# Patient Record
Sex: FEMALE | Race: WHITE | Employment: OTHER | URBAN - NONMETROPOLITAN AREA
[De-identification: names, ages, dates, MRNs, and addresses within clinical notes are randomized per-mention and may not be internally consistent; named-entity substitution may affect disease eponyms.]

---

## 2017-02-08 ENCOUNTER — PROCEDURE VISIT (OUTPATIENT)
Dept: CARDIOLOGY | Age: 22
End: 2017-02-08

## 2017-02-08 DIAGNOSIS — Z45.09 ENCOUNTER FOR ELECTRONIC ANALYSIS OF REVEAL EVENT RECORDER: Primary | ICD-10-CM

## 2017-02-08 PROCEDURE — 93298 REM INTERROG DEV EVAL SCRMS: CPT | Performed by: INTERNAL MEDICINE

## 2017-02-20 ENCOUNTER — OFFICE VISIT (OUTPATIENT)
Dept: FAMILY MEDICINE CLINIC | Age: 22
End: 2017-02-20

## 2017-02-20 VITALS
TEMPERATURE: 98.8 F | WEIGHT: 241.7 LBS | HEART RATE: 116 BPM | SYSTOLIC BLOOD PRESSURE: 108 MMHG | HEIGHT: 70 IN | DIASTOLIC BLOOD PRESSURE: 60 MMHG | BODY MASS INDEX: 34.6 KG/M2 | RESPIRATION RATE: 16 BRPM

## 2017-02-20 DIAGNOSIS — K11.20 SIALADENITIS: Primary | ICD-10-CM

## 2017-02-20 PROCEDURE — G8484 FLU IMMUNIZE NO ADMIN: HCPCS | Performed by: NURSE PRACTITIONER

## 2017-02-20 PROCEDURE — G8427 DOCREV CUR MEDS BY ELIG CLIN: HCPCS | Performed by: NURSE PRACTITIONER

## 2017-02-20 PROCEDURE — 1036F TOBACCO NON-USER: CPT | Performed by: NURSE PRACTITIONER

## 2017-02-20 PROCEDURE — 99213 OFFICE O/P EST LOW 20 MIN: CPT | Performed by: NURSE PRACTITIONER

## 2017-02-20 PROCEDURE — G8417 CALC BMI ABV UP PARAM F/U: HCPCS | Performed by: NURSE PRACTITIONER

## 2017-02-20 RX ORDER — CETIRIZINE HYDROCHLORIDE 10 MG/1
10 TABLET ORAL DAILY
Qty: 90 TABLET | Refills: 3 | Status: SHIPPED | OUTPATIENT
Start: 2017-02-20

## 2017-02-20 ASSESSMENT — ENCOUNTER SYMPTOMS
SORE THROAT: 0
RESPIRATORY NEGATIVE: 1
FACIAL SWELLING: 0
TROUBLE SWALLOWING: 0

## 2017-03-15 ENCOUNTER — TELEPHONE (OUTPATIENT)
Dept: CARDIOLOGY | Age: 22
End: 2017-03-15

## 2017-04-20 ENCOUNTER — TELEPHONE (OUTPATIENT)
Dept: CARDIOLOGY | Age: 22
End: 2017-04-20

## 2017-05-04 ENCOUNTER — TELEPHONE (OUTPATIENT)
Dept: CARDIOLOGY | Age: 22
End: 2017-05-04

## 2017-05-04 ENCOUNTER — PROCEDURE VISIT (OUTPATIENT)
Dept: CARDIOLOGY | Age: 22
End: 2017-05-04

## 2017-05-04 DIAGNOSIS — Z45.09 ENCOUNTER FOR ELECTRONIC ANALYSIS OF REVEAL EVENT RECORDER: Primary | ICD-10-CM

## 2017-05-04 PROCEDURE — 93298 REM INTERROG DEV EVAL SCRMS: CPT | Performed by: INTERNAL MEDICINE

## 2017-05-05 PROBLEM — I26.99 ACUTE PULMONARY EMBOLISM (HCC): Status: ACTIVE | Noted: 2017-05-05

## 2017-05-05 PROBLEM — R07.9 CHEST PAIN: Status: ACTIVE | Noted: 2017-05-05

## 2017-05-05 PROBLEM — N64.4 BREAST PAIN, RIGHT: Status: ACTIVE | Noted: 2017-05-05

## 2017-05-06 PROBLEM — R07.1 CHEST PAIN ON BREATHING: Status: ACTIVE | Noted: 2017-05-05

## 2017-05-08 ENCOUNTER — TELEPHONE (OUTPATIENT)
Dept: FAMILY MEDICINE CLINIC | Age: 22
End: 2017-05-08

## 2017-05-09 ENCOUNTER — TELEPHONE (OUTPATIENT)
Dept: FAMILY MEDICINE CLINIC | Age: 22
End: 2017-05-09

## 2017-05-16 ENCOUNTER — OFFICE VISIT (OUTPATIENT)
Dept: FAMILY MEDICINE CLINIC | Age: 22
End: 2017-05-16

## 2017-05-16 VITALS
TEMPERATURE: 98.9 F | WEIGHT: 254.6 LBS | OXYGEN SATURATION: 98 % | RESPIRATION RATE: 13 BRPM | BODY MASS INDEX: 36.45 KG/M2 | HEART RATE: 107 BPM | SYSTOLIC BLOOD PRESSURE: 116 MMHG | HEIGHT: 70 IN | DIASTOLIC BLOOD PRESSURE: 66 MMHG

## 2017-05-16 DIAGNOSIS — F33.41 MAJOR DEPRESSIVE DISORDER, RECURRENT, IN PARTIAL REMISSION (HCC): ICD-10-CM

## 2017-05-16 DIAGNOSIS — I26.99 OTHER ACUTE PULMONARY EMBOLISM WITHOUT ACUTE COR PULMONALE (HCC): Primary | ICD-10-CM

## 2017-05-16 PROCEDURE — 1111F DSCHRG MED/CURRENT MED MERGE: CPT | Performed by: NURSE PRACTITIONER

## 2017-05-16 PROCEDURE — 99214 OFFICE O/P EST MOD 30 MIN: CPT | Performed by: NURSE PRACTITIONER

## 2017-05-16 PROCEDURE — G8427 DOCREV CUR MEDS BY ELIG CLIN: HCPCS | Performed by: NURSE PRACTITIONER

## 2017-05-16 PROCEDURE — 1036F TOBACCO NON-USER: CPT | Performed by: NURSE PRACTITIONER

## 2017-05-16 PROCEDURE — G8417 CALC BMI ABV UP PARAM F/U: HCPCS | Performed by: NURSE PRACTITIONER

## 2017-05-16 RX ORDER — INDOMETHACIN 50 MG/1
50 CAPSULE ORAL 2 TIMES DAILY WITH MEALS
COMMUNITY
End: 2017-05-16 | Stop reason: ALTCHOICE

## 2017-05-16 ASSESSMENT — PATIENT HEALTH QUESTIONNAIRE - PHQ9
SUM OF ALL RESPONSES TO PHQ9 QUESTIONS 1 & 2: 0
2. FEELING DOWN, DEPRESSED OR HOPELESS: 0
1. LITTLE INTEREST OR PLEASURE IN DOING THINGS: 0
SUM OF ALL RESPONSES TO PHQ QUESTIONS 1-9: 0

## 2017-05-17 ENCOUNTER — OFFICE VISIT (OUTPATIENT)
Dept: CARDIOLOGY | Age: 22
End: 2017-05-17

## 2017-05-17 VITALS
WEIGHT: 252 LBS | HEART RATE: 93 BPM | DIASTOLIC BLOOD PRESSURE: 80 MMHG | SYSTOLIC BLOOD PRESSURE: 128 MMHG | BODY MASS INDEX: 36.08 KG/M2 | HEIGHT: 70 IN

## 2017-05-17 DIAGNOSIS — R07.1 CHEST PAIN ON BREATHING: Primary | ICD-10-CM

## 2017-05-17 PROCEDURE — 1036F TOBACCO NON-USER: CPT | Performed by: INTERNAL MEDICINE

## 2017-05-17 PROCEDURE — G8417 CALC BMI ABV UP PARAM F/U: HCPCS | Performed by: INTERNAL MEDICINE

## 2017-05-17 PROCEDURE — 93000 ELECTROCARDIOGRAM COMPLETE: CPT | Performed by: INTERNAL MEDICINE

## 2017-05-17 PROCEDURE — 1111F DSCHRG MED/CURRENT MED MERGE: CPT | Performed by: INTERNAL MEDICINE

## 2017-05-17 PROCEDURE — 99205 OFFICE O/P NEW HI 60 MIN: CPT | Performed by: INTERNAL MEDICINE

## 2017-05-17 PROCEDURE — G8427 DOCREV CUR MEDS BY ELIG CLIN: HCPCS | Performed by: INTERNAL MEDICINE

## 2017-05-17 RX ORDER — CLONIDINE HYDROCHLORIDE 0.1 MG/1
0.1 TABLET ORAL 2 TIMES DAILY
Qty: 60 TABLET | Refills: 3 | Status: SHIPPED | OUTPATIENT
Start: 2017-05-17 | End: 2017-09-21 | Stop reason: SDUPTHER

## 2017-05-17 ASSESSMENT — ENCOUNTER SYMPTOMS
BLURRED VISION: 0
ABDOMINAL PAIN: 0
COUGH: 0
ABDOMINAL PAIN: 0
SHORTNESS OF BREATH: 0
NAUSEA: 0
CHEST TIGHTNESS: 1
SHORTNESS OF BREATH: 0
DIARRHEA: 0
DOUBLE VISION: 0
COUGH: 0
NAUSEA: 0
WHEEZING: 0
RIGHT EYE: 0
LEFT EYE: 0
CONSTIPATION: 0
SORE THROAT: 0
VOMITING: 0
BACK PAIN: 0

## 2017-06-06 ENCOUNTER — CARE COORDINATION (OUTPATIENT)
Dept: FAMILY MEDICINE CLINIC | Age: 22
End: 2017-06-06

## 2017-06-09 ENCOUNTER — TELEPHONE (OUTPATIENT)
Dept: CARDIOLOGY | Age: 22
End: 2017-06-09

## 2017-06-09 ENCOUNTER — PROCEDURE VISIT (OUTPATIENT)
Dept: CARDIOLOGY | Age: 22
End: 2017-06-09

## 2017-06-09 DIAGNOSIS — Z45.09 ENCOUNTER FOR ELECTRONIC ANALYSIS OF REVEAL EVENT RECORDER: Primary | ICD-10-CM

## 2017-06-09 PROCEDURE — 93298 REM INTERROG DEV EVAL SCRMS: CPT | Performed by: INTERNAL MEDICINE

## 2017-06-20 ENCOUNTER — OFFICE VISIT (OUTPATIENT)
Dept: FAMILY MEDICINE CLINIC | Age: 22
End: 2017-06-20

## 2017-06-20 VITALS
RESPIRATION RATE: 14 BRPM | HEIGHT: 70 IN | SYSTOLIC BLOOD PRESSURE: 124 MMHG | OXYGEN SATURATION: 98 % | HEART RATE: 134 BPM | DIASTOLIC BLOOD PRESSURE: 74 MMHG | WEIGHT: 258.2 LBS | BODY MASS INDEX: 36.97 KG/M2 | TEMPERATURE: 98.5 F

## 2017-06-20 DIAGNOSIS — G90.A POTS (POSTURAL ORTHOSTATIC TACHYCARDIA SYNDROME): ICD-10-CM

## 2017-06-20 DIAGNOSIS — R76.8 POSITIVE ANA (ANTINUCLEAR ANTIBODY): ICD-10-CM

## 2017-06-20 DIAGNOSIS — K21.9 GASTROESOPHAGEAL REFLUX DISEASE WITHOUT ESOPHAGITIS: ICD-10-CM

## 2017-06-20 DIAGNOSIS — F41.1 GAD (GENERALIZED ANXIETY DISORDER): ICD-10-CM

## 2017-06-20 DIAGNOSIS — F33.0 MAJOR DEPRESSIVE DISORDER, RECURRENT EPISODE, MILD (HCC): Chronic | ICD-10-CM

## 2017-06-20 DIAGNOSIS — E79.0 ELEVATED URIC ACID IN BLOOD: Primary | ICD-10-CM

## 2017-06-20 DIAGNOSIS — R10.13 DYSPEPSIA: ICD-10-CM

## 2017-06-20 PROCEDURE — G8427 DOCREV CUR MEDS BY ELIG CLIN: HCPCS | Performed by: NURSE PRACTITIONER

## 2017-06-20 PROCEDURE — 99213 OFFICE O/P EST LOW 20 MIN: CPT | Performed by: NURSE PRACTITIONER

## 2017-06-20 PROCEDURE — 1036F TOBACCO NON-USER: CPT | Performed by: NURSE PRACTITIONER

## 2017-06-20 PROCEDURE — G8417 CALC BMI ABV UP PARAM F/U: HCPCS | Performed by: NURSE PRACTITIONER

## 2017-06-20 RX ORDER — COLCHICINE 0.6 MG/1
0.6 TABLET ORAL DAILY
Qty: 5 TABLET | Refills: 0 | Status: SHIPPED | OUTPATIENT
Start: 2017-06-20 | End: 2018-01-05 | Stop reason: ALTCHOICE

## 2017-06-21 PROBLEM — R76.8 POSITIVE ANA (ANTINUCLEAR ANTIBODY): Status: ACTIVE | Noted: 2017-06-21

## 2017-06-21 ASSESSMENT — ENCOUNTER SYMPTOMS
COUGH: 0
SHORTNESS OF BREATH: 0
NAUSEA: 1
ABDOMINAL PAIN: 0
VOMITING: 1

## 2017-07-05 ENCOUNTER — OFFICE VISIT (OUTPATIENT)
Dept: CARDIOLOGY | Age: 22
End: 2017-07-05

## 2017-07-05 ENCOUNTER — CARE COORDINATION (OUTPATIENT)
Dept: FAMILY MEDICINE CLINIC | Age: 22
End: 2017-07-05

## 2017-07-05 VITALS
DIASTOLIC BLOOD PRESSURE: 82 MMHG | HEIGHT: 70 IN | RESPIRATION RATE: 22 BRPM | WEIGHT: 270 LBS | SYSTOLIC BLOOD PRESSURE: 133 MMHG | HEART RATE: 121 BPM | BODY MASS INDEX: 38.65 KG/M2 | OXYGEN SATURATION: 98 %

## 2017-07-05 DIAGNOSIS — R42 DIZZINESS: Primary | ICD-10-CM

## 2017-07-05 DIAGNOSIS — R00.0 TACHYCARDIA: ICD-10-CM

## 2017-07-05 PROCEDURE — 93000 ELECTROCARDIOGRAM COMPLETE: CPT | Performed by: INTERNAL MEDICINE

## 2017-07-05 PROCEDURE — 1036F TOBACCO NON-USER: CPT | Performed by: INTERNAL MEDICINE

## 2017-07-05 PROCEDURE — 99213 OFFICE O/P EST LOW 20 MIN: CPT | Performed by: INTERNAL MEDICINE

## 2017-07-05 PROCEDURE — G8427 DOCREV CUR MEDS BY ELIG CLIN: HCPCS | Performed by: INTERNAL MEDICINE

## 2017-07-05 PROCEDURE — G8417 CALC BMI ABV UP PARAM F/U: HCPCS | Performed by: INTERNAL MEDICINE

## 2017-07-05 ASSESSMENT — ENCOUNTER SYMPTOMS
RIGHT EYE: 0
LEFT EYE: 0
ABDOMINAL PAIN: 0
COUGH: 0
SHORTNESS OF BREATH: 0
SORE THROAT: 0
WHEEZING: 0
CONSTIPATION: 0
NAUSEA: 0
DOUBLE VISION: 0
VOMITING: 0
DIARRHEA: 0
BLURRED VISION: 0
BACK PAIN: 0

## 2017-07-25 ENCOUNTER — PROCEDURE VISIT (OUTPATIENT)
Dept: CARDIOLOGY CLINIC | Age: 22
End: 2017-07-25
Payer: COMMERCIAL

## 2017-07-25 DIAGNOSIS — Z45.09 ENCOUNTER FOR ELECTRONIC ANALYSIS OF REVEAL EVENT RECORDER: Primary | ICD-10-CM

## 2017-07-25 PROCEDURE — 93298 REM INTERROG DEV EVAL SCRMS: CPT | Performed by: INTERNAL MEDICINE

## 2017-08-07 ENCOUNTER — OFFICE VISIT (OUTPATIENT)
Dept: FAMILY MEDICINE CLINIC | Age: 22
End: 2017-08-07
Payer: COMMERCIAL

## 2017-08-07 ENCOUNTER — HOSPITAL ENCOUNTER (OUTPATIENT)
Age: 22
Discharge: HOME | End: 2017-08-07
Payer: COMMERCIAL

## 2017-08-07 VITALS
SYSTOLIC BLOOD PRESSURE: 118 MMHG | HEART RATE: 76 BPM | BODY MASS INDEX: 38.48 KG/M2 | DIASTOLIC BLOOD PRESSURE: 80 MMHG | RESPIRATION RATE: 16 BRPM | HEIGHT: 70 IN | OXYGEN SATURATION: 98 % | WEIGHT: 268.8 LBS

## 2017-08-07 DIAGNOSIS — R76.8 POSITIVE ANA (ANTINUCLEAR ANTIBODY): ICD-10-CM

## 2017-08-07 DIAGNOSIS — D64.9 ANEMIA, UNSPECIFIED TYPE: Primary | ICD-10-CM

## 2017-08-07 DIAGNOSIS — D64.9 ANEMIA, UNSPECIFIED TYPE: ICD-10-CM

## 2017-08-07 DIAGNOSIS — Z79.01 ANTICOAGULANT LONG-TERM USE: ICD-10-CM

## 2017-08-07 DIAGNOSIS — K21.9 GASTROESOPHAGEAL REFLUX DISEASE WITHOUT ESOPHAGITIS: ICD-10-CM

## 2017-08-07 LAB
FERRITIN: 14 NG/ML (ref 10–291)
FOLATE: > 20 NG/ML (ref 4.8–24.2)
HCT VFR BLD CALC: 37.7 % (ref 37–47)
HEMOGLOBIN: 12.4 GM/DL (ref 12–16)
MCH RBC QN AUTO: 29.4 PG (ref 27–31)
MCHC RBC AUTO-ENTMCNC: 33 GM/DL (ref 33–37)
MCV RBC AUTO: 88.9 FL (ref 81–99)
PDW BLD-RTO: 14.9 % (ref 11.5–14.5)
PLATELET # BLD: 235 THOU/MM3 (ref 130–400)
PMV BLD AUTO: 8.9 MCM (ref 7.4–10.4)
RBC # BLD: 4.24 MILL/MM3 (ref 4.2–5.4)
VITAMIN B-12: 531 PG/ML (ref 211–911)
WBC # BLD: 9.7 THOU/MM3 (ref 4.8–10.8)

## 2017-08-07 PROCEDURE — 82728 ASSAY OF FERRITIN: CPT

## 2017-08-07 PROCEDURE — 85027 COMPLETE CBC AUTOMATED: CPT

## 2017-08-07 PROCEDURE — 99213 OFFICE O/P EST LOW 20 MIN: CPT | Performed by: NURSE PRACTITIONER

## 2017-08-07 PROCEDURE — 82607 VITAMIN B-12: CPT

## 2017-08-07 PROCEDURE — G8417 CALC BMI ABV UP PARAM F/U: HCPCS | Performed by: NURSE PRACTITIONER

## 2017-08-07 PROCEDURE — G8427 DOCREV CUR MEDS BY ELIG CLIN: HCPCS | Performed by: NURSE PRACTITIONER

## 2017-08-07 PROCEDURE — 36415 COLL VENOUS BLD VENIPUNCTURE: CPT

## 2017-08-07 PROCEDURE — 1036F TOBACCO NON-USER: CPT | Performed by: NURSE PRACTITIONER

## 2017-08-07 PROCEDURE — 82746 ASSAY OF FOLIC ACID SERUM: CPT

## 2017-08-07 RX ORDER — OMEPRAZOLE 40 MG/1
40 CAPSULE, DELAYED RELEASE ORAL 2 TIMES DAILY
Qty: 60 CAPSULE | Refills: 5 | Status: SHIPPED | OUTPATIENT
Start: 2017-08-07 | End: 2018-02-03 | Stop reason: SDUPTHER

## 2017-08-08 ASSESSMENT — ENCOUNTER SYMPTOMS
SHORTNESS OF BREATH: 0
COUGH: 0
ABDOMINAL PAIN: 0
NAUSEA: 0
VOMITING: 0

## 2017-08-29 ENCOUNTER — HOSPITAL ENCOUNTER (EMERGENCY)
Age: 22
Discharge: HOME | End: 2017-08-29
Attending: FAMILY MEDICINE
Payer: COMMERCIAL

## 2017-08-29 VITALS
BODY MASS INDEX: 38.57 KG/M2 | TEMPERATURE: 97.4 F | RESPIRATION RATE: 18 BRPM | OXYGEN SATURATION: 99 % | HEART RATE: 130 BPM | SYSTOLIC BLOOD PRESSURE: 112 MMHG | WEIGHT: 265 LBS | DIASTOLIC BLOOD PRESSURE: 72 MMHG

## 2017-08-29 DIAGNOSIS — R51.9 NONINTRACTABLE EPISODIC HEADACHE, UNSPECIFIED HEADACHE TYPE: Primary | ICD-10-CM

## 2017-08-29 LAB
ALBUMIN SERPL-MCNC: 4.7 G/DL (ref 3.5–5.1)
ALP BLD-CCNC: 127 U/L (ref 38–126)
ALT SERPL-CCNC: 20 U/L (ref 11–66)
ANION GAP SERPL CALCULATED.3IONS-SCNC: 21 MEQ/L (ref 8–16)
ANISOCYTOSIS: ABNORMAL
APTT: 38.8 SECONDS (ref 22–38)
AST SERPL-CCNC: 17 U/L (ref 5–40)
BASOPHILS # BLD: 0.5 %
BASOPHILS ABSOLUTE: 0 THOU/MM3 (ref 0–0.1)
BILIRUB SERPL-MCNC: < 0.2 MG/DL (ref 0.3–1.2)
BILIRUBIN DIRECT: < 0.2 MG/DL (ref 0–0.3)
BUN BLDV-MCNC: 9 MG/DL (ref 7–22)
CALCIUM SERPL-MCNC: 9.6 MG/DL (ref 8.5–10.5)
CHLORIDE BLD-SCNC: 96 MEQ/L (ref 98–111)
CO2: 22 MEQ/L (ref 23–33)
CREAT SERPL-MCNC: 0.6 MG/DL (ref 0.4–1.2)
EOSINOPHIL # BLD: 1.9 %
EOSINOPHILS ABSOLUTE: 0.2 THOU/MM3 (ref 0–0.4)
GFR SERPL CREATININE-BSD FRML MDRD: > 90 ML/MIN/1.73M2
GLUCOSE BLD-MCNC: 129 MG/DL (ref 70–108)
HCT VFR BLD CALC: 36.6 % (ref 37–47)
HEMOGLOBIN: 12.4 GM/DL (ref 12–16)
INR BLD: 1.05 (ref 0.85–1.13)
LIPASE: 38.3 U/L (ref 5.6–51.3)
LYMPHOCYTES # BLD: 27.2 %
LYMPHOCYTES ABSOLUTE: 2.6 THOU/MM3 (ref 1–4.8)
MCH RBC QN AUTO: 29.6 PG (ref 27–31)
MCHC RBC AUTO-ENTMCNC: 33.9 GM/DL (ref 33–37)
MCV RBC AUTO: 87.4 FL (ref 81–99)
MONOCYTES # BLD: 5.7 %
MONOCYTES ABSOLUTE: 0.6 THOU/MM3 (ref 0.4–1.3)
NUCLEATED RED BLOOD CELLS: 0 /100 WBC
OSMOLALITY CALCULATION: 277.9 MOSMOL/KG (ref 275–300)
PDW BLD-RTO: 15.4 % (ref 11.5–14.5)
PLATELET # BLD: 281 THOU/MM3 (ref 130–400)
PMV BLD AUTO: 8.3 MCM (ref 7.4–10.4)
POTASSIUM SERPL-SCNC: 3.7 MEQ/L (ref 3.5–5.2)
RBC # BLD: 4.19 MILL/MM3 (ref 4.2–5.4)
RBC # BLD: NORMAL 10*6/UL
SEG NEUTROPHILS: 64.7 %
SEGMENTED NEUTROPHILS ABSOLUTE COUNT: 6.3 THOU/MM3 (ref 1.8–7.7)
SODIUM BLD-SCNC: 139 MEQ/L (ref 135–145)
TOTAL PROTEIN: 8.2 G/DL (ref 6.1–8)
WBC # BLD: 9.7 THOU/MM3 (ref 4.8–10.8)

## 2017-08-29 PROCEDURE — 85025 COMPLETE CBC W/AUTO DIFF WBC: CPT

## 2017-08-29 PROCEDURE — 80053 COMPREHEN METABOLIC PANEL: CPT

## 2017-08-29 PROCEDURE — 96375 TX/PRO/DX INJ NEW DRUG ADDON: CPT

## 2017-08-29 PROCEDURE — 82248 BILIRUBIN DIRECT: CPT

## 2017-08-29 PROCEDURE — 6360000002 HC RX W HCPCS: Performed by: FAMILY MEDICINE

## 2017-08-29 PROCEDURE — 85730 THROMBOPLASTIN TIME PARTIAL: CPT

## 2017-08-29 PROCEDURE — 36415 COLL VENOUS BLD VENIPUNCTURE: CPT

## 2017-08-29 PROCEDURE — 2500000003 HC RX 250 WO HCPCS: Performed by: FAMILY MEDICINE

## 2017-08-29 PROCEDURE — 83690 ASSAY OF LIPASE: CPT

## 2017-08-29 PROCEDURE — 96374 THER/PROPH/DIAG INJ IV PUSH: CPT

## 2017-08-29 PROCEDURE — 6370000000 HC RX 637 (ALT 250 FOR IP): Performed by: FAMILY MEDICINE

## 2017-08-29 PROCEDURE — 85610 PROTHROMBIN TIME: CPT

## 2017-08-29 PROCEDURE — 99283 EMERGENCY DEPT VISIT LOW MDM: CPT

## 2017-08-29 PROCEDURE — S0028 INJECTION, FAMOTIDINE, 20 MG: HCPCS | Performed by: FAMILY MEDICINE

## 2017-08-29 RX ORDER — TRAMADOL HYDROCHLORIDE 50 MG/1
50 TABLET ORAL ONCE
Status: COMPLETED | OUTPATIENT
Start: 2017-08-29 | End: 2017-08-29

## 2017-08-29 RX ORDER — ONDANSETRON 2 MG/ML
4 INJECTION INTRAMUSCULAR; INTRAVENOUS ONCE
Status: COMPLETED | OUTPATIENT
Start: 2017-08-29 | End: 2017-08-29

## 2017-08-29 RX ADMIN — ONDANSETRON 4 MG: 2 INJECTION INTRAMUSCULAR; INTRAVENOUS at 16:57

## 2017-08-29 RX ADMIN — TRAMADOL HYDROCHLORIDE 50 MG: 50 TABLET, FILM COATED ORAL at 17:25

## 2017-08-29 RX ADMIN — FAMOTIDINE 20 MG: 10 INJECTION, SOLUTION INTRAVENOUS at 16:57

## 2017-08-29 ASSESSMENT — PAIN DESCRIPTION - LOCATION: LOCATION: ABDOMEN;HEAD

## 2017-08-29 ASSESSMENT — ENCOUNTER SYMPTOMS
NAUSEA: 1
EYE DISCHARGE: 0

## 2017-08-29 ASSESSMENT — PAIN DESCRIPTION - DESCRIPTORS: DESCRIPTORS: BURNING;ACHING

## 2017-08-29 ASSESSMENT — PAIN DESCRIPTION - PAIN TYPE: TYPE: ACUTE PAIN

## 2017-08-29 ASSESSMENT — PAIN SCALES - GENERAL: PAINLEVEL_OUTOF10: 8

## 2017-08-31 ENCOUNTER — OFFICE VISIT (OUTPATIENT)
Dept: FAMILY MEDICINE CLINIC | Age: 22
End: 2017-08-31
Payer: COMMERCIAL

## 2017-08-31 VITALS
TEMPERATURE: 98.5 F | HEART RATE: 135 BPM | RESPIRATION RATE: 15 BRPM | OXYGEN SATURATION: 98 % | DIASTOLIC BLOOD PRESSURE: 80 MMHG | WEIGHT: 264.8 LBS | SYSTOLIC BLOOD PRESSURE: 120 MMHG | HEIGHT: 70 IN | BODY MASS INDEX: 37.91 KG/M2

## 2017-08-31 DIAGNOSIS — G43.501 PERSISTENT MIGRAINE AURA WITHOUT CEREBRAL INFARCTION AND WITH STATUS MIGRAINOSUS, NOT INTRACTABLE: Primary | ICD-10-CM

## 2017-08-31 DIAGNOSIS — F41.1 GAD (GENERALIZED ANXIETY DISORDER): ICD-10-CM

## 2017-08-31 DIAGNOSIS — R76.8 POSITIVE ANA (ANTINUCLEAR ANTIBODY): ICD-10-CM

## 2017-08-31 DIAGNOSIS — R11.0 NAUSEA: ICD-10-CM

## 2017-08-31 DIAGNOSIS — R00.0 TACHYARRHYTHMIA: ICD-10-CM

## 2017-08-31 DIAGNOSIS — F33.0 MAJOR DEPRESSIVE DISORDER, RECURRENT EPISODE, MILD (HCC): Chronic | ICD-10-CM

## 2017-08-31 PROCEDURE — G8427 DOCREV CUR MEDS BY ELIG CLIN: HCPCS | Performed by: NURSE PRACTITIONER

## 2017-08-31 PROCEDURE — 1036F TOBACCO NON-USER: CPT | Performed by: NURSE PRACTITIONER

## 2017-08-31 PROCEDURE — G8417 CALC BMI ABV UP PARAM F/U: HCPCS | Performed by: NURSE PRACTITIONER

## 2017-08-31 PROCEDURE — 99213 OFFICE O/P EST LOW 20 MIN: CPT | Performed by: NURSE PRACTITIONER

## 2017-08-31 RX ORDER — TRAMADOL HYDROCHLORIDE 50 MG/1
50 TABLET ORAL EVERY 8 HOURS PRN
Qty: 15 TABLET | Refills: 0 | Status: SHIPPED | OUTPATIENT
Start: 2017-08-31 | End: 2017-09-10

## 2017-08-31 RX ORDER — ONDANSETRON 4 MG/1
8 TABLET, ORALLY DISINTEGRATING ORAL EVERY 8 HOURS PRN
Qty: 15 TABLET | Refills: 0 | Status: SHIPPED | OUTPATIENT
Start: 2017-08-31 | End: 2017-10-31 | Stop reason: ALTCHOICE

## 2017-08-31 ASSESSMENT — ENCOUNTER SYMPTOMS
PHOTOPHOBIA: 1
SHORTNESS OF BREATH: 0
NAUSEA: 1
DIARRHEA: 0
VOMITING: 1
ABDOMINAL PAIN: 0
COUGH: 0
CONSTIPATION: 0

## 2017-09-01 ENCOUNTER — CARE COORDINATION (OUTPATIENT)
Dept: FAMILY MEDICINE CLINIC | Age: 22
End: 2017-09-01

## 2017-09-05 ENCOUNTER — PROCEDURE VISIT (OUTPATIENT)
Dept: CARDIOLOGY CLINIC | Age: 22
End: 2017-09-05
Payer: COMMERCIAL

## 2017-09-05 ENCOUNTER — HOSPITAL ENCOUNTER (OUTPATIENT)
Age: 22
Discharge: HOME | End: 2017-09-05
Payer: COMMERCIAL

## 2017-09-05 DIAGNOSIS — Z45.09 ENCOUNTER FOR ELECTRONIC ANALYSIS OF REVEAL EVENT RECORDER: Primary | ICD-10-CM

## 2017-09-05 DIAGNOSIS — D64.9 ANEMIA, UNSPECIFIED TYPE: ICD-10-CM

## 2017-09-05 LAB
HCT VFR BLD CALC: 36.8 % (ref 37–47)
HEMOGLOBIN: 12.1 GM/DL (ref 12–16)
MCH RBC QN AUTO: 28.9 PG (ref 27–31)
MCHC RBC AUTO-ENTMCNC: 32.9 GM/DL (ref 33–37)
MCV RBC AUTO: 87.9 FL (ref 81–99)
PDW BLD-RTO: 15.2 % (ref 11.5–14.5)
PLATELET # BLD: 269 THOU/MM3 (ref 130–400)
PMV BLD AUTO: 8.6 MCM (ref 7.4–10.4)
RBC # BLD: 4.19 MILL/MM3 (ref 4.2–5.4)
WBC # BLD: 10.8 THOU/MM3 (ref 4.8–10.8)

## 2017-09-05 PROCEDURE — 36415 COLL VENOUS BLD VENIPUNCTURE: CPT

## 2017-09-05 PROCEDURE — 93298 REM INTERROG DEV EVAL SCRMS: CPT | Performed by: INTERNAL MEDICINE

## 2017-09-05 PROCEDURE — 85027 COMPLETE CBC AUTOMATED: CPT

## 2017-09-19 ENCOUNTER — OFFICE VISIT (OUTPATIENT)
Dept: RHEUMATOLOGY | Age: 22
End: 2017-09-19
Payer: COMMERCIAL

## 2017-09-19 VITALS
BODY MASS INDEX: 38.65 KG/M2 | RESPIRATION RATE: 16 BRPM | HEIGHT: 70 IN | SYSTOLIC BLOOD PRESSURE: 130 MMHG | DIASTOLIC BLOOD PRESSURE: 80 MMHG | WEIGHT: 270 LBS | HEART RATE: 98 BPM

## 2017-09-19 DIAGNOSIS — R76.8 ANA POSITIVE: Primary | ICD-10-CM

## 2017-09-19 DIAGNOSIS — G89.29 CHRONIC MIDLINE LOW BACK PAIN WITHOUT SCIATICA: ICD-10-CM

## 2017-09-19 DIAGNOSIS — M54.50 CHRONIC MIDLINE LOW BACK PAIN WITHOUT SCIATICA: ICD-10-CM

## 2017-09-19 PROCEDURE — 99204 OFFICE O/P NEW MOD 45 MIN: CPT | Performed by: INTERNAL MEDICINE

## 2017-09-19 PROCEDURE — G8417 CALC BMI ABV UP PARAM F/U: HCPCS | Performed by: INTERNAL MEDICINE

## 2017-09-19 PROCEDURE — 1036F TOBACCO NON-USER: CPT | Performed by: INTERNAL MEDICINE

## 2017-09-19 PROCEDURE — G8427 DOCREV CUR MEDS BY ELIG CLIN: HCPCS | Performed by: INTERNAL MEDICINE

## 2017-09-19 ASSESSMENT — ENCOUNTER SYMPTOMS
NAUSEA: 1
ABDOMINAL PAIN: 1
EYES NEGATIVE: 1
DIARRHEA: 0
CONSTIPATION: 0
WHEEZING: 1
VOMITING: 1
SHORTNESS OF BREATH: 1

## 2017-09-21 ENCOUNTER — HOSPITAL ENCOUNTER (OUTPATIENT)
Dept: GENERAL RADIOLOGY | Age: 22
Discharge: HOME | End: 2017-09-21
Payer: COMMERCIAL

## 2017-09-21 ENCOUNTER — HOSPITAL ENCOUNTER (OUTPATIENT)
Age: 22
Discharge: HOME | End: 2017-09-21
Payer: COMMERCIAL

## 2017-09-21 DIAGNOSIS — R76.8 ANA POSITIVE: ICD-10-CM

## 2017-09-21 LAB
BACTERIA: ABNORMAL
BILIRUBIN URINE: NEGATIVE
BLOOD, URINE: NEGATIVE
C-REACTIVE PROTEIN: 1.99 MG/DL (ref 0–1)
CASTS: ABNORMAL /LPF
CASTS: ABNORMAL /LPF
CHARACTER, URINE: ABNORMAL
COLOR: YELLOW
CRYSTALS: ABNORMAL
EPITHELIAL CELLS, UA: ABNORMAL /HPF
GLUCOSE, URINE: NEGATIVE MG/DL
KETONES, URINE: NEGATIVE
LEUKOCYTE EST, POC: ABNORMAL
MISCELLANEOUS LAB TEST RESULT: ABNORMAL
NITRITE, URINE: NEGATIVE
PH UA: 6.5
PROTEIN UA: NEGATIVE MG/DL
RBC URINE: ABNORMAL /HPF
RENAL EPITHELIAL, UA: ABNORMAL
SEDIMENTATION RATE, ERYTHROCYTE: 37 MM/HR (ref 0–20)
SPECIFIC GRAVITY UA: 1.02 (ref 1–1.03)
UROBILINOGEN, URINE: 0.2 EU/DL
WBC UA: ABNORMAL /HPF
YEAST: ABNORMAL

## 2017-09-21 PROCEDURE — 86160 COMPLEMENT ANTIGEN: CPT

## 2017-09-21 PROCEDURE — 36415 COLL VENOUS BLD VENIPUNCTURE: CPT

## 2017-09-21 PROCEDURE — 72202 X-RAY EXAM SI JOINTS 3/> VWS: CPT

## 2017-09-21 PROCEDURE — 85651 RBC SED RATE NONAUTOMATED: CPT

## 2017-09-21 PROCEDURE — 86140 C-REACTIVE PROTEIN: CPT

## 2017-09-21 PROCEDURE — 86162 COMPLEMENT TOTAL (CH50): CPT

## 2017-09-21 PROCEDURE — 86038 ANTINUCLEAR ANTIBODIES: CPT

## 2017-09-21 PROCEDURE — 86147 CARDIOLIPIN ANTIBODY EA IG: CPT

## 2017-09-21 PROCEDURE — 85730 THROMBOPLASTIN TIME PARTIAL: CPT

## 2017-09-21 PROCEDURE — 86039 ANTINUCLEAR ANTIBODIES (ANA): CPT

## 2017-09-21 PROCEDURE — 82784 ASSAY IGA/IGD/IGG/IGM EACH: CPT

## 2017-09-21 PROCEDURE — 72100 X-RAY EXAM L-S SPINE 2/3 VWS: CPT

## 2017-09-21 PROCEDURE — 81001 URINALYSIS AUTO W/SCOPE: CPT

## 2017-09-21 PROCEDURE — 85613 RUSSELL VIPER VENOM DILUTED: CPT

## 2017-09-21 PROCEDURE — 85610 PROTHROMBIN TIME: CPT

## 2017-09-21 RX ORDER — CLONIDINE HYDROCHLORIDE 0.1 MG/1
0.1 TABLET ORAL 2 TIMES DAILY
Qty: 60 TABLET | Refills: 1 | Status: SHIPPED | OUTPATIENT
Start: 2017-09-21 | End: 2017-11-20 | Stop reason: SDUPTHER

## 2017-09-22 LAB — IGA: 81 MG/DL (ref 70–400)

## 2017-09-23 LAB
C3 COMPLEMENT: 215 MG/DL (ref 88–201)
C4 COMPLEMENT: 37 MG/DL (ref 10–40)

## 2017-09-24 LAB
ANA SCREEN: DETECTED
CARDIOLIPIN AB IGM: 8 MPL (ref 0–12)
CARDIOLIPIN ANTIBODY, IGG: 5 GPL (ref 0–14)
COMPLEMENT TOTAL (CH50): 172 CAE UNITS (ref 60–144)

## 2017-09-25 LAB
ANA SCREEN, REFLEXED: ABNORMAL
DRVVT 1:1 MIX: NORMAL SEC (ref 33–44)
DRVVT CONFIRMATION TEST: NORMAL RATIO
DRVVT SCREEN: 34 SEC (ref 33–44)
HEXAGONAL PHOSPHOLIPID NEUTRALIZAT TEST: NORMAL
LUPUS ANTICOAG INTERP: NORMAL
PLATELET NEUTRALIZATION: NORMAL
PROTHROMBIN TIME: 12.8 SEC (ref 12–15.5)
PTT 1:1 MIX: NORMAL SEC (ref 32–48)
PTT LUPUS ANTICOAGULANT: 44 SEC (ref 32–48)
PTT-HEPARIN NEUTRALIZED: NORMAL SEC (ref 32–48)
REPTILASE TIME: NORMAL SEC
THROMBIN TIME: NORMAL SEC (ref 14.7–19.5)

## 2017-10-02 ENCOUNTER — PROCEDURE VISIT (OUTPATIENT)
Dept: CARDIOLOGY CLINIC | Age: 22
End: 2017-10-02

## 2017-10-02 DIAGNOSIS — Z45.09 ENCOUNTER FOR ELECTRONIC ANALYSIS OF REVEAL EVENT RECORDER: Primary | ICD-10-CM

## 2017-10-12 ENCOUNTER — CARE COORDINATION (OUTPATIENT)
Dept: CARE COORDINATION | Age: 22
End: 2017-10-12

## 2017-10-16 ENCOUNTER — APPOINTMENT (OUTPATIENT)
Dept: GENERAL RADIOLOGY | Age: 22
End: 2017-10-16
Payer: COMMERCIAL

## 2017-10-16 ENCOUNTER — APPOINTMENT (OUTPATIENT)
Dept: CT IMAGING | Age: 22
End: 2017-10-16
Payer: COMMERCIAL

## 2017-10-16 ENCOUNTER — HOSPITAL ENCOUNTER (EMERGENCY)
Age: 22
Discharge: HOME | End: 2017-10-16
Attending: EMERGENCY MEDICINE
Payer: COMMERCIAL

## 2017-10-16 ENCOUNTER — TELEPHONE (OUTPATIENT)
Dept: FAMILY MEDICINE CLINIC | Age: 22
End: 2017-10-16

## 2017-10-16 VITALS
OXYGEN SATURATION: 96 % | WEIGHT: 270 LBS | SYSTOLIC BLOOD PRESSURE: 113 MMHG | HEART RATE: 113 BPM | TEMPERATURE: 98.4 F | HEIGHT: 70 IN | BODY MASS INDEX: 38.65 KG/M2 | RESPIRATION RATE: 17 BRPM | DIASTOLIC BLOOD PRESSURE: 94 MMHG

## 2017-10-16 DIAGNOSIS — J45.21 MILD INTERMITTENT ASTHMA WITH EXACERBATION: Primary | ICD-10-CM

## 2017-10-16 DIAGNOSIS — F41.1 ANXIETY STATE: ICD-10-CM

## 2017-10-16 DIAGNOSIS — J40 BRONCHITIS: Primary | ICD-10-CM

## 2017-10-16 LAB
ALBUMIN SERPL-MCNC: 3.9 G/DL (ref 3.5–5.1)
ALP BLD-CCNC: 126 U/L (ref 38–126)
ALT SERPL-CCNC: 18 U/L (ref 11–66)
AMPHETAMINE+METHAMPHETAMINE URINE SCREEN: NEGATIVE
ANION GAP SERPL CALCULATED.3IONS-SCNC: 17 MEQ/L (ref 8–16)
ANISOCYTOSIS: ABNORMAL
APTT: 32.1 SECONDS (ref 22–38)
AST SERPL-CCNC: 18 U/L (ref 5–40)
BACTERIA: ABNORMAL /HPF
BARBITURATE QUANTITATIVE URINE: NEGATIVE
BASOPHILS # BLD: 0.7 %
BASOPHILS ABSOLUTE: 0.1 THOU/MM3 (ref 0–0.1)
BENZODIAZEPINE QUANTITATIVE URINE: POSITIVE
BILIRUB SERPL-MCNC: < 0.2 MG/DL (ref 0.3–1.2)
BILIRUBIN DIRECT: < 0.2 MG/DL (ref 0–0.3)
BILIRUBIN URINE: NEGATIVE
BLOOD, URINE: NEGATIVE
BUN BLDV-MCNC: 10 MG/DL (ref 7–22)
CALCIUM SERPL-MCNC: 8.8 MG/DL (ref 8.5–10.5)
CANNABINOID QUANTITATIVE URINE: POSITIVE
CASTS 2: ABNORMAL /LPF
CASTS UA: ABNORMAL /LPF
CHARACTER, URINE: CLEAR
CHLORIDE BLD-SCNC: 103 MEQ/L (ref 98–111)
CO2: 21 MEQ/L (ref 23–33)
COCAINE METABOLITE QUANTITATIVE URINE: NEGATIVE
COLOR: YELLOW
CREAT SERPL-MCNC: 0.6 MG/DL (ref 0.4–1.2)
CRYSTALS, UA: ABNORMAL
EKG ATRIAL RATE: 106 BPM
EKG P AXIS: 30 DEGREES
EKG P-R INTERVAL: 128 MS
EKG Q-T INTERVAL: 340 MS
EKG QRS DURATION: 82 MS
EKG QTC CALCULATION (BAZETT): 451 MS
EKG R AXIS: 47 DEGREES
EKG T AXIS: 38 DEGREES
EKG VENTRICULAR RATE: 106 BPM
EOSINOPHIL # BLD: 2.7 %
EOSINOPHILS ABSOLUTE: 0.3 THOU/MM3 (ref 0–0.4)
EPITHELIAL CELLS, UA: ABNORMAL /HPF
GFR SERPL CREATININE-BSD FRML MDRD: > 90 ML/MIN/1.73M2
GLUCOSE BLD-MCNC: 129 MG/DL (ref 70–108)
GLUCOSE URINE: NEGATIVE MG/DL
HCT VFR BLD CALC: 34.7 % (ref 37–47)
HEMOGLOBIN: 11.4 GM/DL (ref 12–16)
INR BLD: 1.03 (ref 0.85–1.13)
KETONES, URINE: NEGATIVE
LEUKOCYTE ESTERASE, URINE: ABNORMAL
LIPASE: 49.6 U/L (ref 5.6–51.3)
LYMPHOCYTES # BLD: 32.2 %
LYMPHOCYTES ABSOLUTE: 3.6 THOU/MM3 (ref 1–4.8)
MAGNESIUM: 1.7 MG/DL (ref 1.6–2.4)
MCH RBC QN AUTO: 28.5 PG (ref 27–31)
MCHC RBC AUTO-ENTMCNC: 32.9 GM/DL (ref 33–37)
MCV RBC AUTO: 86.6 FL (ref 81–99)
MISCELLANEOUS 2: ABNORMAL
MONOCYTES # BLD: 6.2 %
MONOCYTES ABSOLUTE: 0.7 THOU/MM3 (ref 0.4–1.3)
NITRITE, URINE: NEGATIVE
NUCLEATED RED BLOOD CELLS: 0 /100 WBC
OPIATES, URINE: NEGATIVE
OSMOLALITY CALCULATION: 282 MOSMOL/KG (ref 275–300)
OXYCODONE: NEGATIVE
PDW BLD-RTO: 15.4 % (ref 11.5–14.5)
PH UA: 6
PHENCYCLIDINE QUANTITATIVE URINE: NEGATIVE
PLATELET # BLD: 287 THOU/MM3 (ref 130–400)
PMV BLD AUTO: 8.7 MCM (ref 7.4–10.4)
POTASSIUM SERPL-SCNC: 3.6 MEQ/L (ref 3.5–5.2)
PREGNANCY, SERUM: NEGATIVE
PREGNANCY, URINE: NEGATIVE
PROTEIN UA: NEGATIVE
RBC # BLD: 4 MILL/MM3 (ref 4.2–5.4)
RBC # BLD: NORMAL 10*6/UL
RBC URINE: ABNORMAL /HPF
RENAL EPITHELIAL, UA: ABNORMAL
SEG NEUTROPHILS: 58.2 %
SEGMENTED NEUTROPHILS ABSOLUTE COUNT: 6.6 THOU/MM3 (ref 1.8–7.7)
SODIUM BLD-SCNC: 141 MEQ/L (ref 135–145)
SPECIFIC GRAVITY, URINE: 1.03 (ref 1–1.03)
TOTAL PROTEIN: 6.9 G/DL (ref 6.1–8)
TROPONIN T: < 0.01 NG/ML
UROBILINOGEN, URINE: 0.2 EU/DL
WBC # BLD: 11.3 THOU/MM3 (ref 4.8–10.8)
WBC UA: ABNORMAL /HPF
YEAST: ABNORMAL

## 2017-10-16 PROCEDURE — 80053 COMPREHEN METABOLIC PANEL: CPT

## 2017-10-16 PROCEDURE — 80307 DRUG TEST PRSMV CHEM ANLYZR: CPT

## 2017-10-16 PROCEDURE — 93005 ELECTROCARDIOGRAM TRACING: CPT | Performed by: EMERGENCY MEDICINE

## 2017-10-16 PROCEDURE — 85025 COMPLETE CBC W/AUTO DIFF WBC: CPT

## 2017-10-16 PROCEDURE — 6360000004 HC RX CONTRAST MEDICATION: Performed by: EMERGENCY MEDICINE

## 2017-10-16 PROCEDURE — 85730 THROMBOPLASTIN TIME PARTIAL: CPT

## 2017-10-16 PROCEDURE — 84484 ASSAY OF TROPONIN QUANT: CPT

## 2017-10-16 PROCEDURE — 94640 AIRWAY INHALATION TREATMENT: CPT

## 2017-10-16 PROCEDURE — 87086 URINE CULTURE/COLONY COUNT: CPT

## 2017-10-16 PROCEDURE — 36415 COLL VENOUS BLD VENIPUNCTURE: CPT

## 2017-10-16 PROCEDURE — 71275 CT ANGIOGRAPHY CHEST: CPT

## 2017-10-16 PROCEDURE — 6360000002 HC RX W HCPCS: Performed by: EMERGENCY MEDICINE

## 2017-10-16 PROCEDURE — 6370000000 HC RX 637 (ALT 250 FOR IP): Performed by: EMERGENCY MEDICINE

## 2017-10-16 PROCEDURE — 84703 CHORIONIC GONADOTROPIN ASSAY: CPT

## 2017-10-16 PROCEDURE — 71020 XR CHEST STANDARD TWO VW: CPT

## 2017-10-16 PROCEDURE — 81025 URINE PREGNANCY TEST: CPT

## 2017-10-16 PROCEDURE — 81001 URINALYSIS AUTO W/SCOPE: CPT

## 2017-10-16 PROCEDURE — 96374 THER/PROPH/DIAG INJ IV PUSH: CPT

## 2017-10-16 PROCEDURE — 82248 BILIRUBIN DIRECT: CPT

## 2017-10-16 PROCEDURE — 83735 ASSAY OF MAGNESIUM: CPT

## 2017-10-16 PROCEDURE — 99285 EMERGENCY DEPT VISIT HI MDM: CPT

## 2017-10-16 PROCEDURE — 85610 PROTHROMBIN TIME: CPT

## 2017-10-16 PROCEDURE — 83690 ASSAY OF LIPASE: CPT

## 2017-10-16 RX ORDER — IPRATROPIUM BROMIDE AND ALBUTEROL SULFATE 2.5; .5 MG/3ML; MG/3ML
1 SOLUTION RESPIRATORY (INHALATION) ONCE
Status: COMPLETED | OUTPATIENT
Start: 2017-10-16 | End: 2017-10-16

## 2017-10-16 RX ORDER — GUAIFENESIN/DEXTROMETHORPHAN 100-10MG/5
5 SYRUP ORAL 3 TIMES DAILY PRN
Qty: 120 ML | Refills: 0 | Status: SHIPPED | OUTPATIENT
Start: 2017-10-16 | End: 2017-10-26

## 2017-10-16 RX ORDER — METHYLPREDNISOLONE SODIUM SUCCINATE 125 MG/2ML
80 INJECTION, POWDER, LYOPHILIZED, FOR SOLUTION INTRAMUSCULAR; INTRAVENOUS ONCE
Status: COMPLETED | OUTPATIENT
Start: 2017-10-16 | End: 2017-10-16

## 2017-10-16 RX ORDER — ALBUTEROL SULFATE 90 UG/1
2 AEROSOL, METERED RESPIRATORY (INHALATION) EVERY 6 HOURS PRN
Qty: 1 INHALER | Refills: 3 | Status: SHIPPED | OUTPATIENT
Start: 2017-10-16 | End: 2021-03-23 | Stop reason: ALTCHOICE

## 2017-10-16 RX ORDER — ALBUTEROL SULFATE 2.5 MG/3ML
2.5 SOLUTION RESPIRATORY (INHALATION) EVERY 6 HOURS PRN
Qty: 120 EACH | Refills: 3 | Status: SHIPPED | OUTPATIENT
Start: 2017-10-16 | End: 2019-04-11 | Stop reason: SDUPTHER

## 2017-10-16 RX ORDER — PREDNISONE 10 MG/1
40 TABLET ORAL DAILY
Qty: 40 TABLET | Refills: 0 | Status: SHIPPED | OUTPATIENT
Start: 2017-10-16 | End: 2017-10-26

## 2017-10-16 RX ORDER — ALPRAZOLAM 0.5 MG/1
0.25 TABLET ORAL ONCE
Status: COMPLETED | OUTPATIENT
Start: 2017-10-16 | End: 2017-10-16

## 2017-10-16 RX ADMIN — IPRATROPIUM BROMIDE AND ALBUTEROL SULFATE 1 AMPULE: .5; 3 SOLUTION RESPIRATORY (INHALATION) at 05:29

## 2017-10-16 RX ADMIN — IPRATROPIUM BROMIDE AND ALBUTEROL SULFATE 1 AMPULE: .5; 3 SOLUTION RESPIRATORY (INHALATION) at 05:32

## 2017-10-16 RX ADMIN — METHYLPREDNISOLONE SODIUM SUCCINATE 80 MG: 125 INJECTION, POWDER, FOR SOLUTION INTRAMUSCULAR; INTRAVENOUS at 05:27

## 2017-10-16 RX ADMIN — IOPAMIDOL 80 ML: 755 INJECTION, SOLUTION INTRAVENOUS at 06:10

## 2017-10-16 RX ADMIN — ALPRAZOLAM 0.25 MG: 0.5 TABLET ORAL at 05:25

## 2017-10-16 ASSESSMENT — ENCOUNTER SYMPTOMS
VOMITING: 0
BACK PAIN: 0
SINUS PRESSURE: 0
VOICE CHANGE: 0
ABDOMINAL DISTENTION: 0
CHOKING: 0
SHORTNESS OF BREATH: 1
WHEEZING: 0
EYE PAIN: 0
SORE THROAT: 0
EYE ITCHING: 0
EYE REDNESS: 0
CHEST TIGHTNESS: 0
ABDOMINAL PAIN: 0
COUGH: 1
DIARRHEA: 0
PHOTOPHOBIA: 0
RHINORRHEA: 0
CONSTIPATION: 0
NAUSEA: 0
EYE DISCHARGE: 0
TROUBLE SWALLOWING: 0
BLOOD IN STOOL: 0

## 2017-10-16 ASSESSMENT — PAIN DESCRIPTION - DESCRIPTORS
DESCRIPTORS: ACHING
DESCRIPTORS: ACHING

## 2017-10-16 ASSESSMENT — PAIN DESCRIPTION - PAIN TYPE
TYPE: ACUTE PAIN
TYPE: ACUTE PAIN

## 2017-10-16 ASSESSMENT — PAIN SCALES - GENERAL
PAINLEVEL_OUTOF10: 7
PAINLEVEL_OUTOF10: 7

## 2017-10-16 ASSESSMENT — PAIN DESCRIPTION - LOCATION
LOCATION: OTHER (COMMENT)
LOCATION: OTHER (COMMENT)

## 2017-10-16 NOTE — ED PROVIDER NOTES
[facility]  eMERGENCY dEPARTMENT eNCOUnter          Raul Guallpa       Chief Complaint   Patient presents with    Shortness of Breath    Cough       Nurses Notes reviewed and I agree except as noted in the HPI. HISTORY OF PRESENT ILLNESS    Deana Dailey is a 25 y.o. female who presents to the Emergency Department for the evaluation of her cough. Patient states that she began to cough approximately 24 hours ago. Patient reports associated shortness of breath, \"lungs hurting\" which began on Friday, anxiety and bronchial spasms. She states she usually treats her spasms with an at-home machine which will not currently turn on. Patient denies experiencing any associated fevers. Patient has a medical history of DVT, which she keeps up with her treatment for, as well as a history of asthma. Patient denies smoking or any chance of pregnancy. Patient has previously taken steroids for symptoms like she is experiencing today. The HPI was provided by the patient. REVIEW OF SYSTEMS     Review of Systems   Constitutional: Negative for activity change, appetite change, diaphoresis, fatigue and unexpected weight change. HENT: Negative for congestion, ear discharge, ear pain, hearing loss, rhinorrhea, sinus pressure, sore throat, trouble swallowing and voice change. Eyes: Negative for photophobia, pain, discharge, redness and itching. Respiratory: Positive for cough and shortness of breath. Negative for choking, chest tightness and wheezing. Cardiovascular: Negative for chest pain, palpitations and leg swelling. Gastrointestinal: Negative for abdominal distention, abdominal pain, blood in stool, constipation, diarrhea, nausea and vomiting. Endocrine: Negative for polydipsia, polyphagia and polyuria. Genitourinary: Negative for decreased urine volume, difficulty urinating, dysuria, enuresis, frequency, hematuria and urgency.    Musculoskeletal: Negative for arthralgias, back pain, gait problem, myalgias, neck pain and neck stiffness. Skin: Negative for pallor and rash. Allergic/Immunologic: Negative for immunocompromised state. Neurological: Negative for dizziness, tremors, seizures, syncope, facial asymmetry, weakness, light-headedness, numbness and headaches. Hematological: Negative for adenopathy. Does not bruise/bleed easily. Psychiatric/Behavioral: Negative for agitation, hallucinations and suicidal ideas. The patient is nervous/anxious. PAST MEDICAL HISTORY    has a past medical history of ADD (attention deficit disorder); Anxiety; Anxiety attack; Asthma; Atypical face pain; Back pain; Bipolar 1 disorder ([address] Fibromyalgia; Major depressive disorder, recurrent episode, mild ([address] Obesity; POTS (postural orthostatic tachycardia syndrome); Pott disease; Pulmonary emboli ([address] Seizures ([address] Tailbone injury; Thyroid disease; TMJ (dislocation of temporomandibular joint); and Trigeminal neuralgia. SURGICAL HISTORY      has a past surgical history that includes [place] tooth extraction; Cardiac catheterization (3-2016); Cardiac catheterization (04/21/2016); and Cosmetic surgery.     CURRENT MEDICATIONS       Previous Medications    ACETAMINOPHEN (AMINOFEN) 325 MG TABLET    Take 2 tablets by mouth every 4 hours as needed for Pain    BUSPIRONE (BUSPAR) 15 MG TABLET    Take 30 mg by mouth 2 times daily     CALCIUM CITRATE-VITAMIN D (CALCIUM CITRATE + D PO)    Take 1 tablet by mouth daily    CETIRIZINE (ZYRTEC) 10 MG TABLET    Take 1 tablet by mouth daily    CLONIDINE (CATAPRES) 0.1 MG TABLET    Take 1 tablet by mouth 2 times daily    COLCHICINE (COLCRYS) 0.6 MG TABLET    Take 1 tablet by mouth daily for 5 doses    DIAZEPAM (VALIUM) 10 MG TABLET    Take 10 mg by mouth every 8 hours as needed for Anxiety    DULOXETINE (CYMBALTA) 60 MG CAPSULE    Take 60 mg by mouth daily     FLUTICASONE (FLONASE) 50 MCG/ACT NASAL SPRAY    2 sprays by Nasal route as needed for Rhinitis bronchiectasis. Imaged portion of the upper abdomen is unremarkable. The superficial soft tissues are unremarkable. No acute bony abnormality or worrisome osseous lesions identified. Impression:    No central or large segmental pulmonary embolism or other acute finding. Electronically Signed By: Carroll Caal                   LABS:   Labs Reviewed   CBC WITH AUTO DIFFERENTIAL - Abnormal; Notable for the following:        Result Value    WBC 11.3 (*)     RBC 4.00 (*)     Hemoglobin 11.4 (*)     Hematocrit 34.7 (*)     MCHC 32.9 (*)     RDW 15.4 (*)     All other components within normal limits   BASIC METABOLIC PANEL - Abnormal; Notable for the following:     CO2 21 (*)     Glucose 129 (*)     All other components within normal limits   HEPATIC FUNCTION PANEL - Abnormal; Notable for the following: Total Bilirubin <0.2 (*)     All other components within normal limits   ANION GAP - Abnormal; Notable for the following: Anion Gap 17.0 (*)     All other components within normal limits   LIPASE   TROPONIN   MAGNESIUM   HCG, SERUM, QUALITATIVE   APTT   PROTIME-INR   OSMOLALITY   GLOMERULAR FILTRATION RATE, ESTIMATED   PREGNANCY, URINE   URINE DRUG SCREEN   URINE WITH REFLEXED MICRO       EMERGENCY DEPARTMENT COURSE:   Vitals:    Vitals:    10/16/17 0458 10/16/17 0502 10/16/17 0550   BP:  (!) 146/86 (!) 113/94   Pulse: 120  113   Resp: 18  17   Temp: 98.4 °F (36.9 °C)     TempSrc: Oral     SpO2: 97%  96%   Weight: 270 lb (122.5 kg)     Height: 5' 10\" (1.778 m)       5:10 AM: The patient was seen and evaluated. Appropriate labs were ordered and reviewed. Patient does have anxiety she will be given Xanax here. This does sound like an asthma exacerbation however the patient is on Xarelto for DVTs. CT scan will be obtained of her chest.  I reviewed all labs and imaging 1 back to bedside. CT scan was negative chest x-ray was negative, patient's labs were good.   Patient had received 2 DuoNeb treatments here.  She had good results with these. At this point I felt the patient could be safely discharged home. She is instructed to follow-up with her primary care physician and call today for an appointment. She may benefit from seeing a pulmonary doctor again. I instructed her to have her primary care physician suggested follow-up and call for an appointment. Patient understood and agreed with the plan. Patient is subsequently discharged home in improved condition. Patient has what appears to be a mild asthma exacerbation. She also has mild anxiety state. Patient is instructed to take her anxiety medication at home. She has been given a nebulizer machine here she is instructed use it as prescribed. She is also given a prescription for an albuterol inhaler as well as cough medication she is instructed to take that as prescribed. Patient is instructed to follow-up with her primary care physician and to do so in the next 1-2 days and to return to the emergency room for any new or worsening complaints. CRITICAL CARE:   None     CONSULTS:  None    PROCEDURES:  None    FINAL IMPRESSION      1. Mild intermittent asthma with exacerbation    2.  Anxiety state          DISPOSITION/PLAN   Discharge    PATIENT REFERRED TO:  Radha Germain NP  [address] [place] [address]  [address] [place] [address]  [phone]    Call in 2 days        DISCHARGE MEDICATIONS:  New Prescriptions    ALBUTEROL SULFATE HFA (PROAIR HFA) 108 (90 BASE) MCG/ACT INHALER    Inhale 2 puffs into the lungs every 6 hours as needed for Wheezing    GUAIFENESIN-DEXTROMETHORPHAN (ROBITUSSIN DM) 100-10 MG/5ML SYRUP    Take 5 mLs by mouth 3 times daily as needed for Cough    PREDNISONE (DELTASONE) 10 MG TABLET    Take 4 tablets by mouth daily for 10 days       (Please note that portions of this note were completed with a voice recognition program.  Efforts were made to edit the dictations but occasionally words are mis-transcribed.)    Scribe:  Catalino Vyas

## 2017-10-16 NOTE — TELEPHONE ENCOUNTER
Patient's mom Mesfin Burkett on [employer] calling for patient. Patient was seen at ER today and dx with bronchitis. They went home and the albuterol they have at home for nebulizer machine is . Requesting Rx to [facility]. Will check with pharmacy after 5pm regarding.   Please advise

## 2017-10-17 LAB
ORGANISM: ABNORMAL
URINE CULTURE REFLEX: ABNORMAL

## 2017-10-18 ENCOUNTER — CARE COORDINATION (OUTPATIENT)
Dept: FAMILY MEDICINE CLINIC | Age: 22
End: 2017-10-18

## 2017-10-31 ENCOUNTER — TELEPHONE (OUTPATIENT)
Dept: CARDIOLOGY CLINIC | Age: 22
End: 2017-10-31

## 2017-10-31 ENCOUNTER — OFFICE VISIT (OUTPATIENT)
Dept: CARDIOLOGY CLINIC | Age: 22
End: 2017-10-31
Payer: COMMERCIAL

## 2017-10-31 VITALS
SYSTOLIC BLOOD PRESSURE: 133 MMHG | DIASTOLIC BLOOD PRESSURE: 81 MMHG | HEIGHT: 70 IN | HEART RATE: 123 BPM | WEIGHT: 268 LBS | BODY MASS INDEX: 38.37 KG/M2

## 2017-10-31 DIAGNOSIS — G90.A POTS (POSTURAL ORTHOSTATIC TACHYCARDIA SYNDROME): Primary | ICD-10-CM

## 2017-10-31 DIAGNOSIS — I95.81 POSTPROCEDURAL HYPOTENSION: ICD-10-CM

## 2017-10-31 PROCEDURE — 1036F TOBACCO NON-USER: CPT | Performed by: INTERNAL MEDICINE

## 2017-10-31 PROCEDURE — 99215 OFFICE O/P EST HI 40 MIN: CPT | Performed by: INTERNAL MEDICINE

## 2017-10-31 PROCEDURE — G8417 CALC BMI ABV UP PARAM F/U: HCPCS | Performed by: INTERNAL MEDICINE

## 2017-10-31 PROCEDURE — G8427 DOCREV CUR MEDS BY ELIG CLIN: HCPCS | Performed by: INTERNAL MEDICINE

## 2017-10-31 PROCEDURE — G8484 FLU IMMUNIZE NO ADMIN: HCPCS | Performed by: INTERNAL MEDICINE

## 2017-10-31 PROCEDURE — 93000 ELECTROCARDIOGRAM COMPLETE: CPT | Performed by: INTERNAL MEDICINE

## 2017-10-31 ASSESSMENT — ENCOUNTER SYMPTOMS
BLURRED VISION: 0
VOMITING: 0
WHEEZING: 0
ABDOMINAL PAIN: 0
LEFT EYE: 0
DOUBLE VISION: 0
COUGH: 0
CONSTIPATION: 0
SORE THROAT: 0
RIGHT EYE: 0
NAUSEA: 0
BACK PAIN: 0
DIARRHEA: 0
SHORTNESS OF BREATH: 0

## 2017-10-31 NOTE — PROGRESS NOTES
responded to the midodrine. The patient was also found when she went to the ED for this problem on 5/5/2017 to have several small pulmonary emboli. Her BCPs were stopped and she is on Xarelto.     7/5/2017:  The patient continues to have frequent episodes of chest pains, dizziness, and rapid heart rates. She has been to the ED several times for chest pain. Her ECG on 7/3/2017 showed sinus tachycardia with a rate of 112 bpm.  The patient states the clonidine helped early on but it has not been working lately. 10/31/2017:  The patient has weaned off the Seroquel and Artane. She continues to have constant palpitations and does not feel any better. The patient is still taking the Clonidine 0.1 mg 1 po BID. She has been on Metoprolol in the past but could not tolerate this medication secondary to hypotension.     Past Medical History:      Diagnosis Date    ADD (attention deficit disorder)     Anxiety 4/8/2015    Anxiety attack     Asthma     exercise induced    Atypical face pain     Back pain     Bipolar 1 disorder (HCC)     Fibromyalgia     Major depressive disorder, recurrent episode, mild ([address] 7/31/2012    Obesity 10/24/2014    POTS (postural orthostatic tachycardia syndrome)     Pott disease     Pulmonary emboli (HCC)     Seizures ([address] 07/31/2016    Tailbone injury 11/28/15    patient broke tailbone    Thyroid disease     borderline low     TMJ (dislocation of temporomandibular joint)     Trigeminal neuralgia      Past Surgical History:      Procedure Laterality Date    CARDIAC CATHETERIZATION  3-2016    EP Study    [facility]    COSMETIC SURGERY      wisdom teeth surgery 2010    WISDOM TOOTH EXTRACTION       Past Family History:       Problem Relation Age of Onset    Anxiety Disorder Mother     Diabetes Mother     Anxiety Disorder Father     Bipolar Disorder Father     High Blood Pressure Father     Mental Illness Father     Parkinsonism Father      Early-Onset    Depression Paternal Aunt     Cancer Paternal Uncle     Depression Paternal Uncle     Cancer Maternal Grandmother     Depression Maternal Grandfather     Cancer Paternal Grandfather     Lupus Maternal Aunt       Past Social History:     Social History     Social History    Marital status: Single     Spouse name: N/A    Number of children: N/A    Years of education: N/A     Social History Main Topics    Smoking status: Never Smoker    Smokeless tobacco: Never Used    Alcohol use 0.6 oz/week     1 Cans of beer per week    Drug use:      Types: Marijuana    Sexual activity: Yes     Partners: Male     Other Topics Concern    None     Social History Narrative    None       Medications:   Scheduled Meds:  Continuous Infusions:  CBC: No results for input(s): WBC, HGB, PLT in the last 72 hours. BMP:  No results for input(s): NA, K, CL, CO2, BUN, CREATININE, GLUCOSE in the last 72 hours. Hepatic: No results for input(s): AST, ALT, ALB, BILITOT, ALKPHOS in the last 72 hours. Troponin: No results for input(s): TROPONINI in the last 72 hours. BNP: No results for input(s): BNP in the last 72 hours. Lipids: No results for input(s): CHOL, HDL in the last 72 hours. Invalid input(s): LDLCALCU  INR: No results for input(s): INR in the last 72 hours. Objective:   REVIEW OF SYSTEMS:    Review of Systems   Constitution: Negative for fever, weight gain and weight loss. HENT: Negative for hearing loss and sore throat. Eyes: Negative for blurred vision, double vision, vision loss in left eye and vision loss in right eye. Cardiovascular: Positive for chest pain, dyspnea on exertion and palpitations. Negative for irregular heartbeat, near-syncope and syncope. Respiratory: Negative for cough, shortness of breath and wheezing. Endocrine: Negative for cold intolerance, heat intolerance and polyuria. Hematologic/Lymphatic: Negative for bleeding problem.  Does not bruise/bleed

## 2017-10-31 NOTE — PROGRESS NOTES
Patient is here to follow up from medication changes. C/o cp located on the left side described as a sharp pain and heaviness does not travel, dizziness, lightheaded. Denies sob, palpitations and LAM. EKG done today. Patient wants to discuss the high heart rate and when the linq monitor can be taken out.

## 2017-11-02 PROBLEM — I95.9 HYPOTENSION: Status: ACTIVE | Noted: 2017-11-02

## 2017-11-06 ENCOUNTER — CARE COORDINATION (OUTPATIENT)
Dept: CARE COORDINATION | Age: 22
End: 2017-11-06

## 2017-11-09 ENCOUNTER — TELEPHONE (OUTPATIENT)
Dept: CARDIOLOGY CLINIC | Age: 22
End: 2017-11-09

## 2017-11-09 NOTE — TELEPHONE ENCOUNTER
Moved Deana's SVT ablation from 8am to 10am on  1.12.2018. Spoke with Dorian Sanderson at [facility] lab'  Notified kervin for the [address] [place] [address] Road. LMOM for patient to return my call.

## 2017-11-10 ENCOUNTER — PROCEDURE VISIT (OUTPATIENT)
Dept: CARDIOLOGY CLINIC | Age: 22
End: 2017-11-10
Payer: COMMERCIAL

## 2017-11-10 DIAGNOSIS — Z45.09 ENCOUNTER FOR ELECTRONIC ANALYSIS OF REVEAL EVENT RECORDER: Primary | ICD-10-CM

## 2017-11-10 PROCEDURE — 93298 REM INTERROG DEV EVAL SCRMS: CPT | Performed by: INTERNAL MEDICINE

## 2017-11-13 ENCOUNTER — CARE COORDINATION (OUTPATIENT)
Dept: CARE COORDINATION | Age: 22
End: 2017-11-13

## 2017-11-20 RX ORDER — CLONIDINE HYDROCHLORIDE 0.1 MG/1
TABLET ORAL
Qty: 60 TABLET | Refills: 0 | Status: SHIPPED | OUTPATIENT
Start: 2017-11-20 | End: 2017-12-19 | Stop reason: ALTCHOICE

## 2017-12-14 ENCOUNTER — HOSPITAL ENCOUNTER (EMERGENCY)
Age: 22
Discharge: HOME | End: 2017-12-15
Payer: COMMERCIAL

## 2017-12-14 VITALS
HEIGHT: 70 IN | RESPIRATION RATE: 18 BRPM | WEIGHT: 270 LBS | TEMPERATURE: 97.9 F | OXYGEN SATURATION: 98 % | SYSTOLIC BLOOD PRESSURE: 139 MMHG | DIASTOLIC BLOOD PRESSURE: 92 MMHG | HEART RATE: 148 BPM | BODY MASS INDEX: 38.65 KG/M2

## 2017-12-14 DIAGNOSIS — S39.012A STRAIN OF LUMBAR REGION, INITIAL ENCOUNTER: Primary | ICD-10-CM

## 2017-12-14 LAB
AMPHETAMINE+METHAMPHETAMINE URINE SCREEN: NEGATIVE
BARBITURATE QUANTITATIVE URINE: NEGATIVE
BENZODIAZEPINE QUANTITATIVE URINE: POSITIVE
CANNABINOID QUANTITATIVE URINE: POSITIVE
COCAINE METABOLITE QUANTITATIVE URINE: NEGATIVE
OPIATES, URINE: NEGATIVE
OXYCODONE: NEGATIVE
PHENCYCLIDINE QUANTITATIVE URINE: NEGATIVE
PREGNANCY, URINE: NEGATIVE

## 2017-12-14 PROCEDURE — 80307 DRUG TEST PRSMV CHEM ANLYZR: CPT

## 2017-12-14 PROCEDURE — 87086 URINE CULTURE/COLONY COUNT: CPT

## 2017-12-14 PROCEDURE — 99283 EMERGENCY DEPT VISIT LOW MDM: CPT

## 2017-12-14 PROCEDURE — 81025 URINE PREGNANCY TEST: CPT

## 2017-12-14 PROCEDURE — 81001 URINALYSIS AUTO W/SCOPE: CPT

## 2017-12-14 ASSESSMENT — PAIN DESCRIPTION - PAIN TYPE: TYPE: ACUTE PAIN

## 2017-12-14 ASSESSMENT — PAIN DESCRIPTION - ORIENTATION: ORIENTATION: LOWER;MID

## 2017-12-14 ASSESSMENT — PAIN DESCRIPTION - FREQUENCY: FREQUENCY: CONTINUOUS

## 2017-12-14 ASSESSMENT — PAIN DESCRIPTION - LOCATION: LOCATION: BACK

## 2017-12-14 ASSESSMENT — PAIN SCALES - GENERAL: PAINLEVEL_OUTOF10: 10

## 2017-12-15 ENCOUNTER — PROCEDURE VISIT (OUTPATIENT)
Dept: CARDIOLOGY CLINIC | Age: 22
End: 2017-12-15
Payer: COMMERCIAL

## 2017-12-15 DIAGNOSIS — Z45.09 ENCOUNTER FOR ELECTRONIC ANALYSIS OF REVEAL EVENT RECORDER: Primary | ICD-10-CM

## 2017-12-15 LAB
AMORPHOUS: ABNORMAL
BACTERIA: ABNORMAL /HPF
BILIRUBIN URINE: NEGATIVE
BLOOD, URINE: ABNORMAL
CASTS 2: ABNORMAL /LPF
CASTS UA: ABNORMAL /LPF
CHARACTER, URINE: ABNORMAL
COLOR: YELLOW
CRYSTALS, UA: ABNORMAL
EPITHELIAL CELLS, UA: ABNORMAL /HPF
GLUCOSE URINE: NEGATIVE MG/DL
KETONES, URINE: NEGATIVE
LEUKOCYTE ESTERASE, URINE: ABNORMAL
MISCELLANEOUS 2: ABNORMAL
NITRITE, URINE: NEGATIVE
PH UA: 7
PROTEIN UA: NEGATIVE
RBC URINE: ABNORMAL /HPF
RENAL EPITHELIAL, UA: PRESENT
SPECIFIC GRAVITY, URINE: 1.03 (ref 1–1.03)
UROBILINOGEN, URINE: 0.2 EU/DL
WBC UA: ABNORMAL /HPF
YEAST: ABNORMAL

## 2017-12-15 PROCEDURE — 93298 REM INTERROG DEV EVAL SCRMS: CPT | Performed by: INTERNAL MEDICINE

## 2017-12-15 PROCEDURE — 96372 THER/PROPH/DIAG INJ SC/IM: CPT

## 2017-12-15 PROCEDURE — 6360000002 HC RX W HCPCS: Performed by: NURSE PRACTITIONER

## 2017-12-15 RX ORDER — KETOROLAC TROMETHAMINE 30 MG/ML
30 INJECTION, SOLUTION INTRAMUSCULAR; INTRAVENOUS ONCE
Status: COMPLETED | OUTPATIENT
Start: 2017-12-15 | End: 2017-12-15

## 2017-12-15 RX ORDER — ALPRAZOLAM 1 MG/1
2 TABLET ORAL DAILY
COMMUNITY
End: 2018-01-05 | Stop reason: DRUGHIGH

## 2017-12-15 RX ORDER — KETOROLAC TROMETHAMINE 10 MG/1
10 TABLET, FILM COATED ORAL EVERY 6 HOURS PRN
Qty: 20 TABLET | Refills: 0 | Status: SHIPPED | OUTPATIENT
Start: 2017-12-15 | End: 2017-12-19

## 2017-12-15 RX ORDER — ORPHENADRINE CITRATE 100 MG/1
100 TABLET, EXTENDED RELEASE ORAL 2 TIMES DAILY PRN
Qty: 14 TABLET | Refills: 0 | Status: SHIPPED | OUTPATIENT
Start: 2017-12-15 | End: 2017-12-19

## 2017-12-15 RX ORDER — ORPHENADRINE CITRATE 30 MG/ML
60 INJECTION INTRAMUSCULAR; INTRAVENOUS ONCE
Status: COMPLETED | OUTPATIENT
Start: 2017-12-15 | End: 2017-12-15

## 2017-12-15 RX ADMIN — KETOROLAC TROMETHAMINE 30 MG: 30 INJECTION, SOLUTION INTRAMUSCULAR at 00:19

## 2017-12-15 RX ADMIN — ORPHENADRINE CITRATE 60 MG: 30 INJECTION INTRAMUSCULAR; INTRAVENOUS at 00:19

## 2017-12-15 ASSESSMENT — ENCOUNTER SYMPTOMS
NAUSEA: 0
SORE THROAT: 0
BACK PAIN: 1
EYE DISCHARGE: 0
ABDOMINAL PAIN: 0
COUGH: 0
EYE PAIN: 0
VOMITING: 0
SHORTNESS OF BREATH: 0
WHEEZING: 0
DIARRHEA: 0
RHINORRHEA: 0

## 2017-12-15 ASSESSMENT — PAIN SCALES - GENERAL: PAINLEVEL_OUTOF10: 10

## 2017-12-15 NOTE — ED NOTES
Pt presents to ED due to lower mid back pain. Pt states she is experiencing a fibromyalgia flare-up. Pt states her pain began a few days ago. Pt states she used to see Dr. Cintia Brown for pain management however he is no longer in practice and therefore is in between providers. Pt states she was receiving pain injections and it has been 6-7 months since her last pain injection.      Emi Arora RN  12/14/17 2910

## 2017-12-15 NOTE — ED PROVIDER NOTES
is not nervous/anxious. PAST MEDICAL HISTORY    has a past medical history of ADD (attention deficit disorder); Anxiety; Anxiety attack; Asthma; Atypical face pain; Back pain; Bipolar 1 disorder ([address] Fibromyalgia; Hypotension; Major depressive disorder, recurrent episode, mild ([address] Obesity; POTS (postural orthostatic tachycardia syndrome); Pott disease; Pulmonary emboli ([address] Seizures ([address] Tailbone injury; Thyroid disease; TMJ (dislocation of temporomandibular joint); and Trigeminal neuralgia. SURGICAL HISTORY      has a past surgical history that includes [place] tooth extraction; Cardiac catheterization (3-2016); Cardiac catheterization (04/21/2016); and Cosmetic surgery. CURRENT MEDICATIONS       Discharge Medication List as of 12/15/2017  1:28 AM      CONTINUE these medications which have NOT CHANGED    Details   ALPRAZolam (XANAX) 1 MG tablet Take 2 mg by mouth daily . Historical Med      cloNIDine (CATAPRES) 0.1 MG tablet TAKE 1 TABLET BY MOUTH TWO TIMES A DAY , Disp-60 tablet, R-0Normal      ivabradine (CORLANOR) 5 MG TABS tablet Take 1 tablet by mouth 2 times daily (with meals), Disp-60 tablet, R-1Normal      albuterol sulfate HFA (PROAIR HFA) 108 (90 Base) MCG/ACT inhaler Inhale 2 puffs into the lungs every 6 hours as needed for Wheezing, Disp-1 Inhaler, R-3Print      albuterol (PROVENTIL) (2.5 MG/3ML) 0.083% nebulizer solution Take 3 mLs by nebulization every 6 hours as needed for Wheezing, Disp-120 each, R-3Normal      Mirtazapine (REMERON PO) Take 7.5 mg by mouth dailyHistorical Med      omeprazole (PRILOSEC) 40 MG delayed release capsule Take 1 capsule by mouth 2 times daily, Disp-60 capsule, R-5Normal      colchicine (COLCRYS) 0.6 MG tablet Take 1 tablet by mouth daily for 5 doses, Disp-5 tablet, R-0Print      rivaroxaban (XARELTO) 20 MG TABS tablet Take 1 tablet by mouth every 24 hours, Disp-30 tablet, R-5Normal      acetaminophen (AMINOFEN) 325 MG tablet Take 2 tablets by mouth Bipolar Disorder in her father; Cancer in her maternal grandmother, paternal grandfather, and paternal uncle; Depression in her maternal grandfather, paternal aunt, and paternal uncle; Diabetes in her mother; High Blood Pressure in her father; Lupus in her maternal aunt; Mental Illness in her father; Parkinsonism in her father. SOCIAL HISTORY      reports that she has never smoked. She has never used smokeless tobacco. She reports that she drinks about 0.6 oz of alcohol per week . She reports that she uses drugs, including Marijuana. PHYSICAL EXAM     INITIAL VITALS:  height is 5' 9.5\" (1.765 m) and weight is 270 lb (122.5 kg). Her oral temperature is 97.9 °F (36.6 °C). Her blood pressure is 139/92 (abnormal) and her pulse is 148. Her respiration is 18 and oxygen saturation is 98%. Physical Exam   Constitutional: She is oriented to person, place, and time. She appears well-developed and well-nourished. HENT:   Head: Normocephalic and atraumatic. Right Ear: External ear normal.   Left Ear: External ear normal.   Eyes: Conjunctivae are normal. Right eye exhibits no discharge. Left eye exhibits no discharge. No scleral icterus. Neck: Normal range of motion. Neck supple. No JVD present. Pulmonary/Chest: Effort normal. No stridor. No respiratory distress. Abdominal: Soft. She exhibits no distension. Musculoskeletal: Normal range of motion. She exhibits no edema. Lumbar back: She exhibits tenderness. Spinous and Paraspinous tenderness through L4 to L5 that radiates to the sides. No weakness, numbness, tingling, or incontinence    Neurological: She is alert and oriented to person, place, and time. She exhibits normal muscle tone. GCS eye subscore is 4. GCS verbal subscore is 5. GCS motor subscore is 6. Skin: Skin is warm and dry. She is not diaphoretic. No erythema. Psychiatric: She has a normal mood and affect.  Her behavior is normal.   Nursing note and vitals reviewed. DIFFERENTIAL DIAGNOSIS:   Including but not limited to strain, less likely UTI, chronic back pain    DIAGNOSTIC RESULTS     EKG: All EKG's are interpreted by the Emergency Department Physician who either signs or Co-signs this chart in the absence of a cardiologist.  None    RADIOLOGY: non-plain film images(s) such as CT, Ultrasound and MRI are read by the radiologist.  Plain radiographic images are visualized and preliminarily interpreted by the emergency physician unless otherwise stated below. No orders to display         LABS:   Labs Reviewed   URINE WITH REFLEXED MICRO - Abnormal; Notable for the following:        Result Value    Blood, Urine SMALL (*)     Leukocyte Esterase, Urine MODERATE (*)     Character, Urine CLOUDY (*)     All other components within normal limits   URINE CULTURE, REFLEXED   PREGNANCY, URINE   URINE DRUG SCREEN         EMERGENCY DEPARTMENT COURSE AND MEDICAL DECISION MAKING:   Vitals:    Vitals:    12/14/17 2308   BP: (!) 139/92   Pulse: 148   Resp: 18   Temp: 97.9 °F (36.6 °C)   TempSrc: Oral   SpO2: 98%   Weight: 270 lb (122.5 kg)   Height: 5' 9.5\" (1.765 m)         Pertinent Labs & Imaging studies reviewed. (See chart for details)    She was seen and evaluated the emergency room for low back pain without injury. She indicates that she sees a rheumatologist for long-term issues with fibromyalgia as well as pots. She states that due to Dr. Víctor Stone not practicing anymore she's been without a rheumatologist for several months. She normally gets lidocaine injections into the spine. She is scheduled to see Dr. Power Gannon in the next couple of weeks. Today she comes in with complaints of lower back pain. Nothing makes it better or worse. She states that it is painful enough to cause anxiety and nausea vomiting. Patient is treated with Norflex and Toradol. She had significant relief in her discomfort.  Patient is discharged in stable condition with prescriptions for Norflex and

## 2017-12-16 LAB
ORGANISM: ABNORMAL
URINE CULTURE REFLEX: ABNORMAL

## 2017-12-18 ENCOUNTER — CARE COORDINATION (OUTPATIENT)
Dept: CASE MANAGEMENT | Age: 22
End: 2017-12-18

## 2017-12-19 ENCOUNTER — CARE COORDINATION (OUTPATIENT)
Dept: CASE MANAGEMENT | Age: 22
End: 2017-12-19

## 2017-12-19 ENCOUNTER — OFFICE VISIT (OUTPATIENT)
Dept: RHEUMATOLOGY | Age: 22
End: 2017-12-19
Payer: COMMERCIAL

## 2017-12-19 VITALS
HEIGHT: 69 IN | BODY MASS INDEX: 41.09 KG/M2 | DIASTOLIC BLOOD PRESSURE: 90 MMHG | RESPIRATION RATE: 16 BRPM | SYSTOLIC BLOOD PRESSURE: 129 MMHG | HEART RATE: 96 BPM | WEIGHT: 277.4 LBS

## 2017-12-19 DIAGNOSIS — R76.8 ANA POSITIVE: Primary | ICD-10-CM

## 2017-12-19 DIAGNOSIS — M54.50 CHRONIC MIDLINE LOW BACK PAIN WITHOUT SCIATICA: ICD-10-CM

## 2017-12-19 DIAGNOSIS — G89.29 CHRONIC MIDLINE LOW BACK PAIN WITHOUT SCIATICA: ICD-10-CM

## 2017-12-19 PROCEDURE — G8427 DOCREV CUR MEDS BY ELIG CLIN: HCPCS | Performed by: INTERNAL MEDICINE

## 2017-12-19 PROCEDURE — 1036F TOBACCO NON-USER: CPT | Performed by: INTERNAL MEDICINE

## 2017-12-19 PROCEDURE — G8484 FLU IMMUNIZE NO ADMIN: HCPCS | Performed by: INTERNAL MEDICINE

## 2017-12-19 PROCEDURE — 99213 OFFICE O/P EST LOW 20 MIN: CPT | Performed by: INTERNAL MEDICINE

## 2017-12-19 PROCEDURE — G8417 CALC BMI ABV UP PARAM F/U: HCPCS | Performed by: INTERNAL MEDICINE

## 2017-12-19 ASSESSMENT — ENCOUNTER SYMPTOMS
DIARRHEA: 0
ABDOMINAL PAIN: 1
NAUSEA: 1
WHEEZING: 1
SHORTNESS OF BREATH: 1
CONSTIPATION: 0
EYES NEGATIVE: 1
VOMITING: 1

## 2017-12-19 NOTE — PROGRESS NOTES
[address] [address]  Rheumatology Adult Consult/Referral Note       12/19/2017  MRN: [id number]    HPI:   Merari March  is a(n)22 y.o. female with a hx of scalp psoriasis, Depression, bruxism, anxiety, RLS, obesity, POTS, cervicalgia,  Chronic back pain, h/o  trigeminal neuralgia, Fibromyalgia, Migraine, Bipolar 1 disorder, h/o pulmonary embolism (may 2017), atyipcal fascial pain and paresthesias of the hands and feet here for the follow up of her CAESAR, Fibromyalgia, Chronic back pain and bilateral ankle pain. .     Started seeing Dr. Helen Zamora around [id number] for Generalized pain. She was diagnosed with fibromyalgia and managed by Dr. Helen Zamora until around June 2017. She was being treated with trigger point injections in the lower back and posterior shoulders about every 3 months and provided about a lot of relief and decreased the pain to around a 3/10. She reports around June 2017 she developed Bilateral foot- ankle pain, + swelling without redness/warmth around June 2017. ER evaluation found pt to have an elevated uric acid. Pt started on colchicine for suspected gout and sx's lasted ~ 4-5 days but eventually resolved. Joint pains in the hips, knees and lower back. Most severe pain in the lower back. Pain currently 8/10. pain: constant, sharp. Denies shooting pains down the arms or legs. Timing: n/a. Aggravating factors: Lower back: lifting, walking. Alleviating factors: trigger point injections. Associated symptoms:  - AM stiffness lasting about 10 minutes. + Gelling  - Numbness & tingling in the bilateral hands and feet continued, randomly occurring.   - Genearlized weakness, reported weakness in the back. - Chronic fatigue/tiredness, Myalgia's mainly throughout the back. - Insomnia, non-restorative sleep, Reports having prior sleep study. Current therapy: Buspar, latuda, valium, Cymbalta, sertraline, restoril, Amtiza.    Previous therapy: flexeril (hallucinations) , gabapentin (no relief, withdrawal), elavil (no relief). [facility] (2014) for low back sx's. Cancer screening:denies   Travel:denies   Exposure(s): denies   Maternal aunt w/ SLE, MGM w/ Psoriasis. ROS:  Review of Systems   Constitutional: Positive for malaise/fatigue. Negative for chills, fever and weight loss. Eyes: Negative. Respiratory: Positive for shortness of breath and wheezing. Cardiovascular: Positive for chest pain (wtih POTS) and palpitations. Gastrointestinal: Positive for abdominal pain, nausea and vomiting. Negative for constipation and diarrhea. Genitourinary: Negative. Musculoskeletal: Positive for myalgias. Skin: Negative. Neurological: Positive for tingling and headaches. Endo/Heme/Allergies: Negative for environmental allergies and polydipsia. Bruises/bleeds easily (since starting blood thinner).        PAST MEDICAL HISTORY  Past Medical History:   Diagnosis Date    ADD (attention deficit disorder)     Anxiety 4/8/2015    Anxiety attack     Asthma     exercise induced    Atypical face pain     Back pain     Bipolar 1 disorder (HCC)     Fibromyalgia     Hypotension 11/2/2017    Major depressive disorder, recurrent episode, mild ([address] 7/31/2012    Obesity 10/24/2014    POTS (postural orthostatic tachycardia syndrome)     Pott disease     Pulmonary emboli (HCC)     Seizures ([address] 07/31/2016    Tailbone injury 11/28/15    patient broke tailbone    Thyroid disease     borderline low     TMJ (dislocation of temporomandibular joint)     Trigeminal neuralgia        SOCIAL HISTORY  Social History     Social History    Marital status: Single     Spouse name: N/A    Number of children: N/A    Years of education: N/A     Social History Main Topics    Smoking status: Never Smoker    Smokeless tobacco: Never Used    Alcohol use 0.6 oz/week     1 Cans of beer per week    Drug use: Yes     Types: Marijuana    Sexual activity: Yes     Partners: Male Other Topics Concern    Not on file     Social History Narrative    No narrative on file       FAMILY HISTORY  Family History   Problem Relation Age of Onset    Anxiety Disorder Mother     Diabetes Mother     Anxiety Disorder Father     Bipolar Disorder Father     High Blood Pressure Father     Mental Illness Father     Parkinsonism Father      Early-Onset    Depression Paternal Aunt     Cancer Paternal Uncle     Depression Paternal Uncle     Cancer Maternal Grandmother     Depression Maternal Grandfather     Cancer Paternal Grandfather     Lupus Maternal Aunt        SURGICAL HISTORY  Past Surgical History:   Procedure Laterality Date    CARDIAC CATHETERIZATION  3-2016    EP Study    [facility]    COSMETIC SURGERY      wisdom teeth surgery 2010    WISDOM TOOTH EXTRACTION         ALLERGIES  Allergies   Allergen Reactions    Dilaudid [Hydromorphone Hcl]      hallucination    Flexeril [Cyclobenzaprine] Other (See Comments)     Hallucinations    Keflex [Cephalexin] Other (See Comments)     Lose cognitive functions.  Tessalon [Benzonatate] Other (See Comments)     psychosis    Augmentin [Amoxicillin-Pot Clavulanate] Rash    Lorabid [Loracarbef] Hives, Swelling and Rash    Minocycline Itching, Swelling and Rash       CURRENT MEDICATIONS  Current Outpatient Prescriptions   Medication Sig Dispense Refill    ALPRAZolam (XANAX) 1 MG tablet Take 2 mg by mouth daily .       ketorolac (TORADOL) 10 MG tablet Take 1 tablet by mouth every 6 hours as needed for Pain 20 tablet 0    orphenadrine (NORFLEX) 100 MG extended release tablet Take 1 tablet by mouth 2 times daily as needed for Muscle spasms 14 tablet 0    cloNIDine (CATAPRES) 0.1 MG tablet TAKE 1 TABLET BY MOUTH TWO TIMES A DAY  60 tablet 0    ivabradine (CORLANOR) 5 MG TABS tablet Take 1 tablet by mouth 2 times daily (with meals) 60 tablet 1    albuterol sulfate HFA (PROAIR HFA) 108 (90 Base) MCG/ACT unknown. Lower back: lifting, walking. Alleviating factors: trigger point injections. prior evaluation by [facility] evaluation, XR lumbar spine in 2014 w/o abnormalities. Prior tx by Dr. Helen Zamoar from 2014/15 to June 2017 with trigger point injections every 3 months. Pt report hx of tailbone fx around 2015   - ? If the breast size is contributing to  the  chronic  pain throughout the upper back and shoulders. - xr SI joints w/o abnormalities. - referral to physical therapy     4. Bilateral ankle pain:    She reports around June 2017 she developed Bilateral foot- ankle pain, + swelling without redness/warmth around June 2017. ER evaluation found pt to have an elevated uric acid. Pt started on colchicine for suspected gout and sx's lasted ~ 4-5 days but eventually resolved. Denies prior similar hx. Pt w/ self reported hx of Psoriasis of the scalp. No dacytitis, enthesitis, or nail changes reported or seen on exam.    - pt to call if the joint swelling returns. No Follow-up on file. Electronically signed by Geovany Puentes DO on 12/19/2017 at 12:47 PM      Thank you for allowing me to participate in the care of this patient. Please call if there are any questions.

## 2017-12-22 RX ORDER — CLONIDINE HYDROCHLORIDE 0.1 MG/1
TABLET ORAL
Qty: 60 TABLET | Refills: 0 | Status: SHIPPED | OUTPATIENT
Start: 2017-12-22 | End: 2018-01-05 | Stop reason: ALTCHOICE

## 2017-12-27 ENCOUNTER — HOSPITAL ENCOUNTER (OUTPATIENT)
Dept: PHYSICAL THERAPY | Age: 22
Setting detail: THERAPIES SERIES
Discharge: HOME | End: 2017-12-27
Payer: COMMERCIAL

## 2017-12-27 PROCEDURE — 97162 PT EVAL MOD COMPLEX 30 MIN: CPT

## 2017-12-27 PROCEDURE — 97110 THERAPEUTIC EXERCISES: CPT

## 2017-12-27 ASSESSMENT — PAIN DESCRIPTION - LOCATION: LOCATION: BACK;SHOULDER

## 2017-12-27 ASSESSMENT — PAIN DESCRIPTION - ORIENTATION: ORIENTATION: LOWER

## 2017-12-27 ASSESSMENT — PAIN SCALES - GENERAL: PAINLEVEL_OUTOF10: 7

## 2017-12-27 ASSESSMENT — PAIN DESCRIPTION - PAIN TYPE: TYPE: CHRONIC PAIN

## 2017-12-27 NOTE — PROGRESS NOTES
Patient reports she has a fast heart rate and is having cardiac surgery on January 12th. Patient then plans on having a breast reduction after heart surgery to help with back. Has had xrays of back, but no MRI. Pain:  Patient Currently in Pain: Yes  Pain Assessment: 0-10  Pain Level: 7  Pain Type: Chronic pain  Pain Location: Back, Shoulder  Pain Orientation: Lower     Social/Functional History:    Lives With: Family  Type of Home: House  Home Layout: One level  Home Access: Stairs to enter without rails             ADL Assistance: Independent  Homemaking Assistance: Independent  Homemaking Responsibilities: Yes  Meal Prep Responsibility: Secondary  Laundry Responsibility: Secondary  Cleaning Responsibility: Secondary  Ambulation Assistance: Independent  Transfer Assistance: Independent    Active : Yes  Mode of Transportation: Car  Occupation: On disability (For Fibromyalgia and Bi-Polar)    Objective  Overall Orientation Status: Within Normal Limits             Vision: Within Functional Limits    Hearing: Within functional limits      Observation/Palpation  Posture: Fair  Palpation: Patient very tender with pressure to low back. Observation: Increased thoracic kyphosis, decreased lumbar lordosis    Lumbar: 25% loss flexion and extension    R Hip Flexion: 4+/5  R Hip ABduction: 4+/5  R Hip ADduction: 4+/5    Comment:  (Patient needing moderate cues for abdominal bracing)  L Hip Flexion: 4+/5  L Hip ABduction: 4+/5  L Hip ADduction: 4+/5              Tone RLE  RLE Tone: Normotonic  Tone LLE  LLE Tone: Normotonic       Other: Pain sometimes wakes patient up at night. Normally sleeps on either side and uses a pillow between knees. Has a good, supportive mattress. Overall Sensation Status: Impaired  Additional Comments: Intermittent numbness in bilateral hands and bilateral feet                       Exercises  Exercise 1: Patient to begin aquatics at next session.   Exercise 2: Stretches: SKTC, supine hamstring, supine piriformis 3x15 seconds  Exercise 3: LTR x10              Activity Tolerance:  Activity Tolerance: Patient Tolerated treatment well    Assessment: Body structures, Functions, Activity limitations: Decreased functional mobility , Decreased ROM, Decreased ADL status, Decreased balance, Decreased sensation, Decreased endurance, Decreased strength  Assessment: Patient is having heart surgery on January 12 and only scheduling until then at that time. (Will have pacemaker removed)  Prognosis: Good  REQUIRES PT FOLLOW UP: Yes  Discharge Recommendations: Continue to assess pending progress    Patient Education:  Patient Education: Patient educated on POC and HEP                   Plan:  Times per week: 2-3 times per week  Plan weeks: 8 weeks  Specific instructions for Next Treatment: Aquatics for core and LE stretches and strengthening, posture and body mechanics, modaliites as needed, progress to land as needed  Current Treatment Recommendations: Strengthening, ROM, Balance Training, Endurance Training, Stair training, Pain Management, Positioning, Patient/Caregiver Education & Training, Modalities, Gait Training, Transfer Training, Manual Therapy - Soft Tissue Mobilization, Functional Mobility Training, Home Exercise Program, Neuromuscular Re-education  Plan Comment: POC initiated    History: Personal factors or comorbidities that impact plan of care -  Moderate Complexity: 1-2 personal factors or comorbidities. See history section above for details. Examination: Body structures and functions, activity limitations, participation restrictions; using standardized tests and measures - Moderate Complexity: 3 or morebody structures and functional, activity limitations and/or participation restrictions. See restrictions and objective section above for details.     Clinical Presentation: Moderate - Evolving with Changing Characteristics: Has radicular symptoms in bilateral hands and feet    Decision Making: Moderate Complexity due to see above. Decision making was based on patient assessment and decision making process in determining plan of care and establishing reasonable expectations for measurable functional outcomes. Evaluation Complexity: Based on the findings of patient history, examination, clinical presentation, and decision making during this evaluation, the evaluation of Deana Dailey  is of medium complexity. Goals:  Patient goals : Strengthening. Short term goals  Time Frame for Short term goals: 4 weeks  Short term goal 1: Improve Patient Specific Functional Scale to 6/10 to assist with walking. Short term goal 2: Decrease pain in back to 5/10 to assist with sleeping at night. Short term goal 3: Improve posture needing no cues for correction to assist with preventing injury. Short term goal 4: Increase lumbar ROM to [facility] to assist with putting on shoes and socks. Short term goal 5: Increase core strength with no cues needed for abdominal bracing to assist with carrying groceries. Long term goals  Time Frame for Long term goals : 8 weeks  Long term goal 1: Independent with HEP and with progression to assist with decreasing pain. PT G-Codes  Functional Assessment Tool Used: Patient Specific Functional Scale  Score: Walking=4/10, Standing on 1 leg to get pants on=5/10, Standing for long periods of time=3-4/10.     Zana Colorado

## 2017-12-29 ENCOUNTER — HOSPITAL ENCOUNTER (OUTPATIENT)
Dept: PHYSICAL THERAPY | Age: 22
Setting detail: THERAPIES SERIES
Discharge: HOME | End: 2017-12-29
Payer: COMMERCIAL

## 2017-12-29 PROCEDURE — 97113 AQUATIC THERAPY/EXERCISES: CPT

## 2017-12-29 ASSESSMENT — PAIN SCALES - GENERAL: PAINLEVEL_OUTOF10: 4

## 2017-12-29 ASSESSMENT — PAIN DESCRIPTION - PAIN TYPE: TYPE: CHRONIC PAIN

## 2017-12-29 ASSESSMENT — PAIN DESCRIPTION - ORIENTATION: ORIENTATION: LOWER

## 2017-12-29 ASSESSMENT — PAIN DESCRIPTION - LOCATION: LOCATION: BACK;SHOULDER

## 2017-12-29 NOTE — PROGRESS NOTES
week  Plan weeks: 8 weeks  Specific instructions for Next Treatment: Aquatics for core and LE stretches and strengthening, posture and body mechanics, modaliites as needed, progress to land as needed  Plan Comment: Continue per POC    Goals:  Patient goals : Strengthening. Short term goals  Time Frame for Short term goals: 4 weeks  Short term goal 1: Improve Patient Specific Functional Scale to 6/10 to assist with walking. Short term goal 2: Decrease pain in back to 5/10 to assist with sleeping at night. Short term goal 3: Improve posture needing no cues for correction to assist with preventing injury. Short term goal 4: Increase lumbar ROM to [facility] to assist with putting on shoes and socks. Short term goal 5: Increase core strength with no cues needed for abdominal bracing to assist with carrying groceries. Long term goals  Time Frame for Long term goals : 8 weeks  Long term goal 1: Independent with HEP and with progression to assist with decreasing pain.          Zana Yost

## 2018-01-02 ENCOUNTER — HOSPITAL ENCOUNTER (OUTPATIENT)
Dept: PHYSICAL THERAPY | Age: 23
Setting detail: THERAPIES SERIES
Discharge: HOME | End: 2018-01-02
Payer: COMMERCIAL

## 2018-01-02 PROCEDURE — 97113 AQUATIC THERAPY/EXERCISES: CPT

## 2018-01-02 ASSESSMENT — PAIN DESCRIPTION - LOCATION: LOCATION: BACK

## 2018-01-02 ASSESSMENT — PAIN SCALES - GENERAL: PAINLEVEL_OUTOF10: 7

## 2018-01-02 ASSESSMENT — PAIN DESCRIPTION - ORIENTATION: ORIENTATION: LOWER

## 2018-01-02 ASSESSMENT — PAIN DESCRIPTION - PAIN TYPE: TYPE: CHRONIC PAIN

## 2018-01-02 NOTE — PROGRESS NOTES
[address]     Time In: 5707  Time Out: [address]  Minutes: 43  Timed Code Treatment Minutes: 43 Minutes     Date: 2018  Patient Name: Cleopatra Omalley,  Gender:  female        CSN: [id number]   : 1995  (25 y.o.)       Referring Practitioner: Saw Dawn DO      Diagnosis: M54.5, G89.29 (ICD-10-CM) - Chronic midline low back pain without sciatica   Additional Pertinent Hx: Irregular Heart Beat (Is having cardiac surgery on 18), Asthma, Bi-Polar, Anxiety Disorder, Obesity, Fibromyalgia                  General:  PT Visit Information  Onset Date: 17  PT Insurance Information: [employer] [place] ON [employer]ICAL NECESSITY. Aquatics and modalities are covered. Total # of Visits to Date: 3  Plan of Care/Certification Expiration Date: 18  Progress Note Counter: 3/10 for PN. 1/for for new year. Subjective:  Comments: Returns to doctor as needed. Subjective: Pt reporting pain 7/10 today and that pain level is average for her. Pt reporting getting a couple hours of relief after last pool session.       Pain:  Patient Currently in Pain: Yes  Pain Assessment: 0-10  Pain Level: 7  Pain Type: Chronic pain  Pain Location: Back  Pain Orientation: Lower      Objective  Aquatic Therapy: UE, LE  LE Warm Up: Ambulation (F,L,R): 2 laps each  LE Stretches: Hamstring stretch at 4 foot step 15 seconds x 3 each - right side feeling tighter  Static Strengthening UEs  Shoulder Flex: x 10  Shoulder Ext: x 10  Shoulder ABD/ADD: x 10  Shoulder Horiz ABD/ADD: x 10  Rows: on step x 10        Static Strengthening LEs  Heel/Toe Raises: x 15  Squats: x 15  Marching: x 15  Hamstring Curls: x 15  4-Way Hip: x15 (3 directions)     Deep H2O LE  Bike: x3 minutes in 8 foot with noodle  Hang: x5 minutes with noodle in 8 foot - pain decreased to 5/10  Hip Flex/Ext: x 15  Hip ABD/ADD: x 15 Activity Tolerance:  Activity Tolerance: Patient Tolerated treatment well    Assessment:  Assessment: Progressed with UE exercises with bracing and added reps to LE exercises today with good tolerance. Pt needing cues for bracing at times. Pain level decreased with hang and was 4/10 upon getting out of pool. Prognosis: Good       Patient Education:  Patient Education: Continue with HEP and monitor response to progressions. Plan:  Times per week: 2-3 times per week  Plan weeks: 8 weeks  Specific instructions for Next Treatment: Aquatics for core and LE stretches and strengthening, posture and body mechanics, modaliites as needed, progress to land as needed  Current Treatment Recommendations: Strengthening, ROM, Balance Training, Endurance Training, Stair training, Pain Management, Positioning, Patient/Caregiver Education & Training, Modalities, Gait Training, Transfer Training, Manual Therapy - Soft Tissue Mobilization, Functional Mobility Training, Home Exercise Program, Neuromuscular Re-education  Plan Comment: Continue per POC    Goals:  Patient goals : Strengthening. Short term goals  Time Frame for Short term goals: 4 weeks  Short term goal 1: Improve Patient Specific Functional Scale to 6/10 to assist with walking. Short term goal 2: Decrease pain in back to 5/10 to assist with sleeping at night. Short term goal 3: Improve posture needing no cues for correction to assist with preventing injury. Short term goal 4: Increase lumbar ROM to [facility] to assist with putting on shoes and socks. Short term goal 5: Increase core strength with no cues needed for abdominal bracing to assist with carrying groceries. Long term goals  Time Frame for Long term goals : 8 weeks  Long term goal 1: Independent with HEP and with progression to assist with decreasing pain.          Stewart Avalos, [id number]

## 2018-01-03 ENCOUNTER — HOSPITAL ENCOUNTER (OUTPATIENT)
Dept: PHYSICAL THERAPY | Age: 23
Setting detail: THERAPIES SERIES
Discharge: HOME | End: 2018-01-03
Payer: COMMERCIAL

## 2018-01-03 PROCEDURE — 97113 AQUATIC THERAPY/EXERCISES: CPT

## 2018-01-03 ASSESSMENT — PAIN DESCRIPTION - LOCATION: LOCATION: BACK

## 2018-01-03 ASSESSMENT — PAIN DESCRIPTION - ORIENTATION: ORIENTATION: LOWER

## 2018-01-03 ASSESSMENT — PAIN DESCRIPTION - PAIN TYPE: TYPE: CHRONIC PAIN

## 2018-01-03 ASSESSMENT — PAIN SCALES - GENERAL: PAINLEVEL_OUTOF10: 7

## 2018-01-05 ENCOUNTER — HOSPITAL ENCOUNTER (OUTPATIENT)
Dept: PHYSICAL THERAPY | Age: 23
Setting detail: THERAPIES SERIES
Discharge: HOME | End: 2018-01-05
Payer: COMMERCIAL

## 2018-01-05 ENCOUNTER — HOSPITAL ENCOUNTER (OUTPATIENT)
Age: 23
Discharge: HOME | End: 2018-01-05
Payer: COMMERCIAL

## 2018-01-05 VITALS — BODY MASS INDEX: 39.51 KG/M2 | HEIGHT: 70 IN | WEIGHT: 276 LBS

## 2018-01-05 DIAGNOSIS — R76.8 ANA POSITIVE: ICD-10-CM

## 2018-01-05 DIAGNOSIS — G90.A POTS (POSTURAL ORTHOSTATIC TACHYCARDIA SYNDROME): ICD-10-CM

## 2018-01-05 LAB
ANION GAP SERPL CALCULATED.3IONS-SCNC: 16 MEQ/L (ref 8–16)
ANISOCYTOSIS: ABNORMAL
BASOPHILS # BLD: 0.6 %
BASOPHILS ABSOLUTE: 0.1 THOU/MM3 (ref 0–0.1)
BUN BLDV-MCNC: 10 MG/DL (ref 7–22)
CALCIUM SERPL-MCNC: 9.5 MG/DL (ref 8.5–10.5)
CHLORIDE BLD-SCNC: 99 MEQ/L (ref 98–111)
CO2: 23 MEQ/L (ref 23–33)
CREAT SERPL-MCNC: 0.7 MG/DL (ref 0.4–1.2)
EOSINOPHIL # BLD: 0.8 %
EOSINOPHILS ABSOLUTE: 0.1 THOU/MM3 (ref 0–0.4)
GFR SERPL CREATININE-BSD FRML MDRD: > 90 ML/MIN/1.73M2
GLUCOSE BLD-MCNC: 170 MG/DL (ref 70–108)
HCT VFR BLD CALC: 37.5 % (ref 37–47)
HEMOGLOBIN: 12.1 GM/DL (ref 12–16)
HYPOCHROMIA: ABNORMAL
LYMPHOCYTES # BLD: 24.7 %
LYMPHOCYTES ABSOLUTE: 2.5 THOU/MM3 (ref 1–4.8)
MCH RBC QN AUTO: 26.7 PG (ref 27–31)
MCHC RBC AUTO-ENTMCNC: 32.1 GM/DL (ref 33–37)
MCV RBC AUTO: 82.9 FL (ref 81–99)
MONOCYTES # BLD: 4.1 %
MONOCYTES ABSOLUTE: 0.4 THOU/MM3 (ref 0.4–1.3)
NUCLEATED RED BLOOD CELLS: 0 /100 WBC
PDW BLD-RTO: 17.3 % (ref 11.5–14.5)
PLATELET # BLD: 291 THOU/MM3 (ref 130–400)
PMV BLD AUTO: 8.7 MCM (ref 7.4–10.4)
POTASSIUM SERPL-SCNC: 4.4 MEQ/L (ref 3.5–5.2)
PREGNANCY, SERUM: NEGATIVE
RBC # BLD: 4.53 MILL/MM3 (ref 4.2–5.4)
SEG NEUTROPHILS: 69.8 %
SEGMENTED NEUTROPHILS ABSOLUTE COUNT: 7.1 THOU/MM3 (ref 1.8–7.7)
SODIUM BLD-SCNC: 138 MEQ/L (ref 135–145)
WBC # BLD: 10.2 THOU/MM3 (ref 4.8–10.8)

## 2018-01-05 PROCEDURE — 84703 CHORIONIC GONADOTROPIN ASSAY: CPT

## 2018-01-05 PROCEDURE — 97113 AQUATIC THERAPY/EXERCISES: CPT

## 2018-01-05 PROCEDURE — 86225 DNA ANTIBODY NATIVE: CPT

## 2018-01-05 PROCEDURE — 85025 COMPLETE CBC W/AUTO DIFF WBC: CPT

## 2018-01-05 PROCEDURE — 80048 BASIC METABOLIC PNL TOTAL CA: CPT

## 2018-01-05 PROCEDURE — 36415 COLL VENOUS BLD VENIPUNCTURE: CPT

## 2018-01-05 RX ORDER — BUSPIRONE HYDROCHLORIDE 30 MG/1
TABLET ORAL EVERY MORNING
Status: ON HOLD | COMMUNITY
End: 2020-02-03 | Stop reason: HOSPADM

## 2018-01-05 RX ORDER — ALPRAZOLAM 2 MG/1
2 TABLET, EXTENDED RELEASE ORAL EVERY MORNING
Status: ON HOLD | COMMUNITY
End: 2020-02-03 | Stop reason: HOSPADM

## 2018-01-05 ASSESSMENT — PAIN DESCRIPTION - PAIN TYPE: TYPE: CHRONIC PAIN

## 2018-01-05 ASSESSMENT — PAIN SCALES - GENERAL: PAINLEVEL_OUTOF10: 5

## 2018-01-05 ASSESSMENT — PAIN DESCRIPTION - LOCATION: LOCATION: BACK

## 2018-01-05 ASSESSMENT — PAIN DESCRIPTION - ORIENTATION: ORIENTATION: LOWER

## 2018-01-08 ENCOUNTER — HOSPITAL ENCOUNTER (OUTPATIENT)
Dept: PHYSICAL THERAPY | Age: 23
Setting detail: THERAPIES SERIES
Discharge: HOME | End: 2018-01-08
Payer: COMMERCIAL

## 2018-01-08 LAB — DSDNA ANTIBODY: NORMAL

## 2018-01-08 NOTE — PROGRESS NOTES
[facility]  PHYSICAL THERAPY MISSED TREATMENT NOTE  OUTPATIENT REHABILITATION    Date: 2018  Patient Name: Bhavani Liz        MRN: [id number]   : 1995  (25 y.o.)  Gender: female                REASON FOR MISSED TREATMENT  Pt called to cancel appointment today due to transportation problems. Plans to be at next appointment.      Yumi Chandler PTA 61666

## 2018-01-09 ENCOUNTER — HOSPITAL ENCOUNTER (OUTPATIENT)
Dept: PHYSICAL THERAPY | Age: 23
Setting detail: THERAPIES SERIES
Discharge: HOME | End: 2018-01-09
Payer: COMMERCIAL

## 2018-01-09 ENCOUNTER — TELEPHONE (OUTPATIENT)
Dept: RHEUMATOLOGY | Age: 23
End: 2018-01-09

## 2018-01-09 PROCEDURE — 97113 AQUATIC THERAPY/EXERCISES: CPT

## 2018-01-09 ASSESSMENT — PAIN SCALES - GENERAL: PAINLEVEL_OUTOF10: 7

## 2018-01-09 NOTE — TELEPHONE ENCOUNTER
----- Message from Minor Alexandre DO sent at 1/8/2018  2:21 PM EST -----  The blood testing for systemic lupus was negative besides the + anti-nuclear antibody. Please follow up with your primary care provider for your fibromyalgia. If you have any questions please feel free to contact the office or send me a my chart message. Sincerely,     Dr. Mindy Ni.  Erick

## 2018-01-10 ENCOUNTER — TELEPHONE (OUTPATIENT)
Dept: CARDIOLOGY CLINIC | Age: 23
End: 2018-01-10

## 2018-01-10 ENCOUNTER — HOSPITAL ENCOUNTER (OUTPATIENT)
Dept: PHYSICAL THERAPY | Age: 23
Setting detail: THERAPIES SERIES
Discharge: HOME | End: 2018-01-10
Payer: COMMERCIAL

## 2018-01-10 PROCEDURE — 97113 AQUATIC THERAPY/EXERCISES: CPT

## 2018-01-10 ASSESSMENT — PAIN DESCRIPTION - ORIENTATION: ORIENTATION: LOWER

## 2018-01-10 ASSESSMENT — PAIN DESCRIPTION - LOCATION: LOCATION: BACK

## 2018-01-10 ASSESSMENT — PAIN DESCRIPTION - PAIN TYPE: TYPE: CHRONIC PAIN

## 2018-01-10 ASSESSMENT — PAIN SCALES - GENERAL: PAINLEVEL_OUTOF10: 6

## 2018-01-10 NOTE — TELEPHONE ENCOUNTER
Referred back to med list and Ep instructions in Media,   Patient can take meds in the am with a sip of water. Patient was notified.

## 2018-01-10 NOTE — TELEPHONE ENCOUNTER
Patient is questioning if she needs to hold any medications. I attempted to contact patient-  Had to leave a message.

## 2018-01-12 ENCOUNTER — APPOINTMENT (OUTPATIENT)
Dept: GENERAL RADIOLOGY | Age: 23
End: 2018-01-12
Attending: INTERNAL MEDICINE
Payer: COMMERCIAL

## 2018-01-12 ENCOUNTER — HOSPITAL ENCOUNTER (OUTPATIENT)
Dept: INPATIENT UNIT | Age: 23
Discharge: HOME | End: 2018-01-13
Attending: INTERNAL MEDICINE | Admitting: INTERNAL MEDICINE
Payer: COMMERCIAL

## 2018-01-12 ENCOUNTER — ANESTHESIA EVENT (OUTPATIENT)
Dept: CARDIAC CATH/INVASIVE PROCEDURES | Age: 23
End: 2018-01-12
Payer: COMMERCIAL

## 2018-01-12 ENCOUNTER — APPOINTMENT (OUTPATIENT)
Dept: CARDIAC CATH/INVASIVE PROCEDURES | Age: 23
End: 2018-01-12
Attending: INTERNAL MEDICINE
Payer: COMMERCIAL

## 2018-01-12 ENCOUNTER — ANESTHESIA (OUTPATIENT)
Dept: CARDIAC CATH/INVASIVE PROCEDURES | Age: 23
End: 2018-01-12
Payer: COMMERCIAL

## 2018-01-12 ENCOUNTER — HOSPITAL ENCOUNTER (OUTPATIENT)
Dept: INPATIENT UNIT | Age: 23
Discharge: HOME | End: 2018-01-12

## 2018-01-12 VITALS — SYSTOLIC BLOOD PRESSURE: 140 MMHG | TEMPERATURE: 100 F | DIASTOLIC BLOOD PRESSURE: 79 MMHG | OXYGEN SATURATION: 100 %

## 2018-01-12 PROBLEM — Z98.890 S/P ABLATION OF VENTRICULAR ARRHYTHMIA: Status: ACTIVE | Noted: 2018-01-12

## 2018-01-12 PROBLEM — Z86.79 S/P ABLATION OF VENTRICULAR ARRHYTHMIA: Status: ACTIVE | Noted: 2018-01-12

## 2018-01-12 LAB
ANION GAP SERPL CALCULATED.3IONS-SCNC: 15 MEQ/L (ref 8–16)
ANION GAP SERPL CALCULATED.3IONS-SCNC: 18 MEQ/L (ref 8–16)
BASE EXCESS (CALCULATED): -5.6 MMOL/L (ref -2.5–2.5)
BUN BLDV-MCNC: 12 MG/DL (ref 7–22)
BUN BLDV-MCNC: 12 MG/DL (ref 7–22)
CALCIUM IONIZED SERUM: 1.16 MMOL/L (ref 1.12–1.32)
CALCIUM SERPL-MCNC: 8 MG/DL (ref 8.5–10.5)
CALCIUM SERPL-MCNC: 8.4 MG/DL (ref 8.5–10.5)
CHLORIDE BLD-SCNC: 103 MEQ/L (ref 98–111)
CHLORIDE BLD-SCNC: 105 MEQ/L (ref 98–111)
CO2: 22 MEQ/L (ref 23–33)
CO2: 23 MEQ/L (ref 23–33)
COLLECTED BY:: ABNORMAL
CREAT SERPL-MCNC: 0.8 MG/DL (ref 0.4–1.2)
CREAT SERPL-MCNC: 0.8 MG/DL (ref 0.4–1.2)
EKG ATRIAL RATE: 127 BPM
EKG P AXIS: 60 DEGREES
EKG P-R INTERVAL: 140 MS
EKG Q-T INTERVAL: 312 MS
EKG QRS DURATION: 88 MS
EKG QTC CALCULATION (BAZETT): 453 MS
EKG R AXIS: 63 DEGREES
EKG T AXIS: 71 DEGREES
EKG VENTRICULAR RATE: 127 BPM
GFR SERPL CREATININE-BSD FRML MDRD: 89 ML/MIN/1.73M2
GFR SERPL CREATININE-BSD FRML MDRD: 89 ML/MIN/1.73M2
GLUCOSE BLD-MCNC: 176 MG/DL (ref 70–108)
GLUCOSE BLD-MCNC: 182 MG/DL (ref 70–108)
GLUCOSE, WHOLE BLOOD: 180 MG/DL (ref 70–108)
HCO3: 24 MMOL/L (ref 23–28)
HCT VFR BLD CALC: 33.4 % (ref 37–47)
HEMOGLOBIN: 10.4 GM/DL (ref 12–16)
MAGNESIUM: 2 MG/DL (ref 1.6–2.4)
MCH RBC QN AUTO: 26 PG (ref 27–31)
MCHC RBC AUTO-ENTMCNC: 31.2 GM/DL (ref 33–37)
MCV RBC AUTO: 83.3 FL (ref 81–99)
O2 SATURATION: 78 %
PATHOLOGIST REVIEW: ABNORMAL
PCO2: 71 MMHG (ref 35–45)
PDW BLD-RTO: 17.8 % (ref 11.5–14.5)
PH BLOOD GAS: 7.15 (ref 7.35–7.45)
PLATELET # BLD: 336 THOU/MM3 (ref 130–400)
PMV BLD AUTO: 8.5 MCM (ref 7.4–10.4)
PO2: 57 MMHG (ref 71–104)
POC ACTIVATED CLOTTING TIME KAOLIN: 114 SECONDS (ref 1–150)
POC ACTIVATED CLOTTING TIME KAOLIN: 131 SECONDS (ref 1–150)
POC ACTIVATED CLOTTING TIME KAOLIN: 230 SECONDS (ref 1–150)
POC ACTIVATED CLOTTING TIME KAOLIN: 235 SECONDS (ref 1–150)
POC ACTIVATED CLOTTING TIME KAOLIN: 274 SECONDS (ref 1–150)
POTASSIUM SERPL-SCNC: 3.7 MEQ/L (ref 3.5–5.2)
POTASSIUM SERPL-SCNC: 4.6 MEQ/L (ref 3.5–5.2)
POTASSIUM, WHOLE BLOOD: 3.8 MEQ/L (ref 3.5–4.9)
RBC # BLD: 4.01 MILL/MM3 (ref 4.2–5.4)
SODIUM BLD-SCNC: 143 MEQ/L (ref 135–145)
SODIUM BLD-SCNC: 143 MEQ/L (ref 135–145)
SODIUM, WHOLE BLOOD: 146 MEQ/L (ref 138–146)
SOURCE, BLOOD GAS: ABNORMAL
WBC # BLD: 23.1 THOU/MM3 (ref 4.8–10.8)

## 2018-01-12 PROCEDURE — 3700000000 HC ANESTHESIA ATTENDED CARE

## 2018-01-12 PROCEDURE — 37799 UNLISTED PX VASCULAR SURGERY: CPT

## 2018-01-12 PROCEDURE — 36415 COLL VENOUS BLD VENIPUNCTURE: CPT

## 2018-01-12 PROCEDURE — 7100000001 HC PACU RECOVERY - ADDTL 15 MIN

## 2018-01-12 PROCEDURE — C1894 INTRO/SHEATH, NON-LASER: HCPCS

## 2018-01-12 PROCEDURE — 94640 AIRWAY INHALATION TREATMENT: CPT

## 2018-01-12 PROCEDURE — 2500000003 HC RX 250 WO HCPCS

## 2018-01-12 PROCEDURE — 2580000003 HC RX 258: Performed by: INTERNAL MEDICINE

## 2018-01-12 PROCEDURE — A6258 TRANSPARENT FILM >16<=48 IN: HCPCS

## 2018-01-12 PROCEDURE — 82803 BLOOD GASES ANY COMBINATION: CPT

## 2018-01-12 PROCEDURE — 93653 COMPRE EP EVAL TX SVT: CPT

## 2018-01-12 PROCEDURE — 93613 INTRACARDIAC EPHYS 3D MAPG: CPT | Performed by: INTERNAL MEDICINE

## 2018-01-12 PROCEDURE — 80048 BASIC METABOLIC PNL TOTAL CA: CPT

## 2018-01-12 PROCEDURE — 93005 ELECTROCARDIOGRAM TRACING: CPT

## 2018-01-12 PROCEDURE — 6370000000 HC RX 637 (ALT 250 FOR IP): Performed by: PHYSICIAN ASSISTANT

## 2018-01-12 PROCEDURE — 7100000000 HC PACU RECOVERY - FIRST 15 MIN

## 2018-01-12 PROCEDURE — 83735 ASSAY OF MAGNESIUM: CPT

## 2018-01-12 PROCEDURE — C1769 GUIDE WIRE: HCPCS

## 2018-01-12 PROCEDURE — C1730 CATH, EP, 19 OR FEW ELECT: HCPCS

## 2018-01-12 PROCEDURE — 6370000000 HC RX 637 (ALT 250 FOR IP): Performed by: INTERNAL MEDICINE

## 2018-01-12 PROCEDURE — 6370000000 HC RX 637 (ALT 250 FOR IP): Performed by: ANESTHESIOLOGY

## 2018-01-12 PROCEDURE — C1733 CATH, EP, OTHR THAN COOL-TIP: HCPCS

## 2018-01-12 PROCEDURE — 3700000001 HC ADD 15 MINUTES (ANESTHESIA)

## 2018-01-12 PROCEDURE — 6360000002 HC RX W HCPCS

## 2018-01-12 PROCEDURE — 82947 ASSAY GLUCOSE BLOOD QUANT: CPT

## 2018-01-12 PROCEDURE — 85347 COAGULATION TIME ACTIVATED: CPT

## 2018-01-12 PROCEDURE — 93653 COMPRE EP EVAL TX SVT: CPT | Performed by: INTERNAL MEDICINE

## 2018-01-12 PROCEDURE — 2720000010 HC SURG SUPPLY STERILE

## 2018-01-12 PROCEDURE — 71045 X-RAY EXAM CHEST 1 VIEW: CPT

## 2018-01-12 PROCEDURE — 84132 ASSAY OF SERUM POTASSIUM: CPT

## 2018-01-12 PROCEDURE — 93623 PRGRMD STIMJ&PACG IV RX NFS: CPT | Performed by: INTERNAL MEDICINE

## 2018-01-12 PROCEDURE — 85027 COMPLETE CBC AUTOMATED: CPT

## 2018-01-12 PROCEDURE — 6360000002 HC RX W HCPCS: Performed by: NURSE ANESTHETIST, CERTIFIED REGISTERED

## 2018-01-12 PROCEDURE — 82330 ASSAY OF CALCIUM: CPT

## 2018-01-12 PROCEDURE — 93613 INTRACARDIAC EPHYS 3D MAPG: CPT

## 2018-01-12 PROCEDURE — C1732 CATH, EP, DIAG/ABL, 3D/VECT: HCPCS

## 2018-01-12 PROCEDURE — 6370000000 HC RX 637 (ALT 250 FOR IP)

## 2018-01-12 PROCEDURE — 84295 ASSAY OF SERUM SODIUM: CPT

## 2018-01-12 PROCEDURE — 96360 HYDRATION IV INFUSION INIT: CPT

## 2018-01-12 PROCEDURE — 2500000003 HC RX 250 WO HCPCS: Performed by: NURSE ANESTHETIST, CERTIFIED REGISTERED

## 2018-01-12 RX ORDER — ALBUTEROL SULFATE 2.5 MG/3ML
2.5 SOLUTION RESPIRATORY (INHALATION) EVERY 6 HOURS PRN
Status: DISCONTINUED | OUTPATIENT
Start: 2018-01-12 | End: 2018-01-13 | Stop reason: HOSPADM

## 2018-01-12 RX ORDER — OXCARBAZEPINE 300 MG/1
1200 TABLET, FILM COATED ORAL 2 TIMES DAILY
Status: DISCONTINUED | OUTPATIENT
Start: 2018-01-12 | End: 2018-01-13 | Stop reason: HOSPADM

## 2018-01-12 RX ORDER — HEPARIN SODIUM 1000 [USP'U]/ML
INJECTION, SOLUTION INTRAVENOUS; SUBCUTANEOUS PRN
Status: DISCONTINUED | OUTPATIENT
Start: 2018-01-12 | End: 2018-01-12 | Stop reason: SDUPTHER

## 2018-01-12 RX ORDER — IPRATROPIUM BROMIDE AND ALBUTEROL SULFATE 2.5; .5 MG/3ML; MG/3ML
SOLUTION RESPIRATORY (INHALATION)
Status: COMPLETED
Start: 2018-01-12 | End: 2018-01-12

## 2018-01-12 RX ORDER — LIDOCAINE HYDROCHLORIDE 20 MG/ML
INJECTION, SOLUTION EPIDURAL; INFILTRATION; INTRACAUDAL; PERINEURAL PRN
Status: DISCONTINUED | OUTPATIENT
Start: 2018-01-12 | End: 2018-01-12 | Stop reason: SDUPTHER

## 2018-01-12 RX ORDER — IPRATROPIUM BROMIDE AND ALBUTEROL SULFATE 2.5; .5 MG/3ML; MG/3ML
1 SOLUTION RESPIRATORY (INHALATION)
Status: DISCONTINUED | OUTPATIENT
Start: 2018-01-12 | End: 2018-01-13 | Stop reason: HOSPADM

## 2018-01-12 RX ORDER — FENTANYL CITRATE 50 UG/ML
50 INJECTION, SOLUTION INTRAMUSCULAR; INTRAVENOUS EVERY 5 MIN PRN
Status: DISCONTINUED | OUTPATIENT
Start: 2018-01-12 | End: 2018-01-12

## 2018-01-12 RX ORDER — LABETALOL HYDROCHLORIDE 5 MG/ML
5 INJECTION, SOLUTION INTRAVENOUS EVERY 10 MIN PRN
Status: DISCONTINUED | OUTPATIENT
Start: 2018-01-12 | End: 2018-01-12

## 2018-01-12 RX ORDER — CALCIUM CARBONATE/VITAMIN D3 250-3.125
1 TABLET ORAL DAILY
Status: DISCONTINUED | OUTPATIENT
Start: 2018-01-12 | End: 2018-01-13 | Stop reason: HOSPADM

## 2018-01-12 RX ORDER — SODIUM CHLORIDE 0.9 % (FLUSH) 0.9 %
10 SYRINGE (ML) INJECTION PRN
Status: DISCONTINUED | OUTPATIENT
Start: 2018-01-12 | End: 2018-01-12

## 2018-01-12 RX ORDER — ROCURONIUM BROMIDE 10 MG/ML
INJECTION, SOLUTION INTRAVENOUS PRN
Status: DISCONTINUED | OUTPATIENT
Start: 2018-01-12 | End: 2018-01-12 | Stop reason: SDUPTHER

## 2018-01-12 RX ORDER — PROTAMINE SULFATE 10 MG/ML
INJECTION, SOLUTION INTRAVENOUS PRN
Status: DISCONTINUED | OUTPATIENT
Start: 2018-01-12 | End: 2018-01-12 | Stop reason: SDUPTHER

## 2018-01-12 RX ORDER — M-VIT,TX,IRON,MINS/CALC/FOLIC 27MG-0.4MG
1 TABLET ORAL DAILY
Status: DISCONTINUED | OUTPATIENT
Start: 2018-01-13 | End: 2018-01-13 | Stop reason: HOSPADM

## 2018-01-12 RX ORDER — ALBUTEROL SULFATE 90 UG/1
2 AEROSOL, METERED RESPIRATORY (INHALATION) EVERY 6 HOURS PRN
Status: DISCONTINUED | OUTPATIENT
Start: 2018-01-12 | End: 2018-01-13 | Stop reason: HOSPADM

## 2018-01-12 RX ORDER — PROMETHAZINE HYDROCHLORIDE 25 MG/ML
12.5 INJECTION, SOLUTION INTRAMUSCULAR; INTRAVENOUS
Status: DISCONTINUED | OUTPATIENT
Start: 2018-01-12 | End: 2018-01-12

## 2018-01-12 RX ORDER — DIPHENHYDRAMINE HYDROCHLORIDE 50 MG/ML
12.5 INJECTION INTRAMUSCULAR; INTRAVENOUS
Status: DISCONTINUED | OUTPATIENT
Start: 2018-01-12 | End: 2018-01-12

## 2018-01-12 RX ORDER — SODIUM CHLORIDE 0.9 % (FLUSH) 0.9 %
10 SYRINGE (ML) INJECTION EVERY 12 HOURS SCHEDULED
Status: DISCONTINUED | OUTPATIENT
Start: 2018-01-12 | End: 2018-01-12

## 2018-01-12 RX ORDER — DULOXETIN HYDROCHLORIDE 60 MG/1
60 CAPSULE, DELAYED RELEASE ORAL 2 TIMES DAILY
Status: DISCONTINUED | OUTPATIENT
Start: 2018-01-12 | End: 2018-01-13 | Stop reason: HOSPADM

## 2018-01-12 RX ORDER — MEPERIDINE HYDROCHLORIDE 25 MG/ML
25 INJECTION INTRAMUSCULAR; INTRAVENOUS; SUBCUTANEOUS
Status: DISCONTINUED | OUTPATIENT
Start: 2018-01-12 | End: 2018-01-12

## 2018-01-12 RX ORDER — SODIUM CHLORIDE 0.9 % (FLUSH) 0.9 %
10 SYRINGE (ML) INJECTION EVERY 12 HOURS SCHEDULED
Status: DISCONTINUED | OUTPATIENT
Start: 2018-01-12 | End: 2018-01-13 | Stop reason: HOSPADM

## 2018-01-12 RX ORDER — BUSPIRONE HYDROCHLORIDE 10 MG/1
30 TABLET ORAL EVERY MORNING
Status: DISCONTINUED | OUTPATIENT
Start: 2018-01-13 | End: 2018-01-13 | Stop reason: HOSPADM

## 2018-01-12 RX ORDER — SODIUM CHLORIDE 0.9 % (FLUSH) 0.9 %
10 SYRINGE (ML) INJECTION PRN
Status: DISCONTINUED | OUTPATIENT
Start: 2018-01-12 | End: 2018-01-13 | Stop reason: HOSPADM

## 2018-01-12 RX ORDER — PANTOPRAZOLE SODIUM 40 MG/1
40 TABLET, DELAYED RELEASE ORAL
Status: DISCONTINUED | OUTPATIENT
Start: 2018-01-13 | End: 2018-01-12 | Stop reason: ALTCHOICE

## 2018-01-12 RX ORDER — DEXAMETHASONE SODIUM PHOSPHATE 4 MG/ML
INJECTION, SOLUTION INTRA-ARTICULAR; INTRALESIONAL; INTRAMUSCULAR; INTRAVENOUS; SOFT TISSUE PRN
Status: DISCONTINUED | OUTPATIENT
Start: 2018-01-12 | End: 2018-01-12 | Stop reason: SDUPTHER

## 2018-01-12 RX ORDER — SUCCINYLCHOLINE CHLORIDE 20 MG/ML
INJECTION INTRAMUSCULAR; INTRAVENOUS PRN
Status: DISCONTINUED | OUTPATIENT
Start: 2018-01-12 | End: 2018-01-12 | Stop reason: SDUPTHER

## 2018-01-12 RX ORDER — ONDANSETRON 2 MG/ML
INJECTION INTRAMUSCULAR; INTRAVENOUS PRN
Status: DISCONTINUED | OUTPATIENT
Start: 2018-01-12 | End: 2018-01-12 | Stop reason: SDUPTHER

## 2018-01-12 RX ORDER — HYDROCODONE BITARTRATE AND ACETAMINOPHEN 5; 325 MG/1; MG/1
1 TABLET ORAL EVERY 4 HOURS PRN
Status: DISCONTINUED | OUTPATIENT
Start: 2018-01-12 | End: 2018-01-13 | Stop reason: HOSPADM

## 2018-01-12 RX ORDER — ONDANSETRON 2 MG/ML
INJECTION INTRAMUSCULAR; INTRAVENOUS
Status: DISPENSED
Start: 2018-01-12 | End: 2018-01-13

## 2018-01-12 RX ORDER — CETIRIZINE HYDROCHLORIDE 10 MG/1
10 TABLET ORAL DAILY
Status: DISCONTINUED | OUTPATIENT
Start: 2018-01-13 | End: 2018-01-13 | Stop reason: HOSPADM

## 2018-01-12 RX ORDER — SODIUM CHLORIDE 9 MG/ML
INJECTION, SOLUTION INTRAVENOUS CONTINUOUS
Status: DISCONTINUED | OUTPATIENT
Start: 2018-01-12 | End: 2018-01-12

## 2018-01-12 RX ORDER — ATROPINE SULFATE 1 MG/ML
INJECTION, SOLUTION INTRAMUSCULAR; INTRAVENOUS; SUBCUTANEOUS PRN
Status: DISCONTINUED | OUTPATIENT
Start: 2018-01-12 | End: 2018-01-12 | Stop reason: SDUPTHER

## 2018-01-12 RX ORDER — FLUTICASONE PROPIONATE 50 MCG
2 SPRAY, SUSPENSION (ML) NASAL PRN
Status: DISCONTINUED | OUTPATIENT
Start: 2018-01-12 | End: 2018-01-13 | Stop reason: HOSPADM

## 2018-01-12 RX ORDER — ACETAMINOPHEN 325 MG/1
650 TABLET ORAL EVERY 4 HOURS PRN
Status: DISCONTINUED | OUTPATIENT
Start: 2018-01-12 | End: 2018-01-13 | Stop reason: HOSPADM

## 2018-01-12 RX ORDER — PROPOFOL 10 MG/ML
INJECTION, EMULSION INTRAVENOUS PRN
Status: DISCONTINUED | OUTPATIENT
Start: 2018-01-12 | End: 2018-01-12 | Stop reason: SDUPTHER

## 2018-01-12 RX ORDER — QUETIAPINE FUMARATE 200 MG/1
300 TABLET, FILM COATED ORAL NIGHTLY
Status: ON HOLD | COMMUNITY
End: 2020-02-02 | Stop reason: SDUPTHER

## 2018-01-12 RX ORDER — OMEPRAZOLE 40 MG/1
40 CAPSULE, DELAYED RELEASE ORAL
Status: DISCONTINUED | OUTPATIENT
Start: 2018-01-13 | End: 2018-01-13 | Stop reason: HOSPADM

## 2018-01-12 RX ORDER — ESMOLOL HYDROCHLORIDE 10 MG/ML
INJECTION INTRAVENOUS PRN
Status: DISCONTINUED | OUTPATIENT
Start: 2018-01-12 | End: 2018-01-12 | Stop reason: SDUPTHER

## 2018-01-12 RX ORDER — FENTANYL CITRATE 50 UG/ML
25 INJECTION, SOLUTION INTRAMUSCULAR; INTRAVENOUS EVERY 5 MIN PRN
Status: DISCONTINUED | OUTPATIENT
Start: 2018-01-12 | End: 2018-01-12

## 2018-01-12 RX ORDER — ONDANSETRON 2 MG/ML
4 INJECTION INTRAMUSCULAR; INTRAVENOUS EVERY 6 HOURS PRN
Status: DISCONTINUED | OUTPATIENT
Start: 2018-01-12 | End: 2018-01-13 | Stop reason: HOSPADM

## 2018-01-12 RX ORDER — LORAZEPAM 0.5 MG/1
0.5 TABLET ORAL NIGHTLY PRN
Status: DISCONTINUED | OUTPATIENT
Start: 2018-01-12 | End: 2018-01-13 | Stop reason: HOSPADM

## 2018-01-12 RX ORDER — MIDAZOLAM HYDROCHLORIDE 1 MG/ML
INJECTION INTRAMUSCULAR; INTRAVENOUS PRN
Status: DISCONTINUED | OUTPATIENT
Start: 2018-01-12 | End: 2018-01-12 | Stop reason: SDUPTHER

## 2018-01-12 RX ORDER — FENTANYL CITRATE 50 UG/ML
INJECTION, SOLUTION INTRAMUSCULAR; INTRAVENOUS PRN
Status: DISCONTINUED | OUTPATIENT
Start: 2018-01-12 | End: 2018-01-12 | Stop reason: SDUPTHER

## 2018-01-12 RX ORDER — HYDROCODONE BITARTRATE AND ACETAMINOPHEN 5; 325 MG/1; MG/1
2 TABLET ORAL EVERY 4 HOURS PRN
Status: DISCONTINUED | OUTPATIENT
Start: 2018-01-12 | End: 2018-01-13 | Stop reason: HOSPADM

## 2018-01-12 RX ORDER — QUETIAPINE FUMARATE 100 MG/1
200 TABLET, FILM COATED ORAL NIGHTLY
Status: DISCONTINUED | OUTPATIENT
Start: 2018-01-12 | End: 2018-01-13 | Stop reason: HOSPADM

## 2018-01-12 RX ORDER — ALPRAZOLAM 0.5 MG/1
0.5 TABLET ORAL 4 TIMES DAILY PRN
Status: DISCONTINUED | OUTPATIENT
Start: 2018-01-12 | End: 2018-01-12 | Stop reason: ALTCHOICE

## 2018-01-12 RX ADMIN — IPRATROPIUM BROMIDE AND ALBUTEROL SULFATE 1 AMPULE: .5; 3 SOLUTION RESPIRATORY (INHALATION) at 21:13

## 2018-01-12 RX ADMIN — HYDROCODONE BITARTRATE AND ACETAMINOPHEN 2 TABLET: 5; 325 TABLET ORAL at 22:27

## 2018-01-12 RX ADMIN — HEPARIN SODIUM 5000 UNITS: 1000 INJECTION, SOLUTION INTRAVENOUS; SUBCUTANEOUS at 12:44

## 2018-01-12 RX ADMIN — DEXAMETHASONE SODIUM PHOSPHATE 8 MG: 4 INJECTION, SOLUTION INTRAMUSCULAR; INTRAVENOUS at 11:36

## 2018-01-12 RX ADMIN — LIDOCAINE HYDROCHLORIDE 50 MG: 20 INJECTION, SOLUTION EPIDURAL; INFILTRATION; INTRACAUDAL; PERINEURAL at 11:23

## 2018-01-12 RX ADMIN — HEPARIN SODIUM 15000 UNITS: 1000 INJECTION, SOLUTION INTRAVENOUS; SUBCUTANEOUS at 12:01

## 2018-01-12 RX ADMIN — Medication 10 MG: at 14:28

## 2018-01-12 RX ADMIN — Medication 50 MEQ: at 14:25

## 2018-01-12 RX ADMIN — IPRATROPIUM BROMIDE AND ALBUTEROL SULFATE 3 ML: .5; 3 SOLUTION RESPIRATORY (INHALATION) at 17:04

## 2018-01-12 RX ADMIN — PROPOFOL 50 MG: 10 INJECTION, EMULSION INTRAVENOUS at 12:58

## 2018-01-12 RX ADMIN — SUCCINYLCHOLINE CHLORIDE 140 MG: 20 INJECTION, SOLUTION INTRAMUSCULAR; INTRAVENOUS at 11:23

## 2018-01-12 RX ADMIN — Medication 20 MG: at 11:34

## 2018-01-12 RX ADMIN — SUGAMMADEX 500 MG: 100 INJECTION, SOLUTION INTRAVENOUS at 14:43

## 2018-01-12 RX ADMIN — PHENYLEPHRINE HYDROCHLORIDE 200 MCG: 10 INJECTION INTRAMUSCULAR; INTRAVENOUS; SUBCUTANEOUS at 14:19

## 2018-01-12 RX ADMIN — PROPOFOL 150 MG: 10 INJECTION, EMULSION INTRAVENOUS at 11:23

## 2018-01-12 RX ADMIN — FENTANYL CITRATE 50 MCG: 50 INJECTION INTRAMUSCULAR; INTRAVENOUS at 13:56

## 2018-01-12 RX ADMIN — Medication 10 ML: at 20:58

## 2018-01-12 RX ADMIN — QUETIAPINE FUMARATE 200 MG: 100 TABLET, FILM COATED ORAL at 22:30

## 2018-01-12 RX ADMIN — BUSPIRONE HYDROCHLORIDE 45 MG: 10 TABLET ORAL at 20:58

## 2018-01-12 RX ADMIN — PROPOFOL 50 MG: 10 INJECTION, EMULSION INTRAVENOUS at 13:55

## 2018-01-12 RX ADMIN — EPINEPHRINE 1 MG: 0.1 INJECTION, SOLUTION ENDOTRACHEAL; INTRACARDIAC; INTRAVENOUS at 14:00

## 2018-01-12 RX ADMIN — Medication 20 MG: at 14:07

## 2018-01-12 RX ADMIN — PHENYLEPHRINE HYDROCHLORIDE 200 MCG: 10 INJECTION INTRAMUSCULAR; INTRAVENOUS; SUBCUTANEOUS at 14:23

## 2018-01-12 RX ADMIN — ATROPINE SULFATE 1 MG: 1 INJECTION, SOLUTION INTRAMUSCULAR; INTRAVENOUS; SUBCUTANEOUS at 14:01

## 2018-01-12 RX ADMIN — SODIUM CHLORIDE: 9 INJECTION, SOLUTION INTRAVENOUS at 08:45

## 2018-01-12 RX ADMIN — PROTAMINE SULFATE 50 MG: 10 INJECTION, SOLUTION INTRAVENOUS at 14:11

## 2018-01-12 RX ADMIN — ESMOLOL HYDROCHLORIDE 20 MG: 10 INJECTION, SOLUTION INTRAVENOUS at 14:15

## 2018-01-12 RX ADMIN — OXCARBAZEPINE 1200 MG: 300 TABLET, FILM COATED ORAL at 21:05

## 2018-01-12 RX ADMIN — HEPARIN SODIUM 5000 UNITS: 1000 INJECTION, SOLUTION INTRAVENOUS; SUBCUTANEOUS at 13:39

## 2018-01-12 RX ADMIN — FENTANYL CITRATE 100 MCG: 50 INJECTION INTRAMUSCULAR; INTRAVENOUS at 11:23

## 2018-01-12 RX ADMIN — FENTANYL CITRATE 50 MCG: 50 INJECTION INTRAMUSCULAR; INTRAVENOUS at 15:02

## 2018-01-12 RX ADMIN — ONDANSETRON 4 MG: 2 INJECTION INTRAMUSCULAR; INTRAVENOUS at 11:36

## 2018-01-12 RX ADMIN — SODIUM CHLORIDE: 9 INJECTION, SOLUTION INTRAVENOUS at 13:30

## 2018-01-12 RX ADMIN — MIDAZOLAM HYDROCHLORIDE 2 MG: 1 INJECTION, SOLUTION INTRAMUSCULAR; INTRAVENOUS at 11:18

## 2018-01-12 RX ADMIN — PHENYLEPHRINE HYDROCHLORIDE 100 MCG: 10 INJECTION INTRAMUSCULAR; INTRAVENOUS; SUBCUTANEOUS at 14:16

## 2018-01-12 ASSESSMENT — PULMONARY FUNCTION TESTS
PIF_VALUE: 23
PIF_VALUE: 12
PIF_VALUE: 11
PIF_VALUE: 35
PIF_VALUE: 22
PIF_VALUE: 23
PIF_VALUE: 12
PIF_VALUE: 24
PIF_VALUE: 11
PIF_VALUE: 25
PIF_VALUE: 11
PIF_VALUE: 36
PIF_VALUE: 11
PIF_VALUE: 11
PIF_VALUE: 24
PIF_VALUE: 24
PIF_VALUE: 0
PIF_VALUE: 11
PIF_VALUE: 11
PIF_VALUE: 1
PIF_VALUE: 24
PIF_VALUE: 23
PIF_VALUE: 23
PIF_VALUE: 35
PIF_VALUE: 12
PIF_VALUE: 23
PIF_VALUE: 24
PIF_VALUE: 24
PIF_VALUE: 25
PIF_VALUE: 12
PIF_VALUE: 9
PIF_VALUE: 7
PIF_VALUE: 21
PIF_VALUE: 12
PIF_VALUE: 24
PIF_VALUE: 22
PIF_VALUE: 12
PIF_VALUE: 23
PIF_VALUE: 11
PIF_VALUE: 0
PIF_VALUE: 11
PIF_VALUE: 29
PIF_VALUE: 5
PIF_VALUE: 29
PIF_VALUE: 29
PIF_VALUE: 24
PIF_VALUE: 9
PIF_VALUE: 24
PIF_VALUE: 23
PIF_VALUE: 12
PIF_VALUE: 23
PIF_VALUE: 24
PIF_VALUE: 12
PIF_VALUE: 12
PIF_VALUE: 24
PIF_VALUE: 8
PIF_VALUE: 12
PIF_VALUE: 22
PIF_VALUE: 1
PIF_VALUE: 11
PIF_VALUE: 6
PIF_VALUE: 23
PIF_VALUE: 35
PIF_VALUE: 24
PIF_VALUE: 24
PIF_VALUE: 29
PIF_VALUE: 10
PIF_VALUE: 23
PIF_VALUE: 12
PIF_VALUE: 22
PIF_VALUE: 0
PIF_VALUE: 11
PIF_VALUE: 24
PIF_VALUE: 14
PIF_VALUE: 0
PIF_VALUE: 12
PIF_VALUE: 25
PIF_VALUE: 24
PIF_VALUE: 11
PIF_VALUE: 23
PIF_VALUE: 25
PIF_VALUE: 12
PIF_VALUE: 11
PIF_VALUE: 11
PIF_VALUE: 22
PIF_VALUE: 11
PIF_VALUE: 27
PIF_VALUE: 23
PIF_VALUE: 12
PIF_VALUE: 25
PIF_VALUE: 11
PIF_VALUE: 30
PIF_VALUE: 30
PIF_VALUE: 11
PIF_VALUE: 9
PIF_VALUE: 30
PIF_VALUE: 25
PIF_VALUE: 23
PIF_VALUE: 24
PIF_VALUE: 16
PIF_VALUE: 24
PIF_VALUE: 24
PIF_VALUE: 35
PIF_VALUE: 35
PIF_VALUE: 29
PIF_VALUE: 28
PIF_VALUE: 22
PIF_VALUE: 11
PIF_VALUE: 30
PIF_VALUE: 10
PIF_VALUE: 25
PIF_VALUE: 51
PIF_VALUE: 12
PIF_VALUE: 30
PIF_VALUE: 28
PIF_VALUE: 11
PIF_VALUE: 11
PIF_VALUE: 24
PIF_VALUE: 0
PIF_VALUE: 30
PIF_VALUE: 22
PIF_VALUE: 2
PIF_VALUE: 11
PIF_VALUE: 12
PIF_VALUE: 28
PIF_VALUE: 25
PIF_VALUE: 21
PIF_VALUE: 11
PIF_VALUE: 24
PIF_VALUE: 12
PIF_VALUE: 12
PIF_VALUE: 25
PIF_VALUE: 24
PIF_VALUE: 1
PIF_VALUE: 12
PIF_VALUE: 25
PIF_VALUE: 29
PIF_VALUE: 26
PIF_VALUE: 35
PIF_VALUE: 11
PIF_VALUE: 29
PIF_VALUE: 11
PIF_VALUE: 29
PIF_VALUE: 23
PIF_VALUE: 25
PIF_VALUE: 23
PIF_VALUE: 29
PIF_VALUE: 22
PIF_VALUE: 35
PIF_VALUE: 25
PIF_VALUE: 29
PIF_VALUE: 23
PIF_VALUE: 24
PIF_VALUE: 35
PIF_VALUE: 29
PIF_VALUE: 24
PIF_VALUE: 16
PIF_VALUE: 24
PIF_VALUE: 24
PIF_VALUE: 7
PIF_VALUE: 13
PIF_VALUE: 25
PIF_VALUE: 23
PIF_VALUE: 25
PIF_VALUE: 22
PIF_VALUE: 23
PIF_VALUE: 24
PIF_VALUE: 29
PIF_VALUE: 23
PIF_VALUE: 22
PIF_VALUE: 25
PIF_VALUE: 12
PIF_VALUE: 24
PIF_VALUE: 0
PIF_VALUE: 12
PIF_VALUE: 31
PIF_VALUE: 23
PIF_VALUE: 3
PIF_VALUE: 28
PIF_VALUE: 11
PIF_VALUE: 11
PIF_VALUE: 23
PIF_VALUE: 35
PIF_VALUE: 11
PIF_VALUE: 24
PIF_VALUE: 24
PIF_VALUE: 22
PIF_VALUE: 0
PIF_VALUE: 12
PIF_VALUE: 24
PIF_VALUE: 22
PIF_VALUE: 11
PIF_VALUE: 11
PIF_VALUE: 24
PIF_VALUE: 11
PIF_VALUE: 23
PIF_VALUE: 11
PIF_VALUE: 23
PIF_VALUE: 23
PIF_VALUE: 30
PIF_VALUE: 12
PIF_VALUE: 11
PIF_VALUE: 23
PIF_VALUE: 25
PIF_VALUE: 24
PIF_VALUE: 23
PIF_VALUE: 22
PIF_VALUE: 35

## 2018-01-12 ASSESSMENT — PAIN SCALES - GENERAL
PAINLEVEL_OUTOF10: 6
PAINLEVEL_OUTOF10: 0
PAINLEVEL_OUTOF10: 7

## 2018-01-12 ASSESSMENT — PAIN DESCRIPTION - PAIN TYPE: TYPE: ACUTE PAIN

## 2018-01-12 ASSESSMENT — PAIN DESCRIPTION - LOCATION: LOCATION: BACK;GROIN;CHEST

## 2018-01-12 ASSESSMENT — PAIN DESCRIPTION - DESCRIPTORS: DESCRIPTORS: ACHING;DISCOMFORT

## 2018-01-12 NOTE — ANESTHESIA PRE PROCEDURE
sulfate HFA (PROAIR HFA) 108 (90 Base) MCG/ACT inhaler Inhale 2 puffs into the lungs every 6 hours as needed for Wheezing 10/16/17   Christy Preston DO   albuterol (PROVENTIL) (2.5 MG/3ML) 0.083% nebulizer solution Take 3 mLs by nebulization every 6 hours as needed for Wheezing 10/16/17   Shelli Augustine NP   fluticasone Glenard Croon) 50 MCG/ACT nasal spray 2 sprays by Nasal route as needed for Rhinitis    Historical Provider, MD       Current medications:    Current Facility-Administered Medications   Medication Dose Route Frequency Provider Last Rate Last Dose    0.9 % sodium chloride infusion   Intravenous Continuous Cecille Angulo MD           Allergies: Allergies   Allergen Reactions    Dilaudid [Hydromorphone Hcl]      hallucination    Flexeril [Cyclobenzaprine] Other (See Comments)     Hallucinations    Keflex [Cephalexin] Other (See Comments)     Lose cognitive functions.      Tessalon [Benzonatate] Other (See Comments)     psychosis    Augmentin [Amoxicillin-Pot Clavulanate] Rash    Lorabid [Loracarbef] Hives, Swelling and Rash    Minocycline Itching, Swelling and Rash       Problem List:    Patient Active Problem List   Diagnosis Code    Major depressive disorder, recurrent episode, mild ([facility]) F33.0    Low self esteem R45.81    KARLIE (generalized anxiety disorder) F41.1    Obesity E66.9    Obstructive sleep apnea G47.33    Snores R06.83    Sleep disturbances G47.9    Daytime somnolence R40.0    Sleep talking G47.8    Night terrors, adult F51.4    Bruxism (teeth grinding) F45.8    Restless sleeper G47.9    Anxiety F41.9    Abnormal thyroid function test R94.6    Trigeminal neuralgia G50.0    SVT (supraventricular tachycardia) ([facility]) I47.1    POTS (postural orthostatic tachycardia syndrome) R00.0, I95.1    Atypical chest pain R07.89    Syncope and collapse R55    Neck discomfort M54.2    Thyroid cyst E04.1    Pulmonary embolism ([facility]) I26.99    Chest pain on breathing R07.1    Date    WBC 10.2 01/05/2018    RBC 4.53 01/05/2018    RBC 4.11 04/30/2012    HGB 12.1 01/05/2018    HCT 37.5 01/05/2018    MCV 82.9 01/05/2018    RDW 17.3 01/05/2018     01/05/2018       CMP:   Lab Results   Component Value Date     01/05/2018    K 4.4 01/05/2018    CL 99 01/05/2018    CO2 23 01/05/2018    BUN 10 01/05/2018    CREATININE 0.7 01/05/2018    LABGLOM >90 01/05/2018    GLUCOSE 170 01/05/2018    GLUCOSE 85 04/30/2012    PROT 6.9 10/16/2017    CALCIUM 9.5 01/05/2018    BILITOT <0.2 10/16/2017    ALKPHOS 126 10/16/2017    AST 18 10/16/2017    ALT 18 10/16/2017       POC Tests: No results for input(s): POCGLU, POCNA, POCK, POCCL, POCBUN, POCHEMO, POCHCT in the last 72 hours.     Coags:   Lab Results   Component Value Date    PROTIME 12.8 09/21/2017    INR 1.03 10/16/2017    APTT 32.1 10/16/2017       HCG (If Applicable):   Lab Results   Component Value Date    PREGTESTUR NEGATIVE 12/14/2017    PREGSERUM NEGATIVE 01/05/2018        ABGs: No results found for: PHART, PO2ART, HMY5ENA, CDY9DIS, BEART, F2KQHVTM     Type & Screen (If Applicable):  No results found for: LABABO, [address]    Anesthesia Evaluation  Patient summary reviewed and Nursing notes reviewed no history of anesthetic complications:   Airway: Mallampati: III  TM distance: >3 FB   Neck ROM: full  Mouth opening: > = 3 FB Dental: normal exam         Pulmonary:   (+) sleep apnea (Not using CPAP at this point):  asthma: exercise-induced asthma,                            Cardiovascular:    (+) dysrhythmias (POTS): SVT,     (-) hypertension    ECG reviewed          Cleared by cardiology     Beta Blocker:  Not on Beta Blocker         Neuro/Psych:   (+) seizures: well controlled, neuromuscular disease:, psychiatric history: stable with treatment             ROS comment: Fibromyalgia GI/Hepatic/Renal:   (+) GERD: well controlled, morbid obesity (BMI 41)          Endo/Other: Negative Endo/Other ROS                    Abdominal:           Vascular: + PE.                                     Anesthesia Plan      general     ASA 3       Induction: intravenous. MIPS: Postoperative opioids intended and Prophylactic antiemetics administered. Anesthetic plan and risks discussed with patient, mother and father. Plan discussed with CRNA. Louie Joyner MD   1/12/2018

## 2018-01-12 NOTE — ANESTHESIA POSTPROCEDURE EVALUATION
Department of Anesthesiology  Postprocedure Note    Patient: Rashid Chinchilla  MRN: [id number]  YOB: 1995  Date of evaluation: 1/12/2018  Time:  6:08 PM     Procedure Summary     Date:  01/12/18 Room / Location:  [facility] [place] [facility]    Anesthesia Start:  1116 Anesthesia Stop:  1504    Procedure:  [facility] [place] [facility] W ANESTHESIA Diagnosis:      Scheduled Providers:  Tracee Pearce MD; Daysi Pedersen CRNA Responsible Provider:  Tracee Pearce MD    Anesthesia Type:  general ASA Status:  3          Anesthesia Type: general    Rachele Phase I: Rachele Score: 9    Rachele Phase II:      Last vitals: Reviewed and per EMR flowsheets.        Anesthesia Post Evaluation    Patient location during evaluation: PACU  Patient participation: complete - patient participated  Level of consciousness: awake and alert  Airway patency: patent  Nausea & Vomiting: no vomiting and no nausea  Complications: yes (See intraop record and Dr. Hafsa Singh' note for details of event)  Specific complications: cardiac arrest   Cardiovascular status: tachycardic and hemodynamically stable  Respiratory status: acceptable and nasal cannula  Hydration status: stable

## 2018-01-12 NOTE — PROGRESS NOTES
Pt admitted to  [facility] ambulatory for SVT ablation  Pt NPO. Patient accompanied by mom and dad. Vital signs obtained. Assessment and data collection initiated. Oriented to room. Policies and procedures for 2E explained   All questions answered with no further questions at this time. Fall prevention and safety precautions discussed with patient.

## 2018-01-12 NOTE — H&P
causes her chest to hurt and also of orthostatic palpitations and dizziness that has not responded to the midodrine. The patient was also found when she went to the ED for this problem on 5/5/2017 to have several small pulmonary emboli. Her BCPs were stopped and she is on Xarelto.     7/5/2017:  The patient continues to have frequent episodes of chest pains, dizziness, and rapid heart rates.  She has been to the ED several times for chest pain.  Her ECG on 7/3/2017 showed sinus tachycardia with a rate of 112 bpm.  The patient states the clonidine helped early on but it has not been working lately.       10/31/2017:  The patient has weaned off the Seroquel and Artane. She continues to have constant palpitations and does not feel any better. The patient is still taking the Clonidine 0.1 mg 1 po BID.   She has been on Metoprolol in the past but could not tolerate this medication secondary to hypotension.     Past Medical History:  Past Medical History             Diagnosis Date    ADD (attention deficit disorder)      Anxiety 4/8/2015    Anxiety attack      Asthma       exercise induced    Atypical face pain      Back pain      Bipolar 1 disorder (HCC)      Fibromyalgia      Major depressive disorder, recurrent episode, mild ([address] 7/31/2012    Obesity 10/24/2014    POTS (postural orthostatic tachycardia syndrome)      Pott disease      Pulmonary emboli (HCC)      Seizures ([address] 07/31/2016    Tailbone injury 11/28/15     patient broke tailbone    Thyroid disease       borderline low     TMJ (dislocation of temporomandibular joint)      Trigeminal neuralgia           Past Surgical History:  Past Surgical History             Procedure Laterality Date    CARDIAC CATHETERIZATION   3-2016     EP Study    CARDIAC CATHETERIZATION   04/21/2016     [facility]    COSMETIC SURGERY         wisdom teeth surgery 2010    WISDOM TOOTH EXTRACTION             Past Family History:   Family History              Problem Relation Age of Onset    Anxiety Disorder Mother      Diabetes Mother      Anxiety Disorder Father      Bipolar Disorder Father      High Blood Pressure Father      Mental Illness Father      Parkinsonism Father         Early-Onset    Depression Paternal Aunt      Cancer Paternal Uncle      Depression Paternal Uncle      Cancer Maternal Grandmother      Depression Maternal Grandfather      Cancer Paternal Grandfather      Lupus Maternal Aunt           Past Social History:     Social History   Social History            Social History    Marital status: Single       Spouse name: N/A    Number of children: N/A    Years of education: N/A            Social History Main Topics    Smoking status: Never Smoker    Smokeless tobacco: Never Used    Alcohol use 0.6 oz/week       1 Cans of beer per week    Drug use:         Types: Marijuana    Sexual activity: Yes       Partners: Male           Other Topics Concern    None          Social History Narrative    None            Medications:   Scheduled Meds:  Continuous Infusions:  CBC: No results for input(s): WBC, HGB, PLT in the last 72 hours. BMP:  No results for input(s): NA, K, CL, CO2, BUN, CREATININE, GLUCOSE in the last 72 hours. Hepatic: No results for input(s): AST, ALT, ALB, BILITOT, ALKPHOS in the last 72 hours. Troponin: No results for input(s): TROPONINI in the last 72 hours. BNP: No results for input(s): BNP in the last 72 hours. Lipids: No results for input(s): CHOL, HDL in the last 72 hours.     Invalid input(s): LDLCALCU  INR: No results for input(s): INR in the last 72 hours.     Objective:   REVIEW OF SYSTEMS:    Review of Systems   Constitution: Negative for fever, weight gain and weight loss. HENT: Negative for hearing loss and sore throat. Eyes: Negative for blurred vision, double vision, vision loss in left eye and vision loss in right eye. Cardiovascular: Positive for chest pain, dyspnea on exertion and palpitations. Negative for irregular heartbeat, near-syncope and syncope. Respiratory: Negative for cough, shortness of breath and wheezing. Endocrine: Negative for cold intolerance, heat intolerance and polyuria. Hematologic/Lymphatic: Negative for bleeding problem. Does not bruise/bleed easily. Skin: Negative for dry skin, itching and rash. Musculoskeletal: Negative for arthritis, back pain, joint pain and myalgias. Gastrointestinal: Negative for abdominal pain, constipation, diarrhea, nausea and vomiting. Genitourinary: Negative for dysuria, frequency, hematuria and urgency. Neurological: Negative for dizziness, headaches, light-headedness, numbness, paresthesias, seizures and vertigo. Psychiatric/Behavioral: Negative for depression and memory loss. The patient is not nervous/anxious.          PHYSICAL EXAM:  /81   Pulse 123   Ht 5' 10\" (1.778 m)   Wt 268 lb (121.6 kg)   BMI 38.45 kg/m²      Physical Exam   Constitutional: She is oriented to person, place, and time. No distress. HENT:   Head: Normocephalic and atraumatic. Head is without contusion. Right Ear: Hearing and external ear normal. No decreased hearing is noted. Left Ear: Hearing and external ear normal. No decreased hearing is noted. Nose: Nose normal. No sinus tenderness. No epistaxis. Mouth/Throat: Oropharynx is clear and moist. Mucous membranes are not dry. No oropharyngeal exudate. Eyes: Conjunctivae and EOM are normal. Pupils are equal, round, and reactive to light. Right eye exhibits no discharge. Left eye exhibits no discharge. Neck: Trachea normal. Neck supple. No JVD present. No edema present. No thyroid mass present. Cardiovascular: Regular rhythm, normal heart sounds and normal pulses. No extrasystoles are present. Tachycardia present. Pulmonary/Chest: Effort normal and breath sounds normal. She exhibits no tenderness. Breasts are symmetrical.   Abdominal: Soft.  Normal appearance and bowel sounds are

## 2018-01-13 VITALS
OXYGEN SATURATION: 100 % | SYSTOLIC BLOOD PRESSURE: 134 MMHG | WEIGHT: 272.71 LBS | RESPIRATION RATE: 15 BRPM | DIASTOLIC BLOOD PRESSURE: 77 MMHG | BODY MASS INDEX: 40.39 KG/M2 | HEART RATE: 101 BPM | HEIGHT: 69 IN | TEMPERATURE: 98.2 F

## 2018-01-13 LAB
EKG ATRIAL RATE: 97 BPM
EKG P AXIS: 16 DEGREES
EKG P-R INTERVAL: 174 MS
EKG Q-T INTERVAL: 338 MS
EKG QRS DURATION: 92 MS
EKG QTC CALCULATION (BAZETT): 429 MS
EKG R AXIS: 69 DEGREES
EKG T AXIS: 92 DEGREES
EKG VENTRICULAR RATE: 97 BPM

## 2018-01-13 PROCEDURE — 96360 HYDRATION IV INFUSION INIT: CPT

## 2018-01-13 PROCEDURE — 6370000000 HC RX 637 (ALT 250 FOR IP): Performed by: ANESTHESIOLOGY

## 2018-01-13 PROCEDURE — 6370000000 HC RX 637 (ALT 250 FOR IP): Performed by: INTERNAL MEDICINE

## 2018-01-13 PROCEDURE — 2580000003 HC RX 258: Performed by: INTERNAL MEDICINE

## 2018-01-13 PROCEDURE — 93005 ELECTROCARDIOGRAM TRACING: CPT

## 2018-01-13 PROCEDURE — 94640 AIRWAY INHALATION TREATMENT: CPT

## 2018-01-13 RX ORDER — ALPRAZOLAM 0.5 MG/1
0.5 TABLET ORAL 4 TIMES DAILY PRN
Status: DISCONTINUED | OUTPATIENT
Start: 2018-01-12 | End: 2018-01-13 | Stop reason: ALTCHOICE

## 2018-01-13 RX ORDER — ALPRAZOLAM 0.5 MG/1
2 TABLET, EXTENDED RELEASE ORAL DAILY
Status: DISCONTINUED | OUTPATIENT
Start: 2018-01-13 | End: 2018-01-13 | Stop reason: HOSPADM

## 2018-01-13 RX ADMIN — DULOXETIN HYDROCHLORIDE 60 MG: 60 CAPSULE, DELAYED RELEASE ORAL at 08:58

## 2018-01-13 RX ADMIN — OXCARBAZEPINE 1200 MG: 300 TABLET, FILM COATED ORAL at 08:59

## 2018-01-13 RX ADMIN — Medication 10 ML: at 09:04

## 2018-01-13 RX ADMIN — IPRATROPIUM BROMIDE AND ALBUTEROL SULFATE 1 AMPULE: .5; 3 SOLUTION RESPIRATORY (INHALATION) at 09:18

## 2018-01-13 RX ADMIN — Medication 75 MG: at 09:01

## 2018-01-13 RX ADMIN — OMEPRAZOLE 40 MG: 40 CAPSULE, DELAYED RELEASE ORAL at 04:58

## 2018-01-13 RX ADMIN — Medication 250 MG: at 09:00

## 2018-01-13 RX ADMIN — BUSPIRONE HYDROCHLORIDE 30 MG: 10 TABLET ORAL at 08:57

## 2018-01-13 RX ADMIN — ALPRAZOLAM 2 MG: 0.5 TABLET, EXTENDED RELEASE ORAL at 08:57

## 2018-01-13 ASSESSMENT — PAIN DESCRIPTION - LOCATION: LOCATION: GROIN;BACK;CHEST

## 2018-01-13 ASSESSMENT — PAIN DESCRIPTION - PAIN TYPE: TYPE: ACUTE PAIN

## 2018-01-13 ASSESSMENT — PAIN DESCRIPTION - ORIENTATION: ORIENTATION: RIGHT;LEFT;UPPER

## 2018-01-13 ASSESSMENT — PAIN SCALES - GENERAL
PAINLEVEL_OUTOF10: 5
PAINLEVEL_OUTOF10: 0
PAINLEVEL_OUTOF10: 0

## 2018-01-13 ASSESSMENT — PAIN DESCRIPTION - DESCRIPTORS: DESCRIPTORS: ACHING;DISCOMFORT

## 2018-01-13 ASSESSMENT — PAIN DESCRIPTION - FREQUENCY: FREQUENCY: CONTINUOUS

## 2018-01-13 NOTE — PROGRESS NOTES
1500  Pt. Awake and oriented on adm. To pacu. Pt. Denies pain. Left groin site without redness, swelling, or drainage. Pressure held on right femoral site per [facility]    1510  duoneb aerosol tx  Given. 1520  duoneb aerosol done. o2 per nasal cannula applied at 2 liters. 1540  Pressure discontinued from right femoral site. No swelling or drainage noted from bilateral femoral sites. 1600  Pt. Taking ice chips without difficulty. 1610  Report called to 3A. 1630  pacu criteria met. Transfer to 3A07. Family sent to pt. Room.

## 2018-01-13 NOTE — DISCHARGE SUMMARY
CARDIOLOGY ELECTROPHYSIOLOGY   DISCHARGE NOTE    DATE:  1/13/2018    SUBJECTIVE:  The patient did well overnight. There were no events reported by the nursing staff. The patient denies having any chest pain, pleurisy, palpitations, or dyspnea. EXAM:  Vitals:    01/13/18 0700 01/13/18 0705 01/13/18 0805 01/13/18 0845   BP: 120/83 120/83 119/74    Pulse: 101 99 105 103   Resp: 15 12 29 26   Temp:    98.2 °F (36.8 °C)   TempSrc:    Oral   SpO2: 96% 100% 100% 100%   Weight:       Height:         Cath Sites:  No hematomas  CV: RR tachycardic  RESPIRATIONS: CTA-B  EX: No edema    DIAGNOSTIC STUDIES:    ECG: Sinus rhythm, HR 97 bpm, p wave morphology changed in leads III, and aVF. CXR PA/LAT:  Preliminary report:  No pneumothorax, leads in good position. IMPRESSION:  The patient is s/p sinus node modification ablation. Successful modification of sinus node based upon p wave morphology change and activation mapping. Unfortunately the patient's heart rate is unchanged indicating the mechanism of her sinus tachycardia is secondary to extrinsic factors. The patient's insurance would not cover Corlanor, and she cannot tolerate beta blockers. PLAN:  1. D/C: Home    2. F/U: Dr. Jairo Washington in one month. 3. MEDS:     Medication List      CHANGE how you take these medications    busPIRone 30 MG tablet  Commonly known as:  BUSPAR  What changed:  Another medication with the same name was removed. Continue taking this medication, and follow the directions you see here.         CONTINUE taking these medications    * albuterol sulfate  (90 Base) MCG/ACT inhaler  Commonly known as:  PROAIR HFA  Inhale 2 puffs into the lungs every 6 hours as needed for Wheezing     * albuterol (2.5 MG/3ML) 0.083% nebulizer solution  Commonly known as:  PROVENTIL  Take 3 mLs by nebulization every 6 hours as needed for Wheezing     CALCIUM CITRATE + D PO     cetirizine 10 MG tablet  Commonly known as:  ZYRTEC  Take 1 tablet by mouth

## 2018-01-15 ENCOUNTER — TELEPHONE (OUTPATIENT)
Dept: CARDIOLOGY CLINIC | Age: 23
End: 2018-01-15

## 2018-01-15 ENCOUNTER — TELEPHONE (OUTPATIENT)
Dept: FAMILY MEDICINE CLINIC | Age: 23
End: 2018-01-15

## 2018-01-15 NOTE — TELEPHONE ENCOUNTER
[facility] Transitions Initial Follow Up Call    Call within 2 business days of discharge: Yes    Patient: Radha Vega Patient : 1995   MRN: [id number]  Reason for Admission: There are no discharge diagnoses documented for the most recent discharge. Discharge Date: 18 RARS: Shilpi STARR([id number])@     [place] [facility] [place]: [unfilled]    Non-face-to-face services provided:  Have the medications prescribed at discharge been filled? no  Does the patient understand what the medications are for? n/a  Has the patient experienced any new symptoms or have previous symptoms worsened? no  Is the patient experiencing any pain? yes  If yes, is the pain well controlled? no  We want to be sure the patient has the best recovery possible. Does the patient understand all the discharge instructions? yes  Did someone talk to the patient and/or family about the patient needs prior to being discharged? yes  Did the patient's doctor communicate well? yes  Did the patient's nurse communicate well? yes  Was the patient satisfied with the services and care received at [address] [place] [address]? yes          Follow Up  No future appointments.     Asher Santana RN

## 2018-01-15 NOTE — TELEPHONE ENCOUNTER
The pain is most likely from the chest compressions and will take some time to get better. Cannot answer why she almost passed out and is having dizziness. We can check her ILR to see if there were any arrhythmias.

## 2018-01-16 ENCOUNTER — PROCEDURE VISIT (OUTPATIENT)
Dept: CARDIOLOGY CLINIC | Age: 23
End: 2018-01-16
Payer: MEDICARE

## 2018-01-16 DIAGNOSIS — Z45.09 ENCOUNTER FOR ELECTRONIC ANALYSIS OF REVEAL EVENT RECORDER: Primary | ICD-10-CM

## 2018-01-16 PROCEDURE — 93298 REM INTERROG DEV EVAL SCRMS: CPT | Performed by: INTERNAL MEDICINE

## 2018-01-17 ENCOUNTER — OFFICE VISIT (OUTPATIENT)
Dept: FAMILY MEDICINE CLINIC | Age: 23
End: 2018-01-17
Payer: COMMERCIAL

## 2018-01-17 VITALS
SYSTOLIC BLOOD PRESSURE: 118 MMHG | HEIGHT: 70 IN | BODY MASS INDEX: 39.4 KG/M2 | HEART RATE: 68 BPM | DIASTOLIC BLOOD PRESSURE: 74 MMHG | RESPIRATION RATE: 16 BRPM | WEIGHT: 275.2 LBS

## 2018-01-17 DIAGNOSIS — G90.A POTS (POSTURAL ORTHOSTATIC TACHYCARDIA SYNDROME): ICD-10-CM

## 2018-01-17 DIAGNOSIS — I47.1 SVT (SUPRAVENTRICULAR TACHYCARDIA) (HCC): Primary | ICD-10-CM

## 2018-01-17 DIAGNOSIS — Z98.890 S/P ABLATION OF VENTRICULAR ARRHYTHMIA: ICD-10-CM

## 2018-01-17 DIAGNOSIS — F41.1 GAD (GENERALIZED ANXIETY DISORDER): ICD-10-CM

## 2018-01-17 DIAGNOSIS — Z86.79 S/P ABLATION OF VENTRICULAR ARRHYTHMIA: ICD-10-CM

## 2018-01-17 PROCEDURE — 99495 TRANSJ CARE MGMT MOD F2F 14D: CPT | Performed by: NURSE PRACTITIONER

## 2018-01-17 ASSESSMENT — ENCOUNTER SYMPTOMS
SHORTNESS OF BREATH: 0
ABDOMINAL PAIN: 0
COUGH: 0
NAUSEA: 0

## 2018-01-17 NOTE — PROGRESS NOTES
Post-Discharge Transitional Care Management Services      Deana Dailey   YOB: 1995    Date of Visit:  1/17/2018  30 Day Post-Discharge Date: 2/12/18    Chief Complaint   Patient presents with    Follow-Up from [address] Other     heart rate all over--60's to 140's     ADMISSION DATE:  1/12/18  DISCHARGE DATE:  1/13/2018     DIAGNOSTIC STUDIES:     ECG: Sinus rhythm, HR 97 bpm, p wave morphology changed in leads III, and aVF.     CXR PA/LAT:  Preliminary report:  No pneumothorax, leads in good position.     IMPRESSION:  The patient is s/p sinus node modification ablation. Successful modification of sinus node based upon p wave morphology change and activation mapping. Did have Cardiac Arrest that required CPR and Defibrillation. No episodes thereafter. Unfortunately the patient's heart rate is unchanged indicating the mechanism of her sinus tachycardia is secondary to extrinsic factors. The patient's insurance would not cover Corlanor, and she cannot tolerate beta blockers.     PLAN:  1. D/C: Home     2. F/U: Dr. Alfonso Cortes in one month. Allergies   Allergen Reactions    Dilaudid [Hydromorphone Hcl]      hallucination    Flexeril [Cyclobenzaprine] Other (See Comments)     Hallucinations    Keflex [Cephalexin] Other (See Comments)     Lose cognitive functions.  Tessalon [Benzonatate] Other (See Comments)     psychosis    Augmentin [Amoxicillin-Pot Clavulanate] Rash    Lorabid [Loracarbef] Hives, Swelling and Rash    Minocycline Itching, Swelling and Rash     Outpatient Prescriptions Marked as Taking for the 1/17/18 encounter (Office Visit) with Jessica Jackson NP   Medication Sig Dispense Refill    QUEtiapine (SEROQUEL) 200 MG tablet Take 200 mg by mouth nightly      ALPRAZolam (XANAX XR) 2 MG extended release tablet Take 2 mg by mouth every morning.       busPIRone (BUSPAR) 30 MG tablet Take by mouth every morning 30 mg AM, 45 mg PM (1.5 tab)      lurasidone (LATUDA) 80

## 2018-02-21 ENCOUNTER — HOSPITAL ENCOUNTER (EMERGENCY)
Age: 23
Discharge: HOME | DRG: 195 | End: 2018-02-21
Payer: COMMERCIAL

## 2018-02-21 ENCOUNTER — HOSPITAL ENCOUNTER (INPATIENT)
Age: 23
LOS: 2 days | Discharge: HOME | DRG: 195 | End: 2018-02-23
Attending: FAMILY MEDICINE | Admitting: INTERNAL MEDICINE
Payer: COMMERCIAL

## 2018-02-21 ENCOUNTER — HOSPITAL ENCOUNTER (EMERGENCY)
Dept: GENERAL RADIOLOGY | Age: 23
Discharge: HOME | DRG: 195 | End: 2018-02-21
Payer: COMMERCIAL

## 2018-02-21 ENCOUNTER — APPOINTMENT (OUTPATIENT)
Dept: CT IMAGING | Age: 23
DRG: 195 | End: 2018-02-21
Payer: COMMERCIAL

## 2018-02-21 VITALS
OXYGEN SATURATION: 99 % | HEART RATE: 138 BPM | BODY MASS INDEX: 39.3 KG/M2 | SYSTOLIC BLOOD PRESSURE: 162 MMHG | WEIGHT: 270 LBS | RESPIRATION RATE: 18 BRPM | TEMPERATURE: 99.6 F | DIASTOLIC BLOOD PRESSURE: 79 MMHG

## 2018-02-21 DIAGNOSIS — J18.9 PNEUMONIA OF BOTH LUNGS DUE TO INFECTIOUS ORGANISM, UNSPECIFIED PART OF LUNG: ICD-10-CM

## 2018-02-21 DIAGNOSIS — J18.9 PNEUMONIA DUE TO ORGANISM: Primary | ICD-10-CM

## 2018-02-21 DIAGNOSIS — E87.6 HYPOKALEMIA: ICD-10-CM

## 2018-02-21 DIAGNOSIS — J18.9 COMMUNITY ACQUIRED PNEUMONIA OF LEFT UPPER LOBE OF LUNG: Primary | ICD-10-CM

## 2018-02-21 LAB
ALBUMIN SERPL-MCNC: 4 G/DL (ref 3.5–5.1)
ALP BLD-CCNC: 122 U/L (ref 38–126)
ALT SERPL-CCNC: 14 U/L (ref 11–66)
ANION GAP SERPL CALCULATED.3IONS-SCNC: 16 MEQ/L (ref 8–16)
ANISOCYTOSIS: ABNORMAL
ANISOCYTOSIS: NORMAL
APTT: 28.5 SECONDS (ref 22–38)
AST SERPL-CCNC: 14 U/L (ref 5–40)
BASOPHILS # BLD: 0.4 %
BASOPHILS # BLD: 0.6 %
BASOPHILS ABSOLUTE: 0 THOU/MM3 (ref 0–0.1)
BASOPHILS ABSOLUTE: 0 THOU/MM3 (ref 0–0.1)
BILIRUB SERPL-MCNC: < 0.2 MG/DL (ref 0.3–1.2)
BUN BLDV-MCNC: 6 MG/DL (ref 7–22)
BUN WHOLE BLOOD, UC: 4 MG/DL (ref 8–26)
CALCIUM SERPL-MCNC: 9.2 MG/DL (ref 8.5–10.5)
CHLORIDE BLD-SCNC: 98 MEQ/L (ref 98–111)
CHLORIDE, WHOLE BLOOD: 101 MEQ/L (ref 98–109)
CO2, WHOLE BLOOD: 26 MEQ/L (ref 23–33)
CO2: 24 MEQ/L (ref 23–33)
CREAT SERPL-MCNC: 0.7 MG/DL (ref 0.4–1.2)
CREATININE WHOLE BLOOD, UC: 0.7 MG/DL (ref 0.5–1.2)
EKG ATRIAL RATE: 145 BPM
EKG P AXIS: 54 DEGREES
EKG P-R INTERVAL: 128 MS
EKG Q-T INTERVAL: 270 MS
EKG QRS DURATION: 80 MS
EKG QTC CALCULATION (BAZETT): 419 MS
EKG R AXIS: 61 DEGREES
EKG T AXIS: 38 DEGREES
EKG VENTRICULAR RATE: 145 BPM
EOSINOPHIL # BLD: 2.3 %
EOSINOPHIL # BLD: 4.6 %
EOSINOPHILS ABSOLUTE: 0.2 THOU/MM3 (ref 0–0.4)
EOSINOPHILS ABSOLUTE: 0.3 THOU/MM3 (ref 0–0.4)
FLU A ANTIGEN: NEGATIVE
FLU A ANTIGEN: NEGATIVE
FLU B ANTIGEN: NEGATIVE
GFR SERPL CREATININE-BSD FRML MDRD: > 90 ML/MIN/1.73M2
GFR, ESTIMATED: > 90 ML/MIN/1.73M2
GLUCOSE BLD-MCNC: 121 MG/DL (ref 70–108)
GLUCOSE, WHOLE BLOOD: 82 MG/DL (ref 70–108)
HCT VFR BLD CALC: 31.4 % (ref 37–47)
HCT VFR BLD CALC: 33.9 % (ref 37–47)
HEMOGLOBIN: 10 GM/DL (ref 12–16)
HEMOGLOBIN: 11.1 GM/DL (ref 12–16)
HYPOCHROMIA: ABNORMAL
HYPOCHROMIA: NORMAL
INFLUENZA B AG, EIA: NEGATIVE
INR BLD: 1.07 (ref 0.85–1.13)
LACTIC ACID: 2 MMOL/L (ref 0.5–2.2)
LIPASE: 35.2 U/L (ref 5.6–51.3)
LYMPHOCYTES # BLD: 10.6 %
LYMPHOCYTES # BLD: 22.7 %
LYMPHOCYTES ABSOLUTE: 0.8 THOU/MM3 (ref 1–4.8)
LYMPHOCYTES ABSOLUTE: 1.4 THOU/MM3 (ref 1–4.8)
MCH RBC QN AUTO: 25.4 PG (ref 27–31)
MCH RBC QN AUTO: 25.5 PG (ref 27–31)
MCHC RBC AUTO-ENTMCNC: 31.9 GM/DL (ref 33–37)
MCHC RBC AUTO-ENTMCNC: 32.7 GM/DL (ref 33–37)
MCV RBC AUTO: 78 FL (ref 81–99)
MCV RBC AUTO: 80.1 FL (ref 81–99)
MICROCYTES: ABNORMAL
MONOCYTES # BLD: 5.5 %
MONOCYTES # BLD: 8.5 %
MONOCYTES ABSOLUTE: 0.4 THOU/MM3 (ref 0.4–1.3)
MONOCYTES ABSOLUTE: 0.5 THOU/MM3 (ref 0.4–1.3)
NUCLEATED RED BLOOD CELLS: 0 /100 WBC
NUCLEATED RED BLOOD CELLS: 0 /100 WBC
OSMOLALITY CALCULATION: 274.5 MOSMOL/KG (ref 275–300)
PDW BLD-RTO: 15.4 % (ref 11.5–14.5)
PDW BLD-RTO: 18.5 % (ref 11.5–14.5)
PLATELET # BLD: 237 THOU/MM3 (ref 130–400)
PLATELET # BLD: 278 THOU/MM3 (ref 130–400)
PMV BLD AUTO: 7.4 FL (ref 7.4–10.4)
PMV BLD AUTO: 9.1 FL (ref 7.4–10.4)
POTASSIUM REFLEX MAGNESIUM: 4.1 MEQ/L (ref 3.5–5.2)
POTASSIUM, WHOLE BLOOD: 3.3 MEQ/L (ref 3.5–4.9)
PRO-BNP: 90.8 PG/ML (ref 0–450)
RBC # BLD: 3.92 MILL/MM3 (ref 4.2–5.4)
RBC # BLD: 4.37 MILL/MM3 (ref 4.2–5.4)
SEG NEUTROPHILS: 63.6 %
SEG NEUTROPHILS: 81.2 %
SEGMENTED NEUTROPHILS ABSOLUTE COUNT: 3.9 THOU/MM3 (ref 1.8–7.7)
SEGMENTED NEUTROPHILS ABSOLUTE COUNT: 5.8 THOU/MM3 (ref 1.8–7.7)
SODIUM BLD-SCNC: 138 MEQ/L (ref 135–145)
SODIUM, WHOLE BLOOD: 138 MEQ/L (ref 138–146)
TOTAL PROTEIN: 7 G/DL (ref 6.1–8)
TROPONIN T: < 0.01 NG/ML
WBC # BLD: 6.2 THOU/MM3 (ref 4.8–10.8)
WBC # BLD: 7.1 THOU/MM3 (ref 4.8–10.8)

## 2018-02-21 PROCEDURE — 84520 ASSAY OF UREA NITROGEN: CPT

## 2018-02-21 PROCEDURE — 6370000000 HC RX 637 (ALT 250 FOR IP): Performed by: FAMILY MEDICINE

## 2018-02-21 PROCEDURE — 1200000003 HC TELEMETRY R&B

## 2018-02-21 PROCEDURE — 85025 COMPLETE CBC W/AUTO DIFF WBC: CPT

## 2018-02-21 PROCEDURE — 85610 PROTHROMBIN TIME: CPT

## 2018-02-21 PROCEDURE — 96360 HYDRATION IV INFUSION INIT: CPT

## 2018-02-21 PROCEDURE — 99214 OFFICE O/P EST MOD 30 MIN: CPT | Performed by: NURSE PRACTITIONER

## 2018-02-21 PROCEDURE — 80053 COMPREHEN METABOLIC PANEL: CPT

## 2018-02-21 PROCEDURE — 96372 THER/PROPH/DIAG INJ SC/IM: CPT

## 2018-02-21 PROCEDURE — 6360000002 HC RX W HCPCS: Performed by: FAMILY MEDICINE

## 2018-02-21 PROCEDURE — 87040 BLOOD CULTURE FOR BACTERIA: CPT

## 2018-02-21 PROCEDURE — 84484 ASSAY OF TROPONIN QUANT: CPT

## 2018-02-21 PROCEDURE — 2500000003 HC RX 250 WO HCPCS: Performed by: FAMILY MEDICINE

## 2018-02-21 PROCEDURE — 71046 X-RAY EXAM CHEST 2 VIEWS: CPT

## 2018-02-21 PROCEDURE — 83880 ASSAY OF NATRIURETIC PEPTIDE: CPT

## 2018-02-21 PROCEDURE — 6370000000 HC RX 637 (ALT 250 FOR IP): Performed by: NURSE PRACTITIONER

## 2018-02-21 PROCEDURE — 99215 OFFICE O/P EST HI 40 MIN: CPT

## 2018-02-21 PROCEDURE — 2580000003 HC RX 258: Performed by: FAMILY MEDICINE

## 2018-02-21 PROCEDURE — 93005 ELECTROCARDIOGRAM TRACING: CPT | Performed by: FAMILY MEDICINE

## 2018-02-21 PROCEDURE — 83605 ASSAY OF LACTIC ACID: CPT

## 2018-02-21 PROCEDURE — 85730 THROMBOPLASTIN TIME PARTIAL: CPT

## 2018-02-21 PROCEDURE — 94640 AIRWAY INHALATION TREATMENT: CPT

## 2018-02-21 PROCEDURE — 87804 INFLUENZA ASSAY W/OPTIC: CPT

## 2018-02-21 PROCEDURE — 82565 ASSAY OF CREATININE: CPT

## 2018-02-21 PROCEDURE — 99285 EMERGENCY DEPT VISIT HI MDM: CPT

## 2018-02-21 PROCEDURE — 36415 COLL VENOUS BLD VENIPUNCTURE: CPT

## 2018-02-21 PROCEDURE — 6360000002 HC RX W HCPCS: Performed by: NURSE PRACTITIONER

## 2018-02-21 PROCEDURE — 80051 ELECTROLYTE PANEL: CPT

## 2018-02-21 PROCEDURE — 82947 ASSAY GLUCOSE BLOOD QUANT: CPT

## 2018-02-21 PROCEDURE — 83690 ASSAY OF LIPASE: CPT

## 2018-02-21 RX ORDER — BUSPIRONE HYDROCHLORIDE 10 MG/1
30 TABLET ORAL 2 TIMES DAILY
Status: DISCONTINUED | OUTPATIENT
Start: 2018-02-22 | End: 2018-02-23 | Stop reason: HOSPADM

## 2018-02-21 RX ORDER — DULOXETIN HYDROCHLORIDE 60 MG/1
60 CAPSULE, DELAYED RELEASE ORAL 2 TIMES DAILY
Status: DISCONTINUED | OUTPATIENT
Start: 2018-02-22 | End: 2018-02-23 | Stop reason: HOSPADM

## 2018-02-21 RX ORDER — IBUPROFEN 200 MG
600 TABLET ORAL ONCE
Status: COMPLETED | OUTPATIENT
Start: 2018-02-21 | End: 2018-02-21

## 2018-02-21 RX ORDER — AZITHROMYCIN 250 MG/1
TABLET, FILM COATED ORAL
Qty: 6 TABLET | Refills: 0 | Status: ON HOLD | OUTPATIENT
Start: 2018-02-21 | End: 2018-02-23 | Stop reason: HOSPADM

## 2018-02-21 RX ORDER — ACETAMINOPHEN 325 MG/1
650 TABLET ORAL EVERY 4 HOURS PRN
Status: DISCONTINUED | OUTPATIENT
Start: 2018-02-21 | End: 2018-02-22 | Stop reason: SDUPTHER

## 2018-02-21 RX ORDER — LEVOFLOXACIN 5 MG/ML
750 INJECTION, SOLUTION INTRAVENOUS EVERY 24 HOURS
Status: DISCONTINUED | OUTPATIENT
Start: 2018-02-22 | End: 2018-02-23 | Stop reason: HOSPADM

## 2018-02-21 RX ORDER — 0.9 % SODIUM CHLORIDE 0.9 %
500 INTRAVENOUS SOLUTION INTRAVENOUS ONCE
Status: COMPLETED | OUTPATIENT
Start: 2018-02-21 | End: 2018-02-21

## 2018-02-21 RX ORDER — IPRATROPIUM BROMIDE AND ALBUTEROL SULFATE 2.5; .5 MG/3ML; MG/3ML
1 SOLUTION RESPIRATORY (INHALATION)
Status: DISCONTINUED | OUTPATIENT
Start: 2018-02-21 | End: 2018-02-21

## 2018-02-21 RX ORDER — SODIUM CHLORIDE 0.9 % (FLUSH) 0.9 %
10 SYRINGE (ML) INJECTION PRN
Status: DISCONTINUED | OUTPATIENT
Start: 2018-02-21 | End: 2018-02-23 | Stop reason: HOSPADM

## 2018-02-21 RX ORDER — SODIUM CHLORIDE 0.9 % (FLUSH) 0.9 %
10 SYRINGE (ML) INJECTION EVERY 12 HOURS SCHEDULED
Status: DISCONTINUED | OUTPATIENT
Start: 2018-02-21 | End: 2018-02-23 | Stop reason: HOSPADM

## 2018-02-21 RX ORDER — ONDANSETRON 2 MG/ML
4 INJECTION INTRAMUSCULAR; INTRAVENOUS EVERY 6 HOURS PRN
Status: DISCONTINUED | OUTPATIENT
Start: 2018-02-21 | End: 2018-02-23 | Stop reason: HOSPADM

## 2018-02-21 RX ORDER — QUETIAPINE FUMARATE 100 MG/1
200 TABLET, FILM COATED ORAL NIGHTLY
Status: DISCONTINUED | OUTPATIENT
Start: 2018-02-22 | End: 2018-02-23 | Stop reason: HOSPADM

## 2018-02-21 RX ORDER — OXCARBAZEPINE 300 MG/1
1200 TABLET, FILM COATED ORAL 2 TIMES DAILY
Status: DISCONTINUED | OUTPATIENT
Start: 2018-02-22 | End: 2018-02-23 | Stop reason: HOSPADM

## 2018-02-21 RX ORDER — PREDNISONE 20 MG/1
20 TABLET ORAL 2 TIMES DAILY
Qty: 10 TABLET | Refills: 0 | Status: ON HOLD | OUTPATIENT
Start: 2018-02-21 | End: 2018-02-23 | Stop reason: HOSPADM

## 2018-02-21 RX ORDER — PANTOPRAZOLE SODIUM 40 MG/1
40 TABLET, DELAYED RELEASE ORAL
Status: DISCONTINUED | OUTPATIENT
Start: 2018-02-22 | End: 2018-02-23 | Stop reason: HOSPADM

## 2018-02-21 RX ORDER — ALPRAZOLAM 0.5 MG/1
0.5 TABLET ORAL EVERY 6 HOURS PRN
Status: DISCONTINUED | OUTPATIENT
Start: 2018-02-21 | End: 2018-02-23 | Stop reason: HOSPADM

## 2018-02-21 RX ORDER — DEXTROMETHORPHAN POLISTIREX 30 MG/5ML
60 SUSPENSION ORAL 2 TIMES DAILY PRN
Refills: 0 | COMMUNITY
Start: 2018-02-21 | End: 2018-03-03

## 2018-02-21 RX ORDER — MIRTAZAPINE 7.5 MG/1
7.5 TABLET, FILM COATED ORAL NIGHTLY
Status: DISCONTINUED | OUTPATIENT
Start: 2018-02-22 | End: 2018-02-23 | Stop reason: HOSPADM

## 2018-02-21 RX ORDER — METHYLPREDNISOLONE ACETATE 40 MG/ML
40 INJECTION, SUSPENSION INTRA-ARTICULAR; INTRALESIONAL; INTRAMUSCULAR; SOFT TISSUE ONCE
Status: COMPLETED | OUTPATIENT
Start: 2018-02-21 | End: 2018-02-21

## 2018-02-21 RX ORDER — METHYLPREDNISOLONE ACETATE 80 MG/ML
80 INJECTION, SUSPENSION INTRA-ARTICULAR; INTRALESIONAL; INTRAMUSCULAR; SOFT TISSUE ONCE
Status: COMPLETED | OUTPATIENT
Start: 2018-02-21 | End: 2018-02-21

## 2018-02-21 RX ORDER — ALBUTEROL SULFATE 2.5 MG/3ML
2.5 SOLUTION RESPIRATORY (INHALATION) EVERY 6 HOURS PRN
Status: DISCONTINUED | OUTPATIENT
Start: 2018-02-21 | End: 2018-02-23 | Stop reason: HOSPADM

## 2018-02-21 RX ORDER — IPRATROPIUM BROMIDE AND ALBUTEROL SULFATE 2.5; .5 MG/3ML; MG/3ML
1 SOLUTION RESPIRATORY (INHALATION) ONCE
Status: COMPLETED | OUTPATIENT
Start: 2018-02-21 | End: 2018-02-21

## 2018-02-21 RX ORDER — 0.9 % SODIUM CHLORIDE 0.9 %
1000 INTRAVENOUS SOLUTION INTRAVENOUS ONCE
Status: COMPLETED | OUTPATIENT
Start: 2018-02-21 | End: 2018-02-21

## 2018-02-21 RX ADMIN — IPRATROPIUM BROMIDE AND ALBUTEROL SULFATE 1 AMPULE: .5; 3 SOLUTION RESPIRATORY (INHALATION) at 16:11

## 2018-02-21 RX ADMIN — SODIUM CHLORIDE 500 ML: 9 INJECTION, SOLUTION INTRAVENOUS at 21:36

## 2018-02-21 RX ADMIN — AZITHROMYCIN MONOHYDRATE 500 MG: 500 INJECTION, POWDER, LYOPHILIZED, FOR SOLUTION INTRAVENOUS at 22:17

## 2018-02-21 RX ADMIN — METHYLPREDNISOLONE ACETATE 40 MG: 40 INJECTION, SUSPENSION INTRA-ARTICULAR; INTRALESIONAL; INTRAMUSCULAR; SOFT TISSUE at 16:16

## 2018-02-21 RX ADMIN — IBUPROFEN 600 MG: 200 TABLET, FILM COATED ORAL at 20:37

## 2018-02-21 RX ADMIN — METHYLPREDNISOLONE ACETATE 80 MG: 80 INJECTION, SUSPENSION INTRA-ARTICULAR; INTRALESIONAL; INTRAMUSCULAR; SOFT TISSUE at 16:16

## 2018-02-21 RX ADMIN — SODIUM CHLORIDE 1000 ML: 9 INJECTION, SOLUTION INTRAVENOUS at 20:38

## 2018-02-21 RX ADMIN — AZTREONAM 2 G: 1 INJECTION, POWDER, LYOPHILIZED, FOR SOLUTION INTRAMUSCULAR; INTRAVENOUS at 23:49

## 2018-02-21 ASSESSMENT — PAIN DESCRIPTION - LOCATION
LOCATION: CHEST
LOCATION: CHEST;HEAD
LOCATION: CHEST
LOCATION: CHEST

## 2018-02-21 ASSESSMENT — PAIN DESCRIPTION - FREQUENCY: FREQUENCY: CONTINUOUS

## 2018-02-21 ASSESSMENT — ENCOUNTER SYMPTOMS
WHEEZING: 0
SORE THROAT: 0
VOMITING: 1
COUGH: 1
EYE PAIN: 0
ABDOMINAL PAIN: 0
DIARRHEA: 0
NAUSEA: 0
BACK PAIN: 0
RHINORRHEA: 0
SHORTNESS OF BREATH: 1
EYE DISCHARGE: 0

## 2018-02-21 ASSESSMENT — PAIN DESCRIPTION - DESCRIPTORS
DESCRIPTORS: TIGHTNESS;HEADACHE
DESCRIPTORS: TIGHTNESS

## 2018-02-21 ASSESSMENT — PAIN DESCRIPTION - ORIENTATION
ORIENTATION: MID;UPPER
ORIENTATION: MID
ORIENTATION: MID;UPPER

## 2018-02-21 ASSESSMENT — PAIN SCALES - GENERAL
PAINLEVEL_OUTOF10: 9
PAINLEVEL_OUTOF10: 8
PAINLEVEL_OUTOF10: 9
PAINLEVEL_OUTOF10: 9
PAINLEVEL_OUTOF10: 6

## 2018-02-21 ASSESSMENT — PAIN DESCRIPTION - PAIN TYPE
TYPE: ACUTE PAIN

## 2018-02-21 ASSESSMENT — PAIN DESCRIPTION - ONSET: ONSET: ON-GOING

## 2018-02-22 ENCOUNTER — APPOINTMENT (OUTPATIENT)
Dept: CT IMAGING | Age: 23
DRG: 195 | End: 2018-02-22
Payer: COMMERCIAL

## 2018-02-22 LAB
ANION GAP SERPL CALCULATED.3IONS-SCNC: 16 MEQ/L (ref 8–16)
BUN BLDV-MCNC: 5 MG/DL (ref 7–22)
CALCIUM SERPL-MCNC: 9.3 MG/DL (ref 8.5–10.5)
CHLORIDE BLD-SCNC: 101 MEQ/L (ref 98–111)
CO2: 21 MEQ/L (ref 23–33)
CREAT SERPL-MCNC: 0.6 MG/DL (ref 0.4–1.2)
GFR SERPL CREATININE-BSD FRML MDRD: > 90 ML/MIN/1.73M2
GLUCOSE BLD-MCNC: 126 MG/DL (ref 70–108)
HCG,BETA SUBUNIT,QUAL,SERUM: < 0.1 MIU/ML (ref 0–5)
HCT VFR BLD CALC: 33.5 % (ref 37–47)
HEMOGLOBIN: 10.8 GM/DL (ref 12–16)
MCH RBC QN AUTO: 26.3 PG (ref 27–31)
MCHC RBC AUTO-ENTMCNC: 32.2 GM/DL (ref 33–37)
MCV RBC AUTO: 81.8 FL (ref 81–99)
PDW BLD-RTO: 18.2 % (ref 11.5–14.5)
PLATELET # BLD: 223 THOU/MM3 (ref 130–400)
PMV BLD AUTO: 8.8 FL (ref 7.4–10.4)
POTASSIUM REFLEX MAGNESIUM: 3.9 MEQ/L (ref 3.5–5.2)
PREGNANCY, SERUM: NEGATIVE
PROCALCITONIN: 0.06 NG/ML (ref 0.01–0.09)
RBC # BLD: 4.09 MILL/MM3 (ref 4.2–5.4)
SODIUM BLD-SCNC: 138 MEQ/L (ref 135–145)
WBC # BLD: 6.1 THOU/MM3 (ref 4.8–10.8)

## 2018-02-22 PROCEDURE — 80048 BASIC METABOLIC PNL TOTAL CA: CPT

## 2018-02-22 PROCEDURE — 2580000003 HC RX 258: Performed by: FAMILY MEDICINE

## 2018-02-22 PROCEDURE — 93010 ELECTROCARDIOGRAM REPORT: CPT | Performed by: INTERNAL MEDICINE

## 2018-02-22 PROCEDURE — 6370000000 HC RX 637 (ALT 250 FOR IP): Performed by: INTERNAL MEDICINE

## 2018-02-22 PROCEDURE — 71275 CT ANGIOGRAPHY CHEST: CPT

## 2018-02-22 PROCEDURE — 1200000003 HC TELEMETRY R&B

## 2018-02-22 PROCEDURE — 84703 CHORIONIC GONADOTROPIN ASSAY: CPT

## 2018-02-22 PROCEDURE — 6360000002 HC RX W HCPCS: Performed by: FAMILY MEDICINE

## 2018-02-22 PROCEDURE — 6360000002 HC RX W HCPCS: Performed by: INTERNAL MEDICINE

## 2018-02-22 PROCEDURE — 2580000003 HC RX 258: Performed by: INTERNAL MEDICINE

## 2018-02-22 PROCEDURE — 85027 COMPLETE CBC AUTOMATED: CPT

## 2018-02-22 PROCEDURE — 6360000004 HC RX CONTRAST MEDICATION: Performed by: INTERNAL MEDICINE

## 2018-02-22 PROCEDURE — 84145 PROCALCITONIN (PCT): CPT

## 2018-02-22 PROCEDURE — 36415 COLL VENOUS BLD VENIPUNCTURE: CPT

## 2018-02-22 PROCEDURE — 84702 CHORIONIC GONADOTROPIN TEST: CPT

## 2018-02-22 RX ORDER — IPRATROPIUM BROMIDE AND ALBUTEROL SULFATE 2.5; .5 MG/3ML; MG/3ML
1 SOLUTION RESPIRATORY (INHALATION) EVERY 4 HOURS PRN
Status: DISCONTINUED | OUTPATIENT
Start: 2018-02-22 | End: 2018-02-22

## 2018-02-22 RX ORDER — ACETAMINOPHEN 325 MG/1
650 TABLET ORAL EVERY 4 HOURS PRN
Status: DISCONTINUED | OUTPATIENT
Start: 2018-02-22 | End: 2018-02-23 | Stop reason: HOSPADM

## 2018-02-22 RX ORDER — GUAIFENESIN 600 MG/1
600 TABLET, EXTENDED RELEASE ORAL 2 TIMES DAILY
Status: DISCONTINUED | OUTPATIENT
Start: 2018-02-22 | End: 2018-02-23 | Stop reason: HOSPADM

## 2018-02-22 RX ADMIN — DULOXETINE HYDROCHLORIDE 60 MG: 60 CAPSULE, DELAYED RELEASE ORAL at 20:55

## 2018-02-22 RX ADMIN — BUSPIRONE HYDROCHLORIDE 30 MG: 10 TABLET ORAL at 00:49

## 2018-02-22 RX ADMIN — Medication 10 ML: at 20:57

## 2018-02-22 RX ADMIN — LEVOFLOXACIN 750 MG: 5 INJECTION, SOLUTION INTRAVENOUS at 04:08

## 2018-02-22 RX ADMIN — LURASIDONE HYDROCHLORIDE 80 MG: 40 TABLET, FILM COATED ORAL at 20:54

## 2018-02-22 RX ADMIN — DULOXETINE HYDROCHLORIDE 60 MG: 60 CAPSULE, DELAYED RELEASE ORAL at 00:49

## 2018-02-22 RX ADMIN — MIRTAZAPINE 7.5 MG: 7.5 TABLET ORAL at 00:49

## 2018-02-22 RX ADMIN — GUAIFENESIN 600 MG: 600 TABLET, EXTENDED RELEASE ORAL at 00:49

## 2018-02-22 RX ADMIN — Medication 10 ML: at 09:42

## 2018-02-22 RX ADMIN — TOBRAMYCIN SULFATE 600 MG: 40 INJECTION, SOLUTION INTRAMUSCULAR; INTRAVENOUS at 00:45

## 2018-02-22 RX ADMIN — GUAIFENESIN 600 MG: 600 TABLET, EXTENDED RELEASE ORAL at 20:54

## 2018-02-22 RX ADMIN — QUETIAPINE FUMARATE 200 MG: 100 TABLET ORAL at 00:49

## 2018-02-22 RX ADMIN — BUSPIRONE HYDROCHLORIDE 30 MG: 10 TABLET ORAL at 20:55

## 2018-02-22 RX ADMIN — ACETAMINOPHEN 650 MG: 325 TABLET ORAL at 19:43

## 2018-02-22 RX ADMIN — ONDANSETRON 4 MG: 2 INJECTION INTRAMUSCULAR; INTRAVENOUS at 17:38

## 2018-02-22 RX ADMIN — LURASIDONE HYDROCHLORIDE 80 MG: 40 TABLET, FILM COATED ORAL at 00:49

## 2018-02-22 RX ADMIN — OXCARBAZEPINE 1200 MG: 300 TABLET ORAL at 00:49

## 2018-02-22 RX ADMIN — MIRTAZAPINE 7.5 MG: 7.5 TABLET ORAL at 20:54

## 2018-02-22 RX ADMIN — PANTOPRAZOLE SODIUM 40 MG: 40 TABLET, DELAYED RELEASE ORAL at 05:38

## 2018-02-22 RX ADMIN — ENOXAPARIN SODIUM 40 MG: 40 INJECTION SUBCUTANEOUS at 09:50

## 2018-02-22 RX ADMIN — ALPRAZOLAM 0.5 MG: 0.5 TABLET ORAL at 17:41

## 2018-02-22 RX ADMIN — IOPAMIDOL 80 ML: 755 INJECTION, SOLUTION INTRAVENOUS at 16:22

## 2018-02-22 RX ADMIN — SERTRALINE 75 MG: 50 TABLET, FILM COATED ORAL at 09:41

## 2018-02-22 RX ADMIN — BUSPIRONE HYDROCHLORIDE 30 MG: 10 TABLET ORAL at 09:40

## 2018-02-22 RX ADMIN — GUAIFENESIN 600 MG: 600 TABLET, EXTENDED RELEASE ORAL at 09:41

## 2018-02-22 RX ADMIN — DULOXETINE HYDROCHLORIDE 60 MG: 60 CAPSULE, DELAYED RELEASE ORAL at 09:41

## 2018-02-22 RX ADMIN — OXCARBAZEPINE 1200 MG: 300 TABLET ORAL at 09:42

## 2018-02-22 RX ADMIN — QUETIAPINE FUMARATE 200 MG: 100 TABLET ORAL at 20:54

## 2018-02-22 RX ADMIN — OXCARBAZEPINE 1200 MG: 300 TABLET ORAL at 20:55

## 2018-02-22 ASSESSMENT — PAIN DESCRIPTION - LOCATION: LOCATION: CHEST

## 2018-02-22 ASSESSMENT — ENCOUNTER SYMPTOMS
VOICE CHANGE: 0
NAUSEA: 0
WHEEZING: 1
COUGH: 1
CHEST TIGHTNESS: 1
ABDOMINAL DISTENTION: 0
ABDOMINAL PAIN: 0
SINUS PRESSURE: 0
RHINORRHEA: 0
TROUBLE SWALLOWING: 0
SORE THROAT: 0
SHORTNESS OF BREATH: 1
VOMITING: 1
SINUS CONGESTION: 1
STRIDOR: 0

## 2018-02-22 ASSESSMENT — PAIN SCALES - GENERAL
PAINLEVEL_OUTOF10: 3
PAINLEVEL_OUTOF10: 0
PAINLEVEL_OUTOF10: 0

## 2018-02-22 ASSESSMENT — PAIN DESCRIPTION - PAIN TYPE: TYPE: ACUTE PAIN

## 2018-02-22 ASSESSMENT — PAIN DESCRIPTION - ORIENTATION: ORIENTATION: MID

## 2018-02-22 NOTE — PROGRESS NOTES
[facility] THERAPY MISSED TREATMENT NOTE  STR[facility] ICU STEPDOWN TELEMETRY 4K      Date: 2018  Patient Name: Sam Garibay        MRN: [id number]    : 1995  (25 y.o.)    REASON FOR MISSED TREATMENT:  Consult received for a swallowing SCREENING, likely from the auto check box from the pneumonia order set. Chart reviewed and no history of or documentation of dysphagia, aspiration or aspiration pneumonia. TACO Landa reports no current concerns with established diet level of regular with thin liquids; safe toleration of medications consumed whole conveyed. ST services not warranted at this time. Please reconsult for a full swallowing EVALUATION if difficulties arise or suspected.     Ankur Bardales M.A., [address] [place] [address]

## 2018-02-22 NOTE — ED PROVIDER NOTES
(RESTORIL) 30 MG capsule Take 30 mg by mouth nightly. Historical Med      albuterol sulfate HFA (PROAIR HFA) 108 (90 Base) MCG/ACT inhaler Inhale 2 puffs into the lungs every 6 hours as needed for Wheezing, Disp-1 Inhaler, R-3Print       !! - Potential duplicate medications found. Please discuss with provider. ALLERGIES     Patient is is allergic to dilaudid [hydromorphone hcl]; flexeril [cyclobenzaprine]; keflex [cephalexin]; tessalon [benzonatate]; augmentin [amoxicillin-pot clavulanate]; lorabid [loracarbef]; and minocycline. FAMILY HISTORY     Patient's family history includes Anxiety Disorder in her father and mother; Bipolar Disorder in her father; Cancer in her maternal grandmother, paternal grandfather, and paternal uncle; Depression in her maternal grandfather, paternal aunt, and paternal uncle; Diabetes in her mother; High Blood Pressure in her father; Lupus in her maternal aunt; Mental Illness in her father; Parkinsonism in her father. SOCIAL HISTORY     Patient  reports that she has never smoked. She has never used smokeless tobacco. She reports that she drinks about 0.6 oz of alcohol per week . She reports that she uses drugs, including Marijuana. PHYSICAL EXAM     ED TRIAGE VITALS  BP: (!) 162/79, Temp: 99.6 °F (37.6 °C), Pulse: 138, Resp: 18, SpO2: 99 %  Physical Exam   Constitutional: She is oriented to person, place, and time. Vital signs are normal. She appears well-developed and well-nourished. Non-toxic appearance. She does not have a sickly appearance. She does not appear ill. No distress. HENT:   Head: Normocephalic and atraumatic. Head is without right periorbital erythema and without left periorbital erythema. Right Ear: Hearing, tympanic membrane, external ear and ear canal normal.   Left Ear: Hearing, tympanic membrane, external ear and ear canal normal.   Nose: Nose normal. Right sinus exhibits no maxillary sinus tenderness and no frontal sinus tenderness.  Left sinus [facility], [address]  [address] [address]    Schedule an appointment as soon as possible for a visit in 1 day  need to have follow up in office in morning      DISCHARGE MEDICATIONS:  Discharge Medication List as of 2/21/2018  4:50 PM      START taking these medications    Details   azithromycin (ZITHROMAX Z-SARBJIT) 250 MG tablet 2 tablets day 1 then1 tablet days 2 - 5., Disp-6 tablet, R-0Normal      predniSONE (DELTASONE) 20 MG tablet Take 1 tablet by mouth 2 times daily for 5 days, Disp-10 tablet, R-0Normal      dextromethorphan (DELSYM) 30 MG/5ML extended release liquid Take 10 mLs by mouth 2 times daily as needed for Cough, R-0OTC           Discharge Medication List as of 2/21/2018  4:50 PM              Shelia Mccormick, [address] [facility], [facility]  02/22/18 7668

## 2018-02-22 NOTE — ED TRIAGE NOTES
Pt presents to the ED with c/o SOB, cough and dizziness x4 days. Pt states she was seen at urgent care and diagnosed with pneumonia today. Pt states she kept feeling worse. Upon arrival the pt is A/O. Pt has a persistant dry cough.

## 2018-02-22 NOTE — H&P
Internal Medicine  History and Physical    Patient:  Donnalee Hodgkins  MRN: [id number]      History Obtained From:  patient  PCP: Linette Merlos NP    CHIEF COMPLAINT:  Cough, SOB    HISTORY OF PRESENT ILLNESS:   The patient is a 25 y.o. female who presents with cough, shortness of breath, lightheadedness, fever, chills, post tussive emesis, headache and chest pain x 4 days. The patient was seen at Urgent Care yesterday and was diagnosed with LICHA  pneumonia. She was negative for influenza. She has been coughing hard causing chest pain, vomiting, and shortness of breath. She reports chills with her lips and fingers turning blue. The patient was given prednisone and antibiotics at Urgent Care. The patient has a history of POTS and PE. The patient denies abdominal pain or any GI symptoms. She denies any  symptoms. Past Medical History:        Diagnosis Date    ADD (attention deficit disorder)     Anxiety 4/8/2015    Anxiety attack     Asthma     exercise induced    Atypical face pain     Back pain     Bipolar 1 disorder (HCC)     Fibromyalgia     GERD (gastroesophageal reflux disease)     Hypotension 11/2/2017    Major depressive disorder, recurrent episode, mild ([address] 7/31/2012    Obesity 10/24/2014    Pneumonia 2/21/2018    POTS (postural orthostatic tachycardia syndrome)     Pott disease     Pulmonary emboli (HCC)     Seizures ([address] 07/31/2016    Tailbone injury 11/28/15    patient broke tailbone    Thyroid disease     borderline low     TMJ (dislocation of temporomandibular joint)     Trigeminal neuralgia     Wears contact lenses        Past Surgical History:        Procedure Laterality Date    CARDIAC CATHETERIZATION  3-2016    EP Study    [facility]    COLONOSCOPY  2016    WISDOM TOOTH EXTRACTION  2010       Medications Prior to Admission:    Prior to Admission medications    Medication Sig Start Date End Date Taking?  Authorizing myalgias and arthralgias  NEUROLOGICAL:  negative for headaches, dizziness, seizures and memory problems  BEHAVIOR/PSYCH:  negative    Physical Exam:    Vitals: /77   Pulse 132   Temp 97.9 °F (36.6 °C) (Oral)   Resp 18   Ht 5' 10\" (1.778 m)   Wt 273 lb 14.4 oz (124.2 kg)   LMP 02/07/2018   SpO2 97%   BMI 39.30 kg/m²   CONSTITUTIONAL:  fatigued, alert, cooperative, no apparent distress and moderately obese  EYES:  extra-ocular muscles intact  ENT:  normocepalic, without obvious abnormality  NECK:  supple, symmetrical, trachea midline  LUNGS:  no increased work of breathing and crackles right base and left base, diminished breath sounds throughout lungs  CARDIOVASCULAR:  normal S1 and S2 and no edema  ABDOMEN:  No scars, normal bowel sounds, soft, non-distended, non-tender, no masses palpated, no hepatosplenomegally  MUSCULOSKELETAL:  there is no redness, warmth, or swelling of the joints  NEUROLOGIC:  Awake, alert, oriented to name, place and time. Cranial nerves II-XII are grossly intact. Motor is 5 out of 5 bilaterally. SKIN:  normal skin color, texture, turgor      CBC:   Recent Labs      02/21/18   1558  02/21/18 2050 02/22/18   0340   WBC  6.2  7.1  6.1   HGB  11.1*  10.0*  10.8*   PLT  278  237  223     BMP:    Recent Labs      02/21/18   1558  02/21/18 2050 02/22/18   0340   NA  138  138  138   K  3.3*  4.1  3.9   CL   --   98  101   CO2   --   24  21*   BUN   --   6*  5*   CREATININE   --   0.7  0.6   GLUCOSE   --   121*  126*     Hepatic:   Recent Labs      02/21/18 2050   AST  14   ALT  14   BILITOT  <0.2*   ALKPHOS  122     INR:   Recent Labs      02/21/18 2050   INR  1.07     -----------------------------------------------------------------  PA/lat CXR:   1. Normal heart size. No effusion is seen. 2. Very mild/early left upper lobe pneumonia. Assessment and Plan   1. Pneumonia  2. Hypokalemia  3. H/o PE    Cont antibiotics. Bronchodilators. Replace K.   Home soon.    Patient Active Problem List   Diagnosis Code    Major depressive disorder, recurrent episode, mild (HCC) F33.0    Low self esteem R45.81    KARLIE (generalized anxiety disorder) F41.1    Obesity E66.9    Obstructive sleep apnea G47.33    Snores R06.83    Sleep disturbances G47.9    Daytime somnolence R40.0    Sleep talking G47.8    Night terrors, adult F51.4    Bruxism (teeth grinding) F45.8    Restless sleeper G47.9    Anxiety F41.9    Abnormal thyroid function test R94.6    Trigeminal neuralgia G50.0    Inappropriate sinus tachycardia R00.0    POTS (postural orthostatic tachycardia syndrome) R00.0, I95.1    Atypical chest pain R07.89    Syncope and collapse R55    Neck discomfort M54.2    Thyroid cyst E04.1    Pulmonary embolism (HCC) I26.99    Chest pain on breathing R07.1    Breast pain, left N64.4    Positive CAESAR (antinuclear antibody) R76.8    Hypotension I95.9    S/P ablation of ventricular arrhythmia Z98.890, Z86.79    Pneumonia J18.9       Colten Limon MD  Admitting Internist

## 2018-02-22 NOTE — PLAN OF CARE
Problem: Pain:  Goal: Pain level will decrease  Pain level will decrease   Outcome: Ongoing  Patient has pain due to cough. Cough medication order and on MAR. Problem: Falls - Risk of  Goal: Absence of falls  Outcome: Ongoing  Patient is independent and up as tolerated. Problem: Daily Care:  Goal: Daily care needs are met  Daily care needs are met   Outcome: Ongoing  Daily care is being met by this nurse and patient. Problem: Skin Integrity:  Goal: Skin integrity will stabilize  Skin integrity will stabilize   Outcome: Ongoing  No skin breakdown. Continue to monitor. Problem: Discharge Planning:  Goal: Patients continuum of care needs are met  Patients continuum of care needs are met   Outcome: Ongoing  No discharge plans. Will be discharge home with parents. Comments: Care plan reviewed with patient. Patient verbalize understanding of the plan of care and contribute to goal setting.

## 2018-02-22 NOTE — ED PROVIDER NOTES
[facility] [place]  eMERGENCY dEPARTMENT eNCOUnter          [address] [facility]       Chief Complaint   Patient presents with    Shortness of Breath    Cough    Dizziness       Nurses Notes reviewed and I agree except as noted in the HPI. HISTORY OF PRESENT ILLNESS    Deana Dailey is a 25 y.o. female who presents to the Emergency Department for the evaluation of cough, shortness of breath, lightheadedness, fever, chills, post tussive emesis, headache and chest pain. The patient was seen at Urgent Care at 4:00 PM today and was diagnosed with LICHA  pneumonia. She was negative for influenza. The patient states her symptoms began 4 days ago and have been worsening. She rates her chest pain and headache as a 8/10 in severity and describe them as a tightness and aching respectively. She has been coughing hard causing chest pain, vomiting, and shortness of breath. She reports chills with her lips and fingers turning blue. She took a warm shower and now is experiencing a fever of 103. She reports that she has been taking Tylenol with her last dose being 1 hour ago. The patient was given prednisone and antibiotics at Urgent Care. The patient has a history of POTS and PE. She reports low potassium and describes her heart as \"out of whack. \"  The patient denies abdominal pain or any GI symptoms. She denies any  symptoms and  she has no VEE/CNS symptoms. . The patient reports her last period was 2 weeks ago and denies having children. The HPI was provided by the patient. REVIEW OF SYSTEMS     Review of Systems   Constitutional: Positive for chills and fever. Negative for appetite change and fatigue. HENT: Negative for congestion, ear pain, rhinorrhea and sore throat. Eyes: Negative for pain, discharge and visual disturbance. Respiratory: Positive for cough and shortness of breath. Negative for wheezing. Cardiovascular: Positive for chest pain and palpitations. Negative for leg swelling. that the status of her paternal uncle is unknown.    family history includes Anxiety Disorder in her father and mother; Bipolar Disorder in her father; Cancer in her maternal grandmother, paternal grandfather, and paternal uncle; Depression in her maternal grandfather, paternal aunt, and paternal uncle; Diabetes in her mother; High Blood Pressure in her father; Lupus in her maternal aunt; Mental Illness in her father; Parkinsonism in her father. SOCIAL HISTORY      reports that she has never smoked. She has never used smokeless tobacco. She reports that she drinks about 0.6 oz of alcohol per week . She reports that she uses drugs, including Marijuana. PHYSICAL EXAM     INITIAL VITALS:  weight is 270 lb (122.5 kg). Her oral temperature is 102.9 °F (39.4 °C). Her blood pressure is 158/84 (abnormal) and her pulse is 154. Her respiration is 22 and oxygen saturation is 98%. Physical Exam   Constitutional: She is oriented to person, place, and time. She appears well-developed and well-nourished. HENT:   Head: Normocephalic and atraumatic. Right Ear: External ear normal.   Left Ear: External ear normal.   Eyes: Conjunctivae are normal. Right eye exhibits no discharge. Left eye exhibits no discharge. No scleral icterus. Neck: Normal range of motion. Neck supple. No JVD present. Cardiovascular: Regular rhythm and normal heart sounds. Tachycardia present. Exam reveals no gallop and no friction rub. No murmur heard. Pulmonary/Chest: Effort normal. Tachypnea noted. No respiratory distress. She has decreased breath sounds in the right upper field, the right middle field, the right lower field, the left upper field, the left middle field and the left lower field. She has no wheezes. She has no rhonchi. She has no rales. Abdominal: Soft. She exhibits no distension. There is no tenderness. There is no rebound and no guarding. Musculoskeletal: Normal range of motion. She exhibits no edema.    Neurological: She is alert and oriented to person, place, and time. She exhibits normal muscle tone. She displays no seizure activity. GCS eye subscore is 4. GCS verbal subscore is 5. GCS motor subscore is 6. Skin: Skin is warm and dry. No rash noted. She is not diaphoretic. Psychiatric: She has a normal mood and affect. Her behavior is normal. Thought content normal.   Nursing note and vitals reviewed. DIFFERENTIAL DIAGNOSIS:   Pneumonia, Influenza    DIAGNOSTIC RESULTS     EKG: All EKG's are interpreted by the Emergency Department Physician who either signs or Co-signs this chart in the absence of a cardiologist.  EKG interpreted by Nimesh Malave MD:    Vent. Rate: 145 bpm  MI interval: 128 ms  QRS duration: 80 ms  QTc: 419 ms  P-R-T axes:54 , 61, 38  Sinus tachycardia. Possible left atrial enlargement. No STEMI. Compared to old EKG on 13-Jan-2018      RADIOLOGY: non-plain film images(s) such as CT, Ultrasound and MRI are read by the radiologist.    CTA Chest W WO Contrast    (Results Pending)       LABS:   Labs Reviewed   RAPID INFLUENZA A/B ANTIGENS   CULTURE BLOOD #1   CULTURE BLOOD #2   CBC WITH AUTO DIFFERENTIAL   COMPREHENSIVE METABOLIC PANEL W/ REFLEX TO MG FOR LOW K   APTT   TROPONIN   BRAIN NATRIURETIC PEPTIDE   LIPASE   LACTIC ACID, PLASMA   PROTIME-INR       EMERGENCY DEPARTMENT COURSE:   Vitals:    Vitals:    02/21/18 2016 02/21/18 2031   BP: (!) 158/84    Pulse: 154    Resp:  22   Temp: 102.9 °F (39.4 °C)    TempSrc: Oral    SpO2: 98%    Weight: 270 lb (122.5 kg)      MDM:     patient presents to the ER with known diagnosis of LICHA Pneumonia  From the Urgent care. She has now developed high fever and she is tachycardic/tachypneic but clinically stable for admission. We shall repeat the labs and order CTA to exclude recurrent PE . She is started on antibiotics here in the ER and those can be continued upstairs . CONSULTS:    920pm patient is discussed with Dr Tri Starr who kindly accepts the patient .

## 2018-02-23 VITALS
TEMPERATURE: 97.9 F | BODY MASS INDEX: 38.81 KG/M2 | HEIGHT: 70 IN | OXYGEN SATURATION: 94 % | RESPIRATION RATE: 18 BRPM | DIASTOLIC BLOOD PRESSURE: 63 MMHG | SYSTOLIC BLOOD PRESSURE: 119 MMHG | HEART RATE: 118 BPM | WEIGHT: 271.1 LBS

## 2018-02-23 PROBLEM — E87.6 HYPOKALEMIA: Status: ACTIVE | Noted: 2018-02-23

## 2018-02-23 PROCEDURE — 6370000000 HC RX 637 (ALT 250 FOR IP): Performed by: INTERNAL MEDICINE

## 2018-02-23 PROCEDURE — 2580000003 HC RX 258: Performed by: INTERNAL MEDICINE

## 2018-02-23 PROCEDURE — 6360000002 HC RX W HCPCS: Performed by: INTERNAL MEDICINE

## 2018-02-23 RX ORDER — LEVOFLOXACIN 750 MG/1
750 TABLET ORAL DAILY
Qty: 7 TABLET | Refills: 0 | Status: SHIPPED | OUTPATIENT
Start: 2018-02-23 | End: 2018-03-02

## 2018-02-23 RX ORDER — ACETAMINOPHEN 325 MG/1
650 TABLET ORAL EVERY 4 HOURS PRN
Qty: 120 TABLET | Refills: 3 | COMMUNITY
Start: 2018-02-23 | End: 2020-04-20

## 2018-02-23 RX ADMIN — BUSPIRONE HYDROCHLORIDE 30 MG: 10 TABLET ORAL at 09:37

## 2018-02-23 RX ADMIN — Medication 10 ML: at 09:39

## 2018-02-23 RX ADMIN — PANTOPRAZOLE SODIUM 40 MG: 40 TABLET, DELAYED RELEASE ORAL at 05:03

## 2018-02-23 RX ADMIN — LEVOFLOXACIN 750 MG: 5 INJECTION, SOLUTION INTRAVENOUS at 03:32

## 2018-02-23 RX ADMIN — GUAIFENESIN 600 MG: 600 TABLET, EXTENDED RELEASE ORAL at 09:37

## 2018-02-23 RX ADMIN — ACETAMINOPHEN 650 MG: 325 TABLET ORAL at 10:20

## 2018-02-23 RX ADMIN — SERTRALINE 75 MG: 50 TABLET, FILM COATED ORAL at 09:37

## 2018-02-23 RX ADMIN — OXCARBAZEPINE 1200 MG: 300 TABLET ORAL at 09:37

## 2018-02-23 RX ADMIN — ENOXAPARIN SODIUM 40 MG: 40 INJECTION SUBCUTANEOUS at 09:42

## 2018-02-23 RX ADMIN — DULOXETINE HYDROCHLORIDE 60 MG: 60 CAPSULE, DELAYED RELEASE ORAL at 09:37

## 2018-02-23 ASSESSMENT — PAIN SCALES - GENERAL
PAINLEVEL_OUTOF10: 0
PAINLEVEL_OUTOF10: 3
PAINLEVEL_OUTOF10: 0

## 2018-02-23 NOTE — CARE COORDINATION
2/23/18, 11:35 AM  Client denied needs as plans home with mother Cornel Carroll when medically cleared, has nebulizers  Discharge plan discussed by  and . Discharge plan reviewed with patient/ family. Patient/ family verbalize understanding of discharge plan and are in agreement with plan. Understanding was demonstrated using the teach back method.

## 2018-02-23 NOTE — DISCHARGE SUMMARY
Recent Labs      02/21/18 2050   INR  1.07      Magnesium:          Lab Results   Component Value Date     MG 2.0 01/12/2018      CTA chest:  1. No pulmonary emboli. No large vessel aneurysm or dissection. 2. Mild multifocal pneumonia left upper lobe, perihilar region, and superior segments of both lower lobes.     Assessment and Plan:   5. Pneumonia  6. Hypokalemia  7. H/o PE  8. POTS     Oral levaquin. On RA.    Stable for dc home     Treatments:   IV levaquin    Disposition:   home    Signed:  Helen Parmar  2/23/2018, 1:03 PM

## 2018-02-26 ENCOUNTER — TELEPHONE (OUTPATIENT)
Dept: FAMILY MEDICINE CLINIC | Age: 23
End: 2018-02-26

## 2018-02-26 NOTE — TELEPHONE ENCOUNTER
[facility] Transitions Initial Follow Up Call    Call within 2 business days of discharge: Yes    Patient: Marisol Packer Patient : 1995   MRN: [id number]  Reason for Admission: There are no discharge diagnoses documented for the most recent discharge. Discharge Date: 18 RARS: [facility] Risk Score: 9.5     Spoke with:  Left message for pt to ret call    Facility: [unfilled]    Non-face-to-face services provided:       Follow Up  Future Appointments  Date Time Provider Sharonda Nice   2018 8:45 AM Victoria Hickey NP [address] [facility], [address] [place] [address]

## 2018-02-27 ENCOUNTER — TELEPHONE (OUTPATIENT)
Dept: FAMILY MEDICINE CLINIC | Age: 23
End: 2018-02-27

## 2018-02-27 LAB
BLOOD CULTURE, ROUTINE: NORMAL
BLOOD CULTURE, ROUTINE: NORMAL

## 2018-02-28 ENCOUNTER — OFFICE VISIT (OUTPATIENT)
Dept: FAMILY MEDICINE CLINIC | Age: 23
End: 2018-02-28
Payer: COMMERCIAL

## 2018-02-28 VITALS
RESPIRATION RATE: 20 BRPM | HEIGHT: 70 IN | TEMPERATURE: 98 F | WEIGHT: 275.3 LBS | DIASTOLIC BLOOD PRESSURE: 76 MMHG | SYSTOLIC BLOOD PRESSURE: 118 MMHG | HEART RATE: 100 BPM | BODY MASS INDEX: 39.41 KG/M2

## 2018-02-28 DIAGNOSIS — N93.8 DUB (DYSFUNCTIONAL UTERINE BLEEDING): ICD-10-CM

## 2018-02-28 DIAGNOSIS — J18.9 PNEUMONIA OF LEFT UPPER LOBE DUE TO INFECTIOUS ORGANISM: Primary | ICD-10-CM

## 2018-02-28 DIAGNOSIS — D50.0 IRON DEFICIENCY ANEMIA DUE TO CHRONIC BLOOD LOSS: ICD-10-CM

## 2018-02-28 PROCEDURE — 99495 TRANSJ CARE MGMT MOD F2F 14D: CPT | Performed by: NURSE PRACTITIONER

## 2018-02-28 ASSESSMENT — ENCOUNTER SYMPTOMS
ABDOMINAL PAIN: 0
SHORTNESS OF BREATH: 0
COUGH: 0
NAUSEA: 0

## 2018-02-28 NOTE — PROGRESS NOTES
Post-Discharge Transitional Care Management Services      Saul Dailey   YOB: 1995    Date of Visit:  2/28/2018  30 Day Post-Discharge Date: 3/23/18    Chief Complaint   Patient presents with    Follow-Up from Hospital     [facility]. Patient was in the hospital for pneumonia. Admit date:  2/21/2018                        Discharge date:  2/23/18     Admitting Physician:  Nelson Belle MD     Discharge Diagnoses:    1. Pneumonia  2. Hypokalemia  3. H/o PE  4. POTS    CTA chest:  1. No pulmonary emboli. No large vessel aneurysm or dissection. 2. Mild multifocal pneumonia left upper lobe, perihilar region, and superior segments of both lower lobes.     Assessment and Plan:   5. Pneumonia  6. Hypokalemia  7. H/o PE  8. POTS     Hospital Course:   Patient responded to antibiotics. Weaned off oxygen. Oral levaquin. On RA. Stable for dc home     Treatments:   IV levaquin    Vitals:    02/28/18 0847   BP: 118/76   Pulse: 100   Resp: 20   Temp: 98 °F (36.7 °C)       Allergies   Allergen Reactions    Dilaudid [Hydromorphone Hcl]      hallucination    Flexeril [Cyclobenzaprine] Other (See Comments)     Hallucinations    Keflex [Cephalexin] Other (See Comments)     Lose cognitive functions.      Tessalon [Benzonatate] Other (See Comments)     psychosis    Augmentin [Amoxicillin-Pot Clavulanate] Rash    Lorabid [Loracarbef] Hives, Swelling and Rash    Minocycline Itching, Swelling and Rash     Outpatient Prescriptions Marked as Taking for the 2/28/18 encounter (Office Visit) with Ruben Hernandez NP   Medication Sig Dispense Refill    acetaminophen (TYLENOL) 325 MG tablet Take 2 tablets by mouth every 4 hours as needed for Pain or Fever 120 tablet 3    levofloxacin (LEVAQUIN) 750 MG tablet Take 1 tablet by mouth daily for 7 days 7 tablet 0    omeprazole (PRILOSEC) 40 MG delayed release capsule TAKE 1 CAPSULE BY MOUTH TWO TIMES A DAY  60 capsule 11  QUEtiapine (SEROQUEL) 200 MG tablet Take 200 mg by mouth nightly      ALPRAZolam (XANAX XR) 2 MG extended release tablet Take 2 mg by mouth every morning.  busPIRone (BUSPAR) 30 MG tablet Take by mouth every morning 30 mg AM, 45 mg PM (1.5 tab)      lurasidone (LATUDA) 80 MG TABS tablet Take 80 mg by mouth nightly      albuterol sulfate HFA (PROAIR HFA) 108 (90 Base) MCG/ACT inhaler Inhale 2 puffs into the lungs every 6 hours as needed for Wheezing 1 Inhaler 3    albuterol (PROVENTIL) (2.5 MG/3ML) 0.083% nebulizer solution Take 3 mLs by nebulization every 6 hours as needed for Wheezing 120 each 3    Mirtazapine (REMERON PO) Take 7.5 mg by mouth daily      cetirizine (ZYRTEC) 10 MG tablet Take 1 tablet by mouth daily 90 tablet 3    Multiple Vitamins-Minerals (MULTIVITAMIN ADULT) TABS Take 1 tablet by mouth daily 90 tablet 3    Calcium Citrate-Vitamin D (CALCIUM CITRATE + D PO) Take 1 tablet by mouth daily      fluticasone (FLONASE) 50 MCG/ACT nasal spray 2 sprays by Nasal route as needed for Rhinitis      OXcarbazepine (TRILEPTAL) 300 MG tablet Take 1,200 mg by mouth 2 times daily       sertraline (ZOLOFT) 25 MG tablet Take 25 mg by mouth daily Take with 50mg to equal 75mg      sertraline (ZOLOFT) 50 MG tablet Take 50 mg by mouth daily Take with 25mg to equal 75mg      DULoxetine (CYMBALTA) 60 MG capsule Take 60 mg by mouth 2 times daily       temazepam (RESTORIL) 30 MG capsule Take 30 mg by mouth nightly. Vitals:    02/28/18 0847   BP: 118/76   Site: Left Arm   Position: Sitting   Cuff Size: Large Adult   Pulse: 100   Resp: 20   Temp: 98 °F (36.7 °C)   TempSrc: Oral   Weight: 275 lb 4.8 oz (124.9 kg)   Height: 5' 10\" (1.778 m)     Body mass index is 39.5 kg/m².      Wt Readings from Last 3 Encounters:   02/28/18 275 lb 4.8 oz (124.9 kg)   02/23/18 271 lb 1.6 oz (123 kg)   02/21/18 270 lb (122.5 kg)     BP Readings from Last 3 Encounters:   02/28/18 118/76   02/23/18 119/63   02/21/18 deficiency anemia due to chronic blood loss  -     CBC; Future  -     Ferritin; Future    DUB (dysfunctional uterine bleeding)        MDM:  Clinically resolving. Complete ATB. CXR in 5 days. Lab for iron deficiency related to DUB.  RTO if symptoms worsen or stay the same      Diagnostic test results reviewed: inpatient labs, EKG, chest x-ray and CT-Chest    Patient risk of morbidity and mortality: moderate    Medical Decision Making: moderate complexity

## 2018-03-05 ENCOUNTER — HOSPITAL ENCOUNTER (OUTPATIENT)
Age: 23
Discharge: HOME | End: 2018-03-05
Payer: COMMERCIAL

## 2018-03-05 ENCOUNTER — HOSPITAL ENCOUNTER (OUTPATIENT)
Dept: GENERAL RADIOLOGY | Age: 23
Discharge: HOME | End: 2018-03-05
Payer: COMMERCIAL

## 2018-03-05 DIAGNOSIS — J18.9 PNEUMONIA OF LEFT UPPER LOBE DUE TO INFECTIOUS ORGANISM: ICD-10-CM

## 2018-03-05 DIAGNOSIS — D50.0 IRON DEFICIENCY ANEMIA DUE TO CHRONIC BLOOD LOSS: ICD-10-CM

## 2018-03-05 LAB
FERRITIN: 18 NG/ML (ref 10–291)
HCT VFR BLD CALC: 35.9 % (ref 37–47)
HEMOGLOBIN: 11.6 GM/DL (ref 12–16)
MCH RBC QN AUTO: 25.6 PG (ref 27–31)
MCHC RBC AUTO-ENTMCNC: 32.2 GM/DL (ref 33–37)
MCV RBC AUTO: 79.5 FL (ref 81–99)
PDW BLD-RTO: 17.8 % (ref 11.5–14.5)
PLATELET # BLD: 364 THOU/MM3 (ref 130–400)
PMV BLD AUTO: 8.3 FL (ref 7.4–10.4)
RBC # BLD: 4.52 MILL/MM3 (ref 4.2–5.4)
WBC # BLD: 10.7 THOU/MM3 (ref 4.8–10.8)

## 2018-03-05 PROCEDURE — 85027 COMPLETE CBC AUTOMATED: CPT

## 2018-03-05 PROCEDURE — 71046 X-RAY EXAM CHEST 2 VIEWS: CPT

## 2018-03-05 PROCEDURE — 82728 ASSAY OF FERRITIN: CPT

## 2018-03-05 PROCEDURE — 36415 COLL VENOUS BLD VENIPUNCTURE: CPT

## 2018-03-26 ENCOUNTER — PROCEDURE VISIT (OUTPATIENT)
Dept: CARDIOLOGY CLINIC | Age: 23
End: 2018-03-26
Payer: COMMERCIAL

## 2018-03-26 DIAGNOSIS — Z45.09 ENCOUNTER FOR ELECTRONIC ANALYSIS OF REVEAL EVENT RECORDER: Primary | ICD-10-CM

## 2018-03-26 PROCEDURE — 93298 REM INTERROG DEV EVAL SCRMS: CPT | Performed by: INTERNAL MEDICINE

## 2018-05-01 ENCOUNTER — TELEPHONE (OUTPATIENT)
Dept: CARDIOLOGY CLINIC | Age: 23
End: 2018-05-01

## 2018-05-14 ENCOUNTER — APPOINTMENT (OUTPATIENT)
Dept: GENERAL RADIOLOGY | Age: 23
End: 2018-05-14
Payer: COMMERCIAL

## 2018-05-14 ENCOUNTER — APPOINTMENT (OUTPATIENT)
Dept: CT IMAGING | Age: 23
End: 2018-05-14
Payer: COMMERCIAL

## 2018-05-14 ENCOUNTER — HOSPITAL ENCOUNTER (EMERGENCY)
Age: 23
Discharge: HOME | End: 2018-05-14
Attending: FAMILY MEDICINE
Payer: COMMERCIAL

## 2018-05-14 VITALS
OXYGEN SATURATION: 97 % | WEIGHT: 270 LBS | RESPIRATION RATE: 17 BRPM | HEART RATE: 112 BPM | DIASTOLIC BLOOD PRESSURE: 85 MMHG | HEIGHT: 70 IN | SYSTOLIC BLOOD PRESSURE: 106 MMHG | TEMPERATURE: 98.7 F | BODY MASS INDEX: 38.65 KG/M2

## 2018-05-14 DIAGNOSIS — J40 BRONCHITIS: Primary | ICD-10-CM

## 2018-05-14 LAB
ANION GAP SERPL CALCULATED.3IONS-SCNC: 17 MEQ/L (ref 8–16)
ANISOCYTOSIS: ABNORMAL
BASOPHILS # BLD: 0.4 %
BASOPHILS ABSOLUTE: 0 THOU/MM3 (ref 0–0.1)
BUN BLDV-MCNC: 9 MG/DL (ref 7–22)
CALCIUM SERPL-MCNC: 9.2 MG/DL (ref 8.5–10.5)
CHLORIDE BLD-SCNC: 102 MEQ/L (ref 98–111)
CO2: 23 MEQ/L (ref 23–33)
CREAT SERPL-MCNC: 0.7 MG/DL (ref 0.4–1.2)
D-DIMER QUANTITATIVE: 525 NG/ML FEU (ref 0–500)
EKG ATRIAL RATE: 124 BPM
EKG P AXIS: 60 DEGREES
EKG P-R INTERVAL: 132 MS
EKG Q-T INTERVAL: 308 MS
EKG QRS DURATION: 84 MS
EKG QTC CALCULATION (BAZETT): 442 MS
EKG R AXIS: 69 DEGREES
EKG T AXIS: 51 DEGREES
EKG VENTRICULAR RATE: 124 BPM
EOSINOPHIL # BLD: 3.1 %
EOSINOPHILS ABSOLUTE: 0.3 THOU/MM3 (ref 0–0.4)
FLU A ANTIGEN: NEGATIVE
FLU B ANTIGEN: NEGATIVE
GFR SERPL CREATININE-BSD FRML MDRD: > 90 ML/MIN/1.73M2
GLUCOSE BLD-MCNC: 92 MG/DL (ref 70–108)
HCT VFR BLD CALC: 39.9 % (ref 37–47)
HEMOGLOBIN: 13.3 GM/DL (ref 12–16)
LYMPHOCYTES # BLD: 29.1 %
LYMPHOCYTES ABSOLUTE: 2.8 THOU/MM3 (ref 1–4.8)
MCH RBC QN AUTO: 28.7 PG (ref 27–31)
MCHC RBC AUTO-ENTMCNC: 33.3 GM/DL (ref 33–37)
MCV RBC AUTO: 86.3 FL (ref 81–99)
MONOCYTES # BLD: 9.7 %
MONOCYTES ABSOLUTE: 0.9 THOU/MM3 (ref 0.4–1.3)
NUCLEATED RED BLOOD CELLS: 0 /100 WBC
OSMOLALITY CALCULATION: 281.4 MOSMOL/KG (ref 275–300)
PDW BLD-RTO: 21.5 % (ref 11.5–14.5)
PLATELET # BLD: 267 THOU/MM3 (ref 130–400)
PLATELET ESTIMATE: ADEQUATE
PMV BLD AUTO: 8.9 FL (ref 7.4–10.4)
POTASSIUM SERPL-SCNC: 3.9 MEQ/L (ref 3.5–5.2)
PREGNANCY, SERUM: NEGATIVE
PRO-BNP: 66.8 PG/ML (ref 0–450)
PROCALCITONIN: 0.02 NG/ML (ref 0.01–0.09)
RBC # BLD: 4.62 MILL/MM3 (ref 4.2–5.4)
SCAN OF BLOOD SMEAR: NORMAL
SEG NEUTROPHILS: 57.7 %
SEGMENTED NEUTROPHILS ABSOLUTE COUNT: 5.5 THOU/MM3 (ref 1.8–7.7)
SODIUM BLD-SCNC: 142 MEQ/L (ref 135–145)
TROPONIN T: < 0.01 NG/ML
WBC # BLD: 9.6 THOU/MM3 (ref 4.8–10.8)

## 2018-05-14 PROCEDURE — 85379 FIBRIN DEGRADATION QUANT: CPT

## 2018-05-14 PROCEDURE — 84145 PROCALCITONIN (PCT): CPT

## 2018-05-14 PROCEDURE — 6360000004 HC RX CONTRAST MEDICATION: Performed by: FAMILY MEDICINE

## 2018-05-14 PROCEDURE — 71046 X-RAY EXAM CHEST 2 VIEWS: CPT

## 2018-05-14 PROCEDURE — 6360000002 HC RX W HCPCS: Performed by: FAMILY MEDICINE

## 2018-05-14 PROCEDURE — 36415 COLL VENOUS BLD VENIPUNCTURE: CPT

## 2018-05-14 PROCEDURE — 71275 CT ANGIOGRAPHY CHEST: CPT

## 2018-05-14 PROCEDURE — 6370000000 HC RX 637 (ALT 250 FOR IP): Performed by: FAMILY MEDICINE

## 2018-05-14 PROCEDURE — 93010 ELECTROCARDIOGRAM REPORT: CPT | Performed by: INTERNAL MEDICINE

## 2018-05-14 PROCEDURE — 70360 X-RAY EXAM OF NECK: CPT

## 2018-05-14 PROCEDURE — 6360000002 HC RX W HCPCS

## 2018-05-14 PROCEDURE — 80048 BASIC METABOLIC PNL TOTAL CA: CPT

## 2018-05-14 PROCEDURE — 94640 AIRWAY INHALATION TREATMENT: CPT

## 2018-05-14 PROCEDURE — 2580000003 HC RX 258: Performed by: FAMILY MEDICINE

## 2018-05-14 PROCEDURE — 84703 CHORIONIC GONADOTROPIN ASSAY: CPT

## 2018-05-14 PROCEDURE — 96360 HYDRATION IV INFUSION INIT: CPT

## 2018-05-14 PROCEDURE — 93005 ELECTROCARDIOGRAM TRACING: CPT | Performed by: FAMILY MEDICINE

## 2018-05-14 PROCEDURE — 87804 INFLUENZA ASSAY W/OPTIC: CPT

## 2018-05-14 PROCEDURE — 85025 COMPLETE CBC W/AUTO DIFF WBC: CPT

## 2018-05-14 PROCEDURE — 83880 ASSAY OF NATRIURETIC PEPTIDE: CPT

## 2018-05-14 PROCEDURE — 99285 EMERGENCY DEPT VISIT HI MDM: CPT

## 2018-05-14 PROCEDURE — 84484 ASSAY OF TROPONIN QUANT: CPT

## 2018-05-14 RX ORDER — 0.9 % SODIUM CHLORIDE 0.9 %
1000 INTRAVENOUS SOLUTION INTRAVENOUS ONCE
Status: COMPLETED | OUTPATIENT
Start: 2018-05-14 | End: 2018-05-14

## 2018-05-14 RX ORDER — METHYLPREDNISOLONE SODIUM SUCCINATE 125 MG/2ML
125 INJECTION, POWDER, LYOPHILIZED, FOR SOLUTION INTRAMUSCULAR; INTRAVENOUS ONCE
Status: COMPLETED | OUTPATIENT
Start: 2018-05-14 | End: 2018-05-14

## 2018-05-14 RX ORDER — PREDNISONE 20 MG/1
40 TABLET ORAL DAILY
Qty: 10 TABLET | Refills: 0 | Status: SHIPPED | OUTPATIENT
Start: 2018-05-14 | End: 2018-05-19

## 2018-05-14 RX ORDER — ONDANSETRON 2 MG/ML
4 INJECTION INTRAMUSCULAR; INTRAVENOUS EVERY 6 HOURS PRN
Status: DISCONTINUED | OUTPATIENT
Start: 2018-05-14 | End: 2018-05-14 | Stop reason: HOSPADM

## 2018-05-14 RX ORDER — AZITHROMYCIN 250 MG/1
TABLET, FILM COATED ORAL
Qty: 1 PACKET | Refills: 0 | Status: SHIPPED | OUTPATIENT
Start: 2018-05-14 | End: 2018-05-24

## 2018-05-14 RX ORDER — IPRATROPIUM BROMIDE AND ALBUTEROL SULFATE 2.5; .5 MG/3ML; MG/3ML
1 SOLUTION RESPIRATORY (INHALATION)
Status: DISCONTINUED | OUTPATIENT
Start: 2018-05-14 | End: 2018-05-14 | Stop reason: HOSPADM

## 2018-05-14 RX ORDER — ONDANSETRON 2 MG/ML
INJECTION INTRAMUSCULAR; INTRAVENOUS
Status: COMPLETED
Start: 2018-05-14 | End: 2018-05-14

## 2018-05-14 RX ADMIN — RACEPINEPHRINE HYDROCHLORIDE 5.62 MG: 11.25 SOLUTION RESPIRATORY (INHALATION) at 02:40

## 2018-05-14 RX ADMIN — ONDANSETRON 4 MG: 2 INJECTION INTRAMUSCULAR; INTRAVENOUS at 01:48

## 2018-05-14 RX ADMIN — METHYLPREDNISOLONE SODIUM SUCCINATE 125 MG: 125 INJECTION, POWDER, FOR SOLUTION INTRAMUSCULAR; INTRAVENOUS at 01:48

## 2018-05-14 RX ADMIN — SODIUM CHLORIDE 1000 ML: 9 INJECTION, SOLUTION INTRAVENOUS at 01:47

## 2018-05-14 RX ADMIN — IOPAMIDOL 80 ML: 755 INJECTION, SOLUTION INTRAVENOUS at 02:30

## 2018-05-14 ASSESSMENT — ENCOUNTER SYMPTOMS
SORE THROAT: 0
BACK PAIN: 0
EYE DISCHARGE: 0
ABDOMINAL PAIN: 0
VOMITING: 1
DIARRHEA: 0
COUGH: 1
EYE PAIN: 0
RHINORRHEA: 0
SHORTNESS OF BREATH: 1
NAUSEA: 0
WHEEZING: 1

## 2018-05-31 ENCOUNTER — PROCEDURE VISIT (OUTPATIENT)
Dept: CARDIOLOGY CLINIC | Age: 23
End: 2018-05-31
Payer: COMMERCIAL

## 2018-05-31 DIAGNOSIS — Z45.09 ENCOUNTER FOR ELECTRONIC ANALYSIS OF REVEAL EVENT RECORDER: Primary | ICD-10-CM

## 2018-05-31 PROCEDURE — 93298 REM INTERROG DEV EVAL SCRMS: CPT | Performed by: INTERNAL MEDICINE

## 2018-05-31 PROCEDURE — 93299 PR REM INTERROG ICPMS/SCRMS <30 D TECH REVIEW: CPT | Performed by: INTERNAL MEDICINE

## 2018-07-13 ENCOUNTER — PROCEDURE VISIT (OUTPATIENT)
Dept: CARDIOLOGY CLINIC | Age: 23
End: 2018-07-13
Payer: COMMERCIAL

## 2018-07-13 DIAGNOSIS — Z45.09 ENCOUNTER FOR ELECTRONIC ANALYSIS OF REVEAL EVENT RECORDER: Primary | ICD-10-CM

## 2018-07-13 PROCEDURE — 93298 REM INTERROG DEV EVAL SCRMS: CPT | Performed by: INTERNAL MEDICINE

## 2018-07-13 PROCEDURE — 93299 PR REM INTERROG ICPMS/SCRMS <30 D TECH REVIEW: CPT | Performed by: INTERNAL MEDICINE

## 2018-08-23 ENCOUNTER — PROCEDURE VISIT (OUTPATIENT)
Dept: CARDIOLOGY CLINIC | Age: 23
End: 2018-08-23
Payer: COMMERCIAL

## 2018-08-23 DIAGNOSIS — Z45.09 ENCOUNTER FOR ELECTRONIC ANALYSIS OF REVEAL EVENT RECORDER: ICD-10-CM

## 2018-08-23 DIAGNOSIS — R55 SYNCOPE AND COLLAPSE: Primary | Chronic | ICD-10-CM

## 2018-08-23 PROCEDURE — 93298 REM INTERROG DEV EVAL SCRMS: CPT | Performed by: INTERNAL MEDICINE

## 2018-08-23 PROCEDURE — 93299 PR REM INTERROG ICPMS/SCRMS <30 D TECH REVIEW: CPT | Performed by: INTERNAL MEDICINE

## 2018-09-27 ENCOUNTER — PROCEDURE VISIT (OUTPATIENT)
Dept: CARDIOLOGY CLINIC | Age: 23
End: 2018-09-27
Payer: COMMERCIAL

## 2018-09-27 DIAGNOSIS — R55 SYNCOPE AND COLLAPSE: Primary | Chronic | ICD-10-CM

## 2018-09-27 PROCEDURE — 93299 PR REM INTERROG ICPMS/SCRMS <30 D TECH REVIEW: CPT | Performed by: INTERNAL MEDICINE

## 2018-09-27 PROCEDURE — 93298 REM INTERROG DEV EVAL SCRMS: CPT | Performed by: INTERNAL MEDICINE

## 2018-10-31 ENCOUNTER — PROCEDURE VISIT (OUTPATIENT)
Dept: CARDIOLOGY CLINIC | Age: 23
End: 2018-10-31
Payer: COMMERCIAL

## 2018-10-31 DIAGNOSIS — R55 SYNCOPE AND COLLAPSE: Primary | Chronic | ICD-10-CM

## 2018-10-31 PROCEDURE — 93299 PR REM INTERROG ICPMS/SCRMS <30 D TECH REVIEW: CPT | Performed by: INTERNAL MEDICINE

## 2018-10-31 PROCEDURE — 93298 REM INTERROG DEV EVAL SCRMS: CPT | Performed by: INTERNAL MEDICINE

## 2018-11-28 ENCOUNTER — HOSPITAL ENCOUNTER (EMERGENCY)
Dept: GENERAL RADIOLOGY | Age: 23
Discharge: HOME | End: 2018-11-28
Payer: COMMERCIAL

## 2018-11-28 ENCOUNTER — HOSPITAL ENCOUNTER (EMERGENCY)
Age: 23
Discharge: HOME | End: 2018-11-28
Payer: COMMERCIAL

## 2018-11-28 VITALS
WEIGHT: 262 LBS | HEIGHT: 70 IN | OXYGEN SATURATION: 97 % | DIASTOLIC BLOOD PRESSURE: 90 MMHG | BODY MASS INDEX: 37.51 KG/M2 | TEMPERATURE: 97.2 F | RESPIRATION RATE: 24 BRPM | SYSTOLIC BLOOD PRESSURE: 155 MMHG | HEART RATE: 155 BPM

## 2018-11-28 DIAGNOSIS — J20.9 ACUTE BRONCHITIS, UNSPECIFIED ORGANISM: Primary | ICD-10-CM

## 2018-11-28 PROCEDURE — 71046 X-RAY EXAM CHEST 2 VIEWS: CPT

## 2018-11-28 PROCEDURE — 99213 OFFICE O/P EST LOW 20 MIN: CPT | Performed by: NURSE PRACTITIONER

## 2018-11-28 PROCEDURE — 99213 OFFICE O/P EST LOW 20 MIN: CPT

## 2018-11-28 RX ORDER — PREDNISONE 10 MG/1
10 TABLET ORAL SEE ADMIN INSTRUCTIONS
Qty: 21 TABLET | Refills: 0 | Status: SHIPPED | OUTPATIENT
Start: 2018-11-28 | End: 2018-12-04

## 2018-11-28 RX ORDER — GUAIFENESIN AND CODEINE PHOSPHATE 100; 10 MG/5ML; MG/5ML
5 SOLUTION ORAL NIGHTLY PRN
Qty: 50 ML | Refills: 0 | Status: SHIPPED | OUTPATIENT
Start: 2018-11-28 | End: 2018-12-05

## 2018-11-28 RX ORDER — AZITHROMYCIN 250 MG/1
TABLET, FILM COATED ORAL
Qty: 6 TABLET | Refills: 0 | Status: SHIPPED | OUTPATIENT
Start: 2018-11-28 | End: 2019-04-09

## 2018-11-28 ASSESSMENT — PAIN SCALES - GENERAL: PAINLEVEL_OUTOF10: 6

## 2018-11-28 ASSESSMENT — ENCOUNTER SYMPTOMS
RHINORRHEA: 1
COUGH: 1
WHEEZING: 0
SORE THROAT: 1
SINUS PAIN: 0
SWOLLEN GLANDS: 0

## 2018-11-28 ASSESSMENT — PAIN DESCRIPTION - PAIN TYPE: TYPE: ACUTE PAIN

## 2018-11-28 ASSESSMENT — PAIN DESCRIPTION - FREQUENCY: FREQUENCY: CONTINUOUS

## 2018-11-28 ASSESSMENT — ACTIVITIES OF DAILY LIVING (ADL): EFFECT OF PAIN ON DAILY ACTIVITIES: WORSE WITH COUGH

## 2018-11-28 ASSESSMENT — PAIN DESCRIPTION - DESCRIPTORS: DESCRIPTORS: BURNING

## 2018-11-28 NOTE — ED PROVIDER NOTES
[id number]  Urgent Care Encounter      CHIEF COMPLAINT       Chief Complaint   Patient presents with    Cough    Sinusitis       Nurses Notes reviewed and I agree except as noted in the HPI. HISTORY OFPRESENT ILLNESS   Deana Dailey is a 21 y.o. The history is provided by the patient and a parent. No  was used. URI   Presenting symptoms: congestion, cough, fatigue, rhinorrhea and sore throat    Presenting symptoms: no ear pain, no facial pain and no fever    Severity:  Severe  Onset quality:  Sudden  Duration:  1 day  Timing:  Constant  Progression:  Worsening  Chronicity:  Recurrent  Relieved by:  Nothing  Worsened by:  Nothing  Ineffective treatments:  None tried  Associated symptoms: headaches and myalgias    Associated symptoms: no arthralgias, no neck pain, no sinus pain, no sneezing, no swollen glands and no wheezing    Risk factors: sick contacts        REVIEW OF SYSTEMS     Review of Systems   Constitutional: Positive for fatigue. Negative for fever. HENT: Positive for congestion, rhinorrhea and sore throat. Negative for ear pain, sinus pain and sneezing. Respiratory: Positive for cough. Negative for wheezing. Musculoskeletal: Positive for myalgias. Negative for arthralgias and neck pain. Neurological: Positive for headaches.        PAST MEDICAL HISTORY         Diagnosis Date    ADD (attention deficit disorder)     Anxiety 4/8/2015    Anxiety attack     Asthma     exercise induced    Atypical face pain     Back pain     Bipolar 1 disorder (HCC)     Fibromyalgia     GERD (gastroesophageal reflux disease)     Hypotension 11/2/2017    Major depressive disorder, recurrent episode, mild ([address] 7/31/2012    Obesity 10/24/2014    Pneumonia 2/21/2018    POTS (postural orthostatic tachycardia syndrome)     Pott disease     Pulmonary emboli (HCC)     Seizures ([address] 07/31/2016    Tailbone injury 11/28/15    patient broke tailbone    Thyroid with 25mg to equal 75mgHistorical Med      DULoxetine (CYMBALTA) 60 MG capsule Take 60 mg by mouth 2 times daily Historical Med      temazepam (RESTORIL) 30 MG capsule Take 30 mg by mouth nightly. Historical Med      acetaminophen (TYLENOL) 325 MG tablet Take 2 tablets by mouth every 4 hours as needed for Pain or Fever, Disp-120 tablet, R-3OTC      albuterol sulfate HFA (PROAIR HFA) 108 (90 Base) MCG/ACT inhaler Inhale 2 puffs into the lungs every 6 hours as needed for Wheezing, Disp-1 Inhaler, R-3Print      fluticasone (FLONASE) 50 MCG/ACT nasal spray 2 sprays by Nasal route as needed for RhinitisHistorical Med       !! - Potential duplicate medications found. Please discuss with provider. ALLERGIES     Patient is is allergic to dilaudid [hydromorphone hcl]; flexeril [cyclobenzaprine]; keflex [cephalexin]; tessalon [benzonatate]; augmentin [amoxicillin-pot clavulanate]; lorabid [loracarbef]; and minocycline. FAMILY HISTORY     Patient's family history includes Anxiety Disorder in her father and mother; Bipolar Disorder in her father; Cancer in her maternal grandmother, paternal grandfather, and paternal uncle; Depression in her maternal grandfather, paternal aunt, and paternal uncle; Diabetes in her mother; High Blood Pressure in her father; Lupus in her maternal aunt; Mental Illness in her father; Parkinsonism in her father. SOCIAL HISTORY     Patient  reports that she has never smoked. She has never used smokeless tobacco. She reports that she drinks about 0.6 oz of alcohol per week . She reports that she uses drugs, including Marijuana. PHYSICAL EXAM     ED TRIAGE VITALS  BP: (!) 155/90, Temp: 97.2 °F (36.2 °C), Pulse: 155, Resp: 24, SpO2: 97 %  Physical Exam   Constitutional: She is oriented to person, place, and time. Vital signs are normal. She appears well-developed and well-nourished. She is active and cooperative. Non-toxic appearance. She does not have a sickly appearance.  She appears directed,Drink Lots of fluids and Use Inhalers as directed if prescribed. Advised to follow up with family doctor in the next 2-3 days for reevaluation. The patient may return to urgent care if does not get better or symptoms worsen. However the patient is advised to go to ER immediately if present symptoms worsen, high fever >102 , vomiting, breathing difficulty, chest pain, lethargy or new symptoms develop. Patient/ parents understands this approach of home management and agrees to the treatment plan. PATIENT REFERRED TO:  LEVI Long CNP  [address] [facility], [address] [place] [address] Rd  [address] [id number] [phone]    Schedule an appointment as soon as possible for a visit in 1 day      [facility] ER  [facility] [address] [id number]  Go to   If symptoms worsen    DISCHARGE MEDICATIONS:  Discharge Medication List as of 11/28/2018  7:01 PM      START taking these medications    Details   azithromycin (ZITHROMAX Z-SARBJIT) 250 MG tablet 2 tablets day 1 then1 tablet days 2 - 5., Disp-6 tablet, R-0Print      predniSONE (DELTASONE) 10 MG tablet Take 1 tablet by mouth See Admin Instructions for 6 days 60 mgs p.o x 1 day. 50 mgs p.o x 1 day. 40 mgs p.o x 1 day. 30 mgs p.o x 1 day. 20 mgs p.o x 1 day. 10 mgs p.o x 1 day., Disp-21 tablet, R-0Print      guaiFENesin-codeine (TUSSI-ORGANIDIN NR) 100-10 MG/5ML syrup Take 5 mLs by mouth nightly as needed for Cough for up to 7 days. ., Disp-50 mL, R-0Normal           Discharge Medication List as of 11/28/2018  7:01 PM          LEVI Lazcano - LEVI Narvaez CNP  11/28/18 0110

## 2018-11-28 NOTE — ED TRIAGE NOTES
Pt ambulatory ihto esuc with c/o sinus drainage and productive cough on occasion that started yesterday. Pt states her chest burns with a pain of 6 that is worse with coughing. Pt coughing non stop during triage.

## 2018-11-29 NOTE — ED NOTES
Pt verbalized discharge instructions. Pt informed to go to ER if develop chest pain, shortness of breath or abdominal pain. Pt ambulatory out in stable condition. Assessment unchanged.        Soo Dozier RN  11/28/18 2517

## 2018-12-03 ENCOUNTER — APPOINTMENT (OUTPATIENT)
Dept: GENERAL RADIOLOGY | Age: 23
End: 2018-12-03
Payer: COMMERCIAL

## 2018-12-03 ENCOUNTER — HOSPITAL ENCOUNTER (EMERGENCY)
Age: 23
Discharge: HOME | End: 2018-12-03
Payer: COMMERCIAL

## 2018-12-03 VITALS
RESPIRATION RATE: 28 BRPM | HEART RATE: 149 BPM | SYSTOLIC BLOOD PRESSURE: 137 MMHG | TEMPERATURE: 98.7 F | DIASTOLIC BLOOD PRESSURE: 107 MMHG | OXYGEN SATURATION: 98 %

## 2018-12-03 DIAGNOSIS — R05.9 COUGH: Primary | ICD-10-CM

## 2018-12-03 LAB
FLU A ANTIGEN: NEGATIVE
FLU B ANTIGEN: NEGATIVE

## 2018-12-03 PROCEDURE — 87804 INFLUENZA ASSAY W/OPTIC: CPT

## 2018-12-03 PROCEDURE — 99283 EMERGENCY DEPT VISIT LOW MDM: CPT

## 2018-12-03 PROCEDURE — 71046 X-RAY EXAM CHEST 2 VIEWS: CPT

## 2018-12-03 PROCEDURE — 6370000000 HC RX 637 (ALT 250 FOR IP): Performed by: NURSE PRACTITIONER

## 2018-12-03 RX ORDER — GUAIFENESIN AND DEXTROMETHORPHAN HYDROBROMIDE 1200; 60 MG/1; MG/1
1 TABLET, EXTENDED RELEASE ORAL 2 TIMES DAILY
Qty: 28 TABLET | Refills: 0 | Status: SHIPPED | OUTPATIENT
Start: 2018-12-03 | End: 2019-04-09

## 2018-12-03 RX ADMIN — GUAIFENESIN AND DEXTROMETHORPHAN HYDROBROMIDE 1 TABLET: 600; 30 TABLET, EXTENDED RELEASE ORAL at 21:21

## 2018-12-03 ASSESSMENT — ENCOUNTER SYMPTOMS
RHINORRHEA: 1
DIARRHEA: 0
EYE PAIN: 0
SORE THROAT: 1
BACK PAIN: 0
SHORTNESS OF BREATH: 0
WHEEZING: 0
COUGH: 1
VOMITING: 0
EYE DISCHARGE: 0
NAUSEA: 0
ABDOMINAL PAIN: 0

## 2018-12-03 ASSESSMENT — PAIN SCALES - GENERAL: PAINLEVEL_OUTOF10: 5

## 2018-12-03 ASSESSMENT — PAIN DESCRIPTION - FREQUENCY: FREQUENCY: CONTINUOUS

## 2018-12-03 ASSESSMENT — PAIN DESCRIPTION - PAIN TYPE: TYPE: ACUTE PAIN

## 2018-12-03 ASSESSMENT — PAIN DESCRIPTION - LOCATION: LOCATION: CHEST;RIB CAGE

## 2018-12-03 ASSESSMENT — PAIN DESCRIPTION - DESCRIPTORS: DESCRIPTORS: SORE

## 2018-12-04 NOTE — ED PROVIDER NOTES
Anxiety attack; Asthma; Atypical face pain; Back pain; Bipolar 1 disorder ([address] Fibromyalgia; GERD (gastroesophageal reflux disease); Hypotension; Major depressive disorder, recurrent episode, mild ([address] Obesity; Pneumonia; POTS (postural orthostatic tachycardia syndrome); Pott disease; Pulmonary emboli ([address] Seizures ([address] Tailbone injury; Thyroid disease; TMJ (dislocation of temporomandibular joint); Trigeminal neuralgia; and Wears contact lenses. SURGICAL HISTORY      has a past surgical history that includes [place] tooth extraction (2010); Cardiac catheterization (3-2016); Cardiac catheterization (04/21/2016); Colonoscopy (2016); and Insertable Cardiac Monitor. CURRENT MEDICATIONS       Previous Medications    ACETAMINOPHEN (TYLENOL) 325 MG TABLET    Take 2 tablets by mouth every 4 hours as needed for Pain or Fever    ALBUTEROL (PROVENTIL) (2.5 MG/3ML) 0.083% NEBULIZER SOLUTION    Take 3 mLs by nebulization every 6 hours as needed for Wheezing    ALBUTEROL SULFATE HFA (PROAIR HFA) 108 (90 BASE) MCG/ACT INHALER    Inhale 2 puffs into the lungs every 6 hours as needed for Wheezing    ALPRAZOLAM (XANAX XR) 2 MG EXTENDED RELEASE TABLET    Take 2 mg by mouth every morning. AZITHROMYCIN (ZITHROMAX Z-SARBJIT) 250 MG TABLET    2 tablets day 1 then1 tablet days 2 - 5. BUSPIRONE (BUSPAR) 30 MG TABLET    Take by mouth every morning 30 mg AM, 45 mg PM (1.5 tab)    CALCIUM CITRATE-VITAMIN D (CALCIUM CITRATE + D PO)    Take 1 tablet by mouth daily    CETIRIZINE (ZYRTEC) 10 MG TABLET    Take 1 tablet by mouth daily    DULOXETINE (CYMBALTA) 60 MG CAPSULE    Take 60 mg by mouth 2 times daily     FERROUS SULFATE (LEANDRA-SHAINA) 325 (65 FE) MG TABLET    Take 1 tablet by mouth 2 times daily    FLUTICASONE (FLONASE) 50 MCG/ACT NASAL SPRAY    2 sprays by Nasal route as needed for Rhinitis    GUAIFENESIN-CODEINE (TUSSI-ORGANIDIN NR) 100-10 MG/5ML SYRUP    Take 5 mLs by mouth nightly as needed for Cough for up to 7 days. Mallika Rodriguez LURASIDONE (LATUDA) 80 MG TABS TABLET    Take 80 mg by mouth nightly    MIRTAZAPINE (REMERON PO)    Take 7.5 mg by mouth daily    MULTIPLE VITAMINS-MINERALS (MULTIVITAMIN ADULT) TABS    Take 1 tablet by mouth daily    OMEPRAZOLE (PRILOSEC) 40 MG DELAYED RELEASE CAPSULE    TAKE 1 CAPSULE BY MOUTH TWO TIMES A DAY     OXCARBAZEPINE (TRILEPTAL) 300 MG TABLET    Take 1,200 mg by mouth 2 times daily     PREDNISONE (DELTASONE) 10 MG TABLET    Take 1 tablet by mouth See Admin Instructions for 6 days 60 mgs p.o x 1 day. 50 mgs p.o x 1 day. 40 mgs p.o x 1 day. 30 mgs p.o x 1 day. 20 mgs p.o x 1 day. 10 mgs p.o x 1 day. QUETIAPINE (SEROQUEL) 200 MG TABLET    Take 300 mg by mouth nightly     SERTRALINE (ZOLOFT) 25 MG TABLET    Take 25 mg by mouth daily Take with 50mg to equal 75mg    SERTRALINE (ZOLOFT) 50 MG TABLET    Take 50 mg by mouth daily Take with 25mg to equal 75mg    TEMAZEPAM (RESTORIL) 30 MG CAPSULE    Take 30 mg by mouth nightly. ALLERGIES     is allergic to dilaudid [hydromorphone hcl]; flexeril [cyclobenzaprine]; keflex [cephalexin]; tessalon [benzonatate]; augmentin [amoxicillin-pot clavulanate]; lorabid [loracarbef]; and minocycline. FAMILY HISTORY     indicated that her mother is alive. She indicated that her father is alive. She indicated that her brother is alive. She indicated that her maternal grandmother is . She indicated that her maternal grandfather is . She indicated that her paternal grandmother is . She indicated that her paternal grandfather is . She indicated that her maternal aunt is alive. She indicated that the status of her paternal aunt is unknown.  She indicated that the status of her paternal uncle is unknown.    family history includes Anxiety Disorder in her father and mother; Bipolar Disorder in her father; Cancer in her maternal grandmother, paternal grandfather, and paternal uncle; Depression in her maternal grandfather, paternal aunt, and warm and dry. No rash noted. She is not diaphoretic. Nursing note and vitals reviewed. DIFFERENTIAL DIAGNOSIS:   Including but not limited to: viral URI, bronchitis, pneumonia, influenza     DIAGNOSTIC RESULTS     EKG: All EKG's are interpreted by the Emergency Department Physician who either signs or Co-signs this chart in the absence of a cardiologist.  None     RADIOLOGY: non-plain film images(s) such as CT, Ultrasound and MRI are read by the radiologist.    XR CHEST STANDARD (2 VW)   Final Result   No acute disease. **This report has been created using voice recognition software. It may contain minor errors which are inherent in voice recognition technology. **      Final report electronically signed by Dr. Say Valdez on 12/3/2018 8:50 PM          LABS:     Labs Reviewed   RAPID INFLUENZA A/B ANTIGENS       EMERGENCYDEPARTMENT COURSE:   Vitals:    Vitals:    12/03/18 1927   BP: (!) 137/107   Pulse: 149   Resp: 28   Temp: 98.7 °F (37.1 °C)   TempSrc: Oral   SpO2: 98%       7:43 PM: The patient was seen and evaluated. MDM:  The patient was seen and evaluated within the ED today for the evaluation of cough, otalgia, rhinorrhea, and pharyngitis. The patient presented in no acute distress. Physical exam was benign. Lung sounds clear and heart sounds normal. Throat and ears are also normal. Influenza results is negative. Imaging revealed no acute disease. The patient was treated with Mucinex DM while in the ED. I observed the patient's condition to remain stable during the duration of her stay. The patient was amenable to my decision for discharge and was sent home in stable condition with prescription for Mucinex DM Maximum. I discussed return precautions and she verbalized understanding. I recommended that the patient f/u with Jaimie Recinos CNP (pcp) for further evaluation and work up if their symptoms do not improve. All questions and concerns were addressed.      CRITICAL CARE:   None

## 2018-12-10 ENCOUNTER — PROCEDURE VISIT (OUTPATIENT)
Dept: CARDIOLOGY CLINIC | Age: 23
End: 2018-12-10
Payer: COMMERCIAL

## 2018-12-10 DIAGNOSIS — R55 SYNCOPE AND COLLAPSE: Primary | Chronic | ICD-10-CM

## 2018-12-10 PROCEDURE — 93299 PR REM INTERROG ICPMS/SCRMS <30 D TECH REVIEW: CPT | Performed by: INTERNAL MEDICINE

## 2018-12-10 PROCEDURE — 93298 REM INTERROG DEV EVAL SCRMS: CPT | Performed by: INTERNAL MEDICINE

## 2019-01-14 ENCOUNTER — PROCEDURE VISIT (OUTPATIENT)
Dept: CARDIOLOGY CLINIC | Age: 24
End: 2019-01-14
Payer: COMMERCIAL

## 2019-01-14 DIAGNOSIS — R55 SYNCOPE AND COLLAPSE: Primary | Chronic | ICD-10-CM

## 2019-01-14 PROCEDURE — 93298 REM INTERROG DEV EVAL SCRMS: CPT | Performed by: INTERNAL MEDICINE

## 2019-01-14 PROCEDURE — 93299 PR REM INTERROG ICPMS/SCRMS <30 D TECH REVIEW: CPT | Performed by: INTERNAL MEDICINE

## 2019-02-17 DIAGNOSIS — K21.9 GASTROESOPHAGEAL REFLUX DISEASE WITHOUT ESOPHAGITIS: ICD-10-CM

## 2019-02-18 RX ORDER — OMEPRAZOLE 40 MG/1
CAPSULE, DELAYED RELEASE ORAL
Qty: 60 CAPSULE | Refills: 10 | Status: SHIPPED | OUTPATIENT
Start: 2019-02-18 | End: 2022-03-23 | Stop reason: SDUPTHER

## 2019-02-19 ENCOUNTER — PROCEDURE VISIT (OUTPATIENT)
Dept: CARDIOLOGY CLINIC | Age: 24
End: 2019-02-19
Payer: COMMERCIAL

## 2019-02-19 DIAGNOSIS — R55 SYNCOPE AND COLLAPSE: Primary | Chronic | ICD-10-CM

## 2019-02-19 PROCEDURE — 93298 REM INTERROG DEV EVAL SCRMS: CPT | Performed by: INTERNAL MEDICINE

## 2019-02-19 PROCEDURE — 93299 PR REM INTERROG ICPMS/SCRMS <30 D TECH REVIEW: CPT | Performed by: INTERNAL MEDICINE

## 2019-03-25 ENCOUNTER — PROCEDURE VISIT (OUTPATIENT)
Dept: CARDIOLOGY CLINIC | Age: 24
End: 2019-03-25
Payer: COMMERCIAL

## 2019-03-25 DIAGNOSIS — R55 SYNCOPE AND COLLAPSE: Primary | ICD-10-CM

## 2019-03-25 PROCEDURE — 93299 PR REM INTERROG ICPMS/SCRMS <30 D TECH REVIEW: CPT | Performed by: INTERNAL MEDICINE

## 2019-03-25 PROCEDURE — 93298 REM INTERROG DEV EVAL SCRMS: CPT | Performed by: INTERNAL MEDICINE

## 2019-04-01 NOTE — TELEPHONE ENCOUNTER
[id number] Laila Jackson unless the patient wants to see me.
Appeal for corlanor 5mg bid [place] denial started    Should have an answer in 67 by phone    Appeal number [phone]  Fax [phone]  Case # [id number]
MAILBOX IS FULL AND UNABLE TO REACH THIS PT
PRIOR AUTH APPEALS DENIED FOR THE SECOND TIME. NOTE LOCATED UNDER MEDIA TAB IN PT'S CHART/  DO YOU HAVE ANY OTHER RECOMMENDATIONS? ??
She is scheduled for an ablation as well in January. Did you want to see her prior to this?
Unfortunately, No.  The patient will need to see me in the office to discuss what she wants to do.
80

## 2019-04-09 ENCOUNTER — HOSPITAL ENCOUNTER (EMERGENCY)
Age: 24
Discharge: HOME | End: 2019-04-09
Attending: EMERGENCY MEDICINE
Payer: COMMERCIAL

## 2019-04-09 ENCOUNTER — APPOINTMENT (OUTPATIENT)
Dept: GENERAL RADIOLOGY | Age: 24
End: 2019-04-09
Payer: COMMERCIAL

## 2019-04-09 VITALS
WEIGHT: 250 LBS | OXYGEN SATURATION: 99 % | BODY MASS INDEX: 35.79 KG/M2 | RESPIRATION RATE: 20 BRPM | DIASTOLIC BLOOD PRESSURE: 84 MMHG | SYSTOLIC BLOOD PRESSURE: 130 MMHG | TEMPERATURE: 99.5 F | HEART RATE: 112 BPM | HEIGHT: 70 IN

## 2019-04-09 DIAGNOSIS — J40 BRONCHITIS: Primary | ICD-10-CM

## 2019-04-09 LAB
EKG ATRIAL RATE: 103 BPM
EKG P AXIS: 59 DEGREES
EKG P-R INTERVAL: 132 MS
EKG Q-T INTERVAL: 326 MS
EKG QRS DURATION: 82 MS
EKG QTC CALCULATION (BAZETT): 427 MS
EKG R AXIS: 57 DEGREES
EKG T AXIS: 52 DEGREES
EKG VENTRICULAR RATE: 103 BPM
FLU A ANTIGEN: NEGATIVE
FLU B ANTIGEN: NEGATIVE

## 2019-04-09 PROCEDURE — 93010 ELECTROCARDIOGRAM REPORT: CPT | Performed by: NUCLEAR MEDICINE

## 2019-04-09 PROCEDURE — 71046 X-RAY EXAM CHEST 2 VIEWS: CPT

## 2019-04-09 PROCEDURE — 99284 EMERGENCY DEPT VISIT MOD MDM: CPT

## 2019-04-09 PROCEDURE — 93005 ELECTROCARDIOGRAM TRACING: CPT | Performed by: EMERGENCY MEDICINE

## 2019-04-09 PROCEDURE — 87804 INFLUENZA ASSAY W/OPTIC: CPT

## 2019-04-09 RX ORDER — PREDNISONE 20 MG/1
20 TABLET ORAL DAILY
Qty: 5 TABLET | Refills: 0 | Status: SHIPPED | OUTPATIENT
Start: 2019-04-09 | End: 2019-04-14

## 2019-04-09 RX ORDER — AZITHROMYCIN 250 MG/1
TABLET, FILM COATED ORAL
Qty: 1 PACKET | Refills: 0 | Status: SHIPPED | OUTPATIENT
Start: 2019-04-09 | End: 2019-04-19

## 2019-04-09 ASSESSMENT — PAIN DESCRIPTION - DESCRIPTORS
DESCRIPTORS_2: SHARP
DESCRIPTORS: STABBING

## 2019-04-09 ASSESSMENT — PAIN DESCRIPTION - INTENSITY: RATING_2: 8

## 2019-04-09 ASSESSMENT — PAIN DESCRIPTION - PAIN TYPE
TYPE: ACUTE PAIN
TYPE: ACUTE PAIN
TYPE_2: ACUTE PAIN

## 2019-04-09 ASSESSMENT — ENCOUNTER SYMPTOMS
VOMITING: 0
RHINORRHEA: 0
EYE PAIN: 0
WHEEZING: 0
COUGH: 1
ABDOMINAL PAIN: 0
NAUSEA: 0
DIARRHEA: 0
SORE THROAT: 0
SHORTNESS OF BREATH: 0
EYE DISCHARGE: 0
BACK PAIN: 0

## 2019-04-09 ASSESSMENT — PAIN DESCRIPTION - DURATION: DURATION_2: INTERMITTENT

## 2019-04-09 ASSESSMENT — PAIN DESCRIPTION - FREQUENCY: FREQUENCY: CONTINUOUS

## 2019-04-09 ASSESSMENT — PAIN SCALES - GENERAL
PAINLEVEL_OUTOF10: 8
PAINLEVEL_OUTOF10: 8

## 2019-04-09 ASSESSMENT — PAIN DESCRIPTION - LOCATION: LOCATION: BACK

## 2019-04-09 ASSESSMENT — PAIN DESCRIPTION - ORIENTATION: ORIENTATION: LOWER

## 2019-04-09 NOTE — ED NOTES
Pt and family concerned about pt having a possible PE and asking to speak to provider.  Dr. Andre Zhang at bedside speaking with pt and family     Cielo Jha RN  04/09/19 [id number]

## 2019-04-09 NOTE — ED NOTES
Pt resting on cot. Pt updated on plan of care. Lights off for comfort. Family at bedside.      Rambo Rogers RN  04/09/19 7627

## 2019-04-09 NOTE — ED TRIAGE NOTES
Patient presents to the ED with complaint of a cough x1 months. Patient also reports dizziness that has been ongoing for approximately one week. Patient reports history of PE and states that the pain feels like it did last time that she had one. Patient rates R lung pain 8/10 on pain scale. Patient also states that she has lower back pain with NKI. Rates that pain 8/10 on pain scale.

## 2019-04-09 NOTE — ED PROVIDER NOTES
[facility]  eMERGENCY dEPARTMENT eNCOUnter          [address] [facility]       Chief Complaint   Patient presents with    Dizziness    Cough       Nurses Notes reviewed and I agree except as noted in the HPI. HISTORY OF PRESENT ILLNESS    Deana Dailey is a 21 y.o. female who presents to the Emergency Department with a medical history of asthma for the evaluation of a nonproductive cough which the patient reports has been present for the past month. She states that it has gradually started to increase causing her to come to the ED for evaluation. She reports to have used her at home inhaler and OTC Zyrtec but denies to have experienced any significant symptomatic relief. She denies any fever, chills, or chest pain. Patient states to smoke 1-2 cigarettes, daily. The patient reports no additional symptoms or complaints at this time. The HPI was provided by the patient. REVIEW OF SYSTEMS     Review of Systems   Constitutional: Negative for appetite change, chills, fatigue and fever. HENT: Negative for congestion, ear pain, rhinorrhea and sore throat. Eyes: Negative for pain, discharge and visual disturbance. Respiratory: Positive for cough (nonproductive). Negative for shortness of breath and wheezing. Cardiovascular: Negative for chest pain, palpitations and leg swelling. Gastrointestinal: Negative for abdominal pain, diarrhea, nausea and vomiting. Genitourinary: Negative for difficulty urinating, dysuria, hematuria and vaginal discharge. Musculoskeletal: Negative for arthralgias, back pain, joint swelling and neck pain. Skin: Negative for pallor and rash. Neurological: Negative for dizziness, syncope, weakness, light-headedness, numbness and headaches. Hematological: Negative for adenopathy. Psychiatric/Behavioral: Negative for confusion and suicidal ideas. The patient is not nervous/anxious.         PAST MEDICAL HISTORY    has a past medical history of ADD (attention deficit disorder), Anxiety, Anxiety attack, Asthma, Atypical face pain, Back pain, Bipolar 1 disorder (HCC), Fibromyalgia, GERD (gastroesophageal reflux disease), Hypotension, Major depressive disorder, recurrent episode, mild ([address] Obesity, Pneumonia, POTS (postural orthostatic tachycardia syndrome), Pott disease, Pulmonary emboli ([address] Seizures ([address] Tailbone injury, Thyroid disease, TMJ (dislocation of temporomandibular joint), Trigeminal neuralgia, and Wears contact lenses. SURGICAL HISTORY      has a past surgical history that includes [place] tooth extraction (2010); Cardiac catheterization (3-2016); Cardiac catheterization (04/21/2016); Colonoscopy (2016); and Insertable Cardiac Monitor. CURRENT MEDICATIONS       Discharge Medication List as of 4/9/2019  6:44 PM      CONTINUE these medications which have NOT CHANGED    Details   omeprazole (PRILOSEC) 40 MG delayed release capsule TAKE 1 CAPSULE BY MOUTH TWO TIMES A DAY , Disp-60 capsule, R-10Normal      acetaminophen (TYLENOL) 325 MG tablet Take 2 tablets by mouth every 4 hours as needed for Pain or Fever, Disp-120 tablet, R-3OTC      QUEtiapine (SEROQUEL) 200 MG tablet Take 300 mg by mouth nightly Historical Med      ALPRAZolam (XANAX XR) 2 MG extended release tablet Take 2 mg by mouth every morning. Historical Med      busPIRone (BUSPAR) 30 MG tablet Take by mouth every morning 30 mg AM, 45 mg PM (1.5 tab)Historical Med      albuterol sulfate HFA (PROAIR HFA) 108 (90 Base) MCG/ACT inhaler Inhale 2 puffs into the lungs every 6 hours as needed for Wheezing, Disp-1 Inhaler, R-3Print      albuterol (PROVENTIL) (2.5 MG/3ML) 0.083% nebulizer solution Take 3 mLs by nebulization every 6 hours as needed for Wheezing, Disp-120 each, R-3Normal      cetirizine (ZYRTEC) 10 MG tablet Take 1 tablet by mouth daily, Disp-90 tablet, R-3Normal      OXcarbazepine (TRILEPTAL) 300 MG tablet Take 1,200 mg by mouth 2 times daily Historical Med      !! sertraline (ZOLOFT) 25 MG tablet Take 25 mg by mouth daily Take with 50mg to equal 75mgHistorical Med      !! sertraline (ZOLOFT) 50 MG tablet Take 50 mg by mouth daily Take with 25mg to equal 75mgHistorical Med      DULoxetine (CYMBALTA) 60 MG capsule Take 60 mg by mouth 2 times daily Historical Med      temazepam (RESTORIL) 30 MG capsule Take 30 mg by mouth nightly. Historical Med       !! - Potential duplicate medications found. Please discuss with provider. ALLERGIES     is allergic to dilaudid [hydromorphone hcl]; flexeril [cyclobenzaprine]; keflex [cephalexin]; tessalon [benzonatate]; augmentin [amoxicillin-pot clavulanate]; lorabid [loracarbef]; and minocycline. FAMILY HISTORY      indicated that her mother is alive. She indicated that her father is alive. She indicated that her brother is alive. She indicated that her maternal grandmother is . She indicated that her maternal grandfather is . She indicated that her paternal grandmother is . She indicated that her paternal grandfather is . She indicated that her maternal aunt is alive. She indicated that the status of her paternal aunt is unknown. She indicated that the status of her paternal uncle is unknown.   family history includes Anxiety Disorder in her father and mother; Bipolar Disorder in her father; Cancer in her maternal grandmother, paternal grandfather, and paternal uncle; Depression in her maternal grandfather, paternal aunt, and paternal uncle; Diabetes in her mother; High Blood Pressure in her father; Lupus in her maternal aunt; Mental Illness in her father; Parkinsonism in her father. SOCIAL HISTORY      reports that she has been smoking cigarettes. She has never used smokeless tobacco. She reports that she drinks about 0.6 oz of alcohol per week. She reports that she has current or past drug history. Drug: Marijuana.     PHYSICAL EXAM     INITIAL VITALS:  height is 5' 10\" (1.778 m) and weight is 250 lb (113.4 kg). Her oral temperature is 99.5 °F (37.5 °C). Her blood pressure is 130/84 and her pulse is 112. Her respiration is 20 and oxygen saturation is 99%. Physical Exam   Constitutional: She is oriented to person, place, and time. She appears well-developed and well-nourished. Non-toxic appearance. HENT:   Head: Normocephalic and atraumatic. Right Ear: Tympanic membrane and external ear normal.   Left Ear: Tympanic membrane and external ear normal.   Nose: Nose normal.   Mouth/Throat: Oropharynx is clear and moist and mucous membranes are normal. No oropharyngeal exudate, posterior oropharyngeal edema or posterior oropharyngeal erythema. Eyes: Conjunctivae and EOM are normal.   Neck: Normal range of motion. Neck supple. No JVD present. Cardiovascular: Regular rhythm, normal heart sounds, intact distal pulses and normal pulses. Tachycardia present. Exam reveals no gallop and no friction rub. No murmur heard. Pulmonary/Chest: Effort normal and breath sounds normal. No respiratory distress. She has no decreased breath sounds. She has no wheezes. She has no rhonchi. She has no rales. A nonproductive cough is noted. Abdominal: Soft. Bowel sounds are normal. She exhibits no distension. There is no tenderness. There is no rebound, no guarding and no CVA tenderness. Musculoskeletal: Normal range of motion. She exhibits no edema. Neurological: She is alert and oriented to person, place, and time. She exhibits normal muscle tone. Coordination normal.   Skin: Skin is warm and dry. No rash noted. She is not diaphoretic. Nursing note and vitals reviewed. DIFFERENTIALDIAGNOSIS:   Include and are not limited to: URI, bronchitis, pneumonia, influeza    DIAGNOSTICRESULTS     EKG: All EKG's are interpreted by the Emergency Department Physician who either signs or Co-signs this chart in the absence of acardiologist.  EKG interpreted by Neha Thompson, DO:    Vent.  Rate: 103 bpm  SD interval: 132 ms  QRS duration: 82 ms  QTc: 427 ms  P-R-T axes: 59, 57, 52  No STEMI. EKG gives impression of sinus tachycardia    Compared to old EKG on 14-May-2018    RADIOLOGY: non-plain film images(s) such as CT, Ultrasound and MRI are read by the radiologist.    XR CHEST STANDARD (2 VW)   Final Result      No acute cardiopulmonary disease. **This report has been created using voice recognition software. It may contain minor errors which are inherent in voice recognition technology. **      Final report electronically signed by Dr. Dolly Allen on 4/9/2019 5:41 PM          LABS:   Labs Reviewed   RAPID INFLUENZA A/B ANTIGENS       EMERGENCY DEPARTMENT COURSE:   Vitals:    Vitals:    04/09/19 1706 04/09/19 1805   BP: (!) 135/102 130/84   Pulse: 116 112   Resp: 20 20   Temp: 99.5 °F (37.5 °C)    TempSrc: Oral    SpO2: 98% 99%   Weight: 250 lb (113.4 kg)    Height: 5' 10\" (1.778 m)        6:25 PM: The patient was seen and evaluated. MDM:  The patient was seen and evaluated within the ED today for the evaluation of a nonproductive cough. She was afebrile in the ED. The patient presented in no acute distress. Physical exam revealed no significant abnormalities or concerns. She had a mild nonproductive cough during my initial evaluation. Flu is negative. Standard chest XR is normal. EKG is normal. I observed the patient's condition to remain stable during the duration of her stay. She was amenable to my decision for discharge and was sent home in stable condition with prednisone and Zithromax. I discussed return precautions and she verbalized understanding. I recommended that she f/u with her pcp in 3-5 days for further evaluation and work up if their symptoms do not improve. I recommended that the patient try dextromethophane for her cough. All questions and concerns were addressed. CRITICAL CARE:   None     CONSULTS:  None    PROCEDURES:  None     FINAL IMPRESSION      1.  Bronchitis          DISPOSITION/PLAN

## 2019-04-10 ENCOUNTER — TELEPHONE (OUTPATIENT)
Dept: FAMILY MEDICINE CLINIC | Age: 24
End: 2019-04-10

## 2019-04-11 ENCOUNTER — TELEPHONE (OUTPATIENT)
Dept: FAMILY MEDICINE CLINIC | Age: 24
End: 2019-04-11

## 2019-04-11 ENCOUNTER — OFFICE VISIT (OUTPATIENT)
Dept: FAMILY MEDICINE CLINIC | Age: 24
End: 2019-04-11
Payer: COMMERCIAL

## 2019-04-11 VITALS
TEMPERATURE: 98.1 F | HEART RATE: 120 BPM | WEIGHT: 257.3 LBS | OXYGEN SATURATION: 98 % | SYSTOLIC BLOOD PRESSURE: 106 MMHG | BODY MASS INDEX: 36.92 KG/M2 | DIASTOLIC BLOOD PRESSURE: 74 MMHG | RESPIRATION RATE: 18 BRPM

## 2019-04-11 DIAGNOSIS — J40 BRONCHITIS: Primary | ICD-10-CM

## 2019-04-11 DIAGNOSIS — J98.01 COUGH DUE TO BRONCHOSPASM: ICD-10-CM

## 2019-04-11 PROCEDURE — G8417 CALC BMI ABV UP PARAM F/U: HCPCS | Performed by: NURSE PRACTITIONER

## 2019-04-11 PROCEDURE — 96372 THER/PROPH/DIAG INJ SC/IM: CPT | Performed by: NURSE PRACTITIONER

## 2019-04-11 PROCEDURE — 99213 OFFICE O/P EST LOW 20 MIN: CPT | Performed by: NURSE PRACTITIONER

## 2019-04-11 PROCEDURE — G8427 DOCREV CUR MEDS BY ELIG CLIN: HCPCS | Performed by: NURSE PRACTITIONER

## 2019-04-11 PROCEDURE — 4004F PT TOBACCO SCREEN RCVD TLK: CPT | Performed by: NURSE PRACTITIONER

## 2019-04-11 RX ORDER — HYDROCODONE POLISTIREX AND CHLORPHENIRAMINE POLISTIREX 10; 8 MG/5ML; MG/5ML
5 SUSPENSION, EXTENDED RELEASE ORAL EVERY 12 HOURS PRN
Qty: 115 ML | Refills: 0 | Status: SHIPPED | OUTPATIENT
Start: 2019-04-11 | End: 2019-04-18

## 2019-04-11 RX ORDER — HYDROCODONE POLISTIREX AND CHLORPHENIRAMINE POLISTIREX 10; 8 MG/5ML; MG/5ML
5 SUSPENSION, EXTENDED RELEASE ORAL EVERY 12 HOURS PRN
Qty: 70 ML | Refills: 0 | Status: SHIPPED | OUTPATIENT
Start: 2019-04-11 | End: 2019-04-11 | Stop reason: SDUPTHER

## 2019-04-11 RX ORDER — ARIPIPRAZOLE 15 MG/1
15 TABLET ORAL NIGHTLY
Status: ON HOLD | COMMUNITY
End: 2020-02-03 | Stop reason: HOSPADM

## 2019-04-11 RX ORDER — METHYLPREDNISOLONE ACETATE 40 MG/ML
40 INJECTION, SUSPENSION INTRA-ARTICULAR; INTRALESIONAL; INTRAMUSCULAR; SOFT TISSUE ONCE
Status: COMPLETED | OUTPATIENT
Start: 2019-04-11 | End: 2019-04-11

## 2019-04-11 RX ORDER — METHYLPREDNISOLONE ACETATE 80 MG/ML
80 INJECTION, SUSPENSION INTRA-ARTICULAR; INTRALESIONAL; INTRAMUSCULAR; SOFT TISSUE ONCE
Status: COMPLETED | OUTPATIENT
Start: 2019-04-11 | End: 2019-04-11

## 2019-04-11 RX ORDER — ALBUTEROL SULFATE 2.5 MG/3ML
2.5 SOLUTION RESPIRATORY (INHALATION) EVERY 6 HOURS PRN
Qty: 120 EACH | Refills: 3 | Status: SHIPPED | OUTPATIENT
Start: 2019-04-11 | End: 2021-07-12 | Stop reason: SDUPTHER

## 2019-04-11 RX ADMIN — METHYLPREDNISOLONE ACETATE 80 MG: 80 INJECTION, SUSPENSION INTRA-ARTICULAR; INTRALESIONAL; INTRAMUSCULAR; SOFT TISSUE at 14:04

## 2019-04-11 RX ADMIN — METHYLPREDNISOLONE ACETATE 40 MG: 40 INJECTION, SUSPENSION INTRA-ARTICULAR; INTRALESIONAL; INTRAMUSCULAR; SOFT TISSUE at 14:03

## 2019-04-11 ASSESSMENT — ENCOUNTER SYMPTOMS
ABDOMINAL PAIN: 0
COUGH: 1
CHEST TIGHTNESS: 1
RHINORRHEA: 1
SHORTNESS OF BREATH: 0
NAUSEA: 0

## 2019-04-11 NOTE — PROGRESS NOTES
Visit Information    Have you changed or started any medications since your last visit including any over-the-counter medicines, vitamins, or herbal medicines? yes - see updated med list   Are you having any side effects from any of your medications? -  no  Have you stopped taking any of your medications? Is so, why? -  no    Have you seen any other physician or provider since your last visit? Yes - Records Obtained  Have you had any other diagnostic tests since your last visit? Yes - Records Obtained  Have you been seen in the emergency room and/or had an admission to a hospital since we last saw you? Yes - Records Obtained  Have you had your routine dental cleaning in the past 6 months? n/a    Have you activated your [employer] account? If not, what are your barriers?  Yes     Patient Care Team:  LEVI Graham - CNP as PCP - General (Certified Nurse Practitioner)  Scarlett Connor DO as Consulting Physician (Cardiology)  Coral Hamman, MD as Consulting Physician (Hospitalist)  Suzette Riddle MD as Obstetrician (Obstetrics & Gynecology)    Medical History Review  Past Medical, Family, and Social History reviewed and does not contribute to the patient presenting condition    Health Maintenance   Topic Date Due    Pneumococcal 0-64 years Vaccine (1 of 1 - PPSV23) 10/06/2001    HPV vaccine (1 - Female 3-dose series) 10/06/2010    A1C test (Diabetic or Prediabetic)  01/02/2016    Cervical cancer screen  10/06/2016    Chlamydia screen  03/31/2017    Flu vaccine (Season Ended) 09/01/2019    DTaP/Tdap/Td vaccine (7 - Td) 10/24/2024    Colon cancer screen colonoscopy  08/14/2025    Varicella Vaccine  Completed    HIV screen  Completed

## 2019-04-11 NOTE — TELEPHONE ENCOUNTER
Nupur with Carie. T[address] left message on  asking for Tussionex rx for 115ml said it comes in unbreakable bottle of that amt. Please send new rx.

## 2019-04-11 NOTE — PROGRESS NOTES
Subjective:      Patient ID: Jonathan Matta is a 21 y.o. female. HPI Acute for cough    Chief Complaint   Patient presents with    ED Follow-up     in [facility] ED on 4/09/19 - Bronchitis     Was seen in [facility] for cough. Cough started a month ago and has just continued. Was put in the Zpack and prednisone to help with cough. Has continued prednisone but didn't start zpack. No relief with prednisone. Still is present with high pitch cough, SOB during coughing spelling. Cough is dry. Has took mucinex DM to no relief. Vitals:    04/11/19 1327   BP: 106/74   Pulse: 120   Resp: 18   Temp: 98.1 °F (36.7 °C)   SpO2: 98%       Review of Systems   Constitutional: Negative for chills and fever. HENT: Positive for congestion, postnasal drip and rhinorrhea. Respiratory: Positive for cough and chest tightness. Negative for shortness of breath. Cardiovascular: Negative for chest pain. Gastrointestinal: Negative for abdominal pain and nausea. Skin: Negative for rash. Neurological: Negative for dizziness, light-headedness and headaches. Psychiatric/Behavioral: Negative. Objective:   Physical Exam   Constitutional: Vital signs are normal. No distress. HENT:   Nose: Mucosal edema and rhinorrhea present. Eyes: Pupils are equal, round, and reactive to light. EOM are normal.   Neck: Normal range of motion. Neck supple. Cardiovascular: Normal rate and regular rhythm. No murmur heard. Pulmonary/Chest: Effort normal. She has decreased breath sounds. She has no wheezes. Abdominal: Soft. Bowel sounds are normal. She exhibits no distension. There is no tenderness. Musculoskeletal: Normal range of motion. She exhibits no tenderness. Skin: Skin is warm and dry. No rash noted. Assessment:       Diagnosis Orders   1.  Bronchitis  albuterol (PROVENTIL) (2.5 MG/3ML) 0.083% nebulizer solution    methylPREDNISolone acetate (DEPO-MEDROL) injection 80 mg    methylPREDNISolone acetate (DEPO-MEDROL) injection 40 mg   2.  Cough due to bronchospasm  DISCONTINUED: hydrocodone-chlorpheniramine (TUSSIONEX PENNKINETIC ER) 10-8 MG/5ML SUER           Plan:      DEPO 120 IM  NEB tx PRN  Tussionex Cough Syrup  Fluids and rest  RTO if symptoms worsen or stay the same          Camella Paget, APRN - CNP

## 2019-04-11 NOTE — PROGRESS NOTES
Patient was given Depo Medrol 120mg 2ml IM in left dorsogluteal per opal Little CNP. Patient tolerated well and without incident. Administrations This Visit     methylPREDNISolone acetate (DEPO-MEDROL) injection 40 mg     Admin Date  04/11/2019  14:03 Action  Given Dose  40 mg Route  Intramuscular Site  Dorsogluteal Left Administered By  Isis Zamora LPN    Ordering Provider:  LEVI Peralta CNP    NDC:  5550-4609-58    Lot#:  [id number]    :  [employer]. Patient Supplied?:  No    Comments:  Exp: 9/2019          methylPREDNISolone acetate (DEPO-MEDROL) injection 80 mg     Admin Date  04/11/2019  14:04 Action  Given Dose  80 mg Route  Intramuscular Site  Dorsogluteal Left Administered By  sIis Zamora LPN    Ordering Provider:  LEVI Peralta CNP    NDC:  4486-5012-42    Lot#:  [id number]    :  [employer].     Patient Supplied?:  No    Comments:  Exp: 6/2020

## 2019-04-29 ENCOUNTER — PROCEDURE VISIT (OUTPATIENT)
Dept: CARDIOLOGY CLINIC | Age: 24
End: 2019-04-29
Payer: COMMERCIAL

## 2019-04-29 DIAGNOSIS — R55 SYNCOPE AND COLLAPSE: Primary | ICD-10-CM

## 2019-04-29 PROCEDURE — 93299 PR REM INTERROG ICPMS/SCRMS <30 D TECH REVIEW: CPT | Performed by: INTERNAL MEDICINE

## 2019-04-29 PROCEDURE — 93298 REM INTERROG DEV EVAL SCRMS: CPT | Performed by: INTERNAL MEDICINE

## 2019-06-03 ENCOUNTER — TELEPHONE (OUTPATIENT)
Dept: CARDIOLOGY CLINIC | Age: 24
End: 2019-06-03

## 2019-06-26 ENCOUNTER — TELEPHONE (OUTPATIENT)
Dept: CARDIOLOGY CLINIC | Age: 24
End: 2019-06-26

## 2019-06-26 DIAGNOSIS — Z45.09 ENCOUNTER FOR LOOP RECORDER AT END OF BATTERY LIFE: Primary | ICD-10-CM

## 2019-06-27 NOTE — TELEPHONE ENCOUNTER
Pt notified loop recorder is at [employer] and will not record anymore. Pt is asking to have the loop removed.   Is it ok to schedule for removal or do you want to see her in office first?

## 2019-07-19 ENCOUNTER — TELEPHONE (OUTPATIENT)
Dept: CARDIOLOGY CLINIC | Age: 24
End: 2019-07-19

## 2019-08-07 ENCOUNTER — HOSPITAL ENCOUNTER (OUTPATIENT)
Dept: INPATIENT UNIT | Age: 24
Discharge: HOME | End: 2019-08-07
Attending: INTERNAL MEDICINE | Admitting: INTERNAL MEDICINE
Payer: COMMERCIAL

## 2019-08-07 VITALS
RESPIRATION RATE: 19 BRPM | OXYGEN SATURATION: 97 % | HEART RATE: 88 BPM | BODY MASS INDEX: 35.79 KG/M2 | DIASTOLIC BLOOD PRESSURE: 49 MMHG | SYSTOLIC BLOOD PRESSURE: 103 MMHG | WEIGHT: 250 LBS | HEIGHT: 70 IN | TEMPERATURE: 98.2 F

## 2019-08-07 PROCEDURE — 33286 RMVL SUBQ CAR RHYTHM MNTR: CPT | Performed by: INTERNAL MEDICINE

## 2019-08-07 PROCEDURE — 2500000003 HC RX 250 WO HCPCS

## 2019-08-07 PROCEDURE — 33286 RMVL SUBQ CAR RHYTHM MNTR: CPT

## 2019-08-07 PROCEDURE — 2709999900 HC NON-CHARGEABLE SUPPLY

## 2019-08-07 RX ORDER — DEXTROAMPHETAMINE SACCHARATE, AMPHETAMINE ASPARTATE, DEXTROAMPHETAMINE SULFATE AND AMPHETAMINE SULFATE 2.5; 2.5; 2.5; 2.5 MG/1; MG/1; MG/1; MG/1
15 TABLET ORAL DAILY
Status: ON HOLD | COMMUNITY
End: 2020-02-03 | Stop reason: HOSPADM

## 2019-08-07 RX ORDER — QUETIAPINE FUMARATE 50 MG/1
50 TABLET, EXTENDED RELEASE ORAL DAILY
Status: ON HOLD | COMMUNITY
End: 2020-02-02 | Stop reason: SDUPTHER

## 2019-08-07 RX ORDER — SODIUM CHLORIDE 0.9 % (FLUSH) 0.9 %
10 SYRINGE (ML) INJECTION PRN
Status: CANCELLED | OUTPATIENT
Start: 2019-08-07

## 2019-08-07 RX ORDER — SODIUM CHLORIDE 0.9 % (FLUSH) 0.9 %
10 SYRINGE (ML) INJECTION EVERY 12 HOURS SCHEDULED
Status: CANCELLED | OUTPATIENT
Start: 2019-08-07

## 2019-08-07 RX ORDER — MIRTAZAPINE 15 MG/1
7.5 TABLET, FILM COATED ORAL NIGHTLY
Status: ON HOLD | COMMUNITY
End: 2020-02-03 | Stop reason: HOSPADM

## 2019-08-07 NOTE — H&P
chest to hurt and also of orthostatic palpitations and dizziness that has not responded to the midodrine. The patient was also found when she went to the ED for this problem on 5/5/2017 to have several small pulmonary emboli. Her BCPs were stopped and she is on Xarelto.     7/5/2017:  The patient continues to have frequent episodes of chest pains, dizziness, and rapid heart rates.  She has been to the ED several times for chest pain.  Her ECG on 7/3/2017 showed sinus tachycardia with a rate of 112 bpm.  The patient states the clonidine helped early on but it has not been working lately.       10/31/2017:  The patient has weaned off the Seroquel and Artane. She continues to have constant palpitations and does not feel any better. The patient is still taking the Clonidine 0.1 mg 1 po BID. She has been on Metoprolol in the past but could not tolerate this medication secondary to hypotension.     08/07/2019:  The patient presents for removal of an ILR.     Past Medical History:  Past Medical History             Diagnosis Date    ADD (attention deficit disorder)      Anxiety 4/8/2015    Anxiety attack      Asthma       exercise induced    Atypical face pain      Back pain      Bipolar 1 disorder (HCC)      Fibromyalgia      Major depressive disorder, recurrent episode, mild ([address] 7/31/2012    Obesity 10/24/2014    POTS (postural orthostatic tachycardia syndrome)      Pott disease      Pulmonary emboli (HCC)      Seizures ([address] 07/31/2016    Tailbone injury 11/28/15     patient broke tailbone    Thyroid disease       borderline low     TMJ (dislocation of temporomandibular joint)      Trigeminal neuralgia           Past Surgical History:  Past Surgical History             Procedure Laterality Date    CARDIAC CATHETERIZATION   3-2016     EP Study    CARDIAC CATHETERIZATION   04/21/2016     [facility]    COSMETIC SURGERY         wisdom teeth surgery 2010    WISDOM TOOTH EXTRACTION             Past Family History:   Family History              Problem Relation Age of Onset    Anxiety Disorder Mother      Diabetes Mother      Anxiety Disorder Father      Bipolar Disorder Father      High Blood Pressure Father      Mental Illness Father      Parkinsonism Father         Early-Onset    Depression Paternal Aunt      Cancer Paternal Uncle      Depression Paternal Uncle      Cancer Maternal Grandmother      Depression Maternal Grandfather      Cancer Paternal Grandfather      Lupus Maternal Aunt           Past Social History:     Social History   Social History            Social History    Marital status: Single       Spouse name: N/A    Number of children: N/A    Years of education: N/A            Social History Main Topics    Smoking status: Never Smoker    Smokeless tobacco: Never Used    Alcohol use 0.6 oz/week       1 Cans of beer per week    Drug use:         Types: Marijuana    Sexual activity: Yes       Partners: Male           Other Topics Concern    None          Social History Narrative    None            Medications:   Scheduled Meds:  Continuous Infusions:  CBC: No results for input(s): WBC, HGB, PLT in the last 72 hours. BMP:  No results for input(s): NA, K, CL, CO2, BUN, CREATININE, GLUCOSE in the last 72 hours. Hepatic: No results for input(s): AST, ALT, ALB, BILITOT, ALKPHOS in the last 72 hours. Troponin: No results for input(s): TROPONINI in the last 72 hours. BNP: No results for input(s): BNP in the last 72 hours. Lipids: No results for input(s): CHOL, HDL in the last 72 hours.     Invalid input(s): LDLCALCU  INR: No results for input(s): INR in the last 72 hours.     Objective:   REVIEW OF SYSTEMS:    Review of Systems   Constitution: Negative for fever, weight gain and weight loss. HENT: Negative for hearing loss and sore throat. Eyes: Negative for blurred vision, double vision, vision loss in left eye and vision loss in right eye.    Cardiovascular: Positive for chest Normal appearance and bowel sounds are normal. She exhibits no mass. There is no hepatosplenomegaly. There is no tenderness. No hernia. Musculoskeletal: Normal range of motion. Right shoulder: Normal. She exhibits normal range of motion and no swelling. Left shoulder: Normal. She exhibits normal range of motion and no swelling. Right hip: Normal. She exhibits normal range of motion and no swelling. Left hip: Normal. She exhibits normal range of motion and no swelling. Right knee: Normal. She exhibits normal range of motion and no swelling. Left knee: Normal. She exhibits normal range of motion and no swelling. Right upper arm: Normal.        Left upper arm: Normal.        Right upper leg: Normal.        Left upper leg: Normal.        Right lower leg: Normal.        Left lower leg: Normal.   Lymphadenopathy:        Head (right side): No submandibular adenopathy present. Head (left side): No submandibular adenopathy present. Neurological: She is alert and oriented to person, place, and time. She has normal strength. She displays normal reflexes. No cranial nerve deficit or sensory deficit. Coordination and gait normal.   Skin: Skin is warm and dry. No lesion and no rash noted. She is not diaphoretic. No cyanosis. Nails show no clubbing. Psychiatric: Her speech is normal and behavior is normal. Judgment and thought content normal. Her mood appears not anxious. Her affect is not angry. Cognition and memory are normal. She does not exhibit a depressed mood. Nursing note and vitals reviewed.           DIAGNOSTIC STUDIES & LABORATORY DATA:     I have personally reviewed and interpreted the results of the following diagnostic testing  CARDIAC HISTORY:     ECHO: 11/09/2015  Left ventricle:  Size was normal.  Systolic function was at the lower limits of normal. Ejection fraction was estimated in the range of 50 % to 50 %.   There were no regional wall motion abnormalities.     Mitral valve: There was mild regurgitation.      Tricuspid valve: There was mild regurgitation.      Summary: This is a technically limited study that may impair interpretations  ECG:  10/31/2017 Sinus tachycardia,  bpm.  7/5/2017   Sinus tachycardia,  bpm.  5/17/2017 NSR  STRESS TEST: 04/05/2013     EPS: 3/29/2016 @ [facility]  1. Baseline sinus rhythm with normal intracardiac intervals  2. Normal sinus node testing  3. Normal AV node conduction with dual AV node physiology  4. No evidence of accessory bypass tract  5. No inducible supraventricular or ventricular arrhythmia at baseline  6. Inducible atrial tachycardia at rate of 250bpm associated with hypotension precluding further investigation until we have three dimensional mapping to also assist with mapping and ablation of the arrhythmia       EPS: 4/21/2016 @ [facility]  Negative EPS for inducible SVT     INSERTION of ILR: 4/21/2016 @ [facility]                   Assessment / Acute Cardiac Problems:   1. The patient is still having symptomatic palpitations. Her resting heart rate did not slow down. She continues to suffer from a very rapid resting sinus tachycardia. I now believe the patient is most likely suffering from inappropriate sinus tachycardia. I discussed with the patient the diagnosis and the treatment options. Since she cannot tolerate Metoprolol, I recommended she try Corlanor temporarily until she can undergo a sinus node modification ablation procedure. I discussed the procedure in detail and the risks and benefits of the procedure. I specifically discussed the risks of sinus node exit block and the risk of needing a pacemaker.   The patient understood the information and wants to proceed forward with the procedure.     Patient Active Problem List:     Major depressive disorder, recurrent episode, mild (HCC)     Low self esteem     KARLIE (generalized anxiety disorder)     Obesity     Obstructive sleep apnea     Snores Sleep disturbances     Daytime somnolence     Sleep talking     Night terrors, adult     Bruxism (teeth grinding)     Restless sleeper     Anxiety     Abnormal thyroid function test     Trigeminal neuralgia     Tachyarrhythmia     POTS (postural orthostatic tachycardia syndrome)     Atypical chest pain     Syncope and collapse     Neck discomfort     Thyroid cyst     Pulmonary embolism (HCC)     Chest pain on breathing     Breast pain, left     Positive CAESAR (antinuclear antibody)        Plan of Treatment:   1.  Removal of ILR

## 2019-08-08 ENCOUNTER — TELEPHONE (OUTPATIENT)
Dept: FAMILY MEDICINE CLINIC | Age: 24
End: 2019-08-08

## 2019-08-09 NOTE — TELEPHONE ENCOUNTER
[facility] Trans[facility] Initial Follow Up Call    Outreach made within 2 business days of discharge: Yes    Patient: Adi Moralez Patient : 1995   MRN: [id number]  Reason for Admission: There are no discharge diagnoses documented for the most recent discharge. Discharge Date: 19       Spoke with: patient     Discharge department/facility: home    TCM Interactive Patient Contact:  Was patient able to fill all prescriptions: N/A  Was patient instructed to bring all medications to the follow-up visit: Yes  Is patient taking all medications as directed in the discharge summary?  Yes  Does patient understand their discharge instructions: Yes  Does patient have questions or concerns that need addressed prior to 7-14 day follow up office visit: no    Scheduled appointment with PCP within 7-14 days    Follow Up  Future Appointments   Date Time Provider Sharonda Nice   2019 11:30 AM SCHEDULE, MARVA PACELORENA NURSE THIEN PACER BRITTANY May [address] [place] [address] ([address] [place] [address]

## 2019-08-13 ENCOUNTER — NURSE ONLY (OUTPATIENT)
Dept: CARDIOLOGY CLINIC | Age: 24
End: 2019-08-13

## 2019-08-28 ENCOUNTER — NURSE ONLY (OUTPATIENT)
Dept: CARDIOLOGY CLINIC | Age: 24
End: 2019-08-28

## 2019-08-28 DIAGNOSIS — R00.2 PALPITATIONS: Primary | ICD-10-CM

## 2019-08-28 RX ORDER — CEPHALEXIN 500 MG/1
500 CAPSULE ORAL 3 TIMES DAILY
Qty: 21 CAPSULE | Refills: 0 | Status: CANCELLED | OUTPATIENT
Start: 2019-08-28 | End: 2019-09-04

## 2019-08-28 RX ORDER — CLINDAMYCIN HYDROCHLORIDE 300 MG/1
600 CAPSULE ORAL 3 TIMES DAILY
Qty: 42 CAPSULE | Refills: 0 | Status: SHIPPED | OUTPATIENT
Start: 2019-08-28 | End: 2019-09-04
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
## 2019-09-05 ENCOUNTER — OFFICE VISIT (OUTPATIENT)
Dept: FAMILY MEDICINE CLINIC | Age: 24
End: 2019-09-05
Payer: COMMERCIAL

## 2019-09-05 VITALS
BODY MASS INDEX: 38.19 KG/M2 | SYSTOLIC BLOOD PRESSURE: 124 MMHG | DIASTOLIC BLOOD PRESSURE: 80 MMHG | RESPIRATION RATE: 18 BRPM | HEART RATE: 112 BPM | WEIGHT: 262.4 LBS

## 2019-09-05 DIAGNOSIS — Z30.011 ORAL CONTRACEPTION INITIAL PRESCRIPTION: Primary | ICD-10-CM

## 2019-09-05 DIAGNOSIS — Z86.711 HISTORY OF PULMONARY EMBOLISM: ICD-10-CM

## 2019-09-05 PROCEDURE — G8417 CALC BMI ABV UP PARAM F/U: HCPCS | Performed by: NURSE PRACTITIONER

## 2019-09-05 PROCEDURE — 99213 OFFICE O/P EST LOW 20 MIN: CPT | Performed by: NURSE PRACTITIONER

## 2019-09-05 PROCEDURE — 4004F PT TOBACCO SCREEN RCVD TLK: CPT | Performed by: NURSE PRACTITIONER

## 2019-09-05 PROCEDURE — G8427 DOCREV CUR MEDS BY ELIG CLIN: HCPCS | Performed by: NURSE PRACTITIONER

## 2019-09-05 RX ORDER — ACETAMINOPHEN AND CODEINE PHOSPHATE 120; 12 MG/5ML; MG/5ML
1 SOLUTION ORAL DAILY
Qty: 28 TABLET | Refills: 2 | Status: ON HOLD | OUTPATIENT
Start: 2019-09-05 | End: 2020-02-03 | Stop reason: HOSPADM

## 2019-09-05 ASSESSMENT — ENCOUNTER SYMPTOMS: RESPIRATORY NEGATIVE: 1

## 2019-09-05 NOTE — PATIENT INSTRUCTIONS
You may receive a survey regarding the care you received during your visit. Your input is valuable to us. We encourage you to complete and return your survey. We hope you will choose us in the future for your healthcare needs. Tobacco Cessation Programs     Telephonic behavior modification  Cesar Krishnan ([phone])   Counseling service for those who are ready to quit using tobacco.     Available for uninsured [place] residents, [place] recipients, pregnant women, or patients whose health plans or employers are members of the [address] [place] [address] behavior modification   http://[place]. [employer]uit[employer]. org   Online support program to help patients through each step of the quitting process. Available 24 hours a day 7 days a week. Provides up to date researched based tool, step-by-step guides, and motivational messages. Online behavior modification   www.lungusa.org/stop-smoking/how-to-quit   HelpLine: [facility] (590-4026)   Email questions to:  Kassidy@[employer] org    Website offers resources to help tobacco users figure out their reasons for quitting and then take the big step of quitting for good. Hypnosis   Location: [address] [place] [address], [place] [facility] [place], [place]   Contact: Gala Michael, PhD at [phone]   Hypnosis for tobacco cessation   Cost $225 for the initial session and $175 for each session afterwards. Most patients require 6-8 sessions. There is the option to submit through the patients insurance. Hypnosis and behavior modification   Location: [place] [address],  [facility] [place], [place]   Contact: Theresa Krishnamurthy, PhD at [phone]  [facility] and hypnosis for nicotine addition   Cost: For uninsured patients:  Please call above phone number  Cost for insured patients depends on patients insurance plan.     Behavior modification   Location: [facility], [address] [facility] [address] [place], [address]: [address] four one-on-one appointments between the patient and a respiratory therapist.  The four appointments span over three weeks. The respiratory therapist schedules one of the appointments to occur 48 hours after the patients quit date.  Cost $100 total for the four sessions.   Tobacco cessation products are not included in the cost and are not provided by [facility].     Gaye Witt

## 2019-09-05 NOTE — PROGRESS NOTES
contraceptive due to PE hx  Follow up with OB  RTO if symptoms worsen or stay the same          Concepción Farias, APRN - CNP

## 2019-12-12 ENCOUNTER — TELEPHONE (OUTPATIENT)
Dept: FAMILY MEDICINE CLINIC | Age: 24
End: 2019-12-12

## 2019-12-12 DIAGNOSIS — R06.83 SNORING: ICD-10-CM

## 2019-12-12 DIAGNOSIS — R40.0 DAYTIME SOMNOLENCE: ICD-10-CM

## 2019-12-12 DIAGNOSIS — E66.09 CLASS 2 OBESITY DUE TO EXCESS CALORIES WITHOUT SERIOUS COMORBIDITY WITH BODY MASS INDEX (BMI) OF 38.0 TO 38.9 IN ADULT: Primary | ICD-10-CM
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
## 2019-12-30 ENCOUNTER — TELEPHONE (OUTPATIENT)
Dept: FAMILY MEDICINE CLINIC | Age: 24
End: 2019-12-30

## 2019-12-30 ENCOUNTER — OFFICE VISIT (OUTPATIENT)
Dept: FAMILY MEDICINE CLINIC | Age: 24
End: 2019-12-30
Payer: COMMERCIAL

## 2019-12-30 VITALS
HEIGHT: 70 IN | BODY MASS INDEX: 40.9 KG/M2 | SYSTOLIC BLOOD PRESSURE: 128 MMHG | DIASTOLIC BLOOD PRESSURE: 72 MMHG | HEART RATE: 112 BPM | RESPIRATION RATE: 20 BRPM | TEMPERATURE: 98 F | WEIGHT: 285.7 LBS | OXYGEN SATURATION: 98 %

## 2019-12-30 DIAGNOSIS — J40 SINOBRONCHITIS: ICD-10-CM

## 2019-12-30 DIAGNOSIS — J32.9 SINOBRONCHITIS: ICD-10-CM

## 2019-12-30 DIAGNOSIS — R05.9 COUGH: ICD-10-CM

## 2019-12-30 DIAGNOSIS — J40 SINOBRONCHITIS: Primary | ICD-10-CM

## 2019-12-30 DIAGNOSIS — J32.9 SINOBRONCHITIS: Primary | ICD-10-CM

## 2019-12-30 PROCEDURE — 4004F PT TOBACCO SCREEN RCVD TLK: CPT | Performed by: NURSE PRACTITIONER

## 2019-12-30 PROCEDURE — G8417 CALC BMI ABV UP PARAM F/U: HCPCS | Performed by: NURSE PRACTITIONER

## 2019-12-30 PROCEDURE — G8484 FLU IMMUNIZE NO ADMIN: HCPCS | Performed by: NURSE PRACTITIONER

## 2019-12-30 PROCEDURE — G8427 DOCREV CUR MEDS BY ELIG CLIN: HCPCS | Performed by: NURSE PRACTITIONER

## 2019-12-30 PROCEDURE — 99213 OFFICE O/P EST LOW 20 MIN: CPT | Performed by: NURSE PRACTITIONER

## 2019-12-30 PROCEDURE — 96372 THER/PROPH/DIAG INJ SC/IM: CPT | Performed by: NURSE PRACTITIONER

## 2019-12-30 RX ORDER — PROMETHAZINE HYDROCHLORIDE AND CODEINE PHOSPHATE 6.25; 1 MG/5ML; MG/5ML
5-10 SYRUP ORAL EVERY 4 HOURS PRN
Qty: 120 ML | Refills: 0 | Status: SHIPPED | OUTPATIENT
Start: 2019-12-30 | End: 2020-01-06

## 2019-12-30 RX ORDER — METHYLPREDNISOLONE ACETATE 40 MG/ML
40 INJECTION, SUSPENSION INTRA-ARTICULAR; INTRALESIONAL; INTRAMUSCULAR; SOFT TISSUE ONCE
Status: COMPLETED | OUTPATIENT
Start: 2019-12-30 | End: 2019-12-30

## 2019-12-30 RX ORDER — METHYLPREDNISOLONE ACETATE 80 MG/ML
80 INJECTION, SUSPENSION INTRA-ARTICULAR; INTRALESIONAL; INTRAMUSCULAR; SOFT TISSUE ONCE
Status: COMPLETED | OUTPATIENT
Start: 2019-12-30 | End: 2019-12-30

## 2019-12-30 RX ORDER — PROMETHAZINE HYDROCHLORIDE AND CODEINE PHOSPHATE 6.25; 1 MG/5ML; MG/5ML
5-10 SYRUP ORAL EVERY 4 HOURS PRN
Qty: 120 ML | Refills: 0 | Status: SHIPPED | OUTPATIENT
Start: 2019-12-30 | End: 2019-12-30 | Stop reason: SDUPTHER

## 2019-12-30 RX ORDER — LEVOFLOXACIN 500 MG/1
500 TABLET, FILM COATED ORAL DAILY
Qty: 10 TABLET | Refills: 0 | Status: SHIPPED | OUTPATIENT
Start: 2019-12-30 | End: 2020-01-09

## 2019-12-30 RX ADMIN — METHYLPREDNISOLONE ACETATE 80 MG: 80 INJECTION, SUSPENSION INTRA-ARTICULAR; INTRALESIONAL; INTRAMUSCULAR; SOFT TISSUE at 11:24

## 2019-12-30 RX ADMIN — METHYLPREDNISOLONE ACETATE 40 MG: 40 INJECTION, SUSPENSION INTRA-ARTICULAR; INTRALESIONAL; INTRAMUSCULAR; SOFT TISSUE at 11:24

## 2019-12-30 ASSESSMENT — ENCOUNTER SYMPTOMS
WHEEZING: 1
SHORTNESS OF BREATH: 1
SINUS PRESSURE: 1
COUGH: 1
CHEST TIGHTNESS: 0
ABDOMINAL PAIN: 0
NAUSEA: 0
RHINORRHEA: 1

## 2020-01-15 ENCOUNTER — INITIAL CONSULT (OUTPATIENT)
Dept: PULMONOLOGY | Age: 25
End: 2020-01-15
Payer: COMMERCIAL

## 2020-01-15 VITALS
OXYGEN SATURATION: 97 % | HEIGHT: 70 IN | HEART RATE: 122 BPM | BODY MASS INDEX: 41.57 KG/M2 | SYSTOLIC BLOOD PRESSURE: 110 MMHG | DIASTOLIC BLOOD PRESSURE: 68 MMHG | WEIGHT: 290.4 LBS

## 2020-01-15 PROCEDURE — 4004F PT TOBACCO SCREEN RCVD TLK: CPT | Performed by: INTERNAL MEDICINE

## 2020-01-15 PROCEDURE — G8427 DOCREV CUR MEDS BY ELIG CLIN: HCPCS | Performed by: INTERNAL MEDICINE

## 2020-01-15 PROCEDURE — G8484 FLU IMMUNIZE NO ADMIN: HCPCS | Performed by: INTERNAL MEDICINE

## 2020-01-15 PROCEDURE — 99204 OFFICE O/P NEW MOD 45 MIN: CPT | Performed by: INTERNAL MEDICINE

## 2020-01-15 PROCEDURE — G8417 CALC BMI ABV UP PARAM F/U: HCPCS | Performed by: INTERNAL MEDICINE
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
## 2020-01-15 NOTE — PATIENT INSTRUCTIONS
Patient Education        Stopping Smoking: Care Instructions  Your Care Instructions  Cigarette smokers crave the nicotine in cigarettes. Giving it up is much harder than simply changing a habit. Your body has to stop craving the nicotine. It is hard to quit, but you can do it. There are many tools that people use to quit smoking. You may find that combining tools works best for you. There are several steps to quitting. First you get ready to quit. Then you get support to help you. After that, you learn new skills and behaviors to become a nonsmoker. For many people, a necessary step is getting and using medicine. Your doctor will help you set up the plan that best meets your needs. You may want to attend a smoking cessation program to help you quit smoking. When you choose a program, look for one that has proven success. Ask your doctor for ideas. You will greatly increase your chances of success if you take medicine as well as get counseling or join a cessation program.  Some of the changes you feel when you first quit tobacco are uncomfortable. Your body will miss the nicotine at first, and you may feel short-tempered and grumpy. You may have trouble sleeping or concentrating. Medicine can help you deal with these symptoms. You may struggle with changing your smoking habits and rituals. The last step is the tricky one: Be prepared for the smoking urge to continue for a time. This is a lot to deal with, but keep at it. You will feel better. Follow-up care is a key part of your treatment and safety. Be sure to make and go to all appointments, and call your doctor if you are having problems. It's also a good idea to know your test results and keep a list of the medicines you take. How can you care for yourself at home? · Ask your family, friends, and coworkers for support. You have a better chance of quitting if you have help and support.   · Join a support group, such as Nicotine Anonymous, for people who are trying to quit smoking. · Consider signing up for a smoking cessation program, such as the American Lung Association's Freedom from Smoking program.  · Get text messaging support. Go to the website at www.smokefree. gov to sign up for the [facility] program.  · Set a quit date. Pick your date carefully so that it is not right in the middle of a big deadline or stressful time. Once you quit, do not even take a puff. Get rid of all ashtrays and lighters after your last cigarette. Clean your house and your clothes so that they do not smell of smoke. · Learn how to be a nonsmoker. Think about ways you can avoid those things that make you reach for a cigarette. ? Avoid situations that put you at greatest risk for smoking. For some people, it is hard to have a drink with friends without smoking. For others, they might skip a coffee break with coworkers who smoke. ? Change your daily routine. Take a different route to work or eat a meal in a different place. · Cut down on stress. Calm yourself or release tension by doing an activity you enjoy, such as reading a book, taking a hot bath, or gardening. · Talk to your doctor or pharmacist about nicotine replacement therapy, which replaces the nicotine in your body. You still get nicotine but you do not use tobacco. Nicotine replacement products help you slowly reduce the amount of nicotine you need. These products come in several forms, many of them available over-the-counter:  ? Nicotine patches  ? Nicotine gum and lozenges  ? Nicotine inhaler  · Ask your doctor about bupropion (Wellbutrin) or varenicline (Chantix), which are prescription medicines. They do not contain nicotine. They help you by reducing withdrawal symptoms, such as stress and anxiety. · Some people find hypnosis, acupuncture, and massage helpful for ending the smoking habit. · Eat a healthy diet and get regular exercise.  Having healthy habits will help your body move past its craving for the sleep study to go over the sleep study results and further management options.  -She was educated to practice good sleep hygiene practices. She  was provided with a good sleep hygiene hand out.  -Chrissy Olmstead was advised to make earlier appointment with my clinic if she develops any worsening of sleep symptoms. She verbalizes understanding.  - Patient educated to quit smoking Marijuana as soon as possible. Patient verbalizes understanding of adverse consequences of Marijuana smoking.  -Patient educated about sleep walking disorder and advised to practice necessary safety precautions she need to practice. She verbalizes understanding.  -She was educated about sleep restriction therapy and advised to practice to improve her insomnia. -She was educated about stimulus control therapy and advise to practice to improve her insomnia. -Chrissy Olmstead was advised to not to drive any motor vehicles or operate heavy equipment until her sleep symptoms are under good control. Deana Dailey verbalizes understanding.  -She was advised to loose weight by controlling diet and doing exercise once cleared by her family physician. - Donnie Santoyo was educated about my impression and plan. She verbalizes understanding.

## 2020-01-16 ENCOUNTER — HOSPITAL ENCOUNTER (OUTPATIENT)
Dept: SLEEP CENTER | Age: 25
Discharge: HOME | End: 2020-01-18
Payer: COMMERCIAL

## 2020-01-16 PROCEDURE — 95810 POLYSOM 6/> YRS 4/> PARAM: CPT

## 2020-01-17 LAB — STATUS: NORMAL

## 2020-01-18 NOTE — PROGRESS NOTES
performed on 01/16/2020. The study was started at 08:52 p.m.  and was terminated at 05:36 a.m. The total recording time was 523.9  minutes and sleep period time was 507.1 minute and total sleep time was  480.5 minutes. Overall sleep efficiency was 91.7%. The sleep onset  latency was 16.8 minutes and wake after sleep onset was 26.6 minutes. REM sleep latency was 279 minutes. SLEEP STAGING AND DISTRIBUTION SUMMARY:  Revealed the patient spent 16.5  minutes in stage I consisting of 3.4%, 343 minutes in stage II  consisting of 71.4%, 33 minutes in stage III consisting of 6.9%, 88  minutes in REM sleep consisting of 18.3% of total sleep time. RESPIRATORY EVENT ANALYSIS:  Revealed the patient had a total of 1012  apneas, all of them were obstructive in nature. The patient also had a  total of 40 hypopneas, all of them were obstructive in nature. The  total number of apneas and hypopneas recorded during the study were  1052. The apnea-hypopnea index was 131.4. The patient's REM sleep  apnea-hypopnea index was 118. POSITIONAL ANALYSIS:  Revealed the patient spent 308.8 minutes in supine  position with supine apnea-hypopnea index was 136.4. The patient also  spent 20 minutes in left lateral position with left lateral position  apnea-hypopnea index was 126.1 and the patient spent 151.7 minutes in  right lateral position with right lateral position apnea-hypopnea index  was 125. PERIODIC LIMB MOVEMENT ANALYSIS:  Revealed the patient had a total of  seven periodic limb movements. Out of seven, one is associated with  arousals with PLM index of 0.9. PLM arousal index is 0.1. The patient  had a total of 18 spontaneous arousals with a spontaneous arousal index  of 2.2. OXYGEN SATURATION MONITORING:  Revealed the patient had a maximum oxygen  desaturation to 56% with a mean oxygen saturation of 90.1%. The patient  spent total of 135.1 minute below oxygen saturation less than 88%.     No abnormal body

## 2020-02-01 ENCOUNTER — ANESTHESIA EVENT (OUTPATIENT)
Dept: OPERATING ROOM | Age: 25
DRG: 493 | End: 2020-02-01
Payer: COMMERCIAL

## 2020-02-01 ENCOUNTER — APPOINTMENT (OUTPATIENT)
Dept: GENERAL RADIOLOGY | Age: 25
DRG: 493 | End: 2020-02-01
Payer: COMMERCIAL

## 2020-02-01 ENCOUNTER — HOSPITAL ENCOUNTER (INPATIENT)
Age: 25
LOS: 2 days | Discharge: HOME | DRG: 493 | End: 2020-02-03
Attending: PODIATRIST | Admitting: PODIATRIST
Payer: COMMERCIAL

## 2020-02-01 ENCOUNTER — ANESTHESIA (OUTPATIENT)
Dept: OPERATING ROOM | Age: 25
DRG: 493 | End: 2020-02-01
Payer: COMMERCIAL

## 2020-02-01 VITALS
SYSTOLIC BLOOD PRESSURE: 158 MMHG | DIASTOLIC BLOOD PRESSURE: 83 MMHG | OXYGEN SATURATION: 99 % | RESPIRATION RATE: 1 BRPM

## 2020-02-01 PROBLEM — Z87.81 STATUS POST OPEN REDUCTION WITH INTERNAL FIXATION (ORIF) OF FRACTURE OF ANKLE: Status: ACTIVE | Noted: 2020-02-01

## 2020-02-01 PROBLEM — S93.421A TEAR OF DELTOID LIGAMENT OF ANKLE, RIGHT, INITIAL ENCOUNTER: Status: ACTIVE | Noted: 2020-02-01

## 2020-02-01 PROBLEM — S82.891A CLOSED RIGHT ANKLE FRACTURE, INITIAL ENCOUNTER: Status: ACTIVE | Noted: 2020-02-01

## 2020-02-01 PROBLEM — Z98.890 STATUS POST OPEN REDUCTION WITH INTERNAL FIXATION (ORIF) OF FRACTURE OF ANKLE: Status: ACTIVE | Noted: 2020-02-01

## 2020-02-01 PROBLEM — S82.431K: Status: ACTIVE | Noted: 2020-02-01

## 2020-02-01 LAB
ALBUMIN SERPL-MCNC: 3.8 G/DL (ref 3.5–5.1)
ALP BLD-CCNC: 114 U/L (ref 38–126)
ALT SERPL-CCNC: 15 U/L (ref 11–66)
ANION GAP SERPL CALCULATED.3IONS-SCNC: 11 MEQ/L (ref 8–16)
AST SERPL-CCNC: 13 U/L (ref 5–40)
BASOPHILS # BLD: 0.3 %
BASOPHILS ABSOLUTE: 0 THOU/MM3 (ref 0–0.1)
BILIRUB SERPL-MCNC: < 0.2 MG/DL (ref 0.3–1.2)
BUN BLDV-MCNC: 11 MG/DL (ref 7–22)
CALCIUM SERPL-MCNC: 8.8 MG/DL (ref 8.5–10.5)
CHLORIDE BLD-SCNC: 100 MEQ/L (ref 98–111)
CO2: 25 MEQ/L (ref 23–33)
CREAT SERPL-MCNC: 0.5 MG/DL (ref 0.4–1.2)
EOSINOPHIL # BLD: 1 %
EOSINOPHILS ABSOLUTE: 0.1 THOU/MM3 (ref 0–0.4)
ERYTHROCYTE [DISTWIDTH] IN BLOOD BY AUTOMATED COUNT: 14.6 % (ref 11.5–14.5)
ERYTHROCYTE [DISTWIDTH] IN BLOOD BY AUTOMATED COUNT: 50.9 FL (ref 35–45)
GFR SERPL CREATININE-BSD FRML MDRD: > 90 ML/MIN/1.73M2
GLUCOSE BLD-MCNC: 136 MG/DL (ref 70–108)
HCG,BETA SUBUNIT,QUAL,SERUM: 83.2 MIU/ML (ref 0–5)
HCT VFR BLD CALC: 39 % (ref 37–47)
HEMOGLOBIN: 12.1 GM/DL (ref 12–16)
IMMATURE GRANS (ABS): 0.05 THOU/MM3 (ref 0–0.07)
IMMATURE GRANULOCYTES: 0.4 %
LYMPHOCYTES # BLD: 17.1 %
LYMPHOCYTES ABSOLUTE: 2 THOU/MM3 (ref 1–4.8)
MCH RBC QN AUTO: 29.4 PG (ref 26–33)
MCHC RBC AUTO-ENTMCNC: 31 GM/DL (ref 32.2–35.5)
MCV RBC AUTO: 94.7 FL (ref 81–99)
MONOCYTES # BLD: 5.3 %
MONOCYTES ABSOLUTE: 0.6 THOU/MM3 (ref 0.4–1.3)
NUCLEATED RED BLOOD CELLS: 0 /100 WBC
OSMOLALITY CALCULATION: 273.4 MOSMOL/KG (ref 275–300)
PLATELET # BLD: 217 THOU/MM3 (ref 130–400)
PMV BLD AUTO: 10 FL (ref 9.4–12.4)
POTASSIUM SERPL-SCNC: 4 MEQ/L (ref 3.5–5.2)
PREGNANCY, URINE: POSITIVE
RBC # BLD: 4.12 MILL/MM3 (ref 4.2–5.4)
SEG NEUTROPHILS: 75.9 %
SEGMENTED NEUTROPHILS ABSOLUTE COUNT: 8.7 THOU/MM3 (ref 1.8–7.7)
SODIUM BLD-SCNC: 136 MEQ/L (ref 135–145)
TOTAL PROTEIN: 7.1 G/DL (ref 6.1–8)
WBC # BLD: 11.5 THOU/MM3 (ref 4.8–10.8)

## 2020-02-01 PROCEDURE — 3700000001 HC ADD 15 MINUTES (ANESTHESIA): Performed by: PODIATRIST

## 2020-02-01 PROCEDURE — 3600000014 HC SURGERY LEVEL 4 ADDTL 15MIN: Performed by: PODIATRIST

## 2020-02-01 PROCEDURE — 7100000000 HC PACU RECOVERY - FIRST 15 MIN: Performed by: PODIATRIST

## 2020-02-01 PROCEDURE — C1713 ANCHOR/SCREW BN/BN,TIS/BN: HCPCS | Performed by: PODIATRIST

## 2020-02-01 PROCEDURE — 3700000000 HC ANESTHESIA ATTENDED CARE: Performed by: PODIATRIST

## 2020-02-01 PROCEDURE — 2709999900 HC NON-CHARGEABLE SUPPLY: Performed by: PODIATRIST

## 2020-02-01 PROCEDURE — 0QSLXZZ REPOSITION RIGHT TARSAL, EXTERNAL APPROACH: ICD-10-PCS | Performed by: PODIATRIST

## 2020-02-01 PROCEDURE — 6370000000 HC RX 637 (ALT 250 FOR IP): Performed by: PHYSICIAN ASSISTANT

## 2020-02-01 PROCEDURE — 81025 URINE PREGNANCY TEST: CPT

## 2020-02-01 PROCEDURE — 2500000003 HC RX 250 WO HCPCS: Performed by: STUDENT IN AN ORGANIZED HEALTH CARE EDUCATION/TRAINING PROGRAM

## 2020-02-01 PROCEDURE — 2580000003 HC RX 258: Performed by: STUDENT IN AN ORGANIZED HEALTH CARE EDUCATION/TRAINING PROGRAM

## 2020-02-01 PROCEDURE — 2500000003 HC RX 250 WO HCPCS: Performed by: NURSE ANESTHETIST, CERTIFIED REGISTERED

## 2020-02-01 PROCEDURE — 80053 COMPREHEN METABOLIC PANEL: CPT

## 2020-02-01 PROCEDURE — 84702 CHORIONIC GONADOTROPIN TEST: CPT

## 2020-02-01 PROCEDURE — 3209999900 FLUORO FOR SURGICAL PROCEDURES

## 2020-02-01 PROCEDURE — 73610 X-RAY EXAM OF ANKLE: CPT

## 2020-02-01 PROCEDURE — 2709999900 HC NON-CHARGEABLE SUPPLY

## 2020-02-01 PROCEDURE — 73600 X-RAY EXAM OF ANKLE: CPT

## 2020-02-01 PROCEDURE — 2720000010 HC SURG SUPPLY STERILE: Performed by: PODIATRIST

## 2020-02-01 PROCEDURE — G0378 HOSPITAL OBSERVATION PER HR: HCPCS

## 2020-02-01 PROCEDURE — 99285 EMERGENCY DEPT VISIT HI MDM: CPT

## 2020-02-01 PROCEDURE — 36415 COLL VENOUS BLD VENIPUNCTURE: CPT

## 2020-02-01 PROCEDURE — 0MQQ0ZZ REPAIR RIGHT ANKLE BURSA AND LIGAMENT, OPEN APPROACH: ICD-10-PCS | Performed by: PODIATRIST

## 2020-02-01 PROCEDURE — 99223 1ST HOSP IP/OBS HIGH 75: CPT | Performed by: PHYSICIAN ASSISTANT

## 2020-02-01 PROCEDURE — 3600000004 HC SURGERY LEVEL 4 BASE: Performed by: PODIATRIST

## 2020-02-01 PROCEDURE — 0QSJ04Z REPOSITION RIGHT FIBULA WITH INTERNAL FIXATION DEVICE, OPEN APPROACH: ICD-10-PCS | Performed by: PODIATRIST

## 2020-02-01 PROCEDURE — 27840 TREAT ANKLE DISLOCATION: CPT

## 2020-02-01 PROCEDURE — 6360000002 HC RX W HCPCS: Performed by: NURSE ANESTHETIST, CERTIFIED REGISTERED

## 2020-02-01 PROCEDURE — 2580000003 HC RX 258: Performed by: NURSE ANESTHETIST, CERTIFIED REGISTERED

## 2020-02-01 PROCEDURE — 7100000001 HC PACU RECOVERY - ADDTL 15 MIN: Performed by: PODIATRIST

## 2020-02-01 PROCEDURE — 85025 COMPLETE CBC W/AUTO DIFF WBC: CPT

## 2020-02-01 PROCEDURE — 94760 N-INVAS EAR/PLS OXIMETRY 1: CPT

## 2020-02-01 PROCEDURE — 6820000001 HC L2 TRAUMA SURGERY EVALUATION: Performed by: ANESTHESIOLOGY

## 2020-02-01 PROCEDURE — 1200000000 HC SEMI PRIVATE

## 2020-02-01 DEVICE — 4.5MM STRYKER REELX STT ANCHOR
Type: IMPLANTABLE DEVICE | Site: ANKLE | Status: FUNCTIONAL
Brand: REELX

## 2020-02-01 DEVICE — PERI-LOC VLP 3.5MM X 12MM CORTEX                                    SCREW SELF TAPPING
Type: IMPLANTABLE DEVICE | Site: ANKLE | Status: FUNCTIONAL
Brand: PERI-LOC VLP

## 2020-02-01 DEVICE — PERI-LOC VLP 3.5MM LATERAL DISTAL                                    FIBULA LOCKING PLATE 9H RIGHT 131MM
Type: IMPLANTABLE DEVICE | Site: ANKLE | Status: FUNCTIONAL
Brand: PERI-LOC VLP

## 2020-02-01 DEVICE — PERI-LOC 2.7MM SELF-TAPPING CORTEX                                    SCREW 20MM
Type: IMPLANTABLE DEVICE | Site: ANKLE | Status: FUNCTIONAL
Brand: PERI-LOC

## 2020-02-01 DEVICE — PERI-LOC VLP 3.5MM X 12MM LOCKING                                    SCREW SELF TAPPING
Type: IMPLANTABLE DEVICE | Site: ANKLE | Status: FUNCTIONAL
Brand: PERI-LOC VLP

## 2020-02-01 DEVICE — KIT ANCHR SWIVELOCK W/ NO2 FIBERTAPE SUT GWIRE TRCR TIP DRL: Type: IMPLANTABLE DEVICE | Site: ANKLE | Status: FUNCTIONAL

## 2020-02-01 DEVICE — PERI-LOC VLP 2.7MM X 14MM                                    PROVISIONAL FIXATION PIN
Type: IMPLANTABLE DEVICE | Site: ANKLE | Status: FUNCTIONAL
Brand: PERI-LOC VLP

## 2020-02-01 RX ORDER — SODIUM CHLORIDE 9 MG/ML
INJECTION, SOLUTION INTRAVENOUS CONTINUOUS PRN
Status: DISCONTINUED | OUTPATIENT
Start: 2020-02-01 | End: 2020-02-01 | Stop reason: SDUPTHER

## 2020-02-01 RX ORDER — HYDROCODONE BITARTRATE AND ACETAMINOPHEN 5; 325 MG/1; MG/1
1 TABLET ORAL EVERY 6 HOURS PRN
Status: DISCONTINUED | OUTPATIENT
Start: 2020-02-01 | End: 2020-02-02

## 2020-02-01 RX ORDER — NICOTINE 21 MG/24HR
1 PATCH, TRANSDERMAL 24 HOURS TRANSDERMAL DAILY
Status: DISCONTINUED | OUTPATIENT
Start: 2020-02-01 | End: 2020-02-03 | Stop reason: HOSPADM

## 2020-02-01 RX ORDER — SODIUM CHLORIDE 9 MG/ML
INJECTION, SOLUTION INTRAVENOUS CONTINUOUS
Status: DISCONTINUED | OUTPATIENT
Start: 2020-02-01 | End: 2020-02-02

## 2020-02-01 RX ORDER — CLINDAMYCIN PHOSPHATE 900 MG/50ML
900 INJECTION INTRAVENOUS
Status: DISCONTINUED | OUTPATIENT
Start: 2020-02-01 | End: 2020-02-01

## 2020-02-01 RX ORDER — SODIUM CHLORIDE 0.9 % (FLUSH) 0.9 %
10 SYRINGE (ML) INJECTION EVERY 12 HOURS SCHEDULED
Status: DISCONTINUED | OUTPATIENT
Start: 2020-02-01 | End: 2020-02-01 | Stop reason: SDUPTHER

## 2020-02-01 RX ORDER — FENTANYL CITRATE 50 UG/ML
25 INJECTION, SOLUTION INTRAMUSCULAR; INTRAVENOUS
Status: DISCONTINUED | OUTPATIENT
Start: 2020-02-01 | End: 2020-02-01

## 2020-02-01 RX ORDER — CLINDAMYCIN PHOSPHATE 150 MG/ML
INJECTION, SOLUTION INTRAVENOUS PRN
Status: DISCONTINUED | OUTPATIENT
Start: 2020-02-01 | End: 2020-02-01 | Stop reason: SDUPTHER

## 2020-02-01 RX ORDER — HYDROCODONE BITARTRATE AND ACETAMINOPHEN 5; 325 MG/1; MG/1
1 TABLET ORAL ONCE
Status: COMPLETED | OUTPATIENT
Start: 2020-02-01 | End: 2020-02-01

## 2020-02-01 RX ORDER — SODIUM CHLORIDE 0.9 % (FLUSH) 0.9 %
10 SYRINGE (ML) INJECTION EVERY 12 HOURS SCHEDULED
Status: DISCONTINUED | OUTPATIENT
Start: 2020-02-01 | End: 2020-02-03 | Stop reason: HOSPADM

## 2020-02-01 RX ORDER — BUPIVACAINE HYDROCHLORIDE 7.5 MG/ML
INJECTION, SOLUTION INTRASPINAL PRN
Status: DISCONTINUED | OUTPATIENT
Start: 2020-02-01 | End: 2020-02-01 | Stop reason: SDUPTHER

## 2020-02-01 RX ORDER — SODIUM CHLORIDE 0.9 % (FLUSH) 0.9 %
10 SYRINGE (ML) INJECTION PRN
Status: DISCONTINUED | OUTPATIENT
Start: 2020-02-01 | End: 2020-02-03 | Stop reason: HOSPADM

## 2020-02-01 RX ORDER — BUPIVACAINE HYDROCHLORIDE 5 MG/ML
30 INJECTION, SOLUTION EPIDURAL; INTRACAUDAL ONCE
Status: DISCONTINUED | OUTPATIENT
Start: 2020-02-01 | End: 2020-02-03 | Stop reason: HOSPADM

## 2020-02-01 RX ORDER — CLINDAMYCIN PHOSPHATE 900 MG/50ML
900 INJECTION INTRAVENOUS EVERY 8 HOURS
Status: DISCONTINUED | OUTPATIENT
Start: 2020-02-01 | End: 2020-02-03 | Stop reason: HOSPADM

## 2020-02-01 RX ORDER — SODIUM CHLORIDE 0.9 % (FLUSH) 0.9 %
10 SYRINGE (ML) INJECTION PRN
Status: DISCONTINUED | OUTPATIENT
Start: 2020-02-01 | End: 2020-02-01 | Stop reason: SDUPTHER

## 2020-02-01 RX ORDER — FENTANYL CITRATE 50 UG/ML
25 INJECTION, SOLUTION INTRAMUSCULAR; INTRAVENOUS
Status: DISCONTINUED | OUTPATIENT
Start: 2020-02-01 | End: 2020-02-01 | Stop reason: ALTCHOICE

## 2020-02-01 RX ORDER — IBUPROFEN 800 MG/1
800 TABLET ORAL EVERY 8 HOURS PRN
Status: DISCONTINUED | OUTPATIENT
Start: 2020-02-01 | End: 2020-02-01

## 2020-02-01 RX ORDER — FENTANYL CITRATE 50 UG/ML
INJECTION, SOLUTION INTRAMUSCULAR; INTRAVENOUS PRN
Status: DISCONTINUED | OUTPATIENT
Start: 2020-02-01 | End: 2020-02-01 | Stop reason: SDUPTHER

## 2020-02-01 RX ORDER — PROPOFOL 10 MG/ML
INJECTION, EMULSION INTRAVENOUS PRN
Status: DISCONTINUED | OUTPATIENT
Start: 2020-02-01 | End: 2020-02-01 | Stop reason: SDUPTHER

## 2020-02-01 RX ORDER — ONDANSETRON 2 MG/ML
4 INJECTION INTRAMUSCULAR; INTRAVENOUS EVERY 6 HOURS PRN
Status: DISCONTINUED | OUTPATIENT
Start: 2020-02-01 | End: 2020-02-03 | Stop reason: HOSPADM

## 2020-02-01 RX ADMIN — FENTANYL CITRATE 25 MCG: 50 INJECTION, SOLUTION INTRAMUSCULAR; INTRAVENOUS at 16:15

## 2020-02-01 RX ADMIN — HYDROCODONE BITARTRATE AND ACETAMINOPHEN 1 TABLET: 5; 325 TABLET ORAL at 06:37

## 2020-02-01 RX ADMIN — FENTANYL CITRATE 50 MCG: 50 INJECTION, SOLUTION INTRAMUSCULAR; INTRAVENOUS at 15:45

## 2020-02-01 RX ADMIN — FENTANYL CITRATE 25 MCG: 50 INJECTION, SOLUTION INTRAMUSCULAR; INTRAVENOUS at 16:50

## 2020-02-01 RX ADMIN — HYDROCODONE BITARTRATE AND ACETAMINOPHEN 1 TABLET: 5; 325 TABLET ORAL at 21:24

## 2020-02-01 RX ADMIN — FENTANYL CITRATE 25 MCG: 50 INJECTION, SOLUTION INTRAMUSCULAR; INTRAVENOUS at 16:40

## 2020-02-01 RX ADMIN — HYDROCODONE BITARTRATE AND ACETAMINOPHEN 1 TABLET: 5; 325 TABLET ORAL at 11:51

## 2020-02-01 RX ADMIN — PROPOFOL 80 MG: 10 INJECTION, EMULSION INTRAVENOUS at 16:15

## 2020-02-01 RX ADMIN — FENTANYL CITRATE 50 MCG: 50 INJECTION, SOLUTION INTRAMUSCULAR; INTRAVENOUS at 15:30

## 2020-02-01 RX ADMIN — CLINDAMYCIN PHOSPHATE 600 MG: 150 INJECTION, SOLUTION INTRAVENOUS at 16:50

## 2020-02-01 RX ADMIN — SODIUM CHLORIDE: 9 INJECTION, SOLUTION INTRAVENOUS at 15:25

## 2020-02-01 RX ADMIN — FENTANYL CITRATE 25 MCG: 50 INJECTION, SOLUTION INTRAMUSCULAR; INTRAVENOUS at 16:30

## 2020-02-01 RX ADMIN — SODIUM CHLORIDE: 9 INJECTION, SOLUTION INTRAVENOUS at 18:18

## 2020-02-01 RX ADMIN — CLINDAMYCIN PHOSPHATE 900 MG: 900 INJECTION, SOLUTION INTRAVENOUS at 20:25

## 2020-02-01 RX ADMIN — BUPIVACAINE HYDROCHLORIDE 1.8 ML: 7.5 INJECTION, SOLUTION SUBARACHNOID at 16:00

## 2020-02-01 ASSESSMENT — PAIN DESCRIPTION - DESCRIPTORS
DESCRIPTORS: ACHING
DESCRIPTORS: ACHING;SHARP
DESCRIPTORS: ACHING;SHARP

## 2020-02-01 ASSESSMENT — PULMONARY FUNCTION TESTS
PIF_VALUE: 0

## 2020-02-01 ASSESSMENT — PAIN DESCRIPTION - ONSET
ONSET: SUDDEN
ONSET: ON-GOING

## 2020-02-01 ASSESSMENT — PAIN - FUNCTIONAL ASSESSMENT
PAIN_FUNCTIONAL_ASSESSMENT: PREVENTS OR INTERFERES SOME ACTIVE ACTIVITIES AND ADLS
PAIN_FUNCTIONAL_ASSESSMENT: PREVENTS OR INTERFERES SOME ACTIVE ACTIVITIES AND ADLS

## 2020-02-01 ASSESSMENT — PAIN SCALES - GENERAL
PAINLEVEL_OUTOF10: 10
PAINLEVEL_OUTOF10: 9
PAINLEVEL_OUTOF10: 9
PAINLEVEL_OUTOF10: 0
PAINLEVEL_OUTOF10: 10
PAINLEVEL_OUTOF10: 8
PAINLEVEL_OUTOF10: 9
PAINLEVEL_OUTOF10: 10
PAINLEVEL_OUTOF10: 9
PAINLEVEL_OUTOF10: 0

## 2020-02-01 ASSESSMENT — PAIN DESCRIPTION - PAIN TYPE
TYPE: SURGICAL PAIN
TYPE: ACUTE PAIN
TYPE: SURGICAL PAIN
TYPE: ACUTE PAIN

## 2020-02-01 ASSESSMENT — ENCOUNTER SYMPTOMS
NAUSEA: 0
BACK PAIN: 0
VOMITING: 0
ABDOMINAL PAIN: 0
COLOR CHANGE: 0
SHORTNESS OF BREATH: 0

## 2020-02-01 ASSESSMENT — PAIN DESCRIPTION - LOCATION
LOCATION: ANKLE

## 2020-02-01 ASSESSMENT — PAIN DESCRIPTION - ORIENTATION
ORIENTATION: RIGHT

## 2020-02-01 ASSESSMENT — PAIN DESCRIPTION - PROGRESSION
CLINICAL_PROGRESSION: GRADUALLY WORSENING
CLINICAL_PROGRESSION: GRADUALLY WORSENING

## 2020-02-01 ASSESSMENT — PAIN DESCRIPTION - FREQUENCY: FREQUENCY: CONTINUOUS

## 2020-02-01 NOTE — BRIEF OP NOTE
Brief Postoperative Note  ______________________________________________________________    Patient: Charlette Reyes  YOB: 1995  MRN: [id number]  Date of Procedure: 2/1/2020    Pre-Op Diagnosis: RIGHT ANKLE FRACTURE    Post-Op Diagnosis: Same       Procedure(s):  RIGHT ANKLE OPEN REDUCTION INTERNAL FIXATION AND RIGHT DELTOID LIGAMENT REPAIR    Anesthesia: Spinal    Surgeon(s):  Brittani Estrella DPM    Assistant: Ulysses Harts, DPM-PGY1    Estimated Blood Loss (mL): less than 50     Hemostasis: Thigh Tourniquet at 325 mmHg    Suture: 2-0 Vicryl and 3-0 Monocryl    Injectables: None    Complications: None    Specimens: None    Implants:  Implant Name Type Inv.  Item Serial No.  Lot No. LRB No. Used   ANCHOR REELX 4.5MM Fastener ANCHOR REELX 4.5MM  [place]: ORTHOPAEDICS [id number] Right 1   KIT BRACE AUG LIG INTRNL Fastener KIT BRACE AUG LIG INTRNL  [employer] [id number] Right 1   SCREW CORTX SLFTP LK 3.5X12MM Screw/Plate/Nail/[employer] SCREW CORTX SLFTP LK 3.5X12MM  [place]  Right 8   SCREW CORTX SLFTP NON LK 2.7X20MM Screw/Plate/Nail/[employer] SCREW CORTX SLFTP NON LK 2.7X20MM  [place]  Right 1   SCREW CORTX SLFTP LK 3.5X12MM Screw/Plate/Nail/[employer] SCREW CORTX SLFTP LK 3.5X12MM  [employer]  Right 1   PLATE FIB LK L D RT [id number] Screw/Plate/Nail/[employer] PLATE FIB LK L D RT [id number]  [place]  Right 1   PIN PROVISIONAL FIX ISRA 2.7X14MM Screw/Plate/Nail/[employer] PIN PROVISIONAL FIX ISRA 2.7X14MM  [employer]  Right 1         Drains:   Urethral Catheter Double-lumen 16 fr (Active)       [REMOVED] External Urinary Catheter (Removed)       Findings: Same as Pre-Op Diagnosis    Ulysses Harts, DPM  Date: 2/1/2020  Time: 5:23 PM

## 2020-02-01 NOTE — ED NOTES
Transported to [facility] by bed. Floor notified.      Steffanie Christiansen LPN  [id number]/41/83 [id number]

## 2020-02-01 NOTE — CONSULTS
Chief Complaint   Patient presents with     Seizures     [facility]URN     Amarilys Yee     Podiatric Surgery Consult    Reason for Consult: Right ankle fracture    Requesting Physician: KYA Marcum    CHIEF COMPLAINT:  Right Ankle Pain    HISTORY OF PRESENT ILLNESS:                The patient is a 25 y.o. female with significant past medical history of asthma, fibromyalgia, thyroid disease and seizures who is being seen at bedside on behalf of Dr. Hui Smith. Patient reports getting out of bed this morning and stepping wrong. She has a history of ankle instability and injuring her right ankle. About 2 years ago she injured her right ankle and participated in water therapy. She was informed at that time that if she injured her ankle again she would require surgical repair. Patient states she fell to the ground but denies hitting her head. Current pain is reported as 8/10. She did not try to treat her injury prior to coming to ED. Patient is in and out of sleep, she reports taking sleeping medications. Patient's boyfriend is bedside.     Past Medical History:    Past Medical History:   Diagnosis Date    ADD (attention deficit disorder)     Anxiety 4/8/2015    Anxiety attack     Asthma     exercise induced    Atypical face pain     Back pain     Bipolar 1 disorder (HCC)     Fibromyalgia     GERD (gastroesophageal reflux disease)     Hypotension 11/2/2017    Major depressive disorder, recurrent episode, mild ([address] 7/31/2012    Obesity 10/24/2014    Pneumonia 2/21/2018    POTS (postural orthostatic tachycardia syndrome)     Pott disease     Pulmonary emboli (HCC)     Seizures ([facility] [address] 07/31/2016    Tailbone injury 11/28/15    patient broke tailbone    Thyroid disease     borderline low     TMJ (dislocation of temporomandibular joint)     Trigeminal neuralgia     Wears contact lenses         Past Surgical History:    Past Surgical History:   Procedure Laterality Date    CARDIAC CATHETERIZATION  3-2016    EP Study    [facility]    COLONOSCOPY  2016  INSERTABLE CARDIAC MONITOR      WISDOM TOOTH EXTRACTION  2010        Current Medications:    No current facility-administered medications for this encounter. Allergies:  Dilaudid [hydromorphone hcl]; Flexeril [cyclobenzaprine]; Keflex [cephalexin]; Tessalon [benzonatate]; Augmentin [amoxicillin-pot clavulanate]; Lorabid [loracarbef]; and Minocycline    Social History:    Social History     Socioeconomic History    Marital status: Single     Spouse name: None    Number of children: None    Years of education: None    Highest education level: None   Occupational History    None   Social Needs    Financial resource strain: None    Food insecurity:     Worry: None     Inability: None    Transportation needs:     Medical: None     Non-medical: None   Tobacco Use    Smoking status: Current Every Day Smoker     Packs/day: 1.00     Types: Cigarettes     Start date: 6/30/2018    Smokeless tobacco: Never Used   Substance and Sexual Activity    Alcohol use:  Yes     Alcohol/week: 1.0 standard drinks     Types: 1 Cans of beer per week     Comment: occasional    Drug use: Yes     Types: Marijuana     Comment: last nite occasional    Sexual activity: Yes     Partners: Male   Lifestyle    Physical activity:     Days per week: None     Minutes per session: None    Stress: None   Relationships    Social connections:     Talks on phone: None     Gets together: None     Attends Zoroastrian service: None     Active member of club or organization: None     Attends meetings of clubs or organizations: None     Relationship status: None    Intimate partner violence:     Fear of current or ex partner: None     Emotionally abused: None     Physically abused: None     Forced sexual activity: None   Other Topics Concern    None   Social History Narrative    None       Family History:   family history includes Anxiety Disorder in her father and mother; Bipolar Disorder in her father; Cancer in her maternal grandmother, paternal grandfather, and paternal uncle; Depression in her maternal grandfather, paternal aunt, and paternal uncle; Diabetes in her mother; High Blood Pressure in her father; Lupus in her maternal aunt; Mental Illness in her father; Parkinsonism in her father. REVIEW OF SYSTEMS:    General appearance:  Appears stated age and cooperative. PHYSICAL EXAM:      Vitals:    /78   Pulse 100   Temp 98.2 °F (36.8 °C) (Oral)   Resp 17   Ht 5' 10\" (1.778 m)   Wt 280 lb (127 kg)   SpO2 99%   BMI 40.18 kg/m²     Exam:     Vascular: Dorsalis pedis bilaterally and posterior tibial pulse on left palpable. Right posterior tibial pulse faintly palpable secondary to nonpitting edema. Skin temperature is warm to warm from proximal tibial tuberosity to distal digits. CFT brisk to exposed digits. Edema RLE. Quality of skin good. Dermatologic: Minimal skin tenting medial right ankle. No open lesions, rashes or subcutaneous nodules of note. Neurovascular: Light touch sensation intact. Musculoskeletal: Pain w/ palpation of right ankle. Muscle strength deferred. XRAY:  XR ANKLE RIGHT (MIN 3 VIEWS) - PRE REDUCTION  Impression   1. There is an acute oblique fracture of the distal fibular diaphysis which extends distally to the level of the tibiotalar articulation. There is disruption of the deltoid ligament with lateral subluxation of the talus causing widening of the medial    ankle mortise measuring 1.2 cm. There is no posterior malleolar fracture. There is a joint effusion at the tibiotalar articulation. There is soft tissue swelling at the ankle.               **This report has been created using voice recognition software.  It may contain minor errors which are inherent in voice recognition technology. **       Final report electronically signed by Dr. Linnette Santillan on 2/1/2020 6:36 AM       XR ANKLE RIGHT (2 VIEWS) - POST REDUCTION  Impression   1.  There is an acute oblique fracture of the distal fibular diaphysis which extends inferiorly to the level of the tibiotalar articulation. There is interval reduction with persistent 0.3 cm posterior displacement/distraction of the distal bone fragment.   Ricka Backers is also less lateral subluxation of the talus with reduction of the ankle mortise now measuring 0.6 cm medially. There is soft tissue swelling at the ankle and an overlying splint.               **This report has been created using voice recognition software.  It may contain minor errors which are inherent in voice recognition technology. **       Final report electronically signed by Dr. Altagracia Salcedo on 2/1/2020 10:12 AM         LABS:  No results for input(s): WBC, HGB, HCT, PLT in the last 72 hours. No results for input(s): NA, K, CL, CO2, PHOS, BUN, CREATININE in the last 72 hours. Invalid input(s): CA   No results for input(s): PROT, INR, APTT in the last 72 hours. No results for input(s): CKTOTAL, CKMB, CKMBINDEX, TROPONINI in the last 72 hours. Procedure: Closed Reduction of Right Ankle Fracture/Dislocation  - Hematoma Block: 30 cc 1/2% Marcaine Plain.  - Adequate sedation achieved. - Closed ankle reduction performed. - Sugar Tong splint applied (cast padding, ACE, splint x2 and ACE). - Ankle reduction verified w/ C-arm.  - Patient tolerated procedure well. Assessment and Plan  Principle  # Closed Right Ankle Fracture/Dislocation  -Patient initially examined and evaluated  -Xrays reviewed: Fibular fracture noted and talus translated laterally.   Post reduction, anatomical alignment of tibia and talus noted  -RLE immobilized with sugar tong splint and Ace  -Discussed need for surgical intervention to repair fibular fracture and ruptured deltoid ligament  -Patient and her boyfriend agreed to treatment plan  -Patient admitted and scheduled for surgery today for ORIF right ankle fracture/dislocation  -N.p.o. now, hold anticoagulants and requested treatment consent  -NWB RLE  -PT/OT consulted  -Positive pregnancy test.  D/c fentanyl and changed to Tylenol 800 mg q8h prn pain. Pharmacy consulted for proper medication and dosages due to pregnancy    #Asthma  -Continue home meds  -Hospitalist consulted    #Thyroid Disease  -Continue home meds  -Hospitalist consulted    DISPO: Pending surgical intervention and pain control    Dr. Welsh att. providers found, thank you for the consultation allowing podiatry to assist in the medical welfare of this patient. Podiatry will continue to follow this patient throughout the duration of hospitalization. iVck Packer DPM-PGY1  2/1/2020 7:23 AM     I saw and evaluated the patient independently bedside. Please refer to resident physicians note for further details. Discussed with resident assessment and findings, I agree with plan as documented.      Aldo Bonilla [facility] of [facility]

## 2020-02-01 NOTE — ED NOTES
Podiatry provider Dr Regulo Santos at bedside for reduction. X-ray called.       Shweta Sotelo RN  02/01/20 [id number] Naomi Perez RN  02/01/20 [id number]

## 2020-02-01 NOTE — ANESTHESIA PRE PROCEDURE
Department of Anesthesiology  Preprocedure Note       Name:  Ilsa Older   Age:  25 y. o.  :  1995                                          MRN:  [id number]         Date:  2020      Surgeon: Dhiraj Ricks):  Manuel Hansen DPM    Procedure: RIGHT ANKLE OPEN REDUCTION INTERNAL FIXATION (Right Ankle)    Medications prior to admission:   Prior to Admission medications    Medication Sig Start Date End Date Taking? Authorizing Provider   norethindrone (MICRONOR) 0.35 MG tablet Take 1 tablet by mouth daily 19  Yes LEVI Choi CNP   QUEtiapine (SEROQUEL XR) 50 MG extended release tablet Take 50 mg by mouth daily   Yes Historical Provider, MD   mirtazapine (REMERON) 15 MG tablet Take 7.5 mg by mouth nightly   Yes Historical Provider, MD   amphetamine-dextroamphetamine (ADDERALL, 10MG,) 10 MG tablet Take 15 mg by mouth daily. Yes Historical Provider, MD   ARIPiprazole (ABILIFY) 15 MG tablet Take 15 mg by mouth nightly    Yes Historical Provider, MD   albuterol (PROVENTIL) (2.5 MG/3ML) 0.083% nebulizer solution Take 3 mLs by nebulization every 6 hours as needed for Wheezing 19  Yes LEVI Choi CNP   omeprazole (PRILOSEC) 40 MG delayed release capsule TAKE 1 CAPSULE BY MOUTH TWO TIMES A DAY   Patient taking differently: nightly  19  Yes LEVI Choi CNP   QUEtiapine (SEROQUEL) 200 MG tablet Take 300 mg by mouth nightly    Yes Historical Provider, MD   ALPRAZolam (XANAX XR) 2 MG extended release tablet Take 2 mg by mouth every morning.    Yes Historical Provider, MD   busPIRone (BUSPAR) 30 MG tablet Take by mouth every morning 45 mg AM, 15 mg PM (1.5 tab)   Yes Historical Provider, MD   albuterol sulfate HFA (PROAIR HFA) 108 (90 Base) MCG/ACT inhaler Inhale 2 puffs into the lungs every 6 hours as needed for Wheezing 10/16/17  Yes Sanjiv Anthony,    cetirizine (ZYRTEC) 10 MG tablet Take 1 tablet by mouth daily 17  Yes LEVI Choi CNP   OXcarbazepine (TRILEPTAL) 300 MG tablet Take 1,200 mg by mouth 2 times daily    Yes Historical Provider, MD   sertraline (ZOLOFT) 25 MG tablet Take 25 mg by mouth daily Take with 50mg to equal 75mg   Yes Historical Provider, MD   sertraline (ZOLOFT) 50 MG tablet Take 50 mg by mouth daily Take with 25mg to equal 75mg   Yes Historical Provider, MD   DULoxetine (CYMBALTA) 60 MG capsule Take 120 mg by mouth daily    Yes Historical Provider, MD   temazepam (RESTORIL) 30 MG capsule Take 30 mg by mouth nightly. Yes Historical Provider, MD   acetaminophen (TYLENOL) 325 MG tablet Take 2 tablets by mouth every 4 hours as needed for Pain or Fever 2/23/18   Steff Fields MD       Current medications:    Current Facility-Administered Medications   Medication Dose Route Frequency Provider Last Rate Last Dose    bupivacaine (PF) (MARCAINE) 0.5 % injection 150 mg  30 mL Intradermal Once Ana Paula Galvez PA-C        0.9 % sodium chloride infusion   Intravenous Continuous Brian Fatima DPM        sodium chloride flush 0.9 % injection 10 mL  10 mL Intravenous 2 times per day Brian Fatima DPM        sodium chloride flush 0.9 % injection 10 mL  10 mL Intravenous PRN Brian Fatima DPM        [Held by provider] enoxaparin (LOVENOX) injection 40 mg  40 mg Subcutaneous Daily Alida Heard        clindamycin (CLEOCIN) 900 mg in dextrose 5 % 50 mL IVPB  900 mg Intravenous Q8H Alida Heard        fentaNYL (SUBLIMAZE) injection 25 mcg  25 mcg Intravenous Q1H PRN Brian Fatima DPM           Allergies: Allergies   Allergen Reactions    Dilaudid [Hydromorphone Hcl]      hallucination    Flexeril [Cyclobenzaprine] Other (See Comments)     Hallucinations    Keflex [Cephalexin] Other (See Comments)     Lose cognitive functions.      Tessalon [Benzonatate] Other (See Comments)     psychosis    Augmentin [Amoxicillin-Pot Clavulanate] Rash    Lorabid [Loracarbef] Hives, Swelling and Rash    neuralgia     Wears contact lenses        Past Surgical History:        Procedure Laterality Date    CARDIAC CATHETERIZATION  3-2016    EP Study    [facility]    COLONOSCOPY  2016    INSERTABLE CARDIAC MONITOR      WISDOM TOOTH EXTRACTION  2010       Social History:    Social History     Tobacco Use    Smoking status: Current Every Day Smoker     Packs/day: 1.00     Types: Cigarettes     Start date: 6/30/2018    Smokeless tobacco: Never Used   Substance Use Topics    Alcohol use: Yes     Alcohol/week: 1.0 standard drinks     Types: 1 Cans of beer per week     Comment: occasional                                Ready to quit: Not Answered  Counseling given: Not Answered      Vital Signs (Current):   Vitals:    02/01/20 1002 02/01/20 1048 02/01/20 1132 02/01/20 1245   BP: 121/70 128/71 (!) 141/92 (!) 116/52   Pulse: 90 87 110 98   Resp: 19 16 17 18   Temp:    98.2 °F (36.8 °C)   TempSrc:    Oral   SpO2: 96% 97% 94% 97%   Weight:       Height:                                                  BP Readings from Last 3 Encounters:   02/01/20 (!) 116/52   01/15/20 110/68   12/30/19 128/72       NPO Status:                                                                                 BMI:   Wt Readings from Last 3 Encounters:   02/01/20 280 lb (127 kg)   01/15/20 290 lb 6.4 oz (131.7 kg)   12/30/19 285 lb 11.2 oz (129.6 kg)     Body mass index is 40.18 kg/m².     CBC:   Lab Results   Component Value Date    WBC 11.5 02/01/2020    RBC 4.12 02/01/2020    RBC 4.11 04/30/2012    HGB 12.1 02/01/2020    HCT 39.0 02/01/2020    MCV 94.7 02/01/2020    RDW 21.5 05/14/2018     02/01/2020       CMP:   Lab Results   Component Value Date     02/01/2020    K 4.0 02/01/2020    K 3.9 02/22/2018     02/01/2020    CO2 25 02/01/2020    BUN 11 02/01/2020    CREATININE 0.5 02/01/2020    LABGLOM >90 02/01/2020    GLUCOSE 136 02/01/2020    GLUCOSE 85 04/30/2012    PROT 7.1 02/01/2020

## 2020-02-01 NOTE — ED NOTES
Bed: 010A  Expected date: 2/1/20  Expected time: 5:22 AM  Means of arrival: [facility]  Comments:     Johanne Mario RN  02/01/20 7904

## 2020-02-01 NOTE — ED NOTES
Pt falling asleep but still states that she has 10/10 pain, pt says that she can't help falling asleep due to the sleeping aids that she takes     Lidia Nunn RN  02/01/20 [id number]

## 2020-02-01 NOTE — ED NOTES
At first encounter, pt resting on cot, resp easy and unlabored. Discussed imaging results. Bedside shift handoff received from [facility].       Lynn Huber RN  02/01/20 [id number]

## 2020-02-01 NOTE — OP NOTE
Operative Report  Patient: Shey Motley  YOB: 1995  MRN: [id number]  Date of Procedure: 2/1/2020     Pre-Op Diagnosis: RIGHT ANKLE FRACTURE     Post-Op Diagnosis: Same       Procedure(s):  RIGHT ANKLE OPEN REDUCTION INTERNAL FIXATION AND RIGHT DELTOID LIGAMENT REPAIR     Anesthesia: Spinal     Surgeon(s): Johnna Pompa DPM     Assistant: Taj Mahajan DPM-PGY1     Estimated Blood Loss (mL): less than 50      Hemostasis: Thigh Tourniquet at 325 mmHg     Suture: 2-0 Vicryl, 3-0 Monocryl and Staples     Injectables: None     Complications: None     Specimens: None     Implants:  Implant Name Type Inv. Item Serial No.  Lot No. LRB No. Used   ANCHOR REELX 4.5MM Fastener ANCHOR REELX 4.5MM   DENIA: [employer]TH[employer] [id number] Right 1   KIT BRACE AUG LIG INTRNL Fastener KIT BRACE AUG LIG INTRNL   [employer] [id number] Right 1   SCREW CORTX SLFTP LK 3.5X12MM Screw/Plate/Nail/Jean-Claude SCREW CORTX SLFTP LK 3.5X12MM   SMITH   Right 8   SCREW CORTX SLFTP NON LK 2.7X20MM Screw/Plate/Nail/Jean-Claude SCREW CORTX SLFTP NON LK 2.7X20MM   SMITH   Right 1   SCREW CORTX SLFTP LK 3.5X12MM Screw/Plate/Nail/Jean-Claude SCREW CORTX SLFTP LK 3.5X12MM   SMITH   Right 1   PLATE FIB LK L D RT [id number] Screw/Plate/Nail/Jean-Claude PLATE FIB LK L D RT [id number]   SMITH   Right 1   PIN PROVISIONAL FIX ISRA 2.7X14MM Screw/Plate/Nail/Jea-Nclaude PIN PROVISIONAL FIX ISRA 2.7X14MM   SMITH   Right 1          Drains:   Urethral Catheter Double-lumen 16 fr (Active)       [REMOVED] External Urinary Catheter (Removed)         Findings: Same as Pre-Op Diagnosis      Indications: Patient is a 25 y.o. y.o. female with a chief complaint of right ankle fracture/dislocation after stepping incorrectly this morning when getting out of bed who is being seen by Dr. Lia Lala and being treated for closed fibular fracture and lateral ankle dislocation.   Her right ankle fracture/dislocation was closed reduced in the ED this morning after injection of hematoma block with 30 cc of 1/2% Marcaine plain. At this time all conservative options have been exhausted and surgical intervention is necessary. The procedure has been explained to the patient and they understand the risks, benefits and possible complications including but not limited to pain, infection, hardware failure, irritation, recurrence and need for additional surgery. No promises have been made as to surgical outcome. The patient was subsequently admitted for pain control and surgical intervention. Based on the nature and location of her fracture fragment,  x-rays were obtained showing an oblique extra-articular fracture of the fibula and lateral translation of her talus. Need for surgical intervention was indicated. The patient was seen preoperatively. Consent was reviewed and signed. Operative limb was marked. The patient was taken to the operating room, placed in the supine position,and administered an epidural anesthetic due to positive pregnancy tests. A pneumatic thigh tourniquet was applied to the right thigh. The right foot and leg was then prepped and draped in the normal aspetic manner. The right foot and leg was then exsanguinated with an esmark and the tourniquet was inflated to 325 mmHg.     PROCEDURE IN DETAIL:  Primary ORIF of right fibular malleolar ankle fracture. A longitudinal incision was made overlying the fibular shaft, approximately 10 cm in  total length. Initial incision was made full thickness with a 15-scalpel blade followed by blunt dissection with Metzenbaum scissors and electrocautery Bovie. The periosteum was subdivided into anterior and posterior halves allowing for visualization of an oblique-oriented fibular shaft fracture. This was closed reduced using point-to-point reduction clamp followed by placement of interfrag screw. This was followed by placement of a neutralization plate with a combination of locking and nonlocking screws.  This was fixated with temporary PF provisional fixation pins, noted to have good anatomic reduction of the fibula on AP view of the ankle. The distal screw cluster was then filled with unicortical locking screws followed by proximal based bicortical screws. It is important to note that neurovascular structures were carefully retracted and small bleeding vessels were cauterized.     Following reduction of the fibula, attention was then directed to the medial malleolus, an oblique-type incision approximately 5 cm in length was made full thickness with a 15-scalpel blade followed by blunt dissection with Metzenbaum scissors and electrocautery Bovie. The coursing saphenous nerve and greater saphenous vein were identified and retracted anteriorly. Intra-op fluoroscopy showed essentially a mechanical failure of the deltoid ligament, where the ankle could be manipulated laterally out of the ankle joint. Need for primary fixation of the deltoid/medial collateral was indicated. The ligament was incised allowing for visualization of the anterior face of the tibia and medial shoulder of the talus. A pre-set drill/tap were then utilized to create a degree drill hole into the medial aspect of the talar shoulder. In similar fashion, a second drill site was introduced proximal to the distal edge of the tibia. An anchor with internal brace fiber wire was introduced into the talus. The ankle was placed in dorsiflexion/neutral position, followed by a push lock anchors into the tibia. The ankle was placed through range of motion, noted good anatomic restoration of the ankle via ligament balancing. Final AP radiographs were obtained, saved for permanent use. Surgical sites were then flushed with copious amounts of normal sterile saline followed by surgical closure consisting of 2-0 Vicryl for capsular  closure followed by 3-0 Monocryl for subcutaneous and 3-0 Monocryl and staples for skin.  The incision was dressed with adaptic, 4x4's and kerlix The pneumatic thigh tourniquet was then deflated and an immediate hyperemic response was noted to all digits of the right foot. Then cast padding, posterior splint and ACE was applied. The patient was transported to the PACU with VSS and NVS intact to all digits of the right foot. No complications were noted throughout the procedure. The patient is to be discharged per PACU protocol. The patient is to follow-up with Dr. Maria Victoria Leblanc in the office.     Dr. Spike Lara DPM    Electronically signed by Ramón Torres DPM on 2/1/2020 at 5:24 PM

## 2020-02-01 NOTE — ED PROVIDER NOTES
[address] COMPLAINT       Chief Complaint   Patient presents with    Ankle Injury       Nurses Notes reviewed and I agree except as noted in the HPI. HISTORY OF PRESENT ILLNESS    Deana Dailey is a 25 y.o. female who presents to the Emergency Department for the evaluation of right ankle pain. The patient states that's he got out of bed to walk to the bathroom approximately one hour prior to her arrival to this department when she stepped down twisted her right ankle. She states that she heard \"popping\" in her right ankle and began to experience significant pain. The patient rates her pain at a 10/10 in severity and states that her pain is worse with movement, weight bearing, or attempted ambulation. She reports swelling of the right ankle but denies noted bruising. She denies weakness, numbness, or tingling of the right foot. She denies chest pain, SOB, or abdominal pain. She denies additional injuries or areas of pain. The patient has a past medical history of back pain, fibromyalgia, GERD, Obesity, POTS, Pott disease, major hypotension, seizures, and thyroid disease. There are no further symptoms or concerns at this time. The HPI was provided by the patient. REVIEW OF SYSTEMS     Review of Systems   Respiratory: Negative for shortness of breath. Cardiovascular: Negative for chest pain. Gastrointestinal: Negative for abdominal pain, nausea and vomiting. Musculoskeletal: Positive for arthralgias (right ankle), joint swelling (right ankle) and neck stiffness. Negative for back pain, myalgias and neck pain. Skin: Negative for color change, pallor and wound. Neurological: Negative for weakness and numbness.        PAST MEDICAL HISTORY    has a past medical history of ADD (attention deficit disorder), Anxiety, Anxiety attack, Asthma, Atypical face pain, Back pain, Bipolar 1 disorder ([facility] [address] Fibromyalgia, GERD (gastroesophageal reflux disease), Hypotension, Major depressive disorder, recurrent episode, mild ([address] Obesity, Pneumonia, POTS (postural orthostatic tachycardia syndrome), Pott disease, Pulmonary emboli ([address] Seizures ([address] Tailbone injury, Thyroid disease, TMJ (dislocation of temporomandibular joint), Trigeminal neuralgia, and Wears contact lenses. SURGICAL HISTORY      has a past surgical history that includes [place] tooth extraction (2010); Cardiac catheterization (3-2016); Cardiac catheterization (04/21/2016); Colonoscopy (2016); and Insertable Cardiac Monitor. CURRENT MEDICATIONS       Previous Medications    ACETAMINOPHEN (TYLENOL) 325 MG TABLET    Take 2 tablets by mouth every 4 hours as needed for Pain or Fever    ALBUTEROL (PROVENTIL) (2.5 MG/3ML) 0.083% NEBULIZER SOLUTION    Take 3 mLs by nebulization every 6 hours as needed for Wheezing    ALBUTEROL SULFATE HFA (PROAIR HFA) 108 (90 BASE) MCG/ACT INHALER    Inhale 2 puffs into the lungs every 6 hours as needed for Wheezing    ALPRAZOLAM (XANAX XR) 2 MG EXTENDED RELEASE TABLET    Take 2 mg by mouth every morning. AMPHETAMINE-DEXTROAMPHETAMINE (ADDERALL, 10MG,) 10 MG TABLET    Take 10 mg by mouth daily.     ARIPIPRAZOLE (ABILIFY) 15 MG TABLET    Take 15 mg by mouth nightly     BUSPIRONE (BUSPAR) 30 MG TABLET    Take by mouth every morning 45 mg AM, 15 mg PM (1.5 tab)    CETIRIZINE (ZYRTEC) 10 MG TABLET    Take 1 tablet by mouth daily    DULOXETINE (CYMBALTA) 60 MG CAPSULE    Take 120 mg by mouth daily     MIRTAZAPINE (REMERON) 15 MG TABLET    Take 7.5 mg by mouth nightly    NORETHINDRONE (MICRONOR) 0.35 MG TABLET    Take 1 tablet by mouth daily    OMEPRAZOLE (PRILOSEC) 40 MG DELAYED RELEASE CAPSULE    TAKE 1 CAPSULE BY MOUTH TWO TIMES A DAY     OXCARBAZEPINE (TRILEPTAL) 300 MG TABLET    Take 1,200 mg by mouth 2 times daily     QUETIAPINE (SEROQUEL XR) 50 MG EXTENDED RELEASE TABLET    Take 50 mg by mouth daily    QUETIAPINE (SEROQUEL) 200 MG TABLET    Take 300 mg by mouth nightly     SERTRALINE (ZOLOFT) 25 MG TABLET    Take 25 mg by mouth daily Take with 50mg to equal 75mg    SERTRALINE (ZOLOFT) 50 MG TABLET    Take 50 mg by mouth daily Take with 25mg to equal 75mg    TEMAZEPAM (RESTORIL) 30 MG CAPSULE    Take 30 mg by mouth nightly. ALLERGIES     is allergic to dilaudid [hydromorphone hcl]; flexeril [cyclobenzaprine]; keflex [cephalexin]; tessalon [benzonatate]; augmentin [amoxicillin-pot clavulanate]; lorabid [loracarbef]; and minocycline. FAMILY HISTORY     She indicated that her mother is alive. She indicated that her father is alive. She indicated that her brother is alive. She indicated that her maternal grandmother is . She indicated that her maternal grandfather is . She indicated that her paternal grandmother is . She indicated that her paternal grandfather is . She indicated that her maternal aunt is alive. She indicated that the status of her paternal aunt is unknown. She indicated that the status of her paternal uncle is unknown.   family history includes Anxiety Disorder in her father and mother; Bipolar Disorder in her father; Cancer in her maternal grandmother, paternal grandfather, and paternal uncle; Depression in her maternal grandfather, paternal aunt, and paternal uncle; Diabetes in her mother; High Blood Pressure in her father; Lupus in her maternal aunt; Mental Illness in her father; Parkinsonism in her father. SOCIAL HISTORY      reports that she has been smoking cigarettes. She started smoking about 19 months ago. She has been smoking about 1.00 pack per day. She has never used smokeless tobacco. She reports current alcohol use of about 1.0 standard drinks of alcohol per week. She reports current drug use. Drug: Marijuana. PHYSICAL EXAM     INITIAL VITALS:  height is 5' 10\" (1.778 m) and weight is 280 lb (127 kg). Her oral temperature is 98.2 °F (36.8 °C).  Her blood pressure is 135/78 and her pulse is 100. Her respiration is 17 and oxygen saturation is 99%. Physical Exam  Vitals signs and nursing note reviewed. Constitutional:       General: She is not in acute distress. Appearance: Normal appearance. She is well-developed. She is not ill-appearing, toxic-appearing or diaphoretic. HENT:      Head: Normocephalic and atraumatic. Right Ear: External ear normal.      Left Ear: External ear normal.      Nose: Nose normal.      Mouth/Throat:      Mouth: Mucous membranes are moist.      Pharynx: Oropharynx is clear. Eyes:      General: No scleral icterus. Right eye: No discharge. Left eye: No discharge. Conjunctiva/sclera: Conjunctivae normal.      Pupils: Pupils are equal, round, and reactive to light. Neck:      Musculoskeletal: Normal range of motion. Cardiovascular:      Rate and Rhythm: Normal rate. Pulses: Normal pulses. Dorsalis pedis pulses are 2+ on the right side. Posterior tibial pulses are 2+ on the right side. Comments: Capillary refill is less than 3 seconds. Pulmonary:      Effort: Pulmonary effort is normal. No respiratory distress. Musculoskeletal:         General: Swelling and tenderness present. No deformity. Right knee: Normal. She exhibits normal range of motion, no swelling, no effusion, no ecchymosis, no deformity, no laceration, no erythema, normal alignment, no LCL laxity, normal patellar mobility, normal meniscus and no MCL laxity. No tenderness found. Right ankle: She exhibits decreased range of motion and swelling. She exhibits no ecchymosis, no deformity, no laceration and normal pulse. Tenderness. Achilles tendon normal.      Right lower leg: Normal. She exhibits no tenderness, no swelling, no deformity and no laceration. Right foot: Normal. Normal range of motion and normal capillary refill. No tenderness, swelling, crepitus, deformity or laceration. Comments:  There is diffuse edema and tenderness 2/1/2020 6:36 AM          LABS:     Labs Reviewed   PREGNANCY, URINE   COMPREHENSIVE METABOLIC PANEL   CBC WITH AUTO DIFFERENTIAL       EMERGENCY DEPARTMENT COURSE:   Vitals:    Vitals:    02/01/20 0532 02/01/20 0713   BP: 135/78    Pulse: 96 100   Resp: 18 17   Temp: 98.2 °F (36.8 °C)    TempSrc: Oral    SpO2: 98% 99%   Weight: 280 lb (127 kg)    Height: 5' 10\" (1.778 m)        6:32 AM: The patient was seen and evaluated by myself at bedside. MDM:  The patient was seen and evaluated within the ED today with right ankle pain and swelling after an injury. Within the department, I observed the patient's vital signs to be within acceptable range. On exam, I appreciated diffuse edema and tenderness of the right ankle without associated bruising. There is no tenderness, edema, or bruising of the right foot or toes. The patient has good range of motion of the toes of her right foot. The patient has significantly reduced range of motion and strength of the right ankle secondary to her pain. There is intact sensation of soft touch in the RLE. The RLE appears to be neurovascularly intact. Radiologic studies within the department revealed an acute oblique fracture of the right distal fibular diaphysis which extends distally to the level of the tibiotalar articulation. There is disruption of the deltoid ligament with lateral subluxation of the talus, causing widening of the right medial ankle mortise measuring 1.2 cm. There is no posterior malleolar fracture. There is a joint effusion at the tibiotalar articulation. There is soft tissue swelling at the ankle. Within the department, the patient was treated with Norco.  I observed the patient's condition to remain stable during the duration of the stay. Dr. Samuel Davenport, Podiatry Resident, will perform a hematoma block to reduce the right talus, after which she states she will place a splint on the RLE.     I explained my proposed course of treatment to the patient, who was amenable to my treatment and admission decisions. Dr. Taylor Watson, Podiatry Resident, was consulted and kindly agreed to admit the patient for further evaluation and management. The patient remained stable and maintained stable vital signs until admission to the floor. CRITICAL CARE:   None    CONSULTS:  Dr. Taylor Watson, Podiatry Resident - Will reduce the lateral subluxation of the right talus as well as apply a splint to the right lower extremity. Can agree to admit the patient for further evaluation and management, which she reports will require surgical intervention to repair the patient's right ankle fracture. PROCEDURES:  Reduction and splinting of the right ankle performed by Dr. Taylor Watson, Podiatry Resident - please see her note    FINAL IMPRESSION      1. Other closed fracture of distal end of right fibula, initial encounter    2. Closed dislocation of right talus, initial encounter          DISPOSITION/PLAN   Admit under Podiatry services    PATIENT REFERRED TO:  No follow-up provider specified. DISCHARGE MEDICATIONS:  New Prescriptions    No medications on file       (Please note that portions of this note were completed with a voice recognition program.  Efforts were made to edit the dictations but occasionally words are mis-transcribed.)    The patient was given an opportunity to see the Emergency Attending. The patient voiced understanding that I was a Mid-LevelProvider and was in agreement with being seen independently by myself. Scribe:  Editha Lesches 2/1/20 6:32 AM Scribing for and in the presence of Lanre House PA-C. Signed by: Editha Lesches (Name), [place], 02/01/20 8:24 AM    Provider:  I personally performed the services described in the documentation, reviewed and edited the documentation which was dictated to the scribe in my presence, and it accurately records my words and actions.     Lanre House PA-C 2/1/20 8:24 AM       Lanre House PA-C  02/01/20 7003

## 2020-02-02 PROCEDURE — 97166 OT EVAL MOD COMPLEX 45 MIN: CPT

## 2020-02-02 PROCEDURE — 97535 SELF CARE MNGMENT TRAINING: CPT

## 2020-02-02 PROCEDURE — 1200000000 HC SEMI PRIVATE

## 2020-02-02 PROCEDURE — G0378 HOSPITAL OBSERVATION PER HR: HCPCS

## 2020-02-02 PROCEDURE — 2580000003 HC RX 258: Performed by: STUDENT IN AN ORGANIZED HEALTH CARE EDUCATION/TRAINING PROGRAM

## 2020-02-02 PROCEDURE — 97110 THERAPEUTIC EXERCISES: CPT

## 2020-02-02 PROCEDURE — 6360000002 HC RX W HCPCS: Performed by: STUDENT IN AN ORGANIZED HEALTH CARE EDUCATION/TRAINING PROGRAM

## 2020-02-02 PROCEDURE — 6370000000 HC RX 637 (ALT 250 FOR IP): Performed by: INTERNAL MEDICINE

## 2020-02-02 PROCEDURE — 6370000000 HC RX 637 (ALT 250 FOR IP): Performed by: PHYSICIAN ASSISTANT

## 2020-02-02 PROCEDURE — 97116 GAIT TRAINING THERAPY: CPT

## 2020-02-02 PROCEDURE — 6370000000 HC RX 637 (ALT 250 FOR IP): Performed by: STUDENT IN AN ORGANIZED HEALTH CARE EDUCATION/TRAINING PROGRAM

## 2020-02-02 PROCEDURE — 96365 THER/PROPH/DIAG IV INF INIT: CPT

## 2020-02-02 PROCEDURE — 97163 PT EVAL HIGH COMPLEX 45 MIN: CPT

## 2020-02-02 PROCEDURE — 96372 THER/PROPH/DIAG INJ SC/IM: CPT

## 2020-02-02 PROCEDURE — 96366 THER/PROPH/DIAG IV INF ADDON: CPT

## 2020-02-02 PROCEDURE — 2500000003 HC RX 250 WO HCPCS: Performed by: STUDENT IN AN ORGANIZED HEALTH CARE EDUCATION/TRAINING PROGRAM

## 2020-02-02 PROCEDURE — 99232 SBSQ HOSP IP/OBS MODERATE 35: CPT | Performed by: INTERNAL MEDICINE

## 2020-02-02 RX ORDER — OXCARBAZEPINE 300 MG/1
1200 TABLET, FILM COATED ORAL 2 TIMES DAILY
Status: DISCONTINUED | OUTPATIENT
Start: 2020-02-02 | End: 2020-02-03 | Stop reason: HOSPADM

## 2020-02-02 RX ORDER — QUETIAPINE FUMARATE 300 MG/1
300 TABLET, FILM COATED ORAL NIGHTLY
Status: ON HOLD | COMMUNITY
End: 2020-02-03 | Stop reason: HOSPADM

## 2020-02-02 RX ORDER — HYDROCODONE BITARTRATE AND ACETAMINOPHEN 5; 325 MG/1; MG/1
1 TABLET ORAL EVERY 4 HOURS PRN
Status: DISCONTINUED | OUTPATIENT
Start: 2020-02-02 | End: 2020-02-03 | Stop reason: HOSPADM

## 2020-02-02 RX ORDER — HYDROCODONE BITARTRATE AND ACETAMINOPHEN 5; 325 MG/1; MG/1
2 TABLET ORAL EVERY 4 HOURS PRN
Status: DISCONTINUED | OUTPATIENT
Start: 2020-02-02 | End: 2020-02-03 | Stop reason: HOSPADM

## 2020-02-02 RX ORDER — FAMOTIDINE 20 MG/1
20 TABLET, FILM COATED ORAL NIGHTLY
Status: DISCONTINUED | OUTPATIENT
Start: 2020-02-02 | End: 2020-02-03 | Stop reason: HOSPADM

## 2020-02-02 RX ORDER — BUSPIRONE HYDROCHLORIDE 7.5 MG/1
15 TABLET ORAL NIGHTLY
Status: DISCONTINUED | OUTPATIENT
Start: 2020-02-02 | End: 2020-02-03 | Stop reason: HOSPADM

## 2020-02-02 RX ORDER — QUETIAPINE FUMARATE 100 MG/1
300 TABLET, FILM COATED ORAL NIGHTLY
Status: DISCONTINUED | OUTPATIENT
Start: 2020-02-02 | End: 2020-02-03 | Stop reason: HOSPADM

## 2020-02-02 RX ORDER — QUETIAPINE FUMARATE 50 MG/1
50 TABLET, FILM COATED ORAL DAILY
Status: ON HOLD | COMMUNITY
End: 2020-02-03 | Stop reason: HOSPADM

## 2020-02-02 RX ORDER — DULOXETIN HYDROCHLORIDE 60 MG/1
120 CAPSULE, DELAYED RELEASE ORAL DAILY
Status: DISCONTINUED | OUTPATIENT
Start: 2020-02-02 | End: 2020-02-03 | Stop reason: HOSPADM

## 2020-02-02 RX ORDER — QUETIAPINE FUMARATE 25 MG/1
50 TABLET, FILM COATED ORAL DAILY
Status: DISCONTINUED | OUTPATIENT
Start: 2020-02-03 | End: 2020-02-03 | Stop reason: HOSPADM

## 2020-02-02 RX ADMIN — CLINDAMYCIN PHOSPHATE 900 MG: 900 INJECTION, SOLUTION INTRAVENOUS at 18:32

## 2020-02-02 RX ADMIN — OXCARBAZEPINE 1200 MG: 300 TABLET, FILM COATED ORAL at 21:53

## 2020-02-02 RX ADMIN — HYDROCODONE BITARTRATE AND ACETAMINOPHEN 2 TABLET: 5; 325 TABLET ORAL at 13:00

## 2020-02-02 RX ADMIN — FAMOTIDINE 20 MG: 20 TABLET ORAL at 21:48

## 2020-02-02 RX ADMIN — CLINDAMYCIN PHOSPHATE 900 MG: 900 INJECTION, SOLUTION INTRAVENOUS at 03:32

## 2020-02-02 RX ADMIN — SODIUM CHLORIDE, PRESERVATIVE FREE 10 ML: 5 INJECTION INTRAVENOUS at 21:56

## 2020-02-02 RX ADMIN — HYDROCODONE BITARTRATE AND ACETAMINOPHEN 2 TABLET: 5; 325 TABLET ORAL at 21:48

## 2020-02-02 RX ADMIN — HYDROCODONE BITARTRATE AND ACETAMINOPHEN 2 TABLET: 5; 325 TABLET ORAL at 08:56

## 2020-02-02 RX ADMIN — HYDROCODONE BITARTRATE AND ACETAMINOPHEN 1 TABLET: 5; 325 TABLET ORAL at 03:32

## 2020-02-02 RX ADMIN — CLINDAMYCIN PHOSPHATE 900 MG: 900 INJECTION, SOLUTION INTRAVENOUS at 11:24

## 2020-02-02 RX ADMIN — SODIUM CHLORIDE: 9 INJECTION, SOLUTION INTRAVENOUS at 06:38

## 2020-02-02 RX ADMIN — ENOXAPARIN SODIUM 40 MG: 40 INJECTION SUBCUTANEOUS at 11:24

## 2020-02-02 RX ADMIN — QUETIAPINE FUMARATE 300 MG: 100 TABLET ORAL at 21:53

## 2020-02-02 RX ADMIN — BUSPIRONE HYDROCHLORIDE 15 MG: 7.5 TABLET ORAL at 21:52

## 2020-02-02 RX ADMIN — HYDROCODONE BITARTRATE AND ACETAMINOPHEN 2 TABLET: 5; 325 TABLET ORAL at 17:47

## 2020-02-02 ASSESSMENT — PAIN SCALES - GENERAL
PAINLEVEL_OUTOF10: 10
PAINLEVEL_OUTOF10: 8
PAINLEVEL_OUTOF10: 10
PAINLEVEL_OUTOF10: 7
PAINLEVEL_OUTOF10: 10
PAINLEVEL_OUTOF10: 8
PAINLEVEL_OUTOF10: 8
PAINLEVEL_OUTOF10: 0
PAINLEVEL_OUTOF10: 7
PAINLEVEL_OUTOF10: 10

## 2020-02-02 ASSESSMENT — PAIN DESCRIPTION - PROGRESSION: CLINICAL_PROGRESSION: GRADUALLY WORSENING

## 2020-02-02 ASSESSMENT — PAIN - FUNCTIONAL ASSESSMENT: PAIN_FUNCTIONAL_ASSESSMENT: PREVENTS OR INTERFERES SOME ACTIVE ACTIVITIES AND ADLS

## 2020-02-02 ASSESSMENT — PAIN DESCRIPTION - PAIN TYPE
TYPE: SURGICAL PAIN
TYPE: SURGICAL PAIN

## 2020-02-02 ASSESSMENT — PAIN DESCRIPTION - LOCATION: LOCATION: ANKLE

## 2020-02-02 ASSESSMENT — PAIN DESCRIPTION - ONSET: ONSET: ON-GOING

## 2020-02-02 ASSESSMENT — PAIN DESCRIPTION - FREQUENCY: FREQUENCY: CONTINUOUS

## 2020-02-02 ASSESSMENT — PAIN DESCRIPTION - DESCRIPTORS: DESCRIPTORS: ACHING;THROBBING;SHARP;BURNING

## 2020-02-02 ASSESSMENT — PAIN DESCRIPTION - ORIENTATION: ORIENTATION: RIGHT

## 2020-02-02 NOTE — PROGRESS NOTES
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 Pt Had sx 2-1 for ORIF  and deltoid ligament repair on right LE     Restrictions/Precautions:  Restrictions/Precautions: Weight Bearing, Fall Risk  Right Lower Extremity Weight Bearing: Non Weight Bearing  Position Activity Restriction  Other position/activity restrictions: NWB right LE    Subjective  Chart Reviewed: Yes, Imaging, Orders, Labs, Progress Notes, History and Physical  Patient assessed for rehabilitation services?: Yes  Family / Caregiver Present: Yes    Subjective: Pt on BSC upon OT arrival, agreeable to OT session    Pain:  Pain Assessment  Patient Currently in Pain: Yes  Pain Assessment: 0-10  Pain Level: 8  Pain Type: Surgical pain    Social/Functional History:  Lives With: (staying with parents)  Type of Home: House  Home Layout: One level  Home Access: Stairs to enter without rails  Entrance Stairs - Number of Steps: 2 plus 2   Home Equipment: Roll About, Rolling walker   Bathroom Shower/Tub: (plans to sponge bathe)  Bathroom Toilet: Standard  Bathroom Accessibility: (unsure if walker will fit in BR)    Receives Help From: Family  ADL Assistance: Independent  Homemaking Assistance: Independent  Homemaking Responsibilities: Yes  Ambulation Assistance: Independent  Transfer Assistance: Independent          Additional Comments: no ADs pta, living with boyfriend PTA, attends [employer]    Cognition/Orientation:  Overall Orientation Status: Within Normal Limits  Overall Cognitive Status: WNL    ADL's:  Toileting: Maximum assistance(Wiping bottom )       Functional Mobility:  Bed mobility  Sit to Supine: Stand by assistance  Scooting: Stand by assistance    Functional Mobility  Functional - Mobility Device: Rolling Walker  Activity: Other  Assist Level: Contact guard assistance  Functional Mobility Comments: BSC to EOB     Balance:  Balance  Sitting Balance: Supervision  Standing Balance: Contact guard assistance  Standing Balance  Time: during hygiene x 1 min    Transfers:  Sit to stand: Contact guard assistance  Stand to sit: Contact guard assistance  Toilet Transfers  Toilet - Technique: Ambulating  Equipment Used: Standard bedside commode  Toilet Transfer: Contact guard assistance    Upper Extremity Assessment:   LUE AROM : WNL  RUE AROM : WNL    LUE Strength  Gross LUE Strength: WFL  RUE Strength  Gross RUE Strength: WFL          Lengthy education on academic accommodations for eventual return to school. Activity Tolerance: Patient limited by fatigue, Patient limited by pain       Assessment:  Assessment: Pt s/p displaced fx of fibula. Pt exhibiting deficits detailed below, requiring additional OT intervention to restore independent PLOF. Pt now requiring assist for ADLs, IADLs, mobility, t/fs, and standing balance. Pt with significant increase in burden of care. Without skilled OT intervention pt at risk for functional decline, increased falls, and readmission to hospital.    Performance deficits / Impairments: Decreased functional mobility , Decreased strength, Decreased endurance, Decreased ADL status, Decreased balance, Decreased high-level IADLs  Prognosis: Good  REQUIRES OT FOLLOW UP: Yes    Treatment Initiated: Treatment and education initiated within context of evaluation.   Evaluation time included review of current medical information, gathering information related to past medical, social and functional history, completion of standardized testing, formal and informal

## 2020-02-02 NOTE — PROGRESS NOTES
Spoke with niki pharmacist for recommendations for pain medication since patient has just found out she is pregnant patient currently has 1 norco q6h prn ordered. Per Vanna Hahn pharmacist it would be safe for patient to have 1-2 norco tabs q4h prn.  Notified Dr Samuel Davenport with podiatry and received order for norco q4h prn 1-2 tabs

## 2020-02-02 NOTE — CONSULTS
Facility-Administered Medications   Medication Dose Route Frequency Provider Last Rate Last Dose    bupivacaine (PF) (MARCAINE) 0.5 % injection 150 mg  30 mL Intradermal Once [place], [place]        0.9 % sodium chloride infusion   Intravenous Continuous Prashant [place], DP 50 mL/hr at 02/01/20 1818      enoxaparin (LOVENOX) injection 40 mg  40 mg Subcutaneous Daily [facility] [place]        clindamycin (CLEOCIN) 900 mg in dextrose 5 % 50 mL IVPB  900 mg Intravenous Q8H [facility], DPM 50 mL/hr at 02/01/20 2025 900 mg at 02/01/20 2025    sodium chloride flush 0.9 % injection 10 mL  10 mL Intravenous 2 times per day Prashant Colorado, DPM        sodium chloride flush 0.9 % injection 10 mL  10 mL Intravenous PRN Prashant [place], [facility]        ondansetron [facility]) injection 4 mg  4 mg Intravenous Q6H PRN Prashant [place], DPM        HYDROcodone-acetaminophen (NORCO) 5-325 MG per tablet 1 tablet  1 tablet Oral Q6H PRN Landy Kaleta Alexei, PA-C        nicotine (NICODERM CQ) 14 MG/24HR 1 patch  1 patch Transdermal Daily Jaylan Ramírez PA-C           Review of Systems  Review of Systems   Musculoskeletal: Positive for arthralgias and joint swelling. All other systems reviewed and are negative. Physical Exam  Physical Exam  Vitals signs and nursing note reviewed. Constitutional:       Appearance: Normal appearance. She is obese. HENT:      Head: Normocephalic and atraumatic. Nose: Nose normal.      Mouth/Throat:      Mouth: Mucous membranes are moist.      Pharynx: Oropharynx is clear. Eyes:      Extraocular Movements: Extraocular movements intact. Pupils: Pupils are equal, round, and reactive to light. Neck:      Musculoskeletal: Normal range of motion. Cardiovascular:      Rate and Rhythm: Normal rate and regular rhythm. Pulses: Normal pulses. Heart sounds: Normal heart sounds.    Pulmonary:      Effort: Pulmonary effort is normal.      Breath

## 2020-02-02 NOTE — PROGRESS NOTES
Assistance  Dynamic Standing Balance: Contact Guard Assistance    Bed Mobility:  Sit to Supine: Modified Independent   Scooting: Modified Independent    Transfers:  Sit to Stand: Stand By Assistance  Stand to Sit:Stand By Assistance    Ambulation:  Contact Guard Assistance, Minimal Assistance, X 1, with increased time for completion  Distance: 25 feet  Surface: Level Tile  Device:Rolling Walker  Gait Deviations: Forward Flexed Posture, Unsteady Gait with RW, so pt was also educated in rollabout use and she only requires SBA with this. Steady with rollabout   Stairs:  Contact Guard Assistance, X 1, with increased time for completion, with 2 HRs-at home she'll have her dad and fiance on each side since she does not have rails at home  Number of Steps: 4  Height: 8\" step with Bilateral Handrails  Exercise:  Patient was guided in 1 set(s) 10 reps of exercise to both lower extremities. Glut sets, Quad sets, Short arc quads, Hip abduction/adduction, Straight leg raises and and ankle pumps on the left LE. Exercises were completed for increased independence with functional mobility. Functional Outcome Measures: Completed  -PAC Inpatient Mobility without Stair Climbing Raw Score : 18  -PAC Inpatient without Stair Climbing T-Scale Score : 51.97    ASSESSMENT:  Activity Tolerance:  Patient tolerance of  treatment: good. Treatment Initiated: Treatment and education initiated within context of evaluation. Evaluation time included review of current medical information, gathering information related to past medical, social and functional history, completion of standardized testing, formal and informal observation of tasks, assessment of data and development of plan of care and goals. Treatment time included skilled education and facilitation of tasks to increase safety and independence with functional mobility for improved independence and quality of life. Assessment:   Body structures, Functions, Activity

## 2020-02-02 NOTE — PROGRESS NOTES
Hospitalist Progress Note    Patient:  Kei Pace      Unit/Bed:7K-18/018-A    YOB: 1995    MRN: [id number]       Acct: [de-identified]     PCP: LEVI Atkinson CNP    Date of Admission: 2/1/2020    Assessment/Plan:    Closed right ankle fx/dislocation - management per primary service    Bipolar 1, anxiety, depression  Had lengthy discussion with the patient and family about her medications - overall I recommended she have discussion with OB AND psychiatrist - they plan to talk to their psychiatrist tomorrow (they do not follow locally and do not want local consultation). We discussed pregnancy categories as well as ACOG guidelines for psychiatric medications. She doesn't take Xanax or Restoril regularly and they are category D and X, respectively, so they will not be ordered at this time. She wishes to hold Abilify and Remeron at this time as well. Her other medications are category B (buspar) or C and given her serious difficulties with psychiatric stability she wishes to continue taking them and has been made aware that this may result in harm to the fetus, but there is no obvious documented teratogenic effects of the medications she wishes to currently take. GERD, Sz, Asthma - c/w home medications as otherwise ordered    Hospitalists will sign off at this time, recommendation as above, please call if any acute issues we can help address        Expected discharge date:  2/3    Disposition:    [x] Home       [] TCU       [] Rehab       [] Psych       [] SNF       [] [place]       [] Other-    Chief Complaint: Ankle fx, med mgmt of psychiatric issues in pregnancy    Hospital Course: From consult note: \"Consulted for post operative management of patient with right ankle ORIF. Patient had a fall at home. Patient found to have a positive urine pregnancy test and quants consistent with two weeks gestation. The patient is a daily smoker with a history of PE.   She is minor errors which are inherent in voice recognition technology. **      Final report electronically signed by Dr. Nate Gomez on 2/1/2020 10:12 AM      XR ANKLE RIGHT (MIN 3 VIEWS)   Final Result   1. There is an acute oblique fracture of the distal fibular diaphysis which extends distally to the level of the tibiotalar articulation. There is disruption of the deltoid ligament with lateral subluxation of the talus causing widening of the medial    ankle mortise measuring 1.2 cm. There is no posterior malleolar fracture. There is a joint effusion at the tibiotalar articulation. There is soft tissue swelling at the ankle. **This report has been created using voice recognition software. It may contain minor errors which are inherent in voice recognition technology. **      Final report electronically signed by Dr. Nate Gomez on 2/1/2020 6:36 AM          DVT prophylaxis: [x] Lovenox                                 [] SCDs                                 [] SQ Heparin                                 [] Encourage ambulation           [] Already on Anticoagulation     Code Status: Full Code      Active Hospital Problems    Diagnosis Date Noted    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 [S82.431K] 02/01/2020    Closed right ankle fracture, initial encounter [S82.891A] 02/01/2020    Status post open reduction with internal fixation (ORIF) of fracture of ankle [Z98.890, Z87.81] 02/01/2020    Tear of deltoid ligament of ankle, right, initial encounter [J75.241M] 02/01/2020       Electronically signed by Raissa Bañuelos DO on 2/2/2020 at 6:23 PM

## 2020-02-02 NOTE — PROGRESS NOTES
Podiatric Progress Note  Deana Dailey  Subjective :   2/2/20  Patient seen bedside today on behalf of Dr. Letitia Mejia. Patient appeared pleasant, was oriented to person, place and time and in no acute distress. Patient reports BM and denies abdominal pain. Patient states she is ready to discharge home today. Patient denies N/V/F/C/SOB or CP. Patient has no further pedal concerns at this point in time. 2/1/20  The patient is a 25 y.o. female with significant past medical history of asthma, fibromyalgia, thyroid disease and seizures who is being seen at bedside on behalf of Dr. Letitia Mejia. Patient reports getting out of bed this morning and stepping wrong. She has a history of ankle instability and injuring her right ankle. About 2 years ago she injured her right ankle and participated in water therapy. She was informed at that time that if she injured her ankle again she would require surgical repair. Patient states she fell to the ground but denies hitting her head. Current pain is reported as 8/10. She did not try to treat her injury prior to coming to ED. Patient is in and out of sleep, she reports taking sleeping medications. Patient's boyfriend is bedside.     Current Medications:    Current Facility-Administered Medications: HYDROcodone-acetaminophen (NORCO) 5-325 MG per tablet 1 tablet, 1 tablet, Oral, Q4H PRN **OR** HYDROcodone-acetaminophen (NORCO) 5-325 MG per tablet 2 tablet, 2 tablet, Oral, Q4H PRN  bupivacaine (PF) (MARCAINE) 0.5 % injection 150 mg, 30 mL, Intradermal, Once  0.9 % sodium chloride infusion, , Intravenous, Continuous  enoxaparin (LOVENOX) injection 40 mg, 40 mg, Subcutaneous, Daily  clindamycin (CLEOCIN) 900 mg in dextrose 5 % 50 mL IVPB, 900 mg, Intravenous, Q8H  sodium chloride flush 0.9 % injection 10 mL, 10 mL, Intravenous, 2 times per day  sodium chloride flush 0.9 % injection 10 mL, 10 mL, Intravenous, PRN  ondansetron (ZOFRAN) injection 4 mg, 4 mg, Intravenous, Q6H PRN  nicotine (NICODERM CQ) 14 MG/24HR 1 patch, 1 patch, Transdermal, Daily    Objective     /74   Pulse 91   Temp 99.2 °F (37.3 °C) (Oral)   Resp 18   Ht 5' 10\" (1.778 m)   Wt 280 lb (127 kg)   SpO2 98%   BMI 40.18 kg/m²      I/O:    Intake/Output Summary (Last 24 hours) at 2/2/2020 1210  Last data filed at 2/2/2020 1017  Gross per 24 hour   Intake 1857 ml   Output 2000 ml   Net -143 ml              Wt Readings from Last 3 Encounters:   02/01/20 280 lb (127 kg)   01/15/20 290 lb 6.4 oz (131.7 kg)   12/30/19 285 lb 11.2 oz (129.6 kg)       LABS:    Recent Labs     02/01/20  0854   WBC 11.5*   HGB 12.1   HCT 39.0           Recent Labs     02/01/20  0854      K 4.0      CO2 25   BUN 11   CREATININE 0.5        Recent Labs     02/01/20  0854   PROT 7.1      No results for input(s): CKTOTAL, CKMB, CKMBINDEX, TROPONINI in the last 72 hours. Exam:   Right foot dressing intact and well maintained. No apparent strike through noted.      Vascular: CFT brisk to exposed digits.      Dermatologic: Right foot dressing intact, no strike through noted.      Neurovascular: Light touch sensation grossly intact at the digits.     Musculoskeletal: Rectus alignment of foot, rectus alignment of ankle. Patient able to df/pf digits. IMAGING  XR ANKLE RIGHT (MIN 3 VIEWS) - PRE REDUCTION  Impression   1. There is an acute oblique fracture of the distal fibular diaphysis which extends distally to the level of the tibiotalar articulation. There is disruption of the deltoid ligament with lateral subluxation of the talus causing widening of the medial    ankle mortise measuring 1.2 cm. There is no posterior malleolar fracture. There is a joint effusion at the tibiotalar articulation. There is soft tissue swelling at the ankle.               **This report has been created using voice recognition software.  It may contain minor errors which are inherent in voice recognition technology. **       Final report electronically signed by Dr. My Cannon on 2/1/2020 6:36 AM       XR ANKLE RIGHT (2 VIEWS) - POST REDUCTION  Impression   1. There is an acute oblique fracture of the distal fibular diaphysis which extends inferiorly to the level of the tibiotalar articulation. There is interval reduction with persistent 0.3 cm posterior displacement/distraction of the distal bone fragment.   Kathrine Dart is also less lateral subluxation of the talus with reduction of the ankle mortise now measuring 0.6 cm medially. There is soft tissue swelling at the ankle and an overlying splint.               **This report has been created using voice recognition software.  It may contain minor errors which are inherent in voice recognition technology. **       Final report electronically signed by Dr. My Cnanon on 2/1/2020 10:12 AM       Assessment and Plan  Principle  # Closed Right Ankle Fracture/Dislocation  -Patient initially examined and evaluated  -Xrays previously reviewed: Pre-reduction, fibular fracture noted and talus translated laterally. Post reduction, anatomical alignment of tibia and talus noted  -D/c hold anticoagulants  -Right foot dressing intact and well maintained.  No apparent strike through noted  -NWB RLE  -PT following  -OT consulted  -Positive pregnancy test.  PO Norco okay, No NSAIDs  -Okay to d/c home on 2/3/20 w/ PO Romney and f/u w/ Dr. Aimee Holt as outpatient    #H/o PE  -Lovenox for DVT prophylaxis  -Hospitalist following      DISPO: Okay to d/c home on 2/3/20 w/ PO Romney and f/u w/ Dr. Aimee Holt as outpatient      Electronically signed by Clara Benson DPM - PGY1 on 2/2/2020 at 12:10 PM

## 2020-02-03 ENCOUNTER — TELEPHONE (OUTPATIENT)
Dept: FAMILY MEDICINE CLINIC | Age: 25
End: 2020-02-03

## 2020-02-03 VITALS
SYSTOLIC BLOOD PRESSURE: 114 MMHG | WEIGHT: 280 LBS | TEMPERATURE: 97.4 F | HEIGHT: 70 IN | OXYGEN SATURATION: 96 % | HEART RATE: 114 BPM | RESPIRATION RATE: 16 BRPM | BODY MASS INDEX: 40.09 KG/M2 | DIASTOLIC BLOOD PRESSURE: 71 MMHG

## 2020-02-03 LAB — HCG,BETA SUBUNIT,QUAL,SERUM: 179.1 MIU/ML (ref 0–5)

## 2020-02-03 PROCEDURE — 6370000000 HC RX 637 (ALT 250 FOR IP): Performed by: PHYSICIAN ASSISTANT

## 2020-02-03 PROCEDURE — 6370000000 HC RX 637 (ALT 250 FOR IP): Performed by: INTERNAL MEDICINE

## 2020-02-03 PROCEDURE — 97535 SELF CARE MNGMENT TRAINING: CPT

## 2020-02-03 PROCEDURE — 96372 THER/PROPH/DIAG INJ SC/IM: CPT

## 2020-02-03 PROCEDURE — 97530 THERAPEUTIC ACTIVITIES: CPT

## 2020-02-03 PROCEDURE — 36415 COLL VENOUS BLD VENIPUNCTURE: CPT

## 2020-02-03 PROCEDURE — 84702 CHORIONIC GONADOTROPIN TEST: CPT

## 2020-02-03 PROCEDURE — G0378 HOSPITAL OBSERVATION PER HR: HCPCS

## 2020-02-03 PROCEDURE — 96375 TX/PRO/DX INJ NEW DRUG ADDON: CPT

## 2020-02-03 PROCEDURE — 96366 THER/PROPH/DIAG IV INF ADDON: CPT

## 2020-02-03 PROCEDURE — 6360000002 HC RX W HCPCS: Performed by: STUDENT IN AN ORGANIZED HEALTH CARE EDUCATION/TRAINING PROGRAM

## 2020-02-03 PROCEDURE — 2580000003 HC RX 258: Performed by: STUDENT IN AN ORGANIZED HEALTH CARE EDUCATION/TRAINING PROGRAM

## 2020-02-03 PROCEDURE — 6370000000 HC RX 637 (ALT 250 FOR IP): Performed by: STUDENT IN AN ORGANIZED HEALTH CARE EDUCATION/TRAINING PROGRAM

## 2020-02-03 PROCEDURE — 2500000003 HC RX 250 WO HCPCS: Performed by: STUDENT IN AN ORGANIZED HEALTH CARE EDUCATION/TRAINING PROGRAM

## 2020-02-03 RX ORDER — HYDROCODONE BITARTRATE AND ACETAMINOPHEN 5; 325 MG/1; MG/1
1 TABLET ORAL EVERY 4 HOURS PRN
Qty: 42 TABLET | Refills: 0 | Status: SHIPPED | OUTPATIENT
Start: 2020-02-03 | End: 2020-02-10

## 2020-02-03 RX ORDER — CLINDAMYCIN HYDROCHLORIDE 300 MG/1
300 CAPSULE ORAL 2 TIMES DAILY
Qty: 14 CAPSULE | Refills: 0 | Status: SHIPPED | OUTPATIENT
Start: 2020-02-03 | End: 2020-02-10

## 2020-02-03 RX ORDER — HYDROCODONE BITARTRATE AND ACETAMINOPHEN 5; 325 MG/1; MG/1
1 TABLET ORAL EVERY 6 HOURS PRN
Qty: 10 TABLET | Refills: 0 | Status: SHIPPED | OUTPATIENT
Start: 2020-02-03 | End: 2020-02-10

## 2020-02-03 RX ADMIN — ENOXAPARIN SODIUM 40 MG: 40 INJECTION SUBCUTANEOUS at 10:37

## 2020-02-03 RX ADMIN — HYDROCODONE BITARTRATE AND ACETAMINOPHEN 2 TABLET: 5; 325 TABLET ORAL at 05:50

## 2020-02-03 RX ADMIN — OXCARBAZEPINE 1200 MG: 300 TABLET, FILM COATED ORAL at 10:39

## 2020-02-03 RX ADMIN — CLINDAMYCIN PHOSPHATE 900 MG: 900 INJECTION, SOLUTION INTRAVENOUS at 04:34

## 2020-02-03 RX ADMIN — BUSPIRONE HYDROCHLORIDE 45 MG: 10 TABLET ORAL at 10:35

## 2020-02-03 RX ADMIN — HYDROCODONE BITARTRATE AND ACETAMINOPHEN 2 TABLET: 5; 325 TABLET ORAL at 01:47

## 2020-02-03 RX ADMIN — HYDROCODONE BITARTRATE AND ACETAMINOPHEN 2 TABLET: 5; 325 TABLET ORAL at 10:35

## 2020-02-03 RX ADMIN — CLINDAMYCIN PHOSPHATE 900 MG: 900 INJECTION, SOLUTION INTRAVENOUS at 10:36

## 2020-02-03 RX ADMIN — SODIUM CHLORIDE, PRESERVATIVE FREE 10 ML: 5 INJECTION INTRAVENOUS at 09:04

## 2020-02-03 RX ADMIN — QUETIAPINE FUMARATE 50 MG: 25 TABLET ORAL at 10:35

## 2020-02-03 RX ADMIN — DULOXETINE HYDROCHLORIDE 120 MG: 60 CAPSULE, DELAYED RELEASE ORAL at 09:04

## 2020-02-03 RX ADMIN — SERTRALINE 75 MG: 50 TABLET, FILM COATED ORAL at 09:04

## 2020-02-03 RX ADMIN — ONDANSETRON 4 MG: 2 INJECTION INTRAMUSCULAR; INTRAVENOUS at 09:04

## 2020-02-03 ASSESSMENT — PAIN DESCRIPTION - FREQUENCY: FREQUENCY: CONTINUOUS

## 2020-02-03 ASSESSMENT — PAIN SCALES - GENERAL
PAINLEVEL_OUTOF10: 10
PAINLEVEL_OUTOF10: 7
PAINLEVEL_OUTOF10: 8
PAINLEVEL_OUTOF10: 10
PAINLEVEL_OUTOF10: 7

## 2020-02-03 ASSESSMENT — PAIN - FUNCTIONAL ASSESSMENT: PAIN_FUNCTIONAL_ASSESSMENT: PREVENTS OR INTERFERES SOME ACTIVE ACTIVITIES AND ADLS

## 2020-02-03 ASSESSMENT — PAIN DESCRIPTION - ORIENTATION: ORIENTATION: RIGHT

## 2020-02-03 ASSESSMENT — PAIN DESCRIPTION - PROGRESSION: CLINICAL_PROGRESSION: NOT CHANGED

## 2020-02-03 ASSESSMENT — PAIN DESCRIPTION - ONSET: ONSET: ON-GOING

## 2020-02-03 ASSESSMENT — PAIN DESCRIPTION - DESCRIPTORS: DESCRIPTORS: ACHING

## 2020-02-03 ASSESSMENT — PAIN DESCRIPTION - PAIN TYPE: TYPE: SURGICAL PAIN

## 2020-02-03 ASSESSMENT — PAIN DESCRIPTION - LOCATION: LOCATION: ANKLE

## 2020-02-03 NOTE — CARE COORDINATION
2/3/20, 2:56 PM  DISCHARGE PLANNING EVALUATION:    [facility]       Admitted from: ER, patient presented with right ankle pain. 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 Admit order signed?: yes  PMH:  has a past medical history of ADD (attention deficit disorder), Anxiety, Anxiety attack, Asthma, Atypical face pain, Back pain, Bipolar 1 disorder ([address] Fibromyalgia, GERD (gastroesophageal reflux disease), Hypotension, Major depressive disorder, recurrent episode, mild ([address] Obesity, Pneumonia, POTS (postural orthostatic tachycardia syndrome), Pott disease, Pulmonary emboli ([address] Seizures ([address] Tailbone injury, Thyroid disease, TMJ (dislocation of temporomandibular joint), Trigeminal neuralgia, and Wears contact lenses. Procedure:   2/01/2020   1. Primary ORIF right fibula  2. Primary repair of deltoid ligament rupture, right  Pertinent abnormal Imaging:X-rays of right ankle. Medications:  Scheduled Meds:   sertraline  75 mg Oral Daily    QUEtiapine  300 mg Oral Nightly    QUEtiapine  50 mg Oral Daily    OXcarbazepine  1,200 mg Oral BID    DULoxetine  120 mg Oral Daily    busPIRone  45 mg Oral QAM    busPIRone  15 mg Oral Nightly    famotidine  20 mg Oral Nightly    bupivacaine (PF)  30 mL Intradermal Once    enoxaparin  40 mg Subcutaneous Daily    clindamycin (CLEOCIN) IV  900 mg Intravenous Q8H    sodium chloride flush  10 mL Intravenous 2 times per day    nicotine  1 patch Transdermal Daily     Continuous Infusions:   Pertinent Info/Orders/Treatment Plan:  Discharge orders on chart. Diet: DIET GENERAL;   Smoking status:  reports that she has been smoking cigarettes. She started smoking about 19 months ago. She has been smoking about 1.00 pack per day.  She has never used smokeless tobacco.   PCP: LEVI Pierce - CNP  Readmission 30 days or less: No  Readmission Risk Score: 11%    Discharge Planning Evaluation  Current Residence:  Private Residence  Living Arrangements:  Spouse/Significant Other   Support Systems:  Spouse/Significant Other, Parent  Current Services PTA:     Potential Assistance Needed:  Outpatient PT/OT  Potential Assistance Purchasing Medications:  No  Does patient want to participate in local refill/ meds to beds program?  No  Type of Home Care Services:  PT, OT  Patient expects to be discharged to:  home  Expected Discharge date:  02/03/20  Follow Up Appointment: Best Day/ Time: (anytime)    Patient Goals/Plan/Treatment Preferences: Patient was discharged prior to this CM meeting with her. She has insurance and a PCP. Transportation/Food Security/Housekeeping Addressed:  No issues identified.  Evaluation: no  2/4/20, 7:18 AM    Patient goals/plan/ treatment preferences discussed by  and . Patient goals/plan/ treatment preferences reviewed with patient/ family. Patient/ family verbalize understanding of discharge plan and are in agreement with goal/plan/treatment preferences. Understanding was demonstrated using the teach back method. AVS provided by RN at time of discharge, which includes all necessary medical information pertaining to the patients current course of illness, treatment, post-discharge goals of care, and treatment preferences.

## 2020-02-03 NOTE — TELEPHONE ENCOUNTER
.Transition of Care visit scheduled.   2/10/2020  Patient is being discharged to home  Date of discharge 02/03/20  Discharge from facility [facility]  Reason for admission ankle fracture, bipolar, pregnant

## 2020-02-03 NOTE — OP NOTE
[address]    Name Brooke Warner                                                             : 1995  MEDICAL RECORD NO. [id number]    DATE: 2/3/2020    Surgeon: Fernando Beasley DPM    Assist: Waylon Echeverria, PGY I    Pre-operative Diagnosis:    1. Right ankle fracture   2.  right deltoid ligament rupture    Post-operative Diagnosis:    Same    Procedure:    1. Primary ORIF right fibula   2, primary repair of deltoid ligament rupture, right    Anesthesia: Spinal    Hemostasis: A well padded pneumatic thigh tourniquet at 300 mmHG for 45 minutes    Estimated Blood Loss: Tourniquet +10 cc    Materials: [employer] VLP fibular plate with corresponding 3.5 mm locking screws, 1 2.7 mm interfrag screw, Arthrex internal brace, Brittney reelx    Injectables: Not applicable    Condition: Stable    Complications: none     Specimens: were not obtained    INDICATIONS:  The patient is a 25 y.o. female who presented to the ER after sustaining a right lower extremity trauma. Patient was found to have a moderate displaced right ankle fracture with concomitant deltoid ligament rupture. Patient was closed reduced in the OR and splinted. Based on nature location of fracture fragments need for surgical intervention in the form of a primary ORIF of the ankle was indicated. Patient was found to be have a positive urine hCG which was read confirmed with blood work. Consulted Kailee Vladivia prior to surgical intervention at which point we proceeded with spinal anethesia. Patient was seen pre-operatively. Consent was reviewed and signed, operative limb marked. All questions and concerns regarding the case were addressed. The patient was then transferred to the operative room and place in a supine position. Time out taken, verifying patient and planned procedure. A well padded thigh tourniquet was applied to the right leg.  Patient was administered spinal anesthesia. Surgical site  was then prepped using chlorhexadine and draped using sterile technique. Procedure In Detail  1. Primary ORIF right fibula  Attention was directed to the lateral aspect of the right ankle. A 15 cm incision was made full-thickness with a 15 scalpel and followed blunt dissection with Metzenbaum scissors electrocautery Bovie. The periosteum was subdivided into anterior posterior halves allowing for visualization of a long posterior spike fibular midshaft fracture. This was close reduced using a point-to-point reduction clamp followed by 2.7 mm interfrag screw. A secondary buttress plate was then applied laterally consisting of a 7 hole VLP [employer] plate with corresponding 3.5 mm locking screws unicortical distal bicortical proximal.    2, primary repair of deltoid ligament rupture, right  Following primary repair of the fibular fracture attention was directed medially. Under fluoroscopic imaging it was found that she had a complete rupture of the deltoid ligament with opening of her medial ankle gutter. A curvilinear incision was made along the medial malleolus extending up to the talar neck. The posterior tibial and flexor digitorum longus tendons were identified and retracted dorsally allowing for visualization of the underlying sustentaculum celia. A swivel lock anchor was 4.75 mm in diameter was placed in the medial malleolus under fluoroscopic imaging. A secondary 3.75 mm anchor was then placed within the medial talar neck as well as a 4.5 [place] anchor within the sustentaculum of the calcaneus. Patient was noted to have good apposition and realignment of the ankle status post open deltoid repair. Operative sites were then flushed and closed in layered fashion consisting of 0 Vicryl for capsular closure followed by 3-0 Monocryl and skin stapler for subcutaneous and skin.   Patient was placed in a posterior splint transferred to PACU in stable condition    Petrona Hazel John Brain, [facility]

## 2020-02-03 NOTE — DISCHARGE SUMMARY
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 female with significant past medical history of asthma, fibromyalgia, thyroid disease and seizures who is being seen at bedside on behalf of Dr. Kaya Hood. Patient reports getting out of bed this morning and stepping wrong. She has a history of ankle instability and injuring her right ankle. About 2 years ago she injured her right ankle and participated in water therapy. She was informed at that time that if she injured her ankle again she would require surgical repair. Patient states she fell to the ground but denies hitting her head. Current pain is reported as 8/10. She did not try to treat her injury prior to coming to ED. Patient is in and out of sleep, she reports taking sleeping medications. Patient's boyfriend is bedside. Exam:   Right foot dressing intact and well maintained. No apparent strike through noted.      Vascular: CFT brisk to exposed digits.      Dermatologic: Right foot dressing intact, no strike through noted.      Neurovascular: Light touch sensation grossly intact at the digits.     Musculoskeletal: Rectus alignment of foot, rectus alignment of ankle.  Patient able to df/pf digits.     Discharge Physician: Jyoti Metz [facility] Course:  2.3.20  Patient is seen bedside on behalf of Dr. Kaya Hood. Patient is S/P right ankle ORIF. Patient relates that she is not having any pain to the operative extremity. She has been working with PT to remain NWB to the right foot. She denies any shortness of breath, chest pain, calf pain, or difficulty breathing. Discharge home today.      2/2/20  Patient seen bedside today on behalf of Dr. Sandra Hayes. Patient appeared pleasant, was oriented to person, place and time and in no acute distress. Patient reports BM and denies abdominal pain. Patient states she is ready to discharge home today. Patient denies N/V/F/C/SOB or CP. Patient has no further pedal concerns at this point in time. Consults: IM    Discharged Condition: good    Disposition: home    Patient Instructions:   Current Discharge Medication List      START taking these medications    Details   !! HYDROcodone-acetaminophen (NORCO) 5-325 MG per tablet Take 1 tablet by mouth every 6 hours as needed for Pain for up to 7 days. Intended supply: 3 days. 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 HYDROcodone-acetaminophen (NORCO) 5-325 MG per tablet Take 1 tablet by mouth every 4 hours as needed for Pain for up to 7 days. Intended supply: 3 days. 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 - Potential duplicate medications found. Please discuss with provider.       CONTINUE these medications which have NOT CHANGED    Details   albuterol (PROVENTIL) (2.5 MG/3ML) 0.083% nebulizer solution Take 3 mLs by nebulization every 6 hours as needed for Wheezing  Qty: 120 each, Refills: 3 Associated Diagnoses: Bronchitis      omeprazole (PRILOSEC) 40 MG delayed release capsule TAKE 1 CAPSULE BY MOUTH TWO TIMES A DAY   Qty: 60 capsule, Refills: 10    Associated Diagnoses: Gastroesophageal reflux disease without esophagitis      albuterol sulfate HFA (PROAIR HFA) 108 (90 Base) MCG/ACT inhaler Inhale 2 puffs into the lungs every 6 hours as needed for Wheezing  Qty: 1 Inhaler, Refills: 3      cetirizine (ZYRTEC) 10 MG tablet Take 1 tablet by mouth daily  Qty: 90 tablet, Refills: 3      acetaminophen (TYLENOL) 325 MG tablet Take 2 tablets by mouth every 4 hours as needed for Pain or Fever  Qty: 120 tablet, Refills: 3         STOP taking these medications       QUEtiapine (SEROQUEL) 50 MG tablet Comments:   Reason for Stopping:         QUEtiapine (SEROQUEL) 300 MG tablet Comments:   Reason for Stopping:         norethindrone (MICRONOR) 0.35 MG tablet Comments:   Reason for Stopping:         QUEtiapine (SEROQUEL XR) 50 MG extended release tablet Comments:   Reason for Stopping:         mirtazapine (REMERON) 15 MG tablet Comments:   Reason for Stopping:         amphetamine-dextroamphetamine (ADDERALL, 10MG,) 10 MG tablet Comments:   Reason for Stopping:         ARIPiprazole (ABILIFY) 15 MG tablet Comments:   Reason for Stopping:         QUEtiapine (SEROQUEL) 200 MG tablet Comments:   Reason for Stopping:         ALPRAZolam (XANAX XR) 2 MG extended release tablet Comments:   Reason for Stopping:         busPIRone (BUSPAR) 30 MG tablet Comments:   Reason for Stopping:         OXcarbazepine (TRILEPTAL) 300 MG tablet Comments:   Reason for Stopping:         sertraline (ZOLOFT) 25 MG tablet Comments:   Reason for Stopping:         sertraline (ZOLOFT) 50 MG tablet Comments:   Reason for Stopping:         DULoxetine (CYMBALTA) 60 MG capsule Comments:   Reason for Stopping:         temazepam (RESTORIL) 30 MG capsule Comments:   Reason for Stopping:             Activity: activity as tolerated and NWB to surgical foot  Diet: regular diet  Wound Care: keep wound clean and dry, keep dressing in place, reinforce dressing PRN, ice to area for comfort, as directed    Follow-up with Leo Cruz DPM    CALL PCP TODAY TO REFILL MEDICATIONS DUE TO Gokul Pack. RISKS TO FETUS IF MEDICATIONS CONTINUED AND UNKNOWN RISKS IF MEDICATIONS STOPPED ABRUPTLY. MUST CALL PCP TODAY FOR PROPER/SAFE DOSAGES! - Nurse reports patient and family aware of risks with continued use of current home medications. Patient and family stated they will continue use of home medications despite high risk to fetus. Patient scheduled to f/u with Psych doctor tomorrow and PCP next week.     Electronically signed by Jin Pierson DPM on 2/3/2020 at 12:22 PM

## 2020-02-03 NOTE — DISCHARGE INSTR - DIET
 Good nutrition is important when healing from an illness, injury, or surgery. Follow any nutrition recommendations given to you during your hospital stay.  If you were given an oral nutrition supplement while in the hospital, continue to take this supplement at home. You can take it with meals, in-between meals, and/or before bedtime. These supplements can be purchased at most local grocery stores, pharmacies, and chain super-stores.  If you have any questions about your diet or nutrition, call the hospital and ask for the dietitian. General diet.    Increase fiber and water intake to promote good bowel health and prevent constipation

## 2020-02-03 NOTE — PROGRESS NOTES
Podiatric Progress Note  Deana Dailey  Subjective :   2.3.20  Patient is seen bedside on behalf of Dr. Jesse Saini. Patient is S/P right ankle ORIF. Patient relates that she is not having any pain to the operative extremity. She has been working with PT to remain NWB to the right foot. She denies any shortness of breath, chest pain, calf pain, or difficulty breathing. Discharge home today. 2/2/20  Patient seen bedside today on behalf of Dr. Jesse Saini. Patient appeared pleasant, was oriented to person, place and time and in no acute distress. Patient reports BM and denies abdominal pain. Patient states she is ready to discharge home today. Patient denies N/V/F/C/SOB or CP. Patient has no further pedal concerns at this point in time. 2/1/20  The patient is a 25 y.o. female with significant past medical history of asthma, fibromyalgia, thyroid disease and seizures who is being seen at bedside on behalf of Dr. Jesse Saini. Patient reports getting out of bed this morning and stepping wrong. She has a history of ankle instability and injuring her right ankle. About 2 years ago she injured her right ankle and participated in water therapy. She was informed at that time that if she injured her ankle again she would require surgical repair. Patient states she fell to the ground but denies hitting her head. Current pain is reported as 8/10. She did not try to treat her injury prior to coming to ED. Patient is in and out of sleep, she reports taking sleeping medications. Patient's boyfriend is bedside.     Current Medications:    Current Facility-Administered Medications: HYDROcodone-acetaminophen (NORCO) 5-325 MG per tablet 1 tablet, 1 tablet, Oral, Q4H PRN **OR** HYDROcodone-acetaminophen (NORCO) 5-325 MG per tablet 2 tablet, 2 tablet, Oral, Q4H PRN  sertraline (ZOLOFT) tablet 75 mg, 75 mg, Oral, Daily  QUEtiapine (SEROQUEL) tablet 300 mg, 300 mg, Oral, Nightly  QUEtiapine (SEROQUEL) tablet 50 mg, 50 mg, Oral, Daily  OXcarbazepine (TRILEPTAL) tablet 1,200 mg, 1,200 mg, Oral, BID  DULoxetine (CYMBALTA) extended release capsule 120 mg, 120 mg, Oral, Daily  busPIRone (BUSPAR) tablet 45 mg, 45 mg, Oral, QAM  busPIRone (BUSPAR) tablet 15 mg, 15 mg, Oral, Nightly  famotidine (PEPCID) tablet 20 mg, 20 mg, Oral, Nightly  bupivacaine (PF) (MARCAINE) 0.5 % injection 150 mg, 30 mL, Intradermal, Once  enoxaparin (LOVENOX) injection 40 mg, 40 mg, Subcutaneous, Daily  clindamycin (CLEOCIN) 900 mg in dextrose 5 % 50 mL IVPB, 900 mg, Intravenous, Q8H  sodium chloride flush 0.9 % injection 10 mL, 10 mL, Intravenous, 2 times per day  sodium chloride flush 0.9 % injection 10 mL, 10 mL, Intravenous, PRN  ondansetron (ZOFRAN) injection 4 mg, 4 mg, Intravenous, Q6H PRN  nicotine (NICODERM CQ) 14 MG/24HR 1 patch, 1 patch, Transdermal, Daily    Objective     /73   Pulse 102   Temp 97.8 °F (36.6 °C) (Oral)   Resp 19   Ht 5' 10\" (1.778 m)   Wt 280 lb (127 kg)   SpO2 97%   BMI 40.18 kg/m²      I/O:    Intake/Output Summary (Last 24 hours) at 2/3/2020 0839  Last data filed at 2/3/2020 0057  Gross per 24 hour   Intake 1260 ml   Output 900 ml   Net 360 ml              Wt Readings from Last 3 Encounters:   02/01/20 280 lb (127 kg)   01/15/20 290 lb 6.4 oz (131.7 kg)   12/30/19 285 lb 11.2 oz (129.6 kg)       LABS:    Recent Labs     02/01/20  0854   WBC 11.5*   HGB 12.1   HCT 39.0           Recent Labs     02/01/20  0854      K 4.0      CO2 25   BUN 11   CREATININE 0.5        Recent Labs     02/01/20  0854   PROT 7.1      No results for input(s): CKTOTAL, CKMB, CKMBINDEX, TROPONINI in the last 72 hours. Exam:   Right foot dressing intact and well maintained.  No apparent strike through noted.      Vascular: CFT brisk to exposed digits.      Dermatologic: Right foot dressing intact, no strike through noted.      Neurovascular: Light touch sensation grossly intact at the digits.     Musculoskeletal: Rectus alignment of foot, rectus alignment of ankle. Patient able to df/pf digits. IMAGING  XR ANKLE RIGHT (MIN 3 VIEWS) - PRE REDUCTION  Impression   1. There is an acute oblique fracture of the distal fibular diaphysis which extends distally to the level of the tibiotalar articulation. There is disruption of the deltoid ligament with lateral subluxation of the talus causing widening of the medial    ankle mortise measuring 1.2 cm. There is no posterior malleolar fracture. There is a joint effusion at the tibiotalar articulation. There is soft tissue swelling at the ankle.               **This report has been created using voice recognition software.  It may contain minor errors which are inherent in voice recognition technology. **       Final report electronically signed by Dr. Del Steel on 2/1/2020 6:36 AM       XR ANKLE RIGHT (2 VIEWS) - POST REDUCTION  Impression   1. There is an acute oblique fracture of the distal fibular diaphysis which extends inferiorly to the level of the tibiotalar articulation. There is interval reduction with persistent 0.3 cm posterior displacement/distraction of the distal bone fragment.   Levada Roberts is also less lateral subluxation of the talus with reduction of the ankle mortise now measuring 0.6 cm medially. There is soft tissue swelling at the ankle and an overlying splint.               **This report has been created using voice recognition software.  It may contain minor errors which are inherent in voice recognition technology. **       Final report electronically signed by Dr. Del Steel on 2/1/2020 10:12 AM       Assessment and Plan  Principle  Closed Right Ankle Fracture/Dislocation  -Patient initially examined and evaluated  -D/c hold anticoagulants  -Right foot dressing intact and well maintained.  No apparent strike through noted  -NWB RLE  -Positive pregnancy test.  PO Norco okay, No NSAIDs      H/o PE  -Lovenox for DVT prophylaxis  -Hospitalist following      DISPO:  Pain scrip in chart. Discharge home today. Follow up with Dr. Aubree Kohler as outpatient.        Electronically signed by Digna Prakash, [address] 2/3/2020 at 8:39 AM

## 2020-02-03 NOTE — PROGRESS NOTES
[address] [address]  PHYSICAL THERAPY MISSED TREATMENT NOTE  ACUTE CARE  [facility]       Pt in bed breathing heavy, pale appearance and pained expression on her face. SO (?) present on couch. Pt and SO report pt is very nauseous and ill feeling this AM. Pt declines any activity at this time. PTA did report pts distress to nurse.         Missed Treatment  Ronnell Brunner PTA [id number]

## 2020-02-03 NOTE — PLAN OF CARE
Problem: Falls - Risk of:  Goal: Will remain free from falls  Description  Will remain free from falls  Outcome: Ongoing  Note:   Patient using call light appropriately to call for assistance. Patient is compliant with use of non-skid slippers. Patient reports understanding of fall prevention when discussed. Goal: Absence of physical injury  Description  Absence of physical injury  Outcome: Ongoing  Note:   Patient is free from physical injury at this time. Problem: Pain:  Goal: Pain level will decrease  Description  Pain level will decrease  Outcome: Ongoing  Note:   Patient reports pain at a 7 on scale. Patient states oral medication helping to achieve pain goal of a 2 on scale. Pillows used for support and ice applied to RLE. Goal: Control of acute pain  Description  Control of acute pain  Outcome: Ongoing  Goal: Control of chronic pain  Description  Control of chronic pain  Outcome: Ongoing    Care plan reviewed with patient and boyfriend. Patient and boyfriend verbalize understanding of the plan of care and contribute to goal setting.

## 2020-02-03 NOTE — PROGRESS NOTES
[facility]  Occupational Therapy  Daily Note  Time:   Time In: 8326  Time Out: 2721  Timed Code Treatment Minutes: 41 Minutes  Minutes: 41          Date: 2/3/2020  Patient Name: Dyan Uribe,   Gender: female      Room: -18/018-A  MRN: [id number]  : 1995  (25 y.o.)  Referring Practitioner: Mitchell Art DPM  Diagnosis: Closed Displaced Oblique Fx of Shaft of R Fibula with Nonunion   Additional Pertinent Hx: Pt admitted 2-1 following fall at home suffering fibular fx on right LE. Pt Had sx 2-1 for ORIF  and deltoid ligament repair on right LE     Restrictions/Precautions:  Restrictions/Precautions: Weight Bearing, Fall Risk  Right Lower Extremity Weight Bearing: Non Weight Bearing  Position Activity Restriction  Other position/activity restrictions: NWB right LE    SUBJECTIVE: Patient in bed  upon arrival.  RN  Ok'ed treatment. PAIN: 8/10: Leg    ADL:   Toileting: Contact Guard Assistance, X 1 and with set-up. Toilet Transfer: Contact Guard Assistance, X 1, with set-up and with verbal cues . eduction on grab bars and sfatey tih ahnd placement. BALANCE:  Sitting Balance:  Stand By Assistance. EOB approx 15 minutes  Standing Balance: Contact Guard Assistance, X 1, PAtient able to maintain NWB status. BED MOBILITY:  Supine to Sit: Minimal Assistance, X 1, with head of bed raised, with RLE    Sit to Supine: Minimal Assistance, X 1, with RLE    Scooting: Stand By Assistance      TRANSFERS:  Sit to Stand:  [address] Stand to Sit: Contact Guard Assistance. To from bed  education on safety with hand placement and not pushing off walker or Roll about with transfers. FUNCTIONAL MOBILITY:  Assistive Device: Roll-A-Bout  Assist Level:  Contact Guard Assistance, X 1, with set-up, with verbal cues  and with increased time for completion. Distance:  To and from bathroom  Patient given education on rollabout safety with turning and technique with functional mobility with roll about. Patient  Steady on roll about and No LOB. ADDITIONAL ACTIVITIES:  . [facility]  Patient  educated and discussion given on [employer]. Included various places to purchase items. Education on [facility] with ADLs and IADLs, safety with precautions. Included Home environment and AE needs for improved ADLs and IADLs  Patient denies need at this time for [facility] and or shower chair. [employer] hand out given    ASSESSMENT:     Activity Tolerance:  Patient tolerance of  treatment:   good     Discharge Recommendations: Home with assist PRN  Equipment Recommendations: Other: may need BSC  Plan: Times per week: 5x  Current Treatment Recommendations: Strengthening, Functional Mobility Training, Endurance Training, Balance Training, Self-Care / ADL, Equipment Evaluation, Education, & procurement, Patient/Caregiver Education & Training    Patient Education  Patient Education: Plan of Care, ADL's, IADL's, Precautions, Equipment Education, Home Safety and Assistive Device Safety    Goals  Short term goals  Time Frame for Short term goals: by discharge  Short term goal 1: Pt will identify any AE needs for safe transition home   Short term goal 2: Pt will complete toileting routine with Shiv to increase indep with self care. Short term goal 3: Pt will complete functional mobility to/from BR while maintaining precautions to increase indep with accessing environment for ADLs. Long term goals  Time Frame for Long term goals : No LTG d/t short ELOS    Following session, patient left in safe position with all fall risk precautions in place.

## 2020-02-03 NOTE — PROGRESS NOTES
Pt given discharge instructions with permission to allow mom to remain in room during teaching obtained. rx sent with pt. Follow up advised. All personal belongings sent with pt. To car via wheelchair.

## 2020-02-04 ENCOUNTER — TELEPHONE (OUTPATIENT)
Dept: FAMILY MEDICINE CLINIC | Age: 25
End: 2020-02-04

## 2020-02-04 NOTE — TELEPHONE ENCOUNTER
[facility] Transitions Initial Follow Up Call    Outreach made within 2 business days of discharge: Yes    Patient: Manfred Swanson Patient : 1995   MRN: [id number]  Reason for Admission: There are no discharge diagnoses documented for the most recent discharge. Discharge Date: 2/3/20       Spoke with: Attempted to speak with patient, no answer and vm is full  Discharge department/facility: [facility]    TCM Interactive Patient Contact:  Was patient able to fill all prescriptions: N/A  Was patient instructed to bring all medications to the follow-up visit: N/A  Is patient taking all medications as directed in the discharge summary?  N/A  Does patient understand their discharge instructions:N/A  Does patient have questions or concerns that need addressed prior to 7-14 day follow up office visit: N/A    Scheduled appointment with PCP within 7-14 days    Follow Up  Future Appointments   Date Time Provider Sharonda Nice   2020  9:00 AM LEVI Perez - CNP Pulm Med P - [facility] [place] [place]   2/10/2020 10:30 AM LEVI Miller LPN

## 2020-02-05 ENCOUNTER — TELEPHONE (OUTPATIENT)
Dept: PULMONOLOGY | Age: 25
End: 2020-02-05

## 2020-02-07 ENCOUNTER — HOSPITAL ENCOUNTER (OUTPATIENT)
Dept: INTERVENTIONAL RADIOLOGY/VASCULAR | Age: 25
Discharge: HOME | End: 2020-02-07
Payer: COMMERCIAL

## 2020-02-07 PROCEDURE — 93971 EXTREMITY STUDY: CPT

## 2020-02-10 ENCOUNTER — TELEPHONE (OUTPATIENT)
Dept: FAMILY MEDICINE CLINIC | Age: 25
End: 2020-02-10

## 2020-02-10 ENCOUNTER — OFFICE VISIT (OUTPATIENT)
Dept: FAMILY MEDICINE CLINIC | Age: 25
End: 2020-02-10
Payer: COMMERCIAL

## 2020-02-10 VITALS
RESPIRATION RATE: 16 BRPM | DIASTOLIC BLOOD PRESSURE: 72 MMHG | TEMPERATURE: 98.4 F | OXYGEN SATURATION: 97 % | HEART RATE: 123 BPM | SYSTOLIC BLOOD PRESSURE: 118 MMHG

## 2020-02-10 PROCEDURE — 99215 OFFICE O/P EST HI 40 MIN: CPT | Performed by: NURSE PRACTITIONER

## 2020-02-10 PROCEDURE — G8484 FLU IMMUNIZE NO ADMIN: HCPCS | Performed by: NURSE PRACTITIONER

## 2020-02-10 PROCEDURE — 1111F DSCHRG MED/CURRENT MED MERGE: CPT | Performed by: NURSE PRACTITIONER

## 2020-02-10 PROCEDURE — 1036F TOBACCO NON-USER: CPT | Performed by: NURSE PRACTITIONER

## 2020-02-10 PROCEDURE — G8427 DOCREV CUR MEDS BY ELIG CLIN: HCPCS | Performed by: NURSE PRACTITIONER

## 2020-02-10 PROCEDURE — G8417 CALC BMI ABV UP PARAM F/U: HCPCS | Performed by: NURSE PRACTITIONER

## 2020-02-10 RX ORDER — ERGOCALCIFEROL 1.25 MG/1
50000 CAPSULE ORAL WEEKLY
Qty: 4 CAPSULE | Refills: 0 | Status: CANCELLED | OUTPATIENT
Start: 2020-02-10

## 2020-02-10 RX ORDER — OXCARBAZEPINE 600 MG/1
600 TABLET, FILM COATED ORAL DAILY
COMMUNITY
Start: 2020-01-01

## 2020-02-10 RX ORDER — ACETAMINOPHEN 160 MG
1 TABLET,DISINTEGRATING ORAL DAILY
Qty: 30 CAPSULE | Refills: 0 | Status: SHIPPED | OUTPATIENT
Start: 2020-02-10 | End: 2021-09-16

## 2020-02-10 RX ORDER — QUETIAPINE FUMARATE 50 MG/1
50 TABLET, FILM COATED ORAL
COMMUNITY

## 2020-02-10 RX ORDER — CHOLECALCIFEROL (VITAMIN D3) 125 MCG
1 TABLET ORAL DAILY
Qty: 30 TABLET | Refills: 0 | Status: SHIPPED | OUTPATIENT
Start: 2020-02-10 | End: 2020-02-10

## 2020-02-10 RX ORDER — FOLIC ACID 1 MG/1
1 TABLET ORAL DAILY
COMMUNITY
End: 2021-02-01 | Stop reason: ALTCHOICE

## 2020-02-10 RX ORDER — ARIPIPRAZOLE 15 MG/1
20 TABLET ORAL NIGHTLY
COMMUNITY

## 2020-02-10 RX ORDER — SERTRALINE HYDROCHLORIDE 25 MG/1
25 TABLET, FILM COATED ORAL
Status: ON HOLD | COMMUNITY
End: 2020-09-22

## 2020-02-10 RX ORDER — QUETIAPINE FUMARATE 300 MG/1
300 TABLET, FILM COATED ORAL NIGHTLY
COMMUNITY

## 2020-02-10 ASSESSMENT — ENCOUNTER SYMPTOMS
SHORTNESS OF BREATH: 0
ABDOMINAL PAIN: 0
COUGH: 0
NAUSEA: 0

## 2020-02-10 NOTE — TELEPHONE ENCOUNTER
[facility]. left message on our vm saying they cannot get vitamin D2 tablet you sent in. Wants to know if it can be changed to D3. If so please send new rx.

## 2020-02-10 NOTE — PROGRESS NOTES
foot. She denies any shortness of breath, chest pain, calf pain, or difficulty breathing. Discharge home today.      2/2/20  Patient seen bedside today on behalf of Dr. Bowen Suarez. Patient appeared pleasant, was oriented to person, place and time and in no acute distress.  Patient reports BM and denies abdominal pain.  Patient states she is ready to discharge home today. Patient denies N/V/F/C/SOB or CP. Patient has no further pedal concerns at this point in time. Dr. Buzz Abdul POD 2/12/20  Dr. Dedra Landers seen 2/6/20    Unexpected pregnancy. Last menstrual periods 1/10/20. Seen OBGYN already. Labs drawn. Low Vit D. Hx of Vitamin Deficiency. She is taking Prenatal.     Seen PSYCH since pregnancy known from hospitalization. Medicaitons were adjusted      Allergies   Allergen Reactions    Dilaudid [Hydromorphone Hcl]      hallucination    Flexeril [Cyclobenzaprine] Other (See Comments)     Hallucinations    Keflex [Cephalexin] Other (See Comments)     Lose cognitive functions.      Tessalon [Benzonatate] Other (See Comments)     psychosis    Augmentin [Amoxicillin-Pot Clavulanate] Rash    Lorabid [Loracarbef] Hives, Swelling and Rash    Minocycline Itching, Swelling and Rash     Outpatient Medications Marked as Taking for the 2/10/20 encounter (Office Visit) with LEVI Park CNP   Medication Sig Dispense Refill    QUEtiapine (SEROQUEL) 300 MG tablet Take 300 mg by mouth nightly      QUEtiapine (SEROQUEL) 50 MG tablet Take 50 mg by mouth every morning (before breakfast)      ARIPiprazole (ABILIFY) 15 MG tablet Take 15 mg by mouth nightly      sertraline (ZOLOFT) 25 MG tablet Take 25 mg by mouth every morning (before breakfast)      sertraline (ZOLOFT) 50 MG tablet Take 50 mg by mouth every morning (before breakfast)      Prenatal Vit-DSS-Fe Cbn-FA (PRENATAL AD PO) Take by mouth daily      folic acid (FOLVITE) 1 MG tablet Take 1 mg by mouth daily      DULoxetine HCl (CYMBALTA Doppler    Patient risk of morbidity and mortality: moderate    Medical Decision Making: moderate complexity

## 2020-02-10 NOTE — PROGRESS NOTES
Visit Information    Have you changed or started any medications since your last visit including any over-the-counter medicines, vitamins, or herbal medicines? yes - see me list   Are you having any side effects from any of your medications? -  no  Have you stopped taking any of your medications? Is so, why? -  Yes- see med list     Have you seen any other physician or provider since your last visit? Yes - Records Obtained  Have you had any other diagnostic tests since your last visit? Yes - Records Obtained  Have you been seen in the emergency room and/or had an admission to a hospital since we last saw you? Yes - Records Obtained  Have you had your routine dental cleaning in the past 6 months? na    Have you activated your [employer] account? If not, what are your barriers?  Yes     Patient Care Team:  LEVI Blanco CNP as PCP - General (Certified Nurse Practitioner)  LEVI Blanco CNP as PCP - [facility]  Reynaldo Barreto DO as Consulting Physician (Cardiology)  Donta Holden MD as Consulting Physician (Hospitalist)  Yannick Carlson MD as Obstetrician (Obstetrics & Gynecology)    Medical History Review  Past Medical, Family, and Social History reviewed and does not contribute to the patient presenting condition    Health Maintenance   Topic Date Due    Pneumococcal 0-64 years Vaccine (1 of 1 - PPSV23) 10/06/2001    HPV vaccine (1 - Female 2-dose series) 10/06/2006    Hepatitis A vaccine (2 of 2 - 2-dose series) 09/14/2013    A1C test (Diabetic or Prediabetic)  01/02/2016    Cervical cancer screen  10/06/2016    Chlamydia screen  03/31/2017    Flu vaccine (1) 09/01/2019    DTaP/Tdap/Td vaccine (7 - Td) 10/24/2024    Colon cancer screen colonoscopy  08/14/2025    Hepatitis B vaccine  Completed    Hib vaccine  Completed    Varicella vaccine  Completed    Meningococcal (ACWY) vaccine  Completed    HIV screen  Completed

## 2020-02-11 ENCOUNTER — CLINICAL DOCUMENTATION (OUTPATIENT)
Dept: PULMONOLOGY | Age: 25
End: 2020-02-11

## 2020-02-11 NOTE — PROGRESS NOTES
Patient's mom Yaneth Like phoned back today from receiving my prior message. Discussed severity of her WILFREDO and need for treatment. Scheduled 2/16/20 but is going to call if able to have done sooner. She plans to stay with daughter due to non-weight bearing status. Also found out when hospitalized that Mertie Nageotte is pregnant. She is no longer taking pain medications.     Electronically signed by LEVI Ho CNP on 2/11/2020 at 10:30 AM

## 2020-02-16 ENCOUNTER — HOSPITAL ENCOUNTER (OUTPATIENT)
Dept: SLEEP CENTER | Age: 25
Discharge: HOME | End: 2020-02-18
Payer: COMMERCIAL

## 2020-02-16 PROCEDURE — 95811 POLYSOM 6/>YRS CPAP 4/> PARM: CPT

## 2020-02-17 LAB — STATUS: NORMAL

## 2020-02-17 NOTE — PROGRESS NOTES
study collect all appropriate PAP supplies  a. Tubing   b. Humidifier (filled with distilled water)  c. Masks   4. The technologist should assess and measure patient for most appropriate mask prior to start of study. 5. CPAP should be initiated at 5 cm H20.  a. More pressure at start of study may be necessary if patient is morbidly obese or unable to fall asleep on a pressure of 5 cm H20   6. If apneas or frequent hypopneas are present, pressure settings should be increased by 2 cm H20. If occasional hypopneas, snoring, or mask flow limitation are present, pressure settings should be increased by 1 cm H20 and maintained for at least fie minutes to determine if events improve or resolve. Pressure settings may need to be increased more quickly during REM sleep given the limited amount of REM during sleep and the need to treat events during this stage. 7. If a mask leak occurs, the tech should first fix the leakage before raising the pressure. Otherwise, the final pressure setting chosen for the patient may be too high. Once the mask leak has been fixed, decrease the pressure to the last setting where mouth breathing and/or mask leakage was not present, and then re-titrate as indicated. Make sure to document directly on the study the steps taken to resolve the leak and the type of masks used. Pressure setting usually do not need to be set as high with a nasal-mask than with a full-face mask. 8. The recording technologist should document directly on the study at least every 30 minutes. 9. If the patient takes a break from wearing the mask, do not decrease the CPAP pressure on attempted return to sleep unless the patient remains awake for 15 minutes or the patient specifically requests that the pressure be lowered. 10. Do not raise pressure for central apneas. If the patient develops central apneas, pressure setting may need to be lowered.    11. If the patient is unable to tolerate CPAP secondary due to:  a. Persistent mouth breathing despite use of a full-face mask/chin strap  b. Inability to exhale against higher expiratory pressures (typically beginning anywhere from 15 to 20 cm of H20.  c. Has frequent central apneas, the use of bilevel positive airway pressure may be indicated. Document directly on study why the patient is being switched from CPAP to bilevel. 12. Ensure that supine sleep has been seen on the chosen setting. Going above the chosen setting 1 or 2 cm H20 to show range may be helpful to ensure that the correct pressure has been established. BiLEVEL:    1. Technologist may change from CPAP to bilevel during a study if proven the patient is unable to tolerate CPAP. 2. Review the patients pertinent medical al history, previous sleep study or studies to ass the severity of sleep disordered breathing. Review of pertinent information will help to attain a better titration. 3. Applications of electrodes, montages, filters, sensitivities and scoring will be performed according to the current version of the AASM Scoring Manual.   4. Prior to initiating study collect all appropriate PAP supplies  a. Tubing   b. Humidifier (filled with distilled water)  c. Masks   5. The technologist should assess and measure patient for most appropriate mask prior to start of study. 6. If the patient has not previously been on CPAP, beginning pressures should be 8/4 cm H20 or higher if patient is morbidly obese or unable to fall asleep at lower pressures. If the patient has been previously successfully treated on CPAP start expiratory pressure at therapeutic setting and set inspiratory pressure 4 cm H20 higher. If advancing from CPAP to bilevel during a study expiratory pressure should be set at most successful CPAP setting and inspiratory pressure set 4 cm h20 higher.   7. The standard differential pressure utilized during bilevel titrations typically ranges from 3 to 5 cm H20, with 4 cm H20 being the most common. Higher differential pressures may be needed in patients who are morbidly obese or who have neuromuscular diseases. 8. If apneas or frequent hypopneas are present, inspiratory and expiratory pressure settings should be increased by 2 cm H20. If occasional hypopneas, snoring, or mask flow limitation are present inspiratory and expiratory pressures settings should be increased by 1 cm h20 and maintained for at least 5 minutes to determine if events improve or resolve. Pressure settings may need to be increased more quickly during REM sleep given the limited amount of REM during sleep and the need to treat events during this stage. 9. If a mask leak occurs, the tech should first fix the leakage before raising the pressure. Otherwise, the final pressure setting chosen for the patient may be too high. Once the mask leak has been fixed, decrease the pressure to the last setting where mouth breathing and/or mask leakage was not present, and then re-titrate as indicated. Make sure to document directly on the study the steps taken to resolve the leak and the type of masks used. Pressure setting usually do not need to be set as high with a nasal-mask than with a full-face mask. 10. The recording technologist should document directly on the study at least every 30 minutes. 11. If the patient takes a break from wearing the mask, do not decrease the CPAP pressure on attempted return to sleep unless the patient remains awake for 15 minutes or the patient specifically requests that the pressure be lowered. 12. Do not raise pressure for central apneas. If the patient develops central apneas, pressure setting may need to be lowered. If the patient has central apneas on bilevel, the use of spontaneous (ST) mode may be indicated. a. ST mode may only be used in 2 cases  i. An order for ST mode with a primary diagnosis of central sleep apnea  ii.  During a titration if obstructive events are less than 5/ hour and centrals must be greater than 50% of total respiratory events. 13. Ensure that supine sleep has been seen on the chosen setting. Going above the chosen setting 1 or 2 cm H20 to show range may be helpful to ensure that the correct pressure has been established.

## 2020-02-18 NOTE — PROGRESS NOTES
p.m. and was  terminated at 05:02 a.m. with a total recording time was 423.8 minutes,  sleep period time was 384.6 minutes and total sleep time was 359.5  minutes. Overall sleep efficiency was 84.8%. The sleep onset latency  was 39.2 minutes and wake after sleep onset was 25.1 minutes and REM  sleep latency was 160 minutes. SLEEP STAGING AND DISTRIBUTION SUMMARY:  Revealed the patient spent 4  minutes in stage I consisting of 1.1%, 187.5 minutes in stage II  consisting of 52.2%, 18.5 minutes in stage III consisting of 5.1%, 149.5  minutes in REM sleep consisting of 41.6% of total sleep time. CPAP TITRATION STUDY:  The CPAP titration study was started with a CPAP  pressure of 5 cm of water, and the CPAP pressure was gradually increased  to a final CPAP pressure of 15 cm of water by titrating to apneas and  hypopneas. At a CPAP pressure of 15 cm of water, the patient spent 1  hour 8 minutes in bed. Out of 1 hour 8 minutes, the patient slept for a  period of 1 hour 8 minutes in REM sleep and the patient did not achieve  non-REM sleep at this pressure. At a final CPAP pressure of 15 cm of  water, the patient had a total of 6 obstructive apneas and 6 central  apneas and 9 obstructive hypopneas with apnea-hypopnea index of 18.4. The maximum oxygen desaturation recorded at this pressure was 84% with a  mean oxygen saturation of 95.4%. The titration study was performed on  room air. PERIODIC LIMB MOVEMENT ANALYSIS:  Revealed the patient had total of 8  periodic limb movements. Out of 8, 2 of them are associated with  arousals with a PLM index of 1.3. PLM arousal index is 0.3. The  patient had a total of 12 spontaneous arousals with a spontaneous  arousal index of 2. EKG MONITORING:  Revealed normal sinus rhythm with sinus tachycardia. IMPRESSION:  1. Severe obstructive sleep apnea with worsening of respiratory events  in supine position.   The patient failed CPAP therapy up to a pressure of  15 cm of water with persistence of respiratory events and hypoxia. 2.  Periodic limb movements with no significant arousals. 3.  The patient had a good amount of REM sleep during treatment part. 4.  Anxiety disorder. 5.  Bipolar disorder. 6.  Major depressive disorder. 7.  Trigeminal neuralgia. RECOMMENDATIONS:  1. For the patient's sleep-disordered breathing due to failed CPAP  pressure up to 15 cm of water in controlling respiratory events, the  patient needs in-lab retitration study for a BiPAP pressure. The  patient will be scheduled for a in-lab BiPAP titration to get optimal  pressure to start therapy. 2.  Specific recommendations include the patient's choice of interface  was F20 medium size mask. 3.  The patient should be considered for oxygen supplementation during  retitration to better control the patient's oxygenation. Thanks to Betty Joyce CNP, for giving me this opportunity to  participate in the care of this pleasant lady.         Kaden Ji MD    D: 02/17/2020 19:28:43       T: 02/18/2020 2:50:34     [email or web address]  Job#: [id number]     Doc#: [id number]    CC:

## 2020-02-26 ENCOUNTER — HOSPITAL ENCOUNTER (OUTPATIENT)
Dept: SLEEP CENTER | Age: 25
Discharge: HOME | End: 2020-02-28
Payer: COMMERCIAL

## 2020-02-26 PROCEDURE — 95811 POLYSOM 6/>YRS CPAP 4/> PARM: CPT

## 2020-02-27 LAB — STATUS: NORMAL

## 2020-02-27 NOTE — PROGRESS NOTES
Title:  CPAP/BiLevel Titration's    Approved by:  Shefali Romero MD      Approval Date:  December, 2019 Next Review:  December, 2021     Responsible Party:  [facility]Entities Applies to:   Kim Prado Number:  None    Document Type:  Such as Guideline, Policy, Policy & Procedure, or Procedure, Instructions  Manual:  Policy and Procedures    Section: IV Policy Start Date: October, 1221           POLICY: Positive airway pressure device is used to treat patients with sleep related breathing disorders characterized by full or partial occlusion of the upper airway during sleep. A patient must have undergone polysomnography and diagnosed with obstructive sleep apnea. All individuals who record sleep studies must follow best practices for CPAP/bilevel/ASV titrations in order to attain the ideal pressure setting for their patients. Too low of pressure may cause patients to either be sub-optimally treated or to wake up in a panic. Too much pressure may cause the patient to experience bloating or mask leakage. Determining the appropriate pressure setting for each patient will lead to improved adherence and outcome. Titrations are not an exact science, and it is understood that technologists may need to make minor changes for individual patients. The procedures outlined below are meant to be a guideline and follow the spirit of the AASM clinical guidelines. PROCEDURE:    CPAP:    1. Review the patients pertinent medical al history, previous sleep study or studies to ass the severity of sleep disordered breathing. Review of pertinent information will help to attain a better titration. 2. Applications of electrodes, montages, filters, sensitivities and scoring will be performed according to the current version of the AASM Scoring Manual.   3. Prior to initiating study collect all appropriate PAP supplies  a. Tubing   b.  Humidifier (filled with distilled water)  c. Masks   4. The technologist should assess and measure patient for most appropriate mask prior to start of study. 5. CPAP should be initiated at 5 cm H20.  a. More pressure at start of study may be necessary if patient is morbidly obese or unable to fall asleep on a pressure of 5 cm H20   6. If apneas or frequent hypopneas are present, pressure settings should be increased by 2 cm H20. If occasional hypopneas, snoring, or mask flow limitation are present, pressure settings should be increased by 1 cm H20 and maintained for at least fie minutes to determine if events improve or resolve. Pressure settings may need to be increased more quickly during REM sleep given the limited amount of REM during sleep and the need to treat events during this stage. 7. If a mask leak occurs, the tech should first fix the leakage before raising the pressure. Otherwise, the final pressure setting chosen for the patient may be too high. Once the mask leak has been fixed, decrease the pressure to the last setting where mouth breathing and/or mask leakage was not present, and then re-titrate as indicated. Make sure to document directly on the study the steps taken to resolve the leak and the type of masks used. Pressure setting usually do not need to be set as high with a nasal-mask than with a full-face mask. 8. The recording technologist should document directly on the study at least every 30 minutes. 9. If the patient takes a break from wearing the mask, do not decrease the CPAP pressure on attempted return to sleep unless the patient remains awake for 15 minutes or the patient specifically requests that the pressure be lowered. 10. Do not raise pressure for central apneas. If the patient develops central apneas, pressure setting may need to be lowered. 11. If the patient is unable to tolerate CPAP secondary due to:  a. Persistent mouth breathing despite use of a full-face mask/chin strap  b.  Inability have neuromuscular diseases. 8. If apneas or frequent hypopneas are present, inspiratory and expiratory pressure settings should be increased by 2 cm H20. If occasional hypopneas, snoring, or mask flow limitation are present inspiratory and expiratory pressures settings should be increased by 1 cm h20 and maintained for at least 5 minutes to determine if events improve or resolve. Pressure settings may need to be increased more quickly during REM sleep given the limited amount of REM during sleep and the need to treat events during this stage. 9. If a mask leak occurs, the tech should first fix the leakage before raising the pressure. Otherwise, the final pressure setting chosen for the patient may be too high. Once the mask leak has been fixed, decrease the pressure to the last setting where mouth breathing and/or mask leakage was not present, and then re-titrate as indicated. Make sure to document directly on the study the steps taken to resolve the leak and the type of masks used. Pressure setting usually do not need to be set as high with a nasal-mask than with a full-face mask. 10. The recording technologist should document directly on the study at least every 30 minutes. 11. If the patient takes a break from wearing the mask, do not decrease the CPAP pressure on attempted return to sleep unless the patient remains awake for 15 minutes or the patient specifically requests that the pressure be lowered. 12. Do not raise pressure for central apneas. If the patient develops central apneas, pressure setting may need to be lowered. If the patient has central apneas on bilevel, the use of spontaneous (ST) mode may be indicated. a. ST mode may only be used in 2 cases  i. An order for ST mode with a primary diagnosis of central sleep apnea  ii. During a titration if obstructive events are less than 5/ hour and centrals must be greater than 50% of total respiratory events.    13. Ensure that supine sleep has

## 2020-02-28 ENCOUNTER — TELEPHONE (OUTPATIENT)
Dept: SLEEP CENTER | Age: 25
End: 2020-02-28

## 2020-02-28 NOTE — PROGRESS NOTES
event  desaturation of 4 percent or greater. INTERPRETATION:  This is a BiPAP titration study, and the study was  performed on 02/26/2020. The study was started at 09:44 p.m. and was  terminated at 04:51 a.m. with a total recording time was 427.3 minutes,  sleep period time was 389.9 minutes and total sleep time was 383  minutes. Overall sleep efficiency was 89.6%. The sleep onset latency  was 37.5 minutes and wake after sleep onset was 6.9 minutes and REM  sleep latency was 208.5 minutes. SLEEP STAGING AND DISTRIBUTION SUMMARY:  Revealed the patient spent 14  minutes in stage I consisting of 3.7%, 218 minutes in stage II  consisting of 56.9%, 61.5 minutes in stage III consisting of 16.1%, 89.5  minutes in REM sleep consisting of 23.4% of total sleep time. BIPAP TITRATION STUDY:  The BiPAP titration study was started with a  BiPAP pressure of 8/4 cm of water. The BiPAP pressure was gradually  increased to a BiPAP pressure of 22/18 cm of water by titrating to  apneas and hypopneas. At a BiPAP pressure of 22/18 cm of water, the  patient spent 44.37 minutes in bed. Out of 44.37 minutes, the patient  slept for a period of 43.45 minutes in REM sleep. The patient had  absent non-REM sleep. At a BiPAP pressure of 22/18 cm of water, the  patient did not have any apneas or hypopneas with apnea-hypopnea index  of 0. The maximum oxygen desaturation recorded at this pressure was 94%  with a mean oxygen saturation of 96.9%. PERIODIC LIMB MOVEMENT ANALYSIS:  Revealed the patient did not have any  periodic limb movements. The patient had a total of 11 spontaneous  arousals with a spontaneous arousal index of 1.7. EKG MONITORING:  Revealed normal sinus rhythm. IMPRESSION:  1. Severe obstructive sleep apnea with worsening of respiratory events  in supine position. 2.  The patient had optimal titration to a final BiPAP pressure of 22/18  cm of water with room air.   3.  The patient had a good amount of REM sleep during titration  consisting of 23.4%. 4.  Anxiety disorder. 5.  Bipolar disorder. 6.  Major depressive disorder. 7.  Trigeminal neuralgia. RECOMMENDATIONS:  1. For the patient's sleep-disordered breathing, we recommend starting  therapy with BiPAP a pressure of 22/18 cm of water. 2.  Specific recommendations include the patient's choice of interface  was F20 medium size mask. 3.  The patient should be scheduled for a followup with Mallory Dumont CNP clinic in six to eight weeks on recommended BiPAP pressures for  clinical re-evaluation with review of download. Thanks to Mallory Dumont CNP, for giving me this opportunity to  participate in the care of this pleasant lady.         Real Newman MD    D: 02/27/2020 15:41:54       T: 02/28/2020 9:40:42     [email or web address]  Job#: [id number]     Doc#: [id number]    CC:

## 2020-03-17 NOTE — H&P
Study    CARDIAC CATHETERIZATION   04/21/2016     [facility]    COLONOSCOPY   2016    INSERTABLE CARDIAC MONITOR        WISDOM TOOTH EXTRACTION   2010            Current Medications:    Current Hospital Medications   No current facility-administered medications for this encounter.         Allergies:  Dilaudid [hydromorphone hcl]; Flexeril [cyclobenzaprine]; Keflex [cephalexin]; Tessalon [benzonatate]; Augmentin [amoxicillin-pot clavulanate]; Lorabid [loracarbef]; and Minocycline     Social History:    Social History   Social History            Socioeconomic History    Marital status: Single       Spouse name: None    Number of children: None    Years of education: None    Highest education level: None   Occupational History    None   Social Needs    Financial resource strain: None    Food insecurity:       Worry: None       Inability: None    Transportation needs:       Medical: None       Non-medical: None   Tobacco Use    Smoking status: Current Every Day Smoker       Packs/day: 1.00       Types: Cigarettes       Start date: 6/30/2018    Smokeless tobacco: Never Used   Substance and Sexual Activity    Alcohol use: Yes       Alcohol/week: 1.0 standard drinks       Types: 1 Cans of beer per week       Comment: occasional    Drug use:  Yes       Types: Marijuana       Comment: last nite occasional    Sexual activity: Yes       Partners: Male   Lifestyle    Physical activity:       Days per week: None       Minutes per session: None    Stress: None   Relationships    Social connections:       Talks on phone: None       Gets together: None       Attends Anabaptism service: None       Active member of club or organization: None       Attends meetings of clubs or organizations: None       Relationship status: None    Intimate partner violence:       Fear of current or ex partner: None       Emotionally abused: None       Physically abused: None       Forced sexual activity: None   Other Topics Concern    None   Social History Narrative    None            Family History:   family history includes Anxiety Disorder in her father and mother; Bipolar Disorder in her father; Cancer in her maternal grandmother, paternal grandfather, and paternal uncle; Depression in her maternal grandfather, paternal aunt, and paternal uncle; Diabetes in her mother; High Blood Pressure in her father; Lupus in her maternal aunt; Mental Illness in her father; Parkinsonism in her father.      REVIEW OF SYSTEMS:    General appearance:  Appears stated age and cooperative.      PHYSICAL EXAM:       Vitals:    /78   Pulse 100   Temp 98.2 °F (36.8 °C) (Oral)   Resp 17   Ht 5' 10\" (1.778 m)   Wt 280 lb (127 kg)   SpO2 99%   BMI 40.18 kg/m²      Exam:      Vascular: Dorsalis pedis bilaterally and posterior tibial pulse on left palpable. Right posterior tibial pulse faintly palpable secondary to nonpitting edema. Skin temperature is warm to warm from proximal tibial tuberosity to distal digits. CFT brisk to exposed digits. Edema RLE. Quality of skin good.     Dermatologic: Minimal skin tenting medial right ankle. No open lesions, rashes or subcutaneous nodules of note.      Neurovascular: Light touch sensation intact.     Musculoskeletal: Pain w/ palpation of right ankle. Muscle strength deferred.     XRAY:  XR ANKLE RIGHT (MIN 3 VIEWS) - PRE REDUCTION  Impression   1. There is an acute oblique fracture of the distal fibular diaphysis which extends distally to the level of the tibiotalar articulation. There is disruption of the deltoid ligament with lateral subluxation of the talus causing widening of the medial    ankle mortise measuring 1.2 cm. There is no posterior malleolar fracture. There is a joint effusion at the tibiotalar articulation.  There is soft tissue swelling at the ankle.               **This report has been created using voice recognition software.  It may contain minor errors which are inherent in voice recognition ligament  -Patient and her boyfriend agreed to treatment plan  -Patient admitted and scheduled for surgery today for ORIF right ankle fracture/dislocation  -N.p.o. now, hold anticoagulants and requested treatment consent  -NWB RLE  -PT/OT consulted  -Positive pregnancy test.  D/c fentanyl and changed to Tylenol 800 mg q8h prn pain. Pharmacy consulted for proper medication and dosages due to pregnancy     #Asthma  -Continue home meds  -Hospitalist consulted     #Thyroid Disease  -Continue home meds  -Hospitalist consulted     DISPO: Pending surgical intervention and pain control     Dr. Welsh att. providers found, thank you for the consultation allowing podiatry to assist in the medical welfare of this patient. Podiatry will continue to follow this patient throughout the duration of hospitalization.      Luigi Holloway [facility]  2/1/2020 7:23 AM      I saw and evaluated the patient independently bedside. Please refer to resident physicians note for further details.  Discussed with resident assessment and findings, I agree with plan as documented.   Sharonda Costa [facility]

## 2020-04-20 ENCOUNTER — TELEMEDICINE (OUTPATIENT)
Dept: PULMONOLOGY | Age: 25
End: 2020-04-20
Payer: COMMERCIAL

## 2020-04-20 PROCEDURE — G8427 DOCREV CUR MEDS BY ELIG CLIN: HCPCS | Performed by: NURSE PRACTITIONER

## 2020-04-20 PROCEDURE — 99213 OFFICE O/P EST LOW 20 MIN: CPT | Performed by: NURSE PRACTITIONER

## 2020-04-20 RX ORDER — PNV NO.95/FERROUS FUM/FOLIC AC 28MG-0.8MG
1 TABLET ORAL DAILY
COMMUNITY
End: 2021-02-01 | Stop reason: ALTCHOICE

## 2020-04-20 NOTE — PROGRESS NOTES
Negative. Upper respiratory tract: No nasal stuffiness or post nasal drip. Lower respiratory tract/ lungs: No cough or sputum production. No hemoptysis. Cardiovascular: No palpitations or chest pain. Gastrointestinal: No nausea or vomiting. Neurological: No focal neurologiacal weakness. Extremities: No edema. Musculoskeletal: No complaints. Genitourinary: No complaints. Hematological: Negative. Psychiatric/Behavioral: Negative. Skin: No itching. Physical Exam:    BMI: There is no height or weight on file to calculate BMI. Wt Readings from Last 3 Encounters:   02/01/20 280 lb (127 kg)   01/15/20 290 lb 6.4 oz (131.7 kg)   12/30/19 285 lb 11.2 oz (129.6 kg)     Reported weight gained of 10 lbs with pregnancy  Vitals: LMP 01/10/2020 (Exact Date)         Vital Signs: (As obtained by patient/caregiver or practitioner observation)    Constitutional: [x] Appears well-developed and well-nourished [x] No apparent distress       Mental status  [x] Alert and awake  [x] Oriented to person/place/time [x]Able to follow commands      Eyes:  EOM    [x]  Normal   Sclera  [x]  Normal         Discharge [x]  None visible      HENT:   [x] Normocephalic, atraumatic. [x] Mouth/Throat: Mucous membranes are moist.     External Ears  [x] Normal      Neck: [x] No visualized mass     Pulmonary/Chest: [x] Respiratory effort normal.  [x] No visualized signs of difficulty breathing or respiratory distress     Musculoskeletal:  [x] Normal range of motion of neck          Neurological:        [x] No Facial Asymmetry (Cranial nerve 7 motor function) (limited exam to video visit)          [x] No gaze palsy         Skin:        [x] No significant exanthematous lesions or discoloration noted on facial skin             Psychiatric:       [x] Normal Affect [x] No Hallucinations       ASSESSMENT/DIAGNOSIS     Diagnosis Orders   1. WILFREDO treated with BiPAP              Plan   Do you need any equipment today?  No.  -Discussed possible

## 2020-05-08 ENCOUNTER — HOSPITAL ENCOUNTER (OUTPATIENT)
Dept: PHYSICAL THERAPY | Age: 25
Setting detail: THERAPIES SERIES
Discharge: HOME | End: 2020-05-08
Payer: COMMERCIAL

## 2020-05-08 PROCEDURE — 97161 PT EVAL LOW COMPLEX 20 MIN: CPT

## 2020-05-08 PROCEDURE — 97110 THERAPEUTIC EXERCISES: CPT

## 2020-05-08 ASSESSMENT — PAIN DESCRIPTION - ORIENTATION: ORIENTATION: RIGHT

## 2020-05-08 ASSESSMENT — PAIN DESCRIPTION - LOCATION: LOCATION: ANKLE

## 2020-05-08 ASSESSMENT — PAIN DESCRIPTION - PAIN TYPE: TYPE: SURGICAL PAIN

## 2020-05-08 ASSESSMENT — PAIN SCALES - GENERAL: PAINLEVEL_OUTOF10: 4
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
## 2020-05-11 ENCOUNTER — HOSPITAL ENCOUNTER (OUTPATIENT)
Dept: PHYSICAL THERAPY | Age: 25
Setting detail: THERAPIES SERIES
Discharge: HOME | End: 2020-05-11
Payer: COMMERCIAL

## 2020-05-11 PROCEDURE — 97113 AQUATIC THERAPY/EXERCISES: CPT

## 2020-05-11 ASSESSMENT — PAIN SCALES - GENERAL: PAINLEVEL_OUTOF10: 3

## 2020-05-11 NOTE — PROGRESS NOTES
Right ankle dorsiflexion/plantarflexion, inversion/eversion, circles cw/ccw 10x each, Alphabet x1. Activity Tolerance:  Activity Tolerance: Patient Tolerated treatment well    Assessment: Body structures, Functions, Activity limitations: Decreased functional mobility , Decreased ADL status, Decreased ROM, Decreased strength, Decreased endurance, Decreased balance, Increased pain  Assessment: Patient tolerated pool exercises well. Cued patient to maintain abdominal bracing with activities in the pool to protect and prevent back pain. Prognosis: Good  Discharge Recommendations: Continue to assess pending progress    Patient Education:  Patient Education: Continue HEP    Plan:  Times per week: 2x/week  Plan weeks: 8 weeks  Specific instructions for Next Treatment: pool therapy for gait and balance, strengthening and ROM. Move to land therapy as needed. Current Treatment Recommendations: Strengthening, ROM, Balance Training, Functional Mobility Training, Endurance Training, Stair training, Gait Training, Neuromuscular Re-education, Manual Therapy - Soft Tissue Mobilization, Home Exercise Program, Modalities, Aquatics, Pain Management  Plan Comment: Continue with current POC    Goals:  Patient goals : To have more mobility.     Short term goals  Time Frame for Short term goals: 4 weeks  Short term goal 1: Patient to demonstrate 5-10 degree increase in right ankle range for ease with going up and down stairs  Short term goal 2: Patient to demonstrate 4+-5/5 strength in right ankle for ease wityh doing housecleaning  Short term goal 3: Patient to demonstrate normal gait pattern with brace for all community walking  Short term goal 4: Patient to be able to SLS on right for 3-5 seconds for increased stance for gait  Short term goal 5: Patient to demonstrate all balance activity with no more than one hand for safety with comunity ambulation    Long term goals  Time Frame for Long term goals : 8 weeks  Long term goal

## 2020-05-14 ENCOUNTER — HOSPITAL ENCOUNTER (OUTPATIENT)
Dept: PHYSICAL THERAPY | Age: 25
Setting detail: THERAPIES SERIES
Discharge: HOME | End: 2020-05-14
Payer: COMMERCIAL

## 2020-05-14 PROCEDURE — 97113 AQUATIC THERAPY/EXERCISES: CPT

## 2020-05-14 ASSESSMENT — PAIN SCALES - GENERAL: PAINLEVEL_OUTOF10: 6

## 2020-05-14 NOTE — PROGRESS NOTES
bilateral  Hamstring Curls: 15x bilateral   4-Way Hip: (3-way) 15x bilateral  Dynamic Strengthening LE  Step-Ups (F,L,R): Right CC forward/lateral 10x each at 4' step  Deep H2O LE  Ankle ROM: In 5' with noodle: Right ankle dorsiflexion/plantarflexion, inversion/eversion, circles cw/ccw 10x each, Alphabet x1. Bike: x3 minutes with noodle in 5'  Hang: x5 minutes with noodle in 5'  Hip Flex/Ext: 15x with noodle in 5'  Hip ABD/ADD: 15x with noodle in 5'      Activity Tolerance:  Activity Tolerance: Patient Tolerated treatment well  Activity Tolerance: Pain rated 7/10 at end of session. Assessment: Body structures, Functions, Activity limitations: Decreased functional mobility , Decreased ADL status, Decreased ROM, Decreased strength, Decreased endurance, Decreased balance, Increased pain  Assessment: Increased repetitions and added step ups today with fairl tolerance. Pain slightly increased with weight bearing activities but was a little better after non-weightbearing with noodle. Prognosis: Good  Discharge Recommendations: Continue to assess pending progress    Patient Education:  Patient Education: Continue HEP    Plan:  Times per week: 2x/week  Plan weeks: 8 weeks  Specific instructions for Next Treatment: pool therapy for gait and balance, strengthening and ROM. Move to land therapy as needed. Current Treatment Recommendations: Strengthening, ROM, Balance Training, Functional Mobility Training, Endurance Training, Stair training, Gait Training, Neuromuscular Re-education, Manual Therapy - Soft Tissue Mobilization, Home Exercise Program, Modalities, Aquatics, Pain Management  Plan Comment: Continue with current POC    Goals:  Patient goals : To have more mobility.     Short term goals  Time Frame for Short term goals: 4 weeks  Short term goal 1: Patient to demonstrate 5-10 degree increase in right ankle range for ease with going up and down stairs  Short term goal 2: Patient to demonstrate 4+-5/5 strength

## 2020-05-19 ENCOUNTER — HOSPITAL ENCOUNTER (OUTPATIENT)
Dept: PHYSICAL THERAPY | Age: 25
Setting detail: THERAPIES SERIES
Discharge: HOME | End: 2020-05-19
Payer: COMMERCIAL

## 2020-05-19 PROCEDURE — 97113 AQUATIC THERAPY/EXERCISES: CPT

## 2020-05-19 ASSESSMENT — PAIN SCALES - GENERAL: PAINLEVEL_OUTOF10: 5

## 2020-05-19 ASSESSMENT — PAIN DESCRIPTION - PAIN TYPE: TYPE: SURGICAL PAIN

## 2020-05-19 ASSESSMENT — PAIN DESCRIPTION - ORIENTATION: ORIENTATION: RIGHT

## 2020-05-19 ASSESSMENT — PAIN DESCRIPTION - LOCATION: LOCATION: ANKLE

## 2020-05-22 ENCOUNTER — HOSPITAL ENCOUNTER (OUTPATIENT)
Dept: PHYSICAL THERAPY | Age: 25
Setting detail: THERAPIES SERIES
Discharge: HOME | End: 2020-05-22
Payer: COMMERCIAL

## 2020-05-22 PROCEDURE — 97113 AQUATIC THERAPY/EXERCISES: CPT

## 2020-05-22 ASSESSMENT — PAIN SCALES - GENERAL: PAINLEVEL_OUTOF10: 6

## 2020-05-22 NOTE — PROGRESS NOTES
range  Dynamic Strengthening LE  Step-Ups (F,L,R): Right CC forward/lateral 15x each at 4' step  Deep H2O LE  Ankle ROM: In 5' with noodle: Right ankle dorsiflexion/plantarflexion, inversion/eversion, circles cw/ccw 10x each, Alphabet x1. Bike: x3 minutes with noodle in 5'  Hang: x5 minutes with noodle in 5'  Hip Flex/Ext: 15x with noodle in 5'  Hip ABD/ADD: 15x with noodle in 5'       Activity Tolerance:  Activity Tolerance: Patient Tolerated treatment well  Activity Tolerance: Pain rated 5/10 at end of session. Assessment: Body structures, Functions, Activity limitations: Decreased functional mobility , Decreased ADL status, Decreased ROM, Decreased strength, Decreased endurance, Decreased balance, Increased pain  Assessment: Did not progress activities today due to complaints of increased pain and swelling today. Pain decreased to 5/10 by end of session. Unsure if increased swelling is the ankle or because of the pregnancy as patient is having swelling in both feet. Prognosis: Good  Discharge Recommendations: Continue to assess pending progress    Patient Education:  Patient Education: Continue HEP and working on proper walking    Plan:  Times per week: 2x/week  Plan weeks: 8 weeks  Specific instructions for Next Treatment: pool therapy for gait and balance, strengthening and ROM. Move to land therapy as needed. Current Treatment Recommendations: Strengthening, ROM, Balance Training, Functional Mobility Training, Endurance Training, Stair training, Gait Training, Neuromuscular Re-education, Manual Therapy - Soft Tissue Mobilization, Home Exercise Program, Modalities, Aquatics, Pain Management  Plan Comment: Continue with current POC    Goals:  Patient goals : To have more mobility.     Short term goals  Time Frame for Short term goals: 4 weeks  Short term goal 1: Patient to demonstrate 5-10 degree increase in right ankle range for ease with going up and down stairs  Short term goal 2: Patient to demonstrate 4+-5/5 strength in right ankle for ease wityh doing housecleaning  Short term goal 3: Patient to demonstrate normal gait pattern with brace for all community walking  Short term goal 4: Patient to be able to SLS on right for 3-5 seconds for increased stance for gait  Short term goal 5: Patient to demonstrate all balance activity with no more than one hand for safety with comunity ambulation    Long term goals  Time Frame for Long term goals : 8 weeks  Long term goal 1: Patient to be independent with home program and pool program to be able to return to prior activity with minimal difficulty and pain    Vernal Pupa.  Diane, [address], 5/22/2020

## 2020-05-26 ENCOUNTER — HOSPITAL ENCOUNTER (OUTPATIENT)
Dept: PHYSICAL THERAPY | Age: 25
Setting detail: THERAPIES SERIES
Discharge: HOME | End: 2020-05-26
Payer: COMMERCIAL

## 2020-05-26 PROCEDURE — 97113 AQUATIC THERAPY/EXERCISES: CPT

## 2020-05-26 ASSESSMENT — PAIN DESCRIPTION - PAIN TYPE: TYPE: SURGICAL PAIN

## 2020-05-26 ASSESSMENT — PAIN DESCRIPTION - LOCATION: LOCATION: ANKLE

## 2020-05-26 ASSESSMENT — PAIN SCALES - GENERAL: PAINLEVEL_OUTOF10: 4

## 2020-05-26 ASSESSMENT — PAIN DESCRIPTION - ORIENTATION: ORIENTATION: RIGHT

## 2020-05-26 NOTE — PROGRESS NOTES
term goal 2: Patient to demonstrate 4+-5/5 strength in right ankle for ease wityh doing housecleaning  Short term goal 3: Patient to demonstrate normal gait pattern with brace for all community walking  Short term goal 4: Patient to be able to SLS on right for 3-5 seconds for increased stance for gait  Short term goal 5: Patient to demonstrate all balance activity with no more than one hand for safety with comunity ambulation    Long term goals  Time Frame for Long term goals : 8 weeks  Long term goal 1: Patient to be independent with home program and pool program to be able to return to prior activity with minimal difficulty and pain    Kassandra Casanova, PTA

## 2020-05-28 ENCOUNTER — HOSPITAL ENCOUNTER (OUTPATIENT)
Dept: PHYSICAL THERAPY | Age: 25
Setting detail: THERAPIES SERIES
Discharge: HOME | End: 2020-05-28
Payer: COMMERCIAL

## 2020-05-28 PROCEDURE — 97113 AQUATIC THERAPY/EXERCISES: CPT

## 2020-05-28 ASSESSMENT — PAIN SCALES - GENERAL: PAINLEVEL_OUTOF10: 4

## 2020-05-28 NOTE — PROGRESS NOTES
to demonstrate 4+-5/5 strength in right ankle for ease with doing housecleaning  Short term goal 3: Patient to demonstrate normal gait pattern with brace for all community walking  Short term goal 4: Patient to be able to SLS on right for 3-5 seconds for increased stance for gait  Short term goal 5: Patient to demonstrate all balance activity with no more than one hand for safety with comunity ambulation    Long term goals  Time Frame for Long term goals : 8 weeks  Long term goal 1: Patient to be independent with home program and pool program to be able to return to prior activity with minimal difficulty and pain    Jess Gruber.  Zachary Mahajan, [address] [place] [address], 5/28/2020

## 2020-06-01 ENCOUNTER — HOSPITAL ENCOUNTER (OUTPATIENT)
Dept: PHYSICAL THERAPY | Age: 25
Setting detail: THERAPIES SERIES
Discharge: HOME | End: 2020-06-01
Payer: COMMERCIAL

## 2020-06-01 PROCEDURE — 97113 AQUATIC THERAPY/EXERCISES: CPT

## 2020-06-01 ASSESSMENT — PAIN SCALES - GENERAL: PAINLEVEL_OUTOF10: 2

## 2020-06-01 NOTE — PROGRESS NOTES
[facility]     Time In: 1300  Time Out: 7625  Minutes: 45  Timed Code Treatment Minutes: 45 Minutes     Date: 2020  Patient Name: Lennox Eason,  Gender:  female        CSN: [id number]   : 1995  (25 y.o.)       Referring Practitioner: Dr Joe Walden      Diagnosis: RIGHT ANKLE PAIN, CLOSED TIB/FIB FRACTURE  Treatment Diagnosis: right ankle pain, diffiuclty with ambulation, balance deficits, right ankle weakness   Additional Pertinent Hx: Patient is currently Pregnant. No restrictions from doctor. Hx: right ankle fracture, irregualr heartbeat, Asthma, Bi-polar, Anxiety disorder, Obesity, Fibromyalgia       General:  PT Visit Information  Onset Date: 20  PT Insurance Information: [facility] [place] [facility] visits. Aquatics and modalities. Total # of Visits to Date: 8  Plan of Care/Certification Expiration Date: 20  Progress Note Counter: 8/10             Subjective:  Chart Reviewed: Yes  Patient assessed for rehabilitation services?: Yes  Family / Caregiver Present: No  Comments: Follow up with doctor after therapy is done     Subjective: Patient states she had a little swelling after last therapy session but wasn't too bad. Reports the new brace is fitting better. Pool therapy is helping. Pain:  Patient Currently in Pain: Yes  Pain Assessment: 0-10  Pain Level:  2(Right ankle)    Objective    LE Warm Up: Ambulation (F,L,R): x2 laps each direction   LE Streches: Bilateral calf stretch at 4' step 3x 15 seconds  Static Strengthening UEs  Shoulder Flex: 20x in step stance position with Right foot posterior in 4' depth with open paddles   Shoulder ABD/ADD: 20x in step stance position with Right foot posterior in 4' depth with open paddles  Shoulder Horiz ABD/ADD: 20x in step stance position with Right foot posterior in 4' depth with open paddles        Static Strengthening LEs  Heel/Toe

## 2020-06-02 ENCOUNTER — HOSPITAL ENCOUNTER (OUTPATIENT)
Dept: PHYSICAL THERAPY | Age: 25
Setting detail: THERAPIES SERIES
End: 2020-06-02
Payer: COMMERCIAL

## 2020-06-04 ENCOUNTER — HOSPITAL ENCOUNTER (OUTPATIENT)
Dept: ULTRASOUND IMAGING | Age: 25
Discharge: HOME | End: 2020-06-04
Payer: COMMERCIAL

## 2020-06-04 PROCEDURE — 76811 OB US DETAILED SNGL FETUS: CPT

## 2020-06-05 ENCOUNTER — HOSPITAL ENCOUNTER (OUTPATIENT)
Age: 25
Discharge: HOME | End: 2020-06-05
Payer: COMMERCIAL

## 2020-06-05 LAB
ALBUMIN SERPL-MCNC: 3.7 G/DL (ref 3.5–5.1)
ALP BLD-CCNC: 90 U/L (ref 38–126)
ALT SERPL-CCNC: 10 U/L (ref 11–66)
AMYLASE: 53 U/L (ref 20–104)
ANION GAP SERPL CALCULATED.3IONS-SCNC: 12 MEQ/L (ref 8–16)
AST SERPL-CCNC: 9 U/L (ref 5–40)
BASOPHILS # BLD: 0.2 %
BASOPHILS ABSOLUTE: 0 THOU/MM3 (ref 0–0.1)
BILIRUB SERPL-MCNC: < 0.2 MG/DL (ref 0.3–1.2)
BUN BLDV-MCNC: 7 MG/DL (ref 7–22)
CALCIUM SERPL-MCNC: 9.3 MG/DL (ref 8.5–10.5)
CHLORIDE BLD-SCNC: 104 MEQ/L (ref 98–111)
CO2: 21 MEQ/L (ref 23–33)
CREAT SERPL-MCNC: 0.5 MG/DL (ref 0.4–1.2)
EOSINOPHIL # BLD: 0.1 %
EOSINOPHILS ABSOLUTE: 0 THOU/MM3 (ref 0–0.4)
ERYTHROCYTE [DISTWIDTH] IN BLOOD BY AUTOMATED COUNT: 16.2 % (ref 11.5–14.5)
ERYTHROCYTE [DISTWIDTH] IN BLOOD BY AUTOMATED COUNT: 56.1 FL (ref 35–45)
GFR SERPL CREATININE-BSD FRML MDRD: > 90 ML/MIN/1.73M2
GLUCOSE BLD-MCNC: 243 MG/DL (ref 70–108)
HCT VFR BLD CALC: 32.2 % (ref 37–47)
HEMOGLOBIN: 10 GM/DL (ref 12–16)
IMMATURE GRANS (ABS): 0.14 THOU/MM3 (ref 0–0.07)
IMMATURE GRANULOCYTES: 1.2 %
LIPASE: 24.7 U/L (ref 5.6–51.3)
LYMPHOCYTES # BLD: 14.6 %
LYMPHOCYTES ABSOLUTE: 1.8 THOU/MM3 (ref 1–4.8)
MCH RBC QN AUTO: 29.9 PG (ref 26–33)
MCHC RBC AUTO-ENTMCNC: 31.1 GM/DL (ref 32.2–35.5)
MCV RBC AUTO: 96.4 FL (ref 81–99)
MONOCYTES # BLD: 4.2 %
MONOCYTES ABSOLUTE: 0.5 THOU/MM3 (ref 0.4–1.3)
NUCLEATED RED BLOOD CELLS: 0 /100 WBC
PLATELET # BLD: 195 THOU/MM3 (ref 130–400)
PMV BLD AUTO: 10.7 FL (ref 9.4–12.4)
POTASSIUM SERPL-SCNC: 4.1 MEQ/L (ref 3.5–5.2)
RBC # BLD: 3.34 MILL/MM3 (ref 4.2–5.4)
SEG NEUTROPHILS: 79.7 %
SEGMENTED NEUTROPHILS ABSOLUTE COUNT: 9.6 THOU/MM3 (ref 1.8–7.7)
SODIUM BLD-SCNC: 137 MEQ/L (ref 135–145)
TOTAL PROTEIN: 6.8 G/DL (ref 6.1–8)
WBC # BLD: 12 THOU/MM3 (ref 4.8–10.8)

## 2020-06-05 PROCEDURE — 36415 COLL VENOUS BLD VENIPUNCTURE: CPT

## 2020-06-05 PROCEDURE — 82150 ASSAY OF AMYLASE: CPT

## 2020-06-05 PROCEDURE — 85025 COMPLETE CBC W/AUTO DIFF WBC: CPT

## 2020-06-05 PROCEDURE — 83690 ASSAY OF LIPASE: CPT

## 2020-06-05 PROCEDURE — 80053 COMPREHEN METABOLIC PANEL: CPT

## 2020-06-09 ENCOUNTER — HOSPITAL ENCOUNTER (OUTPATIENT)
Dept: PHYSICAL THERAPY | Age: 25
Setting detail: THERAPIES SERIES
Discharge: HOME | End: 2020-06-09
Payer: COMMERCIAL

## 2020-06-09 PROCEDURE — 97110 THERAPEUTIC EXERCISES: CPT

## 2020-06-11 ENCOUNTER — HOSPITAL ENCOUNTER (OUTPATIENT)
Dept: PHYSICAL THERAPY | Age: 25
Setting detail: THERAPIES SERIES
Discharge: HOME | End: 2020-06-11
Payer: COMMERCIAL

## 2020-06-11 PROCEDURE — 97110 THERAPEUTIC EXERCISES: CPT

## 2020-06-11 ASSESSMENT — PAIN SCALES - GENERAL: PAINLEVEL_OUTOF10: 5

## 2020-06-11 NOTE — PROGRESS NOTES
[address]     Time In: 9910  Time Out: 2278  Minutes: 38  Timed Code Treatment Minutes: 38 Minutes     Date: 2020  Patient Name: Danay Lopez,  Gender:  female        CSN: [id number]   : 1995  (25 y.o.)       Referring Practitioner: Dr Rashad Kim      Diagnosis: RIGHT ANKLE PAIN, CLOSED TIB/FIB FRACTURE  Treatment Diagnosis: right ankle pain, diffiuclty with ambulation, balance deficits, right ankle weakness   Additional Pertinent Hx: Patient is currently Pregnant. No restrictions from doctor. Hx: right ankle fracture, irregualr heartbeat, Asthma, Bi-polar, Anxiety disorder, Obesity, Fibromyalgia       General:  PT Visit Information  PT Insurance Information: [facility] [place] [facility] [place] visits. Aquatics and modalities. Total # of Visits to Date: 10  Plan of Care/Certification Expiration Date: 20  Progress Note Counter:  for PN             Subjective:  Chart Reviewed: Yes  Patient assessed for rehabilitation services?: Yes  Family / Caregiver Present: No  Comments: Follow up with doctor on  or . Subjective: Patient states she just finished swimming and is a little more sore since that. Patient wearing brace into therapy. Pain:  Patient Currently in Pain: Yes  Pain Assessment: 0-10  Pain Level: 5(Right ankle)    Objective    Exercises  Exercise 1: NuStep Level 3 for 5 minutes (seat 10/arms 8)  Exercise 2: Standing: heel/toe raises 10x each, marching 10x bilateral, squats 10x and 3-way hip 10x bilateral. Added heel raises with toes pointed in and toes pointed out 10x each  Exercise 3: Balance on blue foam: feet side by side, tandem 1 minute each way. SLS 5x5 seconds bilateral  Exercise 4: Rockerboard 2 directions 15x each way with 30 second balance each way  Exercise 5: Inversion stretch with towel Right 3x 20 seconds. Ankle eversion with blue band 15x.    Exercise 7: Step ups 4\" housecleaning  Long term goal 4: Patient to demonstrate normal gait pattern with brace for all community walking  Long term goal 5: Patient to be able to SLS on right for 3-5 seconds for increased stance for gait  Long term goal 6: Patient to demonstrate all balance activity with no more than one hand for safety with comunity ambulation    Deejay Rising.  Heriberto Hernandez, [address] [facility] [address] [place] [facility], 6/11/2020

## 2020-06-16 ENCOUNTER — HOSPITAL ENCOUNTER (OUTPATIENT)
Dept: PHYSICAL THERAPY | Age: 25
Setting detail: THERAPIES SERIES
Discharge: HOME | End: 2020-06-16
Payer: COMMERCIAL

## 2020-06-16 PROCEDURE — 97110 THERAPEUTIC EXERCISES: CPT

## 2020-06-16 NOTE — PROGRESS NOTES
eversion for ease with doing housecleaning  Long term goal 4: Patient to demonstrate normal gait pattern with brace for all community walking  Long term goal 5: Patient to be able to SLS on right for 3-5 seconds for increased stance for gait  Long term goal 6: Patient to demonstrate all balance activity with no more than one hand for safety with comunity ambulation      Gege Paul.  Abigail Enriquez, [address] [facility] [address] [place], 6/16/2020

## 2020-06-18 ENCOUNTER — HOSPITAL ENCOUNTER (OUTPATIENT)
Dept: PHYSICAL THERAPY | Age: 25
Setting detail: THERAPIES SERIES
Discharge: HOME | End: 2020-06-18
Payer: COMMERCIAL

## 2020-06-18 PROCEDURE — 97112 NEUROMUSCULAR REEDUCATION: CPT

## 2020-06-18 PROCEDURE — 97110 THERAPEUTIC EXERCISES: CPT

## 2020-06-18 NOTE — PROGRESS NOTES
with minimal difficulty and pain  Long term goal 2: Patient to demonstrate 5-10 degree increase in right ankle inversion for ease with going up and down stairs  Long term goal 3: Patient to demonstrate 5/5 strength in right ankle eversion for ease with doing housecleaning  Long term goal 4: Patient to demonstrate normal gait pattern with brace for all community walking  Long term goal 5: Patient to be able to SLS on right for 3-5 seconds for increased stance for gait  Long term goal 6: Patient to demonstrate all balance activity with no more than one hand for safety with comunity ambulation    [address], [address]

## 2020-06-19 ENCOUNTER — HOSPITAL ENCOUNTER (OUTPATIENT)
Dept: ULTRASOUND IMAGING | Age: 25
Discharge: HOME | End: 2020-06-19
Payer: COMMERCIAL

## 2020-06-19 PROCEDURE — 76705 ECHO EXAM OF ABDOMEN: CPT

## 2020-06-23 ENCOUNTER — HOSPITAL ENCOUNTER (OUTPATIENT)
Dept: PHYSICAL THERAPY | Age: 25
Setting detail: THERAPIES SERIES
Discharge: HOME | End: 2020-06-23
Payer: COMMERCIAL

## 2020-06-23 PROCEDURE — 97110 THERAPEUTIC EXERCISES: CPT

## 2020-06-23 NOTE — PROGRESS NOTES
[place]     Time In: 1300  Time Out: 3071  Minutes: 45  Timed Code Treatment Minutes: 45 Minutes     Date: 2020  Patient Name: Lakisha Ghosh,  Gender:  female        CSN: [id number]   : 1995  (25 y.o.)       Referring Practitioner: Dr Armenta Arm      Diagnosis: RIGHT ANKLE PAIN, CLOSED TIB/FIB FRACTURE  Treatment Diagnosis: right ankle pain, diffiuclty with ambulation, balance deficits, right ankle weakness   Additional Pertinent Hx: Patient is currently Pregnant. No restrictions from doctor. Hx: right ankle fracture, irregualr heartbeat, Asthma, Bi-polar, Anxiety disorder, Obesity, Fibromyalgia       General:  PT Visit Information  PT Insurance Information: [facility] [place] [facility] visits. Aquatics and modalities. Total # of Visits to Date: 15  Plan of Care/Certification Expiration Date: 20  Progress Note Counter:  for PN             Subjective:  Chart Reviewed: Yes  Patient assessed for rehabilitation services?: Yes  Family / Caregiver Present: No  Comments: Follow up with doctor mid July      Subjective: Patient reports she stepped on her dog's paw on Saturday and sprained her ankle. States that she was wearing her brace and the increased pain only lasted for about a day. Denies pain coming into therapy today. Pain:  Patient Currently in Pain: No     Objective    Exercises  Exercise 1: NuStep Level 3 for 7 minutes (seat 10/arms 8)  Exercise 2: Standing: heel/toe raises 15x each, Heel raises with toes pointed in and toes pointed out 15x each, marching 20x bilateral, squats 20x and 3-way hip on Blue foam 15x bilateral.   Exercise 3: Balance on blue foam: feet side by side, tandem 1 minute each way. On the ground: SLS 5x10 seconds bilateral  Exercise 4: Rockerboard 2 directions 20x each way with 30 second balance each way  Exercise 5: Inversion stretch with towel Right 3x 20 seconds. Ankle eversion with blue band 15x. Exercise 6: Seated wooden BAPS: 20x each of 4 directions right foot only  Exercise 7: Step ups 4\" foward/lateral/eccentric 20x each Right CC  Exercise 8: Lunges forward onto BOSU 15x bilateral  Exercise 9: Calf stretch off 4\" step 3x 15 seconds. Exercise 10: Hydrostick 4 directions 10x each with feet in step stance each way  Exercise 11: Try bouncing on trampoline next session. Did not try today due to patient having mild sprain over the weekend. Activity Tolerance:  Activity Tolerance: Patient Tolerated treatment well    Assessment: Body structures, Functions, Activity limitations: Decreased functional mobility , Decreased ADL status, Decreased ROM, Decreased strength, Decreased endurance, Decreased balance, Increased pain  Assessment: Able to increase repetitions today and blue foam for 3-way hip today. Did not try bouncing on trampoline today due to patient having mild sprain over the weekend. Plan to try at next session. Prognosis: Good  Discharge Recommendations: Continue to assess pending progress    Patient Education:  Patient Education: Continue with HEP. Monitor pain. Plan:  Times per week: 2x/week  Plan weeks: 4 weeks  Specific instructions for Next Treatment: progress to land therapy for gait, balance and strengthening. Continue to progress ROM. Current Treatment Recommendations: Strengthening, ROM, Balance Training, Functional Mobility Training, Endurance Training, Stair training, Gait Training, Neuromuscular Re-education, Manual Therapy - Soft Tissue Mobilization, Home Exercise Program, Modalities, Aquatics, Pain Management  Plan Comment: Continue with current POC    Goals:  Patient goals : To have more mobility.     Short term goals  Time Frame for Short term goals: 4 weeks  Short term goal 1: See LTG    Long term goals  Time Frame for Long term goals : 4 weeks  Long term goal 1: Patient to be independent with home program and pool program to be able to return to prior activity with minimal difficulty and pain  Long term goal 2: Patient to demonstrate 5-10 degree increase in right ankle inversion for ease with going up and down stairs  Long term goal 3: Patient to demonstrate 5/5 strength in right ankle eversion for ease with doing housecleaning  Long term goal 4: Patient to demonstrate normal gait pattern with brace for all community walking  Long term goal 5: Patient to be able to SLS on right for 3-5 seconds for increased stance for gait  Long term goal 6: Patient to demonstrate all balance activity with no more than one hand for safety with comunity ambulation    Brittany Gilman.  Glenn Kohli, [address] [place], 6/23/2020

## 2020-06-25 ENCOUNTER — HOSPITAL ENCOUNTER (OUTPATIENT)
Dept: PHYSICAL THERAPY | Age: 25
Setting detail: THERAPIES SERIES
Discharge: HOME | End: 2020-06-25
Payer: COMMERCIAL

## 2020-06-25 PROCEDURE — 97110 THERAPEUTIC EXERCISES: CPT

## 2020-06-25 NOTE — PROGRESS NOTES
[place]     Time In: 0  Time Out:   Minutes: 38  Timed Code Treatment Minutes: 38 Minutes                Date: 2020  Patient Name: Mimi Morales,  Gender:  female        CSN: [id number]   : 1995  (25 y.o.)       Referring Practitioner: Dr Rama Sandra      Diagnosis: RIGHT ANKLE PAIN, CLOSED TIB/FIB FRACTURE  Treatment Diagnosis: right ankle pain, diffiuclty with ambulation, balance deficits, right ankle weakness   Additional Pertinent Hx: Patient is currently Pregnant. No restrictions from doctor. Hx: right ankle fracture, irregualr heartbeat, Asthma, Bi-polar, Anxiety disorder, Obesity, Fibromyalgia                  General:  PT Visit Information  Onset Date: 20  PT Insurance Information: [facility] [place] [facility] [employer] [facility] visits. Aquatics and modalities. Total # of Visits to Date: 15  Plan of Care/Certification Expiration Date: 20  Progress Note Counter:  for PN               Subjective:  Chart Reviewed: Yes  Patient assessed for rehabilitation services?: Yes  Family / Caregiver Present: No  Comments: Follow up with doctor mid July      Subjective: Pt stated R ankle is slowly feeling better since sprain. Pt wears ankle brace majority of day. Pain:  Patient Currently in Pain: No         Objective                                                                                                                          Exercises  Exercise 1: NuStep Level 5 for 5 minutes (seat 10/arms 8)  Exercise 2: on steps:  heel/toe raises 15x each, on steps:Heel raises with toes pointed in and toes pointed out 15x each, in //bars;  marching 20x bilateral, squats 20x and 3-way hip on Blue foam 15x bilateral.   Exercise 3: Balance on blue foam: feet side by side EC X30 sec. , tandem 1 minute each way.   On the ground: SLS 5x10 seconds bilateral  Exercise 4: Rockerboard 2 directions 20x each way with 30 second balance each way  Exercise 5: Inversion stretch with towel Right 3x 20 seconds. Ankle eversion with blue band 15x. Exercise 6: Seated wooden BAPS: 20x each of 4 directions right foot only  Exercise 7: Step ups 6\" foward/lateral/eccentric 15x each Right CC  Exercise 8: Lunges forward onto BOSU 15x bilateral  Exercise 9: Calf stretch off step 3x 15 seconds. Exercise 10: Hydrostick 4 directions 10x each with feet in step stance each way  Exercise 11: trampolene: B jumping X20, jogging X20         Activity Tolerance:  Activity Tolerance: Patient Tolerated treatment well    Assessment: Body structures, Functions, Activity limitations: Decreased functional mobility , Decreased ADL status, Decreased ROM, Decreased strength, Decreased endurance, Decreased balance, Increased pain  Assessment: Pt did well with all strengthening exercises for  R ankle. Pain increased to 2/10 at end of session. Added trampolene jumps this date with good tolerance. Prognosis: Good  Discharge Recommendations: Continue to assess pending progress    Patient Education:  PT Education: Home Exercise Program  Patient Education: Continue with HEP. Monitor pain. Plan:  Times per week: 2x/week  Plan weeks: 4 weeks  Specific instructions for Next Treatment: progress to land therapy for gait, balance and strengthening. Continue to progress ROM. Current Treatment Recommendations: Strengthening, ROM, Balance Training, Functional Mobility Training, Endurance Training, Stair training, Gait Training, Neuromuscular Re-education, Manual Therapy - Soft Tissue Mobilization, Home Exercise Program, Modalities, Aquatics, Pain Management  Plan Comment: Continue with current POC    Goals:  Patient goals : To have more mobility.     Short term goals  Time Frame for Short term goals: 4 weeks  Short term goal 1: See LTG    Long term goals  Time Frame for Long term goals : 4 weeks  Long term goal 1: Patient to be independent with home program and pool program to be able to return to prior activity with minimal difficulty and pain  Long term goal 2: Patient to demonstrate 5-10 degree increase in right ankle inversion for ease with going up and down stairs  Long term goal 3: Patient to demonstrate 5/5 strength in right ankle eversion for ease with doing housecleaning  Long term goal 4: Patient to demonstrate normal gait pattern with brace for all community walking  Long term goal 5: Patient to be able to SLS on right for 3-5 seconds for increased stance for gait  Long term goal 6: Patient to demonstrate all balance activity with no more than one hand for safety with comunity ambulation    Iris Delarosa, PTA

## 2020-06-30 ENCOUNTER — HOSPITAL ENCOUNTER (OUTPATIENT)
Dept: PHYSICAL THERAPY | Age: 25
Setting detail: THERAPIES SERIES
Discharge: HOME | End: 2020-06-30
Payer: COMMERCIAL

## 2020-06-30 PROCEDURE — 97110 THERAPEUTIC EXERCISES: CPT

## 2020-06-30 ASSESSMENT — PAIN DESCRIPTION - PAIN TYPE: TYPE: SURGICAL PAIN;ACUTE PAIN

## 2020-06-30 ASSESSMENT — PAIN DESCRIPTION - ORIENTATION: ORIENTATION: RIGHT

## 2020-06-30 ASSESSMENT — PAIN SCALES - GENERAL: PAINLEVEL_OUTOF10: 1

## 2020-06-30 ASSESSMENT — PAIN DESCRIPTION - LOCATION: LOCATION: ANKLE

## 2020-06-30 NOTE — DISCHARGE SUMMARY
[address]     Time In: 1440  Time Out: 6139  Minutes: 18  Timed Code Treatment Minutes: 18 Minutes           Discharge Caleb     Date: 2020  Patient Name: Valentine Georges,  Gender:  female        CSN: [id number]   : 1995  (25 y.o.)       Referring Practitioner: Dr Cherri Grewal      Diagnosis: RIGHT ANKLE PAIN, CLOSED TIB/FIB FRACTURE  Treatment Diagnosis: right ankle pain, diffiuclty with ambulation, balance deficits, right ankle weakness   Additional Pertinent Hx: Patient is currently Pregnant. No restrictions from doctor. Hx: right ankle fracture, irregualr heartbeat, Asthma, Bi-polar, Anxiety disorder, Obesity, Fibromyalgia                  General:  PT Visit Information  PT Insurance Information: [employer] visits. Aquatics and modalities. Total # of Visits to Date: 13  Plan of Care/Certification Expiration Date: 20  Progress Note Counter:  for PN on 2020               Subjective:  Chart Reviewed: Yes  Patient assessed for rehabilitation services?: Yes  Family / Caregiver Present: No  Comments: Follow up with doctor mid July      Subjective: Patient reports right foot is doing better. States is on day #4 without needing brace. States feels is doing ok and no more therapy is needed. Reports had a recent re-injury spraining her ankle but it is doing much better and healing. States thinks some of the pain may be due to weight gain with baby also. Pain:  Patient Currently in Pain: Yes  Pain Assessment: 0-10  Pain Level: 1  Pain Type: Surgical pain, Acute pain  Pain Location: Ankle  Pain Orientation: Right      Objective    Exercises  Exercise 12: Discussed HEP and educated on what to continue with. TINETTI BALANCE TEST    BALANCE Patient is seated in a hard, armless chair   1 SITTING BALANCE 1 - Steady, safe   2.   RISES FROM CHAIR 1 - Able, uses arms Patient to demonstrate 5-10 degree increase in right ankle inversion for ease with going up and down stairs - MET  Long term goal 3: Patient to demonstrate 5/5 strength in right ankle eversion for ease with doing housecleaning - MET  Long term goal 4: Patient to demonstrate normal gait pattern with brace for all community walking - MET  Long term goal 5: Patient to be able to SLS on right for 3-5 seconds for increased stance for gait - NOT MET Patient able to stand 2-3 seconds.   Long term goal 6: Patient to demonstrate all balance activity with no more than one hand for safety with comunity ambulation - MET    Stevo [address]

## 2020-07-07 ENCOUNTER — HOSPITAL ENCOUNTER (OUTPATIENT)
Age: 25
Discharge: STILL ADMITTED OR ADMINISTRATIVE | End: 2020-07-07
Attending: OBSTETRICS & GYNECOLOGY | Admitting: OBSTETRICS & GYNECOLOGY
Payer: COMMERCIAL

## 2020-07-07 ENCOUNTER — HOSPITAL ENCOUNTER (EMERGENCY)
Age: 25
Discharge: HOME | End: 2020-07-07
Attending: EMERGENCY MEDICINE
Payer: COMMERCIAL

## 2020-07-07 VITALS
HEIGHT: 70 IN | WEIGHT: 293 LBS | TEMPERATURE: 97 F | BODY MASS INDEX: 41.95 KG/M2 | DIASTOLIC BLOOD PRESSURE: 60 MMHG | HEART RATE: 111 BPM | RESPIRATION RATE: 20 BRPM | SYSTOLIC BLOOD PRESSURE: 128 MMHG

## 2020-07-07 VITALS
TEMPERATURE: 97.7 F | RESPIRATION RATE: 28 BRPM | HEART RATE: 112 BPM | OXYGEN SATURATION: 100 % | DIASTOLIC BLOOD PRESSURE: 76 MMHG | SYSTOLIC BLOOD PRESSURE: 118 MMHG

## 2020-07-07 PROBLEM — O26.892 ABDOMINAL PAIN DURING PREGNANCY IN SECOND TRIMESTER: Status: ACTIVE | Noted: 2020-07-07

## 2020-07-07 PROBLEM — R10.9 ABDOMINAL PAIN DURING PREGNANCY IN SECOND TRIMESTER: Status: ACTIVE | Noted: 2020-07-07

## 2020-07-07 LAB
ALBUMIN SERPL-MCNC: 3.1 G/DL (ref 3.5–5.1)
ALBUMIN SERPL-MCNC: 3.6 G/DL (ref 3.5–5.1)
ALP BLD-CCNC: 100 U/L (ref 38–126)
ALP BLD-CCNC: 89 U/L (ref 38–126)
ALT SERPL-CCNC: 10 U/L (ref 11–66)
ALT SERPL-CCNC: 11 U/L (ref 11–66)
AMYLASE: 49 U/L (ref 20–104)
ANION GAP SERPL CALCULATED.3IONS-SCNC: 11 MEQ/L (ref 8–16)
ANION GAP SERPL CALCULATED.3IONS-SCNC: 14 MEQ/L (ref 8–16)
AST SERPL-CCNC: 11 U/L (ref 5–40)
AST SERPL-CCNC: 12 U/L (ref 5–40)
BACTERIA: ABNORMAL /HPF
BASOPHILS # BLD: 0.2 %
BASOPHILS ABSOLUTE: 0 THOU/MM3 (ref 0–0.1)
BILIRUB SERPL-MCNC: < 0.2 MG/DL (ref 0.3–1.2)
BILIRUB SERPL-MCNC: < 0.2 MG/DL (ref 0.3–1.2)
BILIRUBIN DIRECT: < 0.2 MG/DL (ref 0–0.3)
BILIRUBIN URINE: NEGATIVE
BLOOD, URINE: NEGATIVE
BUN BLDV-MCNC: 5 MG/DL (ref 7–22)
BUN BLDV-MCNC: 5 MG/DL (ref 7–22)
CALCIUM SERPL-MCNC: 8.9 MG/DL (ref 8.5–10.5)
CALCIUM SERPL-MCNC: 9.2 MG/DL (ref 8.5–10.5)
CASTS 2: ABNORMAL /LPF
CASTS UA: ABNORMAL /LPF
CHARACTER, URINE: ABNORMAL
CHLORIDE BLD-SCNC: 102 MEQ/L (ref 98–111)
CHLORIDE BLD-SCNC: 103 MEQ/L (ref 98–111)
CO2: 22 MEQ/L (ref 23–33)
CO2: 24 MEQ/L (ref 23–33)
COLOR: YELLOW
CREAT SERPL-MCNC: 0.4 MG/DL (ref 0.4–1.2)
CREAT SERPL-MCNC: 0.4 MG/DL (ref 0.4–1.2)
CRYSTALS, UA: ABNORMAL
EKG ATRIAL RATE: 103 BPM
EKG P AXIS: 20 DEGREES
EKG P-R INTERVAL: 140 MS
EKG Q-T INTERVAL: 358 MS
EKG QRS DURATION: 88 MS
EKG QTC CALCULATION (BAZETT): 468 MS
EKG R AXIS: 54 DEGREES
EKG T AXIS: 22 DEGREES
EKG VENTRICULAR RATE: 103 BPM
EOSINOPHIL # BLD: 0.1 %
EOSINOPHILS ABSOLUTE: 0 THOU/MM3 (ref 0–0.4)
EPITHELIAL CELLS, UA: ABNORMAL /HPF
ERYTHROCYTE [DISTWIDTH] IN BLOOD BY AUTOMATED COUNT: 16.3 % (ref 11.5–14.5)
ERYTHROCYTE [DISTWIDTH] IN BLOOD BY AUTOMATED COUNT: 57.2 FL (ref 35–45)
GFR SERPL CREATININE-BSD FRML MDRD: > 90 ML/MIN/1.73M2
GFR SERPL CREATININE-BSD FRML MDRD: > 90 ML/MIN/1.73M2
GLUCOSE BLD-MCNC: 103 MG/DL (ref 70–108)
GLUCOSE BLD-MCNC: 129 MG/DL (ref 70–108)
GLUCOSE URINE: 100 MG/DL
HCT VFR BLD CALC: 31 % (ref 37–47)
HEMOGLOBIN: 9.7 GM/DL (ref 12–16)
IMMATURE GRANS (ABS): 0.12 THOU/MM3 (ref 0–0.07)
IMMATURE GRANULOCYTES: 1 %
KETONES, URINE: NEGATIVE
LEUKOCYTE ESTERASE, URINE: ABNORMAL
LIPASE: 23.4 U/L (ref 5.6–51.3)
LYMPHOCYTES # BLD: 16.3 %
LYMPHOCYTES ABSOLUTE: 1.9 THOU/MM3 (ref 1–4.8)
MCH RBC QN AUTO: 30.2 PG (ref 26–33)
MCHC RBC AUTO-ENTMCNC: 31.3 GM/DL (ref 32.2–35.5)
MCV RBC AUTO: 96.6 FL (ref 81–99)
MISCELLANEOUS 2: ABNORMAL
MONOCYTES # BLD: 6.9 %
MONOCYTES ABSOLUTE: 0.8 THOU/MM3 (ref 0.4–1.3)
NITRITE, URINE: NEGATIVE
NUCLEATED RED BLOOD CELLS: 0 /100 WBC
OSMOLALITY CALCULATION: 271.3 MOSMOL/KG (ref 275–300)
PH UA: 8 (ref 5–9)
PLATELET # BLD: 163 THOU/MM3 (ref 130–400)
PMV BLD AUTO: 10.8 FL (ref 9.4–12.4)
POTASSIUM SERPL-SCNC: 3.4 MEQ/L (ref 3.5–5.2)
POTASSIUM SERPL-SCNC: 3.6 MEQ/L (ref 3.5–5.2)
PROTEIN UA: NEGATIVE
RBC # BLD: 3.21 MILL/MM3 (ref 4.2–5.4)
RBC URINE: ABNORMAL /HPF
RENAL EPITHELIAL, UA: ABNORMAL
SEG NEUTROPHILS: 75.5 %
SEGMENTED NEUTROPHILS ABSOLUTE COUNT: 8.8 THOU/MM3 (ref 1.8–7.7)
SODIUM BLD-SCNC: 137 MEQ/L (ref 135–145)
SODIUM BLD-SCNC: 139 MEQ/L (ref 135–145)
SPECIFIC GRAVITY, URINE: 1.02 (ref 1–1.03)
TOTAL PROTEIN: 6.1 G/DL (ref 6.1–8)
TOTAL PROTEIN: 6.8 G/DL (ref 6.1–8)
UROBILINOGEN, URINE: 0.2 EU/DL (ref 0–1)
WBC # BLD: 11.7 THOU/MM3 (ref 4.8–10.8)
WBC UA: ABNORMAL /HPF
YEAST: ABNORMAL

## 2020-07-07 PROCEDURE — 93005 ELECTROCARDIOGRAM TRACING: CPT | Performed by: EMERGENCY MEDICINE

## 2020-07-07 PROCEDURE — 6360000002 HC RX W HCPCS: Performed by: OBSTETRICS & GYNECOLOGY

## 2020-07-07 PROCEDURE — 36415 COLL VENOUS BLD VENIPUNCTURE: CPT

## 2020-07-07 PROCEDURE — 85025 COMPLETE CBC W/AUTO DIFF WBC: CPT

## 2020-07-07 PROCEDURE — 6360000002 HC RX W HCPCS: Performed by: EMERGENCY MEDICINE

## 2020-07-07 PROCEDURE — 81001 URINALYSIS AUTO W/SCOPE: CPT

## 2020-07-07 PROCEDURE — 80053 COMPREHEN METABOLIC PANEL: CPT

## 2020-07-07 PROCEDURE — 6370000000 HC RX 637 (ALT 250 FOR IP): Performed by: EMERGENCY MEDICINE

## 2020-07-07 PROCEDURE — 82150 ASSAY OF AMYLASE: CPT

## 2020-07-07 PROCEDURE — 96374 THER/PROPH/DIAG INJ IV PUSH: CPT

## 2020-07-07 PROCEDURE — 83690 ASSAY OF LIPASE: CPT

## 2020-07-07 PROCEDURE — 2580000003 HC RX 258: Performed by: EMERGENCY MEDICINE

## 2020-07-07 PROCEDURE — 2709999900 HC NON-CHARGEABLE SUPPLY

## 2020-07-07 PROCEDURE — 99284 EMERGENCY DEPT VISIT MOD MDM: CPT

## 2020-07-07 PROCEDURE — 2580000003 HC RX 258: Performed by: OBSTETRICS & GYNECOLOGY

## 2020-07-07 PROCEDURE — 82248 BILIRUBIN DIRECT: CPT

## 2020-07-07 RX ORDER — SUCRALFATE 1 G/1
1 TABLET ORAL ONCE
Status: COMPLETED | OUTPATIENT
Start: 2020-07-07 | End: 2020-07-07

## 2020-07-07 RX ORDER — DEXTROSE, SODIUM CHLORIDE, SODIUM LACTATE, POTASSIUM CHLORIDE, AND CALCIUM CHLORIDE 5; .6; .31; .03; .02 G/100ML; G/100ML; G/100ML; G/100ML; G/100ML
INJECTION, SOLUTION INTRAVENOUS CONTINUOUS
Status: DISCONTINUED | OUTPATIENT
Start: 2020-07-07 | End: 2020-07-07 | Stop reason: HOSPADM

## 2020-07-07 RX ORDER — 0.9 % SODIUM CHLORIDE 0.9 %
500 INTRAVENOUS SOLUTION INTRAVENOUS ONCE
Status: COMPLETED | OUTPATIENT
Start: 2020-07-07 | End: 2020-07-07

## 2020-07-07 RX ORDER — METOCLOPRAMIDE HYDROCHLORIDE 5 MG/ML
10 INJECTION INTRAMUSCULAR; INTRAVENOUS ONCE
Status: COMPLETED | OUTPATIENT
Start: 2020-07-07 | End: 2020-07-07

## 2020-07-07 RX ORDER — ONDANSETRON 2 MG/ML
8 INJECTION INTRAMUSCULAR; INTRAVENOUS ONCE
Status: COMPLETED | OUTPATIENT
Start: 2020-07-07 | End: 2020-07-07

## 2020-07-07 RX ORDER — SODIUM CHLORIDE 9 MG/ML
INJECTION, SOLUTION INTRAVENOUS CONTINUOUS
Status: DISCONTINUED | OUTPATIENT
Start: 2020-07-07 | End: 2020-07-07 | Stop reason: HOSPADM

## 2020-07-07 RX ORDER — METOCLOPRAMIDE 10 MG/1
TABLET ORAL
Qty: 15 TABLET | Refills: 0 | Status: SHIPPED | OUTPATIENT
Start: 2020-07-07 | End: 2020-07-07 | Stop reason: SDUPTHER

## 2020-07-07 RX ORDER — METOCLOPRAMIDE 10 MG/1
TABLET ORAL
Qty: 15 TABLET | Refills: 0 | Status: SHIPPED | OUTPATIENT
Start: 2020-07-07 | End: 2021-02-01 | Stop reason: ALTCHOICE

## 2020-07-07 RX ORDER — FAMOTIDINE 10 MG
10 TABLET ORAL 2 TIMES DAILY
COMMUNITY
End: 2021-02-01 | Stop reason: ALTCHOICE

## 2020-07-07 RX ADMIN — SODIUM CHLORIDE, SODIUM LACTATE, POTASSIUM CHLORIDE, CALCIUM CHLORIDE AND DEXTROSE MONOHYDRATE: 5; 600; 310; 30; 20 INJECTION, SOLUTION INTRAVENOUS at 13:58

## 2020-07-07 RX ADMIN — METOCLOPRAMIDE 10 MG: 5 INJECTION, SOLUTION INTRAMUSCULAR; INTRAVENOUS at 17:24

## 2020-07-07 RX ADMIN — ONDANSETRON 8 MG: 2 INJECTION INTRAMUSCULAR; INTRAVENOUS at 14:04

## 2020-07-07 RX ADMIN — SUCRALFATE 1 G: 1 TABLET ORAL at 17:24

## 2020-07-07 RX ADMIN — SODIUM CHLORIDE 500 ML: 9 INJECTION, SOLUTION INTRAVENOUS at 17:24

## 2020-07-07 ASSESSMENT — ENCOUNTER SYMPTOMS
NAUSEA: 1
DIARRHEA: 1
VOMITING: 1
SHORTNESS OF BREATH: 1
ABDOMINAL PAIN: 1

## 2020-07-07 ASSESSMENT — PAIN DESCRIPTION - LOCATION
LOCATION_2: ABDOMEN
LOCATION: ABDOMEN

## 2020-07-07 ASSESSMENT — PAIN DESCRIPTION - ORIENTATION
ORIENTATION_2: MID
ORIENTATION: RIGHT

## 2020-07-07 ASSESSMENT — PAIN SCALES - GENERAL: PAINLEVEL_OUTOF10: 6

## 2020-07-07 ASSESSMENT — PAIN DESCRIPTION - PAIN TYPE: TYPE: ACUTE PAIN

## 2020-07-07 ASSESSMENT — PAIN DESCRIPTION - INTENSITY: RATING_2: 5

## 2020-07-07 NOTE — ED NOTES
[facility] Pt requesting an update from the provider. VSS. Respirs unlabored. Significant other bedside.       Vicenta Tom  07/07/20 1922

## 2020-07-07 NOTE — ED NOTES
Bed: 007A  Expected date: 7/7/20  Expected time:   Means of arrival:   Comments:      Chantal Nair RN  07/07/20 0133

## 2020-07-07 NOTE — ED NOTES
**This is a Medical/ PA/ APRN Student Note and is charted for educational purposes. The non-physician staff attested note is not to be used for billing purposes or to guide patient care. Please see the physician modifications/ attestation for treatment plan/suggestions. This note has been reviewed and feedback has been provided to the student. *                  [address]    Chief Complaint   Patient presents with    Abdominal Pain     RUQ    Shortness of Breath       Nurses Notes reviewed and I agree except as noted in the HPI. HPI    Woo Hanks is a 25 y.o. female  who presents for evaluation of vomiting, shortness of breath, and RUQ abdominal pain. Patient is 26.5 weeks pregnant and has had regular check-ups with no complications. She has had 6/10 RUQ and epigastric pain for 1.5 mo that has increased in severity over the past 3 days. The pain is worse after eating but recently has been constant. Shortness of breath started 1.5 mo ago and is worse with exertion. She has been vomiting regularly throughout the entire pregnancy but this increased in frequency yesterday when she vomited after every meal. She was seen 20 with similar symptoms and abd US at that time showed hepatomegaly without gallbladder pathology and LFT wnl. REVIEW OF SYSTEMS    Review of Systems   Constitutional: Negative for chills and fever. HENT: Negative. Respiratory: Positive for shortness of breath. Cardiovascular: Negative. Gastrointestinal: Positive for abdominal pain, diarrhea, nausea and vomiting. GERD   Genitourinary: Negative. Neurological: Negative.         PAST MEDICAL HISTORY     has a past medical history of ADD (attention deficit disorder), Anxiety, Anxiety attack, Asthma, Atypical face pain, Back pain, Bipolar 1 disorder ([facility] [address] Fibromyalgia, GERD (gastroesophageal reflux disease), Hypotension, Major depressive disorder, recurrent episode, mild ([facility] [address] Obesity, WILFREDO treated with BiPAP, Pneumonia, POTS (postural orthostatic tachycardia syndrome), Pott disease, Pulmonary emboli ([facility] [address] Seizures ([facility] [address] Tailbone injury, Thyroid disease, TMJ (dislocation of temporomandibular joint), Trigeminal neuralgia, and Wears contact lenses. SURGICAL HISTORY   has a past surgical history that includes Kimberly tooth extraction (2010); Cardiac catheterization (3-2016); Cardiac catheterization (04/21/2016); Colonoscopy (2016); Insertable Cardiac Monitor; and Ankle fracture surgery (Right, 2/1/2020).     CURRENT MEDICATIONS    Previous Medications    ALBUTEROL (PROVENTIL) (2.5 MG/3ML) 0.083% NEBULIZER SOLUTION    Take 3 mLs by nebulization every 6 hours as needed for Wheezing    ALBUTEROL SULFATE HFA (PROAIR HFA) 108 (90 BASE) MCG/ACT INHALER    Inhale 2 puffs into the lungs every 6 hours as needed for Wheezing    ARIPIPRAZOLE (ABILIFY) 15 MG TABLET    Take 15 mg by mouth nightly    CETIRIZINE (ZYRTEC) 10 MG TABLET    Take 1 tablet by mouth daily    CHOLECALCIFEROL (VITAMIN D3) 50 MCG (2000 UT) CAPS    Take 1 capsule by mouth daily    DULOXETINE HCL (CYMBALTA PO)    Take 120 mg by mouth every morning (before breakfast)    ENOXAPARIN (LOVENOX) 100 MG/ML INJECTION    Inject 1.5 mg/kg into the skin daily    FAMOTIDINE (PEPCID) 10 MG TABLET    Take 10 mg by mouth 2 times daily    FOLIC ACID (FOLVITE) 1 MG TABLET    Take 1 mg by mouth daily    OMEPRAZOLE (PRILOSEC) 40 MG DELAYED RELEASE CAPSULE    TAKE 1 CAPSULE BY MOUTH TWO TIMES A DAY     OXCARBAZEPINE (TRILEPTAL) 600 MG TABLET        PRENATAL VIT-DSS-FE CBN-FA (PRENATAL AD PO)    Take by mouth daily    PRENATAL VIT-FE FUMARATE-FA (PRENATAL VITAMIN) 27-0.8 MG TABS    Take 1 tablet by mouth daily    QUETIAPINE (SEROQUEL) 300 MG TABLET    Take 300 mg by mouth nightly    QUETIAPINE (SEROQUEL) 50 MG TABLET    Take 50 mg by mouth every morning (before breakfast)    SERTRALINE (ZOLOFT) 25 MG TABLET    Take 25 mg by mouth every morning (before breakfast)    SERTRALINE (ZOLOFT) 50 MG TABLET    Take 50 mg by mouth every morning (before breakfast)     ALLERGIES    is allergic to dilaudid [hydromorphone hcl]; flexeril [cyclobenzaprine]; keflex [cephalexin]; tessalon [benzonatate]; augmentin [amoxicillin-pot clavulanate]; lorabid [loracarbef]; and minocycline. FAMILY HISTORY    She indicated that her mother is alive. She indicated that her father is alive. She indicated that her brother is alive. She indicated that her maternal grandmother is . She indicated that her maternal grandfather is . She indicated that her paternal grandmother is . She indicated that her paternal grandfather is . She indicated that her maternal aunt is alive. She indicated that the status of her paternal aunt is unknown. She indicated that the status of her paternal uncle is unknown.   family history includes Anxiety Disorder in her father and mother; Bipolar Disorder in her father; Cancer in her maternal grandmother, paternal grandfather, and paternal uncle; Depression in her maternal grandfather, paternal aunt, and paternal uncle; Diabetes in her mother; High Blood Pressure in her father; Lupus in her maternal aunt; Mental Illness in her father; Parkinsonism in her father. SOCIAL HISTORY     reports that she quit smoking about 5 months ago. Her smoking use included cigarettes. She started smoking about 2 years ago. She has a 1.50 pack-year smoking history. She has never used smokeless tobacco. She reports current alcohol use of about 1.0 standard drinks of alcohol per week. She reports current drug use. Drug: Marijuana.     PHYSICAL EXAM      INITIAL VITALS: /76   Pulse 112   Temp 97.7 °F (36.5 °C) (Oral)   Resp 28   LMP 01/10/2020 (Exact Date)   SpO2 100% Estimated body mass index is 46.37 kg/m² as calculated from the following:    Height as of an earlier encounter on 20: 5' 10\" (1.778 m). Weight as of an earlier encounter on 7/7/20: 323 lb 3.2 oz (146.6 kg). Physical Exam  Vitals signs reviewed. Eyes:      Extraocular Movements: Extraocular movements intact. Pupils: Pupils are equal, round, and reactive to light. Cardiovascular:      Rate and Rhythm: Normal rate and regular rhythm. Pulses: Normal pulses. Heart sounds: Normal heart sounds. Pulmonary:      Effort: Pulmonary effort is normal. No respiratory distress. Breath sounds: Normal breath sounds. No wheezing, rhonchi or rales. Abdominal:      General: Bowel sounds are normal.      Palpations: Abdomen is soft. Tenderness: There is abdominal tenderness. There is no guarding or rebound. Comments: RUQ and epigastric tenderness to light palpation   Skin:     General: Skin is warm and dry. Neurological:      General: No focal deficit present. Mental Status: She is alert. MEDICAL DECISION MAKING    DIFFERENTIAL DIAGNOSIS:  GERD  Cholelithiasis, cholecystitis       DIAGNOSTIC RESULTS      RADIOLOGY:  I have reviewed radiologic plain film image(s).   The plain films will be read or overread by the radiologist.All other non-plain film images(s) such as CT, Ultrasound and MRI have been read by the radiologist.  No orders to display       LABS:   Labs Reviewed   CBC WITH AUTO DIFFERENTIAL - Abnormal; Notable for the following components:       Result Value    WBC 11.7 (*)     RBC 3.21 (*)     Hemoglobin 9.7 (*)     Hematocrit 31.0 (*)     MCHC 31.3 (*)     RDW-CV 16.3 (*)     RDW-SD 57.2 (*)     Segs Absolute 8.8 (*)     Immature Grans (Abs) 0.12 (*)     All other components within normal limits   BASIC METABOLIC PANEL - Abnormal; Notable for the following components:    Potassium 3.4 (*)     BUN 5 (*)     All other components within normal limits   HEPATIC FUNCTION PANEL - Abnormal; Notable for the following components:    Alb 3.1 (*)     Total Bilirubin <0.2 (*)     ALT 10 (*) All other components within normal limits   OSMOLALITY - Abnormal; Notable for the following components:    Osmolality Calc 271.3 (*)     All other components within normal limits   URINE WITH REFLEXED MICRO - Abnormal; Notable for the following components:    Glucose, Ur 100 (*)     Leukocyte Esterase, Urine SMALL (*)     Character, Urine CLOUDY (*)     All other components within normal limits   LIPASE   AMYLASE   ANION GAP   GLOMERULAR FILTRATION RATE, ESTIMATED     All other unresulted laboratory test above are normal:    Vitals:    Vitals:    07/07/20 1552 07/07/20 1630 07/07/20 1835 07/07/20 1920   BP: 119/73 111/61 122/76 118/76   Pulse: 108 106 115 112   Resp: 18 24 22 28   Temp: 97.7 °F (36.5 °C)      TempSrc: Oral      SpO2: 98% 99% 100% 100%       EMERGENCY DEPARTMENT COURSE:    Medications   0.9 % sodium chloride infusion (has no administration in time range)   0.9 % sodium chloride bolus (0 mLs Intravenous Stopped 7/7/20 1920)   metoclopramide (REGLAN) injection 10 mg (10 mg Intravenous Given 7/7/20 1724)   sucralfate (CARAFATE) tablet 1 g (1 g Oral Given 7/7/20 1724)       The pt was seen and evaluated by me. Within the department, I observed the pt's vital signs to be within acceptable range aside from low BP that improved with fluids. Laboratory and Radiological studies were performed, results were reviewed with the patient. Within the department, the pt was treated with IVF and metoclopramide. I observed the pt's condition to improve during the duration of their stay. I explained my proposed course of treatment to the pt, and they were amenable to my decision. They were discharged home, and they will return to the ED if their symptoms become more severein nature, or otherwise change. CRITICAL CARE:   None. CONSULTS:  None    PROCEDURES:  None. FINAL IMPRESSION       1. Abdominal pain, right upper quadrant and epigastric pain    2.  Gastroesophageal reflux disease, esophagitis presence not specified    3. Pregnancy with 26 completed weeks gestation          DISPOSITION/PLAN  PATIENT REFERRED TO:  Taylor Bansal MD  [facility] [id number]  [phone]    Schedule an appointment as soon as possible for a visit in 3 days      Fabiola Wade AM, II[address]    Schedule an appointment as soon as possible for a visit in 1 week      [address]  [phone]    As needed    DISCHARGE MEDICATIONS:  New Prescriptions    METOCLOPRAMIDE (REGLAN) 10 MG TABLET    1 tablet every 6-8 hours for nausea vomiting when necessary        07/07/20 7:42 PM     **This is a Medical/ PA/ APRN Student Note and is charted for educational purposes. The non-physician staff attested note is not to be used for billing purposes or to guide patient care. Please see the physician modifications/ attestation for treatment plan/suggestions. This note has been reviewed and feedback has been provided to the student.  Sebastián Dutton  07/07/20 1942       Ashton Schirmer, MD  07/11/20 1235

## 2020-07-07 NOTE — FLOWSHEET NOTE
Dr. Michael Jiménez updated on pt status. Pt no longer feeling nauseous or vomiting, but still has RUQ abdominal pain. Dr. Michael Jiménez also notified of CMP results. Orders received to discuss with pt options to either finish IV fluids and be discharged home, or to be seen down in the ER for further evaluation. Will update pt and proceed according to her decision.

## 2020-07-07 NOTE — FLOWSHEET NOTE
Dr. Roseann Chandler notified of pt  26w4d arrived to unit with complaints of pain in her RUQ and vomiting. States she has been vomiting daily for the last 3 weeks. She has been seeing a GI specialist recently for issues with her \"enlarged liver,\" but was not able to contact them today. Pt denies any vaginal bleeding or leaking, + fetal movement noted, and denies feeling any contractions. Orders received. Will contact Dr. Roseann Chandler with results.

## 2020-07-07 NOTE — ED PROVIDER NOTES
**This is a Medical/ PA/ APRN Student Note and is charted for educational purposes. The non-physician staff attested note is not to be used for billing purposes or to guide patient care. Please see the physician modifications/ attestation for treatment plan/suggestions. This note has been reviewed and feedback has been provided to the student. *                      [address]          CHIEF COMPLAINT         Chief Complaint   Patient presents with    Abdominal Pain       RUQ    Shortness of Breath        Nurses Notes reviewed and I agree except as noted in the HPI.     HPI    Zay Gustafson is a 25 y.o. female  who presents for evaluation of vomiting, shortness of breath, and RUQ abdominal pain. Patient is 26.5 weeks pregnant and has had regular check-ups with no complications. She has had 6/10 RUQ and epigastric pain for 1.5 mo that has increased in severity over the past 3 days. The pain is worse after eating but recently has been constant. Shortness of breath started 1.5 mo ago and is worse with exertion. She has been vomiting regularly throughout the entire pregnancy but this increased in frequency yesterday when she vomited after every meal. She was seen 20 with similar symptoms and abd US at that time showed hepatomegaly without gallbladder pathology and LFT wnl.      REVIEW OF SYSTEMS    Review of Systems   Constitutional: Negative for chills and fever. HENT: Negative. Respiratory: Positive for shortness of breath. Cardiovascular: Negative. Gastrointestinal: Positive for abdominal pain, diarrhea, nausea and vomiting. GERD   Genitourinary: Negative.     Neurological: Negative.          PAST MEDICAL HISTORY     has a past medical history of ADD (attention deficit disorder), Anxiety, Anxiety attack, Asthma, Atypical face pain, Back pain, Bipolar 1 disorder ([facility] [address] Fibromyalgia, GERD (gastroesophageal reflux disease), Hypotension, Major depressive disorder, recurrent episode, mild ([address] [address] Obesity, WILFREDO treated with BiPAP, Pneumonia, POTS (postural orthostatic tachycardia syndrome), Pott disease, Pulmonary emboli ([facility] [address] Seizures ([facility] [address] Tailbone injury, Thyroid disease, TMJ (dislocation of temporomandibular joint), Trigeminal neuralgia, and Wears contact lenses.     SURGICAL HISTORY   has a past surgical history that includes [place] tooth extraction (2010); Cardiac catheterization (3-2016); Cardiac catheterization (04/21/2016); Colonoscopy (2016); Insertable Cardiac Monitor; and Ankle fracture surgery (Right, 2/1/2020).      CURRENT MEDICATIONS         Previous Medications     ALBUTEROL (PROVENTIL) (2.5 MG/3ML) 0.083% NEBULIZER SOLUTION    Take 3 mLs by nebulization every 6 hours as needed for Wheezing     ALBUTEROL SULFATE HFA (PROAIR HFA) 108 (90 BASE) MCG/ACT INHALER    Inhale 2 puffs into the lungs every 6 hours as needed for Wheezing     ARIPIPRAZOLE (ABILIFY) 15 MG TABLET    Take 15 mg by mouth nightly     CETIRIZINE (ZYRTEC) 10 MG TABLET    Take 1 tablet by mouth daily     CHOLECALCIFEROL (VITAMIN D3) 50 MCG (2000 UT) CAPS    Take 1 capsule by mouth daily     DULOXETINE HCL (CYMBALTA PO)    Take 120 mg by mouth every morning (before breakfast)     ENOXAPARIN (LOVENOX) 100 MG/ML INJECTION    Inject 1.5 mg/kg into the skin daily     FAMOTIDINE (PEPCID) 10 MG TABLET    Take 10 mg by mouth 2 times daily     FOLIC ACID (FOLVITE) 1 MG TABLET    Take 1 mg by mouth daily     OMEPRAZOLE (PRILOSEC) 40 MG DELAYED RELEASE CAPSULE    TAKE 1 CAPSULE BY MOUTH TWO TIMES A DAY      OXCARBAZEPINE (TRILEPTAL) 600 MG TABLET         PRENATAL VIT-DSS-FE CBN-FA (PRENATAL AD PO)    Take by mouth daily     PRENATAL VIT-FE FUMARATE-FA (PRENATAL VITAMIN) 27-0.8 MG TABS    Take 1 tablet by mouth daily     QUETIAPINE (SEROQUEL) 300 MG TABLET    Take 300 mg by mouth nightly     QUETIAPINE (SEROQUEL) 50 MG TABLET    Take 50 mg by mouth every morning (before breakfast)     SERTRALINE (ZOLOFT) 25 MG TABLET    Take 25 mg by mouth every morning (before breakfast)     SERTRALINE (ZOLOFT) 50 MG TABLET    Take 50 mg by mouth every morning (before breakfast)     ALLERGIES    is allergic to dilaudid [hydromorphone hcl]; flexeril [cyclobenzaprine]; keflex [cephalexin]; tessalon [benzonatate]; augmentin [amoxicillin-pot clavulanate]; lorabid [loracarbef]; and minocycline.     FAMILY HISTORY    She indicated that her mother is alive. She indicated that her father is alive. She indicated that her brother is alive. She indicated that her maternal grandmother is . She indicated that her maternal grandfather is . She indicated that her paternal grandmother is . She indicated that her paternal grandfather is . She indicated that her maternal aunt is alive. She indicated that the status of her paternal aunt is unknown. She indicated that the status of her paternal uncle is unknown.   family history includes Anxiety Disorder in her father and mother; Bipolar Disorder in her father; Cancer in her maternal grandmother, paternal grandfather, and paternal uncle; Depression in her maternal grandfather, paternal aunt, and paternal uncle; Diabetes in her mother; High Blood Pressure in her father; Lupus in her maternal aunt; Mental Illness in her father; Parkinsonism in her father.     SOCIAL HISTORY     reports that she quit smoking about 5 months ago. Her smoking use included cigarettes. She started smoking about 2 years ago. She has a 1.50 pack-year smoking history. She has never used smokeless tobacco. She reports current alcohol use of about 1.0 standard drinks of alcohol per week. She reports current drug use.  Drug: Marijuana.     PHYSICAL EXAM      INITIAL VITALS: /76   Pulse 112   Temp 97.7 °F (36.5 °C) (Oral)   Resp 28   LMP 01/10/2020 (Exact Date)   SpO2 100% Estimated body mass index is 46.37 kg/m² as calculated from the None.     CONSULTS:  None     PROCEDURES:  None.     FINAL IMPRESSION       1. Abdominal pain, right upper quadrant and epigastric pain    2. Gastroesophageal reflux disease, esophagitis presence not specified    3. Pregnancy with 26 completed weeks gestation           DISPOSITION/PLAN  PATIENT REFERRED TO:  Adriano Morgan MD  [facility] [address]  [phone]     Schedule an appointment as soon as possible for a visit in 3 days        Javier Taylornyla  [address]     Schedule an appointment as soon as possible for a visit in 1 week        [facility] EMERGENCY DEPT  [address] [facility]  [phone]     As needed    DISCHARGE MEDICATIONS:       New Prescriptions     METOCLOPRAMIDE (REGLAN) 10 MG TABLET    1 tablet every 6-8 hours for nausea vomiting when necessary         20 7:42 PM      **This is a Medical/ PA/ APRN Student Note and is charted for educational purposes. The non-physician staff attested note is not to be used for billing purposes or to guide patient care. Please see the physician modifications/ attestation for treatment plan/suggestions. This note has been reviewed and feedback has been provided to the student. *           Larry Guzmán  20 194        Alexa Chung MD  20 1047              Attending Physician Note:    Patient was seen by 22[address] yoselin Clements Reshma  and I co-managed the case    I have personally performed and participated in the history, exam and medical decision making and agree with all pertinent clinical information. I have also reviewed and agree with the past medical, family and social history unless otherwise noted. Patient presented to the emergency room due to abdominal pain right upper quadrant, this is been ongoing for the past 1-1/2-month however gotten worse for the past 3 days . Patient is pregnant  1 para 0  26 weeks gestation being followed by Dr. Mayito Lal. The patient went to the [facility] floor today and was sent here to be checked. The pain is in the epigastric area and admits that she had some GERD and takes Prilosec and Pepcid. This was given by Dr. Huey Barahona the patient gastroenterology. The patient also had been seeing the nurse practitioner Dr. Huey Barahona who ordered an ultrasound of the liver and gallbladder. Patient was told that her gallbladder is normal however she had enlarged liver. Pain comes and goes and sometimes with vomiting and today she had vomited 8 times for which the patient came here to be checked. Patient denies any fever, no chills, no urinary symptoms, no melena nor hematochezia    Physical examination showed lungs clear to auscultation, heart regular rate and rhythm, abdomen is globular soft depressible tenderness on the epigastric and right upper quadrant no tenderness on the right lower quadrant normoactive bowel sound back showed no CVA tenderness    Evaluation: Agree above , seen the patient and discussed the diagnosis and treatment plans     19:40 PM Patient was hydrated well and when reevaluated pain had resolved and feeling well and wants to go home. FINAL IMPRESSION       1. Abdominal pain, right upper quadrant and epigastric pain    2. Gastroesophageal reflux disease, esophagitis presence not specified    3.  Pregnancy with 26 completed weeks gestation            DISPOSITION/PLAN  PATIENT REFERRED TO:  Minnie Mancini MD  [facility] [address]  [phone]    Schedule an appointment as soon as possible for a visit in 3 days      Robert Nuno MD  [address]  [address]    Schedule an appointment as soon as possible for a visit in 1 week      [address] [facility] [address] EMERGENCY DEPT  [facility] [place] [address]  [phone]    As needed    DISCHARGE MEDICATIONS:  Discharge Medication List as of 7/7/2020  7:43 PM          (Please note that portions of this note were completed with a voice recognition program and electronically transcribed. Efforts were [facility] edit the dictations but occasionally words are mis-transcribed . The transcription may contain errors not detected in proofreading.   This transcription was electronically signed.)      Willam Jones MD      Emergency room physician        Willam Jones MD  07/07/20 [address], MD  07/11/20 [address], MD  07/29/20 [address], MD  08/11/20 0508

## 2020-07-07 NOTE — FLOWSHEET NOTE
Discussed plan of care options with pt. Pt would like to be seen down in the ER. Will keep IV in per Dr. Rosi Tony and transport pt to ER room 7 per ER charge nurse.

## 2020-07-07 NOTE — FLOWSHEET NOTE
Reviewed discharge instructions with pt. Pt verbalizes understanding and has no further questions. Pt transported in wheelchair to ER room 7 with IV in place.

## 2020-07-09 ENCOUNTER — TELEPHONE (OUTPATIENT)
Dept: FAMILY MEDICINE CLINIC | Age: 25
End: 2020-07-09

## 2020-07-20 ENCOUNTER — OFFICE VISIT (OUTPATIENT)
Dept: PULMONOLOGY | Age: 25
End: 2020-07-20
Payer: COMMERCIAL

## 2020-07-20 VITALS
OXYGEN SATURATION: 98 % | BODY MASS INDEX: 41.95 KG/M2 | DIASTOLIC BLOOD PRESSURE: 72 MMHG | HEIGHT: 70 IN | SYSTOLIC BLOOD PRESSURE: 132 MMHG | HEART RATE: 141 BPM | WEIGHT: 293 LBS

## 2020-07-20 PROCEDURE — 99213 OFFICE O/P EST LOW 20 MIN: CPT | Performed by: NURSE PRACTITIONER

## 2020-07-20 PROCEDURE — G8417 CALC BMI ABV UP PARAM F/U: HCPCS | Performed by: NURSE PRACTITIONER

## 2020-07-20 PROCEDURE — 1036F TOBACCO NON-USER: CPT | Performed by: NURSE PRACTITIONER

## 2020-07-20 PROCEDURE — G8427 DOCREV CUR MEDS BY ELIG CLIN: HCPCS | Performed by: NURSE PRACTITIONER

## 2020-07-20 NOTE — PROGRESS NOTES
[facility] Pulmonary Medicine [facility]         [phone]  7/20/2020   Chief Complaint   Patient presents with    Follow-up     WILFREDO 3 month follow up with a download        Pt of Dr. Charisse WEISS Download:   Carol Current or initial AHI: 131.4   Date of initial study: 1/16/20  Weight of initial study: 290 lb  [x] Compliant  100%   [] Noncompliant 0%     PAP Type Spont Level  IPAP: 22, EPAP 18 cmH20   Avg Hrs/Day 8:54  AHI: 0.9   Recorded compliance dates, 6/6/20 to 7/5/20   Machine/Mfg: ResMed  Interface: FFM    Provider:  [x][facility]  [][employer] [][employer]  [][facility]         [][id number] []Other:     Neck Size: 18  Mallampati Mallampati 3  ESS:  10    Here is a scan of the most recent download:              Presentation:   Prudence Hagan presents for sleep medicine follow up for obstructive sleep apnea. Since the last visit, Prudence Hagan continues to do well with treatment. Her weight and ESS is up as she is in her 3rd trimester of pregnancy, due end of September. Does have mask leak, but tolerable, not affecting AHI. Equipment issues: The pressure is acceptable, the mask is somewhat acceptable and she is using the humidity. Sleep issues:  Do you feel better? Yes  More rested? Yes   Better concentration? yes    Progress History:   Since last visit any new medical issues? No  New ER or hospitlal visits? No  Any new or changes in medicines? No  Any new sleep medicines?  No      Past Medical History:   Diagnosis Date    ADD (attention deficit disorder)     Anxiety 4/8/2015    Anxiety attack     Asthma     exercise induced    Atypical face pain     Back pain     Bipolar 1 disorder (HCC)     Fibromyalgia     GERD (gastroesophageal reflux disease)     Hypotension 11/2/2017    Major depressive disorder, recurrent episode, mild ([facility] [address] 7/31/2012    Obesity 10/24/2014    WILFREDO treated with BiPAP     Pneumonia 2/21/2018    POTS (postural orthostatic tachycardia syndrome)     Pott disease     enoxaparin (LOVENOX) 100 MG/ML injection Inject 1.5 mg/kg into the skin daily      OXcarbazepine (TRILEPTAL) 600 MG tablet       QUEtiapine (SEROQUEL) 300 MG tablet Take 300 mg by mouth nightly      QUEtiapine (SEROQUEL) 50 MG tablet Take 50 mg by mouth every morning (before breakfast)      ARIPiprazole (ABILIFY) 15 MG tablet Take 15 mg by mouth nightly      sertraline (ZOLOFT) 25 MG tablet Take 25 mg by mouth every morning (before breakfast)      sertraline (ZOLOFT) 50 MG tablet Take 50 mg by mouth every morning (before breakfast)      Prenatal Vit-DSS-Fe Cbn-FA (PRENATAL AD PO) Take by mouth daily      folic acid (FOLVITE) 1 MG tablet Take 1 mg by mouth daily      DULoxetine HCl (CYMBALTA PO) Take 120 mg by mouth every morning (before breakfast)      Cholecalciferol (VITAMIN D3) 50 MCG (2000 UT) CAPS Take 1 capsule by mouth daily 30 capsule 0    albuterol (PROVENTIL) (2.5 MG/3ML) 0.083% nebulizer solution Take 3 mLs by nebulization every 6 hours as needed for Wheezing 120 each 3    omeprazole (PRILOSEC) 40 MG delayed release capsule TAKE 1 CAPSULE BY MOUTH TWO TIMES A DAY  (Patient taking differently: nightly ) 60 capsule 10    albuterol sulfate HFA (PROAIR HFA) 108 (90 Base) MCG/ACT inhaler Inhale 2 puffs into the lungs every 6 hours as needed for Wheezing 1 Inhaler 3    cetirizine (ZYRTEC) 10 MG tablet Take 1 tablet by mouth daily 90 tablet 3     No current facility-administered medications for this visit.         Family History   Problem Relation Age of Onset    Anxiety Disorder Mother     Diabetes Mother     Anxiety Disorder Father     Bipolar Disorder Father     High Blood Pressure Father     Mental Illness Father     Parkinsonism Father         Early-Onset    Depression Paternal Aunt     Cancer Paternal Uncle     Depression Paternal Uncle     Cancer Maternal Grandmother     Depression Maternal Grandfather     Cancer Paternal Grandfather     Lupus Maternal Aunt         Review of Systems   General/Constitutional: No recent loss of weight or appetite changes. No fever or chills. HENT: Negative. Eyes: Negative. Upper respiratory tract: No nasal stuffiness or post nasal drip. Lower respiratory tract/ lungs: No cough or sputum production. No hemoptysis. Cardiovascular: No palpitations or chest pain. Gastrointestinal: No nausea or vomiting. Neurological: No focal neurologiacal weakness. Extremities: No edema. Musculoskeletal: No complaints. Genitourinary: No complaints. Hematological: Negative. Psychiatric/Behavioral: Negative. Skin: No itching. Physical Exam:    BMI: Body mass index is 46.35 kg/m². Wt Readings from Last 3 Encounters:   07/20/20 (!) 323 lb (146.5 kg)   07/07/20 (!) 323 lb 3.2 oz (146.6 kg)   02/01/20 280 lb (127 kg)     Weight gained 33 lbs since study with preganancy  Vitals: /72 (Site: Left Upper Arm, Position: Sitting, Cuff Size: Medium Adult)   Pulse 141   Ht 5' 10\" (1.778 m)   Wt (!) 323 lb (146.5 kg)   LMP 01/10/2020 (Exact Date)   SpO2 98% Comment: on room air at rest  BMI 46.35 kg/m²         General Appearance - Moderately built, moderately nourished, in no acute distress. HEENT - Head is normocephalic, atraumatic. PERRL. Oral mucosa pink and moist, no oral thrush. Mallampati Score - III (soft palate, base of uvula visible). Neck - Supple, symmetrical, trachea midline and soft. Lungs - Clear to auscultation, no wheezes, rales or rhonchi, aeration good. Cardiovascular - Heart sounds are normal. Regular rhythm normal rate without murmur, gallop or rub. Abdomen - Soft, nontender, non-distended. Pregnant. Neurologic - Alert and oriented x 3. Skin - No bruising or bleeding. Extremities - No cyanosis, clubbing or edema. ASSESSMENT/DIAGNOSIS     Diagnosis Orders   1. WILFREDO treated with BiPAP              Plan   Do you need any equipment today?  No.  -Advised to call if feels the need for increase in pressure with progression of pregnancy. - She was advised to continue current positive airway pressure therapy with above described pressure. - She was advised to keep good compliance with current recommended pressure to get optimal results and clinical improvement.  - Recommend 7-9 hours of sleep with PAP treatment. - She was advised to call [employer] regarding supplies if needed.   -She is to call my office for earlier appointment if needed for worsening of sleep symptoms.   - She was instructed on weight loss. - Marielos Chester was educated about my impression and plan and verbalizes understanding. We will see Deana Dailey back in: 6 months with download.     Electronically signed by LEVI Darby CNP on 7/20/2020 at 2:27 PM

## 2020-08-06 ENCOUNTER — HOSPITAL ENCOUNTER (OUTPATIENT)
Dept: ULTRASOUND IMAGING | Age: 25
Discharge: HOME | End: 2020-08-06
Payer: COMMERCIAL

## 2020-08-06 PROCEDURE — 76816 OB US FOLLOW-UP PER FETUS: CPT | Performed by: OBSTETRICS & GYNECOLOGY

## 2020-08-06 PROCEDURE — 99211 OFF/OP EST MAY X REQ PHY/QHP: CPT

## 2020-08-06 PROCEDURE — 99212 OFFICE O/P EST SF 10 MIN: CPT | Performed by: OBSTETRICS & GYNECOLOGY

## 2020-08-06 PROCEDURE — 76816 OB US FOLLOW-UP PER FETUS: CPT

## 2020-08-06 NOTE — FLOWSHEET NOTE
HT:5'10    WT:   323.8  Lbs.      147.2   Kg.  T:     95.6   Tympanic  BP:120/71  P:133  R:18  Problems/concerns:none

## 2020-08-07 ENCOUNTER — TELEPHONE (OUTPATIENT)
Dept: FAMILY MEDICINE CLINIC | Age: 25
End: 2020-08-07

## 2020-08-22 NOTE — PROGRESS NOTES
[address] [place] [address]                                NON STRESS TEST    PATIENT NAME: Abbi Becerra                  :        1995  MED REC NO:   [id number]                           ROOM:       007  ACCOUNT NO:   [de-identified]                           ADMIT DATE: 2020  PROVIDER:     ELMIRA Escalante Ours:  2020    The patient is a 66-year-old G1, P0, who presented at 32 and 4 with  complaints of abdominal pain and vomiting. Fetal heart tones were  evidentially dopplered due to her maternal habitus and normal.  She was  treated for her abdominal pain and vomiting, and was discharged home,  and instructed to follow up as scheduled in the office.         Shamar Garcia M.D.    D: 2020 11:38:47       T: 2020 16:29:45     PAWAN/CHYNA  Job#: [id number]     Doc#: [id number]    CC:

## 2020-09-03 ENCOUNTER — HOSPITAL ENCOUNTER (OUTPATIENT)
Dept: ULTRASOUND IMAGING | Age: 25
Discharge: HOME | End: 2020-09-03
Payer: COMMERCIAL

## 2020-09-03 PROCEDURE — 76816 OB US FOLLOW-UP PER FETUS: CPT

## 2020-09-03 PROCEDURE — 99211 OFF/OP EST MAY X REQ PHY/QHP: CPT

## 2020-09-03 NOTE — FLOWSHEET NOTE
HT:5'10    WT:    327.6 Lbs.     148.9    Kg.  T: 98.0    oral  BP:139/77  P:132  R:16  Problems/concerns:none

## 2020-09-21 ENCOUNTER — NURSE ONLY (OUTPATIENT)
Dept: LAB | Age: 25
End: 2020-09-21

## 2020-09-21 LAB
ABO: NORMAL
ANTIBODY SCREEN: NORMAL
BASOPHILS # BLD: 0.2 %
BASOPHILS ABSOLUTE: 0 THOU/MM3 (ref 0–0.1)
EOSINOPHIL # BLD: 0.1 %
EOSINOPHILS ABSOLUTE: 0 THOU/MM3 (ref 0–0.4)
ERYTHROCYTE [DISTWIDTH] IN BLOOD BY AUTOMATED COUNT: 16.4 % (ref 11.5–14.5)
ERYTHROCYTE [DISTWIDTH] IN BLOOD BY AUTOMATED COUNT: 53.3 FL (ref 35–45)
HCT VFR BLD CALC: 34 % (ref 37–47)
HEMOGLOBIN: 10.9 GM/DL (ref 12–16)
IMMATURE GRANS (ABS): 0.12 THOU/MM3 (ref 0–0.07)
IMMATURE GRANULOCYTES: 1 %
LYMPHOCYTES # BLD: 17 %
LYMPHOCYTES ABSOLUTE: 2 THOU/MM3 (ref 1–4.8)
MCH RBC QN AUTO: 28.7 PG (ref 26–33)
MCHC RBC AUTO-ENTMCNC: 32.1 GM/DL (ref 32.2–35.5)
MCV RBC AUTO: 89.5 FL (ref 81–99)
MONOCYTES # BLD: 5.1 %
MONOCYTES ABSOLUTE: 0.6 THOU/MM3 (ref 0.4–1.3)
NUCLEATED RED BLOOD CELLS: 0 /100 WBC
PLATELET # BLD: 224 THOU/MM3 (ref 130–400)
PMV BLD AUTO: 11.7 FL (ref 9.4–12.4)
RBC # BLD: 3.8 MILL/MM3 (ref 4.2–5.4)
RH FACTOR: NORMAL
SEG NEUTROPHILS: 76.6 %
SEGMENTED NEUTROPHILS ABSOLUTE COUNT: 8.9 THOU/MM3 (ref 1.8–7.7)
WBC # BLD: 11.6 THOU/MM3 (ref 4.8–10.8)

## 2020-09-22 ENCOUNTER — ANESTHESIA (OUTPATIENT)
Dept: LABOR AND DELIVERY | Age: 25
End: 2020-09-22
Payer: COMMERCIAL

## 2020-09-22 ENCOUNTER — HOSPITAL ENCOUNTER (INPATIENT)
Age: 25
LOS: 3 days | Discharge: HOME | End: 2020-09-25
Attending: OBSTETRICS & GYNECOLOGY | Admitting: OBSTETRICS & GYNECOLOGY
Payer: COMMERCIAL

## 2020-09-22 ENCOUNTER — ANESTHESIA EVENT (OUTPATIENT)
Dept: LABOR AND DELIVERY | Age: 25
End: 2020-09-22
Payer: COMMERCIAL

## 2020-09-22 VITALS — OXYGEN SATURATION: 99 % | SYSTOLIC BLOOD PRESSURE: 131 MMHG | DIASTOLIC BLOOD PRESSURE: 60 MMHG

## 2020-09-22 LAB
AMPHETAMINE+METHAMPHETAMINE URINE SCREEN: NEGATIVE
BARBITURATE QUANTITATIVE URINE: NEGATIVE
BENZODIAZEPINE QUANTITATIVE URINE: NEGATIVE
CANNABINOID QUANTITATIVE URINE: NEGATIVE
COCAINE METABOLITE QUANTITATIVE URINE: NEGATIVE
GLUCOSE BLD-MCNC: 80 MG/DL (ref 70–108)
GLUCOSE BLD-MCNC: 91 MG/DL (ref 70–108)
OPIATES, URINE: NEGATIVE
OXYCODONE: NEGATIVE
PHENCYCLIDINE QUANTITATIVE URINE: NEGATIVE
RPR: NONREACTIVE

## 2020-09-22 PROCEDURE — 6360000002 HC RX W HCPCS

## 2020-09-22 PROCEDURE — 2580000003 HC RX 258: Performed by: OBSTETRICS & GYNECOLOGY

## 2020-09-22 PROCEDURE — 6360000002 HC RX W HCPCS: Performed by: ANESTHESIOLOGY

## 2020-09-22 PROCEDURE — 6360000002 HC RX W HCPCS: Performed by: OBSTETRICS & GYNECOLOGY

## 2020-09-22 PROCEDURE — 82948 REAGENT STRIP/BLOOD GLUCOSE: CPT

## 2020-09-22 PROCEDURE — 80307 DRUG TEST PRSMV CHEM ANLYZR: CPT

## 2020-09-22 PROCEDURE — 3700000001 HC ADD 15 MINUTES (ANESTHESIA): Performed by: OBSTETRICS & GYNECOLOGY

## 2020-09-22 PROCEDURE — 2500000003 HC RX 250 WO HCPCS: Performed by: OBSTETRICS & GYNECOLOGY

## 2020-09-22 PROCEDURE — 7100000000 HC PACU RECOVERY - FIRST 15 MIN: Performed by: OBSTETRICS & GYNECOLOGY

## 2020-09-22 PROCEDURE — 3700000000 HC ANESTHESIA ATTENDED CARE: Performed by: OBSTETRICS & GYNECOLOGY

## 2020-09-22 PROCEDURE — 1220000000 HC SEMI PRIVATE OB R&B

## 2020-09-22 PROCEDURE — 6370000000 HC RX 637 (ALT 250 FOR IP): Performed by: OBSTETRICS & GYNECOLOGY

## 2020-09-22 PROCEDURE — 6360000002 HC RX W HCPCS: Performed by: REGISTERED NURSE

## 2020-09-22 PROCEDURE — 6370000000 HC RX 637 (ALT 250 FOR IP): Performed by: ANESTHESIOLOGY

## 2020-09-22 PROCEDURE — 7100000001 HC PACU RECOVERY - ADDTL 15 MIN: Performed by: OBSTETRICS & GYNECOLOGY

## 2020-09-22 PROCEDURE — 3609079900 HC CESAREAN SECTION: Performed by: OBSTETRICS & GYNECOLOGY

## 2020-09-22 PROCEDURE — 2709999900 HC NON-CHARGEABLE SUPPLY: Performed by: OBSTETRICS & GYNECOLOGY

## 2020-09-22 RX ORDER — SODIUM CHLORIDE, SODIUM LACTATE, POTASSIUM CHLORIDE, CALCIUM CHLORIDE 600; 310; 30; 20 MG/100ML; MG/100ML; MG/100ML; MG/100ML
INJECTION, SOLUTION INTRAVENOUS CONTINUOUS
Status: DISCONTINUED | OUTPATIENT
Start: 2020-09-22 | End: 2020-09-22

## 2020-09-22 RX ORDER — ONDANSETRON 2 MG/ML
4 INJECTION INTRAMUSCULAR; INTRAVENOUS EVERY 6 HOURS PRN
Status: DISCONTINUED | OUTPATIENT
Start: 2020-09-22 | End: 2020-09-22

## 2020-09-22 RX ORDER — BUSPIRONE HYDROCHLORIDE 15 MG/1
45 TABLET ORAL NIGHTLY
COMMUNITY

## 2020-09-22 RX ORDER — BISACODYL 10 MG
10 SUPPOSITORY, RECTAL RECTAL DAILY PRN
Status: DISCONTINUED | OUTPATIENT
Start: 2020-09-22 | End: 2020-09-25 | Stop reason: HOSPADM

## 2020-09-22 RX ORDER — QUETIAPINE FUMARATE 100 MG/1
300 TABLET, FILM COATED ORAL NIGHTLY
Status: DISCONTINUED | OUTPATIENT
Start: 2020-09-22 | End: 2020-09-25 | Stop reason: HOSPADM

## 2020-09-22 RX ORDER — MODIFIED LANOLIN
OINTMENT (GRAM) TOPICAL
Status: DISCONTINUED | OUTPATIENT
Start: 2020-09-22 | End: 2020-09-25 | Stop reason: HOSPADM

## 2020-09-22 RX ORDER — BUSPIRONE HYDROCHLORIDE 7.5 MG/1
15 TABLET ORAL 2 TIMES DAILY
Status: DISCONTINUED | OUTPATIENT
Start: 2020-09-22 | End: 2020-09-25 | Stop reason: HOSPADM

## 2020-09-22 RX ORDER — OXYCODONE HYDROCHLORIDE AND ACETAMINOPHEN 5; 325 MG/1; MG/1
1 TABLET ORAL EVERY 4 HOURS PRN
Status: DISCONTINUED | OUTPATIENT
Start: 2020-09-22 | End: 2020-09-25 | Stop reason: HOSPADM

## 2020-09-22 RX ORDER — KETOROLAC TROMETHAMINE 30 MG/ML
30 INJECTION, SOLUTION INTRAMUSCULAR; INTRAVENOUS EVERY 6 HOURS
Status: DISPENSED | OUTPATIENT
Start: 2020-09-22 | End: 2020-09-23

## 2020-09-22 RX ORDER — MORPHINE SULFATE 4 MG/ML
4 INJECTION, SOLUTION INTRAMUSCULAR; INTRAVENOUS
Status: DISCONTINUED | OUTPATIENT
Start: 2020-09-22 | End: 2020-09-25 | Stop reason: HOSPADM

## 2020-09-22 RX ORDER — SODIUM CHLORIDE 0.9 % (FLUSH) 0.9 %
10 SYRINGE (ML) INJECTION EVERY 12 HOURS SCHEDULED
Status: DISCONTINUED | OUTPATIENT
Start: 2020-09-22 | End: 2020-09-25 | Stop reason: HOSPADM

## 2020-09-22 RX ORDER — OXYTOCIN 10 [USP'U]/ML
INJECTION, SOLUTION INTRAMUSCULAR; INTRAVENOUS PRN
Status: DISCONTINUED | OUTPATIENT
Start: 2020-09-22 | End: 2020-09-22 | Stop reason: SDUPTHER

## 2020-09-22 RX ORDER — KETOROLAC TROMETHAMINE 30 MG/ML
15 INJECTION, SOLUTION INTRAMUSCULAR; INTRAVENOUS EVERY 6 HOURS
Status: DISPENSED | OUTPATIENT
Start: 2020-09-22 | End: 2020-09-23

## 2020-09-22 RX ORDER — SODIUM CHLORIDE, SODIUM LACTATE, POTASSIUM CHLORIDE, AND CALCIUM CHLORIDE .6; .31; .03; .02 G/100ML; G/100ML; G/100ML; G/100ML
1000 INJECTION, SOLUTION INTRAVENOUS ONCE
Status: CANCELLED | OUTPATIENT
Start: 2020-09-22 | End: 2020-09-22

## 2020-09-22 RX ORDER — OXCARBAZEPINE 300 MG/1
600 TABLET, FILM COATED ORAL DAILY
Status: DISCONTINUED | OUTPATIENT
Start: 2020-09-22 | End: 2020-09-23

## 2020-09-22 RX ORDER — OXYCODONE HYDROCHLORIDE AND ACETAMINOPHEN 5; 325 MG/1; MG/1
2 TABLET ORAL EVERY 4 HOURS PRN
Status: DISCONTINUED | OUTPATIENT
Start: 2020-09-22 | End: 2020-09-25 | Stop reason: HOSPADM

## 2020-09-22 RX ORDER — DOCUSATE SODIUM 100 MG/1
100 CAPSULE, LIQUID FILLED ORAL 2 TIMES DAILY
Status: DISCONTINUED | OUTPATIENT
Start: 2020-09-22 | End: 2020-09-25 | Stop reason: HOSPADM

## 2020-09-22 RX ORDER — ARIPIPRAZOLE 15 MG/1
15 TABLET ORAL NIGHTLY
Status: DISCONTINUED | OUTPATIENT
Start: 2020-09-22 | End: 2020-09-25 | Stop reason: HOSPADM

## 2020-09-22 RX ORDER — SODIUM CHLORIDE 0.9 % (FLUSH) 0.9 %
10 SYRINGE (ML) INJECTION PRN
Status: DISCONTINUED | OUTPATIENT
Start: 2020-09-22 | End: 2020-09-25 | Stop reason: HOSPADM

## 2020-09-22 RX ORDER — QUETIAPINE FUMARATE 25 MG/1
50 TABLET, FILM COATED ORAL
Status: DISCONTINUED | OUTPATIENT
Start: 2020-09-23 | End: 2020-09-25 | Stop reason: HOSPADM

## 2020-09-22 RX ORDER — DIPHENHYDRAMINE HCL 25 MG
25 TABLET ORAL EVERY 6 HOURS PRN
Status: DISCONTINUED | OUTPATIENT
Start: 2020-09-22 | End: 2020-09-25 | Stop reason: HOSPADM

## 2020-09-22 RX ORDER — ACETAMINOPHEN 325 MG/1
650 TABLET ORAL EVERY 4 HOURS PRN
Status: DISCONTINUED | OUTPATIENT
Start: 2020-09-22 | End: 2020-09-25 | Stop reason: HOSPADM

## 2020-09-22 RX ORDER — ACETAMINOPHEN 325 MG/1
975 TABLET ORAL ONCE
Status: COMPLETED | OUTPATIENT
Start: 2020-09-22 | End: 2020-09-22

## 2020-09-22 RX ORDER — DULOXETIN HYDROCHLORIDE 60 MG/1
120 CAPSULE, DELAYED RELEASE ORAL
Status: DISCONTINUED | OUTPATIENT
Start: 2020-09-23 | End: 2020-09-25 | Stop reason: HOSPADM

## 2020-09-22 RX ORDER — ONDANSETRON 2 MG/ML
INJECTION INTRAMUSCULAR; INTRAVENOUS PRN
Status: DISCONTINUED | OUTPATIENT
Start: 2020-09-22 | End: 2020-09-22 | Stop reason: SDUPTHER

## 2020-09-22 RX ORDER — FAMOTIDINE 20 MG/1
10 TABLET, FILM COATED ORAL 2 TIMES DAILY
Status: DISCONTINUED | OUTPATIENT
Start: 2020-09-22 | End: 2020-09-25 | Stop reason: HOSPADM

## 2020-09-22 RX ORDER — ONDANSETRON 2 MG/ML
4 INJECTION INTRAMUSCULAR; INTRAVENOUS EVERY 6 HOURS PRN
Status: DISCONTINUED | OUTPATIENT
Start: 2020-09-22 | End: 2020-09-25 | Stop reason: HOSPADM

## 2020-09-22 RX ORDER — ACETAMINOPHEN 325 MG/1
325 TABLET ORAL EVERY 4 HOURS PRN
Status: DISCONTINUED | OUTPATIENT
Start: 2020-09-22 | End: 2020-09-25 | Stop reason: HOSPADM

## 2020-09-22 RX ORDER — NALOXONE HYDROCHLORIDE 0.4 MG/ML
0.4 INJECTION, SOLUTION INTRAMUSCULAR; INTRAVENOUS; SUBCUTANEOUS PRN
Status: DISCONTINUED | OUTPATIENT
Start: 2020-09-22 | End: 2020-09-25 | Stop reason: HOSPADM

## 2020-09-22 RX ORDER — SODIUM CHLORIDE, SODIUM LACTATE, POTASSIUM CHLORIDE, CALCIUM CHLORIDE 600; 310; 30; 20 MG/100ML; MG/100ML; MG/100ML; MG/100ML
INJECTION, SOLUTION INTRAVENOUS CONTINUOUS
Status: DISCONTINUED | OUTPATIENT
Start: 2020-09-22 | End: 2020-09-25 | Stop reason: HOSPADM

## 2020-09-22 RX ORDER — DEXAMETHASONE SODIUM PHOSPHATE 4 MG/ML
INJECTION, SOLUTION INTRA-ARTICULAR; INTRALESIONAL; INTRAMUSCULAR; INTRAVENOUS; SOFT TISSUE PRN
Status: DISCONTINUED | OUTPATIENT
Start: 2020-09-22 | End: 2020-09-22 | Stop reason: SDUPTHER

## 2020-09-22 RX ORDER — CETIRIZINE HYDROCHLORIDE 10 MG/1
10 TABLET ORAL DAILY
Status: DISCONTINUED | OUTPATIENT
Start: 2020-09-23 | End: 2020-09-25 | Stop reason: HOSPADM

## 2020-09-22 RX ORDER — ZOLPIDEM TARTRATE 5 MG/1
5 TABLET ORAL NIGHTLY PRN
Status: DISCONTINUED | OUTPATIENT
Start: 2020-09-22 | End: 2020-09-25 | Stop reason: HOSPADM

## 2020-09-22 RX ORDER — IBUPROFEN 800 MG/1
800 TABLET ORAL EVERY 8 HOURS
Status: DISCONTINUED | OUTPATIENT
Start: 2020-09-23 | End: 2020-09-25 | Stop reason: HOSPADM

## 2020-09-22 RX ORDER — MORPHINE SULFATE 2 MG/ML
2 INJECTION, SOLUTION INTRAMUSCULAR; INTRAVENOUS
Status: DISCONTINUED | OUTPATIENT
Start: 2020-09-22 | End: 2020-09-25 | Stop reason: HOSPADM

## 2020-09-22 RX ORDER — KETOROLAC TROMETHAMINE 30 MG/ML
INJECTION, SOLUTION INTRAMUSCULAR; INTRAVENOUS PRN
Status: DISCONTINUED | OUTPATIENT
Start: 2020-09-22 | End: 2020-09-22 | Stop reason: SDUPTHER

## 2020-09-22 RX ORDER — MORPHINE SULFATE 0.5 MG/ML
INJECTION, SOLUTION EPIDURAL; INTRATHECAL; INTRAVENOUS PRN
Status: DISCONTINUED | OUTPATIENT
Start: 2020-09-22 | End: 2020-09-22 | Stop reason: SDUPTHER

## 2020-09-22 RX ORDER — DIPHENHYDRAMINE HYDROCHLORIDE 50 MG/ML
25 INJECTION INTRAMUSCULAR; INTRAVENOUS EVERY 6 HOURS PRN
Status: DISCONTINUED | OUTPATIENT
Start: 2020-09-22 | End: 2020-09-25 | Stop reason: HOSPADM

## 2020-09-22 RX ORDER — METOCLOPRAMIDE HYDROCHLORIDE 5 MG/ML
10 INJECTION INTRAMUSCULAR; INTRAVENOUS ONCE
Status: COMPLETED | OUTPATIENT
Start: 2020-09-22 | End: 2020-09-22

## 2020-09-22 RX ORDER — CLINDAMYCIN PHOSPHATE 900 MG/50ML
900 INJECTION INTRAVENOUS ONCE
Status: COMPLETED | OUTPATIENT
Start: 2020-09-22 | End: 2020-09-22

## 2020-09-22 RX ADMIN — GENTAMICIN SULFATE 420 MG: 40 INJECTION, SOLUTION INTRAMUSCULAR; INTRAVENOUS at 13:09

## 2020-09-22 RX ADMIN — ONDANSETRON 4 MG: 2 INJECTION INTRAMUSCULAR; INTRAVENOUS at 18:15

## 2020-09-22 RX ADMIN — KETOROLAC TROMETHAMINE 15 MG: 30 INJECTION, SOLUTION INTRAMUSCULAR at 21:03

## 2020-09-22 RX ADMIN — FAMOTIDINE 10 MG: 20 TABLET, FILM COATED ORAL at 21:02

## 2020-09-22 RX ADMIN — ACETAMINOPHEN 975 MG: 325 TABLET ORAL at 11:54

## 2020-09-22 RX ADMIN — OXYTOCIN 20 UNITS: 10 INJECTION, SOLUTION INTRAMUSCULAR; INTRAVENOUS at 14:05

## 2020-09-22 RX ADMIN — SODIUM CHLORIDE, POTASSIUM CHLORIDE, SODIUM LACTATE AND CALCIUM CHLORIDE: 600; 310; 30; 20 INJECTION, SOLUTION INTRAVENOUS at 13:33

## 2020-09-22 RX ADMIN — ARIPIPRAZOLE 15 MG: 15 TABLET ORAL at 23:24

## 2020-09-22 RX ADMIN — Medication 1 MILLI-UNITS/MIN: at 17:23

## 2020-09-22 RX ADMIN — CLINDAMYCIN IN 5 PERCENT DEXTROSE 900 MG: 18 INJECTION, SOLUTION INTRAVENOUS at 12:50

## 2020-09-22 RX ADMIN — METOCLOPRAMIDE 10 MG: 5 INJECTION, SOLUTION INTRAMUSCULAR; INTRAVENOUS at 12:19

## 2020-09-22 RX ADMIN — BUSPIRONE HYDROCHLORIDE 15 MG: 7.5 TABLET ORAL at 23:23

## 2020-09-22 RX ADMIN — SODIUM CHLORIDE, POTASSIUM CHLORIDE, SODIUM LACTATE AND CALCIUM CHLORIDE: 600; 310; 30; 20 INJECTION, SOLUTION INTRAVENOUS at 12:51

## 2020-09-22 RX ADMIN — DOCUSATE SODIUM 100 MG: 100 CAPSULE, LIQUID FILLED ORAL at 21:03

## 2020-09-22 RX ADMIN — DEXAMETHASONE SODIUM PHOSPHATE 8 MG: 4 INJECTION, SOLUTION INTRAMUSCULAR; INTRAVENOUS at 13:40

## 2020-09-22 RX ADMIN — SODIUM CHLORIDE, POTASSIUM CHLORIDE, SODIUM LACTATE AND CALCIUM CHLORIDE: 600; 310; 30; 20 INJECTION, SOLUTION INTRAVENOUS at 14:25

## 2020-09-22 RX ADMIN — Medication 50 MILLI-UNITS/MIN: at 14:25

## 2020-09-22 RX ADMIN — FAMOTIDINE 20 MG: 10 INJECTION INTRAVENOUS at 12:21

## 2020-09-22 RX ADMIN — SODIUM CHLORIDE, POTASSIUM CHLORIDE, SODIUM LACTATE AND CALCIUM CHLORIDE: 600; 310; 30; 20 INJECTION, SOLUTION INTRAVENOUS at 17:23

## 2020-09-22 RX ADMIN — ONDANSETRON 4 MG: 2 INJECTION INTRAMUSCULAR; INTRAVENOUS at 16:12

## 2020-09-22 RX ADMIN — KETOROLAC TROMETHAMINE 30 MG: 30 INJECTION, SOLUTION INTRAMUSCULAR at 14:05

## 2020-09-22 RX ADMIN — SODIUM CHLORIDE, POTASSIUM CHLORIDE, SODIUM LACTATE AND CALCIUM CHLORIDE: 600; 310; 30; 20 INJECTION, SOLUTION INTRAVENOUS at 14:03

## 2020-09-22 RX ADMIN — MORPHINE SULFATE 0.2 MG: 0.5 INJECTION, SOLUTION EPIDURAL; INTRATHECAL; INTRAVENOUS at 13:18

## 2020-09-22 RX ADMIN — SODIUM CHLORIDE, POTASSIUM CHLORIDE, SODIUM LACTATE AND CALCIUM CHLORIDE: 600; 310; 30; 20 INJECTION, SOLUTION INTRAVENOUS at 11:00

## 2020-09-22 RX ADMIN — ONDANSETRON HYDROCHLORIDE 4 MG: 4 INJECTION, SOLUTION INTRAMUSCULAR; INTRAVENOUS at 13:09

## 2020-09-22 RX ADMIN — OXYTOCIN 20 UNITS: 10 INJECTION, SOLUTION INTRAMUSCULAR; INTRAVENOUS at 13:42

## 2020-09-22 RX ADMIN — KETOROLAC TROMETHAMINE 30 MG: 30 INJECTION, SOLUTION INTRAMUSCULAR at 14:13

## 2020-09-22 RX ADMIN — DIPHENHYDRAMINE HYDROCHLORIDE 25 MG: 50 INJECTION, SOLUTION INTRAMUSCULAR; INTRAVENOUS at 18:15

## 2020-09-22 RX ADMIN — OXYCODONE HYDROCHLORIDE AND ACETAMINOPHEN 2 TABLET: 5; 325 TABLET ORAL at 18:19

## 2020-09-22 RX ADMIN — QUETIAPINE FUMARATE 300 MG: 100 TABLET ORAL at 23:21

## 2020-09-22 RX ADMIN — SERTRALINE HYDROCHLORIDE 75 MG: 50 TABLET, FILM COATED ORAL at 23:25

## 2020-09-22 RX ADMIN — OXCARBAZEPINE 600 MG: 300 TABLET, FILM COATED ORAL at 23:22

## 2020-09-22 SDOH — HEALTH STABILITY: MENTAL HEALTH: HOW OFTEN DO YOU HAVE A DRINK CONTAINING ALCOHOL?: NEVER

## 2020-09-22 ASSESSMENT — PULMONARY FUNCTION TESTS
PIF_VALUE: 0

## 2020-09-22 ASSESSMENT — PAIN DESCRIPTION - ONSET
ONSET: ON-GOING
ONSET: ON-GOING

## 2020-09-22 ASSESSMENT — PAIN DESCRIPTION - ORIENTATION
ORIENTATION: MID;LOWER
ORIENTATION: LOWER;MID

## 2020-09-22 ASSESSMENT — PAIN SCALES - GENERAL
PAINLEVEL_OUTOF10: 0
PAINLEVEL_OUTOF10: 7
PAINLEVEL_OUTOF10: 4
PAINLEVEL_OUTOF10: 3

## 2020-09-22 ASSESSMENT — PAIN DESCRIPTION - FREQUENCY
FREQUENCY: INTERMITTENT
FREQUENCY: INTERMITTENT

## 2020-09-22 ASSESSMENT — PAIN DESCRIPTION - LOCATION
LOCATION: ABDOMEN
LOCATION: ABDOMEN

## 2020-09-22 ASSESSMENT — PAIN DESCRIPTION - PAIN TYPE
TYPE: ACUTE PAIN
TYPE: ACUTE PAIN

## 2020-09-22 ASSESSMENT — PAIN DESCRIPTION - PROGRESSION
CLINICAL_PROGRESSION: NOT CHANGED
CLINICAL_PROGRESSION: NOT CHANGED

## 2020-09-22 ASSESSMENT — PAIN DESCRIPTION - DESCRIPTORS
DESCRIPTORS: BURNING
DESCRIPTORS: DISCOMFORT
DESCRIPTORS: DISCOMFORT

## 2020-09-22 NOTE — ANESTHESIA PROCEDURE NOTES
Spinal Block    Patient location during procedure: OB  Start time: 9/22/2020 1:12 PM  End time: 9/22/2020 1:18 PM  Reason for block: primary anesthetic  Staffing  Anesthesiologist: Klaudia Fry DO  Performed: resident/CRNA   Preanesthetic Checklist  Completed: patient identified, site marked, surgical consent, pre-op evaluation, timeout performed, IV checked, risks and benefits discussed, monitors and equipment checked, anesthesia consent given, oxygen available and patient being monitored  Spinal Block  Patient position: sitting  Prep: ChloraPrep  Patient monitoring: cardiac monitor, continuous pulse ox and frequent blood pressure checks  Approach: midline  Location: L4/L5  Procedures: paresthesia technique  Provider prep: mask and sterile gloves  Local infiltration: lidocaine  Dose: 0.2  Agent: bupivacaine  Adjuvant: duramorph  Dose: 1.8  Dose: 1.8  Needle  Needle type: pencil-tip   Needle gauge: 25 G  Needle length: 3.5 in  Assessment  Swirl obtained: Yes  CSF: clear  Attempts: 1  Hemodynamics: stable

## 2020-09-22 NOTE — H&P
[facility]  History and Physical Update    Pt Name: Jonathan Olson  MRN: [id number]  YOB: 1995  Date of evaluation: 2020    [x] I have examined the patient and reviewed the H&P/Consult and there are no changes to the patient or plans. 25yo  at 37+ weeks. Pregnancy complicated by VTE on Lovenox, Gestational diabetic on insulin, and fetal macrosomia. After extensive counseling with fetus estimated at 4500g pt elected for C/S for delivery.      [] I have examined the patient and reviewed the H&P/Consult and have noted the following changes:            Chanel Sherstha MD  Electronically signed 2020 at 2:20 PM

## 2020-09-22 NOTE — FLOWSHEET NOTE
Pt presents to [facility] for primary c/s with Dr Mayito Lal. Pt denies any vaginal bleeding or leaking of fluids, fetal movement noted per pt. Pt oriented to room and call system. Patient to bathroom to change and void, informed of maternal drug testing policy in place on all laboring patients. Verbal consent received, paper consent to be signed and urine to be sent.

## 2020-09-22 NOTE — PLAN OF CARE
Problem: Anxiety:  Goal: Level of anxiety will decrease  Description: Level of anxiety will decrease  Outcome: Ongoing  Note: Pt calm and cooperative significant other at bedside providing support     Problem: Aspiration - Risk of:  Goal: Absence of aspiration  Description: Absence of aspiration  Outcome: Ongoing  Note: Lung sounds clera     Problem: Tissue Perfusion - Uteroplacental, Altered:  Goal: Absence of abnormal fetal heart rate pattern  Description: Absence of abnormal fetal heart rate pattern  Outcome: Ongoing  Note: Fetal Heart Tones remain reassuring. Continuous EFM in place. Problem: Venous Thromboembolism - Risk of:  Goal: Will show no signs or symptoms of venous thromboembolism  Description: Will show no signs or symptoms of venous thromboembolism  Outcome: Ongoing  Note: DVT to be placed for OR     Problem: Falls - Risk of:  Goal: Absence of physical injury  Description: Absence of physical injury  Outcome: Ongoing  Note: Pt. Remains free from falls at this time. IV infusing per order. RN encouraged pt. To call for assistance to BR. Side rails up X2. Call light within reach. S.O. At bedside. RN will continue to provide for a safe environment. Problem: Discharge Planning:  Goal: Discharged to appropriate level of care  Description: Discharged to appropriate level of care  Outcome: Ongoing  Note: Pt aware of 2hr recovery period in L&D and then transfer to mom/baby for the remainder of her stay. Problem: Pain:  Goal: Pain level will decrease  Description: Pain level will decrease  Outcome: Ongoing  Note: Pt denies pain on admission states her pain goal is a 6/10 plans for a spinal for OR    Care plan reviewed with patient and her spouse. Patient and her spouse verbalize understanding of the plan of care and contribute to goal setting.

## 2020-09-22 NOTE — ANESTHESIA PRE PROCEDURE
Department of Anesthesiology  Preprocedure Note       Name:  Sonny Malone   Age:  25 y. o.  :  1995                                          MRN:  [id number]         Date:  2020      Surgeon: Kathryn Charles):  Elizabeth Yen MD    Procedure: Procedure(s):   SECTION    Medications prior to admission:   Prior to Admission medications    Medication Sig Start Date End Date Taking?  Authorizing Provider   Magnesium 100 MG TABS Take 50 mg by mouth   Yes Historical Provider, MD   busPIRone (BUSPAR) 15 MG tablet Take 15 mg by mouth 2 times daily   Yes Historical Provider, MD   Insulin NPH Isophane & Regular (HUMULIN 70/30 PEN SC) Inject into the skin 10 units in am  48 units at bedtime   Yes Historical Provider, MD   Insulin Aspart (NOVOLOG FLEXPEN SC) Inject into the skin 13 units at breakfast  5 units at lunch  10 units at dinner   Yes Historical Provider, MD   famotidine (PEPCID) 10 MG tablet Take 10 mg by mouth 2 times daily   Yes Historical Provider, MD   metoclopramide (REGLAN) 10 MG tablet 1 tablet every 6-8 hours for nausea vomiting when necessary 20  Yes Lucero De Souza MD   enoxaparin (LOVENOX) 100 MG/ML injection Inject 1.5 mg/kg into the skin daily   Yes Historical Provider, MD   OXcarbazepine (TRILEPTAL) 600 MG tablet  20  Yes Historical Provider, MD   QUEtiapine (SEROQUEL) 300 MG tablet Take 300 mg by mouth nightly   Yes Historical Provider, MD   QUEtiapine (SEROQUEL) 50 MG tablet Take 50 mg by mouth every morning (before breakfast)   Yes Historical Provider, MD   ARIPiprazole (ABILIFY) 15 MG tablet Take 15 mg by mouth nightly   Yes Historical Provider, MD   sertraline (ZOLOFT) 50 MG tablet Take 75 mg by mouth nightly    Yes Historical Provider, MD   Prenatal Vit-DSS-Fe Cbn-FA (PRENATAL AD PO) Take by mouth daily   Yes Historical Provider, MD   folic acid (FOLVITE) 1 MG tablet Take 1 mg by mouth daily   Yes Historical Provider, MD   DULoxetine HCl (CYMBALTA PO) Take 120 mg by mouth every morning (before breakfast)   Yes Historical Provider, MD   Cholecalciferol (VITAMIN D3) 50 MCG (2000 UT) CAPS Take 1 capsule by mouth daily 2/10/20  Yes LEVI Goldsmith CNP   omeprazole (PRILOSEC) 40 MG delayed release capsule TAKE 1 CAPSULE BY MOUTH TWO TIMES A DAY   Patient taking differently: nightly  2/18/19  Yes LEVI Godlsmith CNP   cetirizine (ZYRTEC) 10 MG tablet Take 1 tablet by mouth daily 2/20/17  Yes LEVI Goldsmith CNP   Prenatal Vit-Fe Fumarate-FA (PRENATAL VITAMIN) 27-0.8 MG TABS Take 1 tablet by mouth daily    Historical Provider, MD   albuterol (PROVENTIL) (2.5 MG/3ML) 0.083% nebulizer solution Take 3 mLs by nebulization every 6 hours as needed for Wheezing 4/11/19   LEVI Goldsmith CNP   albuterol sulfate HFA (PROAIR HFA) 108 (90 Base) MCG/ACT inhaler Inhale 2 puffs into the lungs every 6 hours as needed for Wheezing 10/16/17   Fly Guevara DO       Current medications:    Current Facility-Administered Medications   Medication Dose Route Frequency Provider Last Rate Last Dose    lactated ringers infusion   Intravenous Continuous Elizabeth Yen  mL/hr at 09/22/20 1100      oxytocin (PITOCIN) 30 units in 500 mL infusion  1 liz-units/min Intravenous Continuous Elizabeth Yen MD        ondansetron [facility]) injection 4 mg  4 mg Intravenous Q6H PRN Elizabeth Yen MD        clindamycin (CLEOCIN) 900 mg in dextrose 5 % 50 mL IVPB  900 mg Intravenous Once Elizabeth Yen MD           Allergies: Allergies   Allergen Reactions    Dilaudid [Hydromorphone Hcl]      hallucination    Flexeril [Cyclobenzaprine] Other (See Comments)     Hallucinations    Keflex [Cephalexin] Other (See Comments)     Lose cognitive functions.      Tessalon [Benzonatate] Other (See Comments)     psychosis    Augmentin [Amoxicillin-Pot Clavulanate] Rash    Lorabid [Loracarbef] Hives, Swelling and Rash    Minocycline Itching, Swelling and Rash       Problem List:    Patient Active 2012    Obesity 10/24/2014    WILFREDO treated with BiPAP     Pneumonia 2018    POTS (postural orthostatic tachycardia syndrome)     Pott disease     Pulmonary emboli (HCC)     Seizures (HCC) 2016    anxiety induced no meds last seizure 4 years ago    Tailbone injury 11/28/15    patient broke tailbone    Thyroid disease     borderline low no meds    TMJ (dislocation of temporomandibular joint)     Trigeminal neuralgia     Wears contact lenses        Past Surgical History:        Procedure Laterality Date    ABLATION OF DYSRHYTHMIC FOCUS      ANKLE FRACTURE SURGERY Right 2020    RIGHT ANKLE OPEN REDUCTION INTERNAL FIXATION AND DELTOID LIGAMENT REPAIR performed by Leonor Mena DPM at [address]  3-2016    EP Study    CARD[facility]AC [facility]HETERIZATION  2016    [facility]    COLONOSCOPY      INSERTABLE CARDIAC MONITOR      WISDOM TOOTH EXTRACTION         Social History:    Social History     Tobacco Use    Smoking status: Former Smoker     Packs/day: 1.00     Years: 1.50     Pack years: 1.50     Types: Cigarettes     Start date: 2018     Last attempt to quit: 2020     Years since quittin.6    Smokeless tobacco: Never Used   Substance Use Topics    Alcohol use: Never     Alcohol/week: 1.0 standard drinks     Types: 1 Cans of beer per week     Frequency: Never     Comment: occasional                                Counseling given: Not Answered      Vital Signs (Current):   Vitals:    20 1045 20 1115 20 1203   BP: 133/84     Pulse: 99     Resp: 18     Temp: 97.5 °F (36.4 °C)     TempSrc: Temporal     SpO2:  98%    Weight:  (!) 335 lb (152 kg) (!) 332 lb 12.8 oz (151 kg)   Height:  5' 10\" (1.778 m)                                               BP Readings from Last 3 Encounters:   20 133/84   20 132/72   20 118/76       NPO Status: Time of last liquid consumption: 2300                        Time of last solid consumption: 2300                        Date of last liquid consumption: 09/21/20                        Date of last solid food consumption: 09/21/20    BMI:   Wt Readings from Last 3 Encounters:   09/22/20 (!) 332 lb 12.8 oz (151 kg)   07/20/20 (!) 323 lb (146.5 kg)   07/07/20 (!) 323 lb 3.2 oz (146.6 kg)     Body mass index is 47.75 kg/m².     CBC:   Lab Results   Component Value Date    WBC 11.6 09/21/2020    RBC 3.80 09/21/2020    RBC 4.47 02/06/2020    HGB 10.9 09/21/2020    HCT 34.0 09/21/2020    MCV 89.5 09/21/2020    RDW 15.1 02/06/2020     09/21/2020       CMP:   Lab Results   Component Value Date     07/07/2020    K 3.4 07/07/2020    K 3.9 02/22/2018     07/07/2020    CO2 24 07/07/2020    BUN 5 07/07/2020    CREATININE 0.4 07/07/2020    LABGLOM >90 07/07/2020    GLUCOSE 103 07/07/2020    GLUCOSE 85 04/30/2012    PROT 6.1 07/07/2020    CALCIUM 8.9 07/07/2020    BILITOT <0.2 07/07/2020    ALKPHOS 89 07/07/2020    AST 11 07/07/2020    ALT 10 07/07/2020       POC Tests:   Recent Labs     09/22/20  1143   POCGLU 80       Coags:   Lab Results   Component Value Date    PROTIME 12.8 09/21/2017    INR 1.07 02/21/2018    APTT 28.5 02/21/2018       HCG (If Applicable):   Lab Results   Component Value Date    PREGTESTUR POSITIVE 02/01/2020    PREGSERUM NEGATIVE 05/14/2018        ABGs: No results found for: PHART, PO2ART, DMO8NPP, SPN6OJE, BEART, X6OYWIRL     Type & Screen (If Applicable):  Lab Results   Component Value Date    LABABO O 02/06/2020    [facility] POS 09/21/2020       Drug/Infectious Status (If Applicable):  No results found for: HIV, HEPCAB    COVID-19 Screening (If Applicable): No results found for: COVID19      Anesthesia Evaluation  Patient summary reviewed and Nursing notes reviewed  Airway: Mallampati: II        Dental:          Pulmonary:   (+) pneumonia:  sleep apnea:  decreased breath sounds,  asthma:                            Cardiovascular:          ECG reviewed  Rhythm: regular  Rate: normal                    Neuro/Psych:   (+) neuromuscular disease:, psychiatric history:            GI/Hepatic/Renal:   (+) GERD:,           Endo/Other:                     Abdominal:           Vascular:                                        Anesthesia Plan      spinal     ASA 3       Induction: intravenous. MIPS: Postoperative opioids intended and Prophylactic antiemetics administered. Anesthetic plan and risks discussed with patient and spouse. Plan discussed with CRNA.                   [facility],    9/22/2020

## 2020-09-22 NOTE — OP NOTE
Department of Obstetrics and Gynecology   Section Note    Pt Name: Vincent Vazquez  MRN: [id number] [place] #: [de-identified]  YOB: 1995  Procedure Performed By: Loren Ko MD    Indications: Macrosomia    Pre-operative Diagnosis: 37 week pregnancy. Post-operative Diagnosis:  Same, Delivered, Living newbornFemale  Procedure:  primary low transverse  section  Findings:  Normal tubes, ovaries and uterus. Baby Female    Estimated Blood Loss:  800ml         Specimens: None     Complications:  None         Condition: infant stable to general nursery and mother stable    Indications:     Vincent Vazquez is a 25 y.o. female  at 37w1d who presented for   section for  macrosomia. She understood the  risks and benefits and signed informed consent. Procedure: The patient was taken to the Operating Room where spinal anesthesia was adequate. She was placed in the dorsal supine position with a leftward tilt and prepped and draped. A Pfannenstiel incision was made with the scalpel taken down to the fascia. The fascia was incised in the midline. This incision extended laterally. The underlying rectus muscle dissected bluntly and with the Gerard scissors. The peritoneum identified and entered sharply. This incision extended superiorly and inferior with good visualization of the bladder. The vesicouterine peritoneum was identified and entered sharply. This incision extended laterally and the bladder flap created digitally. The lower uterine segment was incised in the midline and extended laterally. The vertex was removed from the uterus with the aid of  fundal pressure and delivered atraumatically. The rest of the baby delivered. The cord was clamped and cut and the baby was stimulated. Cord blood was obtained, the placenta delivered manually and intact. The uterus was cleared of all clots and debris and repaired with an 0 vicryl in a running locked fashion.  A second imbricating layer was used for uterine strength. The abdomen was then copiously irrigated, and hemostasis was assured with Bovie cautery. The peritoneum was closed with a 3-0 PDS, the fascia was inspected and found to be hemostatic and closed with a #1 vicryl. The subcutaneous tissue was irrigated, made hemostatic with Bovie cautery and reapproximated with 3-0 vicryl pops. The skin was closed with 4 0 vicryl suture (s). Sponge, lap and needle counts were correct x 2. The patient tolerated the procedure well and was taken to recovery room in stable condition     MARGARET Lozoya M.D.  9/22/2020 2:31 PM

## 2020-09-22 NOTE — FLOWSHEET NOTE
Dr Yeni Simmons returned page informed of pts arrival for primary c/s, reactive fetal tracing noted, CS this am was 80.   Allergies reviewed and orders received for ATB for OR

## 2020-09-22 NOTE — L&D DELIVERY NOTE
Brief Operative Report      Pre-operative Diagnosis:  37wks, macrosomia, GDM- A2, Hx VTE on Lovenox    Post-operative Diagnosis:  Same    Procedure:  LTCS    Surgeon: MARGARET Veloz MD     Anesthesia:  Spinal Anesthesia    Estimated blood loss:  800cc     Findings: See Operative Dictation, VFI cephalic presentation    Complications:  none      See dictated operative report for full details.       [address] [place] [address]

## 2020-09-23 PROCEDURE — 1220000000 HC SEMI PRIVATE OB R&B

## 2020-09-23 PROCEDURE — 6360000002 HC RX W HCPCS: Performed by: OBSTETRICS & GYNECOLOGY

## 2020-09-23 PROCEDURE — 6370000000 HC RX 637 (ALT 250 FOR IP): Performed by: OBSTETRICS & GYNECOLOGY

## 2020-09-23 PROCEDURE — 6360000002 HC RX W HCPCS: Performed by: ANESTHESIOLOGY

## 2020-09-23 PROCEDURE — 6370000000 HC RX 637 (ALT 250 FOR IP): Performed by: ANESTHESIOLOGY

## 2020-09-23 RX ORDER — OXCARBAZEPINE 300 MG/1
600 TABLET, FILM COATED ORAL 2 TIMES DAILY
Status: DISCONTINUED | OUTPATIENT
Start: 2020-09-23 | End: 2020-09-25 | Stop reason: HOSPADM

## 2020-09-23 RX ADMIN — BUSPIRONE HYDROCHLORIDE 15 MG: 7.5 TABLET ORAL at 22:41

## 2020-09-23 RX ADMIN — CETIRIZINE HYDROCHLORIDE 10 MG: 10 TABLET ORAL at 08:58

## 2020-09-23 RX ADMIN — FAMOTIDINE 10 MG: 20 TABLET, FILM COATED ORAL at 09:20

## 2020-09-23 RX ADMIN — SERTRALINE HYDROCHLORIDE 75 MG: 50 TABLET, FILM COATED ORAL at 22:47

## 2020-09-23 RX ADMIN — OXYCODONE HYDROCHLORIDE AND ACETAMINOPHEN 1 TABLET: 5; 325 TABLET ORAL at 13:48

## 2020-09-23 RX ADMIN — DOCUSATE SODIUM 100 MG: 100 CAPSULE, LIQUID FILLED ORAL at 22:40

## 2020-09-23 RX ADMIN — DOCUSATE SODIUM 100 MG: 100 CAPSULE, LIQUID FILLED ORAL at 08:59

## 2020-09-23 RX ADMIN — OXYCODONE HYDROCHLORIDE AND ACETAMINOPHEN 1 TABLET: 5; 325 TABLET ORAL at 23:02

## 2020-09-23 RX ADMIN — OXYCODONE HYDROCHLORIDE AND ACETAMINOPHEN 1 TABLET: 5; 325 TABLET ORAL at 19:01

## 2020-09-23 RX ADMIN — FAMOTIDINE 10 MG: 20 TABLET, FILM COATED ORAL at 22:40

## 2020-09-23 RX ADMIN — KETOROLAC TROMETHAMINE 15 MG: 30 INJECTION, SOLUTION INTRAMUSCULAR at 04:07

## 2020-09-23 RX ADMIN — DULOXETINE HYDROCHLORIDE 120 MG: 60 CAPSULE, DELAYED RELEASE ORAL at 08:58

## 2020-09-23 RX ADMIN — KETOROLAC TROMETHAMINE 30 MG: 30 INJECTION, SOLUTION INTRAMUSCULAR at 10:49

## 2020-09-23 RX ADMIN — OXCARBAZEPINE 600 MG: 300 TABLET, FILM COATED ORAL at 22:41

## 2020-09-23 RX ADMIN — QUETIAPINE FUMARATE 300 MG: 100 TABLET ORAL at 22:47

## 2020-09-23 RX ADMIN — IBUPROFEN 800 MG: 800 TABLET, FILM COATED ORAL at 19:01

## 2020-09-23 RX ADMIN — BUSPIRONE HYDROCHLORIDE 15 MG: 7.5 TABLET ORAL at 08:58

## 2020-09-23 RX ADMIN — ARIPIPRAZOLE 15 MG: 15 TABLET ORAL at 22:49

## 2020-09-23 RX ADMIN — OXCARBAZEPINE 300 MG: 300 TABLET, FILM COATED ORAL at 09:20

## 2020-09-23 RX ADMIN — ENOXAPARIN SODIUM 230 MG: 100 INJECTION SUBCUTANEOUS at 08:18

## 2020-09-23 ASSESSMENT — PAIN SCALES - GENERAL
PAINLEVEL_OUTOF10: 6
PAINLEVEL_OUTOF10: 5
PAINLEVEL_OUTOF10: 3
PAINLEVEL_OUTOF10: 6
PAINLEVEL_OUTOF10: 5

## 2020-09-23 NOTE — ANESTHESIA POSTPROCEDURE EVALUATION
Department of Anesthesiology  Postprocedure Note    Patient: Keeley Washburn  MRN: [id number]  YOB: 1995  Date of evaluation: 2020  Time:  10:16 AM     Procedure Summary     Date:  20 Room / Location:  [facility] OR [address] [place] [address] [place]    Anesthesia Start:   Anesthesia Stop:  8044    Procedure:   SECTION (N/A Uterus) Diagnosis:  (TERM PREGNANCY, GESTATIONAL DIABETES, MACROSOMIA)    Surgeon:  Kwabena Lyon MD Responsible Provider:  Corky Hernandez DO    Anesthesia Type:  spinal ASA Status:  3          Anesthesia Type: spinal    Rachele Phase I: Rachele Score: 9    Rachele Phase II: Rachele Score: 10    Last vitals: Reviewed and per EMR flowsheets.        Anesthesia Post Evaluation    Patient location during evaluation: floor  Patient participation: complete - patient participated  Level of consciousness: awake and alert  Airway patency: patent  Nausea & Vomiting: no nausea and no vomiting  Complications: no  Cardiovascular status: hemodynamically stable  Respiratory status: acceptable, room air and spontaneous ventilation  Hydration status: stable

## 2020-09-23 NOTE — PLAN OF CARE
Problem: Discharge Planning:  Goal: Discharged to appropriate level of care  Description: Discharged to appropriate level of care  9/23/2020 1415 by Jennifer Packer RN  Outcome: Ongoing     Problem: Fluid Volume - Imbalance:  Goal: Absence of postpartum hemorrhage signs and symptoms  Description: Absence of postpartum hemorrhage signs and symptoms  9/23/2020 1415 by Jennifer Packer RN  Outcome: Ongoing     Problem: Fluid Volume - Imbalance:  Goal: Absence of imbalanced fluid volume signs and symptoms  Description: Absence of imbalanced fluid volume signs and symptoms  9/23/2020 1415 by Jennifer Packer RN  Outcome: Ongoing     Problem: Infection - Surgical Site:  Goal: Will show no infection signs and symptoms  Description: Will show no infection signs and symptoms  9/23/2020 1415 by Jennifer Packer RN  Outcome: Ongoing     Problem: Mood - Altered:  Goal: Mood stable  Description: Mood stable  9/23/2020 1415 by Jennifer Packer RN  Outcome: Ongoing     Problem: Nausea/Vomiting:  Goal: Absence of nausea/vomiting  Description: Absence of nausea/vomiting  9/23/2020 1415 by Jennifer Packer RN  Outcome: Ongoing     Problem: Pain - Acute:  Goal: Pain level will decrease  Description: Pain level will decrease  9/23/2020 1415 by Jennifer Packer RN  Outcome: Ongoing     Problem: Urinary Retention:  Goal: Urinary elimination within specified parameters  Description: Urinary elimination within specified parameters  9/23/2020 1415 by Jennifer Packer RN  Outcome: Ongoing     Problem: Venous Thromboembolism:  Goal: Will show no signs or symptoms of venous thromboembolism  Description: Will show no signs or symptoms of venous thromboembolism  9/23/2020 1415 by Jennifer Packer RN  Outcome: Ongoing     Problem: Venous Thromboembolism:  Goal: Absence of signs or symptoms of impaired coagulation  Description: Absence of signs or symptoms of impaired coagulation  9/23/2020 1415 by Jennifer Packer RN  Outcome: Ongoing   Plan of care reviewed

## 2020-09-23 NOTE — FLOWSHEET NOTE
Assisted mom to restroom for first time with minimal assistance . Mom voided large amount clear urine. Scant amount bleeding noted with voiding indigo care completed . Returned to bed with little difficulty . Instructed mom to notify me of need to void times one more . Iv continues to infuse without difficulty .

## 2020-09-23 NOTE — PLAN OF CARE
Problem: Discharge Planning:  Goal: Discharged to appropriate level of care  Description: Discharged to appropriate level of care  Outcome: Ongoing  Note: Remains in hospital, discussed possible discharge needs. Problem: Fluid Volume - Imbalance:  Goal: Absence of postpartum hemorrhage signs and symptoms  Description: Absence of postpartum hemorrhage signs and symptoms  Outcome: Ongoing  Note: Vital signs and assessments WNL. Problem: Fluid Volume - Imbalance:  Goal: Absence of imbalanced fluid volume signs and symptoms  Description: Absence of imbalanced fluid volume signs and symptoms  Outcome: Ongoing  Note: Pt having adequate urine output. Problem: Infection - Surgical Site:  Goal: Will show no infection signs and symptoms  Description: Will show no infection signs and symptoms  Outcome: Ongoing  Note: Dressing intact. No drainage. Problem: Nausea/Vomiting:  Goal: Absence of nausea/vomiting  Description: Absence of nausea/vomiting  Outcome: Ongoing  Note: Pt has nausea and vomited once this shift. Problem: Pain - Acute:  Goal: Pain level will decrease  Description: Pain level will decrease  Outcome: Ongoing  Note: Pt had duramorph and taking iv toradol. Problem: Urinary Retention:  Goal: Urinary elimination within specified parameters  Description: Urinary elimination within specified parameters  Outcome: Ongoing  Note: Pt has sousa in place. Urination adequate.       Problem: Venous Thromboembolism:  Goal: Will show no signs or symptoms of venous thromboembolism  Description: Will show no signs or symptoms of venous thromboembolism  Outcome: Ongoing  Note: Homans sign negative      Problem: Venous Thromboembolism:  Goal: Absence of signs or symptoms of impaired coagulation  Description: Absence of signs or symptoms of impaired coagulation  Outcome: Ongoing  Note: Homans sign negative      Care plan reviewed with patient and she contributes to goal setting and voices understanding

## 2020-09-23 NOTE — FLOWSHEET NOTE
Assisted mom to restroom for second time with no difficulty . Voided well. Scant bleeding noted . Sugar care completed . Mom requested to sit in chair for her celebration meal . Iv discontinued at this time. No complaints of needs at this time.

## 2020-09-24 LAB
BASOPHILS # BLD: 0.2 %
BASOPHILS ABSOLUTE: 0 THOU/MM3 (ref 0–0.1)
EOSINOPHIL # BLD: 0.2 %
EOSINOPHILS ABSOLUTE: 0 THOU/MM3 (ref 0–0.4)
ERYTHROCYTE [DISTWIDTH] IN BLOOD BY AUTOMATED COUNT: 16.6 % (ref 11.5–14.5)
ERYTHROCYTE [DISTWIDTH] IN BLOOD BY AUTOMATED COUNT: 54.2 FL (ref 35–45)
GLUCOSE BLD-MCNC: 111 MG/DL (ref 70–108)
HCT VFR BLD CALC: 26.1 % (ref 37–47)
HEMOGLOBIN: 7.9 GM/DL (ref 12–16)
IMMATURE GRANS (ABS): 0.07 THOU/MM3 (ref 0–0.07)
IMMATURE GRANULOCYTES: 0.7 %
LYMPHOCYTES # BLD: 18.9 %
LYMPHOCYTES ABSOLUTE: 1.8 THOU/MM3 (ref 1–4.8)
MCH RBC QN AUTO: 27.4 PG (ref 26–33)
MCHC RBC AUTO-ENTMCNC: 30.3 GM/DL (ref 32.2–35.5)
MCV RBC AUTO: 90.6 FL (ref 81–99)
MONOCYTES # BLD: 6.7 %
MONOCYTES ABSOLUTE: 0.6 THOU/MM3 (ref 0.4–1.3)
NUCLEATED RED BLOOD CELLS: 0 /100 WBC
PLATELET # BLD: 137 THOU/MM3 (ref 130–400)
PMV BLD AUTO: 11.4 FL (ref 9.4–12.4)
RBC # BLD: 2.88 MILL/MM3 (ref 4.2–5.4)
SEG NEUTROPHILS: 73.3 %
SEGMENTED NEUTROPHILS ABSOLUTE COUNT: 7 THOU/MM3 (ref 1.8–7.7)
WBC # BLD: 9.6 THOU/MM3 (ref 4.8–10.8)

## 2020-09-24 PROCEDURE — 6360000002 HC RX W HCPCS: Performed by: OBSTETRICS & GYNECOLOGY

## 2020-09-24 PROCEDURE — 6370000000 HC RX 637 (ALT 250 FOR IP): Performed by: ANESTHESIOLOGY

## 2020-09-24 PROCEDURE — 6370000000 HC RX 637 (ALT 250 FOR IP): Performed by: OBSTETRICS & GYNECOLOGY

## 2020-09-24 PROCEDURE — 85025 COMPLETE CBC W/AUTO DIFF WBC: CPT

## 2020-09-24 PROCEDURE — 1220000000 HC SEMI PRIVATE OB R&B

## 2020-09-24 PROCEDURE — 82948 REAGENT STRIP/BLOOD GLUCOSE: CPT

## 2020-09-24 PROCEDURE — 36415 COLL VENOUS BLD VENIPUNCTURE: CPT

## 2020-09-24 RX ORDER — IBUPROFEN 200 MG
800 TABLET ORAL EVERY 8 HOURS PRN
COMMUNITY
Start: 2020-09-24 | End: 2021-09-16

## 2020-09-24 RX ORDER — OXYCODONE HYDROCHLORIDE AND ACETAMINOPHEN 5; 325 MG/1; MG/1
1 TABLET ORAL EVERY 6 HOURS PRN
Qty: 28 TABLET | Refills: 0 | Status: SHIPPED | OUTPATIENT
Start: 2020-09-24 | End: 2020-10-01

## 2020-09-24 RX ADMIN — ARIPIPRAZOLE 15 MG: 15 TABLET ORAL at 21:59

## 2020-09-24 RX ADMIN — BUSPIRONE HYDROCHLORIDE 15 MG: 7.5 TABLET ORAL at 08:40

## 2020-09-24 RX ADMIN — DOCUSATE SODIUM 100 MG: 100 CAPSULE, LIQUID FILLED ORAL at 20:12

## 2020-09-24 RX ADMIN — DOCUSATE SODIUM 100 MG: 100 CAPSULE, LIQUID FILLED ORAL at 08:38

## 2020-09-24 RX ADMIN — FAMOTIDINE 10 MG: 20 TABLET, FILM COATED ORAL at 08:39

## 2020-09-24 RX ADMIN — OXCARBAZEPINE 600 MG: 300 TABLET, FILM COATED ORAL at 22:01

## 2020-09-24 RX ADMIN — QUETIAPINE FUMARATE 300 MG: 100 TABLET ORAL at 21:59

## 2020-09-24 RX ADMIN — CETIRIZINE HYDROCHLORIDE 10 MG: 10 TABLET ORAL at 08:48

## 2020-09-24 RX ADMIN — IBUPROFEN 800 MG: 800 TABLET, FILM COATED ORAL at 20:12

## 2020-09-24 RX ADMIN — IBUPROFEN 800 MG: 800 TABLET, FILM COATED ORAL at 11:42

## 2020-09-24 RX ADMIN — OXCARBAZEPINE 600 MG: 300 TABLET, FILM COATED ORAL at 08:46

## 2020-09-24 RX ADMIN — QUETIAPINE FUMARATE 50 MG: 25 TABLET ORAL at 08:40

## 2020-09-24 RX ADMIN — FAMOTIDINE 10 MG: 20 TABLET, FILM COATED ORAL at 22:00

## 2020-09-24 RX ADMIN — BUSPIRONE HYDROCHLORIDE 15 MG: 7.5 TABLET ORAL at 22:01

## 2020-09-24 RX ADMIN — ENOXAPARIN SODIUM 230 MG: 100 INJECTION SUBCUTANEOUS at 08:49

## 2020-09-24 RX ADMIN — SERTRALINE HYDROCHLORIDE 75 MG: 50 TABLET, FILM COATED ORAL at 21:59

## 2020-09-24 RX ADMIN — IBUPROFEN 800 MG: 800 TABLET, FILM COATED ORAL at 03:15

## 2020-09-24 RX ADMIN — OXYCODONE HYDROCHLORIDE AND ACETAMINOPHEN 1 TABLET: 5; 325 TABLET ORAL at 03:16

## 2020-09-24 RX ADMIN — OXYCODONE HYDROCHLORIDE AND ACETAMINOPHEN 1 TABLET: 5; 325 TABLET ORAL at 22:04

## 2020-09-24 RX ADMIN — DULOXETINE HYDROCHLORIDE 120 MG: 60 CAPSULE, DELAYED RELEASE ORAL at 08:48

## 2020-09-24 ASSESSMENT — PAIN SCALES - GENERAL
PAINLEVEL_OUTOF10: 7
PAINLEVEL_OUTOF10: 5
PAINLEVEL_OUTOF10: 3
PAINLEVEL_OUTOF10: 4
PAINLEVEL_OUTOF10: 6
PAINLEVEL_OUTOF10: 5

## 2020-09-24 NOTE — DISCHARGE SUMMARY
C/Section Discharge Summary    Gestational Age:37w4d    Antepartum complications: GDM- A2 on insulin, VTE on lovenox, Macrosomia    Date of Delivery: 2020      Condition: Good    Type of Delivery:  (see operative report)    Labs: CBC   Lab Results   Component Value Date    WBC 9.6 2020    HGB 7.9 (L) 2020    HCT 26.1 (L) 2020     2020        Intrapartum complications: None    Postpartum complications: none    The patient is ambulating well. The patient is tolerating a normal diet.     Discharge Medication:    Cece Dailey   Home Medication Instructions GRACY:[id number]    Printed on:20 0848   Medication Information                      albuterol (PROVENTIL) (2.5 MG/3ML) 0.083% nebulizer solution  Take 3 mLs by nebulization every 6 hours as needed for Wheezing             albuterol sulfate HFA (PROAIR HFA) 108 (90 Base) MCG/ACT inhaler  Inhale 2 puffs into the lungs every 6 hours as needed for Wheezing             ARIPiprazole (ABILIFY) 15 MG tablet  Take 15 mg by mouth nightly             busPIRone (BUSPAR) 15 MG tablet  Take 15 mg by mouth 2 times daily             cetirizine (ZYRTEC) 10 MG tablet  Take 1 tablet by mouth daily             Cholecalciferol (VITAMIN D3) 50 MCG (2000 UT) CAPS  Take 1 capsule by mouth daily             DULoxetine HCl (CYMBALTA PO)  Take 120 mg by mouth every morning (before breakfast)             enoxaparin (LOVENOX) 100 MG/ML injection  Inject 1.5 mg/kg into the skin daily             famotidine (PEPCID) 10 MG tablet  Take 10 mg by mouth 2 times daily             folic acid (FOLVITE) 1 MG tablet  Take 1 mg by mouth daily             ibuprofen (ADVIL;MOTRIN) 200 MG tablet  Take 4 tablets by mouth every 8 hours as needed for Pain             Magnesium 100 MG TABS  Take 50 mg by mouth             metoclopramide (REGLAN) 10 MG tablet  1 tablet every 6-8 hours for nausea vomiting when necessary             omeprazole (PRILOSEC) 40 MG delayed release capsule  TAKE 1 CAPSULE BY MOUTH TWO TIMES A DAY              OXcarbazepine (TRILEPTAL) 600 MG tablet               oxyCODONE-acetaminophen (PERCOCET) 5-325 MG per tablet  Take 1 tablet by mouth every 6 hours as needed for Pain for up to 7 days. Intended supply: 7 days.  Take lowest dose possible to manage pain             Prenatal Vit-DSS-Fe Cbn-FA (PRENATAL AD PO)  Take by mouth daily             Prenatal Vit-Fe Fumarate-FA (PRENATAL VITAMIN) 27-0.8 MG TABS  Take 1 tablet by mouth daily             QUEtiapine (SEROQUEL) 300 MG tablet  Take 300 mg by mouth nightly             QUEtiapine (SEROQUEL) 50 MG tablet  Take 50 mg by mouth every morning (before breakfast)             sertraline (ZOLOFT) 50 MG tablet  Take 75 mg by mouth nightly                   Discharge Date:     Plan:   Follow up in 1 week(s) for incision check    Chanel Jaime Neigh

## 2020-09-24 NOTE — PROGRESS NOTES
Subjective:     Postpartum Day 2:  Delivery    Pain controlled discussed morning BS    Objective:    VITALS:  BP (!) 146/71   Pulse 93   Temp 97.9 °F (36.6 °C)   Resp 17   Ht 5' 10\" (1.778 m)   Wt (!) 332 lb 12.8 oz (151 kg)   LMP 01/10/2020 (Exact Date)   SpO2 97%   Breastfeeding Unknown   BMI 47.75 kg/m²     Vitals:    20 0829   BP: (!) 146/71   Pulse: 93   Resp: 17   Temp: 97.9 °F (36.6 °C)   SpO2:          General:    alert, appears stated age and cooperative   Bowel Sounds:  active           Incision:  healing well, no significant drainage, no dehiscence, no significant erythema, dressing in place         DATA: CBC   Lab Results   Component Value Date    WBC 9.6 2020    HGB 7.9 (L) 2020    HCT 26.1 (L) 2020     2020        Assessment:     Status post  section. Doing well postoperatively. Plan:     Continue current care.       [address] [place] [address]

## 2020-09-24 NOTE — PLAN OF CARE
Andres Briggs RN  Outcome: Ongoing  Note: Homans sign negative      Problem: Venous Thromboembolism:  Goal: Absence of signs or symptoms of impaired coagulation  Description: Absence of signs or symptoms of impaired coagulation  9/23/2020 2040 by Andres Briggs RN  Outcome: Ongoing  Note: Homans sign negative      Care plan reviewed with patient and she contributes to goal setting and voices understanding of plan of care.

## 2020-09-25 VITALS
RESPIRATION RATE: 17 BRPM | BODY MASS INDEX: 41.95 KG/M2 | HEIGHT: 70 IN | WEIGHT: 293 LBS | HEART RATE: 100 BPM | SYSTOLIC BLOOD PRESSURE: 136 MMHG | DIASTOLIC BLOOD PRESSURE: 69 MMHG | OXYGEN SATURATION: 95 % | TEMPERATURE: 98.4 F

## 2020-09-25 PROCEDURE — 6370000000 HC RX 637 (ALT 250 FOR IP): Performed by: OBSTETRICS & GYNECOLOGY

## 2020-09-25 PROCEDURE — 6360000002 HC RX W HCPCS: Performed by: OBSTETRICS & GYNECOLOGY

## 2020-09-25 RX ADMIN — DULOXETINE HYDROCHLORIDE 120 MG: 60 CAPSULE, DELAYED RELEASE ORAL at 08:59

## 2020-09-25 RX ADMIN — IBUPROFEN 800 MG: 800 TABLET, FILM COATED ORAL at 05:09

## 2020-09-25 RX ADMIN — DOCUSATE SODIUM 100 MG: 100 CAPSULE, LIQUID FILLED ORAL at 08:58

## 2020-09-25 RX ADMIN — FAMOTIDINE 10 MG: 20 TABLET, FILM COATED ORAL at 08:58

## 2020-09-25 RX ADMIN — OXCARBAZEPINE 600 MG: 300 TABLET, FILM COATED ORAL at 09:00

## 2020-09-25 RX ADMIN — BUSPIRONE HYDROCHLORIDE 15 MG: 7.5 TABLET ORAL at 09:00

## 2020-09-25 RX ADMIN — ENOXAPARIN SODIUM 230 MG: 100 INJECTION SUBCUTANEOUS at 09:02

## 2020-09-25 RX ADMIN — ACETAMINOPHEN 650 MG: 325 TABLET ORAL at 10:38

## 2020-09-25 RX ADMIN — IBUPROFEN 800 MG: 800 TABLET, FILM COATED ORAL at 13:05

## 2020-09-25 RX ADMIN — CETIRIZINE HYDROCHLORIDE 10 MG: 10 TABLET ORAL at 09:00

## 2020-09-25 RX ADMIN — QUETIAPINE FUMARATE 50 MG: 25 TABLET ORAL at 08:59

## 2020-09-25 ASSESSMENT — PAIN SCALES - GENERAL
PAINLEVEL_OUTOF10: 3
PAINLEVEL_OUTOF10: 4
PAINLEVEL_OUTOF10: 3

## 2020-09-25 NOTE — FLOWSHEET NOTE
Post birth warning signs education paper given and reviewed, teaching complete. [place] postpartum depression screening discussed with patient, instructed to contact her healthcare provider if her score is > 10. Patient voiced understanding. Mother's blood type is O+.

## 2020-11-07 ENCOUNTER — APPOINTMENT (OUTPATIENT)
Dept: GENERAL RADIOLOGY | Age: 25
End: 2020-11-07
Payer: COMMERCIAL

## 2020-11-07 ENCOUNTER — HOSPITAL ENCOUNTER (EMERGENCY)
Age: 25
Discharge: HOME | End: 2020-11-07
Attending: EMERGENCY MEDICINE
Payer: COMMERCIAL

## 2020-11-07 VITALS
HEART RATE: 96 BPM | TEMPERATURE: 98 F | DIASTOLIC BLOOD PRESSURE: 99 MMHG | HEIGHT: 70 IN | RESPIRATION RATE: 16 BRPM | SYSTOLIC BLOOD PRESSURE: 125 MMHG | BODY MASS INDEX: 47.75 KG/M2 | OXYGEN SATURATION: 97 %

## 2020-11-07 PROCEDURE — 6370000000 HC RX 637 (ALT 250 FOR IP): Performed by: EMERGENCY MEDICINE

## 2020-11-07 PROCEDURE — 99282 EMERGENCY DEPT VISIT SF MDM: CPT

## 2020-11-07 PROCEDURE — 73610 X-RAY EXAM OF ANKLE: CPT

## 2020-11-07 RX ORDER — GABAPENTIN 300 MG/1
300 CAPSULE ORAL ONCE
Status: COMPLETED | OUTPATIENT
Start: 2020-11-07 | End: 2020-11-07

## 2020-11-07 RX ORDER — GABAPENTIN 100 MG/1
100 CAPSULE ORAL 3 TIMES DAILY
Qty: 60 CAPSULE | Refills: 0 | Status: SHIPPED | OUTPATIENT
Start: 2020-11-07 | End: 2021-02-01 | Stop reason: ALTCHOICE

## 2020-11-07 RX ADMIN — GABAPENTIN 300 MG: 300 CAPSULE ORAL at 18:09

## 2020-11-07 ASSESSMENT — PAIN DESCRIPTION - ORIENTATION: ORIENTATION: RIGHT

## 2020-11-07 ASSESSMENT — PAIN DESCRIPTION - LOCATION: LOCATION: ANKLE

## 2020-11-07 ASSESSMENT — ENCOUNTER SYMPTOMS
EYE PAIN: 0
SINUS PRESSURE: 0
RECTAL PAIN: 0
BACK PAIN: 0
ABDOMINAL PAIN: 0
SINUS PAIN: 0
BLOOD IN STOOL: 0

## 2020-11-07 ASSESSMENT — PAIN DESCRIPTION - DESCRIPTORS: DESCRIPTORS: ACHING;SHARP

## 2020-11-07 ASSESSMENT — PAIN DESCRIPTION - PAIN TYPE: TYPE: ACUTE PAIN

## 2020-11-07 ASSESSMENT — PAIN SCALES - GENERAL: PAINLEVEL_OUTOF10: 5

## 2020-11-07 NOTE — ED PROVIDER NOTES
[place] ENCOUNTER          Pt Name: Viviana Braun  MRN: [id number]  [place] 1995  Date of evaluation: 11/7/2020  Physician: oHmer Chen MD, Pili Adkins [place]    CHIEF COMPLAINT       Chief Complaint   Patient presents with    Ankle Pain     right     History obtained from chart review and the patient. HISTORY OF PRESENT ILLNESS    HPI  Viviana Braun is a 22 y.o. female who presents to the emergency department for evaluation of ankle pain. Patient had ORIF of the right ankle back in February of this year and has been doing okay until 3 days ago when she started having right ankle pain without any preceding injuries. Patient has been taking Tylenol with little relief. The patient has no other acute complaints at this time. REVIEW OF SYSTEMS   Review of Systems   Constitutional: Negative for chills, fever and unexpected weight change. HENT: Negative for congestion, ear pain, nosebleeds, sinus pressure and sinus pain. Eyes: Negative for pain. Cardiovascular: Negative for chest pain. Gastrointestinal: Negative for abdominal pain, blood in stool and rectal pain. Endocrine: Negative for polydipsia and polyuria. Genitourinary: Negative for dysuria and flank pain. Musculoskeletal: Positive for arthralgias. Negative for back pain, myalgias and neck pain. Skin: Negative for rash. Neurological: Negative for headaches. All other systems reviewed and are negative.         PAST MEDICAL AND SURGICAL HISTORY     Past Medical History:   Diagnosis Date    ADD (attention deficit disorder)     Anxiety 04/08/2015    Anxiety attack     Asthma     exercise induced    Atypical face pain     Back pain     Bipolar 1 disorder (HCC)     Diabetes mellitus ([facility]     GDM on insulin started on 7/23    Fibromyalgia     GERD (gastroesophageal reflux disease)     Hypotension 11/2/2017    Major depressive disorder, recurrent episode, mild ([facility] [address] [facility] 2012    Obesity 10/24/2014    WILFREDO treated with BiPAP     Pneumonia 2018    POTS (postural orthostatic tachycardia syndrome)     Pott disease     Pulmonary emboli (HCC)     Seizures ([facility] [facility] 2016    anxiety induced no meds last seizure 4 years ago    Tailbone injury 11/28/15    patient broke tailbone    Thyroid disease     borderline low no meds    TMJ (dislocation of temporomandibular joint)     Trigeminal neuralgia     Wears contact lenses      Past Surgical History:   Procedure Laterality Date    ABLATION OF DYSRHYTHMIC FOCUS      ANKLE FRACTURE SURGERY Right 2020    RIGHT ANKLE OPEN REDUCTION INTERNAL FIXATION AND DELTOID LIGAMENT REPAIR performed by Ramandeep Chiang DPM at [address] [facility] [address]  3-2016    EP Study   [address]  2016    [facility]     SECTION N/A 2020     SECTION performed by Edgard Lowe MD at [facility]    COLONOSCOPY      INSERTABLE CARDIAC MONITOR      WISDOM TOOTH EXTRACTION           MEDICATIONS     Current Facility-Administered Medications:     gabapentin (NEURONTIN) capsule 300 mg, 300 mg, Oral, Once, Suzanne Tomas MD    Current Outpatient Medications:     gabapentin (NEURONTIN) 100 MG capsule, Take 1 capsule by mouth 3 times daily for 20 days.  Take once a day for 2 days, then 2 times a day for 2 more days, then 3 times a day until you finish, Disp: 60 capsule, Rfl: 0    ibuprofen (ADVIL;MOTRIN) 200 MG tablet, Take 4 tablets by mouth every 8 hours as needed for Pain, Disp: , Rfl:     Magnesium 100 MG TABS, Take 50 mg by mouth, Disp: , Rfl:     busPIRone (BUSPAR) 15 MG tablet, Take 15 mg by mouth 2 times daily, Disp: , Rfl:     famotidine (PEPCID) 10 MG tablet, Take 10 mg by mouth 2 times daily, Disp: , Rfl:     metoclopramide (REGLAN) 10 MG tablet, 1 tablet every 6-8 hours for nausea vomiting when necessary, Disp: 15 tablet, Rfl: 0    Prenatal Vit-Fe Fumarate-FA (PRENATAL VITAMIN) 27-0.8 MG TABS, Take 1 tablet by mouth daily, Disp: , Rfl:     enoxaparin (LOVENOX) 100 MG/ML injection, Inject 1.5 mg/kg into the skin daily, Disp: , Rfl:     OXcarbazepine (TRILEPTAL) 600 MG tablet, , Disp: , Rfl:     QUEtiapine (SEROQUEL) 300 MG tablet, Take 300 mg by mouth nightly, Disp: , Rfl:     QUEtiapine (SEROQUEL) 50 MG tablet, Take 50 mg by mouth every morning (before breakfast), Disp: , Rfl:     ARIPiprazole (ABILIFY) 15 MG tablet, Take 15 mg by mouth nightly, Disp: , Rfl:     sertraline (ZOLOFT) 50 MG tablet, Take 75 mg by mouth nightly , Disp: , Rfl:     Prenatal Vit-DSS-Fe Cbn-FA (PRENATAL AD PO), Take by mouth daily, Disp: , Rfl:     folic acid (FOLVITE) 1 MG tablet, Take 1 mg by mouth daily, Disp: , Rfl:     DULoxetine HCl (CYMBALTA PO), Take 120 mg by mouth every morning (before breakfast), Disp: , Rfl:     Cholecalciferol (VITAMIN D3) 50 MCG (2000 UT) CAPS, Take 1 capsule by mouth daily, Disp: 30 capsule, Rfl: 0    albuterol (PROVENTIL) (2.5 MG/3ML) 0.083% nebulizer solution, Take 3 mLs by nebulization every 6 hours as needed for Wheezing, Disp: 120 each, Rfl: 3    omeprazole (PRILOSEC) 40 MG delayed release capsule, TAKE 1 CAPSULE BY MOUTH TWO TIMES A DAY  (Patient taking differently: nightly ), Disp: 60 capsule, Rfl: 10    albuterol sulfate HFA (PROAIR HFA) 108 (90 Base) MCG/ACT inhaler, Inhale 2 puffs into the lungs every 6 hours as needed for Wheezing, Disp: 1 Inhaler, Rfl: 3    cetirizine (ZYRTEC) 10 MG tablet, Take 1 tablet by mouth daily, Disp: 90 tablet, Rfl: 3      SOCIAL HISTORY     Social History     Social History Narrative    Not on file     Social History     Tobacco Use    Smoking status: Former Smoker     Packs/day: 1.00     Years: 1.50     Pack years: 1.50     Types: Cigarettes     Start date: 2018     Last attempt to quit: 2020     Years since quittin.7    Smokeless tobacco: Never Used   Substance Use Topics    Alcohol use: Never     Alcohol/week: 1.0 standard drinks     Types: 1 Cans of beer per week     Frequency: Never     Comment: occasional    Drug use: Yes     Types: Marijuana     Comment: before preg         ALLERGIES     Allergies   Allergen Reactions    Dilaudid [Hydromorphone Hcl]      hallucination    Flexeril [Cyclobenzaprine] Other (See Comments)     Hallucinations    Keflex [Cephalexin] Other (See Comments)     Lose cognitive functions.  Tessalon [Benzonatate] Other (See Comments)     psychosis    Augmentin [Amoxicillin-Pot Clavulanate] Rash    Lorabid [Loracarbef] Hives, Swelling and Rash    Minocycline Itching, Swelling and Rash         FAMILY HISTORY     Family History   Problem Relation Age of Onset    Anxiety Disorder Mother     Diabetes Mother     Anxiety Disorder Father     Bipolar Disorder Father     High Blood Pressure Father     Mental Illness Father     Parkinsonism Father         Early-Onset    Depression Paternal Aunt     Cancer Paternal Uncle     Depression Paternal Uncle     Cancer Maternal Grandmother     Depression Maternal Grandfather     Cancer Paternal [place] Avery Lupus Maternal Aunt          PREVIOUS RECORDS   Previous records reviewed:   Last seen in the hospital in July 7, 2020 for right upper quadrant abdominal pain. PHYSICAL EXAM     ED Triage Vitals [11/07/20 1558]   BP Temp Temp Source Pulse Resp SpO2 Height Weight   (!) 125/99 98 °F (36.7 °C) Oral 96 16 97 % 5' 10\" (1.778 m) --     Initial vital signs and nursing assessment reviewed and normal. Pulsoximetry is normal per my interpretation. Additional Vital Signs:  Vitals:    11/07/20 1558   BP: (!) 125/99   Pulse: 96   Resp: 16   Temp: 98 °F (36.7 °C)   SpO2: 97%       Physical Exam  Vitals signs and nursing note reviewed. Constitutional:       General: She is not in acute distress. Appearance: Normal appearance. She is well-developed. HENT:      Head: Normocephalic and atraumatic.       Right Ear: External ear normal.      Left Ear: External ear normal.      Nose: Nose normal.   Eyes:      Conjunctiva/sclera: Conjunctivae normal.      Pupils: Pupils are equal, round, and reactive to light. Neck:      Musculoskeletal: Neck supple. Vascular: No JVD. Cardiovascular:      Rate and Rhythm: Normal rate and regular rhythm. Heart sounds: Normal heart sounds. No murmur. No friction rub. No gallop. Pulmonary:      Effort: Pulmonary effort is normal.      Breath sounds: Normal breath sounds. No stridor. No wheezing or rales. Musculoskeletal:      Right lower leg: No edema. Left lower leg: No edema. Comments: Well-appearing surgical scars on medial and lateral right ankle. Minimal tenderness on lateral malleolus. Skin:     General: Skin is warm and dry. Neurological:      Mental Status: She is alert and oriented to person, place, and time. Psychiatric:         Behavior: Behavior normal.             MEDICAL DECISION MAKING   Initial Assessment: Ankle pain without injuries, rule out fracture and hardware displacement. Possible complex regional pain syndrome. Plan: X-rays, pain control, observation. Likely will discharge with [facility] at [facility]. ED RESULTS   Laboratory results:  Labs Reviewed - No data to display    Radiologic studies results:  XR ANKLE RIGHT (MIN 3 VIEWS)   Final Result   Soft tissue swelling and postsurgical changes. No acute fracture. **This report has been created using voice recognition software. It may contain minor errors which are inherent in voice recognition technology. **      Final report electronically signed by Dr Nicole Dubose on 11/7/2020 4:30 PM                ED COURSE   ED Medications administered this visit:   Medications   gabapentin (NEURONTIN) capsule 300 mg (has no administration in time range)          Strict return precautions and follow up instructions were discussed with the patient prior to discharge, with which the patient agrees. MEDICATION CHANGES     New Prescriptions    GABAPENTIN (NEURONTIN) 100 MG CAPSULE    Take 1 capsule by mouth 3 times daily for 20 days. Take once a day for 2 days, then 2 times a day for 2 more days, then 3 times a day until you finish         FINAL DISPOSITION     Final diagnoses:   Acute right ankle pain   Complex regional pain syndrome type 1 of right lower extremity     Condition: condition: good  Dispo: Discharge to home      This transcription was electronically signed. It was dictated by use of voice recognition software and electronically transcribed. The transcription may contain errors not detected in proofreading.        Bela Zhang MD  11/07/20 [id number]

## 2020-11-07 NOTE — ED TRIAGE NOTES
Patient presents to ER with complaints of right ankle pain with no injury. Patient reports history of surgery on right ankle.

## 2020-11-09 ENCOUNTER — TELEPHONE (OUTPATIENT)
Dept: FAMILY MEDICINE CLINIC | Age: 25
End: 2020-11-09

## 2020-12-11 ENCOUNTER — TELEPHONE (OUTPATIENT)
Dept: FAMILY MEDICINE CLINIC | Age: 25
End: 2020-12-11

## 2020-12-11 RX ORDER — CIPROFLOXACIN 250 MG/1
250 TABLET, FILM COATED ORAL 2 TIMES DAILY
Qty: 14 TABLET | Refills: 0 | Status: SHIPPED | OUTPATIENT
Start: 2020-12-11 | End: 2020-12-18

## 2020-12-11 NOTE — TELEPHONE ENCOUNTER
Pt on VM stating she was seen at [address] ED 12/10/2020 and was given a dx of UTI, pt states that ED failed to call her in any antibiotics ? ? Pt would like a call back with advice.

## 2020-12-21 ENCOUNTER — VIRTUAL VISIT (OUTPATIENT)
Dept: FAMILY MEDICINE CLINIC | Age: 25
End: 2020-12-21
Payer: MEDICARE

## 2020-12-21 PROCEDURE — G8428 CUR MEDS NOT DOCUMENT: HCPCS | Performed by: NURSE PRACTITIONER

## 2020-12-21 PROCEDURE — 99213 OFFICE O/P EST LOW 20 MIN: CPT | Performed by: NURSE PRACTITIONER

## 2020-12-21 ASSESSMENT — ENCOUNTER SYMPTOMS
NAUSEA: 0
SHORTNESS OF BREATH: 0
ABDOMINAL PAIN: 0
COUGH: 0

## 2020-12-21 NOTE — PROGRESS NOTES
Subjective:      Patient ID: Francisco Rizo is a 22 y.o. female    HPI: Acute for hyperglycemia    TELEHEALTH EVALUATION -- Audio/Visual (During PHI-35 public health emergency)    Services were provided through a video synchronous discussion virtually to substitute for in-person clinic visit. Patient and provider were located at their individual homes. Patient has requested an audio/video evaluation for the following concern(s):    Chief Complaint   Patient presents with    Hyperglycemia     Had gestational diabetes and was treated for insulin. She is 3 months PP. Had 2 hour glucose tolerance test.  2 hour sugar 145.  1 hour sugar 145. Fasting sugar 102.    Has not been check sugars            Patient Active Problem List   Diagnosis    Major depressive disorder, recurrent episode, mild (HCC)    Low self esteem    KARLIE (generalized anxiety disorder)    Obesity    Obstructive sleep apnea    Snores    Sleep disturbances    Daytime somnolence    Sleep talking    Night terrors, adult    Bruxism (teeth grinding)    Restless sleeper    Anxiety    Abnormal thyroid function test    Trigeminal neuralgia    Inappropriate sinus tachycardia    POTS (postural orthostatic tachycardia syndrome)    Atypical chest pain    Syncope and collapse    Neck discomfort    Thyroid cyst    Pulmonary embolism (HCC)    Chest pain on breathing    Breast pain, left    Positive CAESAR (antinuclear antibody)    Hypotension    S/P ablation of ventricular arrhythmia    Pneumonia    Hypokalemia    DUB (dysfunctional uterine bleeding)    Palpitations    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    Closed right ankle fracture, initial encounter    Status post open reduction with internal fixation (ORIF) of fracture of ankle    Tear of deltoid ligament of ankle, right, initial encounter    Abdominal pain during pregnancy in second trimester    Maternal obesity affecting pregnancy, antepartum    Postpartum care following  delivery       Review of Systems   Constitutional: Negative for chills and fever. HENT: Negative. Respiratory: Negative for cough and shortness of breath. Cardiovascular: Negative for chest pain. Gastrointestinal: Negative for abdominal pain and nausea. Skin: Negative for rash. Neurological: Negative for dizziness, light-headedness and headaches. Psychiatric/Behavioral: Negative. Objective:   Physical Exam  Constitutional:       General: She is not in acute distress. Appearance: She is not ill-appearing. Pulmonary:      Effort: Pulmonary effort is normal. No respiratory distress. Neurological:      Mental Status: She is alert and oriented to person, place, and time. Psychiatric:         Mood and Affect: Mood normal.         Behavior: Behavior normal.         Assessment:       Diagnosis Orders   1. Glucose intolerance  Hemoglobin A1C   2. History of gestational diabetes         Plan:   Serial A1C q 3 months to monitor X 1 year  Diet, exercise and weight loss  Treat conservative due to being in PP and body still adjusting   - no medication for time being  RTO if symptoms worsen or stay the same      Due to this being a TeleHealth encounter (During QLXBL-53 public health emergency), evaluation of the following organ systems was limited: Vitals/Constitutional/EENT/Resp/CV/GI//MS/Neuro/Skin/Heme-Lymph-Imm. Pursuant to the emergency declaration under the [facility] [address] authority and the [employer] Act, this Virtual Visit was conducted with patient's (and/or legal guardian's) consent, to reduce the patient's risk of exposure to COVID-19 and provide necessary medical care.   The patient (and/or legal guardian) has also been advised to contact this office for worsening conditions or problems, and seek emergency medical treatment and/or call 911 if deemed necessary.       [unfilled]

## 2021-01-15 ENCOUNTER — HOSPITAL ENCOUNTER (OUTPATIENT)
Dept: NUCLEAR MEDICINE | Age: 26
Discharge: HOME | End: 2021-01-15
Payer: MEDICARE

## 2021-01-15 VITALS — HEIGHT: 70 IN | WEIGHT: 293 LBS | BODY MASS INDEX: 41.95 KG/M2

## 2021-01-15 DIAGNOSIS — R10.13 ABDOMINAL PAIN, EPIGASTRIC: ICD-10-CM

## 2021-01-15 PROCEDURE — 78227 HEPATOBIL SYST IMAGE W/DRUG: CPT

## 2021-01-15 PROCEDURE — A9537 TC99M MEBROFENIN: HCPCS | Performed by: NURSE PRACTITIONER

## 2021-01-15 PROCEDURE — 3430000000 HC RX DIAGNOSTIC RADIOPHARMACEUTICAL: Performed by: NURSE PRACTITIONER

## 2021-01-15 PROCEDURE — 6360000004 HC RX CONTRAST MEDICATION: Performed by: NURSE PRACTITIONER

## 2021-01-15 PROCEDURE — 2580000003 HC RX 258: Performed by: NURSE PRACTITIONER

## 2021-01-15 RX ORDER — SODIUM CHLORIDE 9 MG/ML
INJECTION, SOLUTION INTRAVENOUS ONCE
Status: COMPLETED | OUTPATIENT
Start: 2021-01-15 | End: 2021-01-15

## 2021-01-15 RX ADMIN — SINCALIDE 3.01 MCG: 5 INJECTION, POWDER, LYOPHILIZED, FOR SOLUTION INTRAVENOUS at 12:43

## 2021-01-15 RX ADMIN — Medication 6 MILLICURIE: at 10:06

## 2021-01-15 RX ADMIN — SODIUM CHLORIDE 100 ML: 9 INJECTION, SOLUTION INTRAVENOUS at 12:44

## 2021-01-16 ENCOUNTER — HOSPITAL ENCOUNTER (EMERGENCY)
Age: 26
Discharge: HOME | End: 2021-01-17
Payer: MEDICARE

## 2021-01-16 DIAGNOSIS — R10.11 ABDOMINAL PAIN, RIGHT UPPER QUADRANT: ICD-10-CM

## 2021-01-16 DIAGNOSIS — N39.0 URINARY TRACT INFECTION IN FEMALE: ICD-10-CM

## 2021-01-16 DIAGNOSIS — R11.2 NON-INTRACTABLE VOMITING WITH NAUSEA, UNSPECIFIED VOMITING TYPE: Primary | ICD-10-CM

## 2021-01-16 LAB
BACTERIA: ABNORMAL /HPF
BILIRUBIN URINE: NEGATIVE
BLOOD, URINE: NEGATIVE
CLARITY: ABNORMAL
COLOR: YELLOW
EPITHELIAL CELLS, UA: 3 /HPF (ref 0–5)
GLUCOSE URINE: NEGATIVE MG/DL
HCG(URINE) PREGNANCY TEST: NEGATIVE
HYALINE CASTS: 3 /LPF (ref 0–8)
KETONES, URINE: NEGATIVE MG/DL
LEUKOCYTE ESTERASE, URINE: ABNORMAL
MICROSCOPIC EXAMINATION: YES
NITRITE, URINE: NEGATIVE
PH UA: 6 (ref 5–8)
PROTEIN UA: ABNORMAL MG/DL
RBC UA: 3 /HPF (ref 0–4)
SPECIFIC GRAVITY UA: 1.03 (ref 1–1.03)
URINE REFLEX TO CULTURE: YES
URINE TYPE: ABNORMAL
UROBILINOGEN, URINE: 0.2 E.U./DL
WBC UA: 128 /HPF (ref 0–5)

## 2021-01-16 PROCEDURE — 87088 URINE BACTERIA CULTURE: CPT

## 2021-01-16 PROCEDURE — 99283 EMERGENCY DEPT VISIT LOW MDM: CPT

## 2021-01-16 PROCEDURE — 87086 URINE CULTURE/COLONY COUNT: CPT

## 2021-01-16 PROCEDURE — 96375 TX/PRO/DX INJ NEW DRUG ADDON: CPT

## 2021-01-16 PROCEDURE — 87186 SC STD MICRODIL/AGAR DIL: CPT

## 2021-01-16 PROCEDURE — 96374 THER/PROPH/DIAG INJ IV PUSH: CPT

## 2021-01-16 PROCEDURE — 81001 URINALYSIS AUTO W/SCOPE: CPT

## 2021-01-16 PROCEDURE — 84703 CHORIONIC GONADOTROPIN ASSAY: CPT

## 2021-01-16 RX ORDER — ONDANSETRON 2 MG/ML
4 INJECTION INTRAMUSCULAR; INTRAVENOUS ONCE
Status: COMPLETED | OUTPATIENT
Start: 2021-01-17 | End: 2021-01-17

## 2021-01-16 RX ORDER — KETOROLAC TROMETHAMINE 30 MG/ML
30 INJECTION, SOLUTION INTRAMUSCULAR; INTRAVENOUS ONCE
Status: COMPLETED | OUTPATIENT
Start: 2021-01-17 | End: 2021-01-17

## 2021-01-16 RX ORDER — 0.9 % SODIUM CHLORIDE 0.9 %
1000 INTRAVENOUS SOLUTION INTRAVENOUS ONCE
Status: COMPLETED | OUTPATIENT
Start: 2021-01-17 | End: 2021-01-17

## 2021-01-16 ASSESSMENT — PAIN SCALES - GENERAL: PAINLEVEL_OUTOF10: 8

## 2021-01-17 ENCOUNTER — APPOINTMENT (OUTPATIENT)
Dept: CT IMAGING | Age: 26
End: 2021-01-17
Payer: MEDICARE

## 2021-01-17 VITALS
WEIGHT: 293 LBS | RESPIRATION RATE: 15 BRPM | OXYGEN SATURATION: 98 % | BODY MASS INDEX: 41.95 KG/M2 | HEIGHT: 70 IN | SYSTOLIC BLOOD PRESSURE: 142 MMHG | DIASTOLIC BLOOD PRESSURE: 75 MMHG | HEART RATE: 92 BPM | TEMPERATURE: 98.2 F

## 2021-01-17 LAB
A/G RATIO: 1.3 (ref 1.1–2.2)
ALBUMIN SERPL-MCNC: 4.2 G/DL (ref 3.4–5)
ALP BLD-CCNC: 107 U/L (ref 40–129)
ALT SERPL-CCNC: 24 U/L (ref 10–40)
ANION GAP SERPL CALCULATED.3IONS-SCNC: 11 MMOL/L (ref 3–16)
AST SERPL-CCNC: 25 U/L (ref 15–37)
BASOPHILS ABSOLUTE: 0.1 K/UL (ref 0–0.2)
BASOPHILS RELATIVE PERCENT: 1 %
BILIRUB SERPL-MCNC: <0.2 MG/DL (ref 0–1)
BUN BLDV-MCNC: 12 MG/DL (ref 7–20)
CALCIUM SERPL-MCNC: 9.1 MG/DL (ref 8.3–10.6)
CHLORIDE BLD-SCNC: 101 MMOL/L (ref 99–110)
CO2: 23 MMOL/L (ref 21–32)
CREAT SERPL-MCNC: 0.7 MG/DL (ref 0.6–1.1)
EOSINOPHILS ABSOLUTE: 0 K/UL (ref 0–0.6)
EOSINOPHILS RELATIVE PERCENT: 0.1 %
GFR AFRICAN AMERICAN: >60
GFR NON-AFRICAN AMERICAN: >60
GLOBULIN: 3.2 G/DL
GLUCOSE BLD-MCNC: 94 MG/DL (ref 70–99)
HCT VFR BLD CALC: 34.5 % (ref 36–48)
HEMOGLOBIN: 11 G/DL (ref 12–16)
LIPASE: 37 U/L (ref 13–60)
LYMPHOCYTES ABSOLUTE: 2.6 K/UL (ref 1–5.1)
LYMPHOCYTES RELATIVE PERCENT: 25.3 %
MCH RBC QN AUTO: 27 PG (ref 26–34)
MCHC RBC AUTO-ENTMCNC: 31.8 G/DL (ref 31–36)
MCV RBC AUTO: 84.9 FL (ref 80–100)
MONOCYTES ABSOLUTE: 0.7 K/UL (ref 0–1.3)
MONOCYTES RELATIVE PERCENT: 7 %
NEUTROPHILS ABSOLUTE: 6.9 K/UL (ref 1.7–7.7)
NEUTROPHILS RELATIVE PERCENT: 66.6 %
PDW BLD-RTO: 19 % (ref 12.4–15.4)
PLATELET # BLD: 236 K/UL (ref 135–450)
PMV BLD AUTO: 8.2 FL (ref 5–10.5)
POTASSIUM SERPL-SCNC: 3.7 MMOL/L (ref 3.5–5.1)
RBC # BLD: 4.06 M/UL (ref 4–5.2)
SODIUM BLD-SCNC: 135 MMOL/L (ref 136–145)
TOTAL PROTEIN: 7.4 G/DL (ref 6.4–8.2)
WBC # BLD: 10.3 K/UL (ref 4–11)

## 2021-01-17 PROCEDURE — 6360000004 HC RX CONTRAST MEDICATION: Performed by: NURSE PRACTITIONER

## 2021-01-17 PROCEDURE — 80053 COMPREHEN METABOLIC PANEL: CPT

## 2021-01-17 PROCEDURE — 96374 THER/PROPH/DIAG INJ IV PUSH: CPT

## 2021-01-17 PROCEDURE — 85025 COMPLETE CBC W/AUTO DIFF WBC: CPT

## 2021-01-17 PROCEDURE — 83690 ASSAY OF LIPASE: CPT

## 2021-01-17 PROCEDURE — 74177 CT ABD & PELVIS W/CONTRAST: CPT

## 2021-01-17 PROCEDURE — 2580000003 HC RX 258: Performed by: NURSE PRACTITIONER

## 2021-01-17 PROCEDURE — 6360000002 HC RX W HCPCS: Performed by: NURSE PRACTITIONER

## 2021-01-17 PROCEDURE — 96375 TX/PRO/DX INJ NEW DRUG ADDON: CPT

## 2021-01-17 RX ORDER — DICYCLOMINE HYDROCHLORIDE 10 MG/1
10 CAPSULE ORAL EVERY 6 HOURS PRN
Qty: 20 CAPSULE | Refills: 0 | Status: SHIPPED | OUTPATIENT
Start: 2021-01-17 | End: 2021-03-04

## 2021-01-17 RX ORDER — ONDANSETRON 4 MG/1
4 TABLET, ORALLY DISINTEGRATING ORAL EVERY 8 HOURS PRN
Qty: 20 TABLET | Refills: 0 | Status: SHIPPED | OUTPATIENT
Start: 2021-01-17 | End: 2021-03-04

## 2021-01-17 RX ORDER — NITROFURANTOIN 25; 75 MG/1; MG/1
100 CAPSULE ORAL 2 TIMES DAILY
Qty: 20 CAPSULE | Refills: 0 | Status: SHIPPED | OUTPATIENT
Start: 2021-01-17 | End: 2021-01-27

## 2021-01-17 RX ADMIN — IOPAMIDOL 75 ML: 755 INJECTION, SOLUTION INTRAVENOUS at 00:52

## 2021-01-17 RX ADMIN — ONDANSETRON 4 MG: 2 INJECTION INTRAMUSCULAR; INTRAVENOUS at 00:16

## 2021-01-17 RX ADMIN — KETOROLAC TROMETHAMINE 30 MG: 30 INJECTION, SOLUTION INTRAMUSCULAR at 00:16

## 2021-01-17 RX ADMIN — SODIUM CHLORIDE 1000 ML: 9 INJECTION, SOLUTION INTRAVENOUS at 00:16

## 2021-01-17 ASSESSMENT — ENCOUNTER SYMPTOMS
ABDOMINAL PAIN: 1
NAUSEA: 1
DIARRHEA: 0
SHORTNESS OF BREATH: 0
CHEST TIGHTNESS: 0
VOMITING: 1

## 2021-01-17 ASSESSMENT — PAIN SCALES - GENERAL: PAINLEVEL_OUTOF10: 9

## 2021-01-17 NOTE — ED NOTES
Discharge instructions reviewed with pt. Pt verbalized understanding. Peripheral IV removed without complications. Pt given copy of discharge instructions. Pt ambulated out of ER without difficulty.       Randi Sr RN  01/17/21 [id number]

## 2021-01-17 NOTE — ED PROVIDER NOTES
hours as needed for Wheezing    ALBUTEROL SULFATE HFA (PROAIR HFA) 108 (90 BASE) MCG/ACT INHALER    Inhale 2 puffs into the lungs every 6 hours as needed for Wheezing    ARIPIPRAZOLE (ABILIFY) 15 MG TABLET    Take 15 mg by mouth nightly    BUSPIRONE (BUSPAR) 15 MG TABLET    Take 15 mg by mouth 2 times daily    CETIRIZINE (ZYRTEC) 10 MG TABLET    Take 1 tablet by mouth daily    CHOLECALCIFEROL (VITAMIN D3) 50 MCG (2000 UT) CAPS    Take 1 capsule by mouth daily    DULOXETINE HCL (CYMBALTA PO)    Take 120 mg by mouth every morning (before breakfast)    ENOXAPARIN (LOVENOX) 100 MG/ML INJECTION    Inject 1.5 mg/kg into the skin daily    FAMOTIDINE (PEPCID) 10 MG TABLET    Take 10 mg by mouth 2 times daily    FOLIC ACID (FOLVITE) 1 MG TABLET    Take 1 mg by mouth daily    GABAPENTIN (NEURONTIN) 100 MG CAPSULE    Take 1 capsule by mouth 3 times daily for 20 days.  Take once a day for 2 days, then 2 times a day for 2 more days, then 3 times a day until you finish    IBUPROFEN (ADVIL;MOTRIN) 200 MG TABLET    Take 4 tablets by mouth every 8 hours as needed for Pain    MAGNESIUM 100 MG TABS    Take 50 mg by mouth    METOCLOPRAMIDE (REGLAN) 10 MG TABLET    1 tablet every 6-8 hours for nausea vomiting when necessary    OMEPRAZOLE (PRILOSEC) 40 MG DELAYED RELEASE CAPSULE    TAKE 1 CAPSULE BY MOUTH TWO TIMES A DAY     OXCARBAZEPINE (TRILEPTAL) 600 MG TABLET        PRENATAL VIT-DSS-FE CBN-FA (PRENATAL AD PO)    Take by mouth daily    PRENATAL VIT-FE FUMARATE-FA (PRENATAL VITAMIN) 27-0.8 MG TABS    Take 1 tablet by mouth daily    QUETIAPINE (SEROQUEL) 300 MG TABLET    Take 300 mg by mouth nightly    QUETIAPINE (SEROQUEL) 50 MG TABLET    Take 50 mg by mouth every morning (before breakfast)    SERTRALINE (ZOLOFT) 50 MG TABLET    Take 75 mg by mouth nightly          ALLERGIES     Dilaudid [hydromorphone hcl], Flexeril [cyclobenzaprine], Keflex [cephalexin], Tessalon [benzonatate], Augmentin [amoxicillin-pot clavulanate], Lorabid Tenderness: There is abdominal tenderness in the right upper quadrant. There is guarding. There is no right CVA tenderness or rebound. Musculoskeletal: Normal range of motion. Skin:     General: Skin is warm and dry. Coloration: Skin is not pale. Neurological:      Mental Status: She is alert and oriented to person, place, and time.    Psychiatric:         Behavior: Behavior normal.         DIAGNOSTIC RESULTS   LABS:    Labs Reviewed   URINE RT REFLEX TO CULTURE - Abnormal; Notable for the following components:       Result Value    Clarity, UA CLOUDY (*)     Protein, UA TRACE (*)     Leukocyte Esterase, Urine MODERATE (*)     All other components within normal limits    Narrative:     Performed at:  [facility] [address]  [place], [place]   Phone [phone]   MICROSCOPIC URINALYSIS - Abnormal; Notable for the following components:    Bacteria, UA 1+ (*)     WBC,  (*)     All other components within normal limits    Narrative:     Performed at:  [facility] [address],  [place], [place]   Phone [phone]   CBC WITH AUTO DIFFERENTIAL - Abnormal; Notable for the following components:    Hemoglobin 11.0 (*)     Hematocrit 34.5 (*)     RDW 19.0 (*)     All other components within normal limits    Narrative:     Performed at:  [facility] [address], [place]   Phone [phone]   COMPREHENSIVE METABOLIC PANEL - Abnormal; Notable for the following components:    Sodium 135 (*)     All other components within normal limits    Narrative:     Performed at:  [facility] [address], [place]   Phone [phone]   CULTURE, URINE   PREGNANCY, URINE    Narrative:     Performed at:  [facility] [address]  [place], [place]   Phone [phone]   LIPASE    Narrative: Performed at:  [facility]  [address], [address]   Phone [phone]       All other labs were within normal range or not returned as of this dictation. EKG: All EKG's are interpreted by the Emergency Department Physician in the absence of a cardiologist.  Please see their note for interpretation of EKG. RADIOLOGY:   Non-plain film images such as CT, Ultrasound and MRI are read by the radiologist. Plain radiographic images are visualized and preliminarily interpreted by the ED Provider with the below findings:        Interpretation per the Radiologist below, if available at the time of this note:    CT ABDOMEN PELVIS W IV CONTRAST Additional Contrast? None   Final Result   1. No acute findings. 2. Hepatomegaly with diffuse hepatic steatosis. Nm Hida Scan W Ef (transcribed)    Result Date: 1/15/2021  EXAMINATION: NUCLEAR MEDICINE HEPATOBILIARY SCINTIGRAPHY (HIDA SCAN) WITH EJECTION FRACTION. TECHNIQUE: Approximately 5.5 millicuries ZL58Q Mebrofenin (Choletec) was administered IV. Then, dynamic images of the abdomen were obtained in the anterior projection for 60 mins. A right lateral view was also obtained at 60 mins. Slow infusion of 2.86 mcg cholecystokinin was administered intravenously over 30-60 mins. Images were obtained in the Romansh projection and regions of interest were drawn around the gallbladder and ejection fraction was calculated. COMPARISON: None HISTORY: ORDERING SYSTEM PROVIDED HISTORY: Abdominal pain, epigastric TECHNOLOGIST PROVIDED HISTORY: Is the patient pregnant?->No Reason for Exam: epigastric pain Acuity: Chronic Type of Exam: Initial Acute postprandial right upper quadrant pain. FINDINGS: Prompt, homogenous uptake by the liver is noted with normal appearance of radiotracer excretion into the biliary system. Clearance of hepatic activity appears appropriate.  Gallbladder and small bowel is visualized in appropriate sequence and time. Gallbladder ejection fraction measured 90%. Normal value is >38% for CCK protocol and >33% for Ensure protocol. Note, Ensure normal range is based on a limited study. Normal hepatobiliary scan with normal gallbladder response to stimulation. Gallbladder ejection fraction 90%. PROCEDURES   Unless otherwise noted below, none     Procedures    CRITICAL CARE TIME   N/A    CONSULTS:  None      EMERGENCY DEPARTMENT COURSE and DIFFERENTIAL DIAGNOSIS/MDM:   Vitals:    Vitals:    01/17/21 0030 01/17/21 0115 01/17/21 0130 01/17/21 0145   BP: 135/69 136/67 (!) 143/82 (!) 142/75   Pulse:    92   Resp:    15   Temp:       SpO2: 98% 98% 99% 98%   Weight:       Height:           Patient was given the following medications:  Medications   0.9 % sodium chloride bolus (1,000 mLs Intravenous New Bag 1/17/21 0016)   ketorolac (TORADOL) injection 30 mg (30 mg Intravenous Given 1/17/21 0016)   ondansetron (ZOFRAN) injection 4 mg (4 mg Intravenous Given 1/17/21 0016)   iopamidol (ISOVUE-370) 76 % injection 75 mL (75 mLs Intravenous Given 1/17/21 0052)           Briefly, presents to the emergency department with the complaint of pain in her right upper quadrant that has been ongoing for 6 months. Pt states she is 4 months post partum and pain has increased in the last several days. Pt states pain does not radiate but is localized and sharp in nature. Pt rates pain a 10/10 on pain scale. Pt complaining of vomiting that is intermittent x 1 week. Pt states pain and vomiting are increased when she eats. Pt with tenderness upon palpation to affected area. Labs did show UTI, this to be treated with Macrobid. CMP and CBC are unremarkable. CT ABDOMEN PELVIS W IV CONTRAST Additional Contrast? None (Final result)  Result time 01/17/21 01:05:49  Final result by Bere Funes MD (01/17/21 01:05:49)                Impression:    1. No acute findings. 2. Hepatomegaly with diffuse hepatic steatosis. She was given Zofran, Toradol, IV fluids. Tolerating p.o. This patient be discharged home with prescription for Zofran and Macrobid. Instructed to follow-up with GI nurse practitioner Lexie. I see nothing that would suggest an acute abdomen at this time. Based on history, physical exam, risk factors, and tests my suspicion for bowel obstruction, incarcerated hernia, acute pancreatitis, intra-abdominal abscess, perforated viscus, diverticulitis, cholecystitis, appendicitis, PID, ovarian torsion, ectopic pregnancy and tubo-ovarian abscess is very low. There is no evidence of peritonitis, sepsis or toxicity at this time. I feel the patient can be managed as an outpatient with follow-up with her family doctor in 24-48 hours. Instructions have been given for the patient to return to the ED for worsening of the pain, high fevers, intractable vomiting, or bleeding. FINAL IMPRESSION      1. Non-intractable vomiting with nausea, unspecified vomiting type    2. Abdominal pain, right upper quadrant    3.  Urinary tract infection in female          DISPOSITION/PLAN   DISPOSITION        PATIENT REFERREDTO:  LEVI Rivas - CNP  [address] [facility]  [phone]    Schedule an appointment as soon as possible for a visit         DISCHARGE MEDICATIONS:  New Prescriptions    DICYCLOMINE (BENTYL) 10 MG CAPSULE    Take 1 capsule by mouth every 6 hours as needed (cramps)    NITROFURANTOIN, MACROCRYSTAL-MONOHYDRATE, (MACROBID) 100 MG CAPSULE    Take 1 capsule by mouth 2 times daily for 10 days    ONDANSETRON (ZOFRAN ODT) 4 MG DISINTEGRATING TABLET    Take 1 tablet by mouth every 8 hours as needed for Nausea       DISCONTINUED MEDICATIONS:  Discontinued Medications    No medications on file              (Please note that portions of this note were completed with a voice recognition program.  Efforts were made to edit the dictations but occasionally words are mis-transcribed.)    Sherryle Clayman Santiago Hammonds - TISHA (electronically signed)          LEVI Holley - CNP  01/17/21 0157

## 2021-01-19 LAB
ORGANISM: ABNORMAL
URINE CULTURE, ROUTINE: ABNORMAL
URINE CULTURE, ROUTINE: ABNORMAL

## 2021-01-21 ENCOUNTER — NURSE ONLY (OUTPATIENT)
Dept: LAB | Age: 26
End: 2021-01-21

## 2021-01-21 ENCOUNTER — OFFICE VISIT (OUTPATIENT)
Dept: PULMONOLOGY | Age: 26
End: 2021-01-21
Payer: MEDICARE

## 2021-01-21 VITALS
HEIGHT: 70 IN | WEIGHT: 293 LBS | DIASTOLIC BLOOD PRESSURE: 78 MMHG | SYSTOLIC BLOOD PRESSURE: 114 MMHG | BODY MASS INDEX: 41.95 KG/M2 | OXYGEN SATURATION: 98 % | TEMPERATURE: 97.7 F | HEART RATE: 137 BPM

## 2021-01-21 DIAGNOSIS — G47.33 OSA TREATED WITH BIPAP: Primary | ICD-10-CM

## 2021-01-21 DIAGNOSIS — E74.39 GLUCOSE INTOLERANCE: ICD-10-CM

## 2021-01-21 LAB
AVERAGE GLUCOSE: 111 MG/DL (ref 70–126)
HBA1C MFR BLD: 5.7 % (ref 4.4–6.4)

## 2021-01-21 PROCEDURE — 99212 OFFICE O/P EST SF 10 MIN: CPT | Performed by: NURSE PRACTITIONER

## 2021-01-21 NOTE — PROGRESS NOTES
[facility]         [id number]  1/21/2021   Chief Complaint   Patient presents with    Follow-up     WILFREDO 6 month follow up with download         Pt of Dr. Emmanuel WEISS Download:   [place] or initial AHI: 131.4   Date of initial study: 01/16/2020  Weight of initial study: 290  [] Compliant  93%   [] Noncompliant 0%     PAP Type VAutoLevel  22/18   Avg Hrs/Day 9 hr 42 min  AHI: 0.6   Recorded compliance dates, 12/20/2020 to 01/18/2021   Machine/Mfg: resmed Interface: FFM    Provider:  [x][facility]  [][employer] [][employer]  [][facility]         [][facility] []Other:     Neck Size: 18  Mallampati Mallampati 3  ESS:  8  SAQLI: 83    Here is a scan of the most recent download:              Presentation:   Nimesh Pedersen presents for sleep medicine follow up for obstructive sleep apnea. Since the last visit, Nimesh Pedersen continues to do well with treatment, significant benefit. Had baby 4 months ago and continues to tolerate same pressure. No weight change. Equipment issues: The pressure is acceptable, the mask is acceptable and she is using the humidity. Sleep issues:  Do you feel better? Yes  More rested? Yes   Better concentration? yes    Progress History:   Since last visit any new medical issues? No  New ER or hospitlal visits? No  Any new or changes in medicines? No  Any new sleep medicines?  No      Past Medical History:   Diagnosis Date    ADD (attention deficit disorder)     Anxiety 04/08/2015    Anxiety attack     Asthma     exercise induced    Atypical face pain     Back pain     Bipolar 1 disorder ([facility]     Diabetes mellitus ([facility]     GDM on insulin started on 7/23    Fibromyalgia     GERD (gastroesophageal reflux disease)     Hypotension 11/2/2017    Major depressive disorder, recurrent episode, mild ([address] 7/31/2012    Obesity 10/24/2014    WILFREDO treated with BiPAP     Pneumonia 2/21/2018    POTS (postural orthostatic tachycardia syndrome)     Pott disease     Pulmonary emboli (HCC)     Seizures ([facility] 2016    anxiety induced no meds last seizure 4 years ago    Tailbone injury 11/28/15    patient broke tailbone    Thyroid disease     borderline low no meds    TMJ (dislocation of temporomandibular joint)     Trigeminal neuralgia     Wears contact lenses        Past Surgical History:   Procedure Laterality Date    ABLATION OF DYSRHYTHMIC FOCUS      ANKLE FRACTURE SURGERY Right 2020    RIGHT ANKLE OPEN REDUCTION INTERNAL FIXATION AND DELTOID LIGAMENT REPAIR performed by Altagracia Davies DPM at [place] [address]  3-2016    EP Study   [address]  2016    [facility]     SECTION N/A 2020     SECTION performed [facility]ard [facility]    COLONOSCOPY      INSERTABLE CARDIAC MONITOR      WISDOM TOOTH EXTRACTION         Social History     Tobacco Use    Smoking status: Former Smoker     Packs/day: 1.00     Years: 1.50     Pack years: 1.50     Types: Cigarettes     Start date: 2018     Quit date: 2020     Years since quittin.9    Smokeless tobacco: Never Used   Substance Use Topics    Alcohol use: Never     Alcohol/week: 1.0 standard drinks     Types: 1 Cans of beer per week     Frequency: Never     Comment: occasional    Drug use: Yes     Types: Marijuana     Comment: before preg       Allergies   Allergen Reactions    Dilaudid [Hydromorphone Hcl]      hallucination    Flexeril [Cyclobenzaprine] Other (See Comments)     Hallucinations    Keflex [Cephalexin] Other (See Comments)     Lose cognitive functions.      Tessalon [Benzonatate] Other (See Comments)     psychosis    Augmentin [Amoxicillin-Pot Clavulanate] Rash    Lorabid [Loracarbef] Hives, Swelling and Rash    Minocycline Itching, Swelling and Rash       Current Outpatient Medications   Medication Sig Dispense Refill    ondansetron (ZOFRAN ODT) 4 MG disintegrating tablet Take 1 tablet by mouth every 8 hours as needed for Nausea 20 tablet 0    dicyclomine (BENTYL) 10 MG capsule Take 1 capsule by mouth every 6 hours as needed (cramps) 20 capsule 0    nitrofurantoin, macrocrystal-monohydrate, (MACROBID) 100 MG capsule Take 1 capsule by mouth 2 times daily for 10 days 20 capsule 0    ibuprofen (ADVIL;MOTRIN) 200 MG tablet Take 4 tablets by mouth every 8 hours as needed for Pain      Magnesium 100 MG TABS Take 50 mg by mouth      busPIRone (BUSPAR) 15 MG tablet Take 15 mg by mouth 2 times daily      famotidine (PEPCID) 10 MG tablet Take 10 mg by mouth 2 times daily      metoclopramide (REGLAN) 10 MG tablet 1 tablet every 6-8 hours for nausea vomiting when necessary 15 tablet 0    Prenatal Vit-Fe Fumarate-FA (PRENATAL VITAMIN) 27-0.8 MG TABS Take 1 tablet by mouth daily      enoxaparin (LOVENOX) 100 MG/ML injection Inject 1.5 mg/kg into the skin daily      OXcarbazepine (TRILEPTAL) 600 MG tablet       QUEtiapine (SEROQUEL) 300 MG tablet Take 300 mg by mouth nightly      QUEtiapine (SEROQUEL) 50 MG tablet Take 50 mg by mouth every morning (before breakfast)      ARIPiprazole (ABILIFY) 15 MG tablet Take 15 mg by mouth nightly      sertraline (ZOLOFT) 50 MG tablet Take 75 mg by mouth nightly       Prenatal Vit-DSS-Fe Cbn-FA (PRENATAL AD PO) Take by mouth daily      folic acid (FOLVITE) 1 MG tablet Take 1 mg by mouth daily      DULoxetine HCl (CYMBALTA PO) Take 120 mg by mouth every morning (before breakfast)      Cholecalciferol (VITAMIN D3) 50 MCG (2000 UT) CAPS Take 1 capsule by mouth daily 30 capsule 0    albuterol (PROVENTIL) (2.5 MG/3ML) 0.083% nebulizer solution Take 3 mLs by nebulization every 6 hours as needed for Wheezing 120 each 3    omeprazole (PRILOSEC) 40 MG delayed release capsule TAKE 1 CAPSULE BY MOUTH TWO TIMES A DAY  (Patient taking differently: nightly ) 60 capsule 10    albuterol sulfate HFA (PROAIR HFA) 108 (90 Base) MCG/ACT inhaler Inhale 2 puffs into the lungs every 6 hours as needed for Wheezing 1 Inhaler 3    cetirizine (ZYRTEC) 10 MG tablet Take 1 tablet by mouth daily 90 tablet 3    gabapentin (NEURONTIN) 100 MG capsule Take 1 capsule by mouth 3 times daily for 20 days. Take once a day for 2 days, then 2 times a day for 2 more days, then 3 times a day until you finish 60 capsule 0     No current facility-administered medications for this visit. Family History   Problem Relation Age of Onset    Anxiety Disorder Mother     Diabetes Mother     Anxiety Disorder Father     Bipolar Disorder Father     High Blood Pressure Father     Mental Illness Father     Parkinsonism Father         Early-Onset    Depression Paternal Aunt     Cancer Paternal Uncle     Depression Paternal Uncle     Cancer Maternal Grandmother     Depression Maternal Grandfather     Cancer Paternal Grandfather     Lupus Maternal Aunt         Review of Systems   General/Constitutional: No recent loss of weight or appetite changes. No fever or chills. HENT: Negative. Eyes: Negative. Upper respiratory tract: No nasal stuffiness or post nasal drip. Lower respiratory tract/ lungs: No cough or sputum production. No hemoptysis. Cardiovascular: No palpitations or chest pain. Gastrointestinal: No nausea or vomiting. Neurological: No focal neurologiacal weakness. Extremities: No edema. Musculoskeletal: No complaints. Genitourinary: No complaints. Hematological: Negative. Psychiatric/Behavioral: Negative. Skin: No itching. Physical Exam:    BMI: Body mass index is 46 kg/m².     Wt Readings from Last 3 Encounters:   01/21/21 (!) 320 lb 9.6 oz (145.4 kg)   01/16/21 (!) 332 lb (150.6 kg)   01/15/21 (!) 332 lb (150.6 kg)     Weight stable / unchanged  Vitals: /78 (Site: Right Upper Arm, Position: Sitting, Cuff Size: Large Adult)   Pulse 137   Temp 97.7 °F (36.5 °C) (Temporal)   Ht 5' 10\" (1.778 m)   Wt (!) 320 lb 9.6 oz (145.4 kg)   SpO2 98% Comment: Room Air  BMI 46.00 kg/m²         General Appearance - Moderately built, moderately nourished, in no acute distress. HEENT - Head is normocephalic, atraumatic. PERRL. Oral mucosa pink and moist, no oral thrush. Mallampati Score - III (soft palate, base of uvula visible). Neck - Supple, symmetrical, trachea midline and soft. Lungs - Clear to auscultation, no wheezes, rales or rhonchi, aeration good. Cardiovascular - Heart sounds are normal. Regular rhythm normal rate without murmur, gallop or rub. Abdomen - Soft, nontender, non-distended. Neurologic - Alert and oriented x 3. Skin - No bruising or bleeding. Extremities - No cyanosis, clubbing or edema. ASSESSMENT/DIAGNOSIS     Diagnosis Orders   1. WILFREDO treated with BiPAP  DME Order for CPAP as OP            Plan   Do you need any equipment today? Yes Rx for new supplies.    - She was advised to continue current positive airway pressure therapy with above described pressure. - She was advised to keep good compliance with current recommended pressure to get optimal results and clinical improvement.  - Recommend 7-9 hours of sleep with PAP treatment. - She was advised to call DME company regarding supplies if needed.   -She is to call my office for earlier appointment if needed for worsening of sleep symptoms.   - She was instructed on weight loss. - Carlos Cee was educated about my impression and plan and verbalizes understanding. We will see Deana Dailey back in: 1 year with download.     LEVI Hannah - CNP  1/21/2021 1:53 PM

## 2021-02-01 ENCOUNTER — OFFICE VISIT (OUTPATIENT)
Dept: SURGERY | Age: 26
End: 2021-02-01
Payer: MEDICARE

## 2021-02-01 VITALS
BODY MASS INDEX: 41.95 KG/M2 | TEMPERATURE: 97.3 F | HEART RATE: 103 BPM | SYSTOLIC BLOOD PRESSURE: 110 MMHG | WEIGHT: 293 LBS | HEIGHT: 70 IN | DIASTOLIC BLOOD PRESSURE: 62 MMHG | RESPIRATION RATE: 18 BRPM | OXYGEN SATURATION: 97 %

## 2021-02-01 DIAGNOSIS — R10.11 RUQ ABDOMINAL PAIN: Primary | ICD-10-CM

## 2021-02-01 DIAGNOSIS — K80.50 BILIARY COLIC: ICD-10-CM

## 2021-02-01 DIAGNOSIS — Z01.818 PRE-OP TESTING: ICD-10-CM

## 2021-02-01 DIAGNOSIS — E66.01 CLASS 3 SEVERE OBESITY WITH BODY MASS INDEX (BMI) OF 45.0 TO 49.9 IN ADULT, UNSPECIFIED OBESITY TYPE, UNSPECIFIED WHETHER SERIOUS COMORBIDITY PRESENT (HCC): ICD-10-CM

## 2021-02-01 PROCEDURE — 99204 OFFICE O/P NEW MOD 45 MIN: CPT | Performed by: SURGERY

## 2021-02-01 RX ORDER — SERTRALINE HYDROCHLORIDE 25 MG/1
25 TABLET, FILM COATED ORAL DAILY
COMMUNITY

## 2021-02-01 NOTE — LETTER
[address] [facility]  [place] [address] [facility] [address]  Phone: [phone]  Fax: [phone]    Anne Ward MD        February 3, 2021    Patient: Dharmesh Morales   MR Number: [id number]   YOB: 1995   Date of Visit: 2/1/2021     Dear Geoffrey Haas, APRN-CNP    Thank you for the request for consultation for Dharmesh Morales to me for the evaluation of recurring postprandial right upper quadrant abdominal pain. Below are the relevant portions of my assessment and plan of care. 1. RUQ abdominal pain    2. Biliary colic    3. Pre-op testing    4. Class 3 severe obesity with body mass index (BMI) of 45.0 to 49.9 in adult, unspecified obesity type, unspecified whether serious comorbidity present ([facility]    4. Hyperactive gallbladder    1. Lengthy discussion undertaken with patient. Certainly symptoms can be consistent with biliary colic. Patient's other diagnostics nonrevealing. EGD in 2015 unremarkable. Offered cholecystectomy but cannot guarantee improvement or resolution in her symptoms. 2.  Patient desires to proceed with cholecystectomy. Techniques and risks of procedure were discussed with the patient at length. Minimally invasive and open techniques were discussed as well. Use of robotic technology was discussed. Risks of procedure including but not limited to bleeding, infection, bile duct leak, bile duct injury, postoperative diarrhea as well as persistence of symptoms postoperatively were all discussed. Potential for cardiac and pulmonary complications discussed as well. Patient wishes to proceed. 3.  Schedule patient for robotic assisted laparoscopic cholecystectomy. 4.  Preoperative testing per anesthesia guidelines including COVID-19 screen. If you have questions, please do not hesitate to call me. I look forward to following Deana along with you.     Sincerely,          Anne Ward MD

## 2021-02-01 NOTE — PROGRESS NOTES
Associates for some time. She has fatty liver and also in addition to her postprandial pain has intermittent nausea and vomiting. Her last EGD was in 2015. She had some patchy erythema of the stomach biopsies were negative for Helicobacter as well as eosinophilic esophagitis. Colonoscopy in 2015 was normal as well. Imaging studies showed hepatomegaly but otherwise were normal in 2020. She had exacerbation of symptoms during pregnancy with nausea and vomiting. She was treated with omeprazole twice daily as well as Pepcid. She was suspected to have gastroparesis and/or gallbladder dysfunction. HIDA scan showed a high ejection fraction of 90%. She is now 2 months postpartum and reports epigastric pain after eating. She was treated recently for UTI with ciprofloxacin. She denies prior history of hepatitis, pancreatitis or jaundice. She is not breast-feeding.     Past Medical History  Past Medical History:   Diagnosis Date    ADD (attention deficit disorder)     Anxiety 04/08/2015    Anxiety attack     Asthma     exercise induced    Atypical face pain     Back pain     Bipolar 1 disorder (HCC)     Diabetes mellitus ([address]     GDM on insulin started on 7/23-during pregnancy    Fibromyalgia     GERD (gastroesophageal reflux disease)     Hypotension 11/02/2017    Major depressive disorder, recurrent episode, mild ([address] 07/31/2012    Obesity 10/24/2014    WILFREDO treated with BiPAP     Pneumonia 02/21/2018    POTS (postural orthostatic tachycardia syndrome)     Pott disease     Pulmonary emboli ([address] 2016    was on blood thinners for 6 months    Seizures ([address] 07/31/2016    anxiety induced no meds last seizure 4 years ago    Tailbone injury 11/28/2015    patient broke tailbone    Thyroid disease     borderline low no meds    TMJ (dislocation of temporomandibular joint)     Trigeminal neuralgia     Wears contact lenses        Past Surgical History  Past Surgical History:   Procedure Laterality Date    [facility]    ANKLE FRACTURE SURGERY Right 2020    RIGHT ANKLE OPEN REDUCTION INTERNAL FIXATION AND DELTOID LIGAMENT REPAIR performed by Mino Broderick DPM at [address]  2016    EP Study    [facility]     SECTION N/A 2020     SECTION performed by Guy Rao MD at [facility]    COLONOSCOPY  2015    [facility]    INSERTABLE CARDIAC MONITOR      UPPER GASTROINTESTINAL ENDOSCOPY  2020    Dr. Johann Vigil  2015    [facility] [address] EXTRACTION         Medications  Current Outpatient Medications   Medication Sig Dispense Refill    sertraline (ZOLOFT) 25 MG tablet Take 25 mg by mouth daily      Multiple Vitamin (MULTIVITAMIN ADULT PO) Take by mouth daily      ondansetron (ZOFRAN ODT) 4 MG disintegrating tablet Take 1 tablet by mouth every 8 hours as needed for Nausea 20 tablet 0    dicyclomine (BENTYL) 10 MG capsule Take 1 capsule by mouth every 6 hours as needed (cramps) 20 capsule 0    ibuprofen (ADVIL;MOTRIN) 200 MG tablet Take 4 tablets by mouth every 8 hours as needed for Pain      busPIRone (BUSPAR) 15 MG tablet Take 15 mg by mouth 2 times daily      OXcarbazepine (TRILEPTAL) 600 MG tablet       QUEtiapine (SEROQUEL) 300 MG tablet Take 300 mg by mouth nightly      QUEtiapine (SEROQUEL) 50 MG tablet Take 50 mg by mouth every morning (before breakfast)      ARIPiprazole (ABILIFY) 15 MG tablet Take 15 mg by mouth nightly      sertraline (ZOLOFT) 50 MG tablet Take 50 mg by mouth nightly       DULoxetine HCl (CYMBALTA PO) Take 120 mg by mouth every morning (before breakfast)      Cholecalciferol (VITAMIN D3) 50 MCG ( UT) CAPS Take 1 capsule by mouth daily 30 capsule 0    albuterol (PROVENTIL) (2.5 MG/3ML) 0.083% nebulizer solution Take 3 mLs by nebulization every 6 hours as needed for Wheezing 120 each 3  omeprazole (PRILOSEC) 40 MG delayed release capsule TAKE 1 CAPSULE BY MOUTH TWO TIMES A DAY  (Patient taking differently: nightly ) 60 capsule 10    albuterol sulfate HFA (PROAIR HFA) 108 (90 Base) MCG/ACT inhaler Inhale 2 puffs into the lungs every 6 hours as needed for Wheezing 1 Inhaler 3    cetirizine (ZYRTEC) 10 MG tablet Take 1 tablet by mouth daily 90 tablet 3     No current facility-administered medications for this visit. Allergies     Allergies   Allergen Reactions    Dilaudid [Hydromorphone Hcl]      hallucination    Flexeril [Cyclobenzaprine] Other (See Comments)     Hallucinations    Keflex [Cephalexin] Other (See Comments)     Lose cognitive functions.      Tessalon [Benzonatate] Other (See Comments)     psychosis    Augmentin [Amoxicillin-Pot Clavulanate] Rash    Lorabid [Loracarbef] Hives, Swelling and Rash    Minocycline Itching, Swelling and Rash       Family History  Family History   Problem Relation Age of Onset    Anxiety Disorder Mother     Diabetes Mother     Anxiety Disorder Father     Bipolar Disorder Father     High Blood Pressure Father     Mental Illness Father     Parkinsonism Father         Early-Onset    Depression Paternal Aunt     Cancer Paternal Uncle         multiple myeloma    Depression Paternal Uncle     Cancer Maternal Grandmother         breast    Ovarian Cancer Maternal Grandmother     Diabetes Maternal Grandmother     Depression Maternal Grandfather     Heart Attack Maternal Grandfather     Cancer Paternal Grandfather         lung    No Known Problems Brother         Gaviria disease    No Known Problems Paternal Grandmother     Lupus Maternal Aunt        SocialHistory  Social History     Socioeconomic History    Marital status: Single     Spouse name: Not on file    Number of children: Not on file    Years of education: Not on file    Highest education level: Not on file   Occupational History    Not on file   Social Needs    Financial resource strain: Not on file    Food insecurity     Worry: Not on file     Inability: Not on file    Transportation needs     Medical: Not on file     Non-medical: Not on file   Tobacco Use    Smoking status: Former Smoker     Packs/day: 1.00     Years: 1.50     Pack years: 1.50     Types: Cigarettes     Start date: 2018     Quit date: 2020     Years since quittin.0    Smokeless tobacco: Never Used   Substance and Sexual Activity    Alcohol use: Never     Alcohol/week: 1.0 standard drinks     Types: 1 Cans of beer per week     Frequency: Never     Comment: occasional    Drug use: Yes     Types: Marijuana     Comment: before preg    Sexual activity: Yes     Partners: Male   Lifestyle    Physical activity     Days per week: Not on file     Minutes per session: Not on file    Stress: Not on file   Relationships    Social connections     Talks on phone: Not on file     Gets together: Not on file     Attends Hinduism service: Not on file     Active member of club or organization: Not on file     Attends meetings of clubs or organizations: Not on file     Relationship status: Not on file    Intimate partner violence     Fear of current or ex partner: Not on file     Emotionally abused: Not on file     Physically abused: Not on file     Forced sexual activity: Not on file   Other Topics Concern    Not on file   Social History Narrative    Not on file           Review of Systems  Review of Systems   Constitutional: Negative for chills, fatigue, fever and unexpected weight change. HENT: Negative for sore throat, trouble swallowing and voice change. Eyes: Negative for visual disturbance. Respiratory: Positive for apnea. Negative for cough, shortness of breath and wheezing. Cardiovascular: Negative for chest pain and palpitations. Gastrointestinal: Positive for abdominal pain, diarrhea, nausea and vomiting. Negative for blood in stool.    Endocrine: Negative for cold intolerance, heat intolerance and polydipsia. Genitourinary: Positive for dysuria. Negative for flank pain and hematuria. Musculoskeletal: Positive for back pain. Negative for gait problem, joint swelling and myalgias. Skin: Negative for color change and rash. Allergic/Immunologic: Negative for immunocompromised state. Neurological: Negative for dizziness, tremors, seizures and speech difficulty. Hematological: Does not bruise/bleed easily. Psychiatric/Behavioral: Negative for behavioral problems and confusion. The patient is nervous/anxious. OBJECTIVE     /62 (Site: Right Upper Arm, Position: Sitting, Cuff Size: Medium Adult)   Pulse 103   Temp 97.3 °F (36.3 °C) (Temporal)   Resp 18   Ht 5' 10\" (1.778 m)   Wt (!) 326 lb 4.8 oz (148 kg)   LMP 01/26/2021   SpO2 97%   BMI 46.82 kg/m²      Physical Exam  Vitals signs reviewed. Constitutional:       Appearance: She is well-developed. HENT:      Head: Normocephalic and atraumatic. Nose: No congestion. Eyes:      General: No scleral icterus. Extraocular Movements: Extraocular movements intact. Pupils: Pupils are equal, round, and reactive to light. Neck:      Musculoskeletal: Normal range of motion and neck supple. Thyroid: No thyromegaly. Vascular: No JVD. Trachea: No tracheal deviation. Cardiovascular:      Rate and Rhythm: Normal rate. Heart sounds: Normal heart sounds. No murmur. Pulmonary:      Effort: Pulmonary effort is normal. No respiratory distress. Breath sounds: Normal breath sounds. No wheezing. Abdominal:      General: Bowel sounds are normal. There is no distension. Palpations: Abdomen is soft. There is no mass. Tenderness: There is no abdominal tenderness. There is no guarding or rebound. Hernia: No hernia is present. Musculoskeletal: Normal range of motion. Lymphadenopathy:      Cervical: No cervical adenopathy. Skin:     General: Skin is warm and dry. Coloration: Skin is not jaundiced or pale. Findings: No bruising or rash. Neurological:      Mental Status: She is alert and oriented to person, place, and time. Cranial Nerves: No cranial nerve deficit. Psychiatric:         Mood and Affect: Mood normal.         Behavior: Behavior normal.         Lab Results   Component Value Date    WBC 10.3 01/17/2021    HGB 11.0 (L) 01/17/2021    HCT 34.5 (L) 01/17/2021     01/17/2021    CHOL 166 07/09/2013    TRIG 156 07/09/2013    HDL 68 07/09/2013    ALT 24 01/17/2021    AST 25 01/17/2021     (L) 01/17/2021    K 3.7 01/17/2021     01/17/2021    CREATININE 0.7 01/17/2021    BUN 12 01/17/2021    CO2 23 01/17/2021    TSH 2.87 02/06/2020    INR 1.07 02/21/2018    LABA1C 5.7 01/21/2021    LABMICR YES 01/16/2021            EXAMINATION:   CT OF THE ABDOMEN AND PELVIS WITH CONTRAST 1/17/2021 12:48 am       TECHNIQUE:   CT of the abdomen and pelvis was performed with the administration of   intravenous contrast. Multiplanar reformatted images are provided for review. Dose modulation, iterative reconstruction, and/or weight based adjustment of   the mA/kV was utilized to reduce the radiation dose to as low as reasonably   achievable.       COMPARISON:   None.       HISTORY:   ORDERING SYSTEM PROVIDED HISTORY: RUQ pain   TECHNOLOGIST PROVIDED HISTORY:   If patient is on cardiac monitor and/or pulse ox, they may be taken off   cardiac monitor and pulse ox, left on O2 if currently on. All monitors   reattached when patient returns to room. Additional Contrast?->None   Reason for exam:->RUQ pain   Reason for Exam: Abdominal Pain (RUQ abdominal pain for four months. has seen   GI and was told her gallbladder is fine. )       FINDINGS:   Lower Chest: The visualized lung bases are clear.       Organs: The liver is enlarged with diffuse fatty infiltration.  The spleen,   pancreas, adrenal glands and kidneys are unremarkable.  The gallbladder is   contracted. value is >38% for CCK protocol and >33% for Ensure protocol.  Note,   Ensure normal range is based on a limited study.           Impression   Normal hepatobiliary scan with normal gallbladder response to stimulation. Gallbladder ejection fraction 90%.                                Imaging and outside records reviewed

## 2021-02-03 ASSESSMENT — ENCOUNTER SYMPTOMS
SORE THROAT: 0
WHEEZING: 0
SHORTNESS OF BREATH: 0
VOMITING: 1
NAUSEA: 1
COUGH: 0
ABDOMINAL PAIN: 1
VOICE CHANGE: 0
BLOOD IN STOOL: 0
TROUBLE SWALLOWING: 0
APNEA: 1
DIARRHEA: 1
BACK PAIN: 1
COLOR CHANGE: 0

## 2021-02-10 ENCOUNTER — HOSPITAL ENCOUNTER (OUTPATIENT)
Age: 26
Discharge: HOME | End: 2021-02-10
Payer: MEDICARE

## 2021-02-10 DIAGNOSIS — Z01.818 PRE-OP TESTING: ICD-10-CM

## 2021-02-10 DIAGNOSIS — R10.11 RUQ ABDOMINAL PAIN: ICD-10-CM

## 2021-02-10 DIAGNOSIS — K80.50 BILIARY COLIC: ICD-10-CM

## 2021-02-10 PROCEDURE — U0003 INFECTIOUS AGENT DETECTION BY NUCLEIC ACID (DNA OR RNA); SEVERE ACUTE RESPIRATORY SYNDROME CORONAVIRUS 2 (SARS-COV-2) (CORONAVIRUS DISEASE [COVID-19]), AMPLIFIED PROBE TECHNIQUE, MAKING USE OF HIGH THROUGHPUT TECHNOLOGIES AS DESCRIBED BY CMS-2020-01-R: HCPCS

## 2021-02-12 LAB — SARS-COV-2: NOT DETECTED

## 2021-02-16 RX ORDER — LEVOFLOXACIN 5 MG/ML
500 INJECTION, SOLUTION INTRAVENOUS
Status: CANCELLED | OUTPATIENT
Start: 2021-02-16 | End: 2021-02-16

## 2021-02-16 NOTE — PROGRESS NOTES
Patient contacted regarding COVID-19 screen. Test was done on 2/10/21  at [facility]**  Following questions asked: In the last month, have you been in contact with someone who was confirmed or suspected to have Coronavirus/COVID-19:  Patient stated NO      Pt was informed can be a visitor allowed. Please bring masks. Do you or family members have any of the following symptoms:  Cough-no   Muscle pain-no   Shortness of breath-no   Fever-no   Weakness-no  Severe headache-no   Sore throat-no   Respiratory symptoms-no    Have you traveled internationally in the last month? No     Have you been to the emergency room recently-no     Inform pt will need to have urine test done if she hasn't had a tubal ligation or hysterectomy.

## 2021-02-17 ENCOUNTER — ANESTHESIA (OUTPATIENT)
Dept: OPERATING ROOM | Age: 26
End: 2021-02-17
Payer: MEDICARE

## 2021-02-17 ENCOUNTER — HOSPITAL ENCOUNTER (OUTPATIENT)
Age: 26
Setting detail: OUTPATIENT SURGERY
Discharge: HOME | End: 2021-02-17
Attending: SURGERY | Admitting: SURGERY
Payer: MEDICARE

## 2021-02-17 ENCOUNTER — ANESTHESIA EVENT (OUTPATIENT)
Dept: OPERATING ROOM | Age: 26
End: 2021-02-17
Payer: MEDICARE

## 2021-02-17 VITALS
WEIGHT: 293 LBS | TEMPERATURE: 96.1 F | DIASTOLIC BLOOD PRESSURE: 80 MMHG | HEART RATE: 85 BPM | SYSTOLIC BLOOD PRESSURE: 131 MMHG | OXYGEN SATURATION: 95 % | HEIGHT: 70 IN | BODY MASS INDEX: 41.95 KG/M2 | RESPIRATION RATE: 18 BRPM

## 2021-02-17 VITALS — TEMPERATURE: 96.8 F | DIASTOLIC BLOOD PRESSURE: 82 MMHG | SYSTOLIC BLOOD PRESSURE: 166 MMHG | OXYGEN SATURATION: 93 %

## 2021-02-17 DIAGNOSIS — K80.50 BILIARY COLIC SYMPTOM: Primary | ICD-10-CM

## 2021-02-17 LAB — PREGNANCY, URINE: NEGATIVE

## 2021-02-17 PROCEDURE — 7100000000 HC PACU RECOVERY - FIRST 15 MIN: Performed by: SURGERY

## 2021-02-17 PROCEDURE — 2500000003 HC RX 250 WO HCPCS: Performed by: SURGERY

## 2021-02-17 PROCEDURE — 3700000001 HC ADD 15 MINUTES (ANESTHESIA): Performed by: SURGERY

## 2021-02-17 PROCEDURE — 6360000002 HC RX W HCPCS: Performed by: NURSE ANESTHETIST, CERTIFIED REGISTERED

## 2021-02-17 PROCEDURE — 7100000011 HC PHASE II RECOVERY - ADDTL 15 MIN: Performed by: SURGERY

## 2021-02-17 PROCEDURE — 2500000003 HC RX 250 WO HCPCS: Performed by: NURSE ANESTHETIST, CERTIFIED REGISTERED

## 2021-02-17 PROCEDURE — S2900 ROBOTIC SURGICAL SYSTEM: HCPCS | Performed by: SURGERY

## 2021-02-17 PROCEDURE — 6360000002 HC RX W HCPCS: Performed by: ANESTHESIOLOGY

## 2021-02-17 PROCEDURE — 47563 LAPARO CHOLECYSTECTOMY/GRAPH: CPT | Performed by: SURGERY

## 2021-02-17 PROCEDURE — 6370000000 HC RX 637 (ALT 250 FOR IP): Performed by: SURGERY

## 2021-02-17 PROCEDURE — 2780000010 HC IMPLANT OTHER: Performed by: SURGERY

## 2021-02-17 PROCEDURE — 88304 TISSUE EXAM BY PATHOLOGIST: CPT

## 2021-02-17 PROCEDURE — C1894 INTRO/SHEATH, NON-LASER: HCPCS | Performed by: SURGERY

## 2021-02-17 PROCEDURE — 2580000003 HC RX 258: Performed by: NURSE ANESTHETIST, CERTIFIED REGISTERED

## 2021-02-17 PROCEDURE — 7100000001 HC PACU RECOVERY - ADDTL 15 MIN: Performed by: SURGERY

## 2021-02-17 PROCEDURE — 6360000002 HC RX W HCPCS: Performed by: SURGERY

## 2021-02-17 PROCEDURE — 3600000019 HC SURGERY ROBOT ADDTL 15MIN: Performed by: SURGERY

## 2021-02-17 PROCEDURE — 2709999900 HC NON-CHARGEABLE SUPPLY: Performed by: SURGERY

## 2021-02-17 PROCEDURE — 81025 URINE PREGNANCY TEST: CPT

## 2021-02-17 PROCEDURE — 3600000009 HC SURGERY ROBOT BASE: Performed by: SURGERY

## 2021-02-17 PROCEDURE — 7100000010 HC PHASE II RECOVERY - FIRST 15 MIN: Performed by: SURGERY

## 2021-02-17 PROCEDURE — 2580000003 HC RX 258: Performed by: SURGERY

## 2021-02-17 PROCEDURE — 6360000002 HC RX W HCPCS

## 2021-02-17 PROCEDURE — 3700000000 HC ANESTHESIA ATTENDED CARE: Performed by: SURGERY

## 2021-02-17 RX ORDER — PROMETHAZINE HYDROCHLORIDE 25 MG/1
12.5 TABLET ORAL EVERY 6 HOURS PRN
Status: DISCONTINUED | OUTPATIENT
Start: 2021-02-17 | End: 2021-02-17 | Stop reason: HOSPADM

## 2021-02-17 RX ORDER — ROCURONIUM BROMIDE 10 MG/ML
INJECTION, SOLUTION INTRAVENOUS PRN
Status: DISCONTINUED | OUTPATIENT
Start: 2021-02-17 | End: 2021-02-17 | Stop reason: SDUPTHER

## 2021-02-17 RX ORDER — SODIUM CHLORIDE 0.9 % (FLUSH) 0.9 %
10 SYRINGE (ML) INJECTION EVERY 12 HOURS SCHEDULED
Status: DISCONTINUED | OUTPATIENT
Start: 2021-02-17 | End: 2021-02-17 | Stop reason: HOSPADM

## 2021-02-17 RX ORDER — PROMETHAZINE HYDROCHLORIDE 25 MG/ML
12.5 INJECTION, SOLUTION INTRAMUSCULAR; INTRAVENOUS ONCE
Status: COMPLETED | OUTPATIENT
Start: 2021-02-17 | End: 2021-02-17

## 2021-02-17 RX ORDER — SUCCINYLCHOLINE CHLORIDE 20 MG/ML
INJECTION INTRAMUSCULAR; INTRAVENOUS PRN
Status: DISCONTINUED | OUTPATIENT
Start: 2021-02-17 | End: 2021-02-17 | Stop reason: SDUPTHER

## 2021-02-17 RX ORDER — ONDANSETRON 2 MG/ML
INJECTION INTRAMUSCULAR; INTRAVENOUS PRN
Status: DISCONTINUED | OUTPATIENT
Start: 2021-02-17 | End: 2021-02-17 | Stop reason: SDUPTHER

## 2021-02-17 RX ORDER — PROMETHAZINE HYDROCHLORIDE 25 MG/ML
INJECTION, SOLUTION INTRAMUSCULAR; INTRAVENOUS
Status: COMPLETED
Start: 2021-02-17 | End: 2021-02-17

## 2021-02-17 RX ORDER — KETOROLAC TROMETHAMINE 30 MG/ML
INJECTION, SOLUTION INTRAMUSCULAR; INTRAVENOUS PRN
Status: DISCONTINUED | OUTPATIENT
Start: 2021-02-17 | End: 2021-02-17 | Stop reason: SDUPTHER

## 2021-02-17 RX ORDER — DEXAMETHASONE SODIUM PHOSPHATE 10 MG/ML
INJECTION, EMULSION INTRAMUSCULAR; INTRAVENOUS PRN
Status: DISCONTINUED | OUTPATIENT
Start: 2021-02-17 | End: 2021-02-17 | Stop reason: SDUPTHER

## 2021-02-17 RX ORDER — FENTANYL CITRATE 50 UG/ML
INJECTION, SOLUTION INTRAMUSCULAR; INTRAVENOUS PRN
Status: DISCONTINUED | OUTPATIENT
Start: 2021-02-17 | End: 2021-02-17 | Stop reason: SDUPTHER

## 2021-02-17 RX ORDER — MORPHINE SULFATE 2 MG/ML
4 INJECTION, SOLUTION INTRAMUSCULAR; INTRAVENOUS
Status: DISCONTINUED | OUTPATIENT
Start: 2021-02-17 | End: 2021-02-17 | Stop reason: HOSPADM

## 2021-02-17 RX ORDER — BUPIVACAINE HYDROCHLORIDE 5 MG/ML
INJECTION, SOLUTION EPIDURAL; INTRACAUDAL PRN
Status: DISCONTINUED | OUTPATIENT
Start: 2021-02-17 | End: 2021-02-17 | Stop reason: ALTCHOICE

## 2021-02-17 RX ORDER — SODIUM CHLORIDE 0.9 % (FLUSH) 0.9 %
10 SYRINGE (ML) INJECTION PRN
Status: DISCONTINUED | OUTPATIENT
Start: 2021-02-17 | End: 2021-02-17 | Stop reason: HOSPADM

## 2021-02-17 RX ORDER — HYDROCODONE BITARTRATE AND ACETAMINOPHEN 5; 325 MG/1; MG/1
1 TABLET ORAL EVERY 6 HOURS PRN
Qty: 28 TABLET | Refills: 0 | Status: SHIPPED | OUTPATIENT
Start: 2021-02-17 | End: 2021-02-24

## 2021-02-17 RX ORDER — FENTANYL CITRATE 50 UG/ML
50 INJECTION, SOLUTION INTRAMUSCULAR; INTRAVENOUS EVERY 5 MIN PRN
Status: COMPLETED | OUTPATIENT
Start: 2021-02-17 | End: 2021-02-17

## 2021-02-17 RX ORDER — MORPHINE SULFATE 2 MG/ML
2 INJECTION, SOLUTION INTRAMUSCULAR; INTRAVENOUS EVERY 5 MIN PRN
Status: DISCONTINUED | OUTPATIENT
Start: 2021-02-17 | End: 2021-02-17 | Stop reason: HOSPADM

## 2021-02-17 RX ORDER — HYDROCODONE BITARTRATE AND ACETAMINOPHEN 5; 325 MG/1; MG/1
2 TABLET ORAL EVERY 4 HOURS PRN
Status: DISCONTINUED | OUTPATIENT
Start: 2021-02-17 | End: 2021-02-17 | Stop reason: HOSPADM

## 2021-02-17 RX ORDER — ONDANSETRON 2 MG/ML
4 INJECTION INTRAMUSCULAR; INTRAVENOUS EVERY 6 HOURS PRN
Status: DISCONTINUED | OUTPATIENT
Start: 2021-02-17 | End: 2021-02-17 | Stop reason: HOSPADM

## 2021-02-17 RX ORDER — ONDANSETRON 4 MG/1
4 TABLET, ORALLY DISINTEGRATING ORAL EVERY 8 HOURS PRN
Qty: 10 TABLET | Refills: 0 | Status: SHIPPED | OUTPATIENT
Start: 2021-02-17 | End: 2021-03-04

## 2021-02-17 RX ORDER — FENTANYL CITRATE 50 UG/ML
INJECTION, SOLUTION INTRAMUSCULAR; INTRAVENOUS
Status: COMPLETED
Start: 2021-02-17 | End: 2021-02-17

## 2021-02-17 RX ORDER — INDOCYANINE GREEN AND WATER 25 MG
2.5 KIT INJECTION ONCE
Status: COMPLETED | OUTPATIENT
Start: 2021-02-17 | End: 2021-02-17

## 2021-02-17 RX ORDER — LIDOCAINE HCL/PF 100 MG/5ML
SYRINGE (ML) INJECTION PRN
Status: DISCONTINUED | OUTPATIENT
Start: 2021-02-17 | End: 2021-02-17 | Stop reason: SDUPTHER

## 2021-02-17 RX ORDER — PROPOFOL 10 MG/ML
INJECTION, EMULSION INTRAVENOUS PRN
Status: DISCONTINUED | OUTPATIENT
Start: 2021-02-17 | End: 2021-02-17 | Stop reason: SDUPTHER

## 2021-02-17 RX ORDER — SODIUM CHLORIDE 9 MG/ML
INJECTION, SOLUTION INTRAVENOUS CONTINUOUS
Status: DISCONTINUED | OUTPATIENT
Start: 2021-02-17 | End: 2021-02-17 | Stop reason: HOSPADM

## 2021-02-17 RX ORDER — MORPHINE SULFATE 2 MG/ML
2 INJECTION, SOLUTION INTRAMUSCULAR; INTRAVENOUS
Status: DISCONTINUED | OUTPATIENT
Start: 2021-02-17 | End: 2021-02-17 | Stop reason: HOSPADM

## 2021-02-17 RX ORDER — HYDROCODONE BITARTRATE AND ACETAMINOPHEN 5; 325 MG/1; MG/1
1 TABLET ORAL EVERY 4 HOURS PRN
Status: DISCONTINUED | OUTPATIENT
Start: 2021-02-17 | End: 2021-02-17 | Stop reason: HOSPADM

## 2021-02-17 RX ORDER — SODIUM CHLORIDE 9 MG/ML
INJECTION, SOLUTION INTRAVENOUS CONTINUOUS PRN
Status: DISCONTINUED | OUTPATIENT
Start: 2021-02-17 | End: 2021-02-17 | Stop reason: SDUPTHER

## 2021-02-17 RX ADMIN — ONDANSETRON HYDROCHLORIDE 4 MG: 4 INJECTION, SOLUTION INTRAMUSCULAR; INTRAVENOUS at 10:15

## 2021-02-17 RX ADMIN — FENTANYL CITRATE 50 MCG: 50 INJECTION, SOLUTION INTRAMUSCULAR; INTRAVENOUS at 11:28

## 2021-02-17 RX ADMIN — CEFOXITIN SODIUM 3 G: 2 POWDER, FOR SOLUTION INTRAVENOUS at 09:57

## 2021-02-17 RX ADMIN — SUCCINYLCHOLINE CHLORIDE 180 MG: 20 INJECTION, SOLUTION INTRAMUSCULAR; INTRAVENOUS at 09:52

## 2021-02-17 RX ADMIN — FENTANYL CITRATE 50 MCG: 50 INJECTION, SOLUTION INTRAMUSCULAR; INTRAVENOUS at 10:49

## 2021-02-17 RX ADMIN — ROCURONIUM BROMIDE 10 MG: 10 INJECTION INTRAVENOUS at 10:21

## 2021-02-17 RX ADMIN — HYDROCODONE BITARTRATE AND ACETAMINOPHEN 2 TABLET: 5; 325 TABLET ORAL at 11:25

## 2021-02-17 RX ADMIN — FENTANYL CITRATE 100 MCG: 50 INJECTION, SOLUTION INTRAMUSCULAR; INTRAVENOUS at 10:06

## 2021-02-17 RX ADMIN — PROPOFOL 150 MG: 10 INJECTION, EMULSION INTRAVENOUS at 09:52

## 2021-02-17 RX ADMIN — FENTANYL CITRATE 50 MCG: 50 INJECTION, SOLUTION INTRAMUSCULAR; INTRAVENOUS at 10:54

## 2021-02-17 RX ADMIN — KETOROLAC TROMETHAMINE 30 MG: 30 INJECTION, SOLUTION INTRAMUSCULAR at 10:35

## 2021-02-17 RX ADMIN — DEXAMETHASONE SODIUM PHOSPHATE 10 MG: 10 INJECTION, EMULSION INTRAMUSCULAR; INTRAVENOUS at 10:15

## 2021-02-17 RX ADMIN — SODIUM CHLORIDE: 9 INJECTION, SOLUTION INTRAVENOUS at 09:48

## 2021-02-17 RX ADMIN — Medication 100 MG: at 09:52

## 2021-02-17 RX ADMIN — MORPHINE SULFATE 2 MG: 2 INJECTION, SOLUTION INTRAMUSCULAR; INTRAVENOUS at 12:08

## 2021-02-17 RX ADMIN — FENTANYL CITRATE 100 MCG: 50 INJECTION, SOLUTION INTRAMUSCULAR; INTRAVENOUS at 09:52

## 2021-02-17 RX ADMIN — MORPHINE SULFATE 2 MG: 2 INJECTION, SOLUTION INTRAMUSCULAR; INTRAVENOUS at 12:15

## 2021-02-17 RX ADMIN — PROMETHAZINE HYDROCHLORIDE 12.5 MG: 25 INJECTION, SOLUTION INTRAMUSCULAR; INTRAVENOUS at 11:07

## 2021-02-17 RX ADMIN — PROMETHAZINE HYDROCHLORIDE 12.5 MG: 25 INJECTION INTRAMUSCULAR; INTRAVENOUS at 11:07

## 2021-02-17 RX ADMIN — FENTANYL CITRATE 50 MCG: 50 INJECTION, SOLUTION INTRAMUSCULAR; INTRAVENOUS at 11:08

## 2021-02-17 RX ADMIN — MORPHINE SULFATE 2 MG: 2 INJECTION, SOLUTION INTRAMUSCULAR; INTRAVENOUS at 11:58

## 2021-02-17 RX ADMIN — SUGAMMADEX 300 MG: 100 INJECTION, SOLUTION INTRAVENOUS at 10:35

## 2021-02-17 RX ADMIN — SODIUM CHLORIDE: 9 INJECTION, SOLUTION INTRAVENOUS at 10:32

## 2021-02-17 RX ADMIN — ROCURONIUM BROMIDE 50 MG: 10 INJECTION INTRAVENOUS at 10:00

## 2021-02-17 RX ADMIN — Medication 2.5 MG: at 08:45

## 2021-02-17 ASSESSMENT — PULMONARY FUNCTION TESTS
PIF_VALUE: 25
PIF_VALUE: 0
PIF_VALUE: 18
PIF_VALUE: 19
PIF_VALUE: 35
PIF_VALUE: 35
PIF_VALUE: 29
PIF_VALUE: 1
PIF_VALUE: 26
PIF_VALUE: 28
PIF_VALUE: 19
PIF_VALUE: 1
PIF_VALUE: 35
PIF_VALUE: 21
PIF_VALUE: 30
PIF_VALUE: 28
PIF_VALUE: 35
PIF_VALUE: 23
PIF_VALUE: 35
PIF_VALUE: 35
PIF_VALUE: 8
PIF_VALUE: 29
PIF_VALUE: 28
PIF_VALUE: 1
PIF_VALUE: 1
PIF_VALUE: 35
PIF_VALUE: 29
PIF_VALUE: 19
PIF_VALUE: 35
PIF_VALUE: 35
PIF_VALUE: 36
PIF_VALUE: 27

## 2021-02-17 ASSESSMENT — PAIN SCALES - GENERAL
PAINLEVEL_OUTOF10: 6
PAINLEVEL_OUTOF10: 5
PAINLEVEL_OUTOF10: 6
PAINLEVEL_OUTOF10: 7
PAINLEVEL_OUTOF10: 5
PAINLEVEL_OUTOF10: 7
PAINLEVEL_OUTOF10: 7
PAINLEVEL_OUTOF10: 0
PAINLEVEL_OUTOF10: 6
PAINLEVEL_OUTOF10: 7
PAINLEVEL_OUTOF10: 6

## 2021-02-17 ASSESSMENT — PAIN DESCRIPTION - PAIN TYPE: TYPE: SURGICAL PAIN

## 2021-02-17 NOTE — BRIEF OP NOTE
Brief Postoperative Note      Patient: Aleksandra Matthews  YOB: 1995  MRN: [id number]    Date of Procedure: 2/17/2021    Pre-Op Diagnosis: RUQ ABD PAIN, BILIARY COLIC    Post-Op Diagnosis: Same       Procedure(s):  CHOLECYSTECTOMY LAPAROSCOPIC ROBOTIC    Surgeon(s):  Naz Caal MD    Assistant:  First Assistant: Hernandez Cortez RN    Anesthesia: General    Estimated Blood Loss (mL): Minimal    Complications: None    Specimens:   ID Type Source Tests Collected by Time Destination   A : gallbladder Tissue Gallbladder SURGICAL PATHOLOGY Naz Caal MD 2/17/2021 1014        Implants:  * No implants in log *      Drains: * No LDAs found *    Findings: distended gb    Electronically signed by Naz Caal MD on 2/17/2021 at 10:37 AM

## 2021-02-17 NOTE — ANESTHESIA POSTPROCEDURE EVALUATION
Department of Anesthesiology  Postprocedure Note    Patient: Veronika Snow  MRN: [id number]  YOB: 1995  Date of evaluation: 2/17/2021  Time:  1:40 PM     Procedure Summary     Date: 02/17/21 Room / Location: [address]    Anesthesia Start: 0948 Anesthesia Stop: 1054    Procedure: CHOLECYSTECTOMY LAPAROSCOPIC ROBOTIC (N/A Abdomen) Diagnosis: (RUQ ABD PAIN, BILIARY COLIC)    Surgeons: Jorge Luis Duckworth MD Responsible Provider: Andrew Hurd DO    Anesthesia Type: general ASA Status: 3          Anesthesia Type: general    Rachele Phase I: Rachele Score: 10    Rachele Phase II: Rachele Score: 10    Last vitals: Reviewed and per EMR flowsheets.        Anesthesia Post Evaluation    Patient location during evaluation: PACU  Patient participation: complete - patient participated  Level of consciousness: awake  Airway patency: patent  Nausea & Vomiting: no nausea and no vomiting  Complications: no  Cardiovascular status: hemodynamically stable  Respiratory status: acceptable  Hydration status: stable

## 2021-02-17 NOTE — ANESTHESIA PRE PROCEDURE
ADULT PO) Take by mouth daily    Historical Provider, MD   dicyclomine (BENTYL) 10 MG capsule Take 1 capsule by mouth every 6 hours as needed (cramps) 1/17/21   LEVI Faith CNP   albuterol (PROVENTIL) (2.5 MG/3ML) 0.083% nebulizer solution Take 3 mLs by nebulization every 6 hours as needed for Wheezing 4/11/19   LEVI Lock CNP   albuterol sulfate HFA (PROAIR HFA) 108 (90 Base) MCG/ACT inhaler Inhale 2 puffs into the lungs every 6 hours as needed for Wheezing 10/16/17   Fiona Flood DO       Current medications:    Current Facility-Administered Medications   Medication Dose Route Frequency Provider Last Rate Last Admin    sodium chloride flush 0.9 % injection 10 mL  10 mL Intravenous 2 times per day Jamie Hughes MD        sodium chloride flush 0.9 % injection 10 mL  10 mL Intravenous PRN Jamie Hughes MD        cefOXitin (MEFOXIN) 3,000 mg in dextrose 5 % 50 mL IVPB  3,000 mg Intravenous Kar Melton MD           Allergies: Allergies   Allergen Reactions    Dilaudid [Hydromorphone Hcl]      hallucination    Flexeril [Cyclobenzaprine] Other (See Comments)     Hallucinations    Keflex [Cephalexin] Other (See Comments)     Lose cognitive functions.      Tessalon [Benzonatate] Other (See Comments)     psychosis    Augmentin [Amoxicillin-Pot Clavulanate] Rash    Lorabid [Loracarbef] Hives, Swelling and Rash    Minocycline Itching, Swelling and Rash       Problem List:    Patient Active Problem List   Diagnosis Code    Major depressive disorder, recurrent episode, mild (HCC) F33.0    Low self esteem R45.81    KARLIE (generalized anxiety disorder) F41.1    Obesity E66.9    Obstructive sleep apnea G47.33    Snores R06.83    Sleep disturbances G47.9    Daytime somnolence R40.0    Sleep talking G47.8    Night terrors, adult F51.4    Bruxism (teeth grinding) F45.8    Restless sleeper G47.9    Anxiety F41.9    Abnormal thyroid function test R94.6    Trigeminal neuralgia G50.0    Inappropriate sinus tachycardia R00.0    POTS (postural orthostatic tachycardia syndrome) I49.8    Atypical chest pain R07.89    Syncope and collapse R55    Neck discomfort M54.2    Thyroid cyst E04.1    Pulmonary embolism (HCC) I26.99    Chest pain on breathing R07.1    Breast pain, left N64.4    Positive CAESAR (antinuclear antibody) R76.8    Hypotension I95.9    S/P ablation of ventricular arrhythmia Z98.890, Z86.79    Pneumonia J18.9    Hypokalemia E87.6    DUB (dysfunctional uterine bleeding) N93.8    Palpitations R00.2    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 S82.431K    Closed right ankle fracture, initial encounter S82.891A    Status post open reduction with internal fixation (ORIF) of fracture of ankle Z98.890, Z87.81    Tear of deltoid ligament of ankle, right, initial encounter U25.465W    Abdominal pain during pregnancy in second trimester O26.892, R10.9    Maternal obesity affecting pregnancy, antepartum O99.210    Postpartum care following  delivery Z39.2       Past Medical History:        Diagnosis Date    ADD (attention deficit disorder)     Anxiety 2015    Anxiety attack     Asthma     exercise induced    Atypical face pain     Back pain     Bipolar 1 disorder (HCC)     Diabetes mellitus (HCC)     GDM on insulin started on -during pregnancy    Fibromyalgia     GERD (gastroesophageal reflux disease)     Hypotension 2017    Major depressive disorder, recurrent episode, mild ([address] 2012    Obesity 10/24/2014    WILFREDO treated with BiPAP     Pneumonia 2018    POTS (postural orthostatic tachycardia syndrome)     Pott disease     Pulmonary emboli ([address] 2016    was on blood thinners for 6 months    Seizures ([address] 2016    anxiety induced no meds last seizure 4 years ago    Tailbone injury 2015    patient broke tailbone    Thyroid disease     borderline low no meds    TMJ (dislocation of temporomandibular joint)     Trigeminal neuralgia     Wears contact lenses        Past Surgical History:        Procedure Laterality Date    [facility]    ANKLE FRACTURE SURGERY Right 2020    RIGHT ANKLE OPEN REDUCTION INTERNAL FIXATION AND DELTOID LIGAMENT REPAIR performed by Angela Carroll DPM at [address]  2016    EP Study    [facility]     SECTION N/A 2020     SECTION performed by Yoon Oliveira MD at [facility]    [facility]COPY  2015    [facility]    INSERTABLE CARDIAC MONITOR      UPPER GASTROINTESTINAL ENDOSCOPY  2020    Dr. Alessandra Rowley  2015    [facility] WISDOM TOOTH EXTRACTION         Social History:    Social History     Tobacco Use    Smoking status: Current Every Day Smoker     Packs/day: 0.50     Years: 1.00     Pack years: 0.50     Types: Cigarettes     Start date: 2018     Last attempt to quit: 2020     Years since quittin.0    Smokeless tobacco: Never Used   Substance Use Topics    Alcohol use: Never     Alcohol/week: 1.0 standard drinks     Types: 1 Cans of beer per week     Frequency: Never     Comment: occasional                                Ready to quit: Not Answered  Counseling given: Not Answered      Vital Signs (Current):   Vitals:    21 0820   BP: (!) 137/92   Pulse: 91   Resp: 16   Temp: 97.4 °F (36.3 °C)   TempSrc: Temporal   SpO2: 99%   Weight: (!) 321 lb 6.4 oz (145.8 kg)   Height: 5' 10\" (1.778 m)                                              BP Readings from Last 3 Encounters:   21 (!) 137/92   21 110/62   21 114/78       NPO Status: Time of last liquid consumption:                         Time of last solid consumption:                         Date of last liquid consumption: 21                        Date of last solid food consumption: 02/16/21    BMI:   Wt Readings from Last 3 Encounters:   02/17/21 (!) 321 lb 6.4 oz (145.8 kg)   02/01/21 (!) 326 lb 4.8 oz (148 kg)   01/21/21 (!) 320 lb 9.6 oz (145.4 kg)     Body mass index is 46.12 kg/m². CBC:   Lab Results   Component Value Date    WBC 10.3 01/17/2021    RBC 4.06 01/17/2021    RBC 4.47 02/06/2020    HGB 11.0 01/17/2021    HCT 34.5 01/17/2021    MCV 84.9 01/17/2021    RDW 19.0 01/17/2021     01/17/2021       CMP:   Lab Results   Component Value Date     01/17/2021    K 3.7 01/17/2021    K 3.9 02/22/2018     01/17/2021    CO2 23 01/17/2021    BUN 12 01/17/2021    CREATININE 0.7 01/17/2021    GFRAA >60 01/17/2021    AGRATIO 1.3 01/17/2021    LABGLOM >60 01/17/2021    LABGLOM >90 07/07/2020    GLUCOSE 94 01/17/2021    GLUCOSE 85 04/30/2012    PROT 7.4 01/17/2021    CALCIUM 9.1 01/17/2021    BILITOT <0.2 01/17/2021    ALKPHOS 107 01/17/2021    AST 25 01/17/2021    ALT 24 01/17/2021       POC Tests: No results for input(s): POCGLU, POCNA, POCK, POCCL, POCBUN, POCHEMO, POCHCT in the last 72 hours.     Coags:   Lab Results   Component Value Date    PROTIME 12.8 09/21/2017    INR 1.07 02/21/2018    APTT 28.5 02/21/2018       HCG (If Applicable):   Lab Results   Component Value Date    PREGTESTUR NEGATIVE 02/17/2021    PREGSERUM NEGATIVE 05/14/2018        ABGs: No results found for: PHART, PO2ART, POV5JNT, NPK4GDZ, BEART, X9GTXZWV     Type & Screen (If Applicable):  Lab Results   Component Value Date    LABABO O 02/06/2020    [facility] POS 09/21/2020       Drug/Infectious Status (If Applicable):  No results found for: HIV, HEPCAB    COVID-19 Screening (If Applicable):   Lab Results   Component Value Date    COVID19 Not Detected 02/10/2021         Anesthesia Evaluation  Patient summary reviewed and Nursing notes reviewed no history of anesthetic complications:   Airway: Mallampati: III        Dental:          Pulmonary: breath sounds clear to auscultation  (+) sleep apnea: on CPAP,  asthma:                            Cardiovascular:  Exercise tolerance: poor (<4 METS),         ECG reviewed  Rhythm: regular  Rate: normal                 ROS comment: POTS syndrome     Neuro/Psych:   (+) neuromuscular disease:, psychiatric history:            GI/Hepatic/Renal:   (+) GERD: poorly controlled,           Endo/Other:    (+) Diabeteswell controlled, , .                 Abdominal:   (+) obese,     Abdomen: soft. Vascular:                                        Anesthesia Plan      general     ASA 3       Induction: intravenous. MIPS: Postoperative opioids intended and Prophylactic antiemetics administered. Anesthetic plan and risks discussed with patient. Plan discussed with CRNA.                   [address] [facility], DO   2/17/2021

## 2021-02-17 NOTE — H&P
Schedule patient for robotic assisted laparoscopic cholecystectomy. 4. Preoperative testing per anesthesia guidelines including COVID-19 screen. Lucia Hou is a 22y.o. year old female who is presenting today in the office for evaluation of persistent post prandial right upper quadrant pain. Also has a longstanding history of epigastric and right upper quadrant abdominal pain. She has been followed with [facility] for some time. She has fatty liver and also in addition to her postprandial pain has intermittent nausea and vomiting. Her last EGD was in 2015. She had some patchy erythema of the stomach biopsies were negative for Helicobacter as well as eosinophilic esophagitis. Colonoscopy in 2015 was normal as well. Imaging studies showed hepatomegaly but otherwise were normal in 2020. She had exacerbation of symptoms during pregnancy with nausea and vomiting. She was treated with omeprazole twice daily as well as Pepcid. She was suspected to have gastroparesis and/or gallbladder dysfunction. HIDA scan showed a high ejection fraction of 90%. She is now 2 months postpartum and reports epigastric pain after eating. She was treated recently for UTI with ciprofloxacin. She denies prior history of hepatitis, pancreatitis or jaundice. She is not breast-feeding.    Past Medical History   Past Medical History                                                                                                                                                                                                Past Surgical History   Past Surgical History                                                                                                                                  Medications   Current Facility-Administered Medications                                                                                                                                                                   Allergies         Allergies Allergen Reactions    Dilaudid [Hydromorphone Hcl]      hallucination    Flexeril [Cyclobenzaprine] Other (See Comments)     Hallucinations    Keflex [Cephalexin] Other (See Comments)     Lose cognitive functions.  Tessalon [Benzonatate] Other (See Comments)     psychosis    Augmentin [Amoxicillin-Pot Clavulanate] Rash    Lorabid [Loracarbef] Hives, Swelling and Rash    Minocycline Itching, Swelling and Rash     Family History   Family History                                                                                                                                                                                  SocialHistory   Social History                                                                                                                                                                                                                                                                                                                                                                                Review of Systems   Review of Systems   Constitutional: Negative for chills, fatigue, fever and unexpected weight change. HENT: Negative for sore throat, trouble swallowing and voice change. Eyes: Negative for visual disturbance. Respiratory: Positive for apnea. Negative for cough, shortness of breath and wheezing. Cardiovascular: Negative for chest pain and palpitations. Gastrointestinal: Positive for abdominal pain, diarrhea, nausea and vomiting. Negative for blood in stool. Endocrine: Negative for cold intolerance, heat intolerance and polydipsia. Genitourinary: Positive for dysuria. Negative for flank pain and hematuria. Musculoskeletal: Positive for back pain. Negative for gait problem, joint swelling and myalgias. Skin: Negative for color change and rash. Allergic/Immunologic: Negative for immunocompromised state.    Neurological: Negative for dizziness, tremors, seizures and speech difficulty. Hematological: Does not bruise/bleed easily. Psychiatric/Behavioral: Negative for behavioral problems and confusion. The patient is nervous/anxious. OBJECTIVE   /62 (Site: Right Upper Arm, Position: Sitting, Cuff Size: Medium Adult)  Pulse 103  Temp 97.3 °F (36.3 °C) (Temporal)  Resp 18  Ht 5' 10\" (1.778 m)  Wt (!) 326 lb 4.8 oz (148 kg)  LMP 01/26/2021  SpO2 97%  BMI 46.82 kg/m²   Physical Exam   Vitals signs reviewed. Constitutional:   Appearance: She is well-developed. HENT:   Head: Normocephalic and atraumatic. Nose: No congestion. Eyes:   General: No scleral icterus. Extraocular Movements: Extraocular movements intact. Pupils: Pupils are equal, round, and reactive to light. Neck:   Musculoskeletal: Normal range of motion and neck supple. Thyroid: No thyromegaly. Vascular: No JVD. Trachea: No tracheal deviation. Cardiovascular:   Rate and Rhythm: Normal rate. Heart sounds: Normal heart sounds. No murmur. Pulmonary:   Effort: Pulmonary effort is normal. No respiratory distress. Breath sounds: Normal breath sounds. No wheezing. Abdominal:   General: Bowel sounds are normal. There is no distension. Palpations: Abdomen is soft. There is no mass. Tenderness: There is no abdominal tenderness. There is no guarding or rebound. Hernia: No hernia is present. Musculoskeletal: Normal range of motion. Lymphadenopathy:   Cervical: No cervical adenopathy. Skin:   General: Skin is warm and dry. Coloration: Skin is not jaundiced or pale. Findings: No bruising or rash. Neurological:   Mental Status: She is alert and oriented to person, place, and time. Cranial Nerves: No cranial nerve deficit.    Psychiatric:   Mood and Affect: Mood normal.   Behavior: Behavior normal.           Lab Results   Component Value Date    WBC 10.3 01/17/2021    HGB 11.0 (L) 01/17/2021    HCT 34.5 (L) 01/17/2021     01/17/2021    CHOL 166 07/09/2013    TRIG 156 07/09/2013    HDL 68 07/09/2013    ALT 24 01/17/2021    AST 25 01/17/2021     (L) 01/17/2021    K 3.7 01/17/2021     01/17/2021    CREATININE 0.7 01/17/2021    BUN 12 01/17/2021    CO2 23 01/17/2021    TSH 2.87 02/06/2020    INR 1.07 02/21/2018    LABA1C 5.7 01/21/2021    LABMICR YES 01/16/2021        EXAMINATION:   CT OF THE ABDOMEN AND PELVIS WITH CONTRAST 1/17/2021 12:48 am      TECHNIQUE:   CT of the abdomen and pelvis was performed with the administration of   intravenous contrast. Multiplanar reformatted images are provided for review. Dose modulation, iterative reconstruction, and/or weight based adjustment of   the mA/kV was utilized to reduce the radiation dose to as low as reasonably   achievable. COMPARISON:   None. HISTORY:   ORDERING SYSTEM PROVIDED HISTORY: RUQ pain   TECHNOLOGIST PROVIDED HISTORY:   If patient is on cardiac monitor and/or pulse ox, they may be taken off   cardiac monitor and pulse ox, left on O2 if currently on. All monitors   reattached when patient returns to room. Additional Contrast?->None   Reason for exam:->RUQ pain   Reason for Exam: Abdominal Pain (RUQ abdominal pain for four months. has seen   GI and was told her gallbladder is fine. )      FINDINGS:   Lower Chest: The visualized lung bases are clear. Organs: The liver is enlarged with diffuse fatty infiltration. The spleen,   pancreas, adrenal glands and kidneys are unremarkable. The gallbladder is   contracted. There is no evidence to suggest acute cholecystitis. GI/Bowel: Small bowel caliber is normal. The appendix is normal. The colon   is unremarkable. Pelvis: An IUD is seen within the uterus. The bladder is grossly negative. Peritoneum/Retroperitoneum: Numerous scattered small mesenteric and   retroperitoneal lymph nodes are likely reactive. There is no mesenteric   stranding or free fluid. The aorta is unremarkable. Bones/Soft Tissues: No acute osseous abnormality. Small ill-defined soft   tissue foci in the anterior abdominal wall subcutaneous fat are likely   related the injections. Impression   1. No acute findings. 2. Hepatomegaly with diffuse hepatic steatosis. EXAMINATION:   NUCLEAR MEDICINE HEPATOBILIARY SCINTIGRAPHY (HIDA SCAN) WITH EJECTION   FRACTION. TECHNIQUE:   Approximately 5.5 millicuries HC20R Mebrofenin (Choletec) was administered   IV. Then, dynamic images of the abdomen were obtained in the anterior   projection for 60 mins. A right lateral view was also obtained at 60 mins. Slow infusion of 2.86 mcg cholecystokinin was administered intravenously over   30-60 mins. Images were obtained in the [place] projection and regions of   interest were drawn around the gallbladder and ejection fraction was   calculated. COMPARISON:   None      HISTORY:   ORDERING SYSTEM PROVIDED HISTORY: Abdominal pain, epigastric   TECHNOLOGIST PROVIDED HISTORY:   Is the patient pregnant?->No   Reason for Exam: epigastric pain   Acuity: Chronic   Type of Exam: Initial      Acute postprandial right upper quadrant pain. FINDINGS:   Prompt, homogenous uptake by the liver is noted with normal appearance of   radiotracer excretion into the biliary system. Clearance of hepatic activity   appears appropriate. Gallbladder and small bowel is visualized in appropriate sequence and time. Gallbladder ejection fraction measured 90%. Normal value is >38% for CCK protocol and >33% for Ensure protocol. Note,   Ensure normal range is based on a limited study. Impression   Normal hepatobiliary scan with normal gallbladder response to stimulation. Gallbladder ejection fraction 90%.

## 2021-02-17 NOTE — PROGRESS NOTES
1046 pt arrived to PACU, awake and alert  1049 c/o pain 7/10, medicated with 50 mcg fentanyl  1054 no change in pain status, medicated with 50 mcg fentanyl  1107 pt nauseated, medicated with 12.5 mg Phenergan  1108 pain 7/10, medicated with 50 mcg fentanyl  1118 pt resting, resp easy  1125 pain 7/10, medicated with 2 Norco  1128 no change in pain status, medicated with 50 mcg fentanyl  1138 pt resting, resp easy  1143 pt wakes up and states pain is 7/10 and immediately drifts back to sleep, not medicated at this time  1158 c/o pain 6/10, medicated with 2 mg morphine  1208 no change in pain status, medicated with 2 mg morphine  1215 no change in pain status, medicated with 2 mg morphine  1225 pt states pain 5/10 and tolerable, denies need for pain medication at this time  1235 pt states pain 5/10 and tolerable, meets criteria for discharge from PACU, transported to [facility]

## 2021-02-17 NOTE — PROGRESS NOTES
Discharge instructions given to patient and family. verbalized understanding.  Patient discharged via wheelchair Vaccine Information Statement(s) was given today. This has been reviewed, questions answered, and verbal consent given by Parent for injection(s) and administration of Hepatitis A.      Patient tolerated without incident. See immunization grid for documentation.

## 2021-02-17 NOTE — PROGRESS NOTES
Pt returned to [facility] room 12. Vitals and assessment as charted. 0.9 infusing, @750ml to count from PACU. Pt has sherbert and pop. Family at the bedside. Pt and family verbalized understanding of discharge criteria and call light use. Call light in reach.

## 2021-02-18 ENCOUNTER — TELEPHONE (OUTPATIENT)
Dept: SURGERY | Age: 26
End: 2021-02-18

## 2021-02-18 NOTE — TELEPHONE ENCOUNTER
Attempted to contact pt post op- no answer- voice mail box is full. Birth Control Pills Pregnancy And Lactation Text: This medication should be avoided if pregnant and for the first 30 days post-partum.

## 2021-02-18 NOTE — OP NOTE
[address] [place] [address]                                OPERATIVE REPORT    PATIENT NAME: Dolly Davenport                  :        1995  MED REC NO:   [id number]                           ROOM:  ACCOUNT NO:   [de-identified]                           ADMIT DATE: 2021  PROVIDER:     Digna Rhodes M.D.    DATE OF PROCEDURE:  2021    PREOPERATIVE DIAGNOSES:  1. Biliary colic symptoms. 2.  BMI 46.12. POSTOPERATIVE DIAGNOSES:  1. Biliary colic symptoms. 2.  BMI 46.12. PROCEDURE:  Robotic-assisted laparoscopic cholecystectomy with ICG green  cholangiography. ANESTHESIA:  General.    SPECIMEN:  Gallbladder to Pathology. ESTIMATED BLOOD LOSS:  Less than 20 mL. INDICATIONS:  See history and physical examination. DESCRIPTION OF PROCEDURE:  The patient was brought to the operating  suite, placed supine on the operating table. She was given Mefoxin  intravenously without complication. She had been injected with ICG  green for cholangiography with Firefly preoperatively. After induction  of general anesthesia, time-out was performed. Abdomen was prepped and  draped. Incision was made at the umbilicus. There was an occult small  umbilical hernia. The fascia was opened around this and a 12-mm port  was inserted. CO2 pneumoperitoneum was introduced followed by the  camera. Laparoscopy of the abdominal cavity showed no other additional  findings. The liver did appear fatty. The gallbladder was distended  but not acutely inflamed. She did have some omental adhesions up to the  gallbladder. Additional 8-mm ports were placed on the right and left  side of the abdomen. A 5-mm subcostal port was also placed. The  patient was placed in reverse Trendelenburg and turned towards the left. The Xi robot was then docked from the patient's right side. Instruments  were inserted and I retired to the console.   My assistant, PRESLEY Lamar, grasped the gallbladder by the fundus, retracted it over the liver  edge. Omental adhesions were taken down with cautery. The infundibulum  was retracted downward and laterally. Utilizing Firefly,  cholangiography was obtained. There was no evidence of obstruction. Common duct was easily seen and cystic duct was seen. Cystic duct was  cleared from surrounding tissue. Clear view and critical view obtained. Clear view between that and the artery. The artery did bifurcate just  below the gallbladder. It was clipped proximally, distally and divided  as was the duct. Once these structures were divided, gallbladder was  dissected off the liver bed using electrocautery technique. I scrubbed back into the patient's bedside. There had been no evidence  of bile leak or bleeding. The gallbladder was placed in an EndoCatch  type bag and removed from the abdominal cavity. It was removed through  the umbilical site. CO2 pneumoperitoneum was suctioned from the  abdominal cavity after the liver bed was reexamined. It was dry. The  infraumbilical fascia was closed with interrupted 0 Ethibond suture. All skin incisions were closed with subcuticular 4-0 Vicryl suture. Skin glue was applied. All port sites had been infiltrated with 0.5%  Marcaine. The patient was spontaneously breathing and transported to  the recovery area in stable condition.         Esteban Solorio M.D.    D: 02/17/2021 10:45:16       T: 02/17/2021 10:52:05     [email or web address]  Job#: [id number]     Doc#: [id number]    CC:

## 2021-03-04 ENCOUNTER — OFFICE VISIT (OUTPATIENT)
Dept: SURGERY | Age: 26
End: 2021-03-04

## 2021-03-04 VITALS
HEART RATE: 122 BPM | TEMPERATURE: 96.8 F | RESPIRATION RATE: 20 BRPM | OXYGEN SATURATION: 98 % | SYSTOLIC BLOOD PRESSURE: 118 MMHG | WEIGHT: 293 LBS | DIASTOLIC BLOOD PRESSURE: 74 MMHG | BODY MASS INDEX: 46.06 KG/M2

## 2021-03-04 DIAGNOSIS — K80.50 BILIARY COLIC: ICD-10-CM

## 2021-03-04 DIAGNOSIS — R10.11 RUQ ABDOMINAL PAIN: Primary | ICD-10-CM

## 2021-03-04 DIAGNOSIS — Z09 POSTOP CHECK: ICD-10-CM

## 2021-03-04 PROCEDURE — 99024 POSTOP FOLLOW-UP VISIT: CPT | Performed by: SURGERY

## 2021-03-04 NOTE — PROGRESS NOTES
Jamie Hughes MD   General Surgery  Postprocedure Evaluation in Office  Pt Name: Kristan Lopez  Date of Birth 1995   Today's Date: 3/4/2021  Medical Record Number: [id number]  Primary Care Provider: LEVI Lock CNP  Chief Complaint   Patient presents with    Post-Op Check     s/p 2/17  Robotic-assisted laparoscopic cholecystectomy with ICG green cholangiography. ASSESSMENT      1. RUQ abdominal pain    2. Biliary colic    3. Postop check         PLAN       1. Patient feels much better since cholecystectomy. She denies any chronic diarrhea has no wound issues. 2.  Pathology discussed with patient. Normal gallbladder. 3.  Avoid heavy lifting over 30 to 35 pounds for 1 month. 4.  Follow-up surgical clinic as needed. Sukumar Velázquez is seen today for post-op follow-up. She is status post robotic assisted laparoscopic cholecystectomy for persistent symptoms of biliary colic. She reports feeling much better since her gallbladder was removed. She has no recurrent symptoms. She denies any fever or chills. She denies any chronic diarrhea. She reports not working outside her home.   Medications    Current Outpatient Medications:     sertraline (ZOLOFT) 25 MG tablet, Take 25 mg by mouth daily, Disp: , Rfl:     Multiple Vitamin (MULTIVITAMIN ADULT PO), Take by mouth daily, Disp: , Rfl:     ibuprofen (ADVIL;MOTRIN) 200 MG tablet, Take 4 tablets by mouth every 8 hours as needed for Pain, Disp: , Rfl:     busPIRone (BUSPAR) 15 MG tablet, Take 15 mg by mouth 2 times daily, Disp: , Rfl:     OXcarbazepine (TRILEPTAL) 600 MG tablet, , Disp: , Rfl:     QUEtiapine (SEROQUEL) 300 MG tablet, Take 300 mg by mouth nightly, Disp: , Rfl:     QUEtiapine (SEROQUEL) 50 MG tablet, Take 50 mg by mouth every morning (before breakfast), Disp: , Rfl:     ARIPiprazole (ABILIFY) 15 MG tablet, Take 15 mg by mouth nightly, Disp: , Rfl:     sertraline (ZOLOFT) 50 MG tablet, Take 50 mg by mouth nightly , Disp: , Rfl:     DULoxetine HCl (CYMBALTA PO), Take 120 mg by mouth every morning (before breakfast), Disp: , Rfl:     Cholecalciferol (VITAMIN D3) 50 MCG (2000 UT) CAPS, Take 1 capsule by mouth daily, Disp: 30 capsule, Rfl: 0    albuterol (PROVENTIL) (2.5 MG/3ML) 0.083% nebulizer solution, Take 3 mLs by nebulization every 6 hours as needed for Wheezing, Disp: 120 each, Rfl: 3    omeprazole (PRILOSEC) 40 MG delayed release capsule, TAKE 1 CAPSULE BY MOUTH TWO TIMES A DAY  (Patient taking differently: nightly ), Disp: 60 capsule, Rfl: 10    albuterol sulfate HFA (PROAIR HFA) 108 (90 Base) MCG/ACT inhaler, Inhale 2 puffs into the lungs every 6 hours as needed for Wheezing, Disp: 1 Inhaler, Rfl: 3    cetirizine (ZYRTEC) 10 MG tablet, Take 1 tablet by mouth daily, Disp: 90 tablet, Rfl: 3    Allergies  Allergies   Allergen Reactions    Dilaudid [Hydromorphone Hcl]      hallucination    Flexeril [Cyclobenzaprine] Other (See Comments)     Hallucinations    Keflex [Cephalexin] Other (See Comments)     Lose cognitive functions.  Tessalon [Benzonatate] Other (See Comments)     psychosis    Augmentin [Amoxicillin-Pot Clavulanate] Rash    Lorabid [Loracarbef] Hives, Swelling and Rash    Minocycline Itching, Swelling and Rash       Review of Systems  History obtained from the patient. Constitutional: Denies any fever, chills, fatigue. Wound: Denies any rash, skin color changes or wound problems. Resp: Denies any cough, shortness of breath. CV: Denies any chest pain, orthopnea or syncope. GI: Positive for incisional discomfort only. Denies any nausea, vomiting, blood in the stool, constipation or diarrhea. : Denies any hematuria, hesitancy or dysuria.    OBJECTIVE     VITALS: /74 (Site: Right Lower Arm, Position: Sitting, Cuff Size: Medium Adult)   Pulse 122   Temp 96.8 °F (36 °C) (Temporal)   Resp 20   Wt (!) 321 lb (145.6 kg)   LMP 02/20/2021 (Approximate)   SpO2 98% Breastfeeding No   BMI 46.06 kg/m²     CONSTITUTIONAL: Alert and oriented times 3, no acute distress and cooperative to examination. SKIN: Skin color, texture, turgor normal. No rashes or lesions. INCISION: wound margins intact and healing well. No signs of infection. No drainage. LUNGS: Lungs Clear  CARDIOVASCULAR: Normal Rate  ABDOMEN: Soft nontender. NEUROLOGIC: No sensory or motor nerve irritation    Performed by: [facility] Persaud. Pathology      BRITTANY FU                [id number]   Assoc.                                              Page 1 of 1   [facility] [address], [place] [address]                                                       PROC: 2021   [facility]                                    RECV: 2021   [address]                                    RPTD: 2021   [address], [place] [address]                       MRN:  [id number]    LOC: OR                       ACCT: [de-identified]  SEX: F                       : 1995  AGE: 25 Y                          PATHOLOGY REPORT                       ATTN: MONY Freed                  REQ: MONY CASTRO       Copies To:   TRISTIN MARTINEZ       Clinical Information: RUQ PAIN, BILIARY COLIC     FINAL DIAGNOSIS:   Gallbladder, cholecystectomy:    No histopathologic abnormality. Specimen:   GALLBLADDER       Gross Examination:   The container is labeled tee Matt.  Received in   formalin is a gallbladder measuring 8 cm in length x 3 cm in diameter. The wall is intact.  The surface is pink-tan, smooth, and glistening. No lymph nodes are identified.  The lumen contains yellow-green bile. There are no stones.  The wall measures 0.2 cm in average thickness and   the mucosal surface is tan and velvety.  The cystic duct is patent. Representative sections including the cystic duct margin are submitted.    1 ss.  [id number]     Microscopic Examination:   Microscopic examination was performed.      [id number]                                                 <Sign Out Dr. Merlene Chris M.D., F.C.A.P.        [facility]/ [facility]  Printed on:  2/18/2021   [address]   [address], [address] [facility]

## 2021-03-23 ENCOUNTER — HOSPITAL ENCOUNTER (EMERGENCY)
Age: 26
Discharge: HOME | End: 2021-03-23
Payer: MEDICARE

## 2021-03-23 VITALS
SYSTOLIC BLOOD PRESSURE: 137 MMHG | HEART RATE: 86 BPM | RESPIRATION RATE: 20 BRPM | DIASTOLIC BLOOD PRESSURE: 71 MMHG | TEMPERATURE: 98.7 F | OXYGEN SATURATION: 98 % | WEIGHT: 293 LBS | BODY MASS INDEX: 45.92 KG/M2

## 2021-03-23 DIAGNOSIS — J20.9 ACUTE BRONCHITIS, UNSPECIFIED ORGANISM: Primary | ICD-10-CM

## 2021-03-23 PROCEDURE — 6360000002 HC RX W HCPCS: Performed by: NURSE PRACTITIONER

## 2021-03-23 PROCEDURE — 99214 OFFICE O/P EST MOD 30 MIN: CPT | Performed by: NURSE PRACTITIONER

## 2021-03-23 PROCEDURE — 99213 OFFICE O/P EST LOW 20 MIN: CPT

## 2021-03-23 PROCEDURE — 96372 THER/PROPH/DIAG INJ SC/IM: CPT

## 2021-03-23 RX ORDER — ALBUTEROL SULFATE 90 UG/1
2 AEROSOL, METERED RESPIRATORY (INHALATION) EVERY 4 HOURS PRN
Qty: 1 INHALER | Refills: 0 | Status: SHIPPED | OUTPATIENT
Start: 2021-03-23 | End: 2021-05-17 | Stop reason: SDUPTHER

## 2021-03-23 RX ORDER — METHYLPREDNISOLONE SODIUM SUCCINATE 125 MG/2ML
125 INJECTION, POWDER, LYOPHILIZED, FOR SOLUTION INTRAMUSCULAR; INTRAVENOUS ONCE
Status: COMPLETED | OUTPATIENT
Start: 2021-03-23 | End: 2021-03-23

## 2021-03-23 RX ORDER — PROMETHAZINE HYDROCHLORIDE 25 MG/1
25 TABLET ORAL NIGHTLY PRN
Qty: 7 TABLET | Refills: 0 | Status: SHIPPED | OUTPATIENT
Start: 2021-03-23 | End: 2021-03-30

## 2021-03-23 RX ORDER — AZELASTINE 1 MG/ML
1 SPRAY, METERED NASAL 2 TIMES DAILY
Qty: 1 BOTTLE | Refills: 0 | Status: SHIPPED | OUTPATIENT
Start: 2021-03-23 | End: 2022-01-28

## 2021-03-23 RX ADMIN — METHYLPREDNISOLONE SODIUM SUCCINATE 125 MG: 125 INJECTION, POWDER, FOR SOLUTION INTRAMUSCULAR; INTRAVENOUS at 09:42

## 2021-03-23 ASSESSMENT — ENCOUNTER SYMPTOMS
SWOLLEN GLANDS: 0
SHORTNESS OF BREATH: 0
SINUS PRESSURE: 1
SORE THROAT: 1
CHOKING: 0
COUGH: 1
STRIDOR: 0
SINUS PAIN: 1
RHINORRHEA: 1
WHEEZING: 1
APNEA: 0
CHEST TIGHTNESS: 0

## 2021-03-24 ENCOUNTER — PATIENT MESSAGE (OUTPATIENT)
Dept: FAMILY MEDICINE CLINIC | Age: 26
End: 2021-03-24

## 2021-04-06 ENCOUNTER — OFFICE VISIT (OUTPATIENT)
Dept: FAMILY MEDICINE CLINIC | Age: 26
End: 2021-04-06
Payer: MEDICARE

## 2021-04-06 VITALS
RESPIRATION RATE: 16 BRPM | SYSTOLIC BLOOD PRESSURE: 128 MMHG | BODY MASS INDEX: 45.9 KG/M2 | DIASTOLIC BLOOD PRESSURE: 74 MMHG | TEMPERATURE: 97 F | WEIGHT: 293 LBS | HEART RATE: 88 BPM

## 2021-04-06 DIAGNOSIS — H69.82 DYSFUNCTION OF LEFT EUSTACHIAN TUBE: Primary | ICD-10-CM

## 2021-04-06 PROCEDURE — 99213 OFFICE O/P EST LOW 20 MIN: CPT | Performed by: NURSE PRACTITIONER

## 2021-04-06 SDOH — ECONOMIC STABILITY: TRANSPORTATION INSECURITY
IN THE PAST 12 MONTHS, HAS LACK OF TRANSPORTATION KEPT YOU FROM MEETINGS, WORK, OR FROM GETTING THINGS NEEDED FOR DAILY LIVING?: NO

## 2021-04-06 SDOH — ECONOMIC STABILITY: TRANSPORTATION INSECURITY
IN THE PAST 12 MONTHS, HAS THE LACK OF TRANSPORTATION KEPT YOU FROM MEDICAL APPOINTMENTS OR FROM GETTING MEDICATIONS?: NO

## 2021-04-06 SDOH — ECONOMIC STABILITY: FOOD INSECURITY: WITHIN THE PAST 12 MONTHS, YOU WORRIED THAT YOUR FOOD WOULD RUN OUT BEFORE YOU GOT MONEY TO BUY MORE.: NEVER TRUE

## 2021-04-06 ASSESSMENT — ENCOUNTER SYMPTOMS
COUGH: 1
SHORTNESS OF BREATH: 0
ABDOMINAL PAIN: 0
NAUSEA: 0

## 2021-04-06 NOTE — PROGRESS NOTES
Deana Dailey (1995) 22 y.o. female here for evaluation of the following chief complaint(s):      HPI:  Chief Complaint   Patient presents with    Ear Fullness     left       Left ear fullness. Muffled hearing. Onset of 2-3 days. Tried cleaning ears out. Recent dx with Bronchitis in end of March. Was previous taking decongestant. Denies dizziness. No ear pain. No ear drainage. Vitals:    21 1326   BP: 128/74   Pulse: 88   Resp: 16   Temp: 97 °F (36.1 °C)       Patient Active Problem List   Diagnosis    Major depressive disorder, recurrent episode, mild (HCC)    Low self esteem    KARLIE (generalized anxiety disorder)    Obesity    Obstructive sleep apnea    Snores    Sleep disturbances    Daytime somnolence    Sleep talking    Night terrors, adult    Bruxism (teeth grinding)    Restless sleeper    Anxiety    Abnormal thyroid function test    Trigeminal neuralgia    Inappropriate sinus tachycardia    POTS (postural orthostatic tachycardia syndrome)    Atypical chest pain    Syncope and collapse    Neck discomfort    Thyroid cyst    Pulmonary embolism (HCC)    Chest pain on breathing    Breast pain, left    Positive CAESAR (antinuclear antibody)    Hypotension    S/P ablation of ventricular arrhythmia    Pneumonia    Hypokalemia    DUB (dysfunctional uterine bleeding)    Palpitations    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    Closed right ankle fracture, initial encounter    Status post open reduction with internal fixation (ORIF) of fracture of ankle    Tear of deltoid ligament of ankle, right, initial encounter    Abdominal pain during pregnancy in second trimester    Maternal obesity affecting pregnancy, antepartum    Postpartum care following  delivery    Biliary colic symptom       SUBJECTIVE/OBJECTIVE:  Review of Systems   Constitutional: Negative for chills and fever.    HENT: Positive for congestion and hearing loss. Negative for ear discharge and ear pain. Respiratory: Positive for cough. Negative for shortness of breath. Cardiovascular: Negative for chest pain. Gastrointestinal: Negative for abdominal pain and nausea. Skin: Negative for rash. Neurological: Negative for dizziness, light-headedness and headaches. Psychiatric/Behavioral: Negative. Physical Exam  Constitutional:       General: She is not in acute distress. HENT:      Left Ear: No drainage, swelling or tenderness. A middle ear effusion is present. There is no impacted cerumen. Tympanic membrane is not erythematous. Eyes:      Pupils: Pupils are equal, round, and reactive to light. Neck:      Musculoskeletal: Normal range of motion and neck supple. Cardiovascular:      Rate and Rhythm: Normal rate and regular rhythm. Heart sounds: No murmur. Pulmonary:      Effort: Pulmonary effort is normal.      Breath sounds: Normal breath sounds. No wheezing. Abdominal:      General: Bowel sounds are normal. There is no distension. Palpations: Abdomen is soft. Tenderness: There is no abdominal tenderness. Musculoskeletal: Normal range of motion. General: No tenderness. Skin:     General: Skin is warm and dry. Findings: No rash. ASSESSMENT/PLAN:   Diagnosis Orders   1. Dysfunction of left eustachian tube           MDM:  Continue Decongestant  Ear Exam unremarkable  Ear popping  RTO if symptoms worsen or stay the same      An electronic signature was used to authenticate this note.     --LEVI Gomez - CNP

## 2021-04-06 NOTE — PATIENT INSTRUCTIONS
You may receive a survey about your visit with us today. The feedback from our patients helps us identify what is working well and where the service to all patients can be enhanced. Thank you! Tobacco Cessation Programs     Telephonic behavior modification  Chon Nguyen ([phone])   Counseling service for those who are ready to quit using tobacco.     Available for uninsured [place] residents, [place] recipients, pregnant women, or patients whose health plans or employers are members of the [address] [place] [address] behavior modification   http://[place]. Quitlogix. org   Online support program to help patients through each step of the quitting process. Available 24 hours a day 7 days a week. Provides up to date researched based tool, step-by-step guides, and motivational messages. Online behavior modification   www.lungusa.org/stop-smoking/how-to-quit   HelpLine: 1-800-LUNG-[place] (082-9590)   Email questions to:  Vilma@BYOM!. org    Website offers resources to help tobacco users figure out their reasons for quitting and then take the big step of quitting for good. Hypnosis   Location: [facility] [place] crossvertise.[place], [place]   Contact: Brett Patten, PhD at [phone]   Hypnosis for tobacco cessation   Cost $225 for the initial session and $175 for each session afterwards. Most patients require 6-8 sessions. There is the option to submit through the patients insurance. Hypnosis and behavior modification   Location: [place] [address],  [address] SONIA COLLINS AM [place], [place]   Contact: Allyson Scott, PhD at [phone]  Armida Brunner Counseling and hypnosis for nicotine addition   Cost: For uninsured patients:  Please call above phone number  Cost for insured patients depends on patients insurance plan.     Behavior modification   Location: [facility], [address] SONIA COLLINS AM [place], [address]: [address] four one-on-one appointments between the patient and a respiratory therapist.  The four appointments span over three weeks. The respiratory therapist schedules one of the appointments to occur 48 hours after the patients quit date.  Cost $100 total for the four sessions. Tobacco cessation products are not included in the cost and are not provided by [facility].         Patient Education        Middle Ear Fluid: Care Instructions  Your Care Instructions     Fluid often builds up inside the ear during a cold or allergies. Usually the fluid drains away, but sometimes a small tube in the ear, called the eustachian tube, stays blocked for months. Symptoms of fluid buildup may include:  · Popping, ringing, or a feeling of fullness or pressure in the ear. · Trouble hearing. · Balance problems and dizziness. In most cases, you can treat yourself at home. Follow-up care is a key part of your treatment and safety. Be sure to make and go to all appointments, and call your doctor if you are having problems. It's also a good idea to know your test results and keep a list of the medicines you take. How can you care for yourself at home? · In most cases, the fluid clears up within a few months without treatment. You may need more tests if the fluid does not clear up after 3 months. · If your doctor prescribed antibiotics, take them as directed. Do not stop taking them just because you feel better. You need to take the full course of antibiotics. When should you call for help? Call your doctor now or seek immediate medical care if:    · You have symptoms of infection, such as:  ? Increased pain, swelling, warmth, or redness. ? Pus draining from the area. ? A fever. Watch closely for changes in your health, and be sure to contact your doctor if:    · You notice changes in hearing.     · You do not get better as expected. Where can you learn more? Go to https://chpepiceweb.[employer]. org and sign in to your Mo-DV account.  Enter R975 in the [address] Information box to learn more about \"Middle Ear Fluid: Care Instructions. \"     If you do not have an account, please click on the \"Sign Up Now\" link. Current as of: December 2, 2020               Content Version: 12.8  © 2006-2021 [employer]. Care instructions adapted under license by [facility] ([facility]). If you have questions about a medical condition or this instruction, always ask your healthcare professional. [place] [address] any warranty or liability for your use of this information. Patient Education        Eustachian Tube Problems: Care Instructions  Your Care Instructions     The eustachian (say \"you-STAY-shee-un\") tubes run between the inside of the ears and the throat. They keep air pressure stable in the ears. If your eustachian tubes become blocked, the air pressure in your ears changes. The fluids from a cold can clog eustachian tubes, causing pain in the ears. A quick change in air pressure can cause eustachian tubes to close up. This might happen when an airplane changes altitude or when a  goes up or down underwater. Eustachian tube problems often clear up on their own or after antibiotic treatment. If your tubes continue to be blocked, you may need surgery. Follow-up care is a key part of your treatment and safety. Be sure to make and go to all appointments, and call your doctor if you are having problems. It's also a good idea to know your test results and keep a list of the medicines you take. How can you care for yourself at home? · To ease ear pain, apply a warm washcloth or a heating pad set on low. There may be some drainage from the ear when the heat melts earwax. Put a cloth between the heat source and your skin. Do not use a heating pad with children. · If your doctor prescribed antibiotics, take them as directed. Do not stop taking them just because you feel better. You need to take the full course of antibiotics.   · Your doctor may recommend over-the-counter medicine. Be safe with medicines. Oral or nasal decongestants may relieve ear pain. Avoid decongestants that are combined with antihistamines, which tend to cause more blockage. But if allergies seem to be the problem, your doctor may recommend a combination. Be careful with cough and cold medicines. Don't give them to children younger than 6, because they don't work for children that age and can even be harmful. For children 6 and older, always follow all the instructions carefully. Make sure you know how much medicine to give and how long to use it. And use the dosing device if one is included. When should you call for help? Call your doctor now or seek immediate medical care if:    · You develop sudden, complete hearing loss.     · You have severe pain or feel dizzy.     · You have new or increasing pus or blood draining from your ear.     · You have redness, swelling, or pain around or behind the ear. Watch closely for changes in your health, and be sure to contact your doctor if:    · You do not get better after 2 weeks.     · You have any new symptoms, such as itching or a feeling of fullness in the ear. Where can you learn more? Go to https://CityHookpeBESOS.[employer]. org and sign in to your [employer]lerock account. Enter Y822 in the Digifeye box to learn more about \"Eustachian Tube Problems: Care Instructions. \"     If you do not have an account, please click on the \"Sign Up Now\" link. Current as of: December 2, 2020               Content Version: 12.8  © 6764-1020 Healthwise, Incorporated. Care instructions adapted under license by [facility] ([facility]). If you have questions about a medical condition or this instruction, always ask your healthcare professional. [place] [address] any warranty or liability for your use of this information.

## 2021-04-06 NOTE — PROGRESS NOTES
Visit Information    Have you changed or started any medications since your last visit including any over-the-counter medicines, vitamins, or herbal medicines? no   Are you having any side effects from any of your medications? -  no  Have you stopped taking any of your medications? Is so, why? -  no    Have you seen any other physician or provider since your last visit? No  Have you had any other diagnostic tests since your last visit? No  Have you been seen in the emergency room and/or had an admission to a hospital since we last saw you? No  Have you had your routine dental cleaning in the past 6 months? no    Have you activated your [employer] account? If not, what are your barriers?  Yes     Patient Care Team:  LEVI Cobb CNP as PCP - General (Family Nurse Practitioner)  LEVI Cobb CNP as PCP - [facility] Provider  Maura Jacques MD as Obstetrician (Obstetrics & Gynecology)    Medical History Review  Past Medical, Family, and Social History reviewed and does contribute to the patient presenting condition    Health Maintenance   Topic Date Due    Hepatitis C screen  Never done    Pneumococcal 0-64 years Vaccine (1 of 1 - PPSV23) Never done    HPV vaccine (1 - 2-dose series) Never done    COVID-19 Vaccine (1) Never done    Hepatitis A vaccine (2 of 2 - 2-dose series) 09/14/2013    Cervical cancer screen  Never done    Annual Wellness Visit (AWV)  Never done    Flu vaccine (Season Ended) 09/01/2021    A1C test (Diabetic or Prediabetic)  01/21/2022    Colon cancer screen colonoscopy  08/14/2025    DTaP/Tdap/Td vaccine (8 - Td) 08/17/2030    Hepatitis B vaccine  Completed    Hib vaccine  Completed    Varicella vaccine  Completed    Meningococcal (ACWY) vaccine  Completed    HIV screen  Completed

## 2021-05-03 ENCOUNTER — OFFICE VISIT (OUTPATIENT)
Dept: PULMONOLOGY | Age: 26
End: 2021-05-03
Payer: MEDICARE

## 2021-05-03 VITALS
SYSTOLIC BLOOD PRESSURE: 132 MMHG | OXYGEN SATURATION: 99 % | HEART RATE: 123 BPM | DIASTOLIC BLOOD PRESSURE: 68 MMHG | HEIGHT: 70 IN | TEMPERATURE: 97.7 F | WEIGHT: 293 LBS | BODY MASS INDEX: 41.95 KG/M2

## 2021-05-03 DIAGNOSIS — G47.33 OSA TREATED WITH BIPAP: Primary | ICD-10-CM

## 2021-05-03 DIAGNOSIS — E66.01 MORBID OBESITY WITH BMI OF 45.0-49.9, ADULT (HCC): ICD-10-CM

## 2021-05-03 DIAGNOSIS — F31.70 BIPOLAR AFFECTIVE DISORDER IN REMISSION (HCC): ICD-10-CM

## 2021-05-03 DIAGNOSIS — F41.1 GAD (GENERALIZED ANXIETY DISORDER): ICD-10-CM

## 2021-05-03 DIAGNOSIS — G47.10 HYPERSOMNIA: ICD-10-CM

## 2021-05-03 DIAGNOSIS — M79.7 FIBROMYALGIA: ICD-10-CM

## 2021-05-03 PROCEDURE — 99214 OFFICE O/P EST MOD 30 MIN: CPT | Performed by: PHYSICIAN ASSISTANT

## 2021-05-03 ASSESSMENT — ENCOUNTER SYMPTOMS
STRIDOR: 0
WHEEZING: 0
ALLERGIC/IMMUNOLOGIC NEGATIVE: 1
NAUSEA: 0
COUGH: 0
SHORTNESS OF BREATH: 0
BACK PAIN: 1
DIARRHEA: 0
CHEST TIGHTNESS: 0
EYES NEGATIVE: 1

## 2021-05-03 NOTE — PATIENT INSTRUCTIONS
Patient Education        Stopping Smoking: Care Instructions  Your Care Instructions     Cigarette smokers crave the nicotine in cigarettes. Giving it up is much harder than simply changing a habit. Your body has to stop craving the nicotine. It is hard to quit, but you can do it. There are many tools that people use to quit smoking. You may find that combining tools works best for you. There are several steps to quitting. First you get ready to quit. Then you get support to help you. After that, you learn new skills and behaviors to become a nonsmoker. For many people, a necessary step is getting and using medicine. Your doctor will help you set up the plan that best meets your needs. You may want to attend a smoking cessation program to help you quit smoking. When you choose a program, look for one that has proven success. Ask your doctor for ideas. You will greatly increase your chances of success if you take medicine as well as get counseling or join a cessation program.  Some of the changes you feel when you first quit tobacco are uncomfortable. Your body will miss the nicotine at first, and you may feel short-tempered and grumpy. You may have trouble sleeping or concentrating. Medicine can help you deal with these symptoms. You may struggle with changing your smoking habits and rituals. The last step is the tricky one: Be prepared for the smoking urge to continue for a time. This is a lot to deal with, but keep at it. You will feel better. Follow-up care is a key part of your treatment and safety. Be sure to make and go to all appointments, and call your doctor if you are having problems. It's also a good idea to know your test results and keep a list of the medicines you take. How can you care for yourself at home? · Ask your family, friends, and coworkers for support. You have a better chance of quitting if you have help and support.   · Join a support group, such as Nicotine Anonymous, for people who are trying to quit smoking. · Consider signing up for a smoking cessation program, such as the American Lung Association's Freedom from Smoking program.  · Get text messaging support. Go to the website at www.smokefree. gov to sign up for the [facility] program.  · Set a quit date. Pick your date carefully so that it is not right in the middle of a big deadline or stressful time. Once you quit, do not even take a puff. Get rid of all ashtrays and lighters after your last cigarette. Clean your house and your clothes so that they do not smell of smoke. · Learn how to be a nonsmoker. Think about ways you can avoid those things that make you reach for a cigarette. ? Avoid situations that put you at greatest risk for smoking. For some people, it is hard to have a drink with friends without smoking. For others, they might skip a coffee break with coworkers who smoke. ? Change your daily routine. Take a different route to work or eat a meal in a different place. · Cut down on stress. Calm yourself or release tension by doing an activity you enjoy, such as reading a book, taking a hot bath, or gardening. · Talk to your doctor or pharmacist about nicotine replacement therapy, which replaces the nicotine in your body. You still get nicotine but you do not use tobacco. Nicotine replacement products help you slowly reduce the amount of nicotine you need. These products come in several forms, many of them available over-the-counter:  ? Nicotine patches  ? Nicotine gum and lozenges  ? Nicotine inhaler  · Ask your doctor about bupropion (Wellbutrin) or varenicline (Chantix), which are prescription medicines. They do not contain nicotine. They help you by reducing withdrawal symptoms, such as stress and anxiety. · Some people find hypnosis, acupuncture, and massage helpful for ending the smoking habit. · Eat a healthy diet and get regular exercise.  Having healthy habits will help your body move past its craving for nicotine. · Be prepared to keep trying. Most people are not successful the first few times they try to quit. Do not get mad at yourself if you smoke again. Make a list of things you learned and think about when you want to try again, such as next week, next month, or next year. Where can you learn more? Go to https://Nichepepicewrafiq.Mor.sl. org and sign in to your [employer] account. Enter V045 in the [employer] box to learn more about \"Stopping Smoking: Care Instructions. \"     If you do not have an account, please click on the \"Sign Up Now\" link. Current as of: March 12, 2020               Content Version: 12.8  © 2006-2021 Healthwise, Incorporated. Care instructions adapted under license by [facility] ([facility]). If you have questions about a medical condition or this instruction, always ask your healthcare professional. [address] any warranty or liability for your use of this information.

## 2021-05-03 NOTE — PROGRESS NOTES
[facility]      Lele Canales         [id number]  5/3/2021   Chief Complaint   Patient presents with    Follow-up     Having issues with machine with fatigue. Khadijah pt. Pt of  Khadijah     PAP Download:   Original or initial AHI: 131.4     Date of initial study: 1/16/2020      Compliant  90%     Noncompliant 3 %     PAP Type Aircurve 10 VautoLevel  22/18   Avg Hrs/Day 9 hr 17 min  AHI: 1.2   Recorded compliance dates , 3/30/2021  to 4/28/2021   Machine/Mfg:   [x] ResMed    [] Respironics/Dreamstation   Interface:   [] Nasal    [] Nasal pillows   [x] FFM      Provider:      [x] -MANJU     []Jennifer     [] Laci    [] Raul Thurman    [] [place]               [] [facility]      [] Adaptive    [] [address]      [] Other    Neck Size: 18  Mallampati Mallampati 3  ESS:  9  SAQLI: 77    Here is a scan of the most recent download:            Presentation:   Abbie Mukherjee presents for sleep medicine follow up for obstructive sleep apnea  Since the last visit, Abbie Mukherjee is doing well with PAP. She states that over the past week or so she does not feel refreshed in the morning. She does not know why and concerned the PAP is not working. She wakes up 1-2 times a night to care for her infant. She is on many psych meds and was recently started on Wellbutrin about 2-3 weeks ago. Mask is leaking. Equipment issues: The pressure is  acceptable, the mask is unacceptable     Sleep issues:  Do you feel better? No  More rested? No   Better concentration? no    Progress History:   Since last visit any new medical issues? No  New ER or hospital visits? No  Any new or changes in medicines? Yes Wellbutrin  Any new sleep medicines? No    Review of Systems -   Review of Systems   Constitutional: Negative for activity change, appetite change, chills and fever. HENT: Negative for congestion and postnasal drip. Eyes: Negative.     Respiratory: Negative for cough, chest tightness, shortness of 2. Hypersomnia     3. Morbid obesity with BMI of 45.0-49.9, adult ([facility] [address]     4. KARLIE (generalized anxiety disorder)     5. Fibromyalgia     6. Bipolar affective disorder in remission [facility])              Plan   Do you need any equipment today? Yes needs a mask fitting  - Suspect hypersomnia related to interrupted sleep from infant and psych meds  - Wellbutrin likely tipped her over  - her WILFREDO is well controlled  - She does need a mask fitting when eligible  - Download reviewed and discussed with patient  - She  was advised to continue current positive airway pressure therapy with above described pressure. - She  advised to keep good compliance with current recommended pressure to get optimal results and clinical improvement  - Recommend 7-9 hours of sleep with PAP  - She was advised to call [employer] regarding supplies if needed.   -She call my office for earlier appointment if needed for worsening of sleep symptoms.   - She was instructed on weight loss  - Parag Fountain was educated about my impression and plan. Patient verbalizesunderstanding.   We will see Joe Mckinnon back in: 3 months with download    Information added by my medical assistant/LPN was reviewed today       Angela Berger PA-C, MPAS  5/3/2021

## 2021-05-06 ENCOUNTER — TELEPHONE (OUTPATIENT)
Dept: FAMILY MEDICINE CLINIC | Age: 26
End: 2021-05-06

## 2021-05-06 DIAGNOSIS — R73.01 IFG (IMPAIRED FASTING GLUCOSE): Primary | ICD-10-CM

## 2021-05-06 DIAGNOSIS — E74.39 GLUCOSE INTOLERANCE: ICD-10-CM

## 2021-05-06 NOTE — TELEPHONE ENCOUNTER
The patient called and is requesting an order for a A1C be faxed to [facility]. If no call back the patient will go to [facility] for her draw.

## 2021-05-17 RX ORDER — ALBUTEROL SULFATE 90 UG/1
2 AEROSOL, METERED RESPIRATORY (INHALATION) EVERY 4 HOURS PRN
Qty: 1 INHALER | Refills: 0 | Status: SHIPPED | OUTPATIENT
Start: 2021-05-17 | End: 2022-01-28

## 2021-05-17 NOTE — TELEPHONE ENCOUNTER
Requested Prescriptions     Pending Prescriptions Disp Refills    albuterol sulfate  (90 Base) MCG/ACT inhaler 1 Inhaler 0     Sig: Inhale 2 puffs into the lungs every 4 hours as needed for Wheezing       If no call back pt will check with Elma Romeo around 12 noon today.

## 2021-05-26 ENCOUNTER — VIRTUAL VISIT (OUTPATIENT)
Dept: FAMILY MEDICINE CLINIC | Age: 26
End: 2021-05-26
Payer: MEDICARE

## 2021-05-26 ENCOUNTER — NURSE TRIAGE (OUTPATIENT)
Dept: OTHER | Facility: CLINIC | Age: 26
End: 2021-05-26

## 2021-05-26 DIAGNOSIS — F17.200 SMOKING: ICD-10-CM

## 2021-05-26 DIAGNOSIS — J45.21 MILD INTERMITTENT REACTIVE AIRWAY DISEASE WITH ACUTE EXACERBATION: Primary | ICD-10-CM

## 2021-05-26 PROCEDURE — 99213 OFFICE O/P EST LOW 20 MIN: CPT | Performed by: NURSE PRACTITIONER

## 2021-05-26 RX ORDER — NICOTINE 21 MG/24HR
1 PATCH, TRANSDERMAL 24 HOURS TRANSDERMAL DAILY
Qty: 30 PATCH | Refills: 1 | Status: SHIPPED | OUTPATIENT
Start: 2021-05-26 | End: 2021-09-16

## 2021-05-26 RX ORDER — PREDNISONE 50 MG/1
50 TABLET ORAL DAILY
Qty: 5 TABLET | Refills: 0 | Status: SHIPPED | OUTPATIENT
Start: 2021-05-26 | End: 2021-05-31

## 2021-05-26 ASSESSMENT — ENCOUNTER SYMPTOMS
SHORTNESS OF BREATH: 1
COUGH: 1
CHEST TIGHTNESS: 0
WHEEZING: 0
NAUSEA: 0
ABDOMINAL PAIN: 0

## 2021-05-26 NOTE — TELEPHONE ENCOUNTER
Reason for Disposition   SEVERE coughing spells (e.g., whooping sound after coughing, vomiting after coughing)    Answer Assessment - Initial Assessment Questions  1. ONSET: \"When did the cough begin? \"         On and off due to smoking     2. SEVERITY: \"How bad is the cough today? \"        Intermittent, but when coughing it is pretty hard and 2 nights ago pt coughed up blood      3. RESPIRATORY DISTRESS: \"Describe your breathing. \"          SOB with coughing     4. FEVER: \"Do you have a fever? \" If so, ask: \"What is your temperature, how was it measured, and when did it start? \"      Denies     5. HEMOPTYSIS: \"Are you coughing up any blood? \" If so ask: \"How much? \" (flecks, streaks, tablespoons, etc.)         Pt coughed up white mucus 2 nights ago with blood drops     6. TREATMENT: \"What have you done so far to treat the cough? \" (e.g., meds, fluids, humidifier)            7. CARDIAC HISTORY: \"Do you have any history of heart disease? \" (e.g., heart attack, congestive heart failure)          Smoker, pt has POTS     8. LUNG HISTORY: \"Do you have any history of lung disease? \"  (e.g., pulmonary embolus, asthma, emphysema)             Asthma      9. PE RISK FACTORS: \"Do you have a history of blood clots? \" (or: recent major surgery, recent prolonged travel, bedridden)             Pt has hx of blood clot in lungs in 2016     10. OTHER SYMPTOMS: \"Do you have any other symptoms? (e.g., runny nose, wheezing, chest pain)            Denies     11. PREGNANCY: \"Is there any chance you are pregnant? \" \"When was your last menstrual period? \"             Denies     12. TRAVEL: \"Have you traveled out of the country in the last month? \" (e.g., travel history, exposures)             Denies    Protocols used: [id number]    Received call from Jordy Ruiz at pre-service center [facility]) Robe Perdomo with [employer].     Brief description of triage: see above     Triage indicates for patient to be seen today for cough with blood streaked sputum Care advice provided, patient verbalizes understanding; denies any other questions or concerns; instructed to call back for any new or worsening symptoms. Writer provided warm transfer to Anshu  at [facility] for appointment scheduling. Attention Provider: Thank you for allowing me to participate in the care of your patient. The patient was connected to triage in response to information provided to the E[facility]. Please do not respond through this encounter as the response is not directed to a shared pool.

## 2021-05-26 NOTE — PROGRESS NOTES
Subjective:      Patient ID: Yanelis Contreras is a 22 y.o. female    HPI: Acute for cough    TELEHEALTH EVALUATION -- Audio/Visual (During VKNFN-54 public health emergency)    Patient identification was verified at the start of the visit: Yes    Services were provided through a video synchronous discussion virtually to substitute for in-person clinic visit. Patient and provider were located at their individual homes. Patient has requested an audio/video evaluation for the following concern(s):    Chief Complaint   Patient presents with    Cough       Was coughing pretty hard and coughed up some blood. Mucous and blood tinge. SOB with cough. Easily winded. Onset of 1 week. Denies chest or nasal congestion. Feels it is related to her smoking. Smoking 1/2-1 ppd. Motivated to quit. Intolerance to Wellbutrin. 8 months PP.      Patient Active Problem List   Diagnosis    Major depressive disorder, recurrent episode, mild (HCC)    Low self esteem    KARLIE (generalized anxiety disorder)    Obesity    Obstructive sleep apnea    Snores    Sleep disturbances    Daytime somnolence    Sleep talking    Night terrors, adult    Bruxism (teeth grinding)    Restless sleeper    Anxiety    Abnormal thyroid function test    Trigeminal neuralgia    Inappropriate sinus tachycardia    POTS (postural orthostatic tachycardia syndrome)    Atypical chest pain    Syncope and collapse    Neck discomfort    Thyroid cyst    Pulmonary embolism (HCC)    Chest pain on breathing    Breast pain, left    Positive CAESAR (antinuclear antibody)    Hypotension    S/P ablation of ventricular arrhythmia    Pneumonia    Hypokalemia    DUB (dysfunctional uterine bleeding)    Palpitations    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    Closed right ankle fracture, initial encounter    Status post open reduction with internal fixation (ORIF) of fracture of ankle    Tear of deltoid ligament of ankle, right, initial encounter    Abdominal pain during pregnancy in second trimester    Maternal obesity affecting pregnancy, antepartum    Postpartum care following  delivery    Biliary colic symptom       Review of Systems   Constitutional: Negative for chills and fever. HENT: Negative. Respiratory: Positive for cough and shortness of breath. Negative for chest tightness and wheezing. Cardiovascular: Negative for chest pain. Gastrointestinal: Negative for abdominal pain and nausea. Skin: Negative for rash. Neurological: Negative for dizziness, light-headedness and headaches. Psychiatric/Behavioral: Negative. Objective:   Physical Exam  Constitutional:       General: She is not in acute distress. Appearance: She is not ill-appearing. Pulmonary:      Effort: Pulmonary effort is normal. No respiratory distress. Neurological:      Mental Status: She is alert and oriented to person, place, and time. Psychiatric:         Mood and Affect: Mood normal.         Behavior: Behavior normal.         Assessment:       Diagnosis Orders   1. Mild intermittent reactive airway disease with acute exacerbation  predniSONE (DELTASONE) 50 MG tablet   2. Smoking  nicotine (NICODERM CQ) 14 MG/24HR       Plan:     Orders as above  Smoking cessation - Nicotine Patches  DB and cough  RTO if symptoms worsen or stay the same         Due to this being a TeleHealth encounter (During [facility]- public health emergency), evaluation of the following organ systems was limited: Vitals/Constitutional/EENT/Resp/CV/GI//MS/Neuro/Skin/Heme-Lymph-Imm.   Pursuant to the emergency declaration under the [facility], [address] [place] [address] authority and the [employer] Act, this Virtual Visit was conducted with patient's (and/or legal guardian's) consent, to reduce the patient's risk of exposure to COVID-19 and provide necessary medical care. The patient (and/or legal guardian) has also been advised to contact this office for worsening conditions or problems, and seek emergency medical treatment and/or call 911 if deemed necessary. --LEVI Hughes - CNP on 5/26/2021 at 1:37 PM    An electronic signature was used to authenticate this note.      5/26/2021

## 2021-05-29 LAB
AVERAGE GLUCOSE: 134 MG/DL
HBA1C MFR BLD: 6.3 % (ref 4.4–6.4)

## 2021-06-01 DIAGNOSIS — F17.200 SMOKING: ICD-10-CM

## 2021-06-01 DIAGNOSIS — R73.01 IFG (IMPAIRED FASTING GLUCOSE): Primary | ICD-10-CM

## 2021-06-08 ENCOUNTER — TELEPHONE (OUTPATIENT)
Dept: FAMILY MEDICINE CLINIC | Age: 26
End: 2021-06-08

## 2021-06-08 DIAGNOSIS — B35.3 TINEA PEDIS OF BOTH FEET: ICD-10-CM

## 2021-06-08 DIAGNOSIS — B37.2 INTERTRIGINOUS CANDIDIASIS: Primary | ICD-10-CM

## 2021-06-08 RX ORDER — CLOTRIMAZOLE AND BETAMETHASONE DIPROPIONATE 10; .64 MG/G; MG/G
CREAM TOPICAL 2 TIMES DAILY
Qty: 45 G | Refills: 0 | Status: SHIPPED | OUTPATIENT
Start: 2021-06-08 | End: 2022-11-03

## 2021-06-08 RX ORDER — NYSTATIN 100000 U/G
CREAM TOPICAL
Qty: 30 G | Refills: 0 | Status: SHIPPED | OUTPATIENT
Start: 2021-06-08 | End: 2022-05-31

## 2021-06-08 NOTE — TELEPHONE ENCOUNTER
The patient called in and stated that she needs some Nystatin cream sent into [facility], she stated that she has a yeasty red itchy rash under her right breast.  She also stated that she has been having issues with the ball of her bilateral feet. Stated that they will burn and throb and at times itch. She is concerned because she is pre-diabetic. Please advise.

## 2021-07-12 DIAGNOSIS — J40 BRONCHITIS: ICD-10-CM

## 2021-07-12 RX ORDER — ALBUTEROL SULFATE 2.5 MG/3ML
2.5 SOLUTION RESPIRATORY (INHALATION) EVERY 6 HOURS PRN
Qty: 120 EACH | Refills: 3 | Status: SHIPPED | OUTPATIENT
Start: 2021-07-12 | End: 2022-01-28

## 2021-07-12 NOTE — TELEPHONE ENCOUNTER
The patient called in and stated that she went to [facility] in [address] [facility] yesterday for a cough, head and chest congestion and nasal drainage and was prescribed a Z-na and prednisone. She is requesting a refill on her albuterol nebulizer solution to be sent to [facility]. Order pended for your signature. If no call back the patient will check with her pharmacy after 2pm today.

## 2021-08-19 ENCOUNTER — HOSPITAL ENCOUNTER (EMERGENCY)
Age: 26
Discharge: HOME | End: 2021-08-20
Attending: EMERGENCY MEDICINE
Payer: MEDICARE

## 2021-08-19 ENCOUNTER — HOSPITAL ENCOUNTER (EMERGENCY)
Age: 26
Discharge: HOME | End: 2021-08-19
Attending: NURSE PRACTITIONER
Payer: MEDICARE

## 2021-08-19 VITALS
HEART RATE: 120 BPM | WEIGHT: 293 LBS | RESPIRATION RATE: 16 BRPM | DIASTOLIC BLOOD PRESSURE: 84 MMHG | HEIGHT: 70 IN | SYSTOLIC BLOOD PRESSURE: 134 MMHG | OXYGEN SATURATION: 98 % | TEMPERATURE: 96.3 F | BODY MASS INDEX: 41.95 KG/M2

## 2021-08-19 DIAGNOSIS — G90.A POTS (POSTURAL ORTHOSTATIC TACHYCARDIA SYNDROME): ICD-10-CM

## 2021-08-19 DIAGNOSIS — R42 ORTHOSTATIC DIZZINESS: Primary | ICD-10-CM

## 2021-08-19 LAB
CHP ED QC CHECK: YES
GLUCOSE BLD-MCNC: 125 MG/DL
GLUCOSE BLD-MCNC: 125 MG/DL (ref 70–108)

## 2021-08-19 PROCEDURE — 82948 REAGENT STRIP/BLOOD GLUCOSE: CPT

## 2021-08-19 PROCEDURE — 96361 HYDRATE IV INFUSION ADD-ON: CPT

## 2021-08-19 PROCEDURE — 85025 COMPLETE CBC W/AUTO DIFF WBC: CPT

## 2021-08-19 PROCEDURE — 99214 OFFICE O/P EST MOD 30 MIN: CPT

## 2021-08-19 PROCEDURE — 99283 EMERGENCY DEPT VISIT LOW MDM: CPT

## 2021-08-19 PROCEDURE — 99213 OFFICE O/P EST LOW 20 MIN: CPT | Performed by: NURSE PRACTITIONER

## 2021-08-19 PROCEDURE — 84703 CHORIONIC GONADOTROPIN ASSAY: CPT

## 2021-08-19 PROCEDURE — 36415 COLL VENOUS BLD VENIPUNCTURE: CPT

## 2021-08-19 PROCEDURE — 93005 ELECTROCARDIOGRAM TRACING: CPT | Performed by: EMERGENCY MEDICINE

## 2021-08-19 PROCEDURE — 96360 HYDRATION IV INFUSION INIT: CPT

## 2021-08-19 PROCEDURE — 80053 COMPREHEN METABOLIC PANEL: CPT

## 2021-08-19 PROCEDURE — 84443 ASSAY THYROID STIM HORMONE: CPT

## 2021-08-19 RX ORDER — MECLIZINE HYDROCHLORIDE CHEWABLE TABLETS 25 MG/1
25 TABLET, CHEWABLE ORAL ONCE
Status: COMPLETED | OUTPATIENT
Start: 2021-08-19 | End: 2021-08-19

## 2021-08-19 RX ORDER — MECLIZINE HYDROCHLORIDE 25 MG/1
25 TABLET ORAL 3 TIMES DAILY PRN
Qty: 30 TABLET | Refills: 0 | Status: SHIPPED | OUTPATIENT
Start: 2021-08-19 | End: 2021-08-29

## 2021-08-19 RX ADMIN — MECLIZINE HYDROCHLORIDE CHEWABLE TABLETS 25 MG: 25 TABLET, CHEWABLE ORAL at 17:24

## 2021-08-19 NOTE — ED NOTES
Pt verbalized discharge instructions. Pt informed to go to ER if develop chest pain, shortness of breath or abdominal pain. Pt ambulatory out in stable condition. Assessment unchanged.        Katrin Brian RN  08/19/21 5024

## 2021-08-19 NOTE — ED NOTES
Pt states she still feels a little dizzy. b Melina Farfan NP notified of orthostatic results.      Master Travis RN  08/19/21 9588

## 2021-08-19 NOTE — ED PROVIDER NOTES
[place] [address]  Urgent Care Encounter       CHIEF COMPLAINT       Chief Complaint   Patient presents with    Dizziness    Fatigue       Nurses Notes reviewed and I agree except as noted in the HPI. HISTORY OF PRESENT ILLNESS   Jessica Mendes is a 22 y.o. female who presents to the Center urgent care for evaluation of fatigue and dizziness. She reports the symptoms started roughly 2 to 3 days ago. She reports the dizziness is worse with position changes including laying to sitting or sitting to standing. She does report that she is drinking plenty of fluids. She denies fever, chills, nausea, vomiting, cough, and vision changes. She does report mild frontal headache and diarrhea. She does report a history of vitamin D and iron deficiency, and she reports that she has not been taking her medications appropriately. The history is provided by the patient. No  was used. REVIEW OF SYSTEMS     Review of Systems   Constitutional: Positive for fatigue. Negative for activity change, appetite change, chills and fever. HENT: Negative for ear discharge, ear pain, rhinorrhea and sore throat. Respiratory: Negative for cough, chest tightness and shortness of breath. Cardiovascular: Negative for chest pain. Gastrointestinal: Positive for diarrhea. Negative for nausea and vomiting. Genitourinary: Negative for dysuria. Skin: Negative for rash. Allergic/Immunologic: Negative for environmental allergies and food allergies. Neurological: Positive for dizziness and headaches.        PAST MEDICAL HISTORY         Diagnosis Date    ADD (attention deficit disorder)     Anxiety 04/08/2015    Anxiety attack     Asthma     exercise induced    Atypical face pain     Back pain     Bipolar 1 disorder (HCC)     Diabetes mellitus (HCC)     GDM on insulin started on 7/23-during pregnancy    Fibromyalgia     GERD (gastroesophageal reflux disease)     Hypotension 2017    Major depressive disorder, recurrent episode, mild ([facility] 2012    Obesity 10/24/2014    WILFREDO treated with BiPAP     Pneumonia 2018    POTS (postural orthostatic tachycardia syndrome)     Pott disease     Pulmonary emboli ([facility] 2016    was on blood thinners for 6 months    Seizures ([facility] 2016    anxiety induced no meds last seizure 4 years ago    Tailbone injury 2015    patient broke tailbone    Thyroid disease     borderline low no meds    TMJ (dislocation of temporomandibular joint)     Trigeminal neuralgia     Wears contact lenses        SURGICALHISTORY     Patient  has a past surgical history that includes [place] tooth extraction (); Cardiac catheterization (2016); Cardiac catheterization (2016); Colonoscopy (2015); Insertable Cardiac Monitor; Ankle fracture surgery (Right, 2020); ablation of dysrhythmic focus ();  section (N/A, 2020); Upper gastrointestinal endoscopy (2020); Upper gastrointestinal endoscopy (2015); and Cholecystectomy, laparoscopic (N/A, 2021).     CURRENT MEDICATIONS       Discharge Medication List as of 2021  5:51 PM      CONTINUE these medications which have NOT CHANGED    Details   SITagliptin (JANUVIA) 100 MG tablet Take 1 tablet by mouth every morning (before breakfast), Disp-30 tablet, R-3Normal      !! sertraline (ZOLOFT) 25 MG tablet Take 25 mg by mouth dailyHistorical Med      busPIRone (BUSPAR) 15 MG tablet Take 45 mg by mouth nightly Historical Med      OXcarbazepine (TRILEPTAL) 600 MG tablet Historical Med      !! QUEtiapine (SEROQUEL) 300 MG tablet Take 300 mg by mouth nightlyHistorical Med      !! QUEtiapine (SEROQUEL) 50 MG tablet Take 50 mg by mouth every morning (before breakfast)Historical Med      ARIPiprazole (ABILIFY) 15 MG tablet Take 15 mg by mouth nightlyHistorical Med      !! sertraline (ZOLOFT) 50 MG tablet Take 50 mg by mouth nightly Historical Med DULoxetine HCl (CYMBALTA PO) Take 120 mg by mouth nightly Historical Med      omeprazole (PRILOSEC) 40 MG delayed release capsule TAKE 1 CAPSULE BY MOUTH TWO TIMES A DAY , Disp-60 capsule, R-10Normal      cetirizine (ZYRTEC) 10 MG tablet Take 1 tablet by mouth daily, Disp-90 tablet, R-3Normal      albuterol (PROVENTIL) (2.5 MG/3ML) 0.083% nebulizer solution Take 3 mLs by nebulization every 6 hours as needed for Wheezing, Disp-120 each, R-3Normal      clotrimazole-betamethasone (LOTRISONE) 1-0.05 % cream Apply topically 2 times daily, Topical, 2 TIMES DAILY Starting Tue 6/8/2021, Disp-45 g, R-0, Normal      nystatin (MYCOSTATIN) 601556 UNIT/GM cream Apply topically 2 times daily. , Disp-30 g, R-0, Normal      nicotine (NICODERM CQ) 14 MG/24HR Place 1 patch onto the skin daily, Disp-30 patch, R-1Normal      albuterol sulfate  (90 Base) MCG/ACT inhaler Inhale 2 puffs into the lungs every 4 hours as needed for Wheezing, Disp-1 Inhaler, R-0Normal      buPROPion HCl (WELLBUTRIN PO) Take by mouthHistorical Med      azelastine (ASTELIN) 0.1 % nasal spray 1 spray by Nasal route 2 times daily Use in each nostril as directed, Disp-1 Bottle, R-0Normal      Multiple Vitamin (MULTIVITAMIN ADULT PO) Take by mouth dailyHistorical Med      ibuprofen (ADVIL;MOTRIN) 200 MG tablet Take 4 tablets by mouth every 8 hours as needed for PainOTC      Cholecalciferol (VITAMIN D3) 50 MCG (2000 UT) CAPS Take 1 capsule by mouth daily, Disp-30 capsule, R-0Normal       !! - Potential duplicate medications found. Please discuss with provider. ALLERGIES     Patient is is allergic to dilaudid [hydromorphone hcl], flexeril [cyclobenzaprine], keflex [cephalexin], tessalon [benzonatate], augmentin [amoxicillin-pot clavulanate], lorabid [loracarbef], and minocycline.     Patients   Immunization History   Administered Date(s) Administered    DTaP 1995, 03/04/1996, 04/22/1996, 01/08/1997, 05/23/2001    Hepatitis A 03/14/2013    Hepatitis B 1995, 1995, 07/08/1996    Hib, unspecified 1995, 03/04/1996, 04/22/1996, 01/08/1997    Influenza Virus Vaccine 10/22/2013    Influenza Whole 10/22/2013    MMR 10/07/1996, 05/23/2001    Meningococcal MCV4P (Menactra) 03/14/2013    Polio IPV (IPOL) 05/23/2001    Polio OPV 1995, 03/04/1996, 04/22/1996    Tdap (Boostrix, Adacel) 10/24/2014, 08/17/2020    Varicella (Varivax) 10/07/1996, 03/14/2013       FAMILY HISTORY     Patient's family history includes Anxiety Disorder in her father and mother; Bipolar Disorder in her father; Cancer in her maternal grandmother, paternal grandfather, and paternal uncle; Depression in her maternal grandfather, paternal aunt, and paternal uncle; Diabetes in her maternal grandmother and mother; Heart Attack in her maternal grandfather; High Blood Pressure in her father; Lupus in her maternal aunt; Mental Illness in her father; No Known Problems in her brother and paternal grandmother; Ovarian Cancer in her maternal grandmother; Parkinsonism in her father. SOCIAL HISTORY     Patient  reports that she has been smoking cigarettes. She started smoking about 3 years ago. She has a 0.50 pack-year smoking history. She has never used smokeless tobacco. She reports current drug use. Drug: Marijuana. She reports that she does not drink alcohol. PHYSICAL EXAM     ED TRIAGE VITALS  BP: 134/84, Temp: 96.3 °F (35.7 °C), Pulse: 120, Resp: 16, SpO2: 98 %,Estimated body mass index is 44.48 kg/m² as calculated from the following:    Height as of this encounter: 5' 10\" (1.778 m). Weight as of this encounter: 310 lb (140.6 kg). ,Patient's last menstrual period was 08/05/2021. Physical Exam  Vitals and nursing note reviewed. Constitutional:       General: She is not in acute distress. Appearance: Normal appearance. She is not ill-appearing, toxic-appearing or diaphoretic. HENT:      Head: Normocephalic.       Right Ear: Ear canal and external ear normal.      Left Ear: Ear canal and external ear normal.      Nose: Nose normal. No congestion or rhinorrhea. Mouth/Throat:      Mouth: Mucous membranes are moist.      Pharynx: Oropharynx is clear. No oropharyngeal exudate or posterior oropharyngeal erythema. Cardiovascular:      Rate and Rhythm: Normal rate. Pulses: Normal pulses. Pulmonary:      Effort: Pulmonary effort is normal. No respiratory distress. Breath sounds: No stridor. No wheezing or rhonchi. Abdominal:      General: Abdomen is flat. Bowel sounds are normal.      Palpations: Abdomen is soft. Musculoskeletal:         General: No swelling or tenderness. Normal range of motion. Cervical back: Normal range of motion. Neurological:      General: No focal deficit present. Mental Status: She is alert and oriented to person, place, and time. Psychiatric:         Mood and Affect: Mood normal.         Behavior: Behavior normal.         DIAGNOSTIC RESULTS     Labs:  Results for orders placed or performed during the hospital encounter of 08/19/21   POCT glucose   Result Value Ref Range    Glucose 125 mg/dL    QC OK? yes    POCT Glucose   Result Value Ref Range    POC Glucose 125 (H) 70 - 108 mg/dl       IMAGING:    No orders to display         EKG: None      URGENT CARE COURSE:     Vitals:    08/19/21 1704   BP: 134/84   Pulse: 120   Resp: 16   Temp: 96.3 °F (35.7 °C)   TempSrc: Temporal   SpO2: 98%   Weight: (!) 310 lb (140.6 kg)   Height: 5' 10\" (1.778 m)       Medications   meclizine (ANTIVERT) chewable tablet 25 mg (25 mg Oral Given 8/19/21 1724)            PROCEDURES:  None    FINAL IMPRESSION      1. Orthostatic dizziness    2. POTS (postural orthostatic tachycardia syndrome)          DISPOSITION/ PLAN     Patient seen and evaluated for dizziness and fatigue. Patient does have a past medical history of pots.   Orthostatic blood pressure with pulse completed revealing a 20 point increase in heart rate from sitting to standing. She is instructed to take slow position changes. She is also prescribed Antivert as needed for dizziness. She is instructed to present to the emergency department for worsening dizziness. She is instructed to follow-up with PCP in 3 to 5 days with new or worsening symptoms. Patient is agreeable to the above plan denies questions or concerns at this time.       PATIENT REFERRED TO:  LEVI Olguin CNP  [address] [address]      DISCHARGE MEDICATIONS:  Discharge Medication List as of 8/19/2021  5:51 PM      START taking these medications    Details   meclizine (ANTIVERT) 25 MG tablet Take 1 tablet by mouth 3 times daily as needed for Dizziness, Disp-30 tablet, R-0Normal             Discharge Medication List as of 8/19/2021  5:51 PM          Discharge Medication List as of 8/19/2021  5:51 PM          Judie Settler, APRN - CNP    (Please note that portions of this note were completed with a voice recognition program. Efforts were made to edit the dictations but occasionally words are mis-transcribed.)           LEVI Machuca CNP  08/20/21 9079

## 2021-08-19 NOTE — ED TRIAGE NOTES
PT AMBULATORY INTO [facility] WITH C/O DIZZINESS FOR THE PAST TWO DAYS. PT STATES IT GETS WORSE WITH CHANGING OF POSITIONS. PT DENIES PAIN.

## 2021-08-19 NOTE — ED NOTES
Lying  Left arm   /62, sitting HR  106 /63, standing   BP  122/63     Fam Forrest, RN  08/19/21 1219

## 2021-08-20 ENCOUNTER — TELEPHONE (OUTPATIENT)
Dept: FAMILY MEDICINE CLINIC | Age: 26
End: 2021-08-20

## 2021-08-20 ENCOUNTER — TELEPHONE (OUTPATIENT)
Dept: CARDIOLOGY CLINIC | Age: 26
End: 2021-08-20

## 2021-08-20 VITALS
SYSTOLIC BLOOD PRESSURE: 103 MMHG | HEIGHT: 70 IN | BODY MASS INDEX: 41.95 KG/M2 | HEART RATE: 86 BPM | DIASTOLIC BLOOD PRESSURE: 79 MMHG | TEMPERATURE: 98 F | OXYGEN SATURATION: 99 % | RESPIRATION RATE: 12 BRPM | WEIGHT: 293 LBS

## 2021-08-20 LAB
ALBUMIN SERPL-MCNC: 4.6 G/DL (ref 3.5–5.1)
ALP BLD-CCNC: 115 U/L (ref 38–126)
ALT SERPL-CCNC: 28 U/L (ref 11–66)
ANION GAP SERPL CALCULATED.3IONS-SCNC: 12 MEQ/L (ref 8–16)
AST SERPL-CCNC: 20 U/L (ref 5–40)
ATYPICAL LYMPHOCYTES: ABNORMAL %
BASOPHILS # BLD: 0.2 %
BASOPHILS ABSOLUTE: 0 THOU/MM3 (ref 0–0.1)
BILIRUB SERPL-MCNC: 0.2 MG/DL (ref 0.3–1.2)
BUN BLDV-MCNC: 9 MG/DL (ref 7–22)
CALCIUM SERPL-MCNC: 9.6 MG/DL (ref 8.5–10.5)
CHLORIDE BLD-SCNC: 100 MEQ/L (ref 98–111)
CO2: 25 MEQ/L (ref 23–33)
CREAT SERPL-MCNC: 0.6 MG/DL (ref 0.4–1.2)
D-DIMER QUANTITATIVE: 367 NG/ML FEU (ref 0–500)
EKG ATRIAL RATE: 94 BPM
EKG P AXIS: 35 DEGREES
EKG P-R INTERVAL: 158 MS
EKG Q-T INTERVAL: 374 MS
EKG QRS DURATION: 88 MS
EKG QTC CALCULATION (BAZETT): 467 MS
EKG R AXIS: 50 DEGREES
EKG T AXIS: 47 DEGREES
EKG VENTRICULAR RATE: 94 BPM
EOSINOPHIL # BLD: 0 %
EOSINOPHILS ABSOLUTE: 0 THOU/MM3 (ref 0–0.4)
ERYTHROCYTE [DISTWIDTH] IN BLOOD BY AUTOMATED COUNT: 15.9 % (ref 11.5–14.5)
ERYTHROCYTE [DISTWIDTH] IN BLOOD BY AUTOMATED COUNT: 51 FL (ref 35–45)
GFR SERPL CREATININE-BSD FRML MDRD: > 90 ML/MIN/1.73M2
GLUCOSE BLD-MCNC: 96 MG/DL (ref 70–108)
HCT VFR BLD CALC: 42.7 % (ref 37–47)
HEMOGLOBIN: 13.2 GM/DL (ref 12–16)
IMMATURE GRANS (ABS): 0.05 THOU/MM3 (ref 0–0.07)
IMMATURE GRANULOCYTES: 0.5 %
LYMPHOCYTES # BLD: 31.4 %
LYMPHOCYTES ABSOLUTE: 3.5 THOU/MM3 (ref 1–4.8)
MCH RBC QN AUTO: 27.7 PG (ref 26–33)
MCHC RBC AUTO-ENTMCNC: 30.9 GM/DL (ref 32.2–35.5)
MCV RBC AUTO: 89.5 FL (ref 81–99)
MONOCYTES # BLD: 5 %
MONOCYTES ABSOLUTE: 0.6 THOU/MM3 (ref 0.4–1.3)
NUCLEATED RED BLOOD CELLS: 0 /100 WBC
OSMOLALITY CALCULATION: 272.4 MOSMOL/KG (ref 275–300)
PATHOLOGIST REVIEW: ABNORMAL
PLATELET # BLD: 268 THOU/MM3 (ref 130–400)
PLATELET ESTIMATE: ADEQUATE
PMV BLD AUTO: 10.4 FL (ref 9.4–12.4)
POTASSIUM REFLEX MAGNESIUM: 3.8 MEQ/L (ref 3.5–5.2)
PREGNANCY, SERUM: NEGATIVE
RBC # BLD: 4.77 MILL/MM3 (ref 4.2–5.4)
SCAN OF BLOOD SMEAR: NORMAL
SEG NEUTROPHILS: 62.9 %
SEGMENTED NEUTROPHILS ABSOLUTE COUNT: 7 THOU/MM3 (ref 1.8–7.7)
SODIUM BLD-SCNC: 137 MEQ/L (ref 135–145)
TOTAL PROTEIN: 7.8 G/DL (ref 6.1–8)
TSH SERPL DL<=0.05 MIU/L-ACNC: 2.5 UIU/ML (ref 0.4–4.2)
WBC # BLD: 11.1 THOU/MM3 (ref 4.8–10.8)

## 2021-08-20 PROCEDURE — 85379 FIBRIN DEGRADATION QUANT: CPT

## 2021-08-20 PROCEDURE — 93010 ELECTROCARDIOGRAM REPORT: CPT | Performed by: INTERNAL MEDICINE

## 2021-08-20 PROCEDURE — 2580000003 HC RX 258: Performed by: EMERGENCY MEDICINE

## 2021-08-20 PROCEDURE — 96361 HYDRATE IV INFUSION ADD-ON: CPT

## 2021-08-20 PROCEDURE — 96360 HYDRATION IV INFUSION INIT: CPT

## 2021-08-20 RX ORDER — 0.9 % SODIUM CHLORIDE 0.9 %
1000 INTRAVENOUS SOLUTION INTRAVENOUS ONCE
Status: COMPLETED | OUTPATIENT
Start: 2021-08-20 | End: 2021-08-20

## 2021-08-20 RX ADMIN — SODIUM CHLORIDE 1000 ML: 9 INJECTION, SOLUTION INTRAVENOUS at 00:30

## 2021-08-20 ASSESSMENT — ENCOUNTER SYMPTOMS
COUGH: 0
SORE THROAT: 0
SHORTNESS OF BREATH: 0
RHINORRHEA: 0
CHEST TIGHTNESS: 0
DIARRHEA: 0
COUGH: 0
NAUSEA: 0
NAUSEA: 0
EYE PAIN: 0
RHINORRHEA: 0
BACK PAIN: 0
ABDOMINAL PAIN: 0
VOMITING: 0
EYE DISCHARGE: 0
DIARRHEA: 1
CONSTIPATION: 0
STRIDOR: 0
WHEEZING: 0
PHOTOPHOBIA: 0
ABDOMINAL DISTENTION: 0
EYE REDNESS: 0
VOMITING: 0
EYE ITCHING: 0
CHEST TIGHTNESS: 0
SHORTNESS OF BREATH: 0
SORE THROAT: 0

## 2021-08-20 NOTE — ED PROVIDER NOTES
[address] ENCOUNTER      PATIENT NAME: Meghan Romo  MRN: [id number]  : 1995  GUZMAN: 2021  PROVIDER: Reva Jacome MD      CHIEF COMPLAINT       Chief Complaint   Patient presents with    Dizziness       Patient is seen and evaluated in a timely fashion. Nurses Notes are reviewed and I agree except as noted in the HPI. HISTORY OF PRESENT ILLNESS    Meghan Romo is a 22 y.o. female who presents to Emergency Department with Dizziness     Patient presents with dizziness for the last 3 days. Dizziness is worse when she stands up. She says frequently she feels she is going to pass out. She also complains of mild migraine headache which seems worse after dizzy episodes. She has no chest pain. No shortness of breath. No abdominal pain, no nausea, no vomiting. Her past medical history remarkable for bipolar disorder, diabetes, obesity, anxiety, depression, PE, and POTS. She was seen at urgent care center earlier today and was suggested to go to ED if symptoms relapse. This HPI was provided by patient. REVIEW OF SYSTEMS   Review of Systems   Constitutional: Negative for activity change, appetite change, chills, fatigue, fever and unexpected weight change. HENT: Negative for congestion, ear discharge, ear pain, hearing loss, nosebleeds, rhinorrhea and sore throat. Eyes: Negative for photophobia, pain, discharge, redness and itching. Respiratory: Negative for cough, chest tightness, shortness of breath, wheezing and stridor. Cardiovascular: Negative for chest pain, palpitations and leg swelling. Gastrointestinal: Negative for abdominal distention, abdominal pain, constipation, diarrhea, nausea and vomiting. Endocrine: Negative for cold intolerance, heat intolerance, polydipsia and polyphagia. Genitourinary: Negative for dysuria, flank pain, frequency and hematuria.    Musculoskeletal: Negative for arthralgias, back pain, gait problem, myalgias, neck pain and neck stiffness. Skin: Negative for pallor, rash and wound. Allergic/Immunologic: Negative for environmental allergies and food allergies. Neurological: Positive for dizziness. Negative for tremors, syncope, weakness and headaches. Psychiatric/Behavioral: Negative for agitation, behavioral problems, confusion, self-injury, sleep disturbance and suicidal ideas.         PAST MEDICAL HISTORY     Past Medical History:   Diagnosis Date    ADD (attention deficit disorder)     Anxiety 2015    Anxiety attack     Asthma     exercise induced    Atypical face pain     Back pain     Bipolar 1 disorder ([address]     Diabetes mellitus ([address]     GDM on insulin started on -during pregnancy    Fibromyalgia     GERD (gastroesophageal reflux disease)     Hypotension 2017    Major depressive disorder, recurrent episode, mild ([address] 2012    Obesity 10/24/2014    WILFREDO treated with BiPAP     Pneumonia 2018    POTS (postural orthostatic tachycardia syndrome)     Pott disease     Pulmonary emboli ([address] 2016    was on blood thinners for 6 months    Seizures ([address] 2016    anxiety induced no meds last seizure 4 years ago    Tailbone injury 2015    patient broke tailbone    Thyroid disease     borderline low no meds    TMJ (dislocation of temporomandibular joint)     Trigeminal neuralgia     Wears contact lenses        SURGICAL HISTORY       Past Surgical History:   Procedure Laterality Date    [facility]BL[facility]THMIC FOCUS  2016    [facility]    ANKLE FRACTURE SURGERY Right 2020    RIGHT ANKLE OPEN REDUCTION INTERNAL FIXATION AND DELTOID LIGAMENT REPAIR performed by Tavia Wray DPM at [address]  2016    EP Study    CARDIAC [facility]IZATION  2016    [facility]     SECTION N/A 2020     SECTION performed by Macey Guerrero MD at [facility] [address], LAPAROSCOPIC N/A 2021 CHOLECYSTECTOMY LAPAROSCOPIC ROBOTIC performed by La Blood MD at [address]  08/14/2015    Mercedes Belalmy INSERTABLE CARDIAC MONITOR      UPPER GASTROINTESTINAL ENDOSCOPY  12/16/2020    Dr. Cara Cheng  07/16/2015    Mercedes Bellamy [address] EXTRACTION  2010       CURRENT MEDICATIONS       Previous Medications    ALBUTEROL (PROVENTIL) (2.5 MG/3ML) 0.083% NEBULIZER SOLUTION    Take 3 mLs by nebulization every 6 hours as needed for Wheezing    ALBUTEROL SULFATE  (90 BASE) MCG/ACT INHALER    Inhale 2 puffs into the lungs every 4 hours as needed for Wheezing    ARIPIPRAZOLE (ABILIFY) 15 MG TABLET    Take 15 mg by mouth nightly    AZELASTINE (ASTELIN) 0.1 % NASAL SPRAY    1 spray by Nasal route 2 times daily Use in each nostril as directed    BUPROPION HCL (WELLBUTRIN PO)    Take by mouth    BUSPIRONE (BUSPAR) 15 MG TABLET    Take 45 mg by mouth nightly     CETIRIZINE (ZYRTEC) 10 MG TABLET    Take 1 tablet by mouth daily    CHOLECALCIFEROL (VITAMIN D3) 50 MCG (2000 UT) CAPS    Take 1 capsule by mouth daily    CLOTRIMAZOLE-BETAMETHASONE (LOTRISONE) 1-0.05 % CREAM    Apply topically 2 times daily    DULOXETINE HCL (CYMBALTA PO)    Take 120 mg by mouth nightly     IBUPROFEN (ADVIL;MOTRIN) 200 MG TABLET    Take 4 tablets by mouth every 8 hours as needed for Pain    MECLIZINE (ANTIVERT) 25 MG TABLET    Take 1 tablet by mouth 3 times daily as needed for Dizziness    MULTIPLE VITAMIN (MULTIVITAMIN ADULT PO)    Take by mouth daily    NICOTINE (NICODERM CQ) 14 MG/24HR    Place 1 patch onto the skin daily    NYSTATIN (MYCOSTATIN) 731885 UNIT/GM CREAM    Apply topically 2 times daily.     OMEPRAZOLE (PRILOSEC) 40 MG DELAYED RELEASE CAPSULE    TAKE 1 CAPSULE BY MOUTH TWO TIMES A DAY     OXCARBAZEPINE (TRILEPTAL) 600 MG TABLET        QUETIAPINE (SEROQUEL) 300 MG TABLET    Take 300 mg by mouth nightly    QUETIAPINE (SEROQUEL) 50 MG TABLET    Take 50 mg by mouth every morning (before breakfast)    SERTRALINE (ZOLOFT) 25 MG TABLET    Take 25 mg by mouth daily    SERTRALINE (ZOLOFT) 50 MG TABLET    Take 50 mg by mouth nightly     SITAGLIPTIN (JANUVIA) 100 MG TABLET    Take 1 tablet by mouth every morning (before breakfast)       ALLERGIES     Dilaudid [hydromorphone hcl], Flexeril [cyclobenzaprine], Keflex [cephalexin], Tessalon [benzonatate], Augmentin [amoxicillin-pot clavulanate], Lorabid [loracarbef], and Minocycline    FAMILY HISTORY     She indicated that her mother is alive. She indicated that her father is alive. She indicated that her brother is alive. She indicated that her maternal grandmother is . She indicated that her maternal grandfather is . She indicated that her paternal grandmother is . She indicated that her paternal grandfather is . She indicated that her maternal aunt is alive. She indicated that the status of her paternal aunt is unknown. She indicated that her paternal uncle is . family history includes Anxiety Disorder in her father and mother; Bipolar Disorder in her father; Cancer in her maternal grandmother, paternal grandfather, and paternal uncle; Depression in her maternal grandfather, paternal aunt, and paternal uncle; Diabetes in her maternal grandmother and mother; Heart Attack in her maternal grandfather; High Blood Pressure in her father; Lupus in her maternal aunt; Mental Illness in her father; No Known Problems in her brother and paternal grandmother; Ovarian Cancer in her maternal grandmother; Parkinsonism in her father. SOCIAL HISTORY      reports that she has been smoking cigarettes. She started smoking about 3 years ago. She has a 0.50 pack-year smoking history. She has never used smokeless tobacco. She reports current drug use. Drug: Marijuana. She reports that she does not drink alcohol. PHYSICAL EXAM      height is 5' 10\" (1.778 m) and weight is 309 lb (140.2 kg) (abnormal).  Her oral temperature is 98 °F (36.7 °C). Her blood pressure is 118/67 and her pulse is 85. Her respiration is 16 and oxygen saturation is 98%. Physical Exam  Vitals and nursing note reviewed. Constitutional:       Appearance: She is well-developed. She is not diaphoretic. HENT:      Head: Normocephalic and atraumatic. Nose: Nose normal.   Eyes:      General: No scleral icterus. Right eye: No discharge. Left eye: No discharge. Conjunctiva/sclera: Conjunctivae normal.      Pupils: Pupils are equal, round, and reactive to light. Neck:      Vascular: No JVD. Trachea: No tracheal deviation. Cardiovascular:      Rate and Rhythm: Normal rate and regular rhythm. Heart sounds: Normal heart sounds. No murmur heard. No friction rub. No gallop. Pulmonary:      Effort: Pulmonary effort is normal. No respiratory distress. Breath sounds: Normal breath sounds. No stridor. No wheezing or rales. Chest:      Chest wall: No tenderness. Abdominal:      General: Bowel sounds are normal. There is no distension. Palpations: Abdomen is soft. There is no mass. Tenderness: There is no abdominal tenderness. There is no guarding or rebound. Hernia: No hernia is present. Musculoskeletal:         General: No tenderness or deformity. Cervical back: Normal range of motion and neck supple. Lymphadenopathy:      Cervical: No cervical adenopathy. Skin:     General: Skin is warm and dry. Capillary Refill: Capillary refill takes less than 2 seconds. Coloration: Skin is not pale. Findings: No erythema or rash. Neurological:      Mental Status: She is alert and oriented to person, place, and time. Cranial Nerves: No cranial nerve deficit. Sensory: No sensory deficit. Motor: No abnormal muscle tone. Coordination: Coordination normal.      Deep Tendon Reflexes: Reflexes normal.   Psychiatric:         Behavior: Behavior normal.         Thought Content:  Thought content normal.         Judgment: Judgment normal.         ANCILLARY TEST RESULTS   EKG:    Interpreted by me  Normal sinus rhythm, sinus arrhythmia, ventricular rate 94 bpm, GA interval 158 ms, QRS duration 88 ms,  ms, no ST elevation or acute T wave    LAB RESULTS:  Results for orders placed or performed during the hospital encounter of 08/19/21   CBC Auto Differential   Result Value Ref Range    WBC 11.1 (H) 4.8 - 10.8 thou/mm3    RBC 4.77 4.20 - 5.40 mill/mm3    Hemoglobin 13.2 12.0 - 16.0 gm/dl    Hematocrit 42.7 37.0 - 47.0 %    MCV 89.5 81.0 - 99.0 fL    MCH 27.7 26.0 - 33.0 pg    MCHC 30.9 (L) 32.2 - 35.5 gm/dl    RDW-CV 15.9 (H) 11.5 - 14.5 %    RDW-SD 51.0 (H) 35.0 - 45.0 fL    Platelets 002 014 - 752 thou/mm3    Platelet Estimate ADEQUATE Adequate   Comprehensive Metabolic Panel w/ Reflex to MG   Result Value Ref Range    Glucose 96 70 - 108 mg/dL    CREATININE 0.6 0.4 - 1.2 mg/dL    BUN 9 7 - 22 mg/dL    Sodium 137 135 - 145 meq/L    Potassium reflex Magnesium 3.8 3.5 - 5.2 meq/L    Chloride 100 98 - 111 meq/L    CO2 25 23 - 33 meq/L    Calcium 9.6 8.5 - 10.5 mg/dL    AST 20 5 - 40 U/L    Alkaline Phosphatase 115 38 - 126 U/L    Total Protein 7.8 6.1 - 8.0 g/dL    Albumin 4.6 3.5 - 5.1 g/dL    Total Bilirubin 0.2 (L) 0.3 - 1.2 mg/dL    ALT 28 11 - 66 U/L   HCG Qualitative, Serum   Result Value Ref Range    Preg, Serum NEGATIVE NEGATIVE   TSH with Reflex   Result Value Ref Range    TSH 2.500 0.400 - 4.200 uIU/mL   D-dimer, quantitative   Result Value Ref Range    D-Dimer, Quant 367.00 0.00 - 500.00 ng/ml FEU   Anion Gap   Result Value Ref Range    Anion Gap 12.0 8.0 - 16.0 meq/L   Osmolality   Result Value Ref Range    Osmolality Calc 272.4 (L) 275.0 - 300.0 mOsmol/kg   Glomerular Filtration Rate, Estimated   Result Value Ref Range    Est, Glom Filt Rate >90 ml/min/1.73m2   Scan of Blood Smear   Result Value Ref Range    SCAN OF BLOOD SMEAR see below    EKG 12 Lead   Result Value Ref Range Ventricular Rate 94 BPM    Atrial Rate 94 BPM    P-R Interval 158 ms    QRS Duration 88 ms    Q-T Interval 374 ms    QTc Calculation (Bazett) 467 ms    P Axis 35 degrees    R Axis 50 degrees    T Axis 47 degrees       RADIOLOGY REPORTS  No orders to display       [address] ED COURSE     ED Vitals:  Vitals:    08/19/21 2314 08/19/21 2334 08/20/21 0111   BP:  (!) 138/92 118/67   Pulse: 106  85   Resp: 15  16   Temp: 98 °F (36.7 °C)     TempSrc: Oral     SpO2: 99%  98%   Weight: (!) 309 lb (140.2 kg)     Height: 5' 10\" (1.778 m)         Differential diagnsis: Orthostatic dizziness, anxiety, arrhythmia, PE, CHF,    Positive orthostatic vital change. Patient received 1 L normal saline bolus. Labs are reassuring including normal D-dimer. She feels better on reassessment. Tachycardia improved. I recommend close follow-up with cardiology to seek definitive care of patient's orthostatic hypotension and possible POTS. Patient is discharged in stable conditions. CRITICAL CARE   None    CONSULTS   None    PROCEDURES   None    FINAL IMPRESSION AND DISPOSITION      1. Orthostatic dizziness    2.  POTS (postural orthostatic tachycardia syndrome)        Home    PATIENT REFERRED TO:  [address] [place] [address] Cardiology  [address]  [phone]  In 3 days  Call today to schedule appointment      DISCHARGE MEDICATIONS:  New Prescriptions    No medications on file       (Please note that portions of this note were completed with a voice recognition program.  Efforts were made to edit the dictations but occasionally words aremis-transcribed.)    MD Bridgette Prieto MD  08/20/21 4814

## 2021-08-20 NOTE — ED TRIAGE NOTES
Arrives to ER with concern of dizziness x 2-3 days. Pt reports she was seen at Urgent care today. She received Antivert. Reports antivert taken about an hour ago with no relief. Pt has hx of vincent. She reports she sometimes gets tunnel vision and headache around eyes after dizzy episodes. . no CP or SOB. Dizziness is worse with movement but remains while resting. orthos complete. ekg complete. Will monitor.

## 2021-08-23 ENCOUNTER — OFFICE VISIT (OUTPATIENT)
Dept: CARDIOLOGY CLINIC | Age: 26
End: 2021-08-23
Payer: MEDICARE

## 2021-08-23 VITALS
HEIGHT: 70 IN | DIASTOLIC BLOOD PRESSURE: 76 MMHG | BODY MASS INDEX: 41.95 KG/M2 | SYSTOLIC BLOOD PRESSURE: 117 MMHG | HEART RATE: 61 BPM | WEIGHT: 293 LBS

## 2021-08-23 DIAGNOSIS — R00.2 PALPITATIONS: ICD-10-CM

## 2021-08-23 DIAGNOSIS — R42 ORTHOSTATIC DIZZINESS: Primary | ICD-10-CM

## 2021-08-23 DIAGNOSIS — R00.0 INAPPROPRIATE SINUS TACHYCARDIA: ICD-10-CM

## 2021-08-23 DIAGNOSIS — G90.A POTS (POSTURAL ORTHOSTATIC TACHYCARDIA SYNDROME): ICD-10-CM

## 2021-08-23 DIAGNOSIS — R42 DIZZINESS, NONSPECIFIC: ICD-10-CM

## 2021-08-23 PROCEDURE — 99204 OFFICE O/P NEW MOD 45 MIN: CPT | Performed by: INTERNAL MEDICINE

## 2021-08-23 NOTE — PROGRESS NOTES
Chief Complaint   Patient presents with    New Patient    Establish Cardiologist    Check-Up    Follow-Up from [facility]    Dizziness     NP here - follow up from hospital - POTS    EKG done 8-19-21    Denied cp, sob, or edema    Occasional palpitations-daily    Dizziness in supine sitting or standing worse for the last-1 weeks  More with standing  Milder form before. Feel near syncope  No dizziness on rolling over  Getting better    Hx of syncope  Like 2014 - last one      Orthostatic BP done      SUPINE            SITTING         STANDING  119/74 100         122/79 100        117/76 64    Smokes 1/2 ppd for 3 yrs    FHX  Grandma and grandpa- had CAD/ MI age unknown      Record from Dr. Eduin Smith reviewed  \" 5/17/2017  HPI  Raisa Ernst is a 21 y.o. female with past medical history of bipolar mood disorder, palpitations, tachycardic episodes and syncope. She first noted racing heart rate when she was approximately 15years old. She has undergone tilt table test in the past (she reports when she was 13years old) which was consistent with POTS. She has had 3 full yudelka syncopal spells most recently in 2014 and she reports daily episodes of presyncope associated with changes in position, lasting 5-15 seconds. In 2015 she was started on beta blocker for consistently high heart rates, but beta blocker was not tolerated and subsequently discontinued. Holter monitoring in 2015 revealed frequent sinus tachycardia. Stress test showed poor functional capacity, EKG without ischemic changes, but stress inadequate to rule out ischemia. She underwent EP testing conducted 3/29/2016 at [address] [place] [address] by Dr. Breonna Donald to assess for other mechanisms for her tachycardia. EP study revealed normal sinus node, AV node function with inducible atrial tachycardia at a rate of 250bpm associated with hypotension.  Of note, the patient did wake up several times during the procedure, and has significant anxiety related to undergoing repeat  Diabetes Mother     Anxiety Disorder Father     Bipolar Disorder Father     High Blood Pressure Father     Mental Illness Father     Parkinsonism Father         Early-Onset    Depression Paternal Aunt     Cancer Paternal Uncle         multiple myeloma    Depression Paternal Uncle     Cancer Maternal Grandmother         breast    Ovarian Cancer Maternal Grandmother     Diabetes Maternal Grandmother     Depression Maternal Grandfather     Heart Attack Maternal Grandfather     Cancer Paternal Grandfather         lung    No Known Problems Brother         Gaviria disease    No Known Problems Paternal Grandmother     Lupus Maternal Aunt         Social History     Socioeconomic History    Marital status:      Spouse name: Corrie Shah    Number of children: 1    Years of education: 15    Highest education level: High school graduate   Occupational History    Not on file   Tobacco Use    Smoking status: Current Every Day Smoker     Packs/day: 0.50     Years: 1.00     Pack years: 0.50     Types: Cigarettes     Start date: 2018     Last attempt to quit: 2020     Years since quittin.5    Smokeless tobacco: Never Used   Vaping Use    Vaping Use: Former    Substances: Always   Substance and Sexual Activity    Alcohol use: Never     Alcohol/week: 1.0 standard drinks     Types: 1 Cans of beer per week     Comment: occasional    Drug use: Yes     Types: Marijuana     Comment: MEDICAL [facility]  21    Sexual activity: Yes     Partners: Male   Other Topics Concern    Not on file   Social History Narrative    Not on file     Social Determinants of Health     Financial Resource Strain: Low Risk     Difficulty of Paying Living Expenses: Not hard at all   Food Insecurity: No Food Insecurity    Worried About Running Out of Food in the Last Year: Never true    Everardo of Food in the Last Year: Never true   Transportation Needs: No Transportation Needs    Lack of Transportation (Medical): No    Lack of Transportation (Non-Medical): No   Physical Activity:     Days of Exercise per Week:     Minutes of Exercise per Session:    Stress:     Feeling of Stress :    Social Connections:     Frequency of Communication with Friends and Family:     Frequency of Social Gatherings with Friends and Family:     Attends Sabianism Services:     Active Member of Clubs or Organizations:     Attends Club or Organization Meetings:     Marital Status:    Intimate Partner Violence:     Fear of Current or Ex-Partner:     Emotionally Abused:     Physically Abused:     Sexually Abused:        Current Outpatient Medications   Medication Sig Dispense Refill    meclizine (ANTIVERT) 25 MG tablet Take 1 tablet by mouth 3 times daily as needed for Dizziness 30 tablet 0    albuterol (PROVENTIL) (2.5 MG/3ML) 0.083% nebulizer solution Take 3 mLs by nebulization every 6 hours as needed for Wheezing 120 each 3    clotrimazole-betamethasone (LOTRISONE) 1-0.05 % cream Apply topically 2 times daily 45 g 0    nystatin (MYCOSTATIN) 408612 UNIT/GM cream Apply topically 2 times daily.  30 g 0    SITagliptin (JANUVIA) 100 MG tablet Take 1 tablet by mouth every morning (before breakfast) 30 tablet 3    buPROPion HCl (WELLBUTRIN PO) Take by mouth      azelastine (ASTELIN) 0.1 % nasal spray 1 spray by Nasal route 2 times daily Use in each nostril as directed 1 Bottle 0    sertraline (ZOLOFT) 25 MG tablet Take 25 mg by mouth daily      Multiple Vitamin (MULTIVITAMIN ADULT PO) Take by mouth daily      ibuprofen (ADVIL;MOTRIN) 200 MG tablet Take 4 tablets by mouth every 8 hours as needed for Pain      busPIRone (BUSPAR) 15 MG tablet Take 45 mg by mouth nightly       OXcarbazepine (TRILEPTAL) 600 MG tablet       QUEtiapine (SEROQUEL) 300 MG tablet Take 300 mg by mouth nightly      QUEtiapine (SEROQUEL) 50 MG tablet Take 50 mg by mouth every morning (before breakfast)      ARIPiprazole (ABILIFY) 15 MG tablet Take 15 mg by mouth nightly      sertraline (ZOLOFT) 50 MG tablet Take 50 mg by mouth nightly       DULoxetine HCl (CYMBALTA PO) Take 120 mg by mouth nightly       Cholecalciferol (VITAMIN D3) 50 MCG (2000 UT) CAPS Take 1 capsule by mouth daily 30 capsule 0    omeprazole (PRILOSEC) 40 MG delayed release capsule TAKE 1 CAPSULE BY MOUTH TWO TIMES A DAY  (Patient taking differently: nightly PT TAKES ONCE AT HS) 60 capsule 10    cetirizine (ZYRTEC) 10 MG tablet Take 1 tablet by mouth daily 90 tablet 3    nicotine (NICODERM CQ) 14 MG/24HR Place 1 patch onto the skin daily 30 patch 1    albuterol sulfate  (90 Base) MCG/ACT inhaler Inhale 2 puffs into the lungs every 4 hours as needed for Wheezing 1 Inhaler 0     No current facility-administered medications for this visit. Review of Systems -     General ROS: negative  Psychological ROS: negative  Hematological and Lymphatic ROS: No history of blood clots or bleeding disorder. Respiratory ROS: no cough,  or wheezing, the rest see HPI  Cardiovascular ROS: See HPI  Gastrointestinal ROS: negative  Genito-Urinary ROS: no dysuria, trouble voiding, or hematuria  Musculoskeletal ROS: negative  Neurological ROS: no TIA or stroke symptoms  Dermatological ROS: negative      Blood pressure 117/76, pulse 61, height 5' 10\" (1.778 m), weight (!) 312 lb 6.4 oz (141.7 kg), last menstrual period 08/05/2021, not currently breastfeeding.         Physical Examination:    General appearance - alert, well appearing, and in no distress  HEENT- Pink conjunctiva  , Non-icteri sclera,PERRLA  Mental status - alert, oriented to person, place, and time  Neck - supple, no significant adenopathy, no JVD, or carotid bruits  Chest - clear to auscultation, no wheezes, rales or rhonchi, symmetric air entry  Heart - normal rate, regular rhythm, normal S1, S2, no murmurs, rubs, clicks or gallops  Abdomen - soft, nontender, nondistended, no masses or organomegaly  VICKIE- no CVA or flank tenderness, no suprapubic tenderness  Neurological - alert, oriented, normal speech, no focal findings or movement disorder noted  Musculoskeletal/limbs - no joint tenderness, deformity or swelling   - peripheral pulses normal, no pedal edema, no clubbing or cyanosis  Skin - normal coloration and turgor, no rashes, no suspicious skin lesions noted  Psych- appropriate mood and affect    Lab  No results for input(s): CKTOTAL, CKMB, CKMBINDEX, TROPONINI in the last 72 hours.   CBC:   Lab Results   Component Value Date    WBC 11.1 08/19/2021    RBC 4.77 08/19/2021    RBC 4.47 02/06/2020    HGB 13.2 08/19/2021    HCT 42.7 08/19/2021    MCV 89.5 08/19/2021    MCH 27.7 08/19/2021    MCHC 30.9 08/19/2021    RDW 19.0 01/17/2021     08/19/2021    MPV 10.4 08/19/2021     BMP:    Lab Results   Component Value Date     08/19/2021    K 3.8 08/19/2021     08/19/2021    CO2 25 08/19/2021    BUN 9 08/19/2021    LABALBU 4.6 08/19/2021    LABALBU 4.5 04/30/2012    CREATININE 0.6 08/19/2021    CALCIUM 9.6 08/19/2021    GFRAA >60 01/17/2021    LABGLOM >90 08/19/2021    GLUCOSE 96 08/19/2021    GLUCOSE 85 04/30/2012     Hepatic Function Panel:    Lab Results   Component Value Date    ALKPHOS 115 08/19/2021    ALT 28 08/19/2021    AST 20 08/19/2021    PROT 7.8 08/19/2021    BILITOT 0.2 08/19/2021    BILIDIR <0.2 07/07/2020    LABALBU 4.6 08/19/2021    LABALBU 4.5 04/30/2012     Magnesium:    Lab Results   Component Value Date    MG 2.0 01/12/2018     Warfarin PT/INR:  No components found for: PTPATWAR, PTINRWAR  HgBA1c:    Lab Results   Component Value Date    LABA1C 6.3 05/28/2021     FLP:    Lab Results   Component Value Date    TRIG 156 07/09/2013    HDL 68 07/09/2013    LDLCALC 67 07/09/2013     TSH:    Lab Results   Component Value Date    TSH 2.500 08/19/2021        I have personally reviewed and interpreted the results of the following diagnostic testing  CARDIAC HISTORY:     ECHO: 11/09/2015  Left ventricle:  Size was normal.  Systolic function was at the lower limits of normal. Ejection fraction was estimated in the range of 50 % to 50 %. There were no regional wall motion abnormalities.     Mitral valve: There was mild regurgitation.      Tricuspid valve: There was mild regurgitation.      Summary: This is a technically limited study that may impair interpretations  ECG:  10/31/2017 Sinus tachycardia,  bpm.  7/5/2017   Sinus tachycardia,  bpm.  5/17/2017 NSR  STRESS TEST: 04/05/2013     EPS: 3/29/2016 @ [facility]  1. Baseline sinus rhythm with normal intracardiac intervals  2. Normal sinus node testing  3. Normal AV node conduction with dual AV node physiology  4. No evidence of accessory bypass tract  5. No inducible supraventricular or ventricular arrhythmia at baseline  6. Inducible atrial tachycardia at rate of 250bpm associated with hypotension precluding further investigation until we have three dimensional mapping to also assist with mapping and ablation of the arrhythmia       EPS: 4/21/2016 @ [facility]  Negative EPS for inducible SVT     INSERTION of ILR: 4/21/2016 @ [facility]       IMPRESSION:  The patient is s/p sinus node modification ablation. Successful modification of sinus node based upon p wave morphology change and activation mapping. Unfortunately the patient's heart rate is unchanged indicating the mechanism of her sinus tachycardia is secondary to extrinsic factors.   The patient's insurance would not cover Corlanor, and she cannot tolerate beta blockers.   01/12/2018  PROVIDER:     Brett White MD     DATE OF PROCEDURE:  01/12/2018     PROCEDURE PERFORMED:  Sinus node modification ablation.     PREOPERATIVE DIAGNOSIS:  Inappropriate sinus tachycardia      ILR removed 08/7/2019      ekg 8/19/21  Normal sinus rhythm with sinus arrhythmia Possible Left atrial enlargement Borderline ECG When compared with ECG of 07-JUL-2020 15:46, No significant change was found      Assessment Diagnosis Orders   1. Orthostatic dizziness     2. Inappropriate sinus tachycardia     3. POTS (postural orthostatic tachycardia syndrome)     4. Dizziness, nonspecific     5.  Palpitations         Plan     meds and labs reviewed    Hx of orthostatic dizziness and non-postural dizziness - from hx  Echo  48 hrs holter  Hydration  Gatorade  Bedside ortho negative but pat felt dizziness    TTT    Lab from ED 08/19/21  TSH - WNL  BMP - WNL    Reviewed the previous record    D/w the pat the plan of care    RTC in 3 weeks      David Segal [facility]

## 2021-09-10 ENCOUNTER — HOSPITAL ENCOUNTER (OUTPATIENT)
Dept: NON INVASIVE DIAGNOSTICS | Age: 26
Discharge: HOME | End: 2021-09-10
Payer: MEDICARE

## 2021-09-10 DIAGNOSIS — G90.A POTS (POSTURAL ORTHOSTATIC TACHYCARDIA SYNDROME): ICD-10-CM

## 2021-09-10 DIAGNOSIS — R00.2 PALPITATIONS: ICD-10-CM

## 2021-09-10 DIAGNOSIS — R42 ORTHOSTATIC DIZZINESS: ICD-10-CM

## 2021-09-10 DIAGNOSIS — R42 DIZZINESS, NONSPECIFIC: ICD-10-CM

## 2021-09-10 DIAGNOSIS — R00.0 INAPPROPRIATE SINUS TACHYCARDIA: ICD-10-CM

## 2021-09-10 LAB
LV EF: 50 %
LVEF MODALITY: NORMAL

## 2021-09-10 PROCEDURE — 93226 XTRNL ECG REC<48 HR SCAN A/R: CPT

## 2021-09-10 PROCEDURE — 93225 XTRNL ECG REC<48 HRS REC: CPT

## 2021-09-10 PROCEDURE — 93306 TTE W/DOPPLER COMPLETE: CPT

## 2021-09-10 NOTE — PROCEDURES
The skin was prepped and a 48 hr holter monitor was applied. The patient was instructed on the documentation of symptoms and the purpose of the holter as well as the things to avoid while wearing the holter. The patient was instructed to remove and return the holter on 9/12/21 . The serial number of the holter that was applied is [id number].

## 2021-09-15 ENCOUNTER — HOSPITAL ENCOUNTER (OUTPATIENT)
Dept: NON INVASIVE DIAGNOSTICS | Age: 26
Discharge: HOME | End: 2021-09-15
Payer: MEDICARE

## 2021-09-15 DIAGNOSIS — G90.A POTS (POSTURAL ORTHOSTATIC TACHYCARDIA SYNDROME): ICD-10-CM

## 2021-09-15 DIAGNOSIS — R42 DIZZINESS, NONSPECIFIC: ICD-10-CM

## 2021-09-15 DIAGNOSIS — R42 ORTHOSTATIC DIZZINESS: ICD-10-CM

## 2021-09-15 DIAGNOSIS — R00.2 PALPITATIONS: ICD-10-CM

## 2021-09-15 DIAGNOSIS — R00.0 INAPPROPRIATE SINUS TACHYCARDIA: ICD-10-CM

## 2021-09-15 PROCEDURE — 93660 TILT TABLE EVALUATION: CPT | Performed by: INTERNAL MEDICINE

## 2021-09-16 ENCOUNTER — OFFICE VISIT (OUTPATIENT)
Dept: CARDIOLOGY CLINIC | Age: 26
End: 2021-09-16
Payer: MEDICARE

## 2021-09-16 VITALS
BODY MASS INDEX: 41.95 KG/M2 | SYSTOLIC BLOOD PRESSURE: 140 MMHG | HEIGHT: 70 IN | DIASTOLIC BLOOD PRESSURE: 76 MMHG | WEIGHT: 293 LBS | HEART RATE: 90 BPM

## 2021-09-16 DIAGNOSIS — R42 DIZZINESS, NONSPECIFIC: ICD-10-CM

## 2021-09-16 DIAGNOSIS — G90.A POTS (POSTURAL ORTHOSTATIC TACHYCARDIA SYNDROME): ICD-10-CM

## 2021-09-16 DIAGNOSIS — R00.0 INAPPROPRIATE SINUS TACHYCARDIA: Primary | ICD-10-CM

## 2021-09-16 PROBLEM — R07.1 CHEST PAIN ON BREATHING: Status: RESOLVED | Noted: 2017-05-05 | Resolved: 2021-09-16

## 2021-09-16 LAB
ACQUISITION DURATION: NORMAL S
AVERAGE HEART RATE: 111 BPM
HOOKUP DATE: NORMAL
HOOKUP TIME: NORMAL
MAX HEART RATE TIME/DATE: NORMAL
MAX HEART RATE: 182 BPM
MIN HEART RATE TIME/DATE: NORMAL
MIN HEART RATE: 76 BPM
NUMBER OF QRS COMPLEXES: NORMAL
NUMBER OF SUPRAVENTRICULAR COUPLETS: 15
NUMBER OF SUPRAVENTRICULAR ECTOPICS: 5308
NUMBER OF SUPRAVENTRICULAR ISOLATED BEATS: 191
NUMBER OF VENTRICULAR BIGEMINAL CYCLES: 0
NUMBER OF VENTRICULAR COUPLETS: 0
NUMBER OF VENTRICULAR ECTOPICS: 107

## 2021-09-16 PROCEDURE — 99214 OFFICE O/P EST MOD 30 MIN: CPT | Performed by: INTERNAL MEDICINE

## 2021-09-16 RX ORDER — TOPIRAMATE 25 MG/1
TABLET ORAL
COMMUNITY
Start: 2021-08-26 | End: 2022-01-28

## 2021-09-16 RX ORDER — IBUPROFEN 800 MG/1
TABLET ORAL
COMMUNITY
Start: 2021-09-04 | End: 2022-01-22

## 2021-09-16 NOTE — PROGRESS NOTES
Chief Complaint   Patient presents with    Follow-up     3 week     Originally  here - follow up from hospital - POTS    EKG done 8-19-21    3 week fu. TTT, holter, Echo    EKG 8-19-21    Denied cp, sob, or edema    Occasional palpitations-daily    Dizziness in supine sitting or standing - better after hydration  More with standing  Milder form before. Feel near syncope  No dizziness on rolling over  Getting better    Hx of syncope  Like 2014 - last one      Orthostatic BP done      SUPINE            SITTING         STANDING  119/74 100         122/79 100        117/76 64    Smokes 1/2 ppd for 3 yrs    FHX  Grandma and grandpa- had CAD/ MI age unknown      Record from Dr. Jackie Pedersen reviewed  \" 5/17/2017  LUCIO Willoughby is a 21 y.o. female with past medical history of bipolar mood disorder, palpitations, tachycardic episodes and syncope. She first noted racing heart rate when she was approximately 15years old. She has undergone tilt table test in the past (she reports when she was 13years old) which was consistent with POTS. She has had 3 full yudelka syncopal spells most recently in 2014 and she reports daily episodes of presyncope associated with changes in position, lasting 5-15 seconds. In 2015 she was started on beta blocker for consistently high heart rates, but beta blocker was not tolerated and subsequently discontinued. Holter monitoring in 2015 revealed frequent sinus tachycardia. Stress test showed poor functional capacity, EKG without ischemic changes, but stress inadequate to rule out ischemia. She underwent EP testing conducted 3/29/2016 at [address] [place] [address] by Dr. Tabitha Bennett to assess for other mechanisms for her tachycardia. EP study revealed normal sinus node, AV node function with inducible atrial tachycardia at a rate of 250bpm associated with hypotension. Of note, the patient did wake up several times during the procedure, and has significant anxiety related to undergoing repeat procedure.  It was recommended to repeat study at [facility] with 3D mapping and ablation of the arrhythmia. The patient underwent repeat EPS at [facility] on 2016 and did not have any inducible SVT. The patient was placed on Verapamil and had an ILR implanted. The patient continues to complain of a rapid heart beat that causes her chest to hurt and also of orthostatic palpitations and dizziness that has not responded to the midodrine. The patient was also found when she went to the ED for this problem on 2017 to have several small pulmonary emboli. Her BCPs were stopped and she is on Xarelto. \"          Past Surgical History:   Procedure Laterality Date    [facility]BL[facility] OF DYSRHYTHMIC FOCUS      [facility]    ANKLE FRACTURE SURGERY Right 2020    RIGHT ANKLE OPEN REDUCTION INTERNAL FIXATION AND DELTOID LIGAMENT REPAIR performed by Lorenza Rush DPM at [address] [place] [address]  2016    EP Study    [facility]  [facility]    [facility]     SECTION N/A 2020     SECTION performed by Gallo Juarez MD at [facility]    [address] [place] [address], LAPAROSCOPIC N/A 2021    CHOLECYSTECTOMY LAPAROSCOPIC ROBOTIC performed by Ruthann Smith MD at [address] [place] [address]  2015    [facility]    INSERTABLE CARDIAC MONITOR      UPPER GASTROINTESTINAL ENDOSCOPY  2020    Dr. Fifi Deluca  2015    Jonathan Johns [address] EXTRACTION         Allergies   Allergen Reactions    Dilaudid [Hydromorphone Hcl]      hallucination    Flexeril [Cyclobenzaprine] Other (See Comments)     Hallucinations    Keflex [Cephalexin] Other (See Comments)     Lose cognitive functions.      Tessalon [Benzonatate] Other (See Comments)     psychosis    Augmentin [Amoxicillin-Pot Clavulanate] Rash    Lorabid [Loracarbef] Hives, Swelling and Rash    Minocycline Itching, Swelling and Rash        Family History   Problem Relation Age of Onset    Anxiety Disorder Mother    [facility] Diabetes Mother  Anxiety Disorder Father     Bipolar Disorder Father     High Blood Pressure Father     Mental Illness Father     Parkinsonism Father         Early-Onset    Depression Paternal Aunt     Cancer Paternal Uncle         multiple myeloma    Depression Paternal Uncle     Cancer Maternal Grandmother         breast    Ovarian Cancer Maternal Grandmother     Diabetes Maternal Grandmother     Depression Maternal Grandfather     Heart Attack Maternal Grandfather     Cancer Paternal Grandfather         lung    No Known Problems Brother         Gaviria disease    No Known Problems Paternal Grandmother     Lupus Maternal Aunt         Social History     Socioeconomic History    Marital status:      Spouse name: Josep Cabral    Number of children: 1    Years of education: 15    Highest education level: High school graduate   Occupational History    Not on file   Tobacco Use    Smoking status: Current Every Day Smoker     Packs/day: 0.50     Years: 1.00     Pack years: 0.50     Types: Cigarettes     Start date: 2018     Last attempt to quit: 2020     Years since quittin.6    Smokeless tobacco: Never Used   Vaping Use    Vaping Use: Former    Substances: Always   Substance and Sexual Activity    Alcohol use: Never     Alcohol/week: 1.0 standard drinks     Types: 1 Cans of beer per week     Comment: occasional    Drug use: Yes     Types: Marijuana     Comment: MEDICAL [address] [facility]  21    Sexual activity: Yes     Partners: Male   Other Topics Concern    Not on file   Social History Narrative    Not on file     Social Determinants of Health     Financial Resource Strain: Low Risk     Difficulty of Paying Living Expenses: Not hard at all   Food Insecurity: No Food Insecurity    Worried About [address] in the Last Year: Never true    [address] in the Last Year: Never true   Transportation Needs: No Transportation Needs    Lack of Transportation (Medical):  No    Lack of Transportation (Non-Medical): No   Physical Activity:     Days of Exercise per Week:     Minutes of Exercise per Session:    Stress:     Feeling of Stress :    Social Connections:     Frequency of Communication with Friends and Family:     Frequency of Social Gatherings with Friends and Family:     Attends Yazdanism Services:     Active Member of Clubs or Organizations:     Attends Club or Organization Meetings:     Marital Status:    Intimate Partner Violence:     Fear of Current or Ex-Partner:     Emotionally Abused:     Physically Abused:     Sexually Abused:        Current Outpatient Medications   Medication Sig Dispense Refill    ibuprofen (ADVIL;MOTRIN) 800 MG tablet TAKE 1 TABLET BY MOUTH FOUR TIMES A DAY AS NEEDED      albuterol (PROVENTIL) (2.5 MG/3ML) 0.083% nebulizer solution Take 3 mLs by nebulization every 6 hours as needed for Wheezing 120 each 3    clotrimazole-betamethasone (LOTRISONE) 1-0.05 % cream Apply topically 2 times daily 45 g 0    nystatin (MYCOSTATIN) 213418 UNIT/GM cream Apply topically 2 times daily.  30 g 0    SITagliptin (JANUVIA) 100 MG tablet Take 1 tablet by mouth every morning (before breakfast) 30 tablet 3    albuterol sulfate  (90 Base) MCG/ACT inhaler Inhale 2 puffs into the lungs every 4 hours as needed for Wheezing 1 Inhaler 0    azelastine (ASTELIN) 0.1 % nasal spray 1 spray by Nasal route 2 times daily Use in each nostril as directed 1 Bottle 0    sertraline (ZOLOFT) 25 MG tablet Take 25 mg by mouth daily      Multiple Vitamin (MULTIVITAMIN ADULT PO) Take by mouth daily      busPIRone (BUSPAR) 15 MG tablet Take 45 mg by mouth nightly       OXcarbazepine (TRILEPTAL) 600 MG tablet       QUEtiapine (SEROQUEL) 300 MG tablet Take 300 mg by mouth nightly      QUEtiapine (SEROQUEL) 50 MG tablet Take 50 mg by mouth every morning (before breakfast)      ARIPiprazole (ABILIFY) 15 MG tablet Take 15 mg by mouth nightly      sertraline (ZOLOFT) 50 MG tablet Take 50 mg by mouth nightly       DULoxetine HCl (CYMBALTA PO) Take 120 mg by mouth nightly       omeprazole (PRILOSEC) 40 MG delayed release capsule TAKE 1 CAPSULE BY MOUTH TWO TIMES A DAY  (Patient taking differently: nightly PT TAKES ONCE AT HS) 60 capsule 10    cetirizine (ZYRTEC) 10 MG tablet Take 1 tablet by mouth daily 90 tablet 3    topiramate (TOPAMAX) 25 MG tablet TAKE 1 TABLET BY MOUTH TWO TIMES A DAY       No current facility-administered medications for this visit. Review of Systems -     General ROS: negative  Psychological ROS: negative  Hematological and Lymphatic ROS: No history of blood clots or bleeding disorder. Respiratory ROS: no cough,  or wheezing, the rest see HPI  Cardiovascular ROS: See HPI  Gastrointestinal ROS: negative  Genito-Urinary ROS: no dysuria, trouble voiding, or hematuria  Musculoskeletal ROS: negative  Neurological ROS: no TIA or stroke symptoms  Dermatological ROS: negative      Blood pressure (!) 140/76, pulse 90, height 5' 10\" (1.778 m), weight (!) 309 lb (140.2 kg), not currently breastfeeding.         Physical Examination:    General appearance - alert, well appearing, and in no distress  HEENT- Pink conjunctiva  , Non-icteri sclera,PERRLA  Mental status - alert, oriented to person, place, and time  Neck - supple, no significant adenopathy, no JVD, or carotid bruits  Chest - clear to auscultation, no wheezes, rales or rhonchi, symmetric air entry  Heart - normal rate, regular rhythm, normal S1, S2, no murmurs, rubs, clicks or gallops  Abdomen - soft, nontender, nondistended, no masses or organomegaly  VICKIE- no CVA or flank tenderness, no suprapubic tenderness  Neurological - alert, oriented, normal speech, no focal findings or movement disorder noted  Musculoskeletal/limbs - no joint tenderness, deformity or swelling   - peripheral pulses normal, no pedal edema, no clubbing or cyanosis  Skin - normal coloration and turgor, no rashes, no suspicious skin lesions noted  Psych- appropriate mood and affect    Lab  No results for input(s): CKTOTAL, CKMB, CKMBINDEX, TROPONINI in the last 72 hours. CBC:   Lab Results   Component Value Date    WBC 11.1 08/19/2021    RBC 4.77 08/19/2021    RBC 4.47 02/06/2020    HGB 13.2 08/19/2021    HCT 42.7 08/19/2021    MCV 89.5 08/19/2021    MCH 27.7 08/19/2021    MCHC 30.9 08/19/2021    RDW 19.0 01/17/2021     08/19/2021    MPV 10.4 08/19/2021     BMP:    Lab Results   Component Value Date     08/19/2021    K 3.8 08/19/2021     08/19/2021    CO2 25 08/19/2021    BUN 9 08/19/2021    LABALBU 4.6 08/19/2021    LABALBU 4.5 04/30/2012    CREATININE 0.6 08/19/2021    CALCIUM 9.6 08/19/2021    GFRAA >60 01/17/2021    LABGLOM >90 08/19/2021    GLUCOSE 96 08/19/2021    GLUCOSE 85 04/30/2012     Hepatic Function Panel:    Lab Results   Component Value Date    ALKPHOS 115 08/19/2021    ALT 28 08/19/2021    AST 20 08/19/2021    PROT 7.8 08/19/2021    BILITOT 0.2 08/19/2021    BILIDIR <0.2 07/07/2020    LABALBU 4.6 08/19/2021    LABALBU 4.5 04/30/2012     Magnesium:    Lab Results   Component Value Date    MG 2.0 01/12/2018     Warfarin PT/INR:  No components found for: PTPATWAR, PTINRWAR  HgBA1c:    Lab Results   Component Value Date    LABA1C 6.3 05/28/2021     FLP:    Lab Results   Component Value Date    TRIG 156 07/09/2013    HDL 68 07/09/2013    LDLCALC 67 07/09/2013     TSH:    Lab Results   Component Value Date    TSH 2.500 08/19/2021        I have personally reviewed and interpreted the results of the following diagnostic testing  CARDIAC HISTORY:     ECHO: 11/09/2015  Left ventricle:  Size was normal.  Systolic function was at the lower limits of normal. Ejection fraction was estimated in the range of 50 % to 50 %. There were no regional wall motion abnormalities.     Mitral valve: There was mild regurgitation.      Tricuspid valve: There was mild regurgitation.      Summary:  This is a technically limited study that may impair interpretations  ECG:  10/31/2017 Sinus tachycardia,  bpm.  7/5/2017   Sinus tachycardia,  bpm.  5/17/2017 NSR  STRESS TEST: 04/05/2013     EPS: 3/29/2016 @ [facility]  1. Baseline sinus rhythm with normal intracardiac intervals  2. Normal sinus node testing  3. Normal AV node conduction with dual AV node physiology  4. No evidence of accessory bypass tract  5. No inducible supraventricular or ventricular arrhythmia at baseline  6. Inducible atrial tachycardia at rate of 250bpm associated with hypotension precluding further investigation until we have three dimensional mapping to also assist with mapping and ablation of the arrhythmia       EPS: 4/21/2016 @ [facility]  Negative EPS for inducible SVT     INSERTION of ILR: 4/21/2016 @ [facility]       IMPRESSION:  The patient is s/p sinus node modification ablation. Successful modification of sinus node based upon p wave morphology change and activation mapping. Unfortunately the patient's heart rate is unchanged indicating the mechanism of her sinus tachycardia is secondary to extrinsic factors. The patient's insurance would not cover Corlanor, and she cannot tolerate beta blockers.   01/12/2018  PROVIDER:     Neeraj Donald MD     DATE OF PROCEDURE:  01/12/2018     PROCEDURE PERFORMED:  Sinus node modification ablation.     PREOPERATIVE DIAGNOSIS:  Inappropriate sinus tachycardia    Conclusions    Summary   Normal left ventricle size and lOW nORMAL LV systolic function. Ejection   fraction was estimated at 50 %. There were no regional left ventricular   wall motion abnormalities and wall thickness was within normal limits. Signature      ----------------------------------------------------------------   Electronically signed by Marialuisa Love MD (Interpreting   physician) on 09/10/2021 at 05:26 PM       CONCLUSION:  1. This is a benign tilt table study. 2.  Tilt table test negative for vasovagal response.   3.  Tilt table test negative for orthostatic hypotension. 4.  Tilt table test negative for POTS (postural orthostatic tachycardia  syndrome) and the dizziness is here during the tilt table test we noted. Of note here is that we noted during the tilt table test, the patient is  having dizziness in standing as well as dizzy in supine. During the  recovery, she stated that it got worse; however, blood pressure was well  maintained at all times when the patient was complaining of dizziness. 6.  Further clinical correlation recommended. Other cause of dizziness  other than hemodynamic derangement needs to be sought.           DULCE Cannon M.D.     D: 09/15/2021 18:13:40             Holter 48 hrs  DIARY *  Symptom correlates with sinus tachycardia and NSR with normal rate  No symptom reported during SVTs   CONCLUSION:  *     Normal Sinus rhythm    Average Heart Rate  111 bpm Range from  76 bpm to 182 bpm   No long pause or profound bradycardia    No Atrial Fibrillation    Rare Premature ventricular complexes  Abnormal holter  Frequent Premature atrial complexes -2%  One  Supraventricular tachycardia - at 5:09 pm-   sustained one Fastest SVT at rate of 182 bpm - was sustained several beats at least over 1 minutes  and no  symptom reported.     Clinical correlation indicated   Confirmed by Cori Cannon MD, Rosey Cervantes ([id number]) on 9/16/2021 8:30:27 AM    ILR removed 08/7/2019      ekg 8/19/21  Normal sinus rhythm with sinus arrhythmia Possible Left atrial enlargement Borderline ECG When compared with ECG of 07-JUL-2020 15:46, No significant change was found      Assessment    PROVIDER:     Brett White MD     DATE OF PROCEDURE:  01/12/2018     PROCEDURE PERFORMED:  Sinus node modification ablation.     PREOPERATIVE DIAGNOSIS:  Inappropriate sinus tachycardia     Diagnosis Orders   1. Inappropriate sinus tachycardia     2. POTS (postural orthostatic tachycardia syndrome)     3.  Dizziness, nonspecific         Plan     The current meds and labs reviewed    Hx of orthostatic dizziness and non-postural dizziness - from hx  Echo- WNL  48 hrs holter WNL but one episode of SVT with no symptom  EPS: 4/21/2016 @ [facility]  Negative EPS for inducible SVT  Hydration  Gatorade  Bedside ortho negative but pat felt dizziness  Feeling good with hydration  Consider ivabardine if get recurrence of symptoms- palpitation and sinus tachy    Could not tolerate metoprolol for low Bp  TTT- negative    Lab from ED 08/19/21  TSH - WNL  BMP - WNL    Reviewed the previous record    D/w the pat the plan of care    RTC in 6 months    [facility]

## 2021-09-16 NOTE — PROCEDURES
[address] [id number]                                TILT TABLE TEST    PATIENT NAME: Kailey Linda                      :        1995  MED REC NO:   [id number]                           ROOM:  ACCOUNT NO:   [de-identified]                           ADMIT DATE: 09/15/2021  PROVIDER:     Maryjane Solitario. Michelle Shrestha M.D.    Lam Evangelista:  09/15/2021    TILT TABLE TEST    INDICATION:  Syncope. Baseline blood pressure 134/84, pulse rate 87. Baseline EKG, sinus  rhythm. PROCEDURE DETAIL:  Under continuous EKG monitoring and intermittent  blood pressure monitoring, the patient was allowed to assume standing  position at 70-degree inclination. Three minutes into tilting, blood  pressure 150/90, pulse rate 91.  10 minutes into tilting, blood pressure  139/106, pulse rate 93.  20 minutes into tilting, blood pressure 143/83,  pulse rate 88.  30 minutes into tilting, blood pressure 145/87, pulse  rate 110. After 30 minutes of tilting, the patient was allowed to  assume supine position. Three minutes in recovery, blood pressure  149/76, pulse rate 87. SYMPTOMATOLOGY:  40 minutes into tilting, the patient was feeling dizzy. However, the blood pressure is 147/82, pulse rate 88, and then dizziness  resolved, and then 28 and 29 minutes into tilting, the patient was  feeling dizzy. Blood pressure was 151/87, pulse rate 97. During assumption of supine position, the patient's dizziness worse. At that time, blood pressure 149/76, pulse rate 87. So, the dizziness  was not postural, was having dizziness in the standing position, having  dizziness also in supine position, and actually this was stated to be  worse when she was assuming supine position in the recovery and blood  pressure is well maintained throughout actually, so this symptomatology  is not explained by any hemodynamic derangement.     Hemodynamically, blood pressure is well maintained and heart rate is  well maintained. CONCLUSION:  1. This is a benign tilt table study. 2.  Tilt table test negative for vasovagal response. 3.  Tilt table test negative for orthostatic hypotension. 4.  Tilt table test negative for POTS (postural orthostatic tachycardia  syndrome) and the dizziness is here during the tilt table test we noted. Of note here is that we noted during the tilt table test, the patient is  having dizziness in standing as well as dizzy in supine. During the  recovery, she stated that it got worse; however, blood pressure was well  maintained at all times when the patient was complaining of dizziness. 6.  Further clinical correlation recommended. Other cause of dizziness  other than hemodynamic derangement needs to be sought. Laron Cortez M.D.    D: 09/15/2021 18:13:40       T: 09/15/2021 19:35:34     MARK_SERGEY  Job#: [id number]     Doc#: [id number]    CC:

## 2021-09-22 ENCOUNTER — OFFICE VISIT (OUTPATIENT)
Dept: FAMILY MEDICINE CLINIC | Age: 26
End: 2021-09-22
Payer: MEDICARE

## 2021-09-22 VITALS
HEART RATE: 96 BPM | WEIGHT: 293 LBS | DIASTOLIC BLOOD PRESSURE: 74 MMHG | RESPIRATION RATE: 18 BRPM | SYSTOLIC BLOOD PRESSURE: 116 MMHG | BODY MASS INDEX: 44.74 KG/M2

## 2021-09-22 DIAGNOSIS — R20.2 NUMBNESS AND TINGLING OF BOTH FEET: ICD-10-CM

## 2021-09-22 DIAGNOSIS — F17.200 SMOKING: ICD-10-CM

## 2021-09-22 DIAGNOSIS — F33.0 MAJOR DEPRESSIVE DISORDER, RECURRENT EPISODE, MILD (HCC): ICD-10-CM

## 2021-09-22 DIAGNOSIS — R73.01 IFG (IMPAIRED FASTING GLUCOSE): ICD-10-CM

## 2021-09-22 DIAGNOSIS — E66.01 MORBID OBESITY WITH BMI OF 40.0-44.9, ADULT (HCC): Primary | ICD-10-CM

## 2021-09-22 DIAGNOSIS — G90.A POTS (POSTURAL ORTHOSTATIC TACHYCARDIA SYNDROME): ICD-10-CM

## 2021-09-22 DIAGNOSIS — R20.0 NUMBNESS AND TINGLING OF BOTH FEET: ICD-10-CM

## 2021-09-22 PROCEDURE — 99214 OFFICE O/P EST MOD 30 MIN: CPT | Performed by: NURSE PRACTITIONER

## 2021-09-22 ASSESSMENT — ENCOUNTER SYMPTOMS
COUGH: 0
SHORTNESS OF BREATH: 0
NAUSEA: 0
ABDOMINAL PAIN: 0

## 2021-09-22 NOTE — PATIENT INSTRUCTIONS
You may receive a survey regarding the care you received during your visit. Your input is valuable to us. We encourage you to complete and return your survey. We hope you will choose us in the future for your healthcare needs. Tobacco Cessation Programs     Telephonic behavior modification  Batsheva Garg ([phone])   Counseling service for those who are ready to quit using tobacco.     Available for uninsured [place] residents, [place] recipients, pregnant women, or patients whose health plans or employers are members of the [address] [place] [address] behavior modification   http://[place]. Quitlogix. org   Online support program to help patients through each step of the quitting process. Available 24 hours a day 7 days a week. Provides up to date researched based tool, step-by-step guides, and motivational messages. Online behavior modification   www.lungusa.org/stop-smoking/how-to-quit   HelpLine: [facility] (150-8358)   Email questions to:  Veronica@[employer]aret[employer]. org    Website offers resources to help tobacco users figure out their reasons for quitting and then take the big step of quitting for good. Hypnosis   Location: [facility], [employer], [place]   Contact: Osito Clarke, PhD at [phone]   Hypnosis for tobacco cessation   Cost $225 for the initial session and $175 for each session afterwards. Most patients require 6-8 sessions. There is the option to submit through the patients insurance. Hypnosis and behavior modification   Location: [address] [address],  [facility] [address] [place] [employer] [address], [place]   Contact: Prasanna Mccann, PhD at [phone]  [employer] Counseling and hypnosis for nicotine addition   Cost: For uninsured patients:  Please call above phone number  Cost for insured patients depends on patients insurance plan.     Behavior modification   Location: [address], [facility] [place] [address] [place], [address]: [address] four one-on-one appointments between the patient and a respiratory therapist.  The four appointments span over three weeks. The respiratory therapist schedules one of the appointments to occur 48 hours after the patients quit date.  Cost $100 total for the four sessions.   Tobacco cessation products are not included in the cost and are not provided by [facility].     Estrada Smith

## 2021-09-22 NOTE — PROGRESS NOTES
Colon cancer screen colonoscopy  08/14/2025    DTaP/Tdap/Td vaccine (8 - Td or Tdap) 08/17/2030    Hepatitis B vaccine  Completed    Hib vaccine  Completed    Varicella vaccine  Completed    Meningococcal (ACWY) vaccine  Completed    HIV screen  Completed       Immunization History   Administered Date(s) Administered    DTaP 1995, 03/04/1996, 04/22/1996, 01/08/1997, 05/23/2001    Hepatitis A 03/14/2013    Hepatitis B 1995, 1995, 07/08/1996    Hib, unspecified 1995, 03/04/1996, 04/22/1996, 01/08/1997    Influenza Virus Vaccine 10/22/2013    Influenza Whole 10/22/2013    MMR 10/07/1996, 05/23/2001    Meningococcal MCV4P (Menactra) 03/14/2013    Polio IPV (IPOL) 05/23/2001    Polio OPV 1995, 03/04/1996, 04/22/1996    Tdap (Boostrix, Adacel) 10/24/2014, 08/17/2020    Varicella (Varivax) 10/07/1996, 03/14/2013       Review of Systems   Constitutional: Positive for fatigue. Negative for chills and fever. HENT: Negative. Respiratory: Negative for cough and shortness of breath. Cardiovascular: Negative for chest pain. Gastrointestinal: Negative for abdominal pain and nausea. Skin: Negative for rash. Neurological: Positive for dizziness and light-headedness. Negative for headaches. Psychiatric/Behavioral: Negative. Objective:   Physical Exam  Constitutional:       General: She is not in acute distress. Eyes:      Pupils: Pupils are equal, round, and reactive to light. Cardiovascular:      Rate and Rhythm: Normal rate and regular rhythm. Heart sounds: No murmur heard. Pulmonary:      Effort: Pulmonary effort is normal.      Breath sounds: Normal breath sounds. No wheezing. Abdominal:      General: Bowel sounds are normal. There is no distension. Palpations: Abdomen is soft. Tenderness: There is no abdominal tenderness. Musculoskeletal:         General: No tenderness. Normal range of motion.       Cervical back: Normal range of motion and neck supple. Skin:     General: Skin is warm and dry. Findings: No rash. Assessment:       Diagnosis Orders   1. Morbid obesity with BMI of 40.0-44.9, adult ([address]  Lipid Panel    Amb External Referral To Bariatrics   2. IFG (impaired fasting glucose)  Hemoglobin A1C   3. Numbness and tingling of both feet  [facility] (EMG) - Irlanda Batista MD   4. Smoking     5. Major depressive disorder, recurrent episode, mild ([address]     6. POTS (postural orthostatic tachycardia syndrome)             Plan:      Refer to [facility]  Set up for EMG for feet neuropathy complaint - DM have been controlled  Januvia fatigue? Hypoglycemia? Stop Januvia  Repeat A1C and LIPID  Hold on new DM medications  Healthy Lifestyles discussed  Follow up with Specialists  RTO PRN      Current Outpatient Medications   Medication Sig Dispense Refill    topiramate (TOPAMAX) 25 MG tablet TAKE 1 TABLET BY MOUTH TWO TIMES A DAY      ibuprofen (ADVIL;MOTRIN) 800 MG tablet TAKE 1 TABLET BY MOUTH FOUR TIMES A DAY AS NEEDED      albuterol (PROVENTIL) (2.5 MG/3ML) 0.083% nebulizer solution Take 3 mLs by nebulization every 6 hours as needed for Wheezing 120 each 3    clotrimazole-betamethasone (LOTRISONE) 1-0.05 % cream Apply topically 2 times daily 45 g 0    nystatin (MYCOSTATIN) 004920 UNIT/GM cream Apply topically 2 times daily.  30 g 0    SITagliptin (JANUVIA) 100 MG tablet Take 1 tablet by mouth every morning (before breakfast) 30 tablet 3    albuterol sulfate  (90 Base) MCG/ACT inhaler Inhale 2 puffs into the lungs every 4 hours as needed for Wheezing 1 Inhaler 0    azelastine (ASTELIN) 0.1 % nasal spray 1 spray by Nasal route 2 times daily Use in each nostril as directed 1 Bottle 0    sertraline (ZOLOFT) 25 MG tablet Take 25 mg by mouth daily      busPIRone (BUSPAR) 15 MG tablet Take 45 mg by mouth nightly       OXcarbazepine (TRILEPTAL) 600 MG tablet       QUEtiapine (SEROQUEL) 300 MG tablet Take 300 mg by mouth nightly      QUEtiapine (SEROQUEL) 50 MG tablet Take 50 mg by mouth every morning (before breakfast)      ARIPiprazole (ABILIFY) 15 MG tablet Take 15 mg by mouth nightly      sertraline (ZOLOFT) 50 MG tablet Take 50 mg by mouth nightly       DULoxetine HCl (CYMBALTA PO) Take 120 mg by mouth nightly       omeprazole (PRILOSEC) 40 MG delayed release capsule TAKE 1 CAPSULE BY MOUTH TWO TIMES A DAY  (Patient taking differently: nightly PT TAKES ONCE AT HS) 60 capsule 10    cetirizine (ZYRTEC) 10 MG tablet Take 1 tablet by mouth daily 90 tablet 3     No current facility-administered medications for this visit.

## 2021-09-22 NOTE — PROGRESS NOTES
Referral faxed to the [facility] at [phone]. The patient is aware that they will call her to schedule.

## 2021-09-24 ENCOUNTER — NURSE ONLY (OUTPATIENT)
Dept: LAB | Age: 26
End: 2021-09-24

## 2021-09-24 DIAGNOSIS — R73.01 IFG (IMPAIRED FASTING GLUCOSE): ICD-10-CM

## 2021-09-24 DIAGNOSIS — E66.01 MORBID OBESITY WITH BMI OF 40.0-44.9, ADULT (HCC): ICD-10-CM

## 2021-09-24 LAB
AVERAGE GLUCOSE: 126 MG/DL (ref 70–126)
CHOLESTEROL, TOTAL: 196 MG/DL (ref 100–199)
HBA1C MFR BLD: 6.2 % (ref 4.4–6.4)
HDLC SERPL-MCNC: 44 MG/DL
LDL CHOLESTEROL CALCULATED: 72 MG/DL
TRIGL SERPL-MCNC: 399 MG/DL (ref 0–199)

## 2021-09-28 ENCOUNTER — PROCEDURE VISIT (OUTPATIENT)
Dept: NEUROLOGY | Age: 26
End: 2021-09-28
Payer: MEDICARE

## 2021-09-28 DIAGNOSIS — M79.605 BILATERAL LEG PAIN: ICD-10-CM

## 2021-09-28 DIAGNOSIS — R20.0 NUMBNESS IN BOTH LEGS: Primary | ICD-10-CM

## 2021-09-28 DIAGNOSIS — M79.604 BILATERAL LEG PAIN: ICD-10-CM

## 2021-09-28 PROCEDURE — 95910 NRV CNDJ TEST 7-8 STUDIES: CPT | Performed by: PSYCHIATRY & NEUROLOGY

## 2021-09-28 PROCEDURE — 95886 MUSC TEST DONE W/N TEST COMP: CPT | Performed by: PSYCHIATRY & NEUROLOGY

## 2021-10-04 ENCOUNTER — TELEPHONE (OUTPATIENT)
Dept: FAMILY MEDICINE CLINIC | Age: 26
End: 2021-10-04

## 2021-10-08 ENCOUNTER — VIRTUAL VISIT (OUTPATIENT)
Dept: PSYCHOLOGY | Age: 26
End: 2021-10-08
Payer: MEDICARE

## 2021-10-08 DIAGNOSIS — F31.32 BIPOLAR 1 DISORDER, DEPRESSED, MODERATE (HCC): ICD-10-CM

## 2021-10-08 PROCEDURE — 90791 PSYCH DIAGNOSTIC EVALUATION: CPT | Performed by: PSYCHOLOGIST

## 2021-10-08 NOTE — PROGRESS NOTES
Behavioral Health Consultation  Arron Kanner, PhD  Psychologist  10/8/2021       Time spent with Patient:  60 minutes  This is patient's first  [facility] appointment. Reason for Consult:  depression and stress  Referring Provider: No referring provider defined for this encounter. Pt provided informed consent for the behavioral health program. Discussed with patient model of service to include the limits of confidentiality (i.e. abuse reporting, suicide intervention, etc.) and short-term intervention focused approach. Pt indicated understanding. Feedback given to PCP. Description:    MSE:    Appearance    crying, moderate distress  Appetite normal  Sleep disturbance  unknown  Loss of pleasure Yes  Speech    normal rate, normal volume and well articulated  Mood    Depressed  Affect    depressed affect  Thought Content    helplessness  Insight    Fair  Judgment    Intact  Suicide Assessment    no suicidal ideation  Homicidal Assessment Intent No  Cutting No  Enuresis No  Learning Disorder None      History:    Medications:   Current Outpatient Medications   Medication Sig Dispense Refill    topiramate (TOPAMAX) 25 MG tablet TAKE 1 TABLET BY MOUTH TWO TIMES A DAY      ibuprofen (ADVIL;MOTRIN) 800 MG tablet TAKE 1 TABLET BY MOUTH FOUR TIMES A DAY AS NEEDED      albuterol (PROVENTIL) (2.5 MG/3ML) 0.083% nebulizer solution Take 3 mLs by nebulization every 6 hours as needed for Wheezing 120 each 3    clotrimazole-betamethasone (LOTRISONE) 1-0.05 % cream Apply topically 2 times daily 45 g 0    nystatin (MYCOSTATIN) 390428 UNIT/GM cream Apply topically 2 times daily.  30 g 0    SITagliptin (JANUVIA) 100 MG tablet Take 1 tablet by mouth every morning (before breakfast) 30 tablet 3    albuterol sulfate  (90 Base) MCG/ACT inhaler Inhale 2 puffs into the lungs every 4 hours as needed for Wheezing 1 Inhaler 0    azelastine (ASTELIN) 0.1 % nasal spray 1 spray by Nasal route 2 times daily Use in each nostril as directed 1 Bottle 0    sertraline (ZOLOFT) 25 MG tablet Take 25 mg by mouth daily      busPIRone (BUSPAR) 15 MG tablet Take 45 mg by mouth nightly       OXcarbazepine (TRILEPTAL) 600 MG tablet       QUEtiapine (SEROQUEL) 300 MG tablet Take 300 mg by mouth nightly      QUEtiapine (SEROQUEL) 50 MG tablet Take 50 mg by mouth every morning (before breakfast)      ARIPiprazole (ABILIFY) 15 MG tablet Take 15 mg by mouth nightly      sertraline (ZOLOFT) 50 MG tablet Take 50 mg by mouth nightly       DULoxetine HCl (CYMBALTA PO) Take 120 mg by mouth nightly       omeprazole (PRILOSEC) 40 MG delayed release capsule TAKE 1 CAPSULE BY MOUTH TWO TIMES A DAY  (Patient taking differently: nightly PT TAKES ONCE AT HS) 60 capsule 10    cetirizine (ZYRTEC) 10 MG tablet Take 1 tablet by mouth daily 90 tablet 3     No current facility-administered medications for this visit.        Social History:   Social History     Socioeconomic History    Marital status:      Spouse name: Lynn Hutchins    Number of children: 1    Years of education: 15    Highest education level: High school graduate   Occupational History    Not on file   Tobacco Use    Smoking status: Current Every Day Smoker     Packs/day: 0.50     Years: 1.00     Pack years: 0.50     Types: Cigarettes     Start date: 6/30/2018    Smokeless tobacco: Never Used   Vaping Use    Vaping Use: Former    Substances: Always   Substance and Sexual Activity    Alcohol use: Never     Alcohol/week: 1.0 standard drinks     Types: 1 Cans of beer per week     Comment: occasional    Drug use: Yes     Types: Marijuana     Comment: MEDICAL [facility]  08/18/21    Sexual activity: Yes     Partners: Male   Other Topics Concern    Not on file   Social History Narrative    Not on file     Social Determinants of Health     Financial Resource Strain: Low Risk     Difficulty of Paying Living Expenses: Not hard at all   Food Insecurity: No Food Insecurity    Worried About Running Out of Food in the Last Year: Never true    Ran Out of Food in the Last Year: Never true   Transportation Needs: No Transportation Needs    Lack of Transportation (Medical): No    Lack of Transportation (Non-Medical): No   Physical Activity:     Days of Exercise per Week:     Minutes of Exercise per Session:    Stress:     Feeling of Stress :    Social Connections:     Frequency of Communication with Friends and Family:     Frequency of Social Gatherings with Friends and Family:     Attends [employer] Services:     Active Member of Clubs or Organizations:     Attends Club or Organization Meetings:     Marital Status:    Intimate Partner Violence:     Fear of Current or Ex-Partner:     Emotionally Abused:     Physically Abused:     Sexually Abused:        TOBACCO:   reports that she has been smoking cigarettes. She started smoking about 3 years ago. She has a 0.50 pack-year smoking history. She has never used smokeless tobacco.  ETOH:   reports no history of alcohol use.     Family History:   Family History   Problem Relation Age of Onset    Anxiety Disorder Mother     Diabetes Mother     Anxiety Disorder Father     Bipolar Disorder Father     High Blood Pressure Father     Mental Illness Father     Parkinsonism Father         Early-Onset    Depression Paternal Aunt     Cancer Paternal Uncle         multiple myeloma    Depression Paternal Uncle     Cancer Maternal Grandmother         breast    Ovarian Cancer Maternal Grandmother     Diabetes Maternal Grandmother     Depression Maternal Grandfather     Heart Attack Maternal Grandfather     Cancer Paternal Grandfather         lung    No Known Problems Brother         Gaviria disease    No Known Problems Paternal Grandmother     Lupus Maternal Aunt            Assessment:  Patient is an old client of this therapist; she returned to counseling; this therapist discussed with her the need to either refer her father because their issues are entangled; she started to cry; she gave this therapist permission to talk to her father and see if her father will see another psychologist in the practice for a little while and through this therapist to work through with her her issues; she did give this therapist permission; she has a history of bipolar depression both manic and depressed state. She says that she was in a manic state several weeks ago and now has moved into more of a depressive state; however, she described more postpartum depression; since her child was born a year ago and with Covid, she has felt depressed and has not felt excited about her child. She denies wanting to hurt the child; she denies wanting to neglect the child; however, she feels that she should be more elated about having a child; she is taking medication and seeing psychiatrist; she says the medication does help; she is ; he is actually from [facility] and is on a work visa; he recently lost his job when he could not afford to keep his business going; he is now going to school to learn to drive a truck; they are back living with her parents; this is caused a lot of stress; she gets upset because her parents argue a lot; she feels her father yells a lot at her mother; she started to cry when discussing the relationship; she is not suicidal or homicidal; her affect was blunted; she has gained weight; she is currently not working; she says that since the baby was born she just cannot feel down and not a lot energy. Based on the overall assessment, she does meet the diagnosis for postpartum depression and has and continues to meet the diagnosis for bipolar 1 disorder depressed state; she is also had a history of posttraumatic stress disorder but there are no symptoms at this time.   This session was conducted as a telepsychology visit due to one or more of the following COVID-19 risk factors being present in this patient:   The patient reports being at risk or potentially exposed to the virus   Office is closed or at reduced capacity due to coronavirus pandemic    Patient Location: Home    Provider Location (City/State): Home    Patient gave consent for teleservices. This virtual visit was conducted via interactive/real-time audio/video, using doxy. Diagnosis:      ICD-10-CM    1. Post partum depression  O99.345     F53.0    2. Bipolar 1 disorder, depressed, moderate ([facility]  F31.32             Plan:  Pt interventions: This therapist will call her father talk to her father and see about the plan to divide up their therapy sessions where he sees another therapist and this therapist focuses on her mental health; sessions will continue to discuss with her symptoms and feelings towards her child and help her normalize her reactions instead of feeling that she has to be like other people; sessions will focus on helping with her catastrophization and learning to focus on what she can do now to feel better and deal with the stress and the depression. This therapist will put her on the cancellation list and try to get her in sooner.

## 2021-10-15 ENCOUNTER — HOSPITAL ENCOUNTER (EMERGENCY)
Age: 26
Discharge: HOME | End: 2021-10-15
Payer: MEDICARE

## 2021-10-15 VITALS
HEART RATE: 125 BPM | SYSTOLIC BLOOD PRESSURE: 125 MMHG | HEIGHT: 70 IN | BODY MASS INDEX: 41.95 KG/M2 | WEIGHT: 293 LBS | OXYGEN SATURATION: 99 % | TEMPERATURE: 98.1 F | RESPIRATION RATE: 16 BRPM | DIASTOLIC BLOOD PRESSURE: 68 MMHG

## 2021-10-15 DIAGNOSIS — K52.9 GASTROENTERITIS: Primary | ICD-10-CM

## 2021-10-15 LAB — SARS-COV-2, NAA: NOT  DETECTED

## 2021-10-15 PROCEDURE — 99213 OFFICE O/P EST LOW 20 MIN: CPT | Performed by: NURSE PRACTITIONER

## 2021-10-15 PROCEDURE — 99213 OFFICE O/P EST LOW 20 MIN: CPT

## 2021-10-15 PROCEDURE — 6370000000 HC RX 637 (ALT 250 FOR IP): Performed by: NURSE PRACTITIONER

## 2021-10-15 PROCEDURE — 87635 SARS-COV-2 COVID-19 AMP PRB: CPT

## 2021-10-15 RX ORDER — ONDANSETRON 4 MG/1
4 TABLET, ORALLY DISINTEGRATING ORAL ONCE
Status: COMPLETED | OUTPATIENT
Start: 2021-10-15 | End: 2021-10-15

## 2021-10-15 RX ORDER — ONDANSETRON 4 MG/1
4 TABLET, ORALLY DISINTEGRATING ORAL EVERY 8 HOURS PRN
Qty: 20 TABLET | Refills: 0 | Status: SHIPPED | OUTPATIENT
Start: 2021-10-15 | End: 2022-01-07 | Stop reason: ALTCHOICE

## 2021-10-15 RX ADMIN — ONDANSETRON 4 MG: 4 TABLET, ORALLY DISINTEGRATING ORAL at 15:07

## 2021-10-15 ASSESSMENT — ENCOUNTER SYMPTOMS
SINUS PRESSURE: 1
APNEA: 0
WHEEZING: 0
SORE THROAT: 1
ABDOMINAL PAIN: 0
CHOKING: 0
DIARRHEA: 1
STRIDOR: 0
COUGH: 1
VOMITING: 1
SHORTNESS OF BREATH: 0
CHEST TIGHTNESS: 0
CONSTIPATION: 0
NAUSEA: 1

## 2021-10-15 ASSESSMENT — PAIN DESCRIPTION - LOCATION: LOCATION: GENERALIZED

## 2021-10-15 ASSESSMENT — PAIN DESCRIPTION - PAIN TYPE: TYPE: ACUTE PAIN

## 2021-10-15 ASSESSMENT — PAIN - FUNCTIONAL ASSESSMENT: PAIN_FUNCTIONAL_ASSESSMENT: PREVENTS OR INTERFERES SOME ACTIVE ACTIVITIES AND ADLS

## 2021-10-15 ASSESSMENT — PAIN DESCRIPTION - DESCRIPTORS: DESCRIPTORS: ACHING;STABBING

## 2021-10-15 ASSESSMENT — PAIN SCALES - GENERAL: PAINLEVEL_OUTOF10: 6

## 2021-10-15 NOTE — ED PROVIDER NOTES
[facility]      CHIEF COMPLAINT       Chief Complaint   Patient presents with    Emesis    Generalized Body Aches       Nurses Notes reviewed and I agree except as noted in the HPI. HISTORY Sg is a 32 y.o. The history is provided by the patient. No  was used. Nausea & Vomiting  Severity:  Moderate  Duration:  1 day  Number of daily episodes:  4  Quality:  Undigested food and bilious material  Able to tolerate:  Liquids  Progression:  Unchanged  Chronicity:  New  Recent urination:  Normal  Context: not post-tussive and not self-induced    Relieved by:  Nothing  Worsened by:  Nothing  Ineffective treatments:  None tried  Associated symptoms: cough, diarrhea, headaches, myalgias and sore throat    Associated symptoms: no abdominal pain, no arthralgias, no chills, no fever and no URI    Diarrhea:     Quality:  Watery    Number of occurrences:  3    Severity:  Moderate    Timing:  Constant    Progression:  Unchanged  Risk factors: no alcohol use, no diabetes, no prior abdominal surgery, no sick contacts, no suspect food intake and no travel to endemic areas        REVIEW OF SYSTEMS     Review of Systems   Constitutional: Negative for activity change, appetite change, chills, diaphoresis, fatigue and fever. HENT: Positive for sinus pressure and sore throat. Negative for congestion. Respiratory: Positive for cough. Negative for apnea, choking, chest tightness, shortness of breath, wheezing and stridor. Cardiovascular: Negative for chest pain, palpitations and leg swelling. Gastrointestinal: Positive for diarrhea, nausea and vomiting. Negative for abdominal pain and constipation. Musculoskeletal: Positive for myalgias. Negative for arthralgias. Neurological: Positive for headaches. Negative for dizziness and light-headedness.        PAST MEDICAL HISTORY         Diagnosis Date    ADD (attention deficit disorder)     Anxiety 2015    Anxiety attack     Asthma     exercise induced    Atypical face pain     Back pain     Bipolar 1 disorder (HCC)     Diabetes mellitus ([facility]     GDM on insulin started on -during pregnancy    Fibromyalgia     GERD (gastroesophageal reflux disease)     Hypotension 2017    Major depressive disorder, recurrent episode, mild ([facility] 2012    Obesity 10/24/2014    WILFREDO treated with BiPAP     Pneumonia 2018    POTS (postural orthostatic tachycardia syndrome)     Pott disease     Pulmonary emboli ([facility] 2016    was on blood thinners for 6 months    [facility] 2016    anxiety induced no meds last seizure 4 years ago    Tailbone injury 2015    patient broke tailbone    Thyroid disease     borderline low no meds    TMJ (dislocation of temporomandibular joint)     Trigeminal neuralgia     Wears contact lenses        SURGICAL HISTORY     Patient  has a past surgical history that includes Bud tooth extraction (); Cardiac catheterization (2016); Cardiac catheterization (2016); Colonoscopy (2015); Insertable Cardiac Monitor; Ankle fracture surgery (Right, 2020); ablation of dysrhythmic focus ();  section (N/A, 2020); Upper gastrointestinal endoscopy (2020); Upper gastrointestinal endoscopy (2015); and Cholecystectomy, laparoscopic (N/A, 2021).     CURRENT MEDICATIONS       Previous Medications    ALBUTEROL (PROVENTIL) (2.5 MG/3ML) 0.083% NEBULIZER SOLUTION    Take 3 mLs by nebulization every 6 hours as needed for Wheezing    ALBUTEROL SULFATE  (90 BASE) MCG/ACT INHALER    Inhale 2 puffs into the lungs every 4 hours as needed for Wheezing    ARIPIPRAZOLE (ABILIFY) 15 MG TABLET    Take 15 mg by mouth nightly    AZELASTINE (ASTELIN) 0.1 % NASAL SPRAY    1 spray by Nasal route 2 times daily Use in each nostril as directed    BUSPIRONE (BUSPAR) 15 MG TABLET    Take 45 mg by mouth nightly     CETIRIZINE (ZYRTEC) 10 MG TABLET    Take 1 tablet by mouth daily    CLOTRIMAZOLE-BETAMETHASONE (LOTRISONE) 1-0.05 % CREAM    Apply topically 2 times daily    DULOXETINE HCL (CYMBALTA PO)    Take 120 mg by mouth nightly     IBUPROFEN (ADVIL;MOTRIN) 800 MG TABLET    TAKE 1 TABLET BY MOUTH FOUR TIMES A DAY AS NEEDED    NYSTATIN (MYCOSTATIN) [id number] UNIT/GM CREAM    Apply topically 2 times daily. OMEPRAZOLE (PRILOSEC) 40 MG DELAYED RELEASE CAPSULE    TAKE 1 CAPSULE BY MOUTH TWO TIMES A DAY     OXCARBAZEPINE (TRILEPTAL) 600 MG TABLET        QUETIAPINE (SEROQUEL) 300 MG TABLET    Take 300 mg by mouth nightly    QUETIAPINE (SEROQUEL) 50 MG TABLET    Take 50 mg by mouth every morning (before breakfast)    SERTRALINE (ZOLOFT) 25 MG TABLET    Take 25 mg by mouth daily    SERTRALINE (ZOLOFT) 50 MG TABLET    Take 50 mg by mouth nightly     TOPIRAMATE (TOPAMAX) 25 MG TABLET    TAKE 1 TABLET BY MOUTH TWO TIMES A DAY       ALLERGIES     Patient is is allergic to dilaudid [hydromorphone hcl], flexeril [cyclobenzaprine], keflex [cephalexin], tessalon [benzonatate], augmentin [amoxicillin-pot clavulanate], lorabid [loracarbef], and minocycline. FAMILY HISTORY     Patient's family history includes Anxiety Disorder in her father and mother; Bipolar Disorder in her father; Cancer in her maternal grandmother, paternal grandfather, and paternal uncle; Depression in her maternal grandfather, paternal aunt, and paternal uncle; Diabetes in her maternal grandmother and mother; Heart Attack in her maternal grandfather; High Blood Pressure in her father; Lupus in her maternal aunt; Mental Illness in her father; No Known Problems in her brother and paternal grandmother; Ovarian Cancer in her maternal grandmother; Parkinsonism in her father. SOCIAL HISTORY     Patient  reports that she has been smoking cigarettes. She started smoking about 3 years ago. She has a 0.50 pack-year smoking history.  She has never used smokeless tobacco. She reports current drug use. Drug: Marijuana. She reports that she does not drink alcohol. PHYSICAL EXAM     ED TRIAGE VITALS  BP: 125/68, Temp: 98.1 °F (36.7 °C), Pulse: 125, Resp: 16, SpO2: 99 %  Physical Exam  Vitals and nursing note reviewed. Constitutional:       General: She is not in acute distress. Appearance: Normal appearance. She is obese. She is not ill-appearing, toxic-appearing or diaphoretic. HENT:      Head: Normocephalic and atraumatic. Right Ear: Tympanic membrane, ear canal and external ear normal.      Left Ear: Tympanic membrane, ear canal and external ear normal.      Nose: Nose normal.      Mouth/Throat:      Mouth: Mucous membranes are moist.   Eyes:      Extraocular Movements: Extraocular movements intact. Conjunctiva/sclera: Conjunctivae normal.   Pulmonary:      Effort: Pulmonary effort is normal. No respiratory distress. Breath sounds: Normal breath sounds. No stridor. No wheezing, rhonchi or rales. Chest:      Chest wall: No tenderness. Abdominal:      General: Abdomen is protuberant. Bowel sounds are increased. Palpations: Abdomen is soft. Tenderness: There is abdominal tenderness in the right upper quadrant and left upper quadrant. Musculoskeletal:         General: Normal range of motion. Cervical back: Normal range of motion. Skin:     General: Skin is warm. Neurological:      General: No focal deficit present. Mental Status: She is alert and oriented to person, place, and time. Psychiatric:         Mood and Affect: Mood normal.         Behavior: Behavior normal.         Thought Content:  Thought content normal.         Judgment: Judgment normal.         DIAGNOSTIC RESULTS   Labs:  Results for orders placed or performed during the hospital encounter of 10/15/21   COVID-19, Rapid   Result Value Ref Range    SARS-CoV-2, MIGUEL NOT  DETECTED NOT DETECTED       IMAGING:  No orders to display     URGENT CARE COURSE: Vitals:    10/15/21 1454 10/15/21 1500   BP: 125/68    Pulse: 136 125   Resp: 16    Temp: 98.1 °F (36.7 °C)    TempSrc: Oral    SpO2: 99%    Weight: (!) 305 lb 3.2 oz (138.4 kg)    Height: 5' 10\" (1.778 m)        Medications   ondansetron (ZOFRAN-ODT) disintegrating tablet 4 mg (4 mg Oral Given 10/15/21 1503)     PROCEDURES:  None  FINAL IMPRESSION      1. Gastroenteritis        DISPOSITION/PLAN      These symptoms are consistent with viral gastroenteritis. I recommend Clear Liquids only next 12-24 hours. If tolerated then BRAT Diet . Advise the patient that if worsening symptoms such as more than 6 stools per day, not voiding regularly, persistent vomiting not relieved with medication,unable to take oral fluids, high fever, severe weakness, lightheadedness or fainting, dry mucous membranes or other signs of dehydration, persisting or increasing abdominal pain, blood in stool or vomit, or failure to improve in 1-2 days. The patient needs to be reevaluated at that time by their primary care provider for a recheck, OR go the Emergency Department for reevaluation. The patient or patient's representative are agreeable to the treatment plan and left ambulatory without any complaints at this time.      PATIENT REFERRED TO:  LEVI Arciniega CNP  [address] [place] [address]  [address] [id number] [phone]    Schedule an appointment as soon as possible for a visit       DISCHARGE MEDICATIONS:  New Prescriptions    ONDANSETRON (ZOFRAN ODT) 4 MG DISINTEGRATING TABLET    Take 1 tablet by mouth every 8 hours as needed for Nausea or Vomiting     Current Discharge Medication List          LEVI Larsen CNP, APRN - CNP  10/15/21 5899

## 2021-10-15 NOTE — Clinical Note
Spenser Bardales was seen and treated in our emergency department on 10/15/2021. She may return to work on 10/18/2021. If you have any questions or concerns, please don't hesitate to call.       Shu Soto, APRN - CNP

## 2021-10-18 ENCOUNTER — TELEPHONE (OUTPATIENT)
Dept: FAMILY MEDICINE CLINIC | Age: 26
End: 2021-10-18

## 2021-11-03 ENCOUNTER — OFFICE VISIT (OUTPATIENT)
Dept: FAMILY MEDICINE CLINIC | Age: 26
End: 2021-11-03
Payer: MEDICARE

## 2021-11-03 VITALS
WEIGHT: 293 LBS | HEART RATE: 108 BPM | RESPIRATION RATE: 16 BRPM | SYSTOLIC BLOOD PRESSURE: 118 MMHG | DIASTOLIC BLOOD PRESSURE: 76 MMHG | BODY MASS INDEX: 44.74 KG/M2

## 2021-11-03 DIAGNOSIS — L73.2 HIDRADENITIS SUPPURATIVA: Primary | ICD-10-CM

## 2021-11-03 DIAGNOSIS — E66.01 MORBID OBESITY WITH BMI OF 40.0-44.9, ADULT (HCC): ICD-10-CM

## 2021-11-03 PROBLEM — R56.9 PSEUDOSEIZURES (HCC): Status: ACTIVE | Noted: 2017-04-06

## 2021-11-03 PROBLEM — F44.5 PSEUDOSEIZURES: Status: ACTIVE | Noted: 2017-04-06

## 2021-11-03 PROBLEM — F60.3 BORDERLINE PERSONALITY DISORDER (HCC): Status: ACTIVE | Noted: 2017-05-31

## 2021-11-03 PROCEDURE — 99213 OFFICE O/P EST LOW 20 MIN: CPT | Performed by: NURSE PRACTITIONER

## 2021-11-03 RX ORDER — SULFAMETHOXAZOLE AND TRIMETHOPRIM 800; 160 MG/1; MG/1
1 TABLET ORAL 2 TIMES DAILY
Qty: 20 TABLET | Refills: 0 | Status: SHIPPED | OUTPATIENT
Start: 2021-11-03 | End: 2021-11-13

## 2021-11-03 RX ORDER — DULOXETIN HYDROCHLORIDE 30 MG/1
1 CAPSULE, DELAYED RELEASE ORAL DAILY
COMMUNITY
Start: 2021-11-02

## 2021-11-03 ASSESSMENT — ENCOUNTER SYMPTOMS
ABDOMINAL PAIN: 0
SHORTNESS OF BREATH: 0
COUGH: 0
NAUSEA: 0

## 2021-11-03 NOTE — PATIENT INSTRUCTIONS
You may receive a survey regarding the care you received during your visit. Your input is valuable to us. We encourage you to complete and return your survey. We hope you will choose us in the future for your healthcare needs. Tobacco Cessation Programs     Telephonic behavior modification  Bautista Tam ([phone])   Counseling service for those who are ready to quit using tobacco.     Available for uninsured [place] residents, [place] recipients, pregnant women, or patients whose health plans or employers are members of the [address] [place] [address] behavior modification   http://[place]. Quitlogix. org   Online support program to help patients through each step of the quitting process. Available 24 hours a day 7 days a week. Provides up to date researched based tool, step-by-step guides, and motivational messages. Online behavior modification   www.lungusa.org/stop-smoking/how-to-quit   HelpLine: 1-800-LUNG-[place] (677-4921)   Email questions to:  Stephanie@[employer]    Website offers resources to help tobacco users figure out their reasons for quitting and then take the big step of quitting for good. Hypnosis   Location: [address], [facility], [place]   Contact: Pastor Olszewski, PhD at [phone]   Hypnosis for tobacco cessation   Cost $225 for the initial session and $175 for each session afterwards. Most patients require 6-8 sessions. There is the option to submit through the patients insurance. Hypnosis and behavior modification   Location: [facility] [address] [facility], [place]   Contact: Roby Gold, PhD at [phone]  [facility] and hypnosis for nicotine addition   Cost: For uninsured patients:  Please call above phone number  Cost for insured patients depends on patients insurance plan.     Behavior modification   Location: [facility] [address] [facility], [address]: [place] [address] include four one-on-one appointments between the patient and a respiratory therapist.  The four appointments span over three weeks. The respiratory therapist schedules one of the appointments to occur 48 hours after the patients quit date.  Cost $100 total for the four sessions. Tobacco cessation products are not included in the cost and are not provided by [facility]     Patient Education        Hidradenitis Suppurativa: Care Instructions  Your Care Instructions     Hidradenitis suppurativa (say \"odr-namc-go-NY-tus sup-yur-uh-TY-vuh\") is a skin condition that causes lumps on the skin that look like pimples or boils. The lumps are usually painful and can break open and drain blood and bad-smelling pus. The condition can come and go for many years. Treatment for this condition may include antibiotics and other medicines. You may need surgery to remove the lumps. Home care includes wearing loose-fitting clothes and washing the area gently. You can help prevent lumps from coming back by staying at a healthy weight and not smoking. Doctors don't know exactly how this condition starts. But they do know that something irritates and inflames the hair follicles, causing them to swell and form lumps. This skin condition can't be spread from person to person (isn't contagious). Follow-up care is a key part of your treatment and safety. Be sure to make and go to all appointments, and call your doctor if you are having problems. It's also a good idea to know your test results and keep a list of the medicines you take. How can you care for yourself at home? Skin care    · Wash the area every day with mild soap. Use your hands rather than a washcloth or sponge when you wash that part of your body.     · Leave the affected areas uncovered when you can. If you have lumps that are draining, you can cover them with a bandage or other dressing.  Put petroleum jelly (such as Vaseline) on the dressing to help keep it from sticking.     · Wear-loose fitting clothes that don't rub against the area. Avoid activities that cause skin to rub together.     · If you have pain, try a warm compress. Soak a towel or washcloth in warm water, wring it out, and place it on the affected skin for about 10 minutes. Medicines    · Be safe with medicines. Take your medicines exactly as prescribed. Call your doctor if you think you are having a problem with your medicine. You will get more details on the specific medicines your doctor prescribes.     · If your doctor prescribed antibiotics, take them as directed. Do not stop taking them just because you feel better. You need to take the full course of antibiotics. Lifestyle choices    · If you smoke, think about quitting. Smoking can make the condition worse. If you need help quitting, talk to your doctor about stop-smoking programs and medicines. These can increase your chances of quitting for good.     · Stay at a healthy weight, or lose weight, by eating healthy foods and being physically active. Being overweight could make this condition worse. When should you call for help? Call your doctor now or seek immediate medical care if:    · You have symptoms of infection, such as:  ? Increased pain, swelling, warmth, or redness. ? Red streaks leading from the area. ? Pus draining from the area. ? A fever. Watch closely for changes in your health, and be sure to contact your doctor if:    · You do not get better as expected. Where can you learn more? Go to https://[employer].health[employer]terers. org and sign in to your [employer] account. Enter H807 in the [facility] box to learn more about \"Hidradenitis Suppurativa: Care Instructions. \"     If you do not have an account, please click on the \"Sign Up Now\" link. Current as of: March 3, 2021               Content Version: 13.0  © 2457-1250 [employer]. Care instructions adapted under license by [facility] ([facility]).  If you have questions about a medical condition or this instruction, always ask your healthcare professional. [place] [address] any warranty or liability for your use of this information.

## 2021-11-03 NOTE — PROGRESS NOTES
Alexis Maris (1995) 32 y.o. female here for evaluation of the following chief complaint(s):      HPI:  Chief Complaint   Patient presents with    Pain     left underarm that radiates into the left breast Boils that will rupture    Other     getting boils on upper thighs/groin area       Recurrent boils in under arm bilateral. Onset of 2-3 days of pain in axillary area similar to previous boils. Usually coming to head but not noticeable now. Also gets recurrent boils in groin region.      Vitals:    21 1105   BP: 118/76   Pulse: 108   Resp: 16       Patient Active Problem List   Diagnosis    Major depressive disorder, recurrent episode, mild (HCC)    Low self esteem    KARLIE (generalized anxiety disorder)    Obesity    Obstructive sleep apnea    Snores    Sleep disturbances    Daytime somnolence    Sleep talking    Night terrors, adult    Bruxism (teeth grinding)    Restless sleeper    Anxiety    Abnormal thyroid function test    Trigeminal neuralgia    Inappropriate sinus tachycardia    POTS (postural orthostatic tachycardia syndrome)    Syncope and collapse    Neck discomfort    Thyroid cyst    Pulmonary embolism (HCC)    Breast pain, left    Positive CAESAR (antinuclear antibody)    Hypotension    S/P ablation of ventricular arrhythmia    Pneumonia    Hypokalemia    DUB (dysfunctional uterine bleeding)    Palpitations    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    Closed right ankle fracture, initial encounter    Status post open reduction with internal fixation (ORIF) of fracture of ankle    Tear of deltoid ligament of ankle, right, initial encounter    Abdominal pain during pregnancy in second trimester    Maternal obesity affecting pregnancy, antepartum    Postpartum care following  delivery    Biliary colic symptom    Dizziness, nonspecific    Orthostatic dizziness    Borderline personality disorder (HCC)    Pseudoseizures ([facility] [address]    Tachyarrhythmia       SUBJECTIVE/OBJECTIVE:  Review of Systems   Constitutional: Negative for chills and fever. HENT: Negative. Respiratory: Negative for cough and shortness of breath. Cardiovascular: Negative for chest pain. Gastrointestinal: Negative for abdominal pain and nausea. Skin: Negative for rash. Neurological: Negative for dizziness, light-headedness and headaches. Psychiatric/Behavioral: Negative. Physical Exam  Constitutional:       General: She is not in acute distress. Eyes:      Pupils: Pupils are equal, round, and reactive to light. Cardiovascular:      Rate and Rhythm: Normal rate and regular rhythm. Heart sounds: No murmur heard. Pulmonary:      Effort: Pulmonary effort is normal.      Breath sounds: Normal breath sounds. No wheezing. Abdominal:      General: Bowel sounds are normal. There is no distension. Palpations: Abdomen is soft. Tenderness: There is no abdominal tenderness. Musculoskeletal:         General: No tenderness. Normal range of motion. Cervical back: Normal range of motion and neck supple. Skin:     General: Skin is warm and dry. Findings: No rash. ASSESSMENT/PLAN:   Diagnosis Orders   1. Hidradenitis suppurativa  sulfamethoxazole-trimethoprim (BACTRIM DS) 800-160 MG per tablet    HUONG - Albania Snyder MD, Dermatology, [facility] ADRIENNE HUMBLE   2. Morbid obesity with BMI of 40.0-44.9, adult ([facility] [address]           MDM: Bactrim BID for acute flare   Refer to [facility] for long term treatment   Warm compress and/or soaks   RTO PRN      An electronic signature was used to authenticate this note.     --Ca Shepherd, LEVI - CNP

## 2021-12-06 ENCOUNTER — OFFICE VISIT (OUTPATIENT)
Dept: PSYCHOLOGY | Age: 26
End: 2021-12-06
Payer: MEDICARE

## 2021-12-06 DIAGNOSIS — F31.32 BIPOLAR 1 DISORDER, DEPRESSED, MODERATE (HCC): ICD-10-CM

## 2021-12-06 PROCEDURE — 90837 PSYTX W PT 60 MINUTES: CPT | Performed by: PSYCHOLOGIST

## 2021-12-10 NOTE — PROGRESS NOTES
Behavioral Health Consultation  Damion Crawford, PhD  Psychologist  12/6/2021       Time spent with Patient:  60 minutes  This is patient's second  [facility] appointment. Reason for Consult:  depression, anxiety and stress  Referring Provider: No referring provider defined for this encounter. Pt provided informed consent for the behavioral health program. Discussed with patient model of service to include the limits of confidentiality (i.e. abuse reporting, suicide intervention, etc.) and short-term intervention focused approach. Pt indicated understanding. Feedback given to PCP. Description:    MSE:    Appearance    cooperative  Appetite normal  Sleep disturbance No  Loss of pleasure No  Speech    normal rate, normal volume and well articulated  Mood    Depressed  Affect    normal affect  Thought Content    intact  Insight    Fair  Judgment    Intact  Suicide Assessment    no suicidal ideation  Homicidal Assessment Intent No  Cutting No  Enuresis No  Learning Disorder None      History:    Medications:   Current Outpatient Medications   Medication Sig Dispense Refill    DULoxetine (CYMBALTA) 30 MG extended release capsule Take 1 capsule by mouth daily      ondansetron (ZOFRAN ODT) 4 MG disintegrating tablet Take 1 tablet by mouth every 8 hours as needed for Nausea or Vomiting 20 tablet 0    topiramate (TOPAMAX) 25 MG tablet TAKE 1 TABLET BY MOUTH TWO TIMES A DAY (Patient not taking: Reported on 11/3/2021)      ibuprofen (ADVIL;MOTRIN) 800 MG tablet TAKE 1 TABLET BY MOUTH FOUR TIMES A DAY AS NEEDED      albuterol (PROVENTIL) (2.5 MG/3ML) 0.083% nebulizer solution Take 3 mLs by nebulization every 6 hours as needed for Wheezing 120 each 3    clotrimazole-betamethasone (LOTRISONE) 1-0.05 % cream Apply topically 2 times daily 45 g 0    nystatin (MYCOSTATIN) 085470 UNIT/GM cream Apply topically 2 times daily.  30 g 0    albuterol sulfate  (90 Base) MCG/ACT inhaler Inhale 2 puffs into the lungs every 4 hours as needed for Wheezing 1 Inhaler 0    azelastine (ASTELIN) 0.1 % nasal spray 1 spray by Nasal route 2 times daily Use in each nostril as directed 1 Bottle 0    sertraline (ZOLOFT) 25 MG tablet Take 25 mg by mouth daily      busPIRone (BUSPAR) 15 MG tablet Take 45 mg by mouth nightly       OXcarbazepine (TRILEPTAL) 600 MG tablet       QUEtiapine (SEROQUEL) 300 MG tablet Take 300 mg by mouth nightly      QUEtiapine (SEROQUEL) 50 MG tablet Take 50 mg by mouth every morning (before breakfast)      ARIPiprazole (ABILIFY) 15 MG tablet Take 15 mg by mouth nightly      sertraline (ZOLOFT) 50 MG tablet Take 50 mg by mouth nightly       omeprazole (PRILOSEC) 40 MG delayed release capsule TAKE 1 CAPSULE BY MOUTH TWO TIMES A DAY  (Patient taking differently: nightly PT TAKES ONCE AT HS) 60 capsule 10    cetirizine (ZYRTEC) 10 MG tablet Take 1 tablet by mouth daily 90 tablet 3     No current facility-administered medications for this visit.        Social History:   Social History     Socioeconomic History    Marital status:      Spouse name: Arline Nichole    Number of children: 1    Years of education: 15    Highest education level: High school graduate   Occupational History    Not on file   Tobacco Use    Smoking status: Current Every Day Smoker     Packs/day: 0.50     Years: 1.00     Pack years: 0.50     Types: Cigarettes     Start date: 6/30/2018    Smokeless tobacco: Never Used   Vaping Use    Vaping Use: Former    Substances: Always   Substance and Sexual Activity    Alcohol use: Never     Alcohol/week: 1.0 standard drink     Types: 1 Cans of beer per week     Comment: occasional    Drug use: Yes     Types: Marijuana Zuleyma Perales)     Comment: MEDICAL [facility]  08/18/21    Sexual activity: Yes     Partners: Male   Other Topics Concern    Not on file   Social History Narrative    Not on file     Social Determinants of Health     Financial Resource Strain: Low Risk     Difficulty of Paying Living Depression Maternal Grandfather     Heart Attack Maternal Grandfather     Cancer Paternal Grandfather         lung    No Known Problems Brother         Gaviria disease    No Known Problems Paternal Grandmother     Lupus Maternal Aunt            Assessment:  Patient was seen for postpartum depression also bipolar disorder; since session, she stated that she is starting to feel closer to her child and not feeling down and guilty whenever she is taking care of her child; she says that she has been focusing on the positives of being a mother; her  also changed jobs and now will be working locally and this is helped; she is focusing more on their relationship and trying to figure out a way to move out on their own; her vacillation of moods are under control with the medication; she has been focusing more on ways that she can control her feelings during the day and using grounding/mindfulness methods; therapist discussed with her radical acceptance and in order to accept things in and not let them build up and bother her. Diagnosis:      ICD-10-CM    1. Post partum depression  O99.345     F53.0    2. Bipolar 1 disorder, depressed, moderate (HCC)  F31.32             Plan:  Pt interventions:  Sessions will continue to use a story telling discussion method of talking about what is going on to help her process and figure out ways of resolving her current feelings; she will focus on the positive things about parenting; she also encouraged her  to help with the parenting responsibilities.

## 2021-12-30 ENCOUNTER — TELEPHONE (OUTPATIENT)
Dept: FAMILY MEDICINE CLINIC | Age: 26
End: 2021-12-30

## 2021-12-30 NOTE — TELEPHONE ENCOUNTER
Patient has been taking Bactrim DS and a probiotic for the last 2 weeks prescribed by Dr. Vamshi Goldstein for HS. Patient is suppose to be on this for 6 weeks total, last couple days she's had diarrhea. [facility] is closed. Patient would like a call back with advice. [facility].

## 2022-01-07 ENCOUNTER — HOSPITAL ENCOUNTER (EMERGENCY)
Age: 27
Discharge: HOME | End: 2022-01-07
Payer: MEDICARE

## 2022-01-07 ENCOUNTER — APPOINTMENT (OUTPATIENT)
Dept: GENERAL RADIOLOGY | Age: 27
End: 2022-01-07
Payer: MEDICARE

## 2022-01-07 ENCOUNTER — APPOINTMENT (OUTPATIENT)
Dept: CT IMAGING | Age: 27
End: 2022-01-07
Payer: MEDICARE

## 2022-01-07 VITALS
SYSTOLIC BLOOD PRESSURE: 102 MMHG | HEART RATE: 85 BPM | BODY MASS INDEX: 41.95 KG/M2 | RESPIRATION RATE: 16 BRPM | TEMPERATURE: 97.8 F | WEIGHT: 293 LBS | DIASTOLIC BLOOD PRESSURE: 69 MMHG | HEIGHT: 70 IN | OXYGEN SATURATION: 98 %

## 2022-01-07 DIAGNOSIS — J18.9 PNEUMONIA OF RIGHT UPPER LOBE DUE TO INFECTIOUS ORGANISM: ICD-10-CM

## 2022-01-07 DIAGNOSIS — U07.1 COVID-19: Primary | ICD-10-CM

## 2022-01-07 LAB
ALBUMIN SERPL-MCNC: 4.2 G/DL (ref 3.5–5.1)
ALP BLD-CCNC: 125 U/L (ref 38–126)
ALT SERPL-CCNC: 44 U/L (ref 11–66)
ANION GAP SERPL CALCULATED.3IONS-SCNC: 16 MEQ/L (ref 8–16)
AST SERPL-CCNC: 25 U/L (ref 5–40)
BASOPHILS # BLD: 0.4 %
BASOPHILS ABSOLUTE: 0 THOU/MM3 (ref 0–0.1)
BILIRUB SERPL-MCNC: < 0.2 MG/DL (ref 0.3–1.2)
BUN BLDV-MCNC: 11 MG/DL (ref 7–22)
C-REACTIVE PROTEIN: 0.79 MG/DL (ref 0–1)
CALCIUM SERPL-MCNC: 9.3 MG/DL (ref 8.5–10.5)
CHLORIDE BLD-SCNC: 102 MEQ/L (ref 98–111)
CO2: 22 MEQ/L (ref 23–33)
CREAT SERPL-MCNC: 0.6 MG/DL (ref 0.4–1.2)
EKG ATRIAL RATE: 85 BPM
EKG P AXIS: 58 DEGREES
EKG P-R INTERVAL: 152 MS
EKG Q-T INTERVAL: 386 MS
EKG QRS DURATION: 90 MS
EKG QTC CALCULATION (BAZETT): 459 MS
EKG R AXIS: 62 DEGREES
EKG T AXIS: 52 DEGREES
EKG VENTRICULAR RATE: 85 BPM
EOSINOPHIL # BLD: 0 %
EOSINOPHILS ABSOLUTE: 0 THOU/MM3 (ref 0–0.4)
ERYTHROCYTE [DISTWIDTH] IN BLOOD BY AUTOMATED COUNT: 15.9 % (ref 11.5–14.5)
ERYTHROCYTE [DISTWIDTH] IN BLOOD BY AUTOMATED COUNT: 52.2 FL (ref 35–45)
FLU A ANTIGEN: NEGATIVE
FLU B ANTIGEN: NEGATIVE
GFR SERPL CREATININE-BSD FRML MDRD: > 90 ML/MIN/1.73M2
GLUCOSE BLD-MCNC: 152 MG/DL (ref 70–108)
GROUP A STREP CULTURE, REFLEX: NEGATIVE
HCT VFR BLD CALC: 42 % (ref 37–47)
HEMOGLOBIN: 13.5 GM/DL (ref 12–16)
IMMATURE GRANS (ABS): 0.01 THOU/MM3 (ref 0–0.07)
IMMATURE GRANULOCYTES: 0.2 %
LIPASE: 46.6 U/L (ref 5.6–51.3)
LYMPHOCYTES # BLD: 47 %
LYMPHOCYTES ABSOLUTE: 2.5 THOU/MM3 (ref 1–4.8)
MCH RBC QN AUTO: 28.9 PG (ref 26–33)
MCHC RBC AUTO-ENTMCNC: 32.1 GM/DL (ref 32.2–35.5)
MCV RBC AUTO: 89.9 FL (ref 81–99)
MONOCYTES # BLD: 7.8 %
MONOCYTES ABSOLUTE: 0.4 THOU/MM3 (ref 0.4–1.3)
NUCLEATED RED BLOOD CELLS: 0 /100 WBC
OSMOLALITY CALCULATION: 281.8 MOSMOL/KG (ref 275–300)
PLATELET # BLD: 211 THOU/MM3 (ref 130–400)
PLATELET ESTIMATE: ADEQUATE
PMV BLD AUTO: 10 FL (ref 9.4–12.4)
POTASSIUM REFLEX MAGNESIUM: 3.9 MEQ/L (ref 3.5–5.2)
PREGNANCY, SERUM: NEGATIVE
RBC # BLD: 4.67 MILL/MM3 (ref 4.2–5.4)
REFLEX THROAT C + S: NORMAL
SARS-COV-2, NAAT: DETECTED
SCAN OF BLOOD SMEAR: NORMAL
SEG NEUTROPHILS: 44.6 %
SEGMENTED NEUTROPHILS ABSOLUTE COUNT: 2.4 THOU/MM3 (ref 1.8–7.7)
SODIUM BLD-SCNC: 140 MEQ/L (ref 135–145)
TOTAL PROTEIN: 7.4 G/DL (ref 6.1–8)
TROPONIN T: < 0.01 NG/ML
WBC # BLD: 5.3 THOU/MM3 (ref 4.8–10.8)

## 2022-01-07 PROCEDURE — 96376 TX/PRO/DX INJ SAME DRUG ADON: CPT

## 2022-01-07 PROCEDURE — 93005 ELECTROCARDIOGRAM TRACING: CPT | Performed by: PHYSICIAN ASSISTANT

## 2022-01-07 PROCEDURE — 99284 EMERGENCY DEPT VISIT MOD MDM: CPT

## 2022-01-07 PROCEDURE — 71275 CT ANGIOGRAPHY CHEST: CPT

## 2022-01-07 PROCEDURE — 6370000000 HC RX 637 (ALT 250 FOR IP): Performed by: PHYSICIAN ASSISTANT

## 2022-01-07 PROCEDURE — 87804 INFLUENZA ASSAY W/OPTIC: CPT

## 2022-01-07 PROCEDURE — 80053 COMPREHEN METABOLIC PANEL: CPT

## 2022-01-07 PROCEDURE — 87070 CULTURE OTHR SPECIMN AEROBIC: CPT

## 2022-01-07 PROCEDURE — 84484 ASSAY OF TROPONIN QUANT: CPT

## 2022-01-07 PROCEDURE — 84703 CHORIONIC GONADOTROPIN ASSAY: CPT

## 2022-01-07 PROCEDURE — 86140 C-REACTIVE PROTEIN: CPT

## 2022-01-07 PROCEDURE — 96374 THER/PROPH/DIAG INJ IV PUSH: CPT

## 2022-01-07 PROCEDURE — 93010 ELECTROCARDIOGRAM REPORT: CPT | Performed by: INTERNAL MEDICINE

## 2022-01-07 PROCEDURE — 87635 SARS-COV-2 COVID-19 AMP PRB: CPT

## 2022-01-07 PROCEDURE — 87880 STREP A ASSAY W/OPTIC: CPT

## 2022-01-07 PROCEDURE — 6360000002 HC RX W HCPCS: Performed by: PHYSICIAN ASSISTANT

## 2022-01-07 PROCEDURE — 83690 ASSAY OF LIPASE: CPT

## 2022-01-07 PROCEDURE — 96375 TX/PRO/DX INJ NEW DRUG ADDON: CPT

## 2022-01-07 PROCEDURE — 6360000004 HC RX CONTRAST MEDICATION: Performed by: PHYSICIAN ASSISTANT

## 2022-01-07 PROCEDURE — 2580000003 HC RX 258: Performed by: PHYSICIAN ASSISTANT

## 2022-01-07 PROCEDURE — 71045 X-RAY EXAM CHEST 1 VIEW: CPT

## 2022-01-07 PROCEDURE — 85025 COMPLETE CBC W/AUTO DIFF WBC: CPT

## 2022-01-07 RX ORDER — ONDANSETRON 2 MG/ML
4 INJECTION INTRAMUSCULAR; INTRAVENOUS ONCE
Status: COMPLETED | OUTPATIENT
Start: 2022-01-07 | End: 2022-01-07

## 2022-01-07 RX ORDER — 0.9 % SODIUM CHLORIDE 0.9 %
1000 INTRAVENOUS SOLUTION INTRAVENOUS ONCE
Status: COMPLETED | OUTPATIENT
Start: 2022-01-07 | End: 2022-01-07

## 2022-01-07 RX ORDER — ONDANSETRON 4 MG/1
4 TABLET, ORALLY DISINTEGRATING ORAL EVERY 8 HOURS PRN
Qty: 20 TABLET | Refills: 0 | Status: SHIPPED | OUTPATIENT
Start: 2022-01-07 | End: 2022-01-12

## 2022-01-07 RX ORDER — KETOROLAC TROMETHAMINE 30 MG/ML
30 INJECTION, SOLUTION INTRAMUSCULAR; INTRAVENOUS ONCE
Status: COMPLETED | OUTPATIENT
Start: 2022-01-07 | End: 2022-01-07

## 2022-01-07 RX ORDER — AZITHROMYCIN 250 MG/1
TABLET, FILM COATED ORAL
Qty: 6 TABLET | Refills: 0 | Status: SHIPPED | OUTPATIENT
Start: 2022-01-07 | End: 2022-01-28

## 2022-01-07 RX ORDER — MELATONIN
2000 DAILY
Qty: 28 TABLET | Refills: 0 | Status: SHIPPED | OUTPATIENT
Start: 2022-01-07 | End: 2022-01-28

## 2022-01-07 RX ORDER — MULTIVIT-MIN/IRON/FOLIC ACID/K 18-600-40
500 CAPSULE ORAL 2 TIMES DAILY
Qty: 28 CAPSULE | Refills: 0 | Status: SHIPPED | OUTPATIENT
Start: 2022-01-07 | End: 2022-01-28

## 2022-01-07 RX ADMIN — ONDANSETRON 4 MG: 2 INJECTION INTRAMUSCULAR; INTRAVENOUS at 06:45

## 2022-01-07 RX ADMIN — ONDANSETRON 4 MG: 2 INJECTION INTRAMUSCULAR; INTRAVENOUS at 08:24

## 2022-01-07 RX ADMIN — LIDOCAINE HYDROCHLORIDE: 20 SOLUTION ORAL; TOPICAL at 06:45

## 2022-01-07 RX ADMIN — KETOROLAC TROMETHAMINE 30 MG: 30 INJECTION, SOLUTION INTRAMUSCULAR; INTRAVENOUS at 06:45

## 2022-01-07 RX ADMIN — SODIUM CHLORIDE 1000 ML: 9 INJECTION, SOLUTION INTRAVENOUS at 06:45

## 2022-01-07 RX ADMIN — IOPAMIDOL 80 ML: 755 INJECTION, SOLUTION INTRAVENOUS at 07:27

## 2022-01-07 ASSESSMENT — ENCOUNTER SYMPTOMS
VOMITING: 1
SHORTNESS OF BREATH: 0
NAUSEA: 1
COUGH: 1
ABDOMINAL PAIN: 1
WHEEZING: 0
CHEST TIGHTNESS: 0
DIARRHEA: 1

## 2022-01-07 ASSESSMENT — PAIN SCALES - GENERAL: PAINLEVEL_OUTOF10: 4

## 2022-01-07 ASSESSMENT — PAIN DESCRIPTION - PAIN TYPE: TYPE: ACUTE PAIN

## 2022-01-07 ASSESSMENT — PAIN DESCRIPTION - ORIENTATION: ORIENTATION: LEFT;RIGHT

## 2022-01-07 ASSESSMENT — PAIN DESCRIPTION - LOCATION: LOCATION: ABDOMEN;RIB CAGE

## 2022-01-07 NOTE — ED TRIAGE NOTES
Pt presents to ED with complaints of abdominal pain, chest pain, and have episodes of emesis. Pt states she had episode of bloody emesis this morning. Pt states symptoms started on Sunday.

## 2022-01-08 NOTE — ED PROVIDER NOTES
[facility]  eMERGENCY dEPARTMENT eNCOUnter          [address] [facility], [place]       Chief Complaint   Patient presents with    Concern For COVID-19    Emesis       Nurses Notes reviewed and I agree except as noted inthe HPI. HISTORY OF PRESENT ILLNESS    Ethel Aguiar is a 32 y.o. female who presents to the Emergency Department for the evaluation of possible COVID. Patient states she has had COVID exposure via her household and developed symptoms 5 to 6 days ago. Symptoms have included cough, chills, diaphoresis, generalized body aches, headache, malaise, decreased taste/smell and diarrhea. She reports associated nausea and vomiting as well which became bloody this morning. States this morning emesis was containing dark red streaks and she did have a couple of dime size clots as well. She notes history of GERD and reports compliance with treatment. Complains of some associated upper abdominal burning with the vomiting. She has not received the COVID-vaccine. She is not on any anticoagulation. She does report history of asthma and is a current 1 pack/day smoker but has had no chest tightness, wheezing or need to use her inhaler. She has been taking Mucinex with improvement in the chest congestion but has not tried anything for the abdominal pain. She denies any associated fever or melena. She does complain of some pain in her lungs and her heart which worsens with coughing and is reminiscent of the pain she had in the past with pulmonary embolism which was secondary to OCP use at that time which she is no longer taking. Reports having a negative home COVID test the day of symptom onset. Denies shortness of breath. The HPI was provided by the patient. REVIEW OF SYSTEMS     Review of Systems   Constitutional: Positive for chills, diaphoresis and fatigue. Negative for fever. Respiratory: Positive for cough. Negative for chest tightness, shortness of breath and wheezing. Cardiovascular: Positive for chest pain. Negative for leg swelling. Gastrointestinal: Positive for abdominal pain, diarrhea, nausea and vomiting. Musculoskeletal: Positive for myalgias. Neurological: Positive for headaches. Negative for syncope. All other systems reviewed and are negative. PAST MEDICAL HISTORY    has a past medical history of ADD (attention deficit disorder), Anxiety, Anxiety attack, Asthma, Atypical face pain, Back pain, Bipolar 1 disorder ([address] Diabetes mellitus ([address] Fibromyalgia, GERD (gastroesophageal reflux disease), Hypotension, Major depressive disorder, recurrent episode, mild ([address] Obesity, WILFREDO treated with BiPAP, Pneumonia, POTS (postural orthostatic tachycardia syndrome), Pott disease, Pulmonary emboli ([address] Seizures ([address] Tailbone injury, Thyroid disease, TMJ (dislocation of temporomandibular joint), Trigeminal neuralgia, and Wears contact lenses. SURGICAL HISTORY      has a past surgical history that includes [place] tooth extraction (); Cardiac catheterization (2016); Cardiac catheterization (2016); Colonoscopy (2015); Insertable Cardiac Monitor; Ankle fracture surgery (Right, 2020); ablation of dysrhythmic focus ();  section (N/A, 2020); Upper gastrointestinal endoscopy (2020); Upper gastrointestinal endoscopy (2015); and Cholecystectomy, laparoscopic (N/A, 2021).     CURRENT MEDICATIONS       Discharge Medication List as of 2022  8:32 AM      CONTINUE these medications which have NOT CHANGED    Details   DULoxetine (CYMBALTA) 30 MG extended release capsule Take 1 capsule by mouth dailyHistorical Med      topiramate (TOPAMAX) 25 MG tablet TAKE 1 TABLET BY MOUTH TWO TIMES A DAYHistorical Med      ibuprofen (ADVIL;MOTRIN) 800 MG tablet TAKE 1 TABLET BY MOUTH FOUR TIMES A DAY AS NEEDEDHistorical Med      albuterol (PROVENTIL) (2.5 MG/3ML) 0.083% nebulizer solution Take 3 mLs by nebulization every 6 hours as needed for Wheezing, Disp-120 each, R-3Normal      clotrimazole-betamethasone (LOTRISONE) 1-0.05 % cream Apply topically 2 times daily, Topical, 2 TIMES DAILY Starting 2021, Disp-45 g, R-0, Normal      nystatin (MYCOSTATIN) 110240 UNIT/GM cream Apply topically 2 times daily. , Disp-30 g, R-0, Normal      albuterol sulfate  (90 Base) MCG/ACT inhaler Inhale 2 puffs into the lungs every 4 hours as needed for Wheezing, Disp-1 Inhaler, R-0Normal      azelastine (ASTELIN) 0.1 % nasal spray 1 spray by Nasal route 2 times daily Use in each nostril as directed, Disp-1 Bottle, R-0Normal      !! sertraline (ZOLOFT) 25 MG tablet Take 25 mg by mouth dailyHistorical Med      busPIRone (BUSPAR) 15 MG tablet Take 45 mg by mouth nightly Historical Med      OXcarbazepine (TRILEPTAL) 600 MG tablet Historical Med      !! QUEtiapine (SEROQUEL) 300 MG tablet Take 300 mg by mouth nightlyHistorical Med      !! QUEtiapine (SEROQUEL) 50 MG tablet Take 50 mg by mouth every morning (before breakfast)Historical Med      ARIPiprazole (ABILIFY) 15 MG tablet Take 15 mg by mouth nightlyHistorical Med      !! sertraline (ZOLOFT) 50 MG tablet Take 50 mg by mouth nightly Historical Med      omeprazole (PRILOSEC) 40 MG delayed release capsule TAKE 1 CAPSULE BY MOUTH TWO TIMES A DAY , Disp-60 capsule, R-10Normal      cetirizine (ZYRTEC) 10 MG tablet Take 1 tablet by mouth daily, Disp-90 tablet, R-3Normal       !! - Potential duplicate medications found. Please discuss with provider. ALLERGIES     is allergic to dilaudid [hydromorphone hcl], flexeril [cyclobenzaprine], keflex [cephalexin], tessalon [benzonatate], augmentin [amoxicillin-pot clavulanate], lorabid [loracarbef], and minocycline. FAMILY HISTORY     She indicated that her mother is alive. She indicated that her father is alive. She indicated that her brother is alive. She indicated that her maternal grandmother is .  She indicated that her maternal grandfather is . She indicated that her paternal grandmother is . She indicated that her paternal grandfather is . She indicated that her maternal aunt is alive. She indicated that the status of her paternal aunt is unknown. She indicated that her paternal uncle is . family history includes Anxiety Disorder in her father and mother; Bipolar Disorder in her father; Cancer in her maternal grandmother, paternal grandfather, and paternal uncle; Depression in her maternal grandfather, paternal aunt, and paternal uncle; Diabetes in her maternal grandmother and mother; Heart Attack in her maternal grandfather; High Blood Pressure in her father; Lupus in her maternal aunt; Mental Illness in her father; No Known Problems in her brother and paternal grandmother; Ovarian Cancer in her maternal grandmother; Parkinsonism in her father. SOCIAL HISTORY      reports that she has been smoking cigarettes. She started smoking about 3 years ago. She has a 0.50 pack-year smoking history. She has never used smokeless tobacco. She reports current drug use. Drug: Marijuana Angella González). She reports that she does not drink alcohol. PHYSICAL EXAM     INITIAL VITALS:  height is 5' 10\" (1.778 m) and weight is 312 lb (141.5 kg) (abnormal). Her oral temperature is 97.8 °F (36.6 °C). Her blood pressure is 102/69 and her pulse is 85. Her respiration is 16 and oxygen saturation is 98%. Physical Exam  Vitals and nursing note reviewed. Constitutional:       Appearance: She is ill-appearing. HENT:      Head: Normocephalic and atraumatic. Nose: Nose normal.      Mouth/Throat:      Pharynx: Oropharynx is clear. Posterior oropharyngeal erythema (Mild) present. Eyes:      Conjunctiva/sclera: Conjunctivae normal.   Cardiovascular:      Rate and Rhythm: Normal rate and regular rhythm. Heart sounds: Normal heart sounds. No murmur heard.       Pulmonary:      Effort: Pulmonary effort is normal. No respiratory distress. Breath sounds: Normal breath sounds. No wheezing or rhonchi. Abdominal:      Palpations: Abdomen is soft. Tenderness: There is abdominal tenderness in the right upper quadrant and epigastric area. There is no right CVA tenderness, left CVA tenderness, guarding or rebound. Negative signs include Mart's sign. Musculoskeletal:      Cervical back: Normal range of motion. Right lower leg: No edema. Left lower leg: No edema. Skin:     General: Skin is warm and dry. Neurological:      General: No focal deficit present. Mental Status: She is alert and oriented to person, place, and time. Psychiatric:         Mood and Affect: Mood normal.         DIFFERENTIAL DIAGNOSIS:   Differential diagnoses are discussed    DIAGNOSTIC RESULTS     EKG: All EKG's are interpreted by the Emergency Department Physician who either signs or Co-signsthis chart in the absence of a cardiologist.    Vent. Rate: 85 bpm  PRinterval: 152 ms  QRS duration: 90 ms  QTc: 459 ms  P-R-T axes: 58, 62, 52  Normal sinus rhythm. No STEMI. Compared to old EKG on 8 -19-21      RADIOLOGY: non-plain film images(s) such as CT, Ultrasound and MRI are read by the radiologist.    CTA CHEST [address]   Final Result    There are no large central or proximal branch pulmonary emboli. More distal emboli can't be excluded due to suboptimal opacification of the pulmonary arteries. There is been interval development of mediastinal adenopathy. There is focal atelectasis or infiltrate right upper lung. **This report has been created using voice recognition software. It may contain minor errors which are inherent in voice recognition technology. **      Final report electronically signed by Dr. Raymond Eastman on 1/7/2022 7:47 AM      XR CHEST PORTABLE   Final Result   1. No acute findings.       This document has been electronically signed by: Brian Escobedo MD on    01/07/2022 06:24 AM          LABS: Labs Reviewed   COVID-19, RAPID - Abnormal; Notable for the following components:       Result Value    SARS-CoV-2, NAAT DETECTED (*)     All other components within normal limits   CBC WITH AUTO DIFFERENTIAL - Abnormal; Notable for the following components:    MCHC 32.1 (*)     RDW-CV 15.9 (*)     RDW-SD 52.2 (*)     All other components within normal limits   COMPREHENSIVE METABOLIC PANEL W/ REFLEX TO MG FOR LOW K - Abnormal; Notable for the following components:    Glucose 152 (*)     CO2 22 (*)     Total Bilirubin <0.2 (*)     All other components within normal limits   RAPID INFLUENZA A/B ANTIGENS   CULTURE, THROAT    Narrative:     Source: Specimen not received       Site:           Current Antibiotics:   GROUP A STREP, REFLEX   HCG, SERUM, QUALITATIVE   LIPASE   C-REACTIVE PROTEIN   TROPONIN   ANION GAP   OSMOLALITY   GLOMERULAR FILTRATION RATE, ESTIMATED   SCAN OF BLOOD SMEAR       EMERGENCY DEPARTMENT COURSE:   Vitals:    Vitals:    01/07/22 0546 01/07/22 0605 01/07/22 0700 01/07/22 0800   BP: (!) 140/95 112/75 (!) 102/59 102/69   Pulse: 114 101  85   Resp: 19 17  16   Temp:       TempSrc:       SpO2: 96% 98% 96% 98%   Weight:       Height:          9:54 PM EST: The patient was seen and evaluated. Patient presents with tachycardia for complaint of hematemesis and concern for possible COVID. She has history of asthma but denies any chest tightness, wheezing, shortness of breath and states her symptoms do not feel consistent with prior asthma exacerbations. She has no history of PE in the past, not currently anticoagulated. Chest x-ray unremarkable. COVID positive. Strep, flu negative and labs are grossly reassuring including CMP within normal limits. CTA of the chest shows no large central or proximal branch pulmonary emboli but does demonstrate some mediastinal adenopathy with focal atelectasis or infiltrate in the right upper lung.   Results discussed with the patient who maintains oxygen saturation in the high 90s throughout her ED stay. She was treated with IV fluids, GI cocktail, Zofran and Toradol with improvement in her symptoms. She does desire discharge home and appears stable to do so at this time. Discussed potential for early right lung infiltrate to be viral in origin but given her history of asthma, will discharge with watch and wait antibiotics should she feel worse over the weekend. She is otherwise provided with supportive care including vitamin C, vitamin D, zinc and Zofran for symptom control and may continue her home PPI. She has home pulse oximeter available to her and return precautions were discussed with the patient regarding further needs upon discharge. CRITICAL CARE:   None    CONSULTS:  None    PROCEDURES:  None    FINAL IMPRESSION      1. COVID-19    2. Pneumonia of right upper lobe due to infectious organism          DISPOSITION/PLAN   Discharge    PATIENT REFERRED TO:  LEVI Brooks - CNP  [address] [facility], [address] [place] [address] Rd  [address] [facility]  [phone]      As needed    [address] [facility] [address] [address] EMERGENCY DEPT  [address] [facility] [address]  [address] [place] [address]  [phone]    If symptoms worsen      DISCHARGEMEDICATIONS:  Discharge Medication List as of 1/7/2022  8:32 AM      START taking these medications    Details   ondansetron (ZOFRAN ODT) 4 MG disintegrating tablet Take 1 tablet by mouth every 8 hours as needed for Nausea or Vomiting, Disp-20 tablet, R-0Normal      azithromycin (ZITHROMAX) 250 MG tablet Take 2 tabs po on day one.   Take 1 tab po daily on days 2-5., Disp-6 tablet, R-0Print      Ascorbic Acid (VITAMIN C) 500 MG CAPS Take 500 mg by mouth 2 times daily, Disp-28 capsule, R-0Normal      vitamin D3 (CHOLECALCIFEROL) 25 MCG (1000 UT) TABS tablet Take 2 tablets by mouth daily, Disp-28 tablet, R-0Normal      zinc 50 MG CAPS Take 100 mg by mouth nightly, Disp-28 capsule, R-0Normal             (Please note that portions of this note were completedwith a voice recognition program. Efforts were made to edit the dictations but occasionally words are mis-transcribed.)       Colten Vallejo PA-C  01/07/22 [id number]

## 2022-01-09 LAB — THROAT/NOSE CULTURE: NORMAL

## 2022-01-10 ENCOUNTER — TELEPHONE (OUTPATIENT)
Dept: FAMILY MEDICINE CLINIC | Age: 27
End: 2022-01-10

## 2022-01-11 ENCOUNTER — APPOINTMENT (OUTPATIENT)
Dept: GENERAL RADIOLOGY | Age: 27
End: 2022-01-11
Payer: MEDICARE

## 2022-01-11 ENCOUNTER — HOSPITAL ENCOUNTER (EMERGENCY)
Age: 27
Discharge: HOME | End: 2022-01-12
Attending: INTERNAL MEDICINE
Payer: MEDICARE

## 2022-01-11 DIAGNOSIS — U07.1 COVID-19: Primary | ICD-10-CM

## 2022-01-11 LAB
ALBUMIN SERPL-MCNC: 4.5 G/DL (ref 3.5–5.1)
ALP BLD-CCNC: 128 U/L (ref 38–126)
ALT SERPL-CCNC: 40 U/L (ref 11–66)
ANION GAP SERPL CALCULATED.3IONS-SCNC: 16 MEQ/L (ref 8–16)
AST SERPL-CCNC: 34 U/L (ref 5–40)
BILIRUB SERPL-MCNC: 0.2 MG/DL (ref 0.3–1.2)
BUN BLDV-MCNC: 11 MG/DL (ref 7–22)
C-REACTIVE PROTEIN: 1.14 MG/DL (ref 0–1)
CALCIUM SERPL-MCNC: 10.2 MG/DL (ref 8.5–10.5)
CHLORIDE BLD-SCNC: 98 MEQ/L (ref 98–111)
CO2: 25 MEQ/L (ref 23–33)
CREAT SERPL-MCNC: 0.7 MG/DL (ref 0.4–1.2)
GFR SERPL CREATININE-BSD FRML MDRD: > 90 ML/MIN/1.73M2
GLUCOSE BLD-MCNC: 96 MG/DL (ref 70–108)
OSMOLALITY CALCULATION: 276.8 MOSMOL/KG (ref 275–300)
POTASSIUM REFLEX MAGNESIUM: 4.3 MEQ/L (ref 3.5–5.2)
SODIUM BLD-SCNC: 139 MEQ/L (ref 135–145)
TOTAL PROTEIN: 7.6 G/DL (ref 6.1–8)
TROPONIN T: < 0.01 NG/ML

## 2022-01-11 PROCEDURE — 86140 C-REACTIVE PROTEIN: CPT

## 2022-01-11 PROCEDURE — 96374 THER/PROPH/DIAG INJ IV PUSH: CPT

## 2022-01-11 PROCEDURE — 6360000002 HC RX W HCPCS: Performed by: INTERNAL MEDICINE

## 2022-01-11 PROCEDURE — 93005 ELECTROCARDIOGRAM TRACING: CPT | Performed by: EMERGENCY MEDICINE

## 2022-01-11 PROCEDURE — 83615 LACTATE (LD) (LDH) ENZYME: CPT

## 2022-01-11 PROCEDURE — 80053 COMPREHEN METABOLIC PANEL: CPT

## 2022-01-11 PROCEDURE — 99283 EMERGENCY DEPT VISIT LOW MDM: CPT

## 2022-01-11 PROCEDURE — 2580000003 HC RX 258: Performed by: INTERNAL MEDICINE

## 2022-01-11 PROCEDURE — 36415 COLL VENOUS BLD VENIPUNCTURE: CPT

## 2022-01-11 PROCEDURE — 71045 X-RAY EXAM CHEST 1 VIEW: CPT

## 2022-01-11 PROCEDURE — M0245 HC IV INFUSION BAMLANIVIMAB & ETESEVIMAB W/MONITORING: HCPCS

## 2022-01-11 PROCEDURE — 96375 TX/PRO/DX INJ NEW DRUG ADDON: CPT

## 2022-01-11 PROCEDURE — 84484 ASSAY OF TROPONIN QUANT: CPT

## 2022-01-11 PROCEDURE — 85025 COMPLETE CBC W/AUTO DIFF WBC: CPT

## 2022-01-11 RX ORDER — DIPHENHYDRAMINE HYDROCHLORIDE 50 MG/ML
50 INJECTION INTRAMUSCULAR; INTRAVENOUS
Status: ACTIVE | OUTPATIENT
Start: 2022-01-11 | End: 2022-01-11

## 2022-01-11 RX ORDER — ALBUTEROL SULFATE 90 UG/1
4 AEROSOL, METERED RESPIRATORY (INHALATION) PRN
Status: DISCONTINUED | OUTPATIENT
Start: 2022-01-11 | End: 2022-01-12 | Stop reason: HOSPADM

## 2022-01-11 RX ORDER — ONDANSETRON 2 MG/ML
8 INJECTION INTRAMUSCULAR; INTRAVENOUS
Status: COMPLETED | OUTPATIENT
Start: 2022-01-11 | End: 2022-01-11

## 2022-01-11 RX ORDER — METHYLPREDNISOLONE SODIUM SUCCINATE 125 MG/2ML
125 INJECTION, POWDER, LYOPHILIZED, FOR SOLUTION INTRAMUSCULAR; INTRAVENOUS
Status: ACTIVE | OUTPATIENT
Start: 2022-01-11 | End: 2022-01-11

## 2022-01-11 RX ORDER — 0.9 % SODIUM CHLORIDE 0.9 %
500 INTRAVENOUS SOLUTION INTRAVENOUS ONCE
Status: COMPLETED | OUTPATIENT
Start: 2022-01-11 | End: 2022-01-12

## 2022-01-11 RX ORDER — ACETAMINOPHEN 325 MG/1
650 TABLET ORAL
Status: ACTIVE | OUTPATIENT
Start: 2022-01-11 | End: 2022-01-11

## 2022-01-11 RX ORDER — SODIUM CHLORIDE 9 MG/ML
20 INJECTION, SOLUTION INTRAVENOUS CONTINUOUS PRN
Status: DISCONTINUED | OUTPATIENT
Start: 2022-01-11 | End: 2022-01-12 | Stop reason: HOSPADM

## 2022-01-11 RX ORDER — SODIUM CHLORIDE 9 MG/ML
100 INJECTION, SOLUTION INTRAVENOUS CONTINUOUS PRN
Status: DISCONTINUED | OUTPATIENT
Start: 2022-01-11 | End: 2022-01-12 | Stop reason: HOSPADM

## 2022-01-11 RX ADMIN — SODIUM CHLORIDE 500 ML: 9 INJECTION, SOLUTION INTRAVENOUS at 23:14

## 2022-01-11 RX ADMIN — ONDANSETRON 8 MG: 2 INJECTION INTRAMUSCULAR; INTRAVENOUS at 23:40

## 2022-01-11 ASSESSMENT — PAIN SCALES - GENERAL: PAINLEVEL_OUTOF10: 7

## 2022-01-11 ASSESSMENT — PAIN DESCRIPTION - ONSET: ONSET: ON-GOING

## 2022-01-11 ASSESSMENT — PAIN DESCRIPTION - PAIN TYPE: TYPE: ACUTE PAIN

## 2022-01-11 ASSESSMENT — PAIN DESCRIPTION - FREQUENCY: FREQUENCY: CONTINUOUS

## 2022-01-11 ASSESSMENT — PAIN DESCRIPTION - LOCATION: LOCATION: CHEST

## 2022-01-11 ASSESSMENT — PAIN DESCRIPTION - DESCRIPTORS: DESCRIPTORS: BURNING;SHARP

## 2022-01-12 ENCOUNTER — TELEPHONE (OUTPATIENT)
Dept: FAMILY MEDICINE CLINIC | Age: 27
End: 2022-01-12

## 2022-01-12 VITALS
HEIGHT: 70 IN | TEMPERATURE: 98.3 F | SYSTOLIC BLOOD PRESSURE: 130 MMHG | OXYGEN SATURATION: 95 % | RESPIRATION RATE: 17 BRPM | WEIGHT: 293 LBS | HEART RATE: 103 BPM | DIASTOLIC BLOOD PRESSURE: 81 MMHG | BODY MASS INDEX: 41.95 KG/M2

## 2022-01-12 LAB
ATYPICAL LYMPHOCYTES: ABNORMAL %
BASOPHILS # BLD: 0.5 %
BASOPHILS ABSOLUTE: 0.1 THOU/MM3 (ref 0–0.1)
EOSINOPHIL # BLD: 0 %
EOSINOPHILS ABSOLUTE: 0 THOU/MM3 (ref 0–0.4)
ERYTHROCYTE [DISTWIDTH] IN BLOOD BY AUTOMATED COUNT: 15.7 % (ref 11.5–14.5)
ERYTHROCYTE [DISTWIDTH] IN BLOOD BY AUTOMATED COUNT: 51.2 FL (ref 35–45)
HCT VFR BLD CALC: 43.7 % (ref 37–47)
HEMOGLOBIN: 14.2 GM/DL (ref 12–16)
IMMATURE GRANS (ABS): 0.04 THOU/MM3 (ref 0–0.07)
IMMATURE GRANULOCYTES: 0.4 %
LD: 222 U/L (ref 100–190)
LYMPHOCYTES # BLD: 43.4 %
LYMPHOCYTES ABSOLUTE: 4.4 THOU/MM3 (ref 1–4.8)
MCH RBC QN AUTO: 29.2 PG (ref 26–33)
MCHC RBC AUTO-ENTMCNC: 32.5 GM/DL (ref 32.2–35.5)
MCV RBC AUTO: 89.7 FL (ref 81–99)
MONOCYTES # BLD: 6.1 %
MONOCYTES ABSOLUTE: 0.6 THOU/MM3 (ref 0.4–1.3)
NUCLEATED RED BLOOD CELLS: 0 /100 WBC
PLATELET # BLD: 264 THOU/MM3 (ref 130–400)
PLATELET ESTIMATE: ADEQUATE
PMV BLD AUTO: 10.4 FL (ref 9.4–12.4)
RBC # BLD: 4.87 MILL/MM3 (ref 4.2–5.4)
SCAN OF BLOOD SMEAR: NORMAL
SEG NEUTROPHILS: 49.6 %
SEGMENTED NEUTROPHILS ABSOLUTE COUNT: 5 THOU/MM3 (ref 1.8–7.7)
WBC # BLD: 10.1 THOU/MM3 (ref 4.8–10.8)

## 2022-01-12 PROCEDURE — 6370000000 HC RX 637 (ALT 250 FOR IP): Performed by: EMERGENCY MEDICINE

## 2022-01-12 PROCEDURE — 6360000002 HC RX W HCPCS: Performed by: EMERGENCY MEDICINE

## 2022-01-12 PROCEDURE — 6370000000 HC RX 637 (ALT 250 FOR IP): Performed by: INTERNAL MEDICINE

## 2022-01-12 PROCEDURE — 2580000003 HC RX 258: Performed by: INTERNAL MEDICINE

## 2022-01-12 PROCEDURE — 2500000003 HC RX 250 WO HCPCS: Performed by: INTERNAL MEDICINE

## 2022-01-12 PROCEDURE — 6360000002 HC RX W HCPCS: Performed by: INTERNAL MEDICINE

## 2022-01-12 RX ORDER — GUAIFENESIN/DEXTROMETHORPHAN 100-10MG/5
5 SYRUP ORAL 3 TIMES DAILY PRN
Qty: 120 ML | Refills: 0 | Status: SHIPPED | OUTPATIENT
Start: 2022-01-12 | End: 2022-01-22

## 2022-01-12 RX ORDER — DEXAMETHASONE 6 MG/1
6 TABLET ORAL 2 TIMES DAILY WITH MEALS
Qty: 20 TABLET | Refills: 0 | Status: SHIPPED | OUTPATIENT
Start: 2022-01-12 | End: 2022-01-22

## 2022-01-12 RX ORDER — GUAIFENESIN/DEXTROMETHORPHAN 100-10MG/5
5 SYRUP ORAL ONCE
Status: COMPLETED | OUTPATIENT
Start: 2022-01-12 | End: 2022-01-12

## 2022-01-12 RX ORDER — ONDANSETRON 4 MG/1
4 TABLET, ORALLY DISINTEGRATING ORAL 3 TIMES DAILY PRN
Qty: 21 TABLET | Refills: 0 | Status: SHIPPED | OUTPATIENT
Start: 2022-01-12 | End: 2022-01-28

## 2022-01-12 RX ORDER — IPRATROPIUM BROMIDE AND ALBUTEROL SULFATE 2.5; .5 MG/3ML; MG/3ML
1 SOLUTION RESPIRATORY (INHALATION) ONCE
Status: COMPLETED | OUTPATIENT
Start: 2022-01-12 | End: 2022-01-12

## 2022-01-12 RX ORDER — DEXAMETHASONE SODIUM PHOSPHATE 4 MG/ML
6 INJECTION, SOLUTION INTRA-ARTICULAR; INTRALESIONAL; INTRAMUSCULAR; INTRAVENOUS; SOFT TISSUE ONCE
Status: COMPLETED | OUTPATIENT
Start: 2022-01-12 | End: 2022-01-12

## 2022-01-12 RX ADMIN — ALBUTEROL SULFATE 4 PUFF: 90 AEROSOL, METERED RESPIRATORY (INHALATION) at 00:43

## 2022-01-12 RX ADMIN — DEXAMETHASONE SODIUM PHOSPHATE 6 MG: 4 INJECTION, SOLUTION INTRA-ARTICULAR; INTRALESIONAL; INTRAMUSCULAR; INTRAVENOUS; SOFT TISSUE at 00:44

## 2022-01-12 RX ADMIN — SODIUM CHLORIDE: 9 INJECTION, SOLUTION INTRAVENOUS at 00:38

## 2022-01-12 RX ADMIN — IPRATROPIUM BROMIDE AND ALBUTEROL SULFATE 1 AMPULE: .5; 3 SOLUTION RESPIRATORY (INHALATION) at 00:45

## 2022-01-12 RX ADMIN — GUAIFENESIN AND DEXTROMETHORPHAN 5 ML: 100; 10 SYRUP ORAL at 00:45

## 2022-01-12 ASSESSMENT — ENCOUNTER SYMPTOMS
CONSTIPATION: 0
BLOOD IN STOOL: 0
NAUSEA: 1
SHORTNESS OF BREATH: 1
TROUBLE SWALLOWING: 0
EYE ITCHING: 0
ABDOMINAL DISTENTION: 0
EYE PAIN: 0
CHOKING: 0
COUGH: 1
CHEST TIGHTNESS: 0
EYE DISCHARGE: 0
RHINORRHEA: 0
BACK PAIN: 0
PHOTOPHOBIA: 0
EYE REDNESS: 0
SORE THROAT: 0
WHEEZING: 1
DIARRHEA: 0
VOICE CHANGE: 0
ABDOMINAL PAIN: 0
VOMITING: 0
SINUS PRESSURE: 0

## 2022-01-12 NOTE — ED TRIAGE NOTES
Pt presents to the ED with c/o chest pain and shortness of breath. Pt states over the weekend she was diagnosed with COVID and pneumonia. Pt states she has continuous been coughing. Pt states today, she had worsening chest pain and shortness of breath. Pt reports taking Ibuprofen for aches and pains with no relief. Pt states last dose was this afternoon.

## 2022-01-12 NOTE — ED PROVIDER NOTES
[facility]  eMERGENCY dEPARTMENT eNCOUnter          [address] [facility]       Chief Complaint   Patient presents with    Positive For Covid-19    Shortness of Breath    Chest Pain       Nurses Notes reviewed and I agree except as noted in the HPI. HISTORY OF PRESENT ILLNESS    Julee Spicer is a 32 y.o. female who presents shortness of breath wheezing and cough. Apparently the patient had been seen by the prior physician. They were not comfortable so they requested a new physician I went and examined the patient. Patient is positive for COVID. Patient also has asthma. Patient is a smoker as well. Patient has not received her COVID shots. Here today she is complaining about cough shortness of breath subjective fevers. She also had a bout of emesis. Patient is otherwise resting comfortably on the cot no apparent distress no other physical complaints at this time. Mother is at bedside throughout the interview and examination. REVIEW OF SYSTEMS     Review of Systems   Constitutional: Negative for activity change, appetite change, diaphoresis, fatigue and unexpected weight change. HENT: Negative for congestion, ear discharge, ear pain, hearing loss, rhinorrhea, sinus pressure, sore throat, trouble swallowing and voice change. Eyes: Negative for photophobia, pain, discharge, redness and itching. Respiratory: Positive for cough, shortness of breath and wheezing. Negative for choking and chest tightness. Cardiovascular: Negative for chest pain, palpitations and leg swelling. Gastrointestinal: Positive for nausea. Negative for abdominal distention, abdominal pain, blood in stool, constipation, diarrhea and vomiting. Endocrine: Negative for polydipsia, polyphagia and polyuria. Genitourinary: Negative for decreased urine volume, difficulty urinating, dysuria, enuresis, frequency, hematuria and urgency.    Musculoskeletal: Negative for arthralgias, back pain, gait problem, myalgias, neck pain and neck stiffness. Skin: Negative for pallor and rash. Allergic/Immunologic: Negative for immunocompromised state. Neurological: Negative for dizziness, tremors, seizures, syncope, facial asymmetry, weakness, light-headedness, numbness and headaches. Hematological: Negative for adenopathy. Does not bruise/bleed easily. Psychiatric/Behavioral: Negative for agitation, hallucinations and suicidal ideas. The patient is not nervous/anxious. PAST MEDICAL HISTORY    has a past medical history of ADD (attention deficit disorder), Anxiety, Anxiety attack, Asthma, Atypical face pain, Back pain, Bipolar 1 disorder ([address] Diabetes mellitus ([address] Fibromyalgia, GERD (gastroesophageal reflux disease), Hypotension, Major depressive disorder, recurrent episode, mild ([address] Obesity, WILFREDO treated with BiPAP, Pneumonia, POTS (postural orthostatic tachycardia syndrome), Pott disease, Pulmonary emboli ([address] Seizures ([address] Tailbone injury, Thyroid disease, TMJ (dislocation of temporomandibular joint), Trigeminal neuralgia, and Wears contact lenses. SURGICAL HISTORY      has a past surgical history that includes Barton tooth extraction (); Cardiac catheterization (2016); Cardiac catheterization (2016); Colonoscopy (2015); Insertable Cardiac Monitor; Ankle fracture surgery (Right, 2020); ablation of dysrhythmic focus ();  section (N/A, 2020); Upper gastrointestinal endoscopy (2020); Upper gastrointestinal endoscopy (2015); and Cholecystectomy, laparoscopic (N/A, 2021).     CURRENT MEDICATIONS       Previous Medications    ALBUTEROL (PROVENTIL) (2.5 MG/3ML) 0.083% NEBULIZER SOLUTION    Take 3 mLs by nebulization every 6 hours as needed for Wheezing    ALBUTEROL SULFATE  (90 BASE) MCG/ACT INHALER    Inhale 2 puffs into the lungs every 4 hours as needed for Wheezing    ARIPIPRAZOLE (ABILIFY) 15 MG TABLET    Take 15 mg by mouth nightly ASCORBIC ACID (VITAMIN C) 500 MG CAPS    Take 500 mg by mouth 2 times daily    AZELASTINE (ASTELIN) 0.1 % NASAL SPRAY    1 spray by Nasal route 2 times daily Use in each nostril as directed    AZITHROMYCIN (ZITHROMAX) 250 MG TABLET    Take 2 tabs po on day one. Take 1 tab po daily on days 2-5. BUSPIRONE (BUSPAR) 15 MG TABLET    Take 45 mg by mouth nightly     CETIRIZINE (ZYRTEC) 10 MG TABLET    Take 1 tablet by mouth daily    CLOTRIMAZOLE-BETAMETHASONE (LOTRISONE) 1-0.05 % CREAM    Apply topically 2 times daily    DULOXETINE (CYMBALTA) 30 MG EXTENDED RELEASE CAPSULE    Take 1 capsule by mouth daily    IBUPROFEN (ADVIL;MOTRIN) 800 MG TABLET    TAKE 1 TABLET BY MOUTH FOUR TIMES A DAY AS NEEDED    NYSTATIN (MYCOSTATIN) 728264 UNIT/GM CREAM    Apply topically 2 times daily. OMEPRAZOLE (PRILOSEC) 40 MG DELAYED RELEASE CAPSULE    TAKE 1 CAPSULE BY MOUTH TWO TIMES A DAY     OXCARBAZEPINE (TRILEPTAL) 600 MG TABLET        QUETIAPINE (SEROQUEL) 300 MG TABLET    Take 300 mg by mouth nightly    QUETIAPINE (SEROQUEL) 50 MG TABLET    Take 50 mg by mouth every morning (before breakfast)    SERTRALINE (ZOLOFT) 25 MG TABLET    Take 25 mg by mouth daily    SERTRALINE (ZOLOFT) 50 MG TABLET    Take 50 mg by mouth nightly     TOPIRAMATE (TOPAMAX) 25 MG TABLET    TAKE 1 TABLET BY MOUTH TWO TIMES A DAY    VITAMIN D3 (CHOLECALCIFEROL) 25 MCG (1000 UT) TABS TABLET    Take 2 tablets by mouth daily    ZINC 50 MG CAPS    Take 100 mg by mouth nightly       ALLERGIES     is allergic to dilaudid [hydromorphone hcl], flexeril [cyclobenzaprine], keflex [cephalexin], tessalon [benzonatate], augmentin [amoxicillin-pot clavulanate], lorabid [loracarbef], and minocycline. FAMILY HISTORY     She indicated that her mother is alive. She indicated that her father is alive. She indicated that her brother is alive. She indicated that her maternal grandmother is . She indicated that her maternal grandfather is .  She indicated that her paternal grandmother is . She indicated that her paternal grandfather is . She indicated that her maternal aunt is alive. She indicated that the status of her paternal aunt is unknown. She indicated that her paternal uncle is . family history includes Anxiety Disorder in her father and mother; Bipolar Disorder in her father; Cancer in her maternal grandmother, paternal grandfather, and paternal uncle; Depression in her maternal grandfather, paternal aunt, and paternal uncle; Diabetes in her maternal grandmother and mother; Heart Attack in her maternal grandfather; High Blood Pressure in her father; Lupus in her maternal aunt; Mental Illness in her father; No Known Problems in her brother and paternal grandmother; Ovarian Cancer in her maternal grandmother; Parkinsonism in her father. SOCIAL HISTORY      reports that she has been smoking cigarettes. She started smoking about 3 years ago. She has a 0.50 pack-year smoking history. She has never used smokeless tobacco. She reports current drug use. Drug: Marijuana Eston Rodrigo). She reports that she does not drink alcohol. PHYSICAL EXAM     INITIAL VITALS:  height is 5' 10\" (1.778 m) and weight is 315 lb (142.9 kg) (abnormal). Her oral temperature is 98.3 °F (36.8 °C). Her blood pressure is 125/81 and her pulse is 101. Her respiration is 20 and oxygen saturation is 99%. Physical Exam  Vitals and nursing note reviewed. Constitutional:       General: She is not in acute distress. Appearance: She is well-developed. She is not diaphoretic. HENT:      Head: Normocephalic and atraumatic. Right Ear: External ear normal.      Left Ear: External ear normal.      Nose: Nose normal.      Mouth/Throat:      Pharynx: No oropharyngeal exudate. Eyes:      General: No scleral icterus. Right eye: No discharge. Left eye: No discharge.       Conjunctiva/sclera: Conjunctivae normal.      Pupils: Pupils are equal, round, and reactive to light. Neck:      Thyroid: No thyromegaly. Vascular: No JVD. Trachea: No tracheal deviation. Cardiovascular:      Rate and Rhythm: Normal rate and regular rhythm. Heart sounds: Normal heart sounds, S1 normal and S2 normal. No murmur heard. No friction rub. No gallop. Pulmonary:      Effort: Pulmonary effort is normal.      Breath sounds: No stridor. Examination of the right-upper field reveals wheezing. Examination of the left-upper field reveals wheezing. Examination of the right-middle field reveals wheezing. Examination of the left-middle field reveals wheezing. Wheezing present. No decreased breath sounds, rhonchi or rales. Chest:      Chest wall: No tenderness. Abdominal:      General: Bowel sounds are normal. There is no distension. Palpations: Abdomen is soft. There is no mass. Tenderness: There is no abdominal tenderness. There is no guarding or rebound. Hernia: No hernia is present. Musculoskeletal:         General: No tenderness. Normal range of motion. Cervical back: Normal range of motion and neck supple. Lymphadenopathy:      Cervical: No cervical adenopathy. Skin:     General: Skin is warm and dry. Capillary Refill: Capillary refill takes less than 2 seconds. Findings: No bruising, ecchymosis, lesion or rash. Neurological:      Mental Status: She is alert and oriented to person, place, and time. Cranial Nerves: No cranial nerve deficit. Coordination: Coordination normal.      Deep Tendon Reflexes: Reflexes are normal and symmetric. Psychiatric:         Speech: Speech normal.         Behavior: Behavior normal.         Thought Content:  Thought content normal.         Judgment: Judgment normal.           DIFFERENTIAL DIAGNOSIS:   COVID-19    DIAGNOSTIC RESULTS     EKG: All EKG's are interpreted by the Emergency Department Physician who either signs or Co-signs this chart in the absence of a cardiologist.  None    RADIOLOGY: non-plain film images(s) such as CT, Ultrasound and MRI are read by the radiologist.  XR CHEST PORTABLE   Final Result   Impression:   Bronchial wall thickening      This document has been electronically signed by: Magdiel Garduno MD on    01/11/2022 10:24 PM            LABS:   Labs Reviewed   CBC WITH AUTO DIFFERENTIAL - Abnormal; Notable for the following components:       Result Value    RDW-CV 15.7 (*)     RDW-SD 51.2 (*)     All other components within normal limits   COMPREHENSIVE METABOLIC PANEL W/ REFLEX TO MG FOR LOW K - Abnormal; Notable for the following components:    Alkaline Phosphatase 128 (*)     Total Bilirubin 0.2 (*)     All other components within normal limits   LACTATE DEHYDROGENASE - Abnormal; Notable for the following components:     (*)     All other components within normal limits   C-REACTIVE PROTEIN - Abnormal; Notable for the following components:    CRP 1.14 (*)     All other components within normal limits   TROPONIN   ANION GAP   GLOMERULAR FILTRATION RATE, ESTIMATED   OSMOLALITY       EMERGENCY DEPARTMENT COURSE:   Vitals:    Vitals:    01/11/22 2147 01/11/22 2149 01/11/22 2249 01/11/22 2303   BP: 117/72  127/82 125/81   Pulse: 119  102 101   Resp: 15  18 20   Temp: 98.3 °F (36.8 °C)      TempSrc: Oral      SpO2:  99% 98% 99%   Weight: (!) 315 lb (142.9 kg)      Height: 5' 10\" (1.778 m)        Patient was assessed at bedside labs and imaging already been performed. Patient is positive for COVID. Chest x-ray was negative. No white blood cell count. At this point I feel the patient would benefit from a breathing treatment. She was also given cough medication, steroids and some Zofran at bedside. Patient had already been started on Regeneron treatments here. Patient was instructed to observe social distancing, wear her mask. According to parents at bedside they are on day 10. The patient is on day 9.   Patient received her treatment here.  Patient did extremely well. At this point I feel the patient be safely discharged home. Patient will be given a prescription for steroids and cough medication for at home. She has all her breathing treatments and nebulizers at home already. Patient is instructed return to the nearest emergency room for any new or worsening complaints. Patient understood and agreed with the plan. Patient is subsequently discharged home in stable condition. Patient has what appears to be COVID-19. Patient has her breathing treatments as well and she is instructed to take those as prescribed. She is also instructed to use Tylenol Motrin for any pain or fevers. Patient has been given prescription for cough medication steroids and nausea medication she is instructed to take that as prescribed. Patient is instructed to follow-up with a primary care physician to do so within the next 1 to 2 days and return to the nearest emergency room immediately for any new or worsening complaints.       CRITICAL CARE:   None    CONSULTS:  None    PROCEDURES:  None    FINAL IMPRESSION      1. COVID-19          DISPOSITION/PLAN   Discharge    PATIENT REFERRED TO:  Jose Retana, APRN - CNP  [address] [place] [address], [address] [place] [address]  [address] [address]  [phone]    Call in 1 day      [address] [facility] [address] [address] EMERGENCY DEPT  [address]  Go in 1 day  If symptoms worsen      DISCHARGE MEDICATIONS:  New Prescriptions    DEXAMETHASONE (DECADRON) 6 MG TABLET    Take 1 tablet by mouth 2 times daily (with meals) for 10 days    GUAIFENESIN-DEXTROMETHORPHAN (ROBITUSSIN DM) 100-10 MG/5ML SYRUP    Take 5 mLs by mouth 3 times daily as needed for Cough    ONDANSETRON (ZOFRAN-ODT) 4 MG DISINTEGRATING TABLET    Take 1 tablet by mouth 3 times daily as needed for Nausea or Vomiting       (Please note that portions of this note were completed with a voice recognition program.  Efforts were made to edit the dictations but occasionally words are mis-transcribed.)    DO Vielka Zapata DO  01/12/22 0045

## 2022-01-12 NOTE — ED PROVIDER NOTES
eMERGENCY dEPARTMENT eNCOUnter      [address] [facility]    Chief Complaint   Patient presents with    Positive For Covid-19    Shortness of Breath    Chest Pain       HPI    Walker Nolan is a 32 y.o. female who presents to the emergency department because of cough and shortness of breath. Patient also is complaining of some chest pain. Has history of anxiety in the past.  Patient was tested positive for COVID on seventh of this month. Patient was not immunized against COVID. Patient is not complaining of any fever or chills. There is no diarrhea.     PAST MEDICAL HISTORY    Past Medical History:   Diagnosis Date    ADD (attention deficit disorder)     Anxiety 04/08/2015    Anxiety attack     Asthma     exercise induced    Atypical face pain     Back pain     Bipolar 1 disorder ([address]     Diabetes mellitus ([address]     GDM on insulin started on 7/23-during pregnancy    Fibromyalgia     GERD (gastroesophageal reflux disease)     Hypotension 11/02/2017    Major depressive disorder, recurrent episode, mild ([address] 07/31/2012    Obesity 10/24/2014    WILFREDO treated with BiPAP     Pneumonia 02/21/2018    POTS (postural orthostatic tachycardia syndrome)     Pott disease     Pulmonary emboli ([address] 2016    was on blood thinners for 6 months    Seizures ([address] 07/31/2016    anxiety induced no meds last seizure 4 years ago    Tailbone injury 11/28/2015    patient broke tailbone    Thyroid disease     borderline low no meds    TMJ (dislocation of temporomandibular joint)     Trigeminal neuralgia     Wears contact lenses        SURGICAL HISTORY    Past Surgical History:   Procedure Laterality Date    [facility]BLATION [facility] DYSRHYTHMIC FOCUS  2016    [facility]    ANKLE FRACTURE SURGERY Right 02/01/2020    RIGHT ANKLE OPEN REDUCTION INTERNAL FIXATION AND DELTOID LIGAMENT REPAIR performed by Merle Massey DPM at [address] [place]  03/2016    EP Study    CARDIAC [facility]ATION  04/21/2016    [facility]    108 (90 Base) MCG/ACT inhaler Inhale 2 puffs into the lungs every 4 hours as needed for Wheezing 1 Inhaler 0    azelastine (ASTELIN) 0.1 % nasal spray 1 spray by Nasal route 2 times daily Use in each nostril as directed 1 Bottle 0    sertraline (ZOLOFT) 25 MG tablet Take 25 mg by mouth daily      busPIRone (BUSPAR) 15 MG tablet Take 45 mg by mouth nightly       OXcarbazepine (TRILEPTAL) 600 MG tablet       QUEtiapine (SEROQUEL) 300 MG tablet Take 300 mg by mouth nightly      QUEtiapine (SEROQUEL) 50 MG tablet Take 50 mg by mouth every morning (before breakfast)      ARIPiprazole (ABILIFY) 15 MG tablet Take 15 mg by mouth nightly      sertraline (ZOLOFT) 50 MG tablet Take 50 mg by mouth nightly       omeprazole (PRILOSEC) 40 MG delayed release capsule TAKE 1 CAPSULE BY MOUTH TWO TIMES A DAY  (Patient taking differently: nightly PT TAKES ONCE AT HS) 60 capsule 10    cetirizine (ZYRTEC) 10 MG tablet Take 1 tablet by mouth daily 90 tablet 3       ALLERGIES    Allergies   Allergen Reactions    Dilaudid [Hydromorphone Hcl]      hallucination    Flexeril [Cyclobenzaprine] Other (See Comments)     Hallucinations    Keflex [Cephalexin] Other (See Comments)     Lose cognitive functions.      Tessalon [Benzonatate] Other (See Comments)     psychosis    Augmentin [Amoxicillin-Pot Clavulanate] Rash    Lorabid [Loracarbef] Hives, Swelling and Rash    Minocycline Itching, Swelling and Rash       FAMILY HISTORY    Family History   Problem Relation Age of Onset    Anxiety Disorder Mother     Diabetes Mother     Anxiety Disorder Father     Bipolar Disorder Father     High Blood Pressure Father     Mental Illness Father     Parkinsonism Father         Early-Onset    Depression Paternal Aunt     Cancer Paternal Uncle         multiple myeloma    Depression Paternal Uncle     Cancer Maternal Grandmother         breast    Ovarian Cancer Maternal Grandmother     Diabetes Maternal Grandmother     Depression Maternal Grandfather     Heart Attack Maternal Grandfather     Cancer Paternal Grandfather         lung    No Known Problems Brother         Gaviria disease    No Known Problems Paternal Grandmother     Lupus Maternal Aunt        SOCIAL HISTORY    Social History     Socioeconomic History    Marital status:      Spouse name: Maggy Hoffman    Number of children: 1    Years of education: 15    Highest education level: High school graduate   Occupational History    None   Tobacco Use    Smoking status: Current Every Day Smoker     Packs/day: 0.50     Years: 1.00     Pack years: 0.50     Types: Cigarettes     Start date: 6/30/2018    Smokeless tobacco: Never Used   Vaping Use    Vaping Use: Former    Substances: Always   Substance and Sexual Activity    Alcohol use: Never     Alcohol/week: 1.0 standard drink     Types: 1 Cans of beer per week     Comment: occasional    Drug use: Yes     Types: Marijuana Ardia Wayne)     Comment: MEDICAL [facility]  08/18/21    Sexual activity: Yes     Partners: Male   Other Topics Concern    None   Social History Narrative    None     Social Determinants of Health     Financial Resource Strain: Low Risk     Difficulty of Paying Living Expenses: Not hard at all   Food Insecurity: No Food Insecurity    Worried About [address] [place] [address] in the Last Year: Never true    [place] of Food in the Last Year: Never true   Transportation Needs: No Transportation Needs    Lack of Transportation (Medical): No    Lack of Transportation (Non-Medical):  No   Physical Activity:     Days of Exercise per Week: Not on file    Minutes of Exercise per Session: Not on file   Stress:     Feeling of Stress : Not on file   Social Connections:     Frequency of Communication with Friends and Family: Not on file    Frequency of Social Gatherings with Friends and Family: Not on file    Attends Tenriism Services: Not on file    Active Member of Clubs or Organizations: Not on file    Attends Club or Organization Meetings: Not on file    Marital Status: Not on file   Intimate Partner Violence:     Fear of Current or Ex-Partner: Not on file    Emotionally Abused: Not on file    Physically Abused: Not on file    Sexually Abused: Not on file   Housing Stability:     Unable to Pay for Housing in the Last Year: Not on file    Number of [place] in the Last Year: Not on file    Unstable Housing in the Last Year: Not on file       REVIEW OF SYSTEMS    Constitutional:  Denies fever, chills, weight loss or weakness   Eyes:  Denies photophobia or discharge   HENT:  Denies sore throat or ear pain   Respiratory: Complaining of cough and shortness of breath   Cardiovascular:  Denies chest pain, palpitations or swelling   GI:  Denies abdominal pain, nausea, vomiting, or diarrhea   Musculoskeletal:  Denies back pain   Skin:  Denies rash   Neurologic:  Denies headache, focal weakness or sensory changes   Endocrine:  Denies polyuria or polydypsia   Lymphatic:  Denies swollen glands   Psychiatric:  Denies depression, suicidal ideation or homicidal ideation   All systems negative except as marked. PHYSICAL EXAM    VITAL SIGNS: /81   Pulse 101   Temp 98.3 °F (36.8 °C) (Oral)   Resp 20   Ht 5' 10\" (1.778 m)   Wt (!) 315 lb (142.9 kg)   SpO2 99%   BMI 45.20 kg/m²    Constitutional:  Well developed, Well nourished, No acute distress, Non-toxic appearance. HENT:  Normocephalic, Atraumatic, Bilateral external ears normal, Oropharynx moist, No oral exudates, Nose normal. Neck- Normal range of motion, No tenderness, Supple, No stridor. Eyes:  PERRL, EOMI, Conjunctiva normal, No discharge. Respiratory:  Normal breath sounds, No respiratory distress, No wheezing, No chest tenderness. Cardiovascular:  Normal heart rate, Normal rhythm, No murmurs, No rubs, No gallops. GI:  Bowel sounds normal, Soft, No tenderness, No masses, No pulsatile masses. :  No CVA tenderness.    Musculoskeletal: Intact distal pulses, No edema, No tenderness, No cyanosis, No clubbing. Good range of motion in all major joints. No tenderness to palpation or major deformities noted. Back- No tenderness. Integument:  Warm, Dry, No erythema, No rash. Lymphatic:  No lymphadenopathy noted. Neurologic:  Alert & oriented x 3, Normal motor function, Normal sensory function, No focal deficits noted. Psychiatric:  Affect normal, Judgment normal, Mood normal.     EKG    Sinus tachycardia with heart rate of 102. WI interval 132 ms  QRS duration 92 ms  QTc interval 469 ms    Labs   Labs Reviewed   CBC WITH AUTO DIFFERENTIAL - Abnormal; Notable for the following components:       Result Value    RDW-CV 15.7 (*)     RDW-SD 51.2 (*)     All other components within normal limits   COMPREHENSIVE METABOLIC PANEL W/ REFLEX TO MG FOR LOW K - Abnormal; Notable for the following components:    Alkaline Phosphatase 128 (*)     Total Bilirubin 0.2 (*)     All other components within normal limits   LACTATE DEHYDROGENASE - Abnormal; Notable for the following components:     (*)     All other components within normal limits   C-REACTIVE PROTEIN - Abnormal; Notable for the following components:    CRP 1.14 (*)     All other components within normal limits   TROPONIN   ANION GAP   GLOMERULAR FILTRATION RATE, ESTIMATED   OSMOLALITY       RADIOLOGY    XR CHEST PORTABLE   Final Result   Impression:   Bronchial wall thickening      This document has been electronically signed by: Beth Ruiz MD on    01/11/2022 10:24 PM              ED COURSE & MEDICAL DECISION MAKING    Pertinent Labs & Imaging studies reviewed. (See chart for details)  Patient was started on monoclonal antibodies. Patient requested to see another physician for some reason. Patient's care was assumed by Dr. Rory Pérez from me.     FINAL IMPRESSION    1. COVID-19             Gutierrez Shea MD  01/12/22 6592

## 2022-01-13 LAB
EKG ATRIAL RATE: 102 BPM
EKG P AXIS: 65 DEGREES
EKG P-R INTERVAL: 132 MS
EKG Q-T INTERVAL: 360 MS
EKG QRS DURATION: 92 MS
EKG QTC CALCULATION (BAZETT): 469 MS
EKG R AXIS: 68 DEGREES
EKG T AXIS: 48 DEGREES
EKG VENTRICULAR RATE: 102 BPM

## 2022-01-21 ENCOUNTER — HOSPITAL ENCOUNTER (EMERGENCY)
Age: 27
Discharge: HOME | End: 2022-01-21
Payer: MEDICARE

## 2022-01-21 ENCOUNTER — APPOINTMENT (OUTPATIENT)
Dept: GENERAL RADIOLOGY | Age: 27
End: 2022-01-21
Payer: MEDICARE

## 2022-01-21 VITALS
RESPIRATION RATE: 18 BRPM | SYSTOLIC BLOOD PRESSURE: 149 MMHG | HEIGHT: 70 IN | BODY MASS INDEX: 41.95 KG/M2 | WEIGHT: 293 LBS | OXYGEN SATURATION: 98 % | HEART RATE: 115 BPM | TEMPERATURE: 96.9 F | DIASTOLIC BLOOD PRESSURE: 77 MMHG

## 2022-01-21 DIAGNOSIS — J06.9 URI WITH COUGH AND CONGESTION: Primary | ICD-10-CM

## 2022-01-21 PROCEDURE — 71046 X-RAY EXAM CHEST 2 VIEWS: CPT

## 2022-01-21 PROCEDURE — 99213 OFFICE O/P EST LOW 20 MIN: CPT | Performed by: EMERGENCY MEDICINE

## 2022-01-21 PROCEDURE — 99213 OFFICE O/P EST LOW 20 MIN: CPT

## 2022-01-21 RX ORDER — CEFDINIR 300 MG/1
300 CAPSULE ORAL 2 TIMES DAILY
Qty: 14 CAPSULE | Refills: 0 | Status: SHIPPED | OUTPATIENT
Start: 2022-01-21 | End: 2022-01-28

## 2022-01-21 RX ORDER — FLUTICASONE PROPIONATE 50 MCG
1 SPRAY, SUSPENSION (ML) NASAL DAILY
Qty: 16 G | Refills: 0 | Status: SHIPPED | OUTPATIENT
Start: 2022-01-21 | End: 2022-01-28

## 2022-01-21 ASSESSMENT — ENCOUNTER SYMPTOMS
SHORTNESS OF BREATH: 0
SINUS PAIN: 1
RHINORRHEA: 1
SORE THROAT: 1
COUGH: 1

## 2022-01-21 NOTE — ED TRIAGE NOTES
Patient ambulated to room with complaint of sinus pressure, cough with yellow metalic tasting mucous, sore throat and ear pressure 5 days ago.   States she was positive for Covid 3 weeks ago and then had pneumonia in right lung

## 2022-01-21 NOTE — ED PROVIDER NOTES
[place]  Urgent Care Encounter       CHIEF COMPLAINT     No chief complaint on file. Nurses Notes reviewed and I agree except as noted in the HPI. HISTORY OF PRESENT ILLNESS   Julee Spicer is a 32 y.o. female who presents for complaints of cough, sore throat, chest congestion, green sputum. Patient had COVID-19 in early January of this year. Patient also had pneumonia and was treated with azithromycin. Patient states she completed the antibiotic last week but her symptoms have gotten significantly worse over the past 3 days. She states she is frequently coughing denies significant shortness of breath. She also complains of bilateral ear pressure. Patient does have history of morbid obesity, tachyarrhythmia, also has history of pulmonary embolism. Patient did have CTA of the chest performed January 7. The right upper lobe pneumonia was noted on the CTA with there was no large central or proximal branch pulmonary emboli. More distal emboli could not be aches excluded due to suboptimal contrast on study    HPI    REVIEW OF SYSTEMS     Review of Systems   Constitutional: Positive for fatigue. Negative for chills and fever. HENT: Positive for congestion, ear pain, rhinorrhea, sinus pain and sore throat. Respiratory: Positive for cough. Negative for shortness of breath. Neurological: Positive for headaches. Negative for dizziness. Psychiatric/Behavioral: Negative for behavioral problems.        PAST MEDICAL HISTORY         Diagnosis Date    ADD (attention deficit disorder)     Anxiety 04/08/2015    Anxiety attack     Asthma     exercise induced    Atypical face pain     Back pain     Bipolar 1 disorder (HCC)     Diabetes mellitus ([facility]     GDM on insulin started on 7/23-during pregnancy    Fibromyalgia     GERD (gastroesophageal reflux disease)     Hypotension 11/02/2017    Major depressive disorder, recurrent episode, mild ([address] 07/31/2012    Obesity 10/24/2014    WILFREDO treated with BiPAP     Pneumonia 2018    POTS (postural orthostatic tachycardia syndrome)     Pott disease     Pulmonary emboli ([facility] 2016    was on blood thinners for 6 months    [facility] 2016    anxiety induced no meds last seizure 4 years ago    Tailbone injury 2015    patient broke tailbone    Thyroid disease     borderline low no meds    TMJ (dislocation of temporomandibular joint)     Trigeminal neuralgia     Wears contact lenses        SURGICALHISTORY     Patient  has a past surgical history that includes [place] tooth extraction (); Cardiac catheterization (2016); Cardiac catheterization (2016); Colonoscopy (2015); Insertable Cardiac Monitor; Ankle fracture surgery (Right, 2020); ablation of dysrhythmic focus ();  section (N/A, 2020); Upper gastrointestinal endoscopy (2020); Upper gastrointestinal endoscopy (2015); and Cholecystectomy, laparoscopic (N/A, 2021). CURRENT MEDICATIONS       Discharge Medication List as of 2022 12:26 PM      CONTINUE these medications which have NOT CHANGED    Details   guaiFENesin-dextromethorphan (ROBITUSSIN DM) 100-10 MG/5ML syrup Take 5 mLs by mouth 3 times daily as needed for Cough, Disp-120 mL, R-0Print      dexamethasone (DECADRON) 6 MG tablet Take 1 tablet by mouth 2 times daily (with meals) for 10 days, Disp-20 tablet, R-0Print      azithromycin (ZITHROMAX) 250 MG tablet Take 2 tabs po on day one.   Take 1 tab po daily on days 2-5., Disp-6 tablet, R-0Print      Ascorbic Acid (VITAMIN C) 500 MG CAPS Take 500 mg by mouth 2 times daily, Disp-28 capsule, R-0Normal      vitamin D3 (CHOLECALCIFEROL) 25 MCG (1000 UT) TABS tablet Take 2 tablets by mouth daily, Disp-28 tablet, R-0Normal      zinc 50 MG CAPS Take 100 mg by mouth nightly, Disp-28 capsule, R-0Normal      DULoxetine (CYMBALTA) 30 MG extended release capsule Take 1 capsule by mouth dailyHistorical Med ibuprofen (ADVIL;MOTRIN) 800 MG tablet TAKE 1 TABLET BY MOUTH FOUR TIMES A DAY AS NEEDEDHistorical Med      albuterol (PROVENTIL) (2.5 MG/3ML) 0.083% nebulizer solution Take 3 mLs by nebulization every 6 hours as needed for Wheezing, Disp-120 each, R-3Normal      clotrimazole-betamethasone (LOTRISONE) 1-0.05 % cream Apply topically 2 times daily, Topical, 2 TIMES DAILY Starting Tue 6/8/2021, Disp-45 g, R-0, Normal      nystatin (MYCOSTATIN) 827353 UNIT/GM cream Apply topically 2 times daily. , Disp-30 g, R-0, Normal      albuterol sulfate  (90 Base) MCG/ACT inhaler Inhale 2 puffs into the lungs every 4 hours as needed for Wheezing, Disp-1 Inhaler, R-0Normal      !! sertraline (ZOLOFT) 25 MG tablet Take 25 mg by mouth dailyHistorical Med      busPIRone (BUSPAR) 15 MG tablet Take 45 mg by mouth nightly Historical Med      OXcarbazepine (TRILEPTAL) 600 MG tablet Historical Med      !! QUEtiapine (SEROQUEL) 300 MG tablet Take 300 mg by mouth nightlyHistorical Med      !! QUEtiapine (SEROQUEL) 50 MG tablet Take 50 mg by mouth every morning (before breakfast)Historical Med      ARIPiprazole (ABILIFY) 15 MG tablet Take 15 mg by mouth nightlyHistorical Med      !! sertraline (ZOLOFT) 50 MG tablet Take 50 mg by mouth nightly Historical Med      omeprazole (PRILOSEC) 40 MG delayed release capsule TAKE 1 CAPSULE BY MOUTH TWO TIMES A DAY , Disp-60 capsule, R-10Normal      cetirizine (ZYRTEC) 10 MG tablet Take 1 tablet by mouth daily, Disp-90 tablet, R-3Normal      ondansetron (ZOFRAN-ODT) 4 MG disintegrating tablet Take 1 tablet by mouth 3 times daily as needed for Nausea or Vomiting, Disp-21 tablet, R-0Print      topiramate (TOPAMAX) 25 MG tablet TAKE 1 TABLET BY MOUTH TWO TIMES A DAYHistorical Med      azelastine (ASTELIN) 0.1 % nasal spray 1 spray by Nasal route 2 times daily Use in each nostril as directed, Disp-1 Bottle, R-0Normal       !! - Potential duplicate medications found. Please discuss with provider. ALLERGIES     Patient is is allergic to dilaudid [hydromorphone hcl], flexeril [cyclobenzaprine], keflex [cephalexin], tessalon [benzonatate], augmentin [amoxicillin-pot clavulanate], lorabid [loracarbef], and minocycline. Patients   Immunization History   Administered Date(s) Administered    DTaP 1995, 03/04/1996, 04/22/1996, 01/08/1997, 05/23/2001    Hepatitis A 03/14/2013    Hepatitis B 1995, 1995, 07/08/1996    Hib, unspecified 1995, 03/04/1996, 04/22/1996, 01/08/1997    Influenza Virus Vaccine 10/22/2013    Influenza Whole 10/22/2013    MMR 10/07/1996, 05/23/2001    Meningococcal MCV4P (Menactra) 03/14/2013    Polio IPV (IPOL) 05/23/2001    Polio OPV 1995, 03/04/1996, 04/22/1996    Tdap (Boostrix, Adacel) 10/24/2014, 08/17/2020    Varicella (Varivax) 10/07/1996, 03/14/2013       FAMILY HISTORY     Patient's family history includes Anxiety Disorder in her father and mother; Bipolar Disorder in her father; Cancer in her maternal grandmother, paternal grandfather, and paternal uncle; Depression in her maternal grandfather, paternal aunt, and paternal uncle; Diabetes in her maternal grandmother and mother; Heart Attack in her maternal grandfather; High Blood Pressure in her father; Lupus in her maternal aunt; Mental Illness in her father; No Known Problems in her brother and paternal grandmother; Ovarian Cancer in her maternal grandmother; Parkinsonism in her father. SOCIAL HISTORY     Patient  reports that she has been smoking cigarettes. She started smoking about 3 years ago. She has a 0.50 pack-year smoking history. She has never used smokeless tobacco. She reports current drug use. Drug: Marijuana Lenore Bachelor). She reports that she does not drink alcohol.     PHYSICAL EXAM     ED TRIAGE VITALS  BP: (!) 149/77, Temp: 96.9 °F (36.1 °C), Pulse: 115, Resp: 18, SpO2: 98 %,Estimated body mass index is 45.2 kg/m² as calculated from the following:    Height as of this encounter: 5' 10\" (1.778 m). Weight as of this encounter: 315 lb (142.9 kg). ,Patient's last menstrual period was 01/01/2022. Physical Exam  Constitutional:       Appearance: She is obese. She is ill-appearing. She is not toxic-appearing. HENT:      Head: Normocephalic and atraumatic. Right Ear: Tympanic membrane and ear canal normal.      Left Ear: Tympanic membrane and ear canal normal.      Nose: Congestion and rhinorrhea present. Mouth/Throat:      Mouth: Mucous membranes are moist.      Pharynx: Posterior oropharyngeal erythema present. No oropharyngeal exudate. Cardiovascular:      Rate and Rhythm: Regular rhythm. Tachycardia present. Pulses: Normal pulses. Heart sounds: Normal heart sounds. Pulmonary:      Effort: Pulmonary effort is normal. No respiratory distress. Breath sounds: Normal breath sounds. No wheezing or rhonchi. Comments: Frequent, harsh nonproductive cough noted  Lymphadenopathy:      Cervical: No cervical adenopathy. Skin:     General: Skin is warm and dry. Capillary Refill: Capillary refill takes less than 2 seconds. Neurological:      General: No focal deficit present. Mental Status: She is alert. Psychiatric:         Mood and Affect: Mood normal.         Behavior: Behavior normal.         DIAGNOSTIC RESULTS     Labs:No results found for this visit on 01/21/22. IMAGING:    XR CHEST (2 VW)   Final Result   No acute intrathoracic process. **This report has been created using voice recognition software. It may contain minor errors which are inherent in voice recognition technology. **      Final report electronically signed by Dr Ayana Mckeon on 1/21/2022 12:09 PM            EKG:      URGENT CARE COURSE:     Vitals:    01/21/22 1127   BP: (!) 149/77   Pulse: 115   Resp: 18   Temp: 96.9 °F (36.1 °C)   TempSrc: Temporal   SpO2: 98%   Weight: (!) 315 lb (142.9 kg)   Height: 5' 10\" (1.778 m)       Medications - No data to display         PROCEDURES:  None    FINAL IMPRESSION      1. URI with cough and congestion          DISPOSITION/ PLAN     Patient presents for URI symptoms. Patient has recently recovered from COVID-19. Recent right upper lobe pneumonia. Chest x-ray today shows no acute cardiopulmonary process. We will treat the patient's symptoms with Omnicef. Patient does have cephalexin listed as an allergy. I did discuss this with the patient and she states the Keflex made her feel funny but did not have a true allergic reaction. Will place patient on this to see if she has improvement of her symptoms. Advised to drink plenty of fluids. Flonase in addition. Return for new or worsening symptoms.     Advised to go to the emergency department for any worsening shortness of breath, chest pain or other concerns      PATIENT REFERRED TO:  LEVI Cruz CNP  [address] [place] [address]      DISCHARGE MEDICATIONS:  Discharge Medication List as of 1/21/2022 12:26 PM      START taking these medications    Details   cefdinir (OMNICEF) 300 MG capsule Take 1 capsule by mouth 2 times daily for 7 days, Disp-14 capsule, R-0Normal      fluticasone (FLONASE) 50 MCG/ACT nasal spray 1 spray by Each Nostril route daily, Disp-16 g, R-0Normal             Discharge Medication List as of 1/21/2022 12:26 PM      STOP taking these medications       SITagliptin (JANUVIA) 100 MG tablet Comments:   Reason for Stopping:               Discharge Medication List as of 1/21/2022 12:26 PM          LEVI Avendano CNP    (Please note that portions of this note were completed with a voice recognition program. Efforts were made to edit the dictations but occasionally words are mis-transcribed.)          LEVI Avendano CNP  01/21/22 [id number]

## 2022-01-22 ENCOUNTER — HOSPITAL ENCOUNTER (EMERGENCY)
Age: 27
Discharge: HOME | End: 2022-01-22
Payer: MEDICARE

## 2022-01-22 VITALS
WEIGHT: 293 LBS | TEMPERATURE: 98.4 F | RESPIRATION RATE: 20 BRPM | OXYGEN SATURATION: 96 % | BODY MASS INDEX: 41.95 KG/M2 | HEIGHT: 70 IN | SYSTOLIC BLOOD PRESSURE: 149 MMHG | DIASTOLIC BLOOD PRESSURE: 98 MMHG | HEART RATE: 96 BPM

## 2022-01-22 DIAGNOSIS — K21.9 GASTROESOPHAGEAL REFLUX DISEASE WITHOUT ESOPHAGITIS: ICD-10-CM

## 2022-01-22 DIAGNOSIS — H65.03 BILATERAL ACUTE SEROUS OTITIS MEDIA, RECURRENCE NOT SPECIFIED: Primary | ICD-10-CM

## 2022-01-22 PROCEDURE — 99283 EMERGENCY DEPT VISIT LOW MDM: CPT

## 2022-01-22 RX ORDER — IBUPROFEN 800 MG/1
800 TABLET ORAL 4 TIMES DAILY PRN
Qty: 40 TABLET | Refills: 0 | Status: SHIPPED | OUTPATIENT
Start: 2022-01-22 | End: 2022-01-28

## 2022-01-22 ASSESSMENT — PAIN DESCRIPTION - LOCATION: LOCATION: EAR

## 2022-01-22 ASSESSMENT — ENCOUNTER SYMPTOMS
SINUS PAIN: 1
VOMITING: 0
SHORTNESS OF BREATH: 0
RHINORRHEA: 0
SINUS PRESSURE: 1
EYE PAIN: 0
SORE THROAT: 1
COUGH: 0
BLOOD IN STOOL: 0
CONSTIPATION: 0
ABDOMINAL PAIN: 0
WHEEZING: 0
DIARRHEA: 0
NAUSEA: 0

## 2022-01-22 ASSESSMENT — PAIN DESCRIPTION - DESCRIPTORS: DESCRIPTORS: THROBBING

## 2022-01-22 ASSESSMENT — PAIN DESCRIPTION - ORIENTATION: ORIENTATION: RIGHT;LEFT

## 2022-01-22 ASSESSMENT — PAIN DESCRIPTION - FREQUENCY: FREQUENCY: CONTINUOUS

## 2022-01-22 ASSESSMENT — PAIN SCALES - GENERAL: PAINLEVEL_OUTOF10: 8

## 2022-01-22 ASSESSMENT — PAIN DESCRIPTION - PAIN TYPE: TYPE: ACUTE PAIN

## 2022-01-22 NOTE — ED TRIAGE NOTES
Pt presents to the ED through lobby with c/o cough and bilateral eat pain. Pt states she tested positive for covid 3 weeks ago. Pt states pain is worse in the right ear. Pt states \"when I wake up there is drainage coming from both of them\". Pt states she was seen at urgent care yesterday for a cough and sore throat. Pt states pain is 8 out of 10. Pt denies taking any pain medications pta.  RR even and unlabored

## 2022-01-24 ENCOUNTER — TELEPHONE (OUTPATIENT)
Dept: FAMILY MEDICINE CLINIC | Age: 27
End: 2022-01-24

## 2022-01-28 ENCOUNTER — OFFICE VISIT (OUTPATIENT)
Dept: FAMILY MEDICINE CLINIC | Age: 27
End: 2022-01-28
Payer: MEDICARE

## 2022-01-28 VITALS
RESPIRATION RATE: 16 BRPM | HEART RATE: 80 BPM | WEIGHT: 293 LBS | SYSTOLIC BLOOD PRESSURE: 124 MMHG | BODY MASS INDEX: 45.4 KG/M2 | DIASTOLIC BLOOD PRESSURE: 68 MMHG

## 2022-01-28 DIAGNOSIS — H69.82 ETD (EUSTACHIAN TUBE DYSFUNCTION), LEFT: Primary | ICD-10-CM

## 2022-01-28 DIAGNOSIS — H60.331 ACUTE SWIMMER'S EAR OF RIGHT SIDE: ICD-10-CM

## 2022-01-28 PROCEDURE — 99213 OFFICE O/P EST LOW 20 MIN: CPT | Performed by: NURSE PRACTITIONER

## 2022-01-28 RX ORDER — PREDNISONE 50 MG/1
50 TABLET ORAL DAILY
Qty: 5 TABLET | Refills: 0 | Status: SHIPPED | OUTPATIENT
Start: 2022-01-28 | End: 2022-02-02

## 2022-01-28 RX ORDER — PSEUDOEPHEDRINE HCL 120 MG/1
120 TABLET, FILM COATED, EXTENDED RELEASE ORAL EVERY 12 HOURS
Qty: 14 TABLET | Refills: 0 | Status: SHIPPED | OUTPATIENT
Start: 2022-01-28 | End: 2022-02-04

## 2022-01-28 ASSESSMENT — ENCOUNTER SYMPTOMS
SHORTNESS OF BREATH: 0
RHINORRHEA: 1
NAUSEA: 0
COUGH: 0
ABDOMINAL PAIN: 0

## 2022-01-28 ASSESSMENT — PATIENT HEALTH QUESTIONNAIRE - PHQ9
3. TROUBLE FALLING OR STAYING ASLEEP: 0
SUM OF ALL RESPONSES TO PHQ9 QUESTIONS 1 & 2: 0
10. IF YOU CHECKED OFF ANY PROBLEMS, HOW DIFFICULT HAVE THESE PROBLEMS MADE IT FOR YOU TO DO YOUR WORK, TAKE CARE OF THINGS AT HOME, OR GET ALONG WITH OTHER PEOPLE: 0
SUM OF ALL RESPONSES TO PHQ QUESTIONS 1-9: 0
7. TROUBLE CONCENTRATING ON THINGS, SUCH AS READING THE NEWSPAPER OR WATCHING TELEVISION: 0
6. FEELING BAD ABOUT YOURSELF - OR THAT YOU ARE A FAILURE OR HAVE LET YOURSELF OR YOUR FAMILY DOWN: 0
8. MOVING OR SPEAKING SO SLOWLY THAT OTHER PEOPLE COULD HAVE NOTICED. OR THE OPPOSITE, BEING SO FIGETY OR RESTLESS THAT YOU HAVE BEEN MOVING AROUND A LOT MORE THAN USUAL: 0
2. FEELING DOWN, DEPRESSED OR HOPELESS: 0
4. FEELING TIRED OR HAVING LITTLE ENERGY: 0
9. THOUGHTS THAT YOU WOULD BE BETTER OFF DEAD, OR OF HURTING YOURSELF: 0
1. LITTLE INTEREST OR PLEASURE IN DOING THINGS: 0
5. POOR APPETITE OR OVEREATING: 0
SUM OF ALL RESPONSES TO PHQ QUESTIONS 1-9: 0

## 2022-01-28 NOTE — PATIENT INSTRUCTIONS
You may receive a survey about your visit with us today. The feedback from our patients helps us identify what is working well and where the service to all patients can be enhanced. Thank you! Tobacco Cessation Programs     Telephonic behavior modification  Gricelda Vela ([phone])   Counseling service for those who are ready to quit using tobacco.     Available for uninsured [place] residents, [place] recipients, pregnant women, or patients whose health plans or employers are members of the [address] [place] [address] behavior modification   http://[place]. [employer]uit[employer]. org   Online support program to help patients through each step of the quitting process. Available 24 hours a day 7 days a week. Provides up to date researched based tool, step-by-step guides, and motivational messages. Online behavior modification   www.lungusa.org/stop-smoking/how-to-quit   HelpLine: 7-796-LUNG-[place] (169-8742)   Email questions to:  Paz@[employer] org    Website offers resources to help tobacco users figure out their reasons for quitting and then take the big step of quitting for good. Hypnosis   Location: [facility], [employer] [address] [address] [address], [address]   Contact: Nohemy Alanis, PhD at [phone]   Hypnosis for tobacco cessation   Cost $225 for the initial session and $175 for each session afterwards. Most patients require 6-8 sessions. There is the option to submit through the patients insurance. Hypnosis and behavior modification   Location: [facility],  [address] [place] [address] [address], [address]   Contact: Tia Roman, PhD at [phone]  [facility] Counseling and hypnosis for nicotine addition   Cost: For uninsured patients:  Please call above phone number  Cost for insured patients depends on patients insurance plan.     Behavior modification   Location: [facility], [address] [place] [address] [place] [address], [address]: [address] four one-on-one appointments between the patient and a respiratory therapist.  The four appointments span over three weeks. The respiratory therapist schedules one of the appointments to occur 48 hours after the patients quit date.  Cost $100 total for the four sessions.   Tobacco cessation products are not included in the cost and are not provided by [facility].

## 2022-01-28 NOTE — PROGRESS NOTES
Lorraine Paiz (1995) 32 y.o. female here for evaluation of the following chief complaint(s):      HPI:  Chief Complaint   Patient presents with    ED Follow-up    Otalgia     bilateral    Ear Fullness     bilateral       ED follow up. Was in ED on  and  for ear complaints. COVID on 22. Was placed on Omnicef BID for AOM. Presents today with left ear muffled hearing. Denies left ear pain. Right ear still painful. Denies muffled hearing. TTP right ear.       Vitals:    22 0919   BP: 124/68   Pulse: 80   Resp: 16       Patient Active Problem List   Diagnosis    Major depressive disorder, recurrent episode, mild (HCC)    Low self esteem    KARLIE (generalized anxiety disorder)    Obesity    Obstructive sleep apnea    Snores    Sleep disturbances    Daytime somnolence    Sleep talking    Night terrors, adult    Bruxism (teeth grinding)    Restless sleeper    Anxiety    Abnormal thyroid function test    Trigeminal neuralgia    Inappropriate sinus tachycardia    POTS (postural orthostatic tachycardia syndrome)    Syncope and collapse    Neck discomfort    Thyroid cyst    Pulmonary embolism (HCC)    Breast pain, left    Positive CAESAR (antinuclear antibody)    Hypotension    S/P ablation of ventricular arrhythmia    Pneumonia    Hypokalemia    DUB (dysfunctional uterine bleeding)    Palpitations    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    Closed right ankle fracture, initial encounter    Status post open reduction with internal fixation (ORIF) of fracture of ankle    Tear of deltoid ligament of ankle, right, initial encounter    Abdominal pain during pregnancy in second trimester    Maternal obesity affecting pregnancy, antepartum    Postpartum care following  delivery    Biliary colic symptom    Dizziness, nonspecific    Orthostatic dizziness    Borderline personality disorder (HCC)    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 HENT: Positive for congestion, ear pain, hearing loss, postnasal drip and rhinorrhea. Negative for ear discharge. Respiratory: Negative for cough and shortness of breath. Cardiovascular: Negative for chest pain. Gastrointestinal: Negative for abdominal pain and nausea. Skin: Negative for rash. Neurological: Negative for dizziness, light-headedness and headaches. Psychiatric/Behavioral: Negative. Physical Exam  Constitutional:       General: She is not in acute distress. HENT:      Right Ear: Swelling and tenderness present. Tympanic membrane is erythematous. Left Ear: A middle ear effusion is present. Tympanic membrane is erythematous. Nose: Mucosal edema, congestion and rhinorrhea present. Eyes:      Pupils: Pupils are equal, round, and reactive to light. Cardiovascular:      Rate and Rhythm: Normal rate and regular rhythm. Heart sounds: No murmur heard. Pulmonary:      Effort: Pulmonary effort is normal.      Breath sounds: Normal breath sounds. No wheezing. Abdominal:      General: Bowel sounds are normal. There is no distension. Palpations: Abdomen is soft. Tenderness: There is no abdominal tenderness. Musculoskeletal:         General: No tenderness. Normal range of motion. Cervical back: Normal range of motion and neck supple. Skin:     General: Skin is warm and dry. Findings: No rash. ASSESSMENT/PLAN:   Diagnosis Orders   1. ETD (Eustachian tube dysfunction), left  pseudoephedrine (SUDAFED 12 HOUR) 120 MG extended release tablet    predniSONE (DELTASONE) 50 MG tablet   2.  Acute swimmer's ear of right side  neomycin-polymyxin-hydrocortisone (CORTISPORIN) [id number] otic solution         MDM:  ED report revieed   Sudafed + Prednisone for ETD   Ear Drops   Ear popping discussed   RTO PRN    An electronic signature was

## 2022-02-07 ENCOUNTER — TELEPHONE (OUTPATIENT)
Dept: FAMILY MEDICINE CLINIC | Age: 27
End: 2022-02-07

## 2022-02-07 NOTE — TELEPHONE ENCOUNTER
Patient finished medication but ear fullness still present bilaterally, Left > Right.   Please advise

## 2022-02-07 NOTE — TELEPHONE ENCOUNTER
Muffled hearing related to fluid in ear. If pain is gone give more time for this to resolve.   Ear popping exercises - pinch nose, breath out nose to \"pop\" ears to get them to open up and drain

## 2022-02-11 ENCOUNTER — HOSPITAL ENCOUNTER (OUTPATIENT)
Age: 27
Discharge: HOME | End: 2022-02-11
Payer: MEDICARE

## 2022-02-11 LAB
ANION GAP SERPL CALCULATED.3IONS-SCNC: 15 MEQ/L (ref 8–16)
BASOPHILS # BLD: 0.4 %
BASOPHILS ABSOLUTE: 0 THOU/MM3 (ref 0–0.1)
BUN BLDV-MCNC: 12 MG/DL (ref 7–22)
CALCIUM SERPL-MCNC: 8.9 MG/DL (ref 8.5–10.5)
CHLORIDE BLD-SCNC: 98 MEQ/L (ref 98–111)
CO2: 22 MEQ/L (ref 23–33)
CREAT SERPL-MCNC: 0.6 MG/DL (ref 0.4–1.2)
EKG ATRIAL RATE: 120 BPM
EKG P AXIS: 53 DEGREES
EKG P-R INTERVAL: 140 MS
EKG Q-T INTERVAL: 328 MS
EKG QRS DURATION: 86 MS
EKG QTC CALCULATION (BAZETT): 463 MS
EKG R AXIS: 54 DEGREES
EKG T AXIS: 42 DEGREES
EKG VENTRICULAR RATE: 120 BPM
EOSINOPHIL # BLD: 0 %
EOSINOPHILS ABSOLUTE: 0 THOU/MM3 (ref 0–0.4)
ERYTHROCYTE [DISTWIDTH] IN BLOOD BY AUTOMATED COUNT: 15.9 % (ref 11.5–14.5)
ERYTHROCYTE [DISTWIDTH] IN BLOOD BY AUTOMATED COUNT: 54.4 FL (ref 35–45)
GFR SERPL CREATININE-BSD FRML MDRD: > 90 ML/MIN/1.73M2
GLUCOSE BLD-MCNC: 225 MG/DL (ref 70–108)
HCT VFR BLD CALC: 44.3 % (ref 37–47)
HEMOGLOBIN: 14.1 GM/DL (ref 12–16)
IMMATURE GRANS (ABS): 0.03 THOU/MM3 (ref 0–0.07)
IMMATURE GRANULOCYTES: 0.4 %
LYMPHOCYTES # BLD: 31.2 %
LYMPHOCYTES ABSOLUTE: 2.6 THOU/MM3 (ref 1–4.8)
MCH RBC QN AUTO: 29.9 PG (ref 26–33)
MCHC RBC AUTO-ENTMCNC: 31.8 GM/DL (ref 32.2–35.5)
MCV RBC AUTO: 94.1 FL (ref 81–99)
MONOCYTES # BLD: 5.7 %
MONOCYTES ABSOLUTE: 0.5 THOU/MM3 (ref 0.4–1.3)
NUCLEATED RED BLOOD CELLS: 0 /100 WBC
PLATELET # BLD: 224 THOU/MM3 (ref 130–400)
PMV BLD AUTO: 9.8 FL (ref 9.4–12.4)
POTASSIUM SERPL-SCNC: 3.9 MEQ/L (ref 3.5–5.2)
RBC # BLD: 4.71 MILL/MM3 (ref 4.2–5.4)
SEG NEUTROPHILS: 62.3 %
SEGMENTED NEUTROPHILS ABSOLUTE COUNT: 5.1 THOU/MM3 (ref 1.8–7.7)
SODIUM BLD-SCNC: 135 MEQ/L (ref 135–145)
WBC # BLD: 8.2 THOU/MM3 (ref 4.8–10.8)

## 2022-02-11 PROCEDURE — 36415 COLL VENOUS BLD VENIPUNCTURE: CPT

## 2022-02-11 PROCEDURE — 93010 ELECTROCARDIOGRAM REPORT: CPT | Performed by: NUCLEAR MEDICINE

## 2022-02-11 PROCEDURE — 80048 BASIC METABOLIC PNL TOTAL CA: CPT

## 2022-02-11 PROCEDURE — 93005 ELECTROCARDIOGRAM TRACING: CPT | Performed by: PODIATRIST

## 2022-02-11 PROCEDURE — 85025 COMPLETE CBC W/AUTO DIFF WBC: CPT

## 2022-02-18 ENCOUNTER — TELEPHONE (OUTPATIENT)
Dept: FAMILY MEDICINE CLINIC | Age: 27
End: 2022-02-18

## 2022-02-18 DIAGNOSIS — R73.01 IFG (IMPAIRED FASTING GLUCOSE): Primary | ICD-10-CM

## 2022-02-18 NOTE — TELEPHONE ENCOUNTER
Pt was notified and will go to [facility] to have this drawn. She will have them pull orders from Epic.

## 2022-03-03 ENCOUNTER — OFFICE VISIT (OUTPATIENT)
Dept: PULMONOLOGY | Age: 27
End: 2022-03-03
Payer: MEDICARE

## 2022-03-03 VITALS
BODY MASS INDEX: 41.02 KG/M2 | HEIGHT: 71 IN | DIASTOLIC BLOOD PRESSURE: 72 MMHG | SYSTOLIC BLOOD PRESSURE: 128 MMHG | WEIGHT: 293 LBS | HEART RATE: 118 BPM | TEMPERATURE: 97.8 F | OXYGEN SATURATION: 98 %

## 2022-03-03 DIAGNOSIS — G47.33 OSA TREATED WITH BIPAP: Primary | ICD-10-CM

## 2022-03-03 PROCEDURE — 99213 OFFICE O/P EST LOW 20 MIN: CPT | Performed by: NURSE PRACTITIONER

## 2022-03-03 ASSESSMENT — ENCOUNTER SYMPTOMS
VOMITING: 1
NAUSEA: 0
CHEST TIGHTNESS: 0
DIARRHEA: 0
STRIDOR: 0
WHEEZING: 0
COUGH: 0
SHORTNESS OF BREATH: 0

## 2022-03-03 NOTE — PROGRESS NOTES
[facility]      Jamee Perez         [id number]  3/3/2022   Chief Complaint   Patient presents with    Follow-up     1 yr tho sleep f/u with [facility]        Pt of Dr. Lakeisha WEISS Download:   Aminata Kern or initial AHI: 131.4   Date of initial study: 05/19/15      Compliant  80%     Noncompliant 17 %     PAP Type air curveLevel  22/18   Avg Hrs/Day 6 hours and 40 min  AHI: 1.0   Recorded compliance dates , 01/31/22  to 03/01/22   Machine/Mfg:   [x] [employer]Med    [] Respironics/Dreamstation   Interface:   [] Nasal    [] Nasal pillows   [x] FFM      Provider:      [x] [facility]     [][employer]     [] [employer]    [] [address]    [] [place]               [] [facility]      [] [employer]    [] [address]      [] Other    Neck Size: 18  Mallampati Mallampati 3  ESS:  5  SAQLI: 84    Here is a scan of the most recent download:              Presentation:   Sayda Diamond presents for sleep medicine follow up for obstructive sleep apnea  Since the last visit, Sayda Diamond reports that she is sleeping well is tolerating pressure well likes her current style of mask but admits it is having a significant amount of leaking. No daytime naps. Sometimes falls asleep watching TV late at night. Equipment issues: The pressure is  acceptable, the mask is acceptable     Sleep issues:  Do you feel better? Yes  More rested? Yes   Better concentration? yes    Progress History:   Since last visit any new medical issues? No  New ER or hospital visits? No  Any new or changes in medicines? No  Any new sleep medicines? No    Review of Systems -   Review of Systems   Constitutional: Negative for chills, fever and unexpected weight change. Respiratory: Negative for cough, chest tightness, shortness of breath, wheezing and stridor. Cardiovascular: Negative for chest pain and leg swelling. Gastrointestinal: Positive for vomiting. Negative for diarrhea and nausea. Genitourinary: Negative for dysuria.         Physical appointment   - Advised to continue current positive airway pressure therapy with above described pressure. - Advised to keep good compliance with current recommended pressure to get optimal results and clinical improvement  - Recommend 7-9 hours of sleep with PAP  - Instructed to call [employer] company regarding supplies if needed.   -Patient to call my office for earlier appointment if needed for worsening of sleep symptoms.   -Discussed weight loss  - Educated about my impression and plan. Patient verbalizes understanding.   We will see back in: 1 year with download     Information added by my medical assistant/LPN was reviewed today     Electronically signed by LEVI Gay CNP on 3/3/2022 at 3:27 PM

## 2022-03-08 ENCOUNTER — TELEMEDICINE (OUTPATIENT)
Dept: PSYCHOLOGY | Age: 27
End: 2022-03-08
Payer: MEDICARE

## 2022-03-08 DIAGNOSIS — F31.32 BIPOLAR 1 DISORDER, DEPRESSED, MODERATE (HCC): ICD-10-CM

## 2022-03-08 PROCEDURE — 90837 PSYTX W PT 60 MINUTES: CPT | Performed by: PSYCHOLOGIST

## 2022-03-08 NOTE — PROGRESS NOTES
Behavioral Health Consultation  Cherri Armas, PhD  Psychologist  3/15/2022       Time spent with Patient:  60 minutes  This is patient's third  [facility] appointment. Reason for Consult:  depression, anxiety, stress and agitation  Referring Provider: No referring provider defined for this encounter. Pt provided informed consent for the behavioral health program. Discussed with patient model of service to include the limits of confidentiality (i.e. abuse reporting, suicide intervention, etc.) and short-term intervention focused approach. Pt indicated understanding. Feedback given to PCP. Description:    MSE:    Appearance    moderate distress  Appetite normal  Sleep disturbance No  Loss of pleasure No  Speech    normal rate, normal volume and well articulated  Mood    Guilty  Depressed  Affect    depressed affect  Thought Content    intact  Insight    Fair  Judgment    Intact  Suicide Assessment    no suicidal ideation  Homicidal Assessment Intent No  Cutting No  Enuresis No  Learning Disorder none       History:    Medications:   Current Outpatient Medications   Medication Sig Dispense Refill    DULoxetine (CYMBALTA) 30 MG extended release capsule Take 1 capsule by mouth daily      clotrimazole-betamethasone (LOTRISONE) 1-0.05 % cream Apply topically 2 times daily 45 g 0    nystatin (MYCOSTATIN) 449181 UNIT/GM cream Apply topically 2 times daily.  30 g 0    sertraline (ZOLOFT) 25 MG tablet Take 25 mg by mouth daily      busPIRone (BUSPAR) 15 MG tablet Take 45 mg by mouth nightly       OXcarbazepine (TRILEPTAL) 600 MG tablet       QUEtiapine (SEROQUEL) 300 MG tablet Take 300 mg by mouth nightly      QUEtiapine (SEROQUEL) 50 MG tablet Take 50 mg by mouth every morning (before breakfast)      ARIPiprazole (ABILIFY) 15 MG tablet Take 15 mg by mouth nightly      sertraline (ZOLOFT) 50 MG tablet Take 50 mg by mouth nightly       omeprazole (PRILOSEC) 40 MG delayed release capsule TAKE 1 CAPSULE BY MOUTH TWO TIMES A DAY  (Patient taking differently: nightly PT TAKES ONCE AT HS) 60 capsule 10    cetirizine (ZYRTEC) 10 MG tablet Take 1 tablet by mouth daily 90 tablet 3     No current facility-administered medications for this visit. Social History:   Social History     Socioeconomic History    Marital status:      Spouse name: Aniceto Telles    Number of children: 1    Years of education: 15    Highest education level: High school graduate   Occupational History    Not on file   Tobacco Use    Smoking status: Former Smoker     Packs/day: 0.50     Years: 1.00     Pack years: 0.50     Types: Cigarettes     Start date: 2018     Quit date: 2022     Years since quittin.2    Smokeless tobacco: Never Used   Vaping Use    Vaping Use: Every day    Substances: Always   Substance and Sexual Activity    Alcohol use: Never     Alcohol/week: 1.0 standard drink     Types: 1 Cans of beer per week     Comment: occasional    Drug use: Yes     Types: Marijuana [place]ondi [place]aba)     Comment: MEDICAL [facility]  21    Sexual activity: Yes     Partners: Male   Other Topics Concern    Not on file   Social History Narrative    Not on file     Social Determinants of Health     Financial Resource Strain: Low Risk     Difficulty of Paying Living Expenses: Not hard at all   Food Insecurity: No Food Insecurity    Worried About [address] in the Last Year: Never true    [address] in the Last Year: Never true   Transportation Needs: No Transportation Needs    Lack of Transportation (Medical): No    Lack of Transportation (Non-Medical):  No   Physical Activity:     Days of Exercise per Week: Not on file    Minutes of Exercise per Session: Not on file   Stress:     Feeling of Stress : Not on file   Social Connections:     Frequency of Communication with Friends and Family: Not on file    Frequency of Social Gatherings with Friends and Family: Not on file    Attends Yazidism Services: Not on file   [facility] [employer] or Organizations: Not on file    Attends Club or Organization Meetings: Not on file    Marital Status: Not on file   Intimate Partner Violence:     Fear of Current or Ex-Partner: Not on file    Emotionally Abused: Not on file    Physically Abused: Not on file    Sexually Abused: Not on file   Housing Stability:     Unable to Pay for Housing in the Last Year: Not on file    Number of [place] in the Last Year: Not on file    Unstable Housing in the Last Year: Not on file       TOBACCO:   reports that she quit smoking about 2 months ago. Her smoking use included cigarettes. She started smoking about 3 years ago. She has a 0.50 pack-year smoking history. She has never used smokeless tobacco.  ETOH:   reports no history of alcohol use.     Family History:   Family History   Problem Relation Age of Onset    Anxiety Disorder Mother     Diabetes Mother     Anxiety Disorder Father     Bipolar Disorder Father     High Blood Pressure Father     Mental Illness Father     Parkinsonism Father         Early-Onset    Depression Paternal Aunt     Cancer Paternal Uncle         multiple myeloma    Depression Paternal Uncle     Cancer Maternal Grandmother         breast    Ovarian Cancer Maternal Grandmother     Diabetes Maternal Grandmother     Depression Maternal Grandfather     Heart Attack Maternal Grandfather     Cancer Paternal Grandfather         lung    No Known Problems Brother         Gaviria disease    No Known Problems Paternal Grandmother     Lupus Maternal Aunt            Assessment:  Patient was seen for postpartum depression and also bipolar disorder; since she moved out of her parents house with her  and child, a lot of the depression based upon child birth has lifted; she is currently dealing with struggles because of living on their own and dealing with marriage; she has her good days and her bad days; she is taking her medication; she discussed her goals for the future and plans; 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 Viola Augustine, was evaluated through a synchronous (real-time) audio-video encounter. The patient (or guardian if applicable) is aware that this is a billable service, which includes applicable co-pays. This Virtual Visit was conducted with patient's (and/or legal guardian's) consent. The visit was conducted pursuant to the emergency declaration under the [facility], [address] [place] [address] authority and the [employer] General Act. Patient identification was verified, and a caregiver was present when appropriate. The patient was located at home in a state where the provider was licensed to provide care. 60 minute sessions by Epic  Diagnosis:      ICD-10-CM    1. Post partum depression  O99.345     F53.0    2. Bipolar 1 disorder, depressed, moderate ([facility]  F31.32             Plan:  Pt interventions:  Sessions will continue to focus on her relationship and move into effective interpersonal skills; a complete DBT approach will be possible because it is being done virtually which is hard for recording and reporting.

## 2022-03-22 ENCOUNTER — HOSPITAL ENCOUNTER (OUTPATIENT)
Age: 27
Discharge: HOME | End: 2022-03-22
Payer: MEDICARE

## 2022-03-22 DIAGNOSIS — R73.01 IFG (IMPAIRED FASTING GLUCOSE): ICD-10-CM

## 2022-03-22 LAB
AVERAGE GLUCOSE: 138 MG/DL (ref 70–126)
HBA1C MFR BLD: 6.6 % (ref 4.4–6.4)

## 2022-03-22 PROCEDURE — 36415 COLL VENOUS BLD VENIPUNCTURE: CPT

## 2022-03-22 PROCEDURE — 83036 HEMOGLOBIN GLYCOSYLATED A1C: CPT

## 2022-03-23 ENCOUNTER — OFFICE VISIT (OUTPATIENT)
Dept: FAMILY MEDICINE CLINIC | Age: 27
End: 2022-03-23
Payer: MEDICARE

## 2022-03-23 VITALS
HEART RATE: 76 BPM | SYSTOLIC BLOOD PRESSURE: 128 MMHG | RESPIRATION RATE: 16 BRPM | BODY MASS INDEX: 45.61 KG/M2 | DIASTOLIC BLOOD PRESSURE: 76 MMHG | WEIGHT: 293 LBS

## 2022-03-23 DIAGNOSIS — E66.01 MORBID OBESITY WITH BMI OF 40.0-44.9, ADULT (HCC): ICD-10-CM

## 2022-03-23 DIAGNOSIS — K21.9 GASTROESOPHAGEAL REFLUX DISEASE WITHOUT ESOPHAGITIS: ICD-10-CM

## 2022-03-23 DIAGNOSIS — G90.A POTS (POSTURAL ORTHOSTATIC TACHYCARDIA SYNDROME): ICD-10-CM

## 2022-03-23 DIAGNOSIS — F33.0 MAJOR DEPRESSIVE DISORDER, RECURRENT EPISODE, MILD (HCC): ICD-10-CM

## 2022-03-23 DIAGNOSIS — E78.1 HYPERTRIGLYCERIDEMIA: ICD-10-CM

## 2022-03-23 DIAGNOSIS — E11.9 TYPE 2 DIABETES MELLITUS WITHOUT COMPLICATION, WITHOUT LONG-TERM CURRENT USE OF INSULIN (HCC): Primary | ICD-10-CM

## 2022-03-23 PROCEDURE — 99214 OFFICE O/P EST MOD 30 MIN: CPT | Performed by: NURSE PRACTITIONER

## 2022-03-23 PROCEDURE — 3044F HG A1C LEVEL LT 7.0%: CPT | Performed by: NURSE PRACTITIONER

## 2022-03-23 RX ORDER — DULAGLUTIDE 0.75 MG/.5ML
0.75 INJECTION, SOLUTION SUBCUTANEOUS WEEKLY
Qty: 4 PEN | Refills: 0 | Status: SHIPPED | OUTPATIENT
Start: 2022-03-23 | End: 2022-04-25

## 2022-03-23 RX ORDER — OMEPRAZOLE 40 MG/1
40 CAPSULE, DELAYED RELEASE ORAL DAILY
Qty: 90 CAPSULE | Refills: 1 | Status: SHIPPED | OUTPATIENT
Start: 2022-03-23 | End: 2022-08-23 | Stop reason: SDUPTHER

## 2022-03-23 ASSESSMENT — ENCOUNTER SYMPTOMS
NAUSEA: 0
ABDOMINAL PAIN: 0
SHORTNESS OF BREATH: 0
COUGH: 0

## 2022-03-23 NOTE — PATIENT INSTRUCTIONS
grains, fruits, milk and yogurt, and starchy vegetables like potatoes, beans, and corn. It also avoids foods and drinks that have added sugar. Instead, low-carb diets include foods that are high in protein and fat. Why might you follow a low-carb diet? Low-carb diets may be used for a variety of reasons, such as for weight loss. People who have diabetes may use a low-carb diet to help manage their blood sugar levels. What should you do before you start the diet? Talk to your doctor before you try any diet. This is even more important if you have health problems like kidney disease, heart disease, or diabetes. Your doctor may suggest that you meet with a registered dietitian. A dietitian can help you make an eating plan that works for you. What foods do you eat on a low-carb diet? On a low-carb diet, you choose foods that are high in protein and fat. Examples of these are:  · Meat, poultry, and fish. · Eggs. · Nuts, such as walnuts, pecans, almonds, and peanuts. · Peanut butter and other nut butters. · Tofu. · Avocado. · [place]. · Non-starchy vegetables like broccoli, cauliflower, green beans, mushrooms, peppers, lettuce, and spinach. · Unsweetened non-dairy milks like almond milk and coconut milk. · Cheese, cottage cheese, and cream cheese. Current as of: September 8, 2021               Content Version: 13.1  © 2006-2021 Healthwise, [employer]. Care instructions adapted under license by [facility] ([facility]). If you have questions about a medical condition or this instruction, always ask your healthcare professional. [place] [address] any warranty or liability for your use of this information.

## 2022-03-23 NOTE — PROGRESS NOTES
Subjective:      Patient ID: Glenis Eaton 1995 is a 32 y.o. female here for evaluation. Chief Complaint   Patient presents with    Follow-up    Results       Patient Active Problem List   Diagnosis    Major depressive disorder, recurrent episode, mild (HCC)    Low self esteem    KARLIE (generalized anxiety disorder)    Obesity    Obstructive sleep apnea    Snores    Sleep disturbances    Daytime somnolence    Sleep talking    Night terrors, adult    Bruxism (teeth grinding)    Restless sleeper    Anxiety    Abnormal thyroid function test    Trigeminal neuralgia    Inappropriate sinus tachycardia    POTS (postural orthostatic tachycardia syndrome)    Syncope and collapse    Neck discomfort    Thyroid cyst    Pulmonary embolism (HCC)    Breast pain, left    Positive CAESAR (antinuclear antibody)    Hypotension    S/P ablation of ventricular arrhythmia    Pneumonia    Hypokalemia    DUB (dysfunctional uterine bleeding)    Palpitations    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    Closed right ankle fracture, initial encounter    Status post open reduction with internal fixation (ORIF) of fracture of ankle    Tear of deltoid ligament of ankle, right, initial encounter    Abdominal pain during pregnancy in second trimester    Maternal obesity affecting pregnancy, antepartum    Postpartum care following  delivery    Biliary colic symptom    Dizziness, nonspecific    Orthostatic dizziness    Borderline personality disorder (HCC)    Pseudoseizures (HCC)   [facility]odie Dhillon - Dr. Angely Reyes  Psychology - Dr. Nakia Ramírez, [place]    Following with CARDIO. Had ECHO, TILT and Holter for POTS . Vitals:    22 0948   BP: 128/76   Pulse: 76   Resp: 16        Labs reviewed. On atypical antipsychotics for MDD. Body mass index is 45.61 kg/m².      Metformin intolerance GI SE    Lab Results   Component Value Date    LABA1C 6.6 (H) 03/22/2022    LABA1C 6.2 09/24/2021    LABA1C 6.3 05/28/2021     Lab Results   Component Value Date    .7 07/27/2020       No components found for: CHLPL  Lab Results   Component Value Date    TRIG 399 (H) 09/24/2021    TRIG 156 07/09/2013     Lab Results   Component Value Date    HDL 44 09/24/2021    HDL 68 07/09/2013     Lab Results   Component Value Date    LDLCALC 72 09/24/2021    LDLCALC 67 07/09/2013     No results found for: LABVLDL      Chemistry        Component Value Date/Time     02/11/2022 1339    K 3.9 02/11/2022 1339    K 4.3 01/11/2022 2209    CL 98 02/11/2022 1339    CO2 22 (L) 02/11/2022 1339    BUN 12 02/11/2022 1339    CREATININE 0.6 02/11/2022 1339        Component Value Date/Time    CALCIUM 8.9 02/11/2022 1339    ALKPHOS 128 (H) 01/11/2022 2209    AST 34 01/11/2022 2209    ALT 40 01/11/2022 2209    BILITOT 0.2 (L) 01/11/2022 2209            Lab Results   Component Value Date    TSH 2.500 08/19/2021       Lab Results   Component Value Date    WBC 8.2 02/11/2022    HGB 14.1 02/11/2022    HCT 44.3 02/11/2022    MCV 94.1 02/11/2022     02/11/2022         Health Maintenance   Topic Date Due    Hepatitis C screen  Never done    COVID-19 Vaccine (1) Never done    HPV vaccine (1 - 2-dose series) Never done    Hepatitis A vaccine (2 of 2 - 2-dose series) 09/14/2013    Diabetic microalbuminuria test  Never done    Diabetic retinal exam  Never done    Pap smear  Never done    Flu vaccine (1) 09/01/2021    Lipid screen  09/24/2022    Depression Monitoring  01/28/2023    A1C test (Diabetic or Prediabetic)  03/22/2023    Diabetic foot exam  03/23/2023    DTaP/Tdap/Td vaccine (8 - Td or Tdap) 08/17/2030    Colorectal Cancer Screen  10/06/2040    Hepatitis B vaccine  Completed    Hib vaccine  Completed    Varicella vaccine  Completed    Meningococcal (ACWY) vaccine  Completed    HIV screen  Completed    Pneumococcal 0-64 years Vaccine  Aged [facility] History   Administered Date(s) Administered    DTaP 1995, 03/04/1996, 04/22/1996, 01/08/1997, 05/23/2001    Hepatitis A 03/14/2013    Hepatitis B 1995, 1995, 07/08/1996    Hib, unspecified 1995, 03/04/1996, 04/22/1996, 01/08/1997    Influenza Virus Vaccine 10/22/2013    Influenza Whole 10/22/2013    MMR 10/07/1996, 05/23/2001    Meningococcal MCV4P (Menactra) 03/14/2013    Polio IPV (IPOL) 05/23/2001    Polio OPV 1995, 03/04/1996, 04/22/1996    Tdap (Boostrix, Adacel) 10/24/2014, 08/17/2020    Varicella (Varivax) 10/07/1996, 03/14/2013       Review of Systems   Constitutional: Negative for chills and fever. HENT: Negative. Respiratory: Negative for cough and shortness of breath. Cardiovascular: Negative for chest pain. Gastrointestinal: Negative for abdominal pain and nausea. Skin: Negative for rash. Neurological: Negative for dizziness, light-headedness and headaches. Psychiatric/Behavioral: Negative. Objective:   Physical Exam  Constitutional:       General: She is not in acute distress. Eyes:      Pupils: Pupils are equal, round, and reactive to light. Cardiovascular:      Rate and Rhythm: Normal rate and regular rhythm. Heart sounds: No murmur heard. Pulmonary:      Effort: Pulmonary effort is normal.      Breath sounds: Normal breath sounds. No wheezing. Abdominal:      General: Bowel sounds are normal. There is no distension. Palpations: Abdomen is soft. Tenderness: There is no abdominal tenderness. Musculoskeletal:         General: No tenderness. Normal range of motion. Cervical back: Normal range of motion and neck supple. Skin:     General: Skin is warm and dry. Findings: No rash. Assessment:       Diagnosis Orders   1.  Type 2 diabetes mellitus without complication, without long-term current use of insulin (HCC)  HM DIABETES FOOT EXAM Dulaglutide (TRULICITY) 6.66 ZC/4.8IA SOPN    Hemoglobin A1C    Comprehensive Metabolic Panel, Fasting    MICROALBUMIN, RANDOM URINE (W/O CREATININE)   2. Morbid obesity with BMI of 40.0-44.9, adult ([facility]     3. Hypertriglyceridemia  Lipid Panel   4. Gastroesophageal reflux disease without esophagitis  omeprazole (PRILOSEC) 40 MG delayed release capsule   5. Major depressive disorder, recurrent episode, mild ([facility]     6. POTS (postural orthostatic tachycardia syndrome)             Plan:      Weight, Diet and medication contributing to DM  Diet, exercise and weight loss discussed  Trulicity Weekly - intolerance to Metformin   - increase to get weight loss benefit  Repeat labs in 4 months  Chronic conditions stable  Refills as above  Follow up with Specialists  RTO in 4 months    Current Outpatient Medications   Medication Sig Dispense Refill    omeprazole (PRILOSEC) 40 MG delayed release capsule Take 1 capsule by mouth daily PT TAKES ONCE AT HS 90 capsule 1    Dulaglutide (TRULICITY) 6.81 MS/4.4CG SOPN Inject 0.75 mg into the skin once a week 4 pen 0    DULoxetine (CYMBALTA) 30 MG extended release capsule Take 1 capsule by mouth daily      clotrimazole-betamethasone (LOTRISONE) 1-0.05 % cream Apply topically 2 times daily 45 g 0    nystatin (MYCOSTATIN) 555164 UNIT/GM cream Apply topically 2 times daily.  30 g 0    sertraline (ZOLOFT) 25 MG tablet Take 25 mg by mouth daily      busPIRone (BUSPAR) 15 MG tablet Take 45 mg by mouth nightly       OXcarbazepine (TRILEPTAL) 600 MG tablet       QUEtiapine (SEROQUEL) 300 MG tablet Take 300 mg by mouth nightly      QUEtiapine (SEROQUEL) 50 MG tablet Take 50 mg by mouth every morning (before breakfast)      ARIPiprazole (ABILIFY) 15 MG tablet Take 15 mg by mouth nightly      sertraline (ZOLOFT) 50 MG tablet Take 50 mg by mouth nightly       cetirizine (ZYRTEC) 10 MG tablet Take 1 tablet by mouth daily 90 tablet 3     No current facility-administered medications for this visit.

## 2022-04-25 DIAGNOSIS — E11.9 TYPE 2 DIABETES MELLITUS WITHOUT COMPLICATION, WITHOUT LONG-TERM CURRENT USE OF INSULIN (HCC): ICD-10-CM

## 2022-04-25 RX ORDER — DULAGLUTIDE 0.75 MG/.5ML
INJECTION, SOLUTION SUBCUTANEOUS
Qty: 2 ML | Refills: 5 | Status: SHIPPED | OUTPATIENT
Start: 2022-04-25 | End: 2022-06-16

## 2022-05-10 ENCOUNTER — TELEMEDICINE (OUTPATIENT)
Dept: PSYCHOLOGY | Age: 27
End: 2022-05-10
Payer: MEDICARE

## 2022-05-10 DIAGNOSIS — F31.32 BIPOLAR 1 DISORDER, DEPRESSED, MODERATE (HCC): Primary | ICD-10-CM

## 2022-05-10 PROCEDURE — 90832 PSYTX W PT 30 MINUTES: CPT | Performed by: PSYCHOLOGIST

## 2022-05-10 NOTE — PROGRESS NOTES
Behavioral Health Consultation  Donny Torres, PhD  Psychologist  5/12/2022       Time spent with Patient:  30 minutes  This is patient's fourth  [facility] appointment. Reason for Consult:  depression, anxiety and stress  Referring Provider: No referring provider defined for this encounter. Pt provided informed consent for the behavioral health program. Discussed with patient model of service to include the limits of confidentiality (i.e. abuse reporting, suicide intervention, etc.) and short-term intervention focused approach. Pt indicated understanding. Feedback given to PCP. Description:    MSE:    Appearance    cooperative  Appetite normal  Sleep disturbance No  Loss of pleasure No  Speech    normal rate, normal volume and well articulated  Mood    Anxious  Affect    normal affect  Thought Content    intact  Insight    Fair  Judgment    Intact  Suicide Assessment    no suicidal ideation  Homicidal Assessment Intent No  Cutting No  Enuresis No  Learning Disorder Past dx of ADHD      History:    Medications:   Current Outpatient Medications   Medication Sig Dispense Refill    TRULICITY 1.13 CH/1.8EG SOPN Inject the contents of 1 syringe into the skin once a week 2 mL 5    omeprazole (PRILOSEC) 40 MG delayed release capsule Take 1 capsule by mouth daily PT TAKES ONCE AT HS 90 capsule 1    DULoxetine (CYMBALTA) 30 MG extended release capsule Take 1 capsule by mouth daily      clotrimazole-betamethasone (LOTRISONE) 1-0.05 % cream Apply topically 2 times daily 45 g 0    nystatin (MYCOSTATIN) 974660 UNIT/GM cream Apply topically 2 times daily.  30 g 0    sertraline (ZOLOFT) 25 MG tablet Take 25 mg by mouth daily      busPIRone (BUSPAR) 15 MG tablet Take 45 mg by mouth nightly       OXcarbazepine (TRILEPTAL) 600 MG tablet       QUEtiapine (SEROQUEL) 300 MG tablet Take 300 mg by mouth nightly      QUEtiapine (SEROQUEL) 50 MG tablet Take 50 mg by mouth every morning (before breakfast)      ARIPiprazole (ABILIFY) 15 MG tablet Take 15 mg by mouth nightly      sertraline (ZOLOFT) 50 MG tablet Take 50 mg by mouth nightly       cetirizine (ZYRTEC) 10 MG tablet Take 1 tablet by mouth daily 90 tablet 3     No current facility-administered medications for this visit. Social History:   Social History     Socioeconomic History    Marital status:      Spouse name: Susan Dominguez    Number of children: 1    Years of education: 15    Highest education level: High school graduate   Occupational History    Not on file   Tobacco Use    Smoking status: Former Smoker     Packs/day: 0.50     Years: 1.00     Pack years: 0.50     Types: Cigarettes     Start date: 2018     Quit date: 2022     Years since quittin.3    Smokeless tobacco: Never Used   Vaping Use    Vaping Use: Every day    Substances: Always   Substance and Sexual Activity    Alcohol use: Never     Alcohol/week: 1.0 standard drink     Types: 1 Cans of beer per week     Comment: occasional    Drug use: Yes     Types: Marijuana Izabela Barros)     Comment: MEDICAL [address] [place] [address]  21    Sexual activity: Yes     Partners: Male   Other Topics Concern    Not on file   Social History Narrative    Not on file     Social Determinants of Health     Financial Resource Strain:     Difficulty of Paying Living Expenses: Not on file   Food Insecurity:     Worried About Running Out of Food in the Last Year: Not on file    Everardo of Food in the Last Year: Not on file   Transportation Needs:     Lack of Transportation (Medical): Not on file    Lack of Transportation (Non-Medical):  Not on file   Physical Activity:     Days of Exercise per Week: Not on file    Minutes of Exercise per Session: Not on file   Stress:     Feeling of Stress : Not on file   Social Connections:     Frequency of Communication with Friends and Family: Not on file    Frequency of Social Gatherings with Friends and Family: Not on file    Attends Baptism Services: Not on file  Active Member of Clubs or Organizations: Not on file    Attends Club or Organization Meetings: Not on file    Marital Status: Not on file   Intimate Partner Violence:     Fear of Current or Ex-Partner: Not on file    Emotionally Abused: Not on file    Physically Abused: Not on file    Sexually Abused: Not on file   Housing Stability:     Unable to Pay for Housing in the Last Year: Not on file    Number of [place] in the Last Year: Not on file    Unstable Housing in the Last Year: Not on file       TOBACCO:   reports that she quit smoking about 4 months ago. Her smoking use included cigarettes. She started smoking about 3 years ago. She has a 0.50 pack-year smoking history. She has never used smokeless tobacco.  ETOH:   reports no history of alcohol use.     Family History:   Family History   Problem Relation Age of Onset    Anxiety Disorder Mother     Diabetes Mother     Anxiety Disorder Father     Bipolar Disorder Father     High Blood Pressure Father     Mental Illness Father     Parkinsonism Father         Early-Onset    Depression Paternal Aunt     Cancer Paternal Uncle         multiple myeloma    Depression Paternal Uncle     Cancer Maternal Grandmother         breast    Ovarian Cancer Maternal Grandmother     Diabetes Maternal Grandmother     Depression Maternal Grandfather     Heart Attack Maternal Grandfather     Cancer Paternal Grandfather         lung    No Known Problems Brother         Gaviria disease    No Known Problems Paternal Grandmother     Lupus Maternal Aunt            Assessment:  Patient was seen for bipolar disorder; since last session, she seems to resolve the postpartum depression and now mom is focusing more on her relationship and day-to-day activities; in the last month or so she has been more worried because her  is trying to integrate into this country and they are having problems with the process; he does not want to get vaccinated and that is one of the requirements of immigration; they have been working with an  and going back and forth; she is really pleased that the  has not experienced; she reports that she feels that things have gotten better because there is more stability in her child's grown a little and is starting to be more active; they are living on their own now so then at the stress of in-laws and parents. Colin Comer, was evaluated through a synchronous (real-time) audio-video encounter. The patient (or guardian if applicable) is aware that this is a billable service, which includes applicable co-pays. This Virtual Visit was conducted with patient's (and/or legal guardian's) consent. The visit was conducted pursuant to the emergency declaration under the [address] [place] [address], [address] [place] [address] authority and the [employer] General Act. Patient identification was verified, and a caregiver was present when appropriate. The patient was located at home in a state where the provider was licensed to provide care. 30 minutes by Epic     Diagnosis:      ICD-10-CM    1. Bipolar 1 disorder, depressed, moderate ([facility]  F31.32             Plan:  Pt interventions:  Session today focused more on grounding techniques and staying current and focusing on a more mind site approach so that she will not be looking too much into the future of what they can handle and deal with today; checking the facts of the main point of the discussion to help remind her that she needs to focus on the fax; Future sessions will continue to focus on more of a DBT approach.

## 2022-06-07 ENCOUNTER — ANESTHESIA EVENT (OUTPATIENT)
Dept: OPERATING ROOM | Age: 27
End: 2022-06-07
Payer: MEDICARE

## 2022-06-07 ENCOUNTER — HOSPITAL ENCOUNTER (OUTPATIENT)
Age: 27
Setting detail: OUTPATIENT SURGERY
Discharge: HOME | End: 2022-06-07
Attending: OBSTETRICS & GYNECOLOGY | Admitting: OBSTETRICS & GYNECOLOGY
Payer: MEDICARE

## 2022-06-07 ENCOUNTER — ANESTHESIA (OUTPATIENT)
Dept: OPERATING ROOM | Age: 27
End: 2022-06-07
Payer: MEDICARE

## 2022-06-07 VITALS
OXYGEN SATURATION: 95 % | WEIGHT: 293 LBS | BODY MASS INDEX: 41.95 KG/M2 | DIASTOLIC BLOOD PRESSURE: 75 MMHG | TEMPERATURE: 97.4 F | SYSTOLIC BLOOD PRESSURE: 126 MMHG | HEART RATE: 95 BPM | HEIGHT: 70 IN | RESPIRATION RATE: 16 BRPM

## 2022-06-07 DIAGNOSIS — R10.2 PELVIC PAIN: Primary | ICD-10-CM

## 2022-06-07 LAB — PREGNANCY, URINE: NEGATIVE

## 2022-06-07 PROCEDURE — 6360000002 HC RX W HCPCS: Performed by: NURSE ANESTHETIST, CERTIFIED REGISTERED

## 2022-06-07 PROCEDURE — 3600000009 HC SURGERY ROBOT BASE: Performed by: OBSTETRICS & GYNECOLOGY

## 2022-06-07 PROCEDURE — 7100000011 HC PHASE II RECOVERY - ADDTL 15 MIN: Performed by: OBSTETRICS & GYNECOLOGY

## 2022-06-07 PROCEDURE — 6370000000 HC RX 637 (ALT 250 FOR IP): Performed by: OBSTETRICS & GYNECOLOGY

## 2022-06-07 PROCEDURE — S2900 ROBOTIC SURGICAL SYSTEM: HCPCS | Performed by: OBSTETRICS & GYNECOLOGY

## 2022-06-07 PROCEDURE — 7100000010 HC PHASE II RECOVERY - FIRST 15 MIN: Performed by: OBSTETRICS & GYNECOLOGY

## 2022-06-07 PROCEDURE — 2500000003 HC RX 250 WO HCPCS: Performed by: OBSTETRICS & GYNECOLOGY

## 2022-06-07 PROCEDURE — 6360000002 HC RX W HCPCS: Performed by: ANESTHESIOLOGY

## 2022-06-07 PROCEDURE — 2709999900 HC NON-CHARGEABLE SUPPLY: Performed by: OBSTETRICS & GYNECOLOGY

## 2022-06-07 PROCEDURE — 2580000003 HC RX 258: Performed by: OBSTETRICS & GYNECOLOGY

## 2022-06-07 PROCEDURE — 7100000000 HC PACU RECOVERY - FIRST 15 MIN: Performed by: OBSTETRICS & GYNECOLOGY

## 2022-06-07 PROCEDURE — 6360000002 HC RX W HCPCS: Performed by: OBSTETRICS & GYNECOLOGY

## 2022-06-07 PROCEDURE — 3700000001 HC ADD 15 MINUTES (ANESTHESIA): Performed by: OBSTETRICS & GYNECOLOGY

## 2022-06-07 PROCEDURE — 3600000019 HC SURGERY ROBOT ADDTL 15MIN: Performed by: OBSTETRICS & GYNECOLOGY

## 2022-06-07 PROCEDURE — 2500000003 HC RX 250 WO HCPCS: Performed by: NURSE ANESTHETIST, CERTIFIED REGISTERED

## 2022-06-07 PROCEDURE — 88307 TISSUE EXAM BY PATHOLOGIST: CPT

## 2022-06-07 PROCEDURE — 7100000001 HC PACU RECOVERY - ADDTL 15 MIN: Performed by: OBSTETRICS & GYNECOLOGY

## 2022-06-07 PROCEDURE — 81025 URINE PREGNANCY TEST: CPT

## 2022-06-07 PROCEDURE — 3700000000 HC ANESTHESIA ATTENDED CARE: Performed by: OBSTETRICS & GYNECOLOGY

## 2022-06-07 PROCEDURE — 2720000010 HC SURG SUPPLY STERILE: Performed by: OBSTETRICS & GYNECOLOGY

## 2022-06-07 RX ORDER — DEXAMETHASONE SODIUM PHOSPHATE 10 MG/ML
INJECTION, EMULSION INTRAMUSCULAR; INTRAVENOUS PRN
Status: DISCONTINUED | OUTPATIENT
Start: 2022-06-07 | End: 2022-06-07 | Stop reason: SDUPTHER

## 2022-06-07 RX ORDER — SODIUM CHLORIDE 0.9 % (FLUSH) 0.9 %
5-40 SYRINGE (ML) INJECTION PRN
Status: DISCONTINUED | OUTPATIENT
Start: 2022-06-07 | End: 2022-06-07 | Stop reason: HOSPADM

## 2022-06-07 RX ORDER — IPRATROPIUM BROMIDE AND ALBUTEROL SULFATE 2.5; .5 MG/3ML; MG/3ML
1 SOLUTION RESPIRATORY (INHALATION)
Status: DISCONTINUED | OUTPATIENT
Start: 2022-06-07 | End: 2022-06-07 | Stop reason: HOSPADM

## 2022-06-07 RX ORDER — SODIUM CHLORIDE 0.9 % (FLUSH) 0.9 %
5-40 SYRINGE (ML) INJECTION EVERY 12 HOURS SCHEDULED
Status: DISCONTINUED | OUTPATIENT
Start: 2022-06-07 | End: 2022-06-07 | Stop reason: HOSPADM

## 2022-06-07 RX ORDER — SODIUM CHLORIDE 9 MG/ML
INJECTION, SOLUTION INTRAVENOUS PRN
Status: DISCONTINUED | OUTPATIENT
Start: 2022-06-07 | End: 2022-06-07 | Stop reason: HOSPADM

## 2022-06-07 RX ORDER — CLINDAMYCIN PHOSPHATE 900 MG/50ML
900 INJECTION INTRAVENOUS
Status: COMPLETED | OUTPATIENT
Start: 2022-06-07 | End: 2022-06-07

## 2022-06-07 RX ORDER — SODIUM CHLORIDE 9 MG/ML
25 INJECTION, SOLUTION INTRAVENOUS PRN
Status: DISCONTINUED | OUTPATIENT
Start: 2022-06-07 | End: 2022-06-07 | Stop reason: HOSPADM

## 2022-06-07 RX ORDER — OXYCODONE HYDROCHLORIDE 5 MG/1
TABLET ORAL
Status: DISCONTINUED
Start: 2022-06-07 | End: 2022-06-07 | Stop reason: HOSPADM

## 2022-06-07 RX ORDER — KETOROLAC TROMETHAMINE 30 MG/ML
30 INJECTION, SOLUTION INTRAMUSCULAR; INTRAVENOUS EVERY 6 HOURS
Status: DISCONTINUED | OUTPATIENT
Start: 2022-06-07 | End: 2022-06-07 | Stop reason: HOSPADM

## 2022-06-07 RX ORDER — HYDRALAZINE HYDROCHLORIDE 20 MG/ML
10 INJECTION INTRAMUSCULAR; INTRAVENOUS
Status: DISCONTINUED | OUTPATIENT
Start: 2022-06-07 | End: 2022-06-07 | Stop reason: HOSPADM

## 2022-06-07 RX ORDER — MORPHINE SULFATE 2 MG/ML
2 INJECTION, SOLUTION INTRAMUSCULAR; INTRAVENOUS
Status: DISCONTINUED | OUTPATIENT
Start: 2022-06-07 | End: 2022-06-07 | Stop reason: HOSPADM

## 2022-06-07 RX ORDER — MEPERIDINE HYDROCHLORIDE 25 MG/ML
12.5 INJECTION INTRAMUSCULAR; INTRAVENOUS; SUBCUTANEOUS EVERY 5 MIN PRN
Status: DISCONTINUED | OUTPATIENT
Start: 2022-06-07 | End: 2022-06-07 | Stop reason: HOSPADM

## 2022-06-07 RX ORDER — ONDANSETRON 4 MG/1
4 TABLET, ORALLY DISINTEGRATING ORAL EVERY 8 HOURS PRN
Status: DISCONTINUED | OUTPATIENT
Start: 2022-06-07 | End: 2022-06-07 | Stop reason: HOSPADM

## 2022-06-07 RX ORDER — ONDANSETRON 2 MG/ML
4 INJECTION INTRAMUSCULAR; INTRAVENOUS EVERY 6 HOURS PRN
Status: DISCONTINUED | OUTPATIENT
Start: 2022-06-07 | End: 2022-06-07 | Stop reason: HOSPADM

## 2022-06-07 RX ORDER — MORPHINE SULFATE 2 MG/ML
4 INJECTION, SOLUTION INTRAMUSCULAR; INTRAVENOUS
Status: DISCONTINUED | OUTPATIENT
Start: 2022-06-07 | End: 2022-06-07 | Stop reason: HOSPADM

## 2022-06-07 RX ORDER — FENTANYL CITRATE 50 UG/ML
INJECTION, SOLUTION INTRAMUSCULAR; INTRAVENOUS PRN
Status: DISCONTINUED | OUTPATIENT
Start: 2022-06-07 | End: 2022-06-07 | Stop reason: SDUPTHER

## 2022-06-07 RX ORDER — DROPERIDOL 2.5 MG/ML
0.62 INJECTION, SOLUTION INTRAMUSCULAR; INTRAVENOUS
Status: DISCONTINUED | OUTPATIENT
Start: 2022-06-07 | End: 2022-06-07 | Stop reason: HOSPADM

## 2022-06-07 RX ORDER — LORAZEPAM 2 MG/ML
0.5 INJECTION INTRAMUSCULAR
Status: DISCONTINUED | OUTPATIENT
Start: 2022-06-07 | End: 2022-06-07 | Stop reason: HOSPADM

## 2022-06-07 RX ORDER — LIDOCAINE HYDROCHLORIDE 20 MG/ML
INJECTION, SOLUTION INTRAVENOUS PRN
Status: DISCONTINUED | OUTPATIENT
Start: 2022-06-07 | End: 2022-06-07 | Stop reason: SDUPTHER

## 2022-06-07 RX ORDER — DIPHENHYDRAMINE HYDROCHLORIDE 50 MG/ML
12.5 INJECTION INTRAMUSCULAR; INTRAVENOUS
Status: DISCONTINUED | OUTPATIENT
Start: 2022-06-07 | End: 2022-06-07 | Stop reason: HOSPADM

## 2022-06-07 RX ORDER — ONDANSETRON 2 MG/ML
4 INJECTION INTRAMUSCULAR; INTRAVENOUS
Status: DISCONTINUED | OUTPATIENT
Start: 2022-06-07 | End: 2022-06-07 | Stop reason: HOSPADM

## 2022-06-07 RX ORDER — ONDANSETRON 2 MG/ML
INJECTION INTRAMUSCULAR; INTRAVENOUS PRN
Status: DISCONTINUED | OUTPATIENT
Start: 2022-06-07 | End: 2022-06-07 | Stop reason: SDUPTHER

## 2022-06-07 RX ORDER — BUPIVACAINE HYDROCHLORIDE 5 MG/ML
INJECTION, SOLUTION EPIDURAL; INTRACAUDAL PRN
Status: DISCONTINUED | OUTPATIENT
Start: 2022-06-07 | End: 2022-06-07 | Stop reason: ALTCHOICE

## 2022-06-07 RX ORDER — SODIUM CHLORIDE, SODIUM LACTATE, POTASSIUM CHLORIDE, CALCIUM CHLORIDE 600; 310; 30; 20 MG/100ML; MG/100ML; MG/100ML; MG/100ML
INJECTION, SOLUTION INTRAVENOUS CONTINUOUS
Status: DISCONTINUED | OUTPATIENT
Start: 2022-06-07 | End: 2022-06-07 | Stop reason: HOSPADM

## 2022-06-07 RX ORDER — ROCURONIUM BROMIDE 10 MG/ML
INJECTION, SOLUTION INTRAVENOUS PRN
Status: DISCONTINUED | OUTPATIENT
Start: 2022-06-07 | End: 2022-06-07 | Stop reason: SDUPTHER

## 2022-06-07 RX ORDER — LABETALOL 20 MG/4 ML (5 MG/ML) INTRAVENOUS SYRINGE
10
Status: DISCONTINUED | OUTPATIENT
Start: 2022-06-07 | End: 2022-06-07 | Stop reason: HOSPADM

## 2022-06-07 RX ORDER — OXYCODONE HYDROCHLORIDE AND ACETAMINOPHEN 5; 325 MG/1; MG/1
1 TABLET ORAL EVERY 6 HOURS PRN
Qty: 28 TABLET | Refills: 0 | Status: SHIPPED | OUTPATIENT
Start: 2022-06-07 | End: 2022-06-14

## 2022-06-07 RX ORDER — SUCCINYLCHOLINE/SOD CL,ISO/PF 200MG/10ML
SYRINGE (ML) INTRAVENOUS PRN
Status: DISCONTINUED | OUTPATIENT
Start: 2022-06-07 | End: 2022-06-07 | Stop reason: SDUPTHER

## 2022-06-07 RX ORDER — PROPOFOL 10 MG/ML
INJECTION, EMULSION INTRAVENOUS PRN
Status: DISCONTINUED | OUTPATIENT
Start: 2022-06-07 | End: 2022-06-07 | Stop reason: SDUPTHER

## 2022-06-07 RX ORDER — KETOROLAC TROMETHAMINE 30 MG/ML
INJECTION, SOLUTION INTRAMUSCULAR; INTRAVENOUS
Status: DISCONTINUED
Start: 2022-06-07 | End: 2022-06-07 | Stop reason: HOSPADM

## 2022-06-07 RX ORDER — OXYCODONE HYDROCHLORIDE 5 MG/1
5 TABLET ORAL EVERY 4 HOURS PRN
Status: DISCONTINUED | OUTPATIENT
Start: 2022-06-07 | End: 2022-06-07 | Stop reason: HOSPADM

## 2022-06-07 RX ORDER — OXYCODONE HYDROCHLORIDE 5 MG/1
10 TABLET ORAL EVERY 4 HOURS PRN
Status: DISCONTINUED | OUTPATIENT
Start: 2022-06-07 | End: 2022-06-07 | Stop reason: HOSPADM

## 2022-06-07 RX ORDER — FENTANYL CITRATE 50 UG/ML
50 INJECTION, SOLUTION INTRAMUSCULAR; INTRAVENOUS EVERY 5 MIN PRN
Status: DISCONTINUED | OUTPATIENT
Start: 2022-06-07 | End: 2022-06-07 | Stop reason: HOSPADM

## 2022-06-07 RX ORDER — ENOXAPARIN SODIUM 100 MG/ML
40 INJECTION SUBCUTANEOUS
Status: DISCONTINUED | OUTPATIENT
Start: 2022-06-07 | End: 2022-06-07 | Stop reason: HOSPADM

## 2022-06-07 RX ADMIN — ENOXAPARIN SODIUM 40 MG: 100 INJECTION SUBCUTANEOUS at 08:30

## 2022-06-07 RX ADMIN — FENTANYL CITRATE 50 MCG: 50 INJECTION, SOLUTION INTRAMUSCULAR; INTRAVENOUS at 10:08

## 2022-06-07 RX ADMIN — Medication 180 MG: at 08:11

## 2022-06-07 RX ADMIN — FENTANYL CITRATE 50 MCG: 50 INJECTION, SOLUTION INTRAMUSCULAR; INTRAVENOUS at 08:08

## 2022-06-07 RX ADMIN — SODIUM CHLORIDE, POTASSIUM CHLORIDE, SODIUM LACTATE AND CALCIUM CHLORIDE: 600; 310; 30; 20 INJECTION, SOLUTION INTRAVENOUS at 09:30

## 2022-06-07 RX ADMIN — SODIUM CHLORIDE, POTASSIUM CHLORIDE, SODIUM LACTATE AND CALCIUM CHLORIDE: 600; 310; 30; 20 INJECTION, SOLUTION INTRAVENOUS at 07:16

## 2022-06-07 RX ADMIN — LIDOCAINE HYDROCHLORIDE 100 MG: 20 INJECTION, SOLUTION INTRAVENOUS at 08:11

## 2022-06-07 RX ADMIN — CLINDAMYCIN PHOSPHATE 900 MG: 900 INJECTION, SOLUTION INTRAVENOUS at 08:17

## 2022-06-07 RX ADMIN — ROCURONIUM BROMIDE 10 MG: 10 INJECTION INTRAVENOUS at 09:22

## 2022-06-07 RX ADMIN — FENTANYL CITRATE 50 MCG: 50 INJECTION, SOLUTION INTRAMUSCULAR; INTRAVENOUS at 09:52

## 2022-06-07 RX ADMIN — ONDANSETRON 4 MG: 2 INJECTION INTRAMUSCULAR; INTRAVENOUS at 09:31

## 2022-06-07 RX ADMIN — ROCURONIUM BROMIDE 10 MG: 10 INJECTION INTRAVENOUS at 09:01

## 2022-06-07 RX ADMIN — FENTANYL CITRATE 50 MCG: 50 INJECTION, SOLUTION INTRAMUSCULAR; INTRAVENOUS at 08:14

## 2022-06-07 RX ADMIN — GENTAMICIN SULFATE 500 MG: 40 INJECTION, SOLUTION INTRAMUSCULAR; INTRAVENOUS at 08:30

## 2022-06-07 RX ADMIN — FENTANYL CITRATE 50 MCG: 50 INJECTION, SOLUTION INTRAMUSCULAR; INTRAVENOUS at 08:42

## 2022-06-07 RX ADMIN — FENTANYL CITRATE 50 MCG: 50 INJECTION, SOLUTION INTRAMUSCULAR; INTRAVENOUS at 10:27

## 2022-06-07 RX ADMIN — PROPOFOL 150 MG: 10 INJECTION, EMULSION INTRAVENOUS at 08:11

## 2022-06-07 RX ADMIN — KETOROLAC TROMETHAMINE 30 MG: 30 INJECTION, SOLUTION INTRAMUSCULAR; INTRAVENOUS at 11:14

## 2022-06-07 RX ADMIN — OXYCODONE 10 MG: 5 TABLET ORAL at 11:14

## 2022-06-07 RX ADMIN — FENTANYL CITRATE 50 MCG: 50 INJECTION, SOLUTION INTRAMUSCULAR; INTRAVENOUS at 10:12

## 2022-06-07 RX ADMIN — SUGAMMADEX 290 MG: 100 INJECTION, SOLUTION INTRAVENOUS at 10:02

## 2022-06-07 RX ADMIN — ROCURONIUM BROMIDE 40 MG: 10 INJECTION INTRAVENOUS at 08:22

## 2022-06-07 RX ADMIN — DEXAMETHASONE SODIUM PHOSPHATE 10 MG: 10 INJECTION, EMULSION INTRAMUSCULAR; INTRAVENOUS at 08:34

## 2022-06-07 RX ADMIN — PROPOFOL 50 MG: 10 INJECTION, EMULSION INTRAVENOUS at 08:14

## 2022-06-07 RX ADMIN — ROCURONIUM BROMIDE 10 MG: 10 INJECTION INTRAVENOUS at 08:41

## 2022-06-07 ASSESSMENT — PAIN SCALES - GENERAL
PAINLEVEL_OUTOF10: 8
PAINLEVEL_OUTOF10: 8
PAINLEVEL_OUTOF10: 0
PAINLEVEL_OUTOF10: 5
PAINLEVEL_OUTOF10: 5
PAINLEVEL_OUTOF10: 7
PAINLEVEL_OUTOF10: 7
PAINLEVEL_OUTOF10: 8

## 2022-06-07 ASSESSMENT — PAIN DESCRIPTION - LOCATION
LOCATION: ABDOMEN
LOCATION: ABDOMEN

## 2022-06-07 ASSESSMENT — PAIN DESCRIPTION - ORIENTATION: ORIENTATION: MID

## 2022-06-07 ASSESSMENT — PAIN - FUNCTIONAL ASSESSMENT: PAIN_FUNCTIONAL_ASSESSMENT: 0-10

## 2022-06-07 ASSESSMENT — PAIN DESCRIPTION - DESCRIPTORS
DESCRIPTORS: CRAMPING
DESCRIPTORS: CRAMPING

## 2022-06-07 NOTE — ANESTHESIA PRE PROCEDURE
Department of Anesthesiology  Preprocedure Note       Name:  Bertha Marquez   Age:  32 y.o.  :  1995                                          MRN:  [id number]         Date:  2022      Surgeon: Mohini Rubio):  Jay Miller MD    Procedure: Procedure(s):  Robotic Hysterectomy possible BSO    Medications prior to admission:   Prior to Admission medications    Medication Sig Start Date End Date Taking?  Authorizing Provider   TRULICITY 6.57 IX/7.8CR SOPN Inject the contents of 1 syringe into the skin once a week 22   LEVI Ryan CNP   omeprazole (PRILOSEC) 40 MG delayed release capsule Take 1 capsule by mouth daily PT TAKES ONCE AT HS 3/23/22   LEVI Ryan CNP   DULoxetine (CYMBALTA) 30 MG extended release capsule Take 1 capsule by mouth daily 21   Historical Provider, MD   clotrimazole-betamethasone (Bradd Mullet) 1-0.05 % cream Apply topically 2 times daily  Patient not taking: Reported on 2022   LEVI Ryan CNP   sertraline (ZOLOFT) 25 MG tablet Take 25 mg by mouth daily    Historical Provider, MD   busPIRone (BUSPAR) 15 MG tablet Take 45 mg by mouth nightly     Historical Provider, MD   OXcarbazepine (TRILEPTAL) 600 MG tablet Take 600 mg by mouth daily  20   Historical Provider, MD   QUEtiapine (SEROQUEL) 300 MG tablet Take 300 mg by mouth nightly    Historical Provider, MD   QUEtiapine (SEROQUEL) 50 MG tablet Take 50 mg by mouth every morning (before breakfast)    Historical Provider, MD   ARIPiprazole (ABILIFY) 15 MG tablet Take 20 mg by mouth nightly     Historical Provider, MD   sertraline (ZOLOFT) 50 MG tablet Take 50 mg by mouth nightly     Historical Provider, MD   cetirizine (ZYRTEC) 10 MG tablet Take 1 tablet by mouth daily 17   LEVI Ryan CNP       Current medications:    Current Facility-Administered Medications   Medication Dose Route Frequency Provider Last Rate Last Admin    lactated ringers infusion   IntraVENous Continuous Edgard Lowe  mL/hr at 06/07/22 0716 New Bag at 06/07/22 0716    sodium chloride flush 0.9 % injection 5-40 mL  5-40 mL IntraVENous 2 times per day Edgard Lowe MD        sodium chloride flush 0.9 % injection 5-40 mL  5-40 mL IntraVENous PRN Edgard Lowe MD        0.9 % sodium chloride infusion   IntraVENous PRN Edgard Lowe MD        clindamycin (CLEOCIN) 900 mg in dextrose 5 % 50 mL IVPB  900 mg IntraVENous On Call to 226Kenna Justice MD        And    gentamicin (GARAMYCIN) 500 mg in dextrose 5 % 250 mL IVPB  500 mg IntraVENous On Call to 2263 Boy Justice MD           Allergies: Allergies   Allergen Reactions    Dilaudid [Hydromorphone Hcl]      hallucination    Flexeril [Cyclobenzaprine] Other (See Comments)     Hallucinations    Keflex [Cephalexin] Other (See Comments)     Lose cognitive functions.      Tessalon [Benzonatate] Other (See Comments)     psychosis    Augmentin [Amoxicillin-Pot Clavulanate] Rash    Lorabid [Loracarbef] Hives, Swelling and Rash    Minocycline Itching, Swelling and Rash       Problem List:    Patient Active Problem List   Diagnosis Code    Major depressive disorder, recurrent episode, mild (HCC) F33.0    Low self esteem R45.81    KARLIE (generalized anxiety disorder) F41.1    Obesity E66.9    Obstructive sleep apnea G47.33    Snores R06.83    Sleep disturbances G47.9    Daytime somnolence R40.0    Sleep talking G47.8    Night terrors, adult F51.4    Bruxism (teeth grinding) F45.8    Restless sleeper G47.9    Anxiety F41.9    Abnormal thyroid function test R94.6    Trigeminal neuralgia G50.0    Inappropriate sinus tachycardia R00.0    POTS (postural orthostatic tachycardia syndrome) I49.8    Syncope and collapse R55    Neck discomfort M54.2    Thyroid cyst E04.1    Pulmonary embolism (HCC) I26.99    Breast pain, left N64.4    Positive CAESAR (antinuclear antibody) R76.8    Hypotension I95.9    S/P ablation of ventricular arrhythmia Z98.890, Z86.79    Pneumonia J18.9    Hypokalemia E87.6    DUB (dysfunctional uterine bleeding) N93.8    Palpitations R00.2    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 S82.431K    Closed right ankle fracture, initial encounter S82.891A    Status post open reduction with internal fixation (ORIF) of fracture of ankle Z98.890, Z87.81    Tear of deltoid ligament of ankle, right, initial encounter F09.277A    Abdominal pain during pregnancy in second trimester O26.892, R10.9    Maternal obesity affecting pregnancy, antepartum O99.210    Postpartum care following  delivery B76.2    Biliary colic symptom F85.08    Dizziness, nonspecific R42    Orthostatic dizziness R42    Borderline personality disorder (HCC) F60.3    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    Tachyarrhythmia R00.0       Past Medical History:        Diagnosis Date    ADD (attention deficit disorder)     Anxiety 2015    Anxiety attack     Asthma     exercise induced    Atypical face pain     Back pain     Bipolar 1 disorder ([address]     Diabetes mellitus ([address]     GDM on insulin started on -during pregnancy    Fibromyalgia     GERD (gastroesophageal reflux disease)     Hx of blood clots     Hypotension 2017    Major depressive disorder, recurrent episode, mild ([address] 2012    Obesity 10/24/2014    WILFREDO treated with BiPAP     Pneumonia 2018    POTS (postural orthostatic tachycardia syndrome)     POTS (postural orthostatic tachycardia syndrome)     Pott disease     Pulmonary emboli ([address] 2016    was on blood thinners for 6 months    Seizures ([address] 2016    anxiety induced no meds last seizure 4 years ago    Tailbone injury 2015    patient broke tailbone    Thyroid disease     borderline low no meds    TMJ (dislocation of temporomandibular joint)     Trigeminal neuralgia     Wears contact lenses        Past Surgical History:        Procedure Laterality Date    [facility]    ANKLE FRACTURE SURGERY Right 2020    RIGHT ANKLE OPEN REDUCTION INTERNAL FIXATION AND DELTOID LIGAMENT REPAIR performed by Bi Umanzor DPM at [address]  2016    EP Study    [facility]  [facility]    [facility]     SECTION N/A 2020     SECTION performed by Shanda Stockton MD at [facility] [address], LAPAROSCOPIC N/A 2021    CHOLECYSTECTOMY LAPAROSCOPIC ROBOTIC performed by Bonnie oKhli MD at [address] [place] [address]  2015    [facility]    INSERTABLE CARDIAC MONITOR      UPPER GASTROINTESTINAL ENDOSCOPY  2020    Dr. Geoffrey Dominguez  2015    [place] Jimenez [address] EXTRACTION         Social History:    Social History     Tobacco Use    Smoking status: Current Every Day Smoker     Packs/day: 1.00     Years: 1.00     Pack years: 1.00     Types: Cigarettes     Start date: 2018     Last attempt to quit: 2022     Years since quittin.4    Smokeless tobacco: Never Used   Substance Use Topics    Alcohol use: Not Currently     Alcohol/week: 1.0 standard drink     Types: 1 Cans of beer per week                                Ready to quit: Not Answered  Counseling given: Not Answered      Vital Signs (Current):   Vitals:    22 1438 22 0640 22 0705   BP:  125/73    Pulse:  (!) 101    Resp:  13    Temp:  97.3 °F (36.3 °C)    SpO2:  98%    Weight: (!) 315 lb (142.9 kg)  (!) 320 lb 6.4 oz (145.3 kg)   Height: 5' 10\" (1.778 m)                                                BP Readings from Last 3 Encounters:   22 125/73   22 128/76   22 128/72       NPO Status: Time of last liquid consumption: 530                        Time of last solid consumption:                         Date of last liquid consumption: 22                        Date of last solid food consumption: 06/06/22    BMI:   Wt Readings from Last 3 Encounters:   06/07/22 (!) 320 lb 6.4 oz (145.3 kg)   03/23/22 (!) 327 lb (148.3 kg)   03/03/22 (!) 319 lb (144.7 kg)     Body mass index is 45.97 kg/m². CBC:   Lab Results   Component Value Date    WBC 8.2 02/11/2022    RBC 4.71 02/11/2022    RBC 4.47 02/06/2020    HGB 14.1 02/11/2022    HCT 44.3 02/11/2022    MCV 94.1 02/11/2022    RDW 19.0 01/17/2021     02/11/2022       CMP:   Lab Results   Component Value Date     02/11/2022    K 3.9 02/11/2022    K 4.3 01/11/2022    CL 98 02/11/2022    CO2 22 02/11/2022    BUN 12 02/11/2022    CREATININE 0.6 02/11/2022    GFRAA >60 01/17/2021    AGRATIO 1.3 01/17/2021    LABGLOM >90 02/11/2022    GLUCOSE 225 02/11/2022    GLUCOSE 85 04/30/2012    PROT 7.6 01/11/2022    CALCIUM 8.9 02/11/2022    BILITOT 0.2 01/11/2022    ALKPHOS 128 01/11/2022    AST 34 01/11/2022    ALT 40 01/11/2022       POC Tests: No results for input(s): POCGLU, POCNA, POCK, POCCL, POCBUN, POCHEMO, POCHCT in the last 72 hours.     Coags:   Lab Results   Component Value Date    PROTIME 12.8 09/21/2017    INR 1.07 02/21/2018    APTT 28.5 02/21/2018       HCG (If Applicable):   Lab Results   Component Value Date    PREGTESTUR negative 06/07/2022    PREGSERUM NEGATIVE 01/07/2022        ABGs: No results found for: PHART, PO2ART, OSP0JJZ, VBM4PXC, BEART, U2THKCRW     Type & Screen (If Applicable):  Lab Results   Component Value Date    LABABO O 02/06/2020    [facility] POS 09/21/2020       Drug/Infectious Status (If Applicable):  No results found for: HIV, HEPCAB    COVID-19 Screening (If Applicable):   Lab Results   Component Value Date    COVID19 DETECTED 01/07/2022    COVID19 NOT  DETECTED 10/15/2021           Anesthesia Evaluation    Airway: Mallampati: III          Dental:          Pulmonary:   (+) sleep apnea:  asthma:                            Cardiovascular:          ECG reviewed      Echocardiogram reviewed                  Neuro/Psych:   (+) neuromuscular disease:, psychiatric history:            GI/Hepatic/Renal:   (+) GERD:,           Endo/Other:    (+) Diabetes, . Abdominal:   (+) obese,           Vascular: Other Findings:           Anesthesia Plan      general     ASA 2       Induction: intravenous. Anesthetic plan and risks discussed with patient. Plan discussed with CRNA.                     Josie Parisi MD   6/7/2022

## 2022-06-07 NOTE — PROGRESS NOTES
1010- pt to pacu, pt alert, VSS, pt states pain is 7/10 and feels a little worse than period cramps, pt appears in no acute distress  1020- pt resting in bed with occasional snoring, pt with O2 desaturation due to hx of sleep apnea, pt appears in no acute distress  1024- pt moved from simple mask to nasal cannula 4L  1027- fentanyl given for pain  1033- pt resting in bed with occasional snoring, VSS, pt states pain is improving, pt stable on 4L O2  1035- pt decreased to 2L O2 via nasal cannula  1047- pt meets criteria for discharge from pacu, pt transported to [facility], report given to Dakota Gomez

## 2022-06-07 NOTE — H&P
Department of  Obstetrics and Gynecology  History and Physical  Date of Admission:  2022    CHIEF COMPLAINT:   Pelvic pain, worsening menses    History obtained from patient    HISTORY OF PRESENT ILLNESS:     The patient is a 32 y.o. female with significant past medical history of VTE who presents with worsening menses and pelvic pain. No relief with medical therapies. R/B/A of PEYTON discussed all questions answered.     Past Medical History:        Diagnosis Date    ADD (attention deficit disorder)     Anxiety 2015    Anxiety attack     Asthma     exercise induced    Atypical face pain     Back pain     Bipolar 1 disorder (HCC)     Diabetes mellitus ([address]     GDM on insulin started on -during pregnancy    Fibromyalgia     GERD (gastroesophageal reflux disease)     Hx of blood clots     Hypotension 2017    Major depressive disorder, recurrent episode, mild ([address] 2012    Obesity 10/24/2014    WILFREDO treated with BiPAP     Pneumonia 2018    POTS (postural orthostatic tachycardia syndrome)     POTS (postural orthostatic tachycardia syndrome)     Pott disease     Pulmonary emboli ([address] 2016    was on blood thinners for 6 months    Seizures ([address] 2016    anxiety induced no meds last seizure 4 years ago    Tailbone injury 2015    patient broke tailbone    Thyroid disease     borderline low no meds    TMJ (dislocation of temporomandibular joint)     Trigeminal neuralgia     Wears contact lenses      Past Surgical History:        Procedure Laterality Date    [facility]    ANKLE FRACTURE SURGERY Right 2020    RIGHT ANKLE OPEN REDUCTION INTERNAL FIXATION AND DELTOID LIGAMENT REPAIR performed by Fei Gallegos DPM at [address] [facility] [address]  2016    EP Study    [facility]  [facility]    [facility]     SECTION N/A 2020     SECTION performed by Kamla Ceballos MD at [facility]  CHOLECYSTECTOMY, LAPAROSCOPIC N/A 2/17/2021    CHOLECYSTECTOMY LAPAROSCOPIC ROBOTIC performed by John Hood MD at [facility]  08/14/2015    [facility] INSERTABLE CARDIAC MONITOR      UPPER GASTROINTESTINAL ENDOSCOPY  12/16/2020    Dr. Diane Gonsalez  07/16/2015    [facility] WISDOM TOOTH EXTRACTION  2010         meds:  Current Facility-Administered Medications:     lactated ringers infusion, , IntraVENous, Continuous, Carroll Reyes MD, Last Rate: 125 mL/hr at 06/07/22 0716, New Bag at 06/07/22 0716    sodium chloride flush 0.9 % injection 5-40 mL, 5-40 mL, IntraVENous, 2 times per day, Carroll Reyes MD    sodium chloride flush 0.9 % injection 5-40 mL, 5-40 mL, IntraVENous, PRN, Carroll Reyes MD    0.9 % sodium chloride infusion, , IntraVENous, PRN, Carroll Reyes MD    clindamycin (CLEOCIN) 900 mg in dextrose 5 % 50 mL IVPB, 900 mg, IntraVENous, On Call to OR **AND** gentamicin (GARAMYCIN) 500 mg in dextrose 5 % 250 mL IVPB, 500 mg, IntraVENous, On Call to OR, Carroll Reyes MD       Allergies:  Dilaudid [hydromorphone hcl], Flexeril [cyclobenzaprine], Keflex [cephalexin], Tessalon [benzonatate], Augmentin [amoxicillin-pot clavulanate], Lorabid [loracarbef], and Minocycline     Social History:  TOBACCO:   reports that she has been smoking cigarettes. She started smoking about 3 years ago. She has a 1.00 pack-year smoking history. She has never used smokeless tobacco.  ETOH:   reports previous alcohol use of about 1.0 standard drink of alcohol per week. DRUGS:   reports current drug use. Drug: Marijuana Elfrieda Keller).     Family History:       Problem Relation Age of Onset    Anxiety Disorder Mother     Diabetes Mother     Anxiety Disorder Father     Bipolar Disorder Father     High Blood Pressure Father     Mental Illness Father     Parkinsonism Father         Early-Onset    Depression Paternal Aunt     Cancer Paternal Uncle         multiple myeloma    Depression Paternal Uncle     Cancer Maternal Grandmother         breast    Ovarian Cancer Maternal Grandmother     Diabetes Maternal Grandmother     Depression Maternal Grandfather     Heart Attack Maternal Grandfather     Cancer Paternal Grandfather         lung    No Known Problems Brother         Gaviria disease    No Known Problems Paternal Grandmother     Lupus Maternal Aunt         PHYSICAL EXAM:    Vitals:  /73   Pulse (!) 101   Temp 97.3 °F (36.3 °C)   Resp 13   Ht 5' 10\" (1.778 m)   Wt (!) 320 lb 6.4 oz (145.3 kg)   SpO2 98%   BMI 45.97 kg/m²     CONSTITUTIONAL:  awake, alert, cooperative, no apparent distress, and appears stated age  ABDOMEN:  Soft, NT, ND, obese  {GYN PELVIC EXAM: deferred    DATA:  Labs:    CBC:   Lab Results   Component Value Date    WBC 8.2 02/11/2022    RBC 4.71 02/11/2022    RBC 4.47 02/06/2020    HGB 14.1 02/11/2022    HCT 44.3 02/11/2022    MCV 94.1 02/11/2022    RDW 19.0 01/17/2021     02/11/2022       IMPRESSION/RECOMMENDATIONS:    25yo with DUB and pelvic pain  - Plan for Yung Flores MD

## 2022-06-07 NOTE — OP NOTE
[address] [place] [address]                                OPERATIVE REPORT    PATIENT NAME: Kasandra Olsen                      :        1995  MED REC NO:   [id number]                           ROOM:  ACCOUNT NO:   [de-identified]                           ADMIT DATE: 2022  PROVIDER:     Marcio Rush M.D.    Abby Sussy:  2022    PREOPERATIVE DIAGNOSES:  1.  Pelvic pain. 2.  Dysfunctional uterine bleeding. POSTOPERATIVE DIAGNOSES:  1.  Pelvic pain. 2.  Dysfunctional uterine bleeding. PROCEDURE:  Robotic-assisted total laparoscopic hysterectomy. SURGEON:  Chanel Rush MD    ANESTHESIA:  General.    COMPLICATIONS:  None. EBL:  Less than 50. FINDINGS:  Normal pelvic anatomy with normal uterus, tubes, and ovaries  bilaterally. DESCRIPTION OF PROCEDURE:  The patient was taken to the operating room. General anesthesia was found to be adequate. She was prepped and draped  in dorsal lithotomy position with the Yeалександрns stirrups and secured to  the operating room table with beanbag device. Weighted speculum was  placed in the vagina. Cervix grasped with tenaculum. It was sounded  and then dilated to accommodate a medium VCare. This was sutured into  place at 12 and 6 o'clock. Gross catheter was placed in the urinary  bladder and attention was turned to the abdomen. Supraumbilical skin  was injected with Marcaine and incised with a scalpel and 8 mm trocar  was placed directly into the abdomen and was insufflated with CO2 gas. Robotic trocars were placed in the right and left upper quadrant and  assistant port in the left mid quadrant. The [place] was then side  docked on the patient's right after targeting. Fenestrated bipolar and  monopolar scissors were placed within the robotic ports. Surgeon then  went to console for remainder of the procedure.   The adnexa were  visualized and noted to be normal.  There was no evidence of  endometriosis so her ovaries were left in place. The fallopian tubes  were ligated along the mesosalpinx up to the level of the cornua of the  uterus. The round ligaments were ligated and transected. The  utero-ovarian ligaments were ligated and transected. The broad  ligaments were opened up down to the level of the uterine vessels and a  bladder flap was created. The bladder was dissected anteriorly off of  the VCare cup and below the VCare cup. The uterine vessels were then  skeletonized bilaterally, ligated, transected, and dissected laterally  off the cup. Posterior colpotomy was then performed, carried around  laterally until the uterus was amputated at the external cervical os. The vaginal cuff was reapproximated with 0 Vicryl figure-of-eight  sutures and all areas were hemostatic upon completion. There was some  light oozing noted at the bladder dissecting dissection area. This was  treated with Jb upon completion and after the pelvis was irrigated  with warm normal saline. All instruments were then removed from the  abdomen and sponge, lap, and needle counts were correct x2. The patient  was taken to recovery room in stable condition. She received gentamicin  and clindamycin prior to procedure and Lovenox due to her VTE history.         Kristan Tesfaye M.D.    D: 06/07/2022 13:13:47       T: 06/07/2022 13:16:09     [id number]  Job#: [id number]     Doc#: [id number]    CC:

## 2022-06-07 NOTE — BRIEF OP NOTE
Brief Operative Report      Pre-operative Diagnosis:  Pelvic pain, DUB    Post-operative Diagnosis:  Same    Procedure:  CARLO Escobar    Surgeon: MARGARET Brown MD     Anesthesia:  General endotrachial anesthesia    Estimated blood loss:  Less than 100 ml     Findings: See Operative Dictation, Normal uterus and adnexa    Complications:  none      See dictated operative report for full details.       Greta Libman, MD

## 2022-06-07 NOTE — PROGRESS NOTES
Pt admitted to [facility] room 6 and oriented to unit. SCD sleeves applied. Nares swabbed. Pt verbalized permission for first name, last initial and physicians name on white board. SDS board and discharge criteria explained, pt and family verbalized understanding. Pt denies thoughts of harming self or others. Call light in reach. Family at the bedside.

## 2022-06-08 NOTE — ANESTHESIA POSTPROCEDURE EVALUATION
Department of Anesthesiology  Postprocedure Note    Patient: Cher Bernabe  MRN: [id number]  YOB: 1995  Date of evaluation: 6/8/2022  Time:  8:20 AM     Procedure Summary     Date: 06/07/22 Room / Location: [address] G[place]    Anesthesia Start: 0806 Anesthesia Stop: 6112    Procedure: Robotic Hysterectomy with Bilateral Salpingectomy (N/A Uterus) Diagnosis: (Pelvic pain)    Surgeons: Keren Valdez MD Responsible Provider: Jonathan Fontana MD    Anesthesia Type: general ASA Status: 2          Anesthesia Type: No value filed. Rachele Phase I: Rachele Score: 9    Rachele Phase II: Rachele Score: 10    Last vitals: Reviewed and per EMR flowsheets.        Anesthesia Post Evaluation    Complications: no

## 2022-06-16 ENCOUNTER — OFFICE VISIT (OUTPATIENT)
Dept: FAMILY MEDICINE CLINIC | Age: 27
End: 2022-06-16
Payer: MEDICARE

## 2022-06-16 ENCOUNTER — PATIENT MESSAGE (OUTPATIENT)
Dept: FAMILY MEDICINE CLINIC | Age: 27
End: 2022-06-16

## 2022-06-16 ENCOUNTER — NURSE ONLY (OUTPATIENT)
Dept: LAB | Age: 27
End: 2022-06-16

## 2022-06-16 VITALS
HEART RATE: 116 BPM | DIASTOLIC BLOOD PRESSURE: 80 MMHG | BODY MASS INDEX: 45.93 KG/M2 | RESPIRATION RATE: 20 BRPM | WEIGHT: 293 LBS | SYSTOLIC BLOOD PRESSURE: 128 MMHG

## 2022-06-16 DIAGNOSIS — G90.A POTS (POSTURAL ORTHOSTATIC TACHYCARDIA SYNDROME): ICD-10-CM

## 2022-06-16 DIAGNOSIS — E11.9 TYPE 2 DIABETES MELLITUS WITHOUT COMPLICATION, WITHOUT LONG-TERM CURRENT USE OF INSULIN (HCC): Primary | ICD-10-CM

## 2022-06-16 DIAGNOSIS — R56.9 PSEUDOSEIZURES (HCC): ICD-10-CM

## 2022-06-16 DIAGNOSIS — Q79.60 EDS (EHLERS-DANLOS SYNDROME): Primary | ICD-10-CM

## 2022-06-16 DIAGNOSIS — E11.9 TYPE 2 DIABETES MELLITUS WITHOUT COMPLICATION, WITHOUT LONG-TERM CURRENT USE OF INSULIN (HCC): ICD-10-CM

## 2022-06-16 DIAGNOSIS — Z90.710 S/P HYSTERECTOMY: ICD-10-CM

## 2022-06-16 DIAGNOSIS — E78.1 HYPERTRIGLYCERIDEMIA: ICD-10-CM

## 2022-06-16 DIAGNOSIS — F33.0 MAJOR DEPRESSIVE DISORDER, RECURRENT EPISODE, MILD (HCC): ICD-10-CM

## 2022-06-16 DIAGNOSIS — E66.01 MORBID OBESITY WITH BMI OF 40.0-44.9, ADULT (HCC): ICD-10-CM

## 2022-06-16 DIAGNOSIS — K21.9 GASTROESOPHAGEAL REFLUX DISEASE WITHOUT ESOPHAGITIS: ICD-10-CM

## 2022-06-16 LAB
ALBUMIN SERPL-MCNC: 4.5 G/DL (ref 3.5–5.1)
ALP BLD-CCNC: 137 U/L (ref 38–126)
ALT SERPL-CCNC: 26 U/L (ref 11–66)
ANION GAP SERPL CALCULATED.3IONS-SCNC: 13 MEQ/L (ref 8–16)
AST SERPL-CCNC: 21 U/L (ref 5–40)
AVERAGE GLUCOSE: 129 MG/DL (ref 70–126)
BILIRUB SERPL-MCNC: 0.3 MG/DL (ref 0.3–1.2)
BUN BLDV-MCNC: 10 MG/DL (ref 7–22)
CALCIUM SERPL-MCNC: 10 MG/DL (ref 8.5–10.5)
CHLORIDE BLD-SCNC: 100 MEQ/L (ref 98–111)
CHOLESTEROL, TOTAL: 212 MG/DL (ref 100–199)
CO2: 26 MEQ/L (ref 23–33)
CREAT SERPL-MCNC: 0.5 MG/DL (ref 0.4–1.2)
CREATININE, URINE: 73.2 MG/DL
GFR SERPL CREATININE-BSD FRML MDRD: > 90 ML/MIN/1.73M2
GLUCOSE FASTING: 125 MG/DL (ref 70–108)
HBA1C MFR BLD: 6.3 % (ref 4.4–6.4)
HDLC SERPL-MCNC: 36 MG/DL
LDL CHOLESTEROL CALCULATED: ABNORMAL MG/DL
MICROALBUMIN UR-MCNC: 7.39 MG/DL
MICROALBUMIN/CREAT UR-RTO: 101 MG/G (ref 0–30)
POTASSIUM SERPL-SCNC: 4.2 MEQ/L (ref 3.5–5.2)
SODIUM BLD-SCNC: 139 MEQ/L (ref 135–145)
TOTAL PROTEIN: 7.5 G/DL (ref 6.1–8)
TRIGL SERPL-MCNC: 803 MG/DL (ref 0–199)

## 2022-06-16 PROCEDURE — 3044F HG A1C LEVEL LT 7.0%: CPT | Performed by: NURSE PRACTITIONER

## 2022-06-16 PROCEDURE — 99214 OFFICE O/P EST MOD 30 MIN: CPT | Performed by: NURSE PRACTITIONER

## 2022-06-16 RX ORDER — DULAGLUTIDE 1.5 MG/.5ML
1.5 INJECTION, SOLUTION SUBCUTANEOUS WEEKLY
Qty: 2 ML | Refills: 5 | Status: SHIPPED | OUTPATIENT
Start: 2022-06-16 | End: 2022-11-03

## 2022-06-16 RX ORDER — DULAGLUTIDE 0.75 MG/.5ML
INJECTION, SOLUTION SUBCUTANEOUS
Qty: 2 ML | Refills: 5 | Status: CANCELLED | OUTPATIENT
Start: 2022-06-16

## 2022-06-16 SDOH — ECONOMIC STABILITY: FOOD INSECURITY: WITHIN THE PAST 12 MONTHS, THE FOOD YOU BOUGHT JUST DIDN'T LAST AND YOU DIDN'T HAVE MONEY TO GET MORE.: NEVER TRUE

## 2022-06-16 SDOH — ECONOMIC STABILITY: FOOD INSECURITY: WITHIN THE PAST 12 MONTHS, YOU WORRIED THAT YOUR FOOD WOULD RUN OUT BEFORE YOU GOT MONEY TO BUY MORE.: NEVER TRUE

## 2022-06-16 ASSESSMENT — ENCOUNTER SYMPTOMS
NAUSEA: 0
SHORTNESS OF BREATH: 0
ABDOMINAL PAIN: 0
COUGH: 0

## 2022-06-16 ASSESSMENT — SOCIAL DETERMINANTS OF HEALTH (SDOH): HOW HARD IS IT FOR YOU TO PAY FOR THE VERY BASICS LIKE FOOD, HOUSING, MEDICAL CARE, AND HEATING?: NOT HARD AT ALL

## 2022-06-16 NOTE — TELEPHONE ENCOUNTER
From: Meliton Inman  To: Mandy Jules  Sent: 6/16/2022 2:03 PM EDT  Subject: EDS Specialsits    Marty Phillips MD  [address]  [address] [place] [address],[address], [address] [place] [address], [address] [place] [address]  [phone]  [email or web address]    Hospital affiliations:    [facility]  I have both personal and professional experience with EDS, and consider my job to [facility] for the EDS patient! Would contact this office about an appointment.

## 2022-06-16 NOTE — PROGRESS NOTES
Subjective:      Patient ID: Darin Blevins 1995 is a 32 y.o. female here for evaluation. Chief Complaint   Patient presents with    Follow-up     4mo follow up    Medication Refill       Patient Active Problem List   Diagnosis    Major depressive disorder, recurrent episode, mild (HCC)    Low self esteem    KARLIE (generalized anxiety disorder)    Obesity    Obstructive sleep apnea    Snores    Sleep disturbances    Daytime somnolence    Sleep talking    Night terrors, adult    Bruxism (teeth grinding)    Restless sleeper    Anxiety    Abnormal thyroid function test    Trigeminal neuralgia    Inappropriate sinus tachycardia    POTS (postural orthostatic tachycardia syndrome)    Syncope and collapse    Neck discomfort    Thyroid cyst    Pulmonary embolism (HCC)    Breast pain, left    Positive CAESAR (antinuclear antibody)    Hypotension    S/P ablation of ventricular arrhythmia    Pneumonia    Hypokalemia    DUB (dysfunctional uterine bleeding)    Palpitations    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    Closed right ankle fracture, initial encounter    Status post open reduction with internal fixation (ORIF) of fracture of ankle    Tear of deltoid ligament of ankle, right, initial encounter    Abdominal pain during pregnancy in second trimester    Maternal obesity affecting pregnancy, antepartum    Postpartum care following  delivery    Biliary colic symptom    Dizziness, nonspecific    Orthostatic dizziness    Borderline personality disorder (HCC)    Pseudoseizures (HCC)    Tachyarrhythmia    Pelvic pain       [facility] - Dr. [place] Finley - Dr. Mukul Chew - Dr. Abhishek Puckett - Dr. Singh Connor, [place]    Partial Hysterectomy with OBGYN on 22    Following with [facility]. Had ECHO, TILT and Holter for POTS .     Vitals:    22 1324   BP: 128/80   Pulse: (!) 116   Resp: 20 Labs reviewed. On atypical antipsychotics for MDD. Follows with [facility] Body mass index is 45.93 kg/m². Metformin intolerance GI SE . On Trulicity 2.79 mg Weekly. Tolerating well with good appetite suppressant.      Wt Readings from Last 3 Encounters:   06/16/22 (!) 320 lb 1.6 oz (145.2 kg)   06/07/22 (!) 320 lb 6.4 oz (145.3 kg)   03/23/22 (!) 327 lb (148.3 kg)       Labs pending    Lab Results   Component Value Date    LABA1C 6.6 (H) 03/22/2022    LABA1C 6.2 09/24/2021    LABA1C 6.3 05/28/2021     Lab Results   Component Value Date    .7 07/27/2020       No components found for: CHLPL  Lab Results   Component Value Date    TRIG 399 (H) 09/24/2021    TRIG 156 07/09/2013     Lab Results   Component Value Date    HDL 44 09/24/2021    HDL 68 07/09/2013     Lab Results   Component Value Date    LDLCALC 72 09/24/2021    LDLCALC 67 07/09/2013     No results found for: LABVLDL      Chemistry        Component Value Date/Time     02/11/2022 1339    K 3.9 02/11/2022 1339    K 4.3 01/11/2022 2209    CL 98 02/11/2022 1339    CO2 22 (L) 02/11/2022 1339    BUN 12 02/11/2022 1339    CREATININE 0.6 02/11/2022 1339        Component Value Date/Time    CALCIUM 8.9 02/11/2022 1339    ALKPHOS 128 (H) 01/11/2022 2209    AST 34 01/11/2022 2209    ALT 40 01/11/2022 2209    BILITOT 0.2 (L) 01/11/2022 2209            Lab Results   Component Value Date    TSH 2.500 08/19/2021       Lab Results   Component Value Date    WBC 8.2 02/11/2022    HGB 14.1 02/11/2022    HCT 44.3 02/11/2022    MCV 94.1 02/11/2022     02/11/2022         Health Maintenance   Topic Date Due    COVID-19 Vaccine (1) Never done    Pneumococcal 0-64 years Vaccine (1 - PCV) Never done    HPV vaccine (1 - 2-dose series) Never done    Hepatitis A vaccine (2 of 2 - 2-dose series) 09/14/2013    Diabetic microalbuminuria test  Never done    Diabetic retinal exam  Never done    Hepatitis C screen  Never done    Pap smear  Never done    Flu vaccine (Season Ended) 09/01/2022    Lipids  09/24/2022    Depression Monitoring  01/28/2023    A1C test (Diabetic or Prediabetic)  03/22/2023    Diabetic foot exam  03/23/2023    DTaP/Tdap/Td vaccine (8 - Td or Tdap) 08/17/2030    Colorectal Cancer Screen  10/06/2040    Hepatitis B vaccine  Completed    Hib vaccine  Completed    Varicella vaccine  Completed    Meningococcal (ACWY) vaccine  Completed    HIV screen  Completed       Immunization History   Administered Date(s) Administered    DTaP 1995, 03/04/1996, 04/22/1996, 01/08/1997, 05/23/2001    Hepatitis A 03/14/2013    Hepatitis B [birth year], 07/08/1996    Hib, unspecified 1995, 03/04/1996, 04/22/1996, 01/08/1997    Influenza Virus Vaccine 10/22/2013    Influenza Whole 10/22/2013    MMR 10/07/1996, 05/23/2001    Meningococcal MCV4P (Menactra) 03/14/2013    Polio IPV (IPOL) 05/23/2001    Polio OPV 1995, 03/04/1996, 04/22/1996    Tdap (Boostrix, Adacel) 10/24/2014, 08/17/2020    Varicella (Varivax) 10/07/1996, 03/14/2013       Review of Systems   Constitutional: Negative for chills and fever. HENT: Negative. Respiratory: Negative for cough and shortness of breath. Cardiovascular: Negative for chest pain. Gastrointestinal: Negative for abdominal pain and nausea. Genitourinary: Positive for pelvic pain. Skin: Negative for rash. Neurological: Negative for dizziness, light-headedness and headaches. Psychiatric/Behavioral: Negative. Objective:   Physical Exam  Constitutional:       General: She is not in acute distress. Eyes:      Pupils: Pupils are equal, round, and reactive to light. Cardiovascular:      Rate and Rhythm: Normal rate and regular rhythm. Heart sounds: No murmur heard. Pulmonary:      Effort: Pulmonary effort is normal.      Breath sounds: Normal breath sounds. No wheezing. Abdominal:      General: Bowel sounds are normal. There is no distension. Palpations: Abdomen is soft. Tenderness: There is no abdominal tenderness. Musculoskeletal:         General: No tenderness. Normal range of motion. Cervical back: Normal range of motion and neck supple. Skin:     General: Skin is warm and dry. Findings: No rash. Assessment:       Diagnosis Orders   1. Type 2 diabetes mellitus without complication, without long-term current use of insulin (HCC)  Dulaglutide (TRULICITY) 1.5 LO/2.8RY SOPN   2. Morbid obesity with BMI of 40.0-44.9, adult (HCC)  Dulaglutide (TRULICITY) 1.5 JM/7.1WH SOPN   3. Hypertriglyceridemia     4. POTS (postural orthostatic tachycardia syndrome)     5. Major depressive disorder, recurrent episode, mild ([address]     6. Gastroesophageal reflux disease without esophagitis     7. Pseudoseizures ([address]     8. S/P hysterectomy             Plan:      Weight, Diet and medication contributing to DM  Diet, exercise and weight loss discussed  Chronic conditions stable  Labs pending at time of appt  Refills as above   - increase trulicity for weight loss benefit  Follow up with Specialists   - information given for an EDS specialists   RTO in 6 months      Tatum [place] MD  [facility] [address], [address] [place] [address], [address] [place] [address]  [phone]  [email or web address]    Hospital affiliations:    [facility]  I have both personal and professional experience with EDS, and consider my job to [facility] for the EDS patient!         Current Outpatient Medications   Medication Sig Dispense Refill    Dulaglutide (TRULICITY) 1.5 EM/9.9IP SOPN Inject 1.5 mg into the skin once a week 2 mL 5    omeprazole (PRILOSEC) 40 MG delayed release capsule Take 1 capsule by mouth daily PT TAKES ONCE AT HS 90 capsule 1    DULoxetine (CYMBALTA) 30 MG extended release capsule Take 1 capsule by mouth daily      clotrimazole-betamethasone (LOTRISONE) 1-0.05 % cream Apply topically 2 times daily 45 g 0    sertraline (ZOLOFT) 25 MG tablet Take 25 mg by mouth daily      busPIRone (BUSPAR) 15 MG tablet Take 45 mg by mouth nightly       OXcarbazepine (TRILEPTAL) 600 MG tablet Take 600 mg by mouth daily       QUEtiapine (SEROQUEL) 300 MG tablet Take 300 mg by mouth nightly      QUEtiapine (SEROQUEL) 50 MG tablet Take 50 mg by mouth every morning (before breakfast)      ARIPiprazole (ABILIFY) 15 MG tablet Take 20 mg by mouth nightly       sertraline (ZOLOFT) 50 MG tablet Take 50 mg by mouth nightly       cetirizine (ZYRTEC) 10 MG tablet Take 1 tablet by mouth daily 90 tablet 3     No current facility-administered medications for this visit.

## 2022-06-17 ENCOUNTER — PATIENT MESSAGE (OUTPATIENT)
Dept: FAMILY MEDICINE CLINIC | Age: 27
End: 2022-06-17

## 2022-06-17 DIAGNOSIS — E78.1 HYPERTRIGLYCERIDEMIA: Primary | ICD-10-CM

## 2022-06-17 NOTE — TELEPHONE ENCOUNTER
From: Shawna Castillo  To: Kennedy Hong  Sent: 6/17/2022 10:56 AM EDT  Subject: Triglycerides     May I ask what medicine you would like to put me on?

## 2022-06-21 RX ORDER — FENOFIBRATE 160 MG/1
160 TABLET ORAL DAILY
Qty: 90 TABLET | Refills: 1 | Status: SHIPPED | OUTPATIENT
Start: 2022-06-21

## 2022-06-22 ENCOUNTER — TELEPHONE (OUTPATIENT)
Dept: FAMILY MEDICINE CLINIC | Age: 27
End: 2022-06-22

## 2022-06-22 ENCOUNTER — HOSPITAL ENCOUNTER (EMERGENCY)
Age: 27
Discharge: HOME | End: 2022-06-22
Payer: MEDICARE

## 2022-06-22 ENCOUNTER — APPOINTMENT (OUTPATIENT)
Dept: GENERAL RADIOLOGY | Age: 27
End: 2022-06-22
Payer: MEDICARE

## 2022-06-22 VITALS
HEIGHT: 70 IN | TEMPERATURE: 98.2 F | OXYGEN SATURATION: 99 % | WEIGHT: 293 LBS | DIASTOLIC BLOOD PRESSURE: 56 MMHG | BODY MASS INDEX: 41.95 KG/M2 | RESPIRATION RATE: 21 BRPM | HEART RATE: 94 BPM | SYSTOLIC BLOOD PRESSURE: 103 MMHG

## 2022-06-22 DIAGNOSIS — M54.12 LEFT CERVICAL RADICULOPATHY: Primary | ICD-10-CM

## 2022-06-22 LAB
ALBUMIN SERPL-MCNC: 4.2 G/DL (ref 3.5–5.1)
ALP BLD-CCNC: 120 U/L (ref 38–126)
ALT SERPL-CCNC: 25 U/L (ref 11–66)
ANION GAP SERPL CALCULATED.3IONS-SCNC: 12 MEQ/L (ref 8–16)
AST SERPL-CCNC: 17 U/L (ref 5–40)
BASOPHILS # BLD: 0.3 %
BASOPHILS ABSOLUTE: 0 THOU/MM3 (ref 0–0.1)
BILIRUB SERPL-MCNC: 0.2 MG/DL (ref 0.3–1.2)
BUN BLDV-MCNC: 11 MG/DL (ref 7–22)
CALCIUM SERPL-MCNC: 9.4 MG/DL (ref 8.5–10.5)
CHLORIDE BLD-SCNC: 101 MEQ/L (ref 98–111)
CO2: 25 MEQ/L (ref 23–33)
CREAT SERPL-MCNC: 0.6 MG/DL (ref 0.4–1.2)
EOSINOPHIL # BLD: 0.2 %
EOSINOPHILS ABSOLUTE: 0 THOU/MM3 (ref 0–0.4)
ERYTHROCYTE [DISTWIDTH] IN BLOOD BY AUTOMATED COUNT: 14.6 % (ref 11.5–14.5)
ERYTHROCYTE [DISTWIDTH] IN BLOOD BY AUTOMATED COUNT: 50 FL (ref 35–45)
GFR SERPL CREATININE-BSD FRML MDRD: > 90 ML/MIN/1.73M2
GLUCOSE BLD-MCNC: 151 MG/DL (ref 70–108)
HCT VFR BLD CALC: 39.8 % (ref 37–47)
HEMOGLOBIN: 13.1 GM/DL (ref 12–16)
IMMATURE GRANS (ABS): 0.07 THOU/MM3 (ref 0–0.07)
IMMATURE GRANULOCYTES: 0.6 %
LYMPHOCYTES # BLD: 31.3 %
LYMPHOCYTES ABSOLUTE: 3.8 THOU/MM3 (ref 1–4.8)
MCH RBC QN AUTO: 30.8 PG (ref 26–33)
MCHC RBC AUTO-ENTMCNC: 32.9 GM/DL (ref 32.2–35.5)
MCV RBC AUTO: 93.6 FL (ref 81–99)
MONOCYTES # BLD: 6.1 %
MONOCYTES ABSOLUTE: 0.7 THOU/MM3 (ref 0.4–1.3)
NUCLEATED RED BLOOD CELLS: 0 /100 WBC
OSMOLALITY CALCULATION: 278 MOSMOL/KG (ref 275–300)
PLATELET # BLD: 212 THOU/MM3 (ref 130–400)
PLATELET ESTIMATE: ADEQUATE
PMV BLD AUTO: 10.4 FL (ref 9.4–12.4)
POTASSIUM REFLEX MAGNESIUM: 3.7 MEQ/L (ref 3.5–5.2)
RBC # BLD: 4.25 MILL/MM3 (ref 4.2–5.4)
SCAN OF BLOOD SMEAR: NORMAL
SEG NEUTROPHILS: 61.5 %
SEGMENTED NEUTROPHILS ABSOLUTE COUNT: 7.5 THOU/MM3 (ref 1.8–7.7)
SODIUM BLD-SCNC: 138 MEQ/L (ref 135–145)
TOTAL PROTEIN: 7.4 G/DL (ref 6.1–8)
TROPONIN T: < 0.01 NG/ML
WBC # BLD: 12.2 THOU/MM3 (ref 4.8–10.8)

## 2022-06-22 PROCEDURE — 85025 COMPLETE CBC W/AUTO DIFF WBC: CPT

## 2022-06-22 PROCEDURE — 6370000000 HC RX 637 (ALT 250 FOR IP): Performed by: NURSE PRACTITIONER

## 2022-06-22 PROCEDURE — 99284 EMERGENCY DEPT VISIT MOD MDM: CPT

## 2022-06-22 PROCEDURE — 6360000002 HC RX W HCPCS: Performed by: NURSE PRACTITIONER

## 2022-06-22 PROCEDURE — 80053 COMPREHEN METABOLIC PANEL: CPT

## 2022-06-22 PROCEDURE — 72040 X-RAY EXAM NECK SPINE 2-3 VW: CPT

## 2022-06-22 PROCEDURE — 96374 THER/PROPH/DIAG INJ IV PUSH: CPT

## 2022-06-22 PROCEDURE — 84484 ASSAY OF TROPONIN QUANT: CPT

## 2022-06-22 PROCEDURE — 73030 X-RAY EXAM OF SHOULDER: CPT

## 2022-06-22 RX ORDER — METHYLPREDNISOLONE 4 MG/1
TABLET ORAL
Qty: 1 KIT | Refills: 0 | Status: SHIPPED | OUTPATIENT
Start: 2022-06-22 | End: 2022-06-28

## 2022-06-22 RX ORDER — KETOROLAC TROMETHAMINE 30 MG/ML
30 INJECTION, SOLUTION INTRAMUSCULAR; INTRAVENOUS ONCE
Status: COMPLETED | OUTPATIENT
Start: 2022-06-22 | End: 2022-06-22

## 2022-06-22 RX ORDER — TIZANIDINE 4 MG/1
4 TABLET ORAL ONCE
Status: COMPLETED | OUTPATIENT
Start: 2022-06-22 | End: 2022-06-22

## 2022-06-22 RX ADMIN — TIZANIDINE 4 MG: 4 TABLET ORAL at 05:46

## 2022-06-22 RX ADMIN — KETOROLAC TROMETHAMINE 30 MG: 30 INJECTION, SOLUTION INTRAMUSCULAR; INTRAVENOUS at 05:45

## 2022-06-22 ASSESSMENT — ENCOUNTER SYMPTOMS
RHINORRHEA: 0
NAUSEA: 0
CONSTIPATION: 0
BLOOD IN STOOL: 0
EYE PAIN: 0
ABDOMINAL PAIN: 0
COUGH: 0
DIARRHEA: 0
VOMITING: 0
WHEEZING: 0
SHORTNESS OF BREATH: 0

## 2022-06-22 ASSESSMENT — PAIN SCALES - GENERAL: PAINLEVEL_OUTOF10: 6

## 2022-06-22 NOTE — ED TRIAGE NOTES
Pt presents to the ED from home with complaints of left sided arm pain and numbness. Pt states this started yesterday morning. She said she took a percocet at 2000 last night. Pt denies chest pain, shortness of breath. NIH is 0. Pt is alert and oriented x 4 with respirations even and unlabored.

## 2022-06-22 NOTE — ED NOTES
Pt is resting in cot with respirations even and unlabored. Mother is at bedside. No concern or need is voiced at this time. Will continue to monitor.       [facility]  06/22/22 1203

## 2022-06-22 NOTE — ED PROVIDER NOTES
[address] [facility] [address] [address] EMERGENCY DEPT  EMERGENCY MEDICINE     Pt Name: Jozef Calixto  MRN: [id number]  Birthdate 1995  Date of evaluation: 6/22/2022  PCP:    LEVI Hines CNP  Provider: LEVI Pop CNP    CHIEF COMPLAINT       Chief Complaint   Patient presents with    Arm Pain    Numbness           HISTORY OF PRESENT ILLNESS    Jozef Calixto is a 32 y.o. female patient that presents to ER with complaint of left arm pain and intermittent numbness and tingling. She also states that the pain sometimes shoots down her entire left side. Patient denies chest pain, shortness of breath, nausea, vomiting, diarrhea or fever. Patient is neurovascularly intact, but is currently complaining of left shoulder pain that is shooting down her left arm. Patient does have mild tenderness in the musculature of the posterior left shoulder. Of note, patient was just started on a new medication for her cholesterol    Triage notes and Nursing notes were reviewed by myself. Any discrepancies are addressed above.     PAST MEDICAL HISTORY     Past Medical History:   Diagnosis Date    ADD (attention deficit disorder)     Anxiety 04/08/2015    Anxiety attack     Asthma     exercise induced    Atypical face pain     Back pain     Bipolar 1 disorder ([address]     Diabetes mellitus ([address]     GDM on insulin started on 7/23-during pregnancy    Fibromyalgia     GERD (gastroesophageal reflux disease)     Hx of blood clots     Hypotension 11/02/2017    Major depressive disorder, recurrent episode, mild ([address] 07/31/2012    Obesity 10/24/2014    WILFREDO treated with BiPAP     Pneumonia 02/21/2018    POTS (postural orthostatic tachycardia syndrome)     POTS (postural orthostatic tachycardia syndrome)     Pott disease     Pulmonary emboli ([address] 2016    was on blood thinners for 6 months    Seizures ([address] 07/31/2016    anxiety induced no meds last seizure 4 years ago    Tailbone injury 11/28/2015    patient broke tailbone    Thyroid disease     borderline low no meds    TMJ (dislocation of temporomandibular joint)     Trigeminal neuralgia     Wears contact lenses        SURGICAL HISTORY       Past Surgical History:   Procedure Laterality Date    [facility]    ANKLE FRACTURE SURGERY Right 2020    RIGHT ANKLE OPEN REDUCTION INTERNAL FIXATION AND DELTOID LIGAMENT REPAIR performed by Nicolás Roblero DPM at [address]  2016    EP Study    [facility]     SECTION N/A 2020     SECTION performed by Xena Baker MD at [address] [facility] [address], [address] N/A 2021    CHOLECYSTECTOMY LAPAROSCOPIC ROBOTIC performed by Jaya Almonte MD at [address]  2015    Maria Esther Mckeon HYSTERECTOMY (CERVIX STATUS UNKNOWN) N/A 2022    Robotic Hysterectomy with Bilateral Salpingectomy performed by Xena Baker MD at [address] [place] [address] ENDOSCOPY  2020    Dr. Hellen Castro  2015    Maria Esther Mckeon [address] EXTRACTION  2010       CURRENT MEDICATIONS       Previous Medications    ARIPIPRAZOLE (ABILIFY) 15 MG TABLET    Take 20 mg by mouth nightly     BUSPIRONE (BUSPAR) 15 MG TABLET    Take 45 mg by mouth nightly     CETIRIZINE (ZYRTEC) 10 MG TABLET    Take 1 tablet by mouth daily    CLOTRIMAZOLE-BETAMETHASONE (LOTRISONE) 1-0.05 % CREAM    Apply topically 2 times daily    DULAGLUTIDE (TRULICITY) 1.5 JS/0.8ZO SOPN    Inject 1.5 mg into the skin once a week    DULOXETINE (CYMBALTA) 30 MG EXTENDED RELEASE CAPSULE    Take 1 capsule by mouth daily    FENOFIBRATE (TRIGLIDE) 160 MG TABLET    Take 1 tablet by mouth daily    OMEPRAZOLE (PRILOSEC) 40 MG DELAYED RELEASE CAPSULE    Take 1 capsule by mouth daily PT TAKES ONCE AT HS    OXCARBAZEPINE (TRILEPTAL) 600 MG TABLET    Take 600 mg by mouth daily     QUETIAPINE (SEROQUEL) 300 MG TABLET    Take 300 mg by mouth nightly    QUETIAPINE (SEROQUEL) 50 MG TABLET    Take 50 mg by mouth every morning (before breakfast)    SERTRALINE (ZOLOFT) 25 MG TABLET    Take 25 mg by mouth daily    SERTRALINE (ZOLOFT) 50 MG TABLET    Take 50 mg by mouth nightly        ALLERGIES       Allergies   Allergen Reactions    Dilaudid [Hydromorphone Hcl]      hallucination    Flexeril [Cyclobenzaprine] Other (See Comments)     Hallucinations    Keflex [Cephalexin] Other (See Comments)     Lose cognitive functions.      Tessalon [Benzonatate] Other (See Comments)     psychosis    Augmentin [Amoxicillin-Pot Clavulanate] Rash    Lorabid [Loracarbef] Hives, Swelling and Rash    Minocycline Itching, Swelling and Rash       FAMILY HISTORY       Family History   Problem Relation Age of Onset    Anxiety Disorder Mother     Diabetes Mother     Anxiety Disorder Father     Bipolar Disorder Father     High Blood Pressure Father     Mental Illness Father     Parkinsonism Father         Early-Onset    Depression Paternal Aunt     Cancer Paternal Uncle         multiple myeloma    Depression Paternal Uncle     Cancer Maternal Grandmother         breast    Ovarian Cancer Maternal Grandmother     Diabetes Maternal Grandmother     Depression Maternal Grandfather     Heart Attack Maternal Grandfather     Cancer Paternal Grandfather         lung    No Known Problems Brother         Gaviria disease    No Known Problems Paternal Grandmother     Lupus Maternal Aunt         SOCIAL HISTORY       Social History     Socioeconomic History    Marital status:      Spouse name: Rita Thao    Number of children: 1    Years of education: 15    Highest education level: High school graduate   Occupational History    None   Tobacco Use    Smoking status: Current Every Day Smoker     Packs/day: 1.00     Years: 4.00     Pack years: 4.00     Types: Cigarettes     Start date: 6/30/2018    Smokeless tobacco: Never Used   Vaping Use    Vaping Use: Former   Substance and Sexual Activity    Alcohol use: Not Currently     Alcohol/week: 1.0 standard drink     Types: 1 Cans of beer per week    Drug use: Yes     Types: Marijuana Caitlin Ing)     Comment: MEDICAL [address]  5/30/22    Sexual activity: Yes     Partners: Male   Other Topics Concern    None   Social History Narrative    None     Social Determinants of Health     Financial Resource Strain: Low Risk     Difficulty of Paying Living Expenses: Not hard at all   Food Insecurity: No Food Insecurity    Worried About Running Out of Food in the Last Year: Never true    [address] in the Last Year: Never true   Transportation Needs:     Lack of Transportation (Medical): Not on file    Lack of Transportation (Non-Medical): Not on file   Physical Activity:     Days of Exercise per Week: Not on file    Minutes of Exercise per Session: Not on file   Stress:     Feeling of Stress : Not on file   Social Connections:     Frequency of Communication with Friends and Family: Not on file    Frequency of Social Gatherings with Friends and Family: Not on file    Attends Bahai Services: Not on file    Active Member of [address] [place] [address] or Organizations: Not on file    Attends Club or Organization Meetings: Not on file    Marital Status: Not on file   Intimate Partner Violence:     Fear of Current or Ex-Partner: Not on file    Emotionally Abused: Not on file    Physically Abused: Not on file    Sexually Abused: Not on file   Housing Stability:     Unable to Pay for Housing in the Last Year: Not on file    Number of [place] in the Last Year: Not on file    Unstable Housing in the Last Year: Not on file       REVIEW OF SYSTEMS     Review of Systems   Constitutional: Negative for appetite change, chills, fatigue, fever and unexpected weight change. HENT: Negative for ear pain and rhinorrhea. Eyes: Negative for pain and visual disturbance.    Respiratory: Negative for cough, shortness of breath and wheezing. Cardiovascular: Negative for chest pain, palpitations and leg swelling. Gastrointestinal: Negative for abdominal pain, blood in stool, constipation, diarrhea, nausea and vomiting. Genitourinary: Negative for dysuria, frequency and hematuria. Musculoskeletal: Positive for arthralgias (left shoulder ). Negative for joint swelling, neck pain and neck stiffness. Skin: Negative for rash. Neurological: Positive for numbness. Negative for dizziness, syncope, weakness, light-headedness and headaches. Hematological: Does not bruise/bleed easily. Except as noted above the remainder of the review of systems was reviewed and is negative. SCREENINGS   NIH Stroke Scale  Interval: Baseline  Level of Consciousness (1a): Alert  LOC Questions (1b): Answers both correctly  LOC Commands (1c): Performs both tasks correctly  Best Gaze (2): Normal  Visual (3): No visual loss  Facial Palsy (4): Normal symmetrical movement  Motor Arm, Left (5a): No drift  Motor Arm, Right (5b): No drift  Motor Leg, Left (6a): No drift  Motor Leg, Right (6b): No drift  Limb Ataxia (7): Absent  Sensory (8): Normal  Best Language (9): No aphasia  Dysarthria (10): Normal  Extinction and Inattention (11): No abnormality  Total: 0    [place] Coma Scale  Eye Opening: Spontaneous  Best Verbal Response: Oriented  Best Motor Response: Obeys commands  [place] Coma Scale Score: 15               PHYSICAL EXAM    (up to 7 for level 4, 8 or more for level 5)     ED Triage Vitals [02/19/22 1512]   BP Temp Temp Source Pulse Resp SpO2 Height Weight   (!) 134/95 98.2 °F (36.8 °C) Oral 124 16 100 % -- --       Physical Exam  Vitals and nursing note reviewed. Constitutional:       Appearance: She is well-developed. HENT:      Head: Normocephalic and atraumatic. Eyes:      General: No visual field deficit.      Conjunctiva/sclera: Conjunctivae normal.      Pupils: Pupils are equal, round, and reactive to 99   Resp: 18   Temp: 98.2 °F (36.8 °C)   TempSrc: Oral   Weight: (!) 320 lb (145.2 kg)   Height: 5' 10\" (1.778 m)       PROCEDURES: (None if blank)  Procedures       MDM  Number of Diagnoses or Management Options     Amount and/or Complexity of Data Reviewed  Clinical lab tests: ordered and reviewed  Tests in the radiology section of CPT®: ordered and reviewed  Review and summarize past medical records: yes  Discuss the patient with other providers: yes  Independent visualization of images, tracings, or specimens: yes    Risk of Complications, Morbidity, and/or Mortality  Presenting problems: low  Diagnostic procedures: moderate  Management options: low      Patient that presents to ER with complaint of left arm pain and intermittent numbness and tingling. Differential diagnosis includes but not limited to shoulder strain, cervical radiculopathy, cervical strain, NSTEMI, medication side effects or less likely stroke. NIH score is 0. Patient has no neurodeficits at all. Patient's current complaint is left-sided shoulder pain. Care turned over to C Counts FNP-C at 6 AM.      Strict return precautions and follow up instructions were discussed with the patient with which the patient agrees    ED Medications administered this visit:    Medications   tiZANidine (ZANAFLEX) tablet 4 mg (has no administration in time range)   ketorolac (TORADOL) injection 30 mg (has no administration in time range)         FINAL IMPRESSION    No diagnosis found. Care turned over to C Counts FNP-C  DISPOSITION/PLAN   DISPOSITION       Care turned over to C Counts FNP-C  PATIENT REFERRED TO:  No follow-up provider specified.     DISCHARGE MEDICATIONS:  New Prescriptions    No medications on file              LEVI Mckeon CNP (electronically signed)           LEVI Mckeon CNP  06/26/22 9924

## 2022-06-26 PROCEDURE — 99284 EMERGENCY DEPT VISIT MOD MDM: CPT

## 2022-06-26 PROCEDURE — 96374 THER/PROPH/DIAG INJ IV PUSH: CPT

## 2022-06-27 ENCOUNTER — HOSPITAL ENCOUNTER (EMERGENCY)
Age: 27
Discharge: HOME | End: 2022-06-27
Payer: MEDICARE

## 2022-06-27 VITALS
SYSTOLIC BLOOD PRESSURE: 108 MMHG | DIASTOLIC BLOOD PRESSURE: 62 MMHG | TEMPERATURE: 98.3 F | OXYGEN SATURATION: 100 % | WEIGHT: 293 LBS | HEIGHT: 70 IN | RESPIRATION RATE: 18 BRPM | HEART RATE: 98 BPM | BODY MASS INDEX: 41.95 KG/M2

## 2022-06-27 DIAGNOSIS — R11.2 NON-INTRACTABLE VOMITING WITH NAUSEA, UNSPECIFIED VOMITING TYPE: Primary | ICD-10-CM

## 2022-06-27 DIAGNOSIS — R51.9 NONINTRACTABLE HEADACHE, UNSPECIFIED CHRONICITY PATTERN, UNSPECIFIED HEADACHE TYPE: ICD-10-CM

## 2022-06-27 LAB
ALBUMIN SERPL-MCNC: 4.8 G/DL (ref 3.5–5.1)
ALP BLD-CCNC: 126 U/L (ref 38–126)
ALT SERPL-CCNC: 39 U/L (ref 11–66)
ANION GAP SERPL CALCULATED.3IONS-SCNC: 17 MEQ/L (ref 8–16)
AST SERPL-CCNC: 28 U/L (ref 5–40)
BASOPHILS # BLD: 0.6 %
BASOPHILS ABSOLUTE: 0.1 THOU/MM3 (ref 0–0.1)
BILIRUB SERPL-MCNC: 0.2 MG/DL (ref 0.3–1.2)
BILIRUBIN DIRECT: < 0.2 MG/DL (ref 0–0.3)
BUN BLDV-MCNC: 12 MG/DL (ref 7–22)
CALCIUM SERPL-MCNC: 9.9 MG/DL (ref 8.5–10.5)
CARBON MONOXIDE, BLOOD: 8.7 % CO SAT
CHLORIDE BLD-SCNC: 98 MEQ/L (ref 98–111)
CO2: 25 MEQ/L (ref 23–33)
CREAT SERPL-MCNC: 0.7 MG/DL (ref 0.4–1.2)
EOSINOPHIL # BLD: 0.1 %
EOSINOPHILS ABSOLUTE: 0 THOU/MM3 (ref 0–0.4)
ERYTHROCYTE [DISTWIDTH] IN BLOOD BY AUTOMATED COUNT: 14.4 % (ref 11.5–14.5)
ERYTHROCYTE [DISTWIDTH] IN BLOOD BY AUTOMATED COUNT: 48.3 FL (ref 35–45)
GFR SERPL CREATININE-BSD FRML MDRD: > 90 ML/MIN/1.73M2
GLUCOSE BLD-MCNC: 108 MG/DL (ref 70–108)
HCT VFR BLD CALC: 42 % (ref 37–47)
HEMOGLOBIN: 13.8 GM/DL (ref 12–16)
IMMATURE GRANS (ABS): 0.06 THOU/MM3 (ref 0–0.07)
IMMATURE GRANULOCYTES: 0.5 %
LIPASE: 26.7 U/L (ref 5.6–51.3)
LYMPHOCYTES # BLD: 31 %
LYMPHOCYTES ABSOLUTE: 3.7 THOU/MM3 (ref 1–4.8)
MCH RBC QN AUTO: 30.3 PG (ref 26–33)
MCHC RBC AUTO-ENTMCNC: 32.9 GM/DL (ref 32.2–35.5)
MCV RBC AUTO: 92.3 FL (ref 81–99)
MONOCYTES # BLD: 5.6 %
MONOCYTES ABSOLUTE: 0.7 THOU/MM3 (ref 0.4–1.3)
NUCLEATED RED BLOOD CELLS: 0 /100 WBC
OSMOLALITY CALCULATION: 279.7 MOSMOL/KG (ref 275–300)
PLATELET # BLD: 262 THOU/MM3 (ref 130–400)
PMV BLD AUTO: 10.1 FL (ref 9.4–12.4)
POTASSIUM REFLEX MAGNESIUM: 3.7 MEQ/L (ref 3.5–5.2)
PRO-BNP: 21.8 PG/ML (ref 0–450)
RBC # BLD: 4.55 MILL/MM3 (ref 4.2–5.4)
SEG NEUTROPHILS: 62.2 %
SEGMENTED NEUTROPHILS ABSOLUTE COUNT: 7.4 THOU/MM3 (ref 1.8–7.7)
SODIUM BLD-SCNC: 140 MEQ/L (ref 135–145)
TOTAL PROTEIN: 7.7 G/DL (ref 6.1–8)
TROPONIN T: < 0.01 NG/ML
WBC # BLD: 11.9 THOU/MM3 (ref 4.8–10.8)

## 2022-06-27 PROCEDURE — 80048 BASIC METABOLIC PNL TOTAL CA: CPT

## 2022-06-27 PROCEDURE — 6360000002 HC RX W HCPCS: Performed by: NURSE PRACTITIONER

## 2022-06-27 PROCEDURE — 80076 HEPATIC FUNCTION PANEL: CPT

## 2022-06-27 PROCEDURE — 6370000000 HC RX 637 (ALT 250 FOR IP): Performed by: NURSE PRACTITIONER

## 2022-06-27 PROCEDURE — 83880 ASSAY OF NATRIURETIC PEPTIDE: CPT

## 2022-06-27 PROCEDURE — 82375 ASSAY CARBOXYHB QUANT: CPT

## 2022-06-27 PROCEDURE — 84484 ASSAY OF TROPONIN QUANT: CPT

## 2022-06-27 PROCEDURE — 85025 COMPLETE CBC W/AUTO DIFF WBC: CPT

## 2022-06-27 PROCEDURE — 83690 ASSAY OF LIPASE: CPT

## 2022-06-27 RX ORDER — KETOROLAC TROMETHAMINE 30 MG/ML
30 INJECTION, SOLUTION INTRAMUSCULAR; INTRAVENOUS ONCE
Status: COMPLETED | OUTPATIENT
Start: 2022-06-27 | End: 2022-06-27

## 2022-06-27 RX ORDER — ONDANSETRON 4 MG/1
4 TABLET, ORALLY DISINTEGRATING ORAL ONCE
Status: COMPLETED | OUTPATIENT
Start: 2022-06-27 | End: 2022-06-27

## 2022-06-27 RX ORDER — ONDANSETRON 4 MG/1
4 TABLET, ORALLY DISINTEGRATING ORAL EVERY 8 HOURS PRN
Qty: 20 TABLET | Refills: 0 | Status: SHIPPED | OUTPATIENT
Start: 2022-06-27 | End: 2022-11-03

## 2022-06-27 RX ADMIN — KETOROLAC TROMETHAMINE 30 MG: 30 INJECTION, SOLUTION INTRAMUSCULAR; INTRAVENOUS at 01:33

## 2022-06-27 RX ADMIN — ONDANSETRON 4 MG: 4 TABLET, ORALLY DISINTEGRATING ORAL at 00:32

## 2022-06-27 ASSESSMENT — ENCOUNTER SYMPTOMS
ABDOMINAL PAIN: 0
NAUSEA: 1
CONSTIPATION: 0
COUGH: 0
WHEEZING: 0
EYE PAIN: 0
BLOOD IN STOOL: 0
VOMITING: 1
SHORTNESS OF BREATH: 0
DIARRHEA: 0
RHINORRHEA: 0

## 2022-06-27 ASSESSMENT — PAIN DESCRIPTION - FREQUENCY: FREQUENCY: CONTINUOUS

## 2022-06-27 ASSESSMENT — PAIN DESCRIPTION - LOCATION: LOCATION: HEAD

## 2022-06-27 ASSESSMENT — PAIN - FUNCTIONAL ASSESSMENT: PAIN_FUNCTIONAL_ASSESSMENT: 0-10

## 2022-06-27 ASSESSMENT — PAIN SCALES - GENERAL: PAINLEVEL_OUTOF10: 6

## 2022-06-27 NOTE — ED PROVIDER NOTES
[address] [facility] [address] [address] EMERGENCY DEPT  EMERGENCY MEDICINE     Pt Name: Radha George  MRN: [id number]  Birthdate 1995  Date of evaluation: 6/26/2022  PCP:    LEVI Purvis CNP  Provider: LEVI Calderon CNP    CHIEF COMPLAINT       Chief Complaint   Patient presents with   [facility] Nausea    Emesis    Headache           HISTORY OF PRESENT ILLNESS    Radha George is a 32 y.o. female patient that presents to ER with complaint of nausea, headache and numbness in her lips following inhalation of fumes from a gas stove that was on without the  lit. She states this started about an hour to an hour and a half ago and she \"just does not feel good\" since then. Patient denies chest pain or shortness of breath. Patient does state that she thinks that the oven was on for a couple hours prior to her going out to turn it off. She states that when she went out to turn it off she did inhale quite a bit of the fumes. Triage notes and Nursing notes were reviewed by myself. Any discrepancies are addressed above.     PAST MEDICAL HISTORY     Past Medical History:   Diagnosis Date    ADD (attention deficit disorder)     Anxiety 04/08/2015    Anxiety attack     Asthma     exercise induced    Atypical face pain     Back pain     Bipolar 1 disorder ([address]     Diabetes mellitus ([address]     GDM on insulin started on 7/23-during pregnancy    Fibromyalgia     GERD (gastroesophageal reflux disease)     Hx of blood clots     Hypotension 11/02/2017    Major depressive disorder, recurrent episode, mild ([address] 07/31/2012    Obesity 10/24/2014    WILFREDO treated with BiPAP     Pneumonia 02/21/2018    POTS (postural orthostatic tachycardia syndrome)     POTS (postural orthostatic tachycardia syndrome)     Pott disease     Pulmonary emboli ([address] 2016    was on blood thinners for 6 months    Seizures ([address] 07/31/2016    anxiety induced no meds last seizure 4 years ago    Tailbone injury 11/28/2015    patient broke tailbone    Thyroid disease     borderline low no meds    TMJ (dislocation of temporomandibular joint)     Trigeminal neuralgia     Wears contact lenses        SURGICAL HISTORY       Past Surgical History:   Procedure Laterality Date    [facility]    ANKLE FRACTURE SURGERY Right 2020    RIGHT ANKLE OPEN REDUCTION INTERNAL FIXATION AND DELTOID LIGAMENT REPAIR performed by Dann Villarreal DPM at [address] [facility] [place]  2016    EP Study    [facility]     SECTION N/A 2020     SECTION performed by Domitila Roche MD at [address] [facility] [address] N/A 2021    CHOLECYSTECTOMY LAPAROSCOPIC ROBOTIC performed by Armani Aguiar MD at [address]  2015    Lam Blotter HYSTERECTOMY (CERVIX STATUS UNKNOWN) N/A 2022    Robotic Hysterectomy with Bilateral Salpingectomy performed by Domitila Roche MD at [address] [place] [address]  2020    Dr. Deepa Raphael  2015    [facility] [address] EXTRACTION  2010       CURRENT MEDICATIONS       Discharge Medication List as of 2022  1:41 AM      CONTINUE these medications which have NOT CHANGED    Details   methylPREDNISolone (MEDROL, SARBJIT,) 4 MG tablet Take by mouth., Disp-1 kit, R-0Normal      fenofibrate (TRIGLIDE) 160 MG tablet Take 1 tablet by mouth daily, Disp-90 tablet, R-1Normal      Dulaglutide (TRULICITY) 1.5 BQ/5.0HA SOPN Inject 1.5 mg into the skin once a week, Disp-2 mL, R-5Normal      omeprazole (PRILOSEC) 40 MG delayed release capsule Take 1 capsule by mouth daily PT TAKES ONCE AT HS, Disp-90 capsule, R-1Normal      DULoxetine (CYMBALTA) 30 MG extended release capsule Take 1 capsule by mouth dailyHistorical Med      clotrimazole-betamethasone (LOTRISONE) 1-0.05 % cream Apply topically 2 times daily, Topical, 2 TIMES DAILY Starting Tue 6/8/2021, Disp-45 g, R-0, Normal      !! sertraline (ZOLOFT) 25 MG tablet Take 25 mg by mouth dailyHistorical Med      busPIRone (BUSPAR) 15 MG tablet Take 45 mg by mouth nightly Historical Med      OXcarbazepine (TRILEPTAL) 600 MG tablet Take 600 mg by mouth daily Historical Med      !! QUEtiapine (SEROQUEL) 300 MG tablet Take 300 mg by mouth nightlyHistorical Med      !! QUEtiapine (SEROQUEL) 50 MG tablet Take 50 mg by mouth every morning (before breakfast)Historical Med      ARIPiprazole (ABILIFY) 15 MG tablet Take 20 mg by mouth nightly Historical Med      !! sertraline (ZOLOFT) 50 MG tablet Take 50 mg by mouth nightly Historical Med      cetirizine (ZYRTEC) 10 MG tablet Take 1 tablet by mouth daily, Disp-90 tablet, R-3Normal       !! - Potential duplicate medications found. Please discuss with provider. ALLERGIES       Allergies   Allergen Reactions    Dilaudid [Hydromorphone Hcl]      hallucination    Flexeril [Cyclobenzaprine] Other (See Comments)     Hallucinations    Keflex [Cephalexin] Other (See Comments)     Lose cognitive functions.      Tessalon [Benzonatate] Other (See Comments)     psychosis    Augmentin [Amoxicillin-Pot Clavulanate] Rash    Lorabid [Loracarbef] Hives, Swelling and Rash    Minocycline Itching, Swelling and Rash       FAMILY HISTORY       Family History   Problem Relation Age of Onset    Anxiety Disorder Mother     Diabetes Mother     Anxiety Disorder Father     Bipolar Disorder Father     High Blood Pressure Father     Mental Illness Father     Parkinsonism Father         Early-Onset    Depression Paternal Aunt     Cancer Paternal Uncle         multiple myeloma    Depression Paternal Uncle     Cancer Maternal Grandmother         breast    Ovarian Cancer Maternal Grandmother     Diabetes Maternal Grandmother     Depression Maternal Grandfather     Heart Attack Maternal Grandfather     Cancer Paternal Grandfather         lung    No Known Emotionally Abused: Not on file    Physically Abused: Not on file    Sexually Abused: Not on file   Housing Stability:     Unable to Pay for Housing in the Last Year: Not on file    Number of Places Lived in the Last Year: Not on file    Unstable Housing in the Last Year: Not on file       REVIEW OF SYSTEMS     Review of Systems   Constitutional: Negative for appetite change, chills, fatigue, fever and unexpected weight change. HENT: Negative for ear pain and rhinorrhea. Eyes: Negative for pain and visual disturbance. Respiratory: Negative for cough, shortness of breath and wheezing. Cardiovascular: Negative for chest pain, palpitations and leg swelling. Gastrointestinal: Positive for nausea and vomiting. Negative for abdominal pain, blood in stool, constipation and diarrhea. Genitourinary: Negative for dysuria, frequency and hematuria. Musculoskeletal: Negative for arthralgias, joint swelling and neck stiffness. Skin: Negative for rash. Neurological: Positive for headaches. Negative for dizziness, syncope, weakness and light-headedness. Hematological: Does not bruise/bleed easily. Except as noted above the remainder of the review of systems was reviewed and is negative. SCREENINGS        [place] Coma Scale  Eye Opening: Spontaneous  Best Verbal Response: Oriented  Best Motor Response: Obeys commands  [place] Coma Scale Score: 15               PHYSICAL EXAM    (up to 7 for level 4, 8 or more for level 5)     ED Triage Vitals [02/19/22 1512]   BP Temp Temp Source Pulse Resp SpO2 Height Weight   (!) 134/95 98.2 °F (36.8 °C) Oral 124 16 100 % -- --       Physical Exam  Vitals and nursing note reviewed. Constitutional:       Appearance: She is well-developed. HENT:      Head: Normocephalic and atraumatic. Eyes:      Conjunctiva/sclera: Conjunctivae normal.      Pupils: Pupils are equal, round, and reactive to light.    Cardiovascular:      Rate and Rhythm: Normal rate and regular rhythm. Heart sounds: Normal heart sounds. No murmur heard. No gallop. Pulmonary:      Effort: Pulmonary effort is normal. No respiratory distress. Breath sounds: Normal breath sounds. No wheezing or rales. Abdominal:      General: Bowel sounds are normal. There is no distension. Palpations: Abdomen is soft. There is no mass. Tenderness: There is no abdominal tenderness. There is no right CVA tenderness, left CVA tenderness, guarding or rebound. Hernia: No hernia is present. Musculoskeletal:         General: Normal range of motion. Cervical back: Normal range of motion. Skin:     General: Skin is warm and dry. Neurological:      Mental Status: She is alert and oriented to person, place, and time. DIAGNOSTIC RESULTS     EKG:(none if blank)  All EKGs are interpreted by the Emergency Department Physician who either signs or Co-signs this chart in the absence of a cardiologist.        RADIOLOGY: (none if blank)   I directly visualized the following images and reviewed the radiologist interpretations. Interpretation per the Radiologist below, if available at the time of this note:  No orders to display       LABS:  Labs Reviewed   CBC WITH AUTO DIFFERENTIAL - Abnormal; Notable for the following components:       Result Value    WBC 11.9 (*)     RDW-SD 48.3 (*)     All other components within normal limits   HEPATIC FUNCTION PANEL - Abnormal; Notable for the following components: Total Bilirubin 0.2 (*)     All other components within normal limits   ANION GAP - Abnormal; Notable for the following components:    Anion Gap 17.0 (*)     All other components within normal limits   BASIC METABOLIC PANEL W/ REFLEX TO MG FOR LOW K   TROPONIN   LIPASE   BRAIN NATRIURETIC PEPTIDE   CARBOXYHEMOGLOBIN   GLOMERULAR FILTRATION RATE, ESTIMATED   OSMOLALITY       All other labs were within normal range or not returned as of this dictation.   Please note, any cultures that may have been sent were not resulted at the time of this patient visit. EMERGENCY DEPARTMENT COURSE and Medical Decision Making:     Vitals:    Vitals:    06/27/22 0100 06/27/22 0115 06/27/22 0130 06/27/22 0131   BP: 83/63 101/87 108/62    Pulse:    98   Resp:       Temp:       TempSrc:       SpO2: 100% 100% 100%    Weight:       Height:           PROCEDURES: (None if blank)  Procedures    ED Course as of 06/27/22 0605   Mon Jun 27, 2022 0137 Reviewed case with Dr Peter Brady. OK for discharge. [NW]      ED Course User Index  [NW] Brit Agarwal APRN - CNP     MDM  Number of Diagnoses or Management Options     Amount and/or Complexity of Data Reviewed  Clinical lab tests: ordered and reviewed    Risk of Complications, Morbidity, and/or Mortality  Presenting problems: low  Diagnostic procedures: moderate  Management options: low      Patient that presents to ER with complaint of nausea, headache and numbness in her lips following inhalation of fumes from a gas stove that was on without the  lit. Differential diagnosis includes but not limited to carbon monoxide poisoning, electrolyte abnormality, dehydration or gastroenteritis. Patient is placed on 2 L nasal cannula while we wait for her carboxyhemoglobin level. Labs are reassuring. Patient will be discharged home. Patient instructed to return to ER for worsening symptoms, inability to keep liquids down, inability to urinate for greater than 8 hours or difficulty breathing. Follow-up with your primary care provider. Open your windows to air out home. Have someone light  light and call fire department if needed for evaluation.     Strict return precautions and follow up instructions were discussed with the patient with which the patient agrees    ED Medications administered this visit:    Medications   ondansetron (ZOFRAN-ODT) disintegrating tablet 4 mg (4 mg Oral Given 6/27/22 0032)   ketorolac (TORADOL) injection 30 mg (30 mg IntraVENous Given 6/27/22 0133)         FINAL IMPRESSION      1. Non-intractable vomiting with nausea, unspecified vomiting type    2.  Nonintractable headache, unspecified chronicity pattern, unspecified headache type          DISPOSITION/PLAN   DISPOSITION Decision To Discharge 06/27/2022 01:50:26 AM      PATIENT REFERRED TO:  LEVI Mcleod CNP  [address]  [address]  [phone]    In 2 days        DISCHARGE MEDICATIONS:  Discharge Medication List as of 6/27/2022  1:41 AM      START taking these medications    Details   ondansetron (ZOFRAN ODT) 4 MG disintegrating tablet Take 1 tablet by mouth every 8 hours as needed for Nausea, Disp-20 tablet, R-0Normal                    LEVI Nguyen CNP (electronically signed)           LEVI Nguyen CNP  06/27/22 9967

## 2022-06-27 NOTE — ED TRIAGE NOTES
Pt presents to ED for evaluation of N/V, headache. Pt does also state her lips current feel numb. . Per pt onset around 1hr ago, pt was at home when she noticed the gas was turned onto her oven, but had not been ignited. Pt believes this was going on for around 1hr. Pt rates current pain 7/10. Pt denies CP or SOB at thsi time. Vitals obtained.

## 2022-06-27 NOTE — Clinical Note
Rosaline Petty was seen and treated in our emergency department on 6/26/2022. She may return to work on 06/28/2022. If you have any questions or concerns, please don't hesitate to call.       Soy Gardner, APRN - CNP

## 2022-07-12 ENCOUNTER — TELEMEDICINE (OUTPATIENT)
Dept: PSYCHOLOGY | Age: 27
End: 2022-07-12

## 2022-07-12 DIAGNOSIS — F31.32 BIPOLAR 1 DISORDER, DEPRESSED, MODERATE (HCC): Primary | ICD-10-CM

## 2022-07-12 NOTE — PROGRESS NOTES
Behavioral Health Consultation  Sourav Unger, PhD  Psychologist  7/12/2022       Time spent with Patient:  30 minutes  This is patient's fifth  [facility] appointment. Reason for Consult:  depression, anxiety, stress and agitation  Referring Provider: No referring provider defined for this encounter. Pt provided informed consent for the behavioral health program. Discussed with patient model of service to include the limits of confidentiality (i.e. abuse reporting, suicide intervention, etc.) and short-term intervention focused approach. Pt indicated understanding. Feedback given to PCP.     Description:    MSE:    Appearance    cooperative  Appetite normal  Sleep disturbance Yes  Loss of pleasure No  Speech    spontaneous, normal rate, normal volume and well articulated  Mood    Irritability  Affect    depressed affect  Thought Content    intact  Insight    Fair  Judgment    Intact  Suicide Assessment    no suicidal ideation  Homicidal Assessment Intent No  Cutting No  Enuresis No  Learning Disorder past ADHD dx      History:    Medications:   Current Outpatient Medications   Medication Sig Dispense Refill    ondansetron (ZOFRAN ODT) 4 MG disintegrating tablet Take 1 tablet by mouth every 8 hours as needed for Nausea 20 tablet 0    fenofibrate (TRIGLIDE) 160 MG tablet Take 1 tablet by mouth daily 90 tablet 1    Dulaglutide (TRULICITY) 1.5 TI/8.3VL SOPN Inject 1.5 mg into the skin once a week 2 mL 5    omeprazole (PRILOSEC) 40 MG delayed release capsule Take 1 capsule by mouth daily PT TAKES ONCE AT HS 90 capsule 1    DULoxetine (CYMBALTA) 30 MG extended release capsule Take 1 capsule by mouth daily      clotrimazole-betamethasone (LOTRISONE) 1-0.05 % cream Apply topically 2 times daily 45 g 0    sertraline (ZOLOFT) 25 MG tablet Take 25 mg by mouth daily      busPIRone (BUSPAR) 15 MG tablet Take 45 mg by mouth nightly       OXcarbazepine (TRILEPTAL) 600 MG tablet Take 600 mg by mouth daily       on file    Frequency of Social Gatherings with Friends and Family: Not on file    Attends Amish Services: Not on file    Active Member of Clubs or Organizations: Not on file    Attends Club or Organization Meetings: Not on file    Marital Status: Not on file   Intimate Partner Violence:     Fear of Current or Ex-Partner: Not on file    Emotionally Abused: Not on file    Physically Abused: Not on file    Sexually Abused: Not on file   Housing Stability:     Unable to Pay for Housing in the Last Year: Not on file    Number of Quintin in the Last Year: Not on file    Unstable Housing in the Last Year: Not on file       TOBACCO:   reports that she has been smoking cigarettes. She started smoking about 4 years ago. She has a 4.00 pack-year smoking history. She has never used smokeless tobacco.  ETOH:   reports previous alcohol use of about 1.0 standard drink of alcohol per week.     Family History:   Family History   Problem Relation Age of Onset    Anxiety Disorder Mother     Diabetes Mother     Anxiety Disorder Father     Bipolar Disorder Father     High Blood Pressure Father     Mental Illness Father     Parkinsonism Father         Early-Onset    Depression Paternal Aunt     Cancer Paternal Uncle         multiple myeloma    Depression Paternal Uncle     Cancer Maternal Grandmother         breast    Ovarian Cancer Maternal Grandmother     Diabetes Maternal Grandmother     Depression Maternal Grandfather     Heart Attack Maternal Grandfather     Cancer Paternal Grandfather         lung    No Known Problems Brother         Gaviria disease    No Known Problems Paternal Grandmother     Lupus Maternal Aunt            Assessment:  Patient was seen for bipolar disorder; since last session, she has had a lot of irritability; her medication was adjusted but she also just had a hysterectomy-partial; she discussed how she has lost some weight and she felt better since hysterectomy but realized that it takes 4 to 6 weeks before recovery is complete; her  is working and she is feeling better about how things are going at home; she is enjoying her daughter and 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 The patient (or guardian if applicable) is aware that this is a billable service, which includes applicable co-pays. This Virtual Visit was conducted with patient's (and/or legal guardian's) consent. The visit was conducted pursuant to the emergency declaration under the [address] [place] [address] and the [employer] and [employer] Act. Patient identification was verified, and a caregiver was present when appropriate. The patient was located at home. Provider was located at [address] [place] [address]. .     Diagnosis:      ICD-10-CM    1. Bipolar 1 disorder, depressed, moderate ([facility]  F31.32             Plan:  Pt interventions:  She is encouraged to continue to focus on other factors that is contributing to her irritability such as a surgery and recovery and not think this is all related to her bipolar disorder; session will continue to focus on grounding methods and helping her focus on the positives in her life instead of the negatives.

## 2022-07-26 ENCOUNTER — PATIENT MESSAGE (OUTPATIENT)
Dept: FAMILY MEDICINE CLINIC | Age: 27
End: 2022-07-26

## 2022-07-26 DIAGNOSIS — E11.9 TYPE 2 DIABETES MELLITUS WITHOUT COMPLICATION, WITHOUT LONG-TERM CURRENT USE OF INSULIN (HCC): Primary | ICD-10-CM

## 2022-08-05 NOTE — TELEPHONE ENCOUNTER
From: Ritika Walker  To: Oswaldo Garsia  Sent: 7/26/2022 1:20 PM EDT  Subject: EDS Referral    Got a message from the EDS Specialist at [facility] that said there is a 1.5 year wait to be seen by the provider that does the screening. They did give me possible genetic testing options we can do that if positive we can get you in sooner but they are not covered by insurance.

## 2022-08-09 RX ORDER — PIOGLITAZONEHYDROCHLORIDE 30 MG/1
30 TABLET ORAL DAILY
Qty: 90 TABLET | Refills: 3 | Status: SHIPPED | OUTPATIENT
Start: 2022-08-09

## 2022-08-23 DIAGNOSIS — K21.9 GASTROESOPHAGEAL REFLUX DISEASE WITHOUT ESOPHAGITIS: ICD-10-CM

## 2022-08-23 RX ORDER — OMEPRAZOLE 40 MG/1
40 CAPSULE, DELAYED RELEASE ORAL DAILY
Qty: 90 CAPSULE | Refills: 1 | Status: SHIPPED | OUTPATIENT
Start: 2022-08-23

## 2022-09-13 ENCOUNTER — TELEMEDICINE (OUTPATIENT)
Dept: PSYCHOLOGY | Age: 27
End: 2022-09-13
Payer: MEDICARE

## 2022-09-13 DIAGNOSIS — F31.32 BIPOLAR 1 DISORDER, DEPRESSED, MODERATE (HCC): Primary | ICD-10-CM

## 2022-09-13 PROCEDURE — 90832 PSYTX W PT 30 MINUTES: CPT | Performed by: PSYCHOLOGIST

## 2022-09-13 NOTE — PROGRESS NOTES
Behavioral Health Consultation  Spike Mauricio, PhD  Psychologist  9/13/2022       Time spent with Patient:  30 minutes  This is patient's sixth  [facility] appointment. Reason for Consult:  depression and stress  Referring Provider: No referring provider defined for this encounter. Pt provided informed consent for the behavioral health program. Discussed with patient model of service to include the limits of confidentiality (i.e. abuse reporting, suicide intervention, etc.) and short-term intervention focused approach. Pt indicated understanding. Feedback given to PCP. Description:    MSE:    Appearance    crying  Appetite abnormal: gaining weight  Sleep disturbance No  Loss of pleasure Yes  Speech    pressured  Mood    Anxious  Depressed  Affect    normal affect  Thought Content    hopelessness and helplessness  Insight    Poor  Judgment    Intact  Suicide Assessment    no suicidal ideation  Homicidal Assessment Intent No  Cutting No  Enuresis No  Learning Disorder past Hx ADHD      History:    Medications:   Current Outpatient Medications   Medication Sig Dispense Refill    omeprazole (PRILOSEC) 40 MG delayed release capsule Take 1 capsule by mouth daily PT TAKES ONCE AT HS 90 capsule 1    pioglitazone (ACTOS) 30 MG tablet Take 1 tablet by mouth in the morning.  90 tablet 3    ondansetron (ZOFRAN ODT) 4 MG disintegrating tablet Take 1 tablet by mouth every 8 hours as needed for Nausea 20 tablet 0    fenofibrate (TRIGLIDE) 160 MG tablet Take 1 tablet by mouth daily 90 tablet 1    Dulaglutide (TRULICITY) 1.5 AC/3.6JE SOPN Inject 1.5 mg into the skin once a week 2 mL 5    DULoxetine (CYMBALTA) 30 MG extended release capsule Take 1 capsule by mouth daily      clotrimazole-betamethasone (LOTRISONE) 1-0.05 % cream Apply topically 2 times daily 45 g 0    sertraline (ZOLOFT) 25 MG tablet Take 25 mg by mouth daily      busPIRone (BUSPAR) 15 MG tablet Take 45 mg by mouth nightly       OXcarbazepine (TRILEPTAL) 600 MG tablet Take 600 mg by mouth daily       QUEtiapine (SEROQUEL) 300 MG tablet Take 300 mg by mouth nightly      QUEtiapine (SEROQUEL) 50 MG tablet Take 50 mg by mouth every morning (before breakfast)      ARIPiprazole (ABILIFY) 15 MG tablet Take 20 mg by mouth nightly       sertraline (ZOLOFT) 50 MG tablet Take 50 mg by mouth nightly       cetirizine (ZYRTEC) 10 MG tablet Take 1 tablet by mouth daily 90 tablet 3     No current facility-administered medications for this visit. Social History:   Social History     Socioeconomic History    Marital status:      Spouse name: Sharmila Sanchez    Number of children: 1    Years of education: 12    Highest education level: High school graduate   Occupational History    Not on file   Tobacco Use    Smoking status: Every Day     Packs/day: 1.00     Years: 4.00     Pack years: 4.00     Types: Cigarettes     Start date: 6/30/2018    Smokeless tobacco: Never   Vaping Use    Vaping Use: Former   Substance and Sexual Activity    Alcohol use: Not Currently     Alcohol/week: 1.0 standard drink     Types: 1 Cans of beer per week    Drug use: Yes     Types: Marijuana Lucianne Bars)     Comment: MEDICAL [facility]  5/30/22    Sexual activity: Yes     Partners: Male   Other Topics Concern    Not on file   Social History Narrative    Not on file     Social Determinants of Health     Financial Resource Strain: Low Risk     Difficulty of Paying Living Expenses: Not hard at all   Food Insecurity: No Food Insecurity    Worried About Running Out of Food in the Last Year: Never true    Ran Out of Food in the Last Year: Never true   Transportation Needs: Not on file   Physical Activity: Not on file   Stress: Not on file   Social Connections: Not on file   Intimate Partner Violence: Not on file   Housing Stability: Not on file       TOBACCO:   reports that she has been smoking cigarettes. She started smoking about 4 years ago. She has a 4.00 pack-year smoking history.  She has never used smokeless tobacco.  ETOH:   reports that she does not currently use alcohol after a past usage of about 1.0 standard drink per week. Family History:   Family History   Problem Relation Age of Onset    Anxiety Disorder Mother     Diabetes Mother     Anxiety Disorder Father     Bipolar Disorder Father     High Blood Pressure Father     Mental Illness Father     Parkinsonism Father         Early-Onset    Depression Paternal Aunt     Cancer Paternal Uncle         multiple myeloma    Depression Paternal Uncle     Cancer Maternal Grandmother         breast    Ovarian Cancer Maternal Grandmother     Diabetes Maternal Grandmother     Depression Maternal Grandfather     Heart Attack Maternal Grandfather     Cancer Paternal Grandfather         lung    No Known Problems Brother         Gaviria disease    No Known Problems Paternal Grandmother     Lupus Maternal Aunt            Assessment:  Patient was seen for bipolar disorder; she has had a rough time since last appointment; she has been feeling guilty, hypervigilant, at touchy and disoriented times and felt like she was disassociating; she feels overwhelmed; her Abilify was bumped up last time because of irritability but she still is feeling these symptoms; she feels guilty and then depressed; she was looking for the answers for what is going on; this therapist discussed with her these are symptoms of a needed med adjustment; she finally agreed at the end of the session to call her psychiatrist tomorrow and make an appointment; this therapist checked up on her and she did call the psychiatrist in the morning and at this therapist some encouraged to continue to work on her med management. The next session will focus more on what she can do at home to help relieve some of the stress. Lisa Schaefer, was evaluated through a synchronous (real-time) audio-video encounter. The patient (or guardian if applicable) is aware that this is a billable service, which includes applicable co-pays. This Virtual Visit was conducted with patient's (and/or legal guardian's) consent. The visit was conducted pursuant to the emergency declaration under the [facility], [facility] authority and the [employer] and [employer] Act. Patient identification was verified, and a caregiver was present when appropriate. The patient was located at Home: [address] [place] [address] Road [facility]. Provider was located at [facility] ([address] [place] [address]): [address] [place] [address],  [address]. Diagnosis:      ICD-10-CM    1. Bipolar 1 disorder, depressed, moderate ([facility]  F31.32                Plan:  Pt interventions: At this point, her symptoms are reflective of a needed med adjustment; spent a little while since her medication was adjusted; she has an appointment with her psychiatrist and will be meeting with him; this therapist will check in on her periodically to see how she is doing.

## 2022-11-01 ENCOUNTER — HOSPITAL ENCOUNTER (EMERGENCY)
Age: 27
Discharge: HOME | End: 2022-11-01
Attending: EMERGENCY MEDICINE
Payer: MEDICARE

## 2022-11-01 ENCOUNTER — APPOINTMENT (OUTPATIENT)
Dept: GENERAL RADIOLOGY | Age: 27
End: 2022-11-01
Payer: MEDICARE

## 2022-11-01 ENCOUNTER — TELEPHONE (OUTPATIENT)
Dept: FAMILY MEDICINE CLINIC | Age: 27
End: 2022-11-01

## 2022-11-01 VITALS
OXYGEN SATURATION: 97 % | TEMPERATURE: 98.2 F | SYSTOLIC BLOOD PRESSURE: 168 MMHG | HEIGHT: 70 IN | DIASTOLIC BLOOD PRESSURE: 102 MMHG | WEIGHT: 293 LBS | BODY MASS INDEX: 41.95 KG/M2 | HEART RATE: 97 BPM | RESPIRATION RATE: 23 BRPM

## 2022-11-01 DIAGNOSIS — R07.89 ATYPICAL CHEST PAIN: Primary | ICD-10-CM

## 2022-11-01 DIAGNOSIS — R11.2 NAUSEA AND VOMITING, UNSPECIFIED VOMITING TYPE: ICD-10-CM

## 2022-11-01 LAB
ANION GAP SERPL CALCULATED.3IONS-SCNC: 16 MEQ/L (ref 8–16)
BASOPHILS # BLD: 0.4 %
BASOPHILS ABSOLUTE: 0 THOU/MM3 (ref 0–0.1)
BUN BLDV-MCNC: 9 MG/DL (ref 7–22)
CALCIUM SERPL-MCNC: 9.3 MG/DL (ref 8.5–10.5)
CHLORIDE BLD-SCNC: 97 MEQ/L (ref 98–111)
CO2: 23 MEQ/L (ref 23–33)
CREAT SERPL-MCNC: 0.7 MG/DL (ref 0.4–1.2)
D-DIMER QUANTITATIVE: 304 NG/ML FEU (ref 0–500)
EOSINOPHIL # BLD: 0.1 %
EOSINOPHILS ABSOLUTE: 0 THOU/MM3 (ref 0–0.4)
ERYTHROCYTE [DISTWIDTH] IN BLOOD BY AUTOMATED COUNT: 14 % (ref 11.5–14.5)
ERYTHROCYTE [DISTWIDTH] IN BLOOD BY AUTOMATED COUNT: 46 FL (ref 35–45)
GFR SERPL CREATININE-BSD FRML MDRD: > 60 ML/MIN/1.73M2
GLUCOSE BLD-MCNC: 121 MG/DL (ref 70–108)
HCT VFR BLD CALC: 41.9 % (ref 37–47)
HEMOGLOBIN: 14.2 GM/DL (ref 12–16)
IMMATURE GRANS (ABS): 0.05 THOU/MM3 (ref 0–0.07)
IMMATURE GRANULOCYTES: 0.5 %
INFLUENZA A: NOT DETECTED
INFLUENZA B: NOT DETECTED
LIPASE: 49.9 U/L (ref 5.6–51.3)
LYMPHOCYTES # BLD: 33.1 %
LYMPHOCYTES ABSOLUTE: 3.5 THOU/MM3 (ref 1–4.8)
MCH RBC QN AUTO: 30.7 PG (ref 26–33)
MCHC RBC AUTO-ENTMCNC: 33.9 GM/DL (ref 32.2–35.5)
MCV RBC AUTO: 90.7 FL (ref 81–99)
MONOCYTES # BLD: 6.2 %
MONOCYTES ABSOLUTE: 0.7 THOU/MM3 (ref 0.4–1.3)
NUCLEATED RED BLOOD CELLS: 0 /100 WBC
OSMOLALITY CALCULATION: 271.9 MOSMOL/KG (ref 275–300)
PLATELET # BLD: 225 THOU/MM3 (ref 130–400)
PMV BLD AUTO: 10.1 FL (ref 9.4–12.4)
POTASSIUM SERPL-SCNC: 4 MEQ/L (ref 3.5–5.2)
PREGNANCY, SERUM: NEGATIVE
PRO-BNP: 13.4 PG/ML (ref 0–450)
PROCALCITONIN: 0.04 NG/ML (ref 0.01–0.09)
RBC # BLD: 4.62 MILL/MM3 (ref 4.2–5.4)
SARS-COV-2 RNA, RT PCR: NOT DETECTED
SEG NEUTROPHILS: 59.7 %
SEGMENTED NEUTROPHILS ABSOLUTE COUNT: 6.4 THOU/MM3 (ref 1.8–7.7)
SODIUM BLD-SCNC: 136 MEQ/L (ref 135–145)
TROPONIN T: < 0.01 NG/ML
TSH SERPL DL<=0.05 MIU/L-ACNC: 1.72 UIU/ML (ref 0.4–4.2)
WBC # BLD: 10.7 THOU/MM3 (ref 4.8–10.8)

## 2022-11-01 PROCEDURE — 85379 FIBRIN DEGRADATION QUANT: CPT

## 2022-11-01 PROCEDURE — 96374 THER/PROPH/DIAG INJ IV PUSH: CPT

## 2022-11-01 PROCEDURE — 84443 ASSAY THYROID STIM HORMONE: CPT

## 2022-11-01 PROCEDURE — 6370000000 HC RX 637 (ALT 250 FOR IP): Performed by: EMERGENCY MEDICINE

## 2022-11-01 PROCEDURE — 36415 COLL VENOUS BLD VENIPUNCTURE: CPT

## 2022-11-01 PROCEDURE — 6360000002 HC RX W HCPCS: Performed by: EMERGENCY MEDICINE

## 2022-11-01 PROCEDURE — 83880 ASSAY OF NATRIURETIC PEPTIDE: CPT

## 2022-11-01 PROCEDURE — 84703 CHORIONIC GONADOTROPIN ASSAY: CPT

## 2022-11-01 PROCEDURE — 99285 EMERGENCY DEPT VISIT HI MDM: CPT | Performed by: EMERGENCY MEDICINE

## 2022-11-01 PROCEDURE — 84145 PROCALCITONIN (PCT): CPT

## 2022-11-01 PROCEDURE — 85025 COMPLETE CBC W/AUTO DIFF WBC: CPT

## 2022-11-01 PROCEDURE — 84484 ASSAY OF TROPONIN QUANT: CPT

## 2022-11-01 PROCEDURE — 2580000003 HC RX 258: Performed by: EMERGENCY MEDICINE

## 2022-11-01 PROCEDURE — 93005 ELECTROCARDIOGRAM TRACING: CPT | Performed by: EMERGENCY MEDICINE

## 2022-11-01 PROCEDURE — 87636 SARSCOV2 & INF A&B AMP PRB: CPT

## 2022-11-01 PROCEDURE — 96361 HYDRATE IV INFUSION ADD-ON: CPT

## 2022-11-01 PROCEDURE — 83690 ASSAY OF LIPASE: CPT

## 2022-11-01 PROCEDURE — 96372 THER/PROPH/DIAG INJ SC/IM: CPT

## 2022-11-01 PROCEDURE — 80048 BASIC METABOLIC PNL TOTAL CA: CPT

## 2022-11-01 PROCEDURE — 71046 X-RAY EXAM CHEST 2 VIEWS: CPT

## 2022-11-01 RX ORDER — PROMETHAZINE HYDROCHLORIDE 25 MG/ML
6.25 INJECTION, SOLUTION INTRAMUSCULAR; INTRAVENOUS ONCE
Status: COMPLETED | OUTPATIENT
Start: 2022-11-01 | End: 2022-11-01

## 2022-11-01 RX ORDER — PROMETHAZINE HYDROCHLORIDE 25 MG/1
25 TABLET ORAL 3 TIMES DAILY PRN
Qty: 20 TABLET | Refills: 0 | Status: SHIPPED | OUTPATIENT
Start: 2022-11-01 | End: 2022-11-08

## 2022-11-01 RX ORDER — 0.9 % SODIUM CHLORIDE 0.9 %
1000 INTRAVENOUS SOLUTION INTRAVENOUS ONCE
Status: COMPLETED | OUTPATIENT
Start: 2022-11-01 | End: 2022-11-01

## 2022-11-01 RX ORDER — ONDANSETRON 2 MG/ML
4 INJECTION INTRAMUSCULAR; INTRAVENOUS ONCE
Status: COMPLETED | OUTPATIENT
Start: 2022-11-01 | End: 2022-11-01

## 2022-11-01 RX ADMIN — ONDANSETRON 4 MG: 2 INJECTION INTRAMUSCULAR; INTRAVENOUS at 17:52

## 2022-11-01 RX ADMIN — SODIUM CHLORIDE 1000 ML: 9 INJECTION, SOLUTION INTRAVENOUS at 17:50

## 2022-11-01 RX ADMIN — Medication: at 20:09

## 2022-11-01 RX ADMIN — PROMETHAZINE HYDROCHLORIDE 6.25 MG: 25 INJECTION INTRAMUSCULAR; INTRAVENOUS at 20:07

## 2022-11-01 ASSESSMENT — PAIN - FUNCTIONAL ASSESSMENT: PAIN_FUNCTIONAL_ASSESSMENT: 0-10

## 2022-11-01 ASSESSMENT — PAIN SCALES - GENERAL: PAINLEVEL_OUTOF10: 7

## 2022-11-01 ASSESSMENT — PAIN DESCRIPTION - LOCATION: LOCATION: CHEST

## 2022-11-01 NOTE — TELEPHONE ENCOUNTER
Since 10/31/22 hs patient is having chest pain left side with tightness, +nausea, +diarrhea. DENIES arm pain, vomiting or sweats. Advised patient she need to go to the ED for eval and treat of current symptoms. Patient agreed she will go to [facility] ED now.

## 2022-11-01 NOTE — ED TRIAGE NOTES
Presents to ED with c/o chest pain that started last night. States she got \"real hot\". Pain has been constant since. Alert and oriented. Respirations easy and unlabored.

## 2022-11-01 NOTE — ED NOTES
Upon first contact with patient this RN receives bedside shift report Foster Bonner RN.        Everardo Loredo RN  11/01/22 [id number]

## 2022-11-01 NOTE — ED NOTES
Patient resting in bed. Respirations easy and unlabored. No distress noted. Call light within reach.        Ghassan Maya RN  11/01/22 [id number]

## 2022-11-01 NOTE — ED PROVIDER NOTES
[address] ENCOUNTER      PATIENT NAME: Odin Briceño  MRN: [id number]  : 1995  GUZMAN: 2022  PROVIDER: Allyson Welsh MD      CHIEF COMPLAINT       Chief Complaint   Patient presents with    Chest Pain       Patient is seen and evaluated in a timely fashion. Nurses Notes are reviewed and I agree except as noted in the HPI. HISTORY OF PRESENT ILLNESS    Christopher Bueno is a 32 y.o. female who presents to Emergency Department with Chest Pain     Patient presents to ED for evaluation of headache, dizzy, chest pain, hot flashes, nausea, and diarrhea since yesterday. Chest pain is from left upper chest, sharp pain, rated at 7/10, radiating to left shoulder. Chest pain has been persistent. Chest pain is worse on deep breathing. No chest wall tenderness. Patient also feels she has frequent hot flashes. She is found tachycardic on arrival.  She has no abdominal pain, but states nausea and diarrhea. No leg swelling. Her past medical history remarkable for anxiety, POTS, diabetes, morbid obesity, fibromyalgia, and PE in  due to oral contraceptive. Patient currently is not taking Gizm[employer]. This HPI was provided by patient.     REVIEW OF SYSTEMS   Ten-point review of systems is negative except those documented in above HPI including constitutional, HEENT, respiratory, cardiovascular, gastrointestinal, genitourinary, musculoskeletal, skin, neurological, hematological and behavioral.      PAST MEDICAL HISTORY    has a past medical history of ADD (attention deficit disorder), Anxiety, Anxiety attack, Asthma, Atypical face pain, Back pain, Bipolar 1 disorder ([address] Diabetes mellitus ([address] Fibromyalgia, GERD (gastroesophageal reflux disease), Hx of blood clots, Hypotension, Major depressive disorder, recurrent episode, mild ([address] Obesity, WILFREDO treated with BiPAP, Pneumonia, POTS (postural orthostatic tachycardia syndrome), POTS (postural orthostatic tachycardia syndrome), Pott disease, Pulmonary emboli ([facility] Seizures ([facility] Tailbone injury, Thyroid disease, TMJ (dislocation of temporomandibular joint), Trigeminal neuralgia, and Wears contact lenses. SURGICAL HISTORY      has a past surgical history that includes [place] tooth extraction (); Cardiac catheterization (2016); Cardiac catheterization (2016); Colonoscopy (2015); Insertable Cardiac Monitor; Ankle fracture surgery (Right, 2020); ablation of dysrhythmic focus ();  section (N/A, 2020); Upper gastrointestinal endoscopy (2020); Upper gastrointestinal endoscopy (2015); Cholecystectomy, laparoscopic (N/A, 2021); and Hysterectomy (N/A, 2022).     CURRENT MEDICATIONS       Discharge Medication List as of 2022  9:07 PM        CONTINUE these medications which have NOT CHANGED    Details   omeprazole (PRILOSEC) 40 MG delayed release capsule Take 1 capsule by mouth daily PT TAKES ONCE AT HS, Disp-90 capsule, R-1Normal      pioglitazone (ACTOS) 30 MG tablet Take 1 tablet by mouth in the morning., Disp-90 tablet, R-3Normal      ondansetron (ZOFRAN ODT) 4 MG disintegrating tablet Take 1 tablet by mouth every 8 hours as needed for Nausea, Disp-20 tablet, R-0Normal      fenofibrate (TRIGLIDE) 160 MG tablet Take 1 tablet by mouth daily, Disp-90 tablet, R-1Normal      Dulaglutide (TRULICITY) 1.5 HU/5.2IJ SOPN Inject 1.5 mg into the skin once a week, Disp-2 mL, R-5Normal      DULoxetine (CYMBALTA) 30 MG extended release capsule Take 1 capsule by mouth dailyHistorical Med      clotrimazole-betamethasone (LOTRISONE) 1-0.05 % cream Apply topically 2 times daily, Topical, 2 TIMES DAILY Starting 2021, Disp-45 g, R-0, Normal      !! sertraline (ZOLOFT) 25 MG tablet Take 25 mg by mouth dailyHistorical Med      busPIRone (BUSPAR) 15 MG tablet Take 45 mg by mouth nightly Historical Med      OXcarbazepine (TRILEPTAL) 600 MG tablet Take 600 mg by mouth daily Historical Med !! QUEtiapine (SEROQUEL) 300 MG tablet Take 300 mg by mouth nightlyHistorical Med      !! QUEtiapine (SEROQUEL) 50 MG tablet Take 50 mg by mouth every morning (before breakfast)Historical Med      ARIPiprazole (ABILIFY) 15 MG tablet Take 20 mg by mouth nightly Historical Med      !! sertraline (ZOLOFT) 50 MG tablet Take 50 mg by mouth nightly Historical Med      cetirizine (ZYRTEC) 10 MG tablet Take 1 tablet by mouth daily, Disp-90 tablet, R-3Normal       !! - Potential duplicate medications found. Please discuss with provider. ALLERGIES     is allergic to dilaudid [hydromorphone hcl], flexeril [cyclobenzaprine], keflex [cephalexin], tessalon [benzonatate], augmentin [amoxicillin-pot clavulanate], lorabid [loracarbef], and minocycline. FAMILY HISTORY     She indicated that her mother is alive. She indicated that her father is alive. She indicated that her brother is alive. She indicated that her maternal grandmother is . She indicated that her maternal grandfather is . She indicated that her paternal grandmother is . She indicated that her paternal grandfather is . She indicated that her maternal aunt is alive. She indicated that the status of her paternal aunt is unknown. She indicated that her paternal uncle is . family history includes Anxiety Disorder in her father and mother; Bipolar Disorder in her father; Cancer in her maternal grandmother, paternal grandfather, and paternal uncle; Depression in her maternal grandfather, paternal aunt, and paternal uncle; Diabetes in her maternal grandmother and mother; Heart Attack in her maternal grandfather; High Blood Pressure in her father; Lupus in her maternal aunt; Mental Illness in her father; No Known Problems in her brother and paternal grandmother; Ovarian Cancer in her maternal grandmother; Parkinsonism in her father. SOCIAL HISTORY      reports that she has been smoking cigarettes.  She started smoking about 4 years ago. She has a 4.00 pack-year smoking history. She has never used smokeless tobacco. She reports that she does not currently use alcohol after a past usage of about 1.0 standard drink per week. She reports current drug use. Drug: Marijuana Vinod Ruiz). PHYSICAL EXAM      height is 5' 10\" (1.778 m) and weight is 315 lb (142.9 kg) (abnormal). Her oral temperature is 98.2 °F (36.8 °C). Her blood pressure is 168/102 (abnormal) and her pulse is 97. Her respiration is 23 and oxygen saturation is 97%. Physical Exam  Vitals and nursing note reviewed. Constitutional:       Appearance: She is well-developed. She is not diaphoretic. HENT:      Head: Normocephalic and atraumatic. Nose: Nose normal.   Eyes:      General: No scleral icterus. Right eye: No discharge. Left eye: No discharge. Conjunctiva/sclera: Conjunctivae normal.      Pupils: Pupils are equal, round, and reactive to light. Neck:      Vascular: No JVD. Trachea: No tracheal deviation. Cardiovascular:      Rate and Rhythm: Regular rhythm. Tachycardia present. Heart sounds: Normal heart sounds. No murmur heard. No friction rub. No gallop. Pulmonary:      Effort: Pulmonary effort is normal. No respiratory distress. Breath sounds: Normal breath sounds. No stridor. No wheezing or rales. Chest:      Chest wall: No tenderness. Abdominal:      General: Bowel sounds are normal. There is no distension. Palpations: Abdomen is soft. There is no mass. Tenderness: There is no abdominal tenderness. There is no guarding or rebound. Hernia: No hernia is present. Comments: Complete benign abdominal exam, no guarding, no rebound, no tenderness. Hyperactive bowel sounds. Musculoskeletal:         General: No tenderness or deformity. Cervical back: Normal range of motion and neck supple. Lymphadenopathy:      Cervical: No cervical adenopathy. Skin:     General: Skin is warm and dry. thou/mm3    Immature Grans (Abs) 0.05 0.00 - 0.07 thou/mm3    nRBC 0 /100 wbc   Basic Metabolic Panel   Result Value Ref Range    Sodium 136 135 - 145 meq/L    Potassium 4.0 3.5 - 5.2 meq/L    Chloride 97 (L) 98 - 111 meq/L    CO2 23 23 - 33 meq/L    Glucose 121 (H) 70 - 108 mg/dL    BUN 9 7 - 22 mg/dL    Creatinine 0.7 0.4 - 1.2 mg/dL    Calcium 9.3 8.5 - 10.5 mg/dL   Troponin   Result Value Ref Range    Troponin T < 0.010 ng/ml   Glomerular Filtration Rate, Estimated   Result Value Ref Range    Est, Glom Filt Rate >60 >60 ml/min/1.73m2   Anion Gap   Result Value Ref Range    Anion Gap 16.0 8.0 - 16.0 meq/L   Osmolality   Result Value Ref Range    Osmolality Calc 271.9 (L) 275.0 - 300.0 mOsmol/kg   TSH with Reflex   Result Value Ref Range    TSH 1.720 0.400 - 4.200 uIU/mL   D-Dimer, Quantitative   Result Value Ref Range    D-Dimer, Quant 304.00 0.00 - 500.00 ng/ml FEU   HCG Qualitative, Serum   Result Value Ref Range    Preg, Serum NEGATIVE NEGATIVE   Brain Natriuretic Peptide   Result Value Ref Range    Pro-BNP 13.4 0.0 - 450.0 pg/mL   Procalcitonin   Result Value Ref Range    Procalcitonin 0.04 0.01 - 0.09 ng/mL   Lipase   Result Value Ref Range    Lipase 49.9 5.6 - 51.3 U/L   EKG Chest Pain   Result Value Ref Range    Ventricular Rate 107 BPM    Atrial Rate 107 BPM    P-R Interval 134 ms    QRS Duration 96 ms    Q-T Interval 336 ms    QTc Calculation (Bazett) 448 ms    P Axis 57 degrees    R Axis 56 degrees    T Axis 30 degrees       RADIOLOGY REPORTS  XR CHEST (2 VW)   Final Result   Impression:   Bilateral nonspecific interstitial opacities, can be seen in interstitial    edema or atypical infection. No consolidation. This document has been electronically signed by: Mabel Gil MD on 11/01/2022    05:31 PM          [facility] ([facility]) AND ED COURSE   Patient is seen and evaluated at 5:46 PM EDT.    DDx: Include but not limited to: Unstable angina, angina, anxiety, PE, atypical chest pain, aortic dissection, pneumonia, GERD, costochondritis, pleurisy, chest wall pain, dyspnea, CHF, COPD, bronchitis, biliary disease. Plan: large bore IV, basic labs. CXR. Orthostatic vitals. ED work-ups are reassuring. Tachycardia improves. Patient still has moderate persistent nausea and vomiting during reassessment. She has no abdominal pain. She has no abdominal tenderness on reassessment. Given her reassuring labs and completely benign abdominal exam, I do not feel an abdominal imaging study is needed. Admission is offered but patient declines. Discharged with Phenergan prescribed. PCP follow-up in 3 days. Consult  None    Procedures  None    Medications   0.9 % sodium chloride bolus (0 mLs IntraVENous Stopped 11/1/22 1850)   ondansetron (ZOFRAN) injection 4 mg (4 mg IntraVENous Given 11/1/22 1752)   promethazine (PHENERGAN) injection 6.25 mg (6.25 mg IntraMUSCular Given 11/1/22 2007)   aluminum & magnesium hydroxide-simethicone (MAALOX) 30 mL, lidocaine viscous hcl (XYLOCAINE) 5 mL (GI COCKTAIL) ( Oral Given 11/1/22 2009)       Vitals:    11/01/22 1753 11/01/22 1820 11/01/22 2044 11/01/22 2058   BP: 130/88 (!) 101/55 (!) 168/102    Pulse: (!) 102 (!) 105 (!) 107 97   Resp: 18 21 14 23   Temp:       TempSrc:       SpO2: 98% 99% 97% 97%   Weight:       Height:             FINAL IMPRESSION AND DISPOSITION      1. Atypical chest pain    2.  Nausea and vomiting, unspecified vomiting type        DISPOSITION Decision To Discharge 11/01/2022 09:06:19 PM      PATIENT REFERRED TO:  Tiffanie Mosqueda, APRN - CNP  [address] [facility], [address] [place] [address]  [address] [id number] [phone]    In 3 days  ED discharge follow up  Zachary Mccollum:  Discharge Medication List as of 11/1/2022  9:07 PM        START taking these medications    Details   promethazine (PHENERGAN) 25 MG tablet Take 1 tablet by mouth 3 times daily as needed for Nausea, Disp-20 tablet, R-0Normal           (Please note that portions of this note were completed with a voice recognition program.  Efforts were made to edit the dictations but occasionally words aremis-transcribed.)  MD Anselmo Andrew MD  11/02/22 8821

## 2022-11-01 NOTE — ED NOTES
Patient resting in bed. Respirations easy and unlabored. No distress noted. Call light within reach.        Maritza Paul RN  11/01/22 [id number]

## 2022-11-02 LAB
EKG ATRIAL RATE: 107 BPM
EKG P AXIS: 57 DEGREES
EKG P-R INTERVAL: 134 MS
EKG Q-T INTERVAL: 336 MS
EKG QRS DURATION: 96 MS
EKG QTC CALCULATION (BAZETT): 448 MS
EKG R AXIS: 56 DEGREES
EKG T AXIS: 30 DEGREES
EKG VENTRICULAR RATE: 107 BPM

## 2022-11-02 PROCEDURE — 93010 ELECTROCARDIOGRAM REPORT: CPT | Performed by: INTERNAL MEDICINE

## 2022-11-03 ENCOUNTER — OFFICE VISIT (OUTPATIENT)
Dept: FAMILY MEDICINE CLINIC | Age: 27
End: 2022-11-03
Payer: MEDICARE

## 2022-11-03 VITALS
RESPIRATION RATE: 20 BRPM | BODY MASS INDEX: 46.78 KG/M2 | WEIGHT: 293 LBS | DIASTOLIC BLOOD PRESSURE: 64 MMHG | SYSTOLIC BLOOD PRESSURE: 126 MMHG | HEART RATE: 112 BPM

## 2022-11-03 DIAGNOSIS — K21.9 GASTROESOPHAGEAL REFLUX DISEASE WITHOUT ESOPHAGITIS: ICD-10-CM

## 2022-11-03 DIAGNOSIS — K29.00 ACUTE SUPERFICIAL GASTRITIS WITHOUT HEMORRHAGE: ICD-10-CM

## 2022-11-03 DIAGNOSIS — R11.2 NAUSEA VOMITING AND DIARRHEA: Primary | ICD-10-CM

## 2022-11-03 DIAGNOSIS — R19.7 NAUSEA VOMITING AND DIARRHEA: Primary | ICD-10-CM

## 2022-11-03 PROBLEM — M89.8X7 EXOSTOSIS OF BONE OF ANKLE: Status: ACTIVE | Noted: 2022-11-03

## 2022-11-03 PROBLEM — M21.40 PES PLANUS: Status: ACTIVE | Noted: 2022-11-03

## 2022-11-03 PROBLEM — M62.40 TENDON CONTRACTURE: Status: ACTIVE | Noted: 2022-11-03

## 2022-11-03 PROBLEM — M65.871 OTHER SYNOVITIS AND TENOSYNOVITIS, RIGHT ANKLE AND FOOT: Status: ACTIVE | Noted: 2022-11-03

## 2022-11-03 PROBLEM — M25.579 PAIN IN JOINT INVOLVING ANKLE AND FOOT: Status: ACTIVE | Noted: 2022-11-03

## 2022-11-03 PROBLEM — T85.848A PAIN DUE TO INTERNAL PROSTHETIC DEVICE: Status: ACTIVE | Noted: 2022-11-03

## 2022-11-03 PROBLEM — M25.561 PAIN IN RIGHT KNEE: Status: ACTIVE | Noted: 2022-11-03

## 2022-11-03 PROBLEM — M62.571: Status: ACTIVE | Noted: 2022-11-03

## 2022-11-03 PROBLEM — M76.70 PERONEAL TENDINITIS: Status: ACTIVE | Noted: 2022-11-03

## 2022-11-03 PROBLEM — S93.429A SPRAIN OF DELTOID LIGAMENT OF ANKLE: Status: ACTIVE | Noted: 2022-11-03

## 2022-11-03 PROCEDURE — 99214 OFFICE O/P EST MOD 30 MIN: CPT | Performed by: NURSE PRACTITIONER

## 2022-11-03 RX ORDER — LOPERAMIDE HYDROCHLORIDE 2 MG/1
2 CAPSULE ORAL 4 TIMES DAILY PRN
Qty: 30 CAPSULE | Refills: 0 | Status: SHIPPED | OUTPATIENT
Start: 2022-11-03 | End: 2022-11-13

## 2022-11-03 RX ORDER — SUCRALFATE ORAL 1 G/10ML
1 SUSPENSION ORAL 4 TIMES DAILY
Qty: 280 ML | Refills: 0 | Status: SHIPPED | OUTPATIENT
Start: 2022-11-03 | End: 2022-11-10

## 2022-11-03 ASSESSMENT — ENCOUNTER SYMPTOMS
NAUSEA: 0
COUGH: 0
ABDOMINAL PAIN: 0
SHORTNESS OF BREATH: 0

## 2022-11-03 NOTE — PROGRESS NOTES
Subjective:      Patient ID: Dilcia Fonseca 1995 is a 32 y.o. female here for evaluation. Chief Complaint   Patient presents with    ED Follow-up     In [facility] ED on 11/1 - chest pain, nausea, stomach pain     HISTORY OF PRESENT ILLNESS    Dilcia Fonseca is a 32 y.o. female who presents to Emergency Department with Chest Pain     Patient presents to ED for evaluation of headache, dizzy, chest pain, hot flashes, nausea, and diarrhea since yesterday. Chest pain is from left upper chest, sharp pain, rated at 7/10, radiating to left shoulder. Chest pain has been persistent. Chest pain is worse on deep breathing. No chest wall tenderness. Patient also feels she has frequent hot flashes. She is found tachycardic on arrival.  She has no abdominal pain, but states nausea and diarrhea. No leg swelling. Her past medical history remarkable for anxiety, POTS, diabetes, morbid obesity, fibromyalgia, and PE in 2015 due to oral contraceptive. Patient currently is not taking [address]. ED MDM  ED work-ups are reassuring. Tachycardia improves. Patient still has moderate persistent nausea and vomiting during reassessment. She has no abdominal pain. She has no abdominal tenderness on reassessment. Given her reassuring labs and completely benign abdominal exam, I do not feel an abdominal imaging study is needed. Admission is offered but patient declines. Discharged with Phenergan prescribed. PCP follow-up in 3 days. Went to [facility] ED yesterday 11/2/22 for continued complaints. Work up NEG. Respiratory PANEL NEG. Continues with CP, nausea. Stomach pain. Vomiting as improved. Was previous throwing up blood. Dizziness. Fatigue. Diarrhea.       Patient Active Problem List   Diagnosis    Major depressive disorder, recurrent episode, mild (HCC)    Low self esteem    KARLIE (generalized anxiety disorder)    Obesity    Obstructive sleep apnea    Snores    Sleep disturbances    Daytime somnolence    Sleep talking Night terrors, adult    Bruxism (teeth grinding)    Restless sleeper    Anxiety    Abnormal thyroid function test    Trigeminal neuralgia    Inappropriate sinus tachycardia    POTS (postural orthostatic tachycardia syndrome)    Syncope and collapse    Neck discomfort    Thyroid cyst    Pulmonary embolism (HCC)    Breast pain, left    Positive CAESAR (antinuclear antibody)    Hypotension    S/P ablation of ventricular arrhythmia    Pneumonia    Hypokalemia    DUB (dysfunctional uterine bleeding)    Palpitations    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    Closed right ankle fracture, initial encounter    Status post open reduction with internal fixation (ORIF) of fracture of ankle    Tear of deltoid ligament of ankle, right, initial encounter    Abdominal pain during pregnancy in second trimester    Maternal obesity affecting pregnancy, antepartum    Postpartum care following  delivery    Biliary colic symptom    Dizziness, nonspecific    Orthostatic dizziness    Borderline personality disorder (HCC)    Pseudoseizures (HCC)    Tachyarrhythmia    Pelvic pain    Effusion of ankle or foot joint    Exostosis of bone of ankle    Muscle wasting and atrophy, not elsewhere classified, right ankle and foot    Other synovitis and tenosynovitis, right ankle and foot    Pain due to internal prosthetic device    Pain in joint involving ankle and foot    Pain in right knee    Peroneal tendinitis    Pes planus    Sprain of deltoid ligament of ankle    Tendon contracture       CARDIO - Dr. Jerilyn Ellis - Dr. Atkins Sides - Dr. Charley Meadows, Peña Prasad    Partial Hysterectomy with OBGYN on 22    Following with CARDIO. Had ECHO, TILT and Holter for POTS . Vitals:    22 1040   BP: 126/64   Pulse: (!) 112   Resp: 20        Labs reviewed. On atypical antipsychotics for MDD.   Follows with [facility] Body mass index is 46.78 kg/m². Metformin intolerance GI SE . On Trulicity 0.87 mg Weekly. Tolerating well with good appetite suppressant.      Wt Readings from Last 3 Encounters:   11/03/22 (!) 326 lb (147.9 kg)   11/01/22 (!) 315 lb (142.9 kg)   06/27/22 (!) 315 lb (142.9 kg)       Labs pending    Lab Results   Component Value Date    LABA1C 6.3 06/16/2022    LABA1C 6.6 (H) 03/22/2022    LABA1C 6.2 09/24/2021     Lab Results   Component Value Date    .7 07/27/2020       No components found for: CHLPL  Lab Results   Component Value Date    TRIG 803 (H) 06/16/2022    TRIG 399 (H) 09/24/2021    TRIG 156 07/09/2013     Lab Results   Component Value Date    HDL 36 06/16/2022    HDL 44 09/24/2021    HDL 68 07/09/2013     Lab Results   Component Value Date    LDLCALC SEE BELOW 06/16/2022    LDLCALC 72 09/24/2021    LDLCALC 67 07/09/2013     No results found for: LABVLDL      Chemistry        Component Value Date/Time     11/02/2022 2111    K 3.7 11/02/2022 2111    K 3.7 06/27/2022 0034     11/02/2022 2111    CO2 24 11/02/2022 2111    BUN 10 11/02/2022 2111    CREATININE 0.61 11/02/2022 2111        Component Value Date/Time    CALCIUM 8.60 (L) 11/02/2022 2111    ALKPHOS 91 11/02/2022 2111    AST 18 11/02/2022 2111    ALT 27 11/02/2022 2111    BILITOT 0.3 11/02/2022 2111            Lab Results   Component Value Date    TSH 1.720 11/01/2022       Lab Results   Component Value Date    WBC 9.5 11/02/2022    HGB 13.2 11/02/2022    HCT 38.6 11/02/2022    MCV 88.6 11/02/2022     11/02/2022         Health Maintenance   Topic Date Due    COVID-19 Vaccine (1) Never done    Pneumococcal 0-64 years Vaccine (1 - PCV) Never done    Hepatitis A vaccine (2 of 2 - 2-dose series) 09/14/2013    Diabetic retinal exam  Never done    Hepatitis C screen  Never done    Pap smear  Never done    Flu vaccine (1) 08/01/2022    Depression Monitoring  01/28/2023    Diabetic foot exam  03/23/2023    A1C test (Diabetic or Prediabetic)  06/16/2023 Diabetic microalbuminuria test  06/16/2023    Lipids  06/16/2023    DTaP/Tdap/Td vaccine (8 - Td or Tdap) 08/17/2030    Colorectal Cancer Screen  10/06/2040    Hepatitis B vaccine  Completed    Hib vaccine  Completed    Varicella vaccine  Completed    Meningococcal (ACWY) vaccine  Completed    HIV screen  Completed       Immunization History   Administered Date(s) Administered    DTaP 1995, 03/04/1996, 04/22/1996, 01/08/1997, 05/23/2001    Hepatitis A 03/14/2013    Hepatitis B [birth year], 07/08/1996    Hib, unspecified 1995, 03/04/1996, 04/22/1996, 01/08/1997    Influenza Virus Vaccine 10/22/2013    Influenza Whole 10/22/2013    MMR 10/07/1996, 05/23/2001    Meningococcal MCV4P (Menactra) 03/14/2013    Polio IPV (IPOL) 05/23/2001    Polio OPV 1995, 03/04/1996, 04/22/1996    Tdap (Boostrix, Adacel) 10/24/2014, 08/17/2020    Varicella (Varivax) 10/07/1996, 03/14/2013       Review of Systems   Constitutional:  Negative for chills and fever. HENT: Negative. Respiratory:  Negative for cough and shortness of breath. Cardiovascular:  Negative for chest pain. Gastrointestinal:  Negative for abdominal pain and nausea. Skin:  Negative for rash. Neurological:  Negative for dizziness, light-headedness and headaches. Psychiatric/Behavioral: Negative. Objective:   Physical Exam  Constitutional:       General: She is not in acute distress. Eyes:      Pupils: Pupils are equal, round, and reactive to light. Cardiovascular:      Rate and Rhythm: Normal rate and regular rhythm. Heart sounds: No murmur heard. Pulmonary:      Effort: Pulmonary effort is normal.      Breath sounds: Normal breath sounds. No wheezing. Abdominal:      General: Bowel sounds are normal. There is no distension. Palpations: Abdomen is soft. Tenderness: There is no abdominal tenderness. Musculoskeletal:         General: No tenderness. Normal range of motion.       Cervical back: Normal range of motion and neck supple. Skin:     General: Skin is warm and dry. Findings: No rash. Assessment:       Diagnosis Orders   1. Nausea vomiting and diarrhea  loperamide (RA ANTI-DIARRHEAL) 2 MG capsule    GI Bacterial Pathogens By PCR      2. Acute superficial gastritis without hemorrhage  sucralfate (CARAFATE) 1 GM/10ML suspension      3. Gastroesophageal reflux disease without esophagitis                Plan:      [facility] ED Visits reviewed  Appears GI Viral related with complications  Symptomatic relief sent in  Push fluids and Rest  GI Panel  [facility]        Current Outpatient Medications   Medication Sig Dispense Refill    sucralfate (CARAFATE) 1 GM/10ML suspension Take 10 mLs by mouth 4 times daily for 7 days 280 mL 0    loperamide (RA ANTI-DIARRHEAL) 2 MG capsule Take 1 capsule by mouth 4 times daily as needed for Diarrhea 30 capsule 0    promethazine (PHENERGAN) 25 MG tablet Take 1 tablet by mouth 3 times daily as needed for Nausea 20 tablet 0    omeprazole (PRILOSEC) 40 MG delayed release capsule Take 1 capsule by mouth daily PT TAKES ONCE AT HS 90 capsule 1    pioglitazone (ACTOS) 30 MG tablet Take 1 tablet by mouth in the morning.  90 tablet 3    fenofibrate (TRIGLIDE) 160 MG tablet Take 1 tablet by mouth daily 90 tablet 1    DULoxetine (CYMBALTA) 30 MG extended release capsule Take 1 capsule by mouth daily      sertraline (ZOLOFT) 25 MG tablet Take 25 mg by mouth daily      busPIRone (BUSPAR) 15 MG tablet Take 45 mg by mouth nightly       OXcarbazepine (TRILEPTAL) 600 MG tablet Take 600 mg by mouth daily       QUEtiapine (SEROQUEL) 300 MG tablet Take 300 mg by mouth nightly      QUEtiapine (SEROQUEL) 50 MG tablet Take 50 mg by mouth every morning (before breakfast)      ARIPiprazole (ABILIFY) 15 MG tablet Take 20 mg by mouth nightly       sertraline (ZOLOFT) 50 MG tablet Take 50 mg by mouth nightly       cetirizine (ZYRTEC) 10 MG tablet Take 1 tablet by mouth daily 90 tablet 3     No current facility-administered medications for this visit.

## 2022-11-03 NOTE — PATIENT INSTRUCTIONS
You may receive a survey regarding the care you received during your visit. Your input is valuable to us. We encourage you to complete and return your survey. We hope you will choose us in the future for your healthcare needs. Tobacco Cessation Programs     Telephonic behavior modification  1-800-QUIT-NOW (980-4626)  Counseling service for those who are ready to quit using tobacco.    Available for uninsured [place] residents, Medicaid recipients, pregnant women, or patients whose health plans or employers are members of the [address] [place] [address] behavior modification  http://[place]. Quitlogix. org  Online support program to help patients through each step of the quitting process. Available 24 hours a day 7 days a week. Provides up to date researched based tool, step-by-step guides, and motivational messages. Online behavior modification  www.lungusa.org/stop-smoking/how-to-quit  HelpLine: 1-800-LUNG-USA (643-0691)  Email questions to:  Bruce@[employer]. [employer]   Website offers resources to help tobacco users figure out their reasons for quitting and then take the big step of quitting for good. Hypnosis  Location: [address] [place] [address]  Contact: Cem Funez, PhD at [phone]  Hypnosis for tobacco cessation  Cost $225 for the initial session and $175 for each session afterwards. Most patients require 6-8 sessions. There is the option to submit through the patients insurance. Hypnosis and behavior modification  Location: [place] [address],  [facility] [address] [place] [address]  Contact: Alissa Castorena, PhD at [phone]  Counseling and hypnosis for nicotine addition  Cost: For uninsured patients:  Please call above phone number  Cost for insured patients depends on patients insurance plan.     Behavior modification  Location: [facility], [address] [facility], [address] [place] [address] 50: [phone]  Services include four one-on-one appointments between the patient and a respiratory therapist.  The four appointments span over three weeks. The respiratory therapist schedules one of the appointments to occur 48 hours after the patients quit date. Cost $100 total for the four sessions.   Tobacco cessation products are not included in the cost and are not provided by [facility].

## 2022-11-04 ENCOUNTER — NURSE ONLY (OUTPATIENT)
Dept: LAB | Age: 27
End: 2022-11-04

## 2022-11-04 DIAGNOSIS — R19.7 NAUSEA VOMITING AND DIARRHEA: ICD-10-CM

## 2022-11-04 DIAGNOSIS — R11.2 NAUSEA VOMITING AND DIARRHEA: ICD-10-CM

## 2022-11-04 LAB
ADENOVIRUS F 40 41 PCR: NOT DETECTED
ASTROVIRUS PCR: NOT DETECTED
C DIFF TOXIN/ANTIGEN: NEGATIVE
CAMPYLOBACTER PCR: NOT DETECTED
CLOSTRIDIUM DIFFICILE, PCR: NORMAL
CRYPTOSPORIDIUM PCR: NOT DETECTED
CYCLOSPORA CAYETANENSIS PCR: NOT DETECTED
E COLI 0157 PCR: NORMAL
E COLI ENTEROAGGREGATIVE PCR: NOT DETECTED
E COLI ENTEROPATHOGENIC PCR: NOT DETECTED
E COLI ENTEROTOXIGENIC PCR: NOT DETECTED
E COLI SHIGA LIKE TOXIN PCR: NOT DETECTED
E COLI SHIGELLA/ENTEROINVASIVE PCR: NOT DETECTED
E HISTOLYTICA GI FILM ARRAY: NOT DETECTED
GIARDIA LAMBLIA PCR: NOT DETECTED
NOROVIRUS GI GII PCR: NOT DETECTED
PLESIOMONAS SHIGELLOIDES PCR: NOT DETECTED
ROTAVIRUS A PCR: NOT DETECTED
SALMONELLA PCR: NOT DETECTED
SAPOVIRUS PCR: NOT DETECTED
VIBRIO CHOLERAE PCR: NOT DETECTED
VIBRIO PCR: NOT DETECTED
YERSINIA ENTEROCOLITICA PCR: NOT DETECTED

## 2022-11-07 ENCOUNTER — TELEPHONE (OUTPATIENT)
Dept: FAMILY MEDICINE CLINIC | Age: 27
End: 2022-11-07

## 2022-11-07 NOTE — TELEPHONE ENCOUNTER
The patient called in and stated that she is traveling to [facility] in January and she called the health department and was updated on the vaccines she would need and was instructed to contact her PCP to get the medication she is to take for Malaria. The patient uses [employer]. Please advise. The patient is requesting a call back.

## 2022-11-11 ENCOUNTER — TELEMEDICINE (OUTPATIENT)
Dept: PSYCHOLOGY | Age: 27
End: 2022-11-11
Payer: MEDICARE

## 2022-11-11 DIAGNOSIS — F31.32 BIPOLAR 1 DISORDER, DEPRESSED, MODERATE (HCC): Primary | ICD-10-CM

## 2022-11-11 PROCEDURE — 90834 PSYTX W PT 45 MINUTES: CPT | Performed by: PSYCHOLOGIST

## 2022-11-11 NOTE — PROGRESS NOTES
Behavioral Health Consultation  Jack Albright, PhD  Psychologist  11/11/2022       Time spent with Patient:  45 minutes  This is patient's seventh  [facility] appointment. Reason for Consult:  depression, anxiety, and stress  Referring Provider: No referring provider defined for this encounter. Pt provided informed consent for the behavioral health program. Discussed with patient model of service to include the limits of confidentiality (i.e. abuse reporting, suicide intervention, etc.) and short-term intervention focused approach. Pt indicated understanding. Feedback given to PCP. Description:    MSE:    Appearance    cooperative  Appetite normal  Sleep disturbance No  Loss of pleasure No  Speech    normal rate, normal volume, and well articulated  Mood    Anxious  Depressed  Affect    normal affect  Thought Content    intact  Insight    Fair  Judgment    Intact  Suicide Assessment    no suicidal ideation  Homicidal Assessment Intent No  Cutting No  Enuresis No  Learning Disorder Past Dx of ADHD      History:    Medications:   Current Outpatient Medications   Medication Sig Dispense Refill    sucralfate (CARAFATE) 1 GM/10ML suspension Take 10 mLs by mouth 4 times daily for 7 days 280 mL 0    loperamide (RA ANTI-DIARRHEAL) 2 MG capsule Take 1 capsule by mouth 4 times daily as needed for Diarrhea 30 capsule 0    omeprazole (PRILOSEC) 40 MG delayed release capsule Take 1 capsule by mouth daily PT TAKES ONCE AT HS 90 capsule 1    pioglitazone (ACTOS) 30 MG tablet Take 1 tablet by mouth in the morning.  90 tablet 3    fenofibrate (TRIGLIDE) 160 MG tablet Take 1 tablet by mouth daily 90 tablet 1    DULoxetine (CYMBALTA) 30 MG extended release capsule Take 1 capsule by mouth daily      sertraline (ZOLOFT) 25 MG tablet Take 25 mg by mouth daily      busPIRone (BUSPAR) 15 MG tablet Take 45 mg by mouth nightly       OXcarbazepine (TRILEPTAL) 600 MG tablet Take 600 mg by mouth daily       QUEtiapine (SEROQUEL) 300 MG tablet Take 300 mg by mouth nightly      QUEtiapine (SEROQUEL) 50 MG tablet Take 50 mg by mouth every morning (before breakfast)      ARIPiprazole (ABILIFY) 15 MG tablet Take 20 mg by mouth nightly       sertraline (ZOLOFT) 50 MG tablet Take 50 mg by mouth nightly       cetirizine (ZYRTEC) 10 MG tablet Take 1 tablet by mouth daily 90 tablet 3     No current facility-administered medications for this visit. Social History:   Social History     Socioeconomic History    Marital status:      Spouse name: Eloisa Dave    Number of children: 1    Years of education: 12    Highest education level: High school graduate   Occupational History    Not on file   Tobacco Use    Smoking status: Every Day     Packs/day: 1.00     Years: 4.00     Pack years: 4.00     Types: Cigarettes     Start date: 6/30/2018    Smokeless tobacco: Never   Vaping Use    Vaping Use: Former   Substance and Sexual Activity    Alcohol use: Not Currently     Alcohol/week: 1.0 standard drink     Types: 1 Cans of beer per week    Drug use: Yes     Types: Marijuana [place])     Comment: MEDICAL [facility]  5/30/22    Sexual activity: Yes     Partners: Male   Other Topics Concern    Not on file   Social History Narrative    Not on file     Social Determinants of Health     Financial Resource Strain: Low Risk     Difficulty of Paying Living Expenses: Not hard at all   Food Insecurity: No Food Insecurity    Worried About Running Out of Food in the Last Year: Never true    Ran Out of Food in the Last Year: Never true   Transportation Needs: Not on file   Physical Activity: Not on file   Stress: Not on file   Social Connections: Not on file   Intimate Partner Violence: Not on file   Housing Stability: Not on file       TOBACCO:   reports that she has been smoking cigarettes. She started smoking about 4 years ago. She has a 4.00 pack-year smoking history.  She has never used smokeless tobacco.  ETOH:   reports that she does not currently use alcohol after a past usage of about 1.0 standard drink per week. Family History:   Family History   Problem Relation Age of Onset    Anxiety Disorder Mother     Diabetes Mother     Anxiety Disorder Father     Bipolar Disorder Father     High Blood Pressure Father     Mental Illness Father     Parkinsonism Father         Early-Onset    Depression Paternal Aunt     Cancer Paternal Uncle         multiple myeloma    Depression Paternal Uncle     Cancer Maternal Grandmother         breast    Ovarian Cancer Maternal Grandmother     Diabetes Maternal Grandmother     Depression Maternal Grandfather     Heart Attack Maternal Grandfather     Cancer Paternal Grandfather         lung    No Known Problems Brother         Gaviria disease    No Known Problems Paternal Grandmother     Lupus Maternal Aunt            Assessment:  Patient was seen for bipolar disorder; the session discussed her plans for the future; her  lost his job but they are still planning to go to [place]; she has been feeling a lot anxiety about going to [place]; she started to realize that DBT might help and felt like it was time to start working more closely on [employer]; this therapist asked her to purchase a dialectical behavioral therapy workbook so we can start using this as a tool; this therapist went back and reviewed the dialectical process with her and also how people have a tendency to start to think judgment and also planning when they are involved in situations-she admitted that this happens to her a lot and then she spends her time disassociating and then getting her self all worked up; wants to start managing herself more holistically. She feels that the Abilify has helped tremendously with her moods and now she is ready to start to work on her emotions and her interpersonal skills. Fran Jack, was evaluated through a synchronous (real-time) audio-video encounter.  The patient (or guardian if applicable) is aware that this is a billable service, which includes applicable co-pays. This Virtual Visit was conducted with patient's (and/or legal guardian's) consent. The visit was conducted pursuant to the emergency declaration under the [facility], [address] [place] [address] authority and the [employer] General Act. Patient identification was verified, and a caregiver was present when appropriate. The patient was located at Home: [address] [place] [address] Road [facility]. Provider was located at [facility] ([address] [place], [place] [address]t): [address] [place] [address],  [address]. Diagnosis:      ICD-10-CM    1. Bipolar 1 disorder, depressed, moderate (HCC)  F31.32                Plan:  Pt interventions:  She will purchase a DBT workbook and session will begin working on using this workbook to help her learn mindfulness, distress tolerance, emotional regulation and also effective interpersonal skills.

## 2022-11-14 ENCOUNTER — PATIENT MESSAGE (OUTPATIENT)
Dept: FAMILY MEDICINE CLINIC | Age: 27
End: 2022-11-14

## 2022-11-14 DIAGNOSIS — E11.9 TYPE 2 DIABETES MELLITUS WITHOUT COMPLICATION, WITHOUT LONG-TERM CURRENT USE OF INSULIN (HCC): Primary | ICD-10-CM

## 2022-11-14 DIAGNOSIS — E78.1 HYPERTRIGLYCERIDEMIA: ICD-10-CM

## 2022-11-15 ENCOUNTER — NURSE ONLY (OUTPATIENT)
Dept: LAB | Age: 27
End: 2022-11-15

## 2022-11-15 DIAGNOSIS — E78.1 HYPERTRIGLYCERIDEMIA: ICD-10-CM

## 2022-11-15 DIAGNOSIS — E11.9 TYPE 2 DIABETES MELLITUS WITHOUT COMPLICATION, WITHOUT LONG-TERM CURRENT USE OF INSULIN (HCC): ICD-10-CM

## 2022-11-15 LAB
AVERAGE GLUCOSE: 138 MG/DL (ref 70–126)
CHOLESTEROL, TOTAL: 187 MG/DL (ref 100–199)
HBA1C MFR BLD: 6.6 % (ref 4.4–6.4)
HDLC SERPL-MCNC: 40 MG/DL
LDL CHOLESTEROL CALCULATED: ABNORMAL MG/DL
TRIGL SERPL-MCNC: 520 MG/DL (ref 0–199)

## 2022-11-15 NOTE — TELEPHONE ENCOUNTER
From: Destiny Shook  To: Vamsi Torres  Sent: 11/14/2022 6:57 PM EST  Subject: Appointment coming up    Am I supposed to have labs done for my appointment coming up?

## 2022-11-17 ENCOUNTER — OFFICE VISIT (OUTPATIENT)
Dept: FAMILY MEDICINE CLINIC | Age: 27
End: 2022-11-17
Payer: MEDICARE

## 2022-11-17 VITALS
SYSTOLIC BLOOD PRESSURE: 128 MMHG | BODY MASS INDEX: 46.85 KG/M2 | HEART RATE: 80 BPM | WEIGHT: 293 LBS | DIASTOLIC BLOOD PRESSURE: 74 MMHG | RESPIRATION RATE: 16 BRPM

## 2022-11-17 DIAGNOSIS — K21.9 GASTROESOPHAGEAL REFLUX DISEASE WITHOUT ESOPHAGITIS: ICD-10-CM

## 2022-11-17 DIAGNOSIS — E11.9 TYPE 2 DIABETES MELLITUS WITHOUT COMPLICATION, WITHOUT LONG-TERM CURRENT USE OF INSULIN (HCC): Primary | ICD-10-CM

## 2022-11-17 DIAGNOSIS — Q79.60 EDS (EHLERS-DANLOS SYNDROME): ICD-10-CM

## 2022-11-17 DIAGNOSIS — F33.0 MAJOR DEPRESSIVE DISORDER, RECURRENT EPISODE, MILD (HCC): ICD-10-CM

## 2022-11-17 DIAGNOSIS — R56.9 PSEUDOSEIZURES (HCC): ICD-10-CM

## 2022-11-17 DIAGNOSIS — E66.01 MORBID OBESITY WITH BMI OF 40.0-44.9, ADULT (HCC): ICD-10-CM

## 2022-11-17 DIAGNOSIS — E78.1 HYPERTRIGLYCERIDEMIA: ICD-10-CM

## 2022-11-17 DIAGNOSIS — G90.A POTS (POSTURAL ORTHOSTATIC TACHYCARDIA SYNDROME): ICD-10-CM

## 2022-11-17 PROCEDURE — 3044F HG A1C LEVEL LT 7.0%: CPT | Performed by: NURSE PRACTITIONER

## 2022-11-17 PROCEDURE — 99214 OFFICE O/P EST MOD 30 MIN: CPT | Performed by: NURSE PRACTITIONER

## 2022-11-17 RX ORDER — ATORVASTATIN CALCIUM 20 MG/1
20 TABLET, FILM COATED ORAL DAILY
Qty: 90 TABLET | Refills: 1 | Status: SHIPPED | OUTPATIENT
Start: 2022-11-17

## 2022-11-17 ASSESSMENT — ENCOUNTER SYMPTOMS
NAUSEA: 0
SHORTNESS OF BREATH: 0
COUGH: 0
ABDOMINAL PAIN: 0

## 2022-11-17 NOTE — PROGRESS NOTES
Subjective:      Patient ID: Fabian  1995 is a 32 y.o. female here for evaluation.      Chief Complaint   Patient presents with    6 Month Follow-Up    Diabetes    Results       Patient Active Problem List   Diagnosis    Major depressive disorder, recurrent episode, mild (HCC)    Low self esteem    KARLIE (generalized anxiety disorder)    Obesity    Obstructive sleep apnea    Snores    Sleep disturbances    Daytime somnolence    Sleep talking    Night terrors, adult    Bruxism (teeth grinding)    Restless sleeper    Anxiety    Abnormal thyroid function test    Trigeminal neuralgia    Inappropriate sinus tachycardia    POTS (postural orthostatic tachycardia syndrome)    Syncope and collapse    Neck discomfort    Thyroid cyst    Pulmonary embolism (HCC)    Breast pain, left    Positive CAESAR (antinuclear antibody)    Hypotension    S/P ablation of ventricular arrhythmia    Pneumonia    Hypokalemia    DUB (dysfunctional uterine bleeding)    Palpitations    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    Closed right ankle fracture, initial encounter    Status post open reduction with internal fixation (ORIF) of fracture of ankle    Tear of deltoid ligament of ankle, right, initial encounter    Abdominal pain during pregnancy in second trimester    Maternal obesity affecting pregnancy, antepartum    Postpartum care following  delivery    Biliary colic symptom    Dizziness, nonspecific    Orthostatic dizziness    Borderline personality disorder (HCC)    Pseudoseizures (HCC)    Tachyarrhythmia    Pelvic pain    Effusion of ankle or foot joint    Exostosis of bone of ankle    Muscle wasting and atrophy, not elsewhere classified, right ankle and foot    Other synovitis and tenosynovitis, right ankle and foot    Pain due to internal prosthetic device    Pain in joint involving ankle and foot    Pain in right knee    Peroneal tendinitis    Pes planus    Sprain of deltoid ligament of ankle Tendon contracture       CARDIO - Dr. Ariana Collins - Dr. [place] Dr. Rafat Bennett, [address] with CARDIO. Had ECHO, TILT and Holter for POTS . Vitals:    11/17/22 0943   BP: 128/74   Pulse: 80   Resp: 16        Labs reviewed. On atypical antipsychotics for MDD. [facility] Body mass index is 46.85 kg/m². Metformin intolerance GI SE . Actos 30 mg. Was to be on Trulicity 1.5 mg Weekly but insurance denied. Was tolerating well with good appetite suppressant. Wt Readings from Last 3 Encounters:   11/17/22 (!) 326 lb 8 oz (148.1 kg)   11/03/22 (!) 326 lb (147.9 kg)   11/01/22 (!) 315 lb (142.9 kg)       Labs reviewed    On fenofibrate 160 mg. TRIG improved.      Lab Results   Component Value Date    LABA1C 6.6 (H) 11/15/2022    LABA1C 6.3 06/16/2022    LABA1C 6.6 (H) 03/22/2022     Lab Results   Component Value Date    .7 07/27/2020       No components found for: CHLPL  Lab Results   Component Value Date    TRIG 520 (H) 11/15/2022    TRIG 803 (H) 06/16/2022    TRIG 399 (H) 09/24/2021     Lab Results   Component Value Date    HDL 40 11/15/2022    HDL 36 06/16/2022    HDL 44 09/24/2021     Lab Results   Component Value Date    LDLCALC SEE BELOW 11/15/2022    LDLCALC SEE BELOW 06/16/2022    LDLCALC 72 09/24/2021     No results found for: LABVLDL      Chemistry        Component Value Date/Time     11/02/2022 2111    K 3.7 11/02/2022 2111    K 3.7 06/27/2022 0034     11/02/2022 2111    CO2 24 11/02/2022 2111    BUN 10 11/02/2022 2111    CREATININE 0.61 11/02/2022 2111        Component Value Date/Time    CALCIUM 8.60 (L) 11/02/2022 2111    ALKPHOS 91 11/02/2022 2111    AST 18 11/02/2022 2111    ALT 27 11/02/2022 2111    BILITOT 0.3 11/02/2022 2111            Lab Results   Component Value Date    TSH 1.720 11/01/2022       Lab Results   Component Value Date    WBC 9.5 11/02/2022    HGB 13.2 11/02/2022    HCT 38.6 11/02/2022    MCV 88.6 11/02/2022     11/02/2022         Health Maintenance   Topic Date Due    COVID-19 Vaccine (1) Never done    Pneumococcal 0-64 years Vaccine (1 - PCV) Never done    Hepatitis A vaccine (2 of 2 - 2-dose series) 09/14/2013    Diabetic retinal exam  Never done    Hepatitis C screen  Never done    Pap smear  Never done    Flu vaccine (1) 08/01/2022    Depression Monitoring  01/28/2023    Diabetic foot exam  03/23/2023    Diabetic microalbuminuria test  06/16/2023    A1C test (Diabetic or Prediabetic)  11/15/2023    Lipids  11/15/2023    DTaP/Tdap/Td vaccine (8 - Td or Tdap) 08/17/2030    Colorectal Cancer Screen  10/06/2040    Hepatitis B vaccine  Completed    Hib vaccine  Completed    Varicella vaccine  Completed    Meningococcal (ACWY) vaccine  Completed    HIV screen  Completed       Immunization History   Administered Date(s) Administered    DTaP 1995, 03/04/1996, 04/22/1996, 01/08/1997, 05/23/2001    Hepatitis A 03/14/2013    Hepatitis B [birth year] 07/08/1996    Hib, unspecified 1995, 03/04/1996, 04/22/1996, 01/08/1997    Influenza Virus Vaccine 10/22/2013    Influenza Whole 10/22/2013    MMR 10/07/1996, 05/23/2001    Meningococcal MCV4P (Menactra) 03/14/2013    Polio IPV (IPOL) 05/23/2001    Polio OPV 1995, 03/04/1996, 04/22/1996    Tdap (Boostrix, Adacel) 10/24/2014, 08/17/2020    Varicella (Varivax) 10/07/1996, 03/14/2013       Review of Systems   Constitutional:  Negative for chills and fever. HENT: Negative. Respiratory:  Negative for cough and shortness of breath. Cardiovascular:  Negative for chest pain. Gastrointestinal:  Negative for abdominal pain and nausea. Genitourinary:  Positive for pelvic pain. Skin:  Negative for rash. Neurological:  Negative for dizziness, light-headedness and headaches. Psychiatric/Behavioral: Negative.        Objective:   Physical Exam  Constitutional:       General: She is not in acute distress. Eyes:      Pupils: Pupils are equal, round, and reactive to light. Cardiovascular:      Rate and Rhythm: Normal rate and regular rhythm. Heart sounds: No murmur heard. Pulmonary:      Effort: Pulmonary effort is normal.      Breath sounds: Normal breath sounds. No wheezing. Abdominal:      General: Bowel sounds are normal. There is no distension. Palpations: Abdomen is soft. Tenderness: There is no abdominal tenderness. Musculoskeletal:         General: No tenderness. Normal range of motion. Cervical back: Normal range of motion and neck supple. Skin:     General: Skin is warm and dry. Findings: No rash. Assessment:       Diagnosis Orders   1. Type 2 diabetes mellitus without complication, without long-term current use of insulin ([facility])  dapagliflozin (FARXIGA) 10 MG tablet    CBC    Hemoglobin A1C      2. Hypertriglyceridemia  atorvastatin (LIPITOR) 20 MG tablet    Lipid Panel    Comprehensive Metabolic Panel    TSH with Reflex      3. Morbid obesity with BMI of 40.0-44.9, adult ([address]        4. Major depressive disorder, recurrent episode, mild ([address]        5. Pseudoseizures ([address]        6. POTS (postural orthostatic tachycardia syndrome)        7. EDS (Melissa-Danlos syndrome), high risk        8.  Gastroesophageal reflux disease without esophagitis                  Plan:      Immunizations and Prophylaxis tx discussed for overseas travel  Chronic conditions stable  Labs reviewed  Refills as above   - Farxiga 10 mg for DM    - add Trulicity back on after new year and insurance change   - add on STATIN to lower TRIG  Follow up with [facility]  Immunizations discussed  Labs as above in 6 months  RTO in 6 months            Current Outpatient Medications   Medication Sig Dispense Refill    dapagliflozin (FARXIGA) 10 MG tablet Take 1 tablet by mouth every morning 90 tablet 1    atorvastatin (LIPITOR) 20 MG tablet Take 1 tablet by mouth daily 90 tablet 1 omeprazole (PRILOSEC) 40 MG delayed release capsule Take 1 capsule by mouth daily PT TAKES ONCE AT HS 90 capsule 1    pioglitazone (ACTOS) 30 MG tablet Take 1 tablet by mouth in the morning. 90 tablet 3    fenofibrate (TRIGLIDE) 160 MG tablet Take 1 tablet by mouth daily 90 tablet 1    DULoxetine (CYMBALTA) 30 MG extended release capsule Take 1 capsule by mouth daily      sertraline (ZOLOFT) 25 MG tablet Take 25 mg by mouth daily      busPIRone (BUSPAR) 15 MG tablet Take 45 mg by mouth nightly       OXcarbazepine (TRILEPTAL) 600 MG tablet Take 600 mg by mouth daily       QUEtiapine (SEROQUEL) 300 MG tablet Take 300 mg by mouth nightly      QUEtiapine (SEROQUEL) 50 MG tablet Take 50 mg by mouth every morning (before breakfast)      ARIPiprazole (ABILIFY) 15 MG tablet Take 25 mg by mouth nightly      sertraline (ZOLOFT) 50 MG tablet Take 50 mg by mouth nightly       cetirizine (ZYRTEC) 10 MG tablet Take 1 tablet by mouth daily 90 tablet 3    sucralfate (CARAFATE) 1 GM/10ML suspension Take 10 mLs by mouth 4 times daily for 7 days 280 mL 0     No current facility-administered medications for this visit.

## 2023-01-09 DIAGNOSIS — K21.9 GASTROESOPHAGEAL REFLUX DISEASE WITHOUT ESOPHAGITIS: ICD-10-CM

## 2023-01-09 DIAGNOSIS — E11.9 TYPE 2 DIABETES MELLITUS WITHOUT COMPLICATION, WITHOUT LONG-TERM CURRENT USE OF INSULIN (HCC): ICD-10-CM

## 2023-01-09 DIAGNOSIS — E78.1 HYPERTRIGLYCERIDEMIA: ICD-10-CM

## 2023-01-09 RX ORDER — ATORVASTATIN CALCIUM 20 MG/1
20 TABLET, FILM COATED ORAL DAILY
Qty: 90 TABLET | Refills: 1 | Status: SHIPPED | OUTPATIENT
Start: 2023-01-09

## 2023-01-09 RX ORDER — PIOGLITAZONEHYDROCHLORIDE 30 MG/1
30 TABLET ORAL DAILY
Qty: 90 TABLET | Refills: 3 | Status: SHIPPED | OUTPATIENT
Start: 2023-01-09

## 2023-01-09 RX ORDER — OMEPRAZOLE 40 MG/1
40 CAPSULE, DELAYED RELEASE ORAL DAILY
Qty: 90 CAPSULE | Refills: 1 | Status: SHIPPED | OUTPATIENT
Start: 2023-01-09

## 2023-01-09 NOTE — TELEPHONE ENCOUNTER
Pt called office stating she is leaving for [facility] in [address] and 3 of her meds will take 2wks from the mail in company to get here. She is asking for Actos, Prilosec and Lipitor to be sent to [facility]. If no call back she will check with the pharmacy after 1pm today. Refill if appropriate.

## 2023-01-30 ENCOUNTER — TELEPHONE (OUTPATIENT)
Dept: FAMILY MEDICINE CLINIC | Age: 28
End: 2023-01-30

## 2023-01-30 NOTE — TELEPHONE ENCOUNTER
Pt is over in [facility] visiting her husbands family she's contracted varicella \"chicken pox\" saw a provider pt is wanting to make sure the medication prescribed is okay for her to take. Ebastine (Ebasp) 20 mg qd  Vouerax 800 mg tid  Pranzy ? On mg bid. Please advise for a call to pt.

## 2023-01-30 NOTE — TELEPHONE ENCOUNTER
Third medication Zoriraz: spelled per  Zovirax. FYI. Pt voiced understanding okay to take below medication.

## 2023-02-13 ENCOUNTER — NURSE ONLY (OUTPATIENT)
Dept: LAB | Age: 28
End: 2023-02-13

## 2023-02-13 ENCOUNTER — OFFICE VISIT (OUTPATIENT)
Dept: FAMILY MEDICINE CLINIC | Age: 28
End: 2023-02-13
Payer: MEDICARE

## 2023-02-13 VITALS
SYSTOLIC BLOOD PRESSURE: 122 MMHG | DIASTOLIC BLOOD PRESSURE: 78 MMHG | BODY MASS INDEX: 47.11 KG/M2 | HEART RATE: 116 BPM | WEIGHT: 293 LBS | RESPIRATION RATE: 20 BRPM

## 2023-02-13 DIAGNOSIS — R19.7 DIARRHEA, UNSPECIFIED TYPE: ICD-10-CM

## 2023-02-13 DIAGNOSIS — R53.83 OTHER FATIGUE: ICD-10-CM

## 2023-02-13 DIAGNOSIS — D50.9 IRON DEFICIENCY ANEMIA, UNSPECIFIED IRON DEFICIENCY ANEMIA TYPE: ICD-10-CM

## 2023-02-13 DIAGNOSIS — E11.9 TYPE 2 DIABETES MELLITUS WITHOUT COMPLICATION, WITHOUT LONG-TERM CURRENT USE OF INSULIN (HCC): ICD-10-CM

## 2023-02-13 DIAGNOSIS — R56.9 PSEUDOSEIZURES (HCC): ICD-10-CM

## 2023-02-13 DIAGNOSIS — G90.A POTS (POSTURAL ORTHOSTATIC TACHYCARDIA SYNDROME): ICD-10-CM

## 2023-02-13 DIAGNOSIS — R19.7 DIARRHEA, UNSPECIFIED TYPE: Primary | ICD-10-CM

## 2023-02-13 DIAGNOSIS — E66.01 MORBID OBESITY WITH BMI OF 40.0-44.9, ADULT (HCC): ICD-10-CM

## 2023-02-13 DIAGNOSIS — F33.0 MAJOR DEPRESSIVE DISORDER, RECURRENT EPISODE, MILD (HCC): ICD-10-CM

## 2023-02-13 LAB
ANION GAP SERPL CALC-SCNC: 13 MEQ/L (ref 8–16)
BUN SERPL-MCNC: 12 MG/DL (ref 7–22)
C CAYETANENSIS DNA SPEC QL NAA+PROBE: NOT DETECTED
C DIFF TOX GENS STL QL NAA+PROBE: NOT DETECTED
CALCIUM SERPL-MCNC: 9.1 MG/DL (ref 8.5–10.5)
CAMPY SP DNA.DIARRHEA STL QL NAA+PROBE: NOT DETECTED
CHLORIDE SERPL-SCNC: 105 MEQ/L (ref 98–111)
CO2 SERPL-SCNC: 24 MEQ/L (ref 23–33)
CREAT SERPL-MCNC: 0.6 MG/DL (ref 0.4–1.2)
CRYPTOSP DNA SPEC QL NAA+PROBE: NOT DETECTED
DEPRECATED RDW RBC AUTO: 47.3 FL (ref 35–45)
E COLI O157H7 DNA SPEC QL NAA+PROBE: NORMAL
E HISTOLYT DNA SPEC QL NAA+PROBE: NOT DETECTED
EAEC PAA PLAS AGGR+AATA ST NAA+NON-PRB: NOT DETECTED
EC STX1+STX2 + H7 FLIC SPEC NAA+PROBE: NOT DETECTED
EPEC EAE GENE STL QL NAA+NON-PROBE: NOT DETECTED
ERYTHROCYTE [DISTWIDTH] IN BLOOD BY AUTOMATED COUNT: 14 % (ref 11.5–14.5)
ETEC LTA+ST1A+ST1B TOX ST NAA+NON-PROBE: NOT DETECTED
FERRITIN SERPL IA-MCNC: 66 NG/ML (ref 10–291)
G LAMBLIA DNA SPEC QL NAA+PROBE: NOT DETECTED
GFR SERPL CREATININE-BSD FRML MDRD: > 60 ML/MIN/1.73M2
GLUCOSE SERPL-MCNC: 140 MG/DL (ref 70–108)
HADV DNA SPEC QL NAA+PROBE: NOT DETECTED
HASTV RNA SPEC QL NAA+PROBE: NOT DETECTED
HCT VFR BLD AUTO: 41.1 % (ref 37–47)
HGB BLD-MCNC: 13.4 GM/DL (ref 12–16)
MCH RBC QN AUTO: 30.2 PG (ref 26–33)
MCHC RBC AUTO-ENTMCNC: 32.6 GM/DL (ref 32.2–35.5)
MCV RBC AUTO: 92.6 FL (ref 81–99)
NOROVIRUS GI + GII RNA STL NAA+PROBE: NOT DETECTED
P SHIGELLOIDES DNA STL QL NAA+PROBE: NOT DETECTED
PLATELET # BLD AUTO: 220 THOU/MM3 (ref 130–400)
PMV BLD AUTO: 10.2 FL (ref 9.4–12.4)
POTASSIUM SERPL-SCNC: 3.7 MEQ/L (ref 3.5–5.2)
RBC # BLD AUTO: 4.44 MILL/MM3 (ref 4.2–5.4)
RV RNA SPEC QL NAA+PROBE: NOT DETECTED
SALMONELLA DNA SPEC QL NAA+PROBE: NOT DETECTED
SAPOVIRUS RNA SPEC QL NAA+PROBE: NOT DETECTED
SHIGELLA SP+EIEC IPAH ST NAA+NON-PROBE: NOT DETECTED
SODIUM SERPL-SCNC: 142 MEQ/L (ref 135–145)
V CHOLERAE DNA SPEC QL NAA+PROBE: NOT DETECTED
VIBRIO DNA SPEC NAA+PROBE: NOT DETECTED
WBC # BLD AUTO: 10.4 THOU/MM3 (ref 4.8–10.8)
Y ENTERO RECN STL QL NAA+PROBE: NOT DETECTED

## 2023-02-13 PROCEDURE — 99214 OFFICE O/P EST MOD 30 MIN: CPT | Performed by: NURSE PRACTITIONER

## 2023-02-13 RX ORDER — DULOXETIN HYDROCHLORIDE 60 MG/1
2 CAPSULE, DELAYED RELEASE ORAL NIGHTLY
COMMUNITY
Start: 2022-12-13

## 2023-02-13 RX ORDER — DULAGLUTIDE 0.75 MG/.5ML
0.75 INJECTION, SOLUTION SUBCUTANEOUS WEEKLY
Qty: 6 ML | Refills: 1 | Status: SHIPPED | OUTPATIENT
Start: 2023-02-13

## 2023-02-13 RX ORDER — FERROUS SULFATE 325(65) MG
325 TABLET ORAL
Qty: 180 TABLET | Refills: 1 | Status: SHIPPED | OUTPATIENT
Start: 2023-02-13

## 2023-02-13 RX ORDER — BUSPIRONE HYDROCHLORIDE 30 MG/1
1.5 TABLET ORAL NIGHTLY
COMMUNITY
Start: 2023-01-05

## 2023-02-13 SDOH — ECONOMIC STABILITY: INCOME INSECURITY: HOW HARD IS IT FOR YOU TO PAY FOR THE VERY BASICS LIKE FOOD, HOUSING, MEDICAL CARE, AND HEATING?: NOT VERY HARD

## 2023-02-13 SDOH — ECONOMIC STABILITY: HOUSING INSECURITY
IN THE LAST 12 MONTHS, WAS THERE A TIME WHEN YOU DID NOT HAVE A STEADY PLACE TO SLEEP OR SLEPT IN A SHELTER (INCLUDING NOW)?: NO

## 2023-02-13 SDOH — ECONOMIC STABILITY: FOOD INSECURITY: WITHIN THE PAST 12 MONTHS, YOU WORRIED THAT YOUR FOOD WOULD RUN OUT BEFORE YOU GOT MONEY TO BUY MORE.: NEVER TRUE

## 2023-02-13 SDOH — ECONOMIC STABILITY: FOOD INSECURITY: WITHIN THE PAST 12 MONTHS, THE FOOD YOU BOUGHT JUST DIDN'T LAST AND YOU DIDN'T HAVE MONEY TO GET MORE.: NEVER TRUE

## 2023-02-13 ASSESSMENT — PATIENT HEALTH QUESTIONNAIRE - PHQ9
10. IF YOU CHECKED OFF ANY PROBLEMS, HOW DIFFICULT HAVE THESE PROBLEMS MADE IT FOR YOU TO DO YOUR WORK, TAKE CARE OF THINGS AT HOME, OR GET ALONG WITH OTHER PEOPLE: 0
4. FEELING TIRED OR HAVING LITTLE ENERGY: 0
SUM OF ALL RESPONSES TO PHQ QUESTIONS 1-9: 0
SUM OF ALL RESPONSES TO PHQ QUESTIONS 1-9: 0
2. FEELING DOWN, DEPRESSED OR HOPELESS: 0
SUM OF ALL RESPONSES TO PHQ9 QUESTIONS 1 & 2: 0
6. FEELING BAD ABOUT YOURSELF - OR THAT YOU ARE A FAILURE OR HAVE LET YOURSELF OR YOUR FAMILY DOWN: 0
8. MOVING OR SPEAKING SO SLOWLY THAT OTHER PEOPLE COULD HAVE NOTICED. OR THE OPPOSITE, BEING SO FIGETY OR RESTLESS THAT YOU HAVE BEEN MOVING AROUND A LOT MORE THAN USUAL: 0
7. TROUBLE CONCENTRATING ON THINGS, SUCH AS READING THE NEWSPAPER OR WATCHING TELEVISION: 0
SUM OF ALL RESPONSES TO PHQ QUESTIONS 1-9: 0
3. TROUBLE FALLING OR STAYING ASLEEP: 0
SUM OF ALL RESPONSES TO PHQ QUESTIONS 1-9: 0
5. POOR APPETITE OR OVEREATING: 0
9. THOUGHTS THAT YOU WOULD BE BETTER OFF DEAD, OR OF HURTING YOURSELF: 0
1. LITTLE INTEREST OR PLEASURE IN DOING THINGS: 0

## 2023-02-13 ASSESSMENT — ENCOUNTER SYMPTOMS
DIARRHEA: 1
ABDOMINAL PAIN: 0
COUGH: 1
SHORTNESS OF BREATH: 0
NAUSEA: 0

## 2023-02-13 NOTE — PATIENT INSTRUCTIONS
You may receive a survey regarding the care you received during your visit. Your input is valuable to us. We encourage you to complete and return your survey. We hope you will choose us in the future for your healthcare needs. Tobacco Cessation Programs     Telephonic behavior modification  1-800-QUIT-NOW (203-1374)  Counseling service for those who are ready to quit using tobacco.    Available for uninsured [place] residents, Medicaid recipients, pregnant women, or patients whose health plans or employers are members of the [address] [place] [address] behavior modification  http://[place]. Quitlogix. org  Online support program to help patients through each step of the quitting process. Available 24 hours a day 7 days a week. Provides up to date researched based tool, step-by-step guides, and motivational messages. Online behavior modification  www.lungusa.org/stop-smoking/how-to-quit  HelpLine: 1-800-LUNG-USA (458-0096)  Email questions to:  Andrew@[employer]   Website offers resources to help tobacco users figure out their reasons for quitting and then take the big step of quitting for good. Hypnosis  Location: [address] [place] [address], [place], [place]  Contact: Carloz Triplett, PhD at [phone]  Hypnosis for tobacco cessation  Cost $225 for the initial session and $175 for each session afterwards. Most patients require 6-8 sessions. There is the option to submit through the patients insurance. Hypnosis and behavior modification  Location: [place] [address],  [facility] [address] [place], [place]  Contact: Kathy Ramirez, PhD at [phone]  Counseling and hypnosis for nicotine addition  Cost: For uninsured patients:  Please call above phone number  Cost for insured patients depends on patients insurance plan.     Behavior modification  Location: [facility], [address] [facility] [place] 50: [phone]  Services include four one-on-one appointments between the patient and a respiratory therapist.  The four appointments span over three weeks. The respiratory therapist schedules one of the appointments to occur 48 hours after the patients quit date. Cost $100 total for the four sessions.   Tobacco cessation products are not included in the cost and are not provided by [facility].

## 2023-02-13 NOTE — PROGRESS NOTES
Subjective:      Patient ID: Caryn Berg 1995 is a 32 y.o. female here for evaluation. Chief Complaint   Patient presents with    Varicella     Rash is scabbed over now    Discuss Labs     Patient is sending through [employer]    Extremity Weakness     Started when she was [place]    Dizziness    Diarrhea    Cough       Was in [place]. Contracted chicken Pox. Was placed on antivirals over there. Rash was from head to toe. Is now scabbing over. Returned home on Saturday from [facility]. Was having diarrhea prior to leaving. Dizziness and weakness. Shakes. Mild cough. Did eat from street vendors. Daughter was also sick with \"something\"    CBC done in [facility] that showed microcytosis. No anemia.      Patient Active Problem List   Diagnosis    Major depressive disorder, recurrent episode, mild (HCC)    Low self esteem    KARLIE (generalized anxiety disorder)    Obesity    Obstructive sleep apnea    Snores    Sleep disturbances    Daytime somnolence    Sleep talking    Night terrors, adult    Bruxism (teeth grinding)    Restless sleeper    Anxiety    Abnormal thyroid function test    Trigeminal neuralgia    Inappropriate sinus tachycardia    POTS (postural orthostatic tachycardia syndrome)    Syncope and collapse    Neck discomfort    Thyroid cyst    Pulmonary embolism (HCC)    Breast pain, left    Positive CAESAR (antinuclear antibody)    Hypotension    S/P ablation of ventricular arrhythmia    Pneumonia    Hypokalemia    DUB (dysfunctional uterine bleeding)    Palpitations    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    Closed right ankle fracture, initial encounter    Status post open reduction with internal fixation (ORIF) of fracture of ankle    Tear of deltoid ligament of ankle, right, initial encounter    Abdominal pain during pregnancy in second trimester    Maternal obesity affecting pregnancy, antepartum    Postpartum care following  delivery    Biliary colic symptom Dizziness, nonspecific    Orthostatic dizziness    Borderline personality disorder (HCC)    Pseudoseizures (HCC)    Tachyarrhythmia    Pelvic pain    Effusion of ankle or foot joint    Exostosis of bone of ankle    Muscle wasting and atrophy, not elsewhere classified, right ankle and foot    Other synovitis and tenosynovitis, right ankle and foot    Pain due to internal prosthetic device    Pain in joint involving ankle and foot    Pain in right knee    Peroneal tendinitis    Pes planus    Sprain of deltoid ligament of ankle    Tendon contracture       CARDIO - Dr. Linda Perera - Dr. Edelmira Severance - Dr. Marina Ames - Dr. Erick Su, [address] with CARDIO. Had ECHO, TILT and Holter for POTS    Vitals:    02/13/23 1257   BP: 122/78   Pulse: (!) 116   Resp: 20        Labs reviewed. On atypical antipsychotics for MDD. Follows with [facility] Body mass index is 47.11 kg/m². Metformin intolerance GI SE . Actos 30 mg and Farxiga 10 mg. Was to be on Trulicity 1.5 mg Weekly but insurance denied. Was tolerating well with good appetite suppressant. Wt Readings from Last 3 Encounters:   02/13/23 (!) 328 lb 4.8 oz (148.9 kg)   11/17/22 (!) 326 lb 8 oz (148.1 kg)   11/03/22 (!) 326 lb (147.9 kg)       Labs reviewed    On fenofibrate 160 mg. TRIG improved.      Lab Results   Component Value Date    LABA1C 6.6 (H) 11/15/2022    LABA1C 6.3 06/16/2022    LABA1C 6.6 (H) 03/22/2022     Lab Results   Component Value Date    .7 07/27/2020       No components found for: CHLPL  Lab Results   Component Value Date    TRIG 520 (H) 11/15/2022    TRIG 803 (H) 06/16/2022    TRIG 399 (H) 09/24/2021     Lab Results   Component Value Date    HDL 40 11/15/2022    HDL 36 06/16/2022    HDL 44 09/24/2021     Lab Results   Component Value Date    LDLCALC SEE BELOW 11/15/2022    LDLCALC SEE BELOW 06/16/2022    LDLCALC 72 09/24/2021     No results found for: LABVLDL Chemistry        Component Value Date/Time     11/02/2022 2111    K 3.7 11/02/2022 2111    K 3.7 06/27/2022 0034     11/02/2022 2111    CO2 24 11/02/2022 2111    BUN 10 11/02/2022 2111    CREATININE 0.61 11/02/2022 2111        Component Value Date/Time    CALCIUM 8.60 (L) 11/02/2022 2111    ALKPHOS 91 11/02/2022 2111    AST 18 11/02/2022 2111    ALT 27 11/02/2022 2111    BILITOT 0.3 11/02/2022 2111            Lab Results   Component Value Date    TSH 1.720 11/01/2022       Lab Results   Component Value Date    WBC 9.5 11/02/2022    HGB 13.2 11/02/2022    HCT 38.6 11/02/2022    MCV 88.6 11/02/2022     11/02/2022         Health Maintenance   Topic Date Due    COVID-19 Vaccine (1) Never done    Pneumococcal 0-64 years Vaccine (1 - PCV) Never done    Hepatitis A vaccine (2 of 2 - 2-dose series) 09/14/2013    Diabetic retinal exam  Never done    Hepatitis C screen  Never done    Pap smear  Never done    Flu vaccine (1) 08/01/2022    Depression Monitoring  01/28/2023    Diabetic foot exam  03/23/2023    Diabetic Alb to Cr ratio (uACR) test  06/16/2023    GFR test (Diabetes, CKD 3-4, OR last GFR 15-59)  11/01/2023    A1C test (Diabetic or Prediabetic)  11/15/2023    Lipids  11/15/2023    DTaP/Tdap/Td vaccine (8 - Td or Tdap) 08/17/2030    Colorectal Cancer Screen  10/06/2040    Hepatitis B vaccine  Completed    Hib vaccine  Completed    Varicella vaccine  Completed    Meningococcal (ACWY) vaccine  Completed    HIV screen  Completed       Immunization History   Administered Date(s) Administered    DTaP 1995, 03/04/1996, 04/22/1996, 01/08/1997, 05/23/2001    Hepatitis A 03/14/2013    Hepatitis B [birth year], 07/08/1996    Hib, unspecified 1995, 03/04/1996, 04/22/1996, 01/08/1997    Influenza Virus Vaccine 10/22/2013    Influenza Whole 10/22/2013    MMR 10/07/1996, 05/23/2001    Meningococcal MCV4P (Menactra) 03/14/2013    Polio IPV (IPOL) 05/23/2001    Polio OPV 1995, 03/04/1996, 04/22/1996    Tdap (Boostrix, Adacel) 10/24/2014, 08/17/2020    Varicella (Varivax) 10/07/1996, 03/14/2013       Review of Systems   Constitutional:  Positive for fatigue. Negative for chills and fever. HENT: Negative. Respiratory:  Positive for cough. Negative for shortness of breath. Cardiovascular:  Negative for chest pain. Gastrointestinal:  Positive for diarrhea. Negative for abdominal pain and nausea. Genitourinary:  Negative for pelvic pain. Skin:  Negative for rash. Neurological:  Positive for weakness and light-headedness. Negative for dizziness and headaches. Psychiatric/Behavioral: Negative. Objective:   Physical Exam  Constitutional:       General: She is not in acute distress. Eyes:      Pupils: Pupils are equal, round, and reactive to light. Cardiovascular:      Rate and Rhythm: Normal rate and regular rhythm. Heart sounds: No murmur heard. Pulmonary:      Effort: Pulmonary effort is normal.      Breath sounds: Normal breath sounds. No wheezing. Abdominal:      General: Bowel sounds are normal. There is no distension. Palpations: Abdomen is soft. Tenderness: There is no abdominal tenderness. Musculoskeletal:         General: No tenderness. Normal range of motion. Cervical back: Normal range of motion and neck supple. Skin:     General: Skin is warm and dry. Findings: No rash. Assessment:       Diagnosis Orders   1. Diarrhea, unspecified type  GI Bacterial Pathogens By PCR      2. Other fatigue        3. Iron deficiency anemia, unspecified iron deficiency anemia type  ferrous sulfate (IRON 325) 325 (65 Fe) MG tablet    CBC    Ferritin      4. Type 2 diabetes mellitus without complication, without long-term current use of insulin ([facility])  Basic Metabolic Panel    Dulaglutide (TRULICITY) 6.89 OL/7.4QJ SOPN      5. Morbid obesity with BMI of 40.0-44.9, adult ([facility]        6.  Major depressive disorder, recurrent episode, mild ([facility] 7. Pseudoseizures ([address]        8. POTS (postural orthostatic tachycardia syndrome)                  Plan:      Colitis infection from [place]? Travelers Diarrhea?   GI Panel  CBC and BMP  Ferritin level for PHILIP  Chronic conditions stable  Labs reviewed  Refills as above   - add Trulicity back on   Follow up with Special[facility]  DUNG in 3 months            Current Outpatient Medications   Medication Sig Dispense Refill    busPIRone (BUSPAR) 30 MG tablet Take 1.5 tablets by mouth nightly      DULoxetine (CYMBALTA) 60 MG extended release capsule Take 2 capsules by mouth nightly      ferrous sulfate (IRON 325) 325 (65 Fe) MG tablet Take 1 tablet by mouth daily (with breakfast) 180 tablet 1    Dulaglutide (TRULICITY) 6.76 RS/6.1FP SOPN Inject 0.75 mg into the skin once a week 6 mL 1    pioglitazone (ACTOS) 30 MG tablet Take 1 tablet by mouth daily 90 tablet 3    omeprazole (PRILOSEC) 40 MG delayed release capsule Take 1 capsule by mouth daily PT TAKES ONCE AT HS 90 capsule 1    atorvastatin (LIPITOR) 20 MG tablet Take 1 tablet by mouth daily 90 tablet 1    dapagliflozin (FARXIGA) 10 MG tablet Take 1 tablet by mouth every morning 90 tablet 1    fenofibrate (TRIGLIDE) 160 MG tablet Take 1 tablet by mouth daily 90 tablet 1    sertraline (ZOLOFT) 25 MG tablet Take 25 mg by mouth daily      busPIRone (BUSPAR) 15 MG tablet Take 45 mg by mouth nightly       OXcarbazepine (TRILEPTAL) 600 MG tablet Take 600 mg by mouth daily       QUEtiapine (SEROQUEL) 300 MG tablet Take 300 mg by mouth nightly      QUEtiapine (SEROQUEL) 50 MG tablet Take 50 mg by mouth every morning (before breakfast)      ARIPiprazole (ABILIFY) 15 MG tablet Take 25 mg by mouth nightly      sertraline (ZOLOFT) 50 MG tablet Take 50 mg by mouth nightly       cetirizine (ZYRTEC) 10 MG tablet Take 1 tablet by mouth daily 90 tablet 3    sucralfate (CARAFATE) 1 GM/10ML suspension Take 10 mLs by mouth 4 times daily for 7 days 280 mL 0     No current facility-administered medications for this visit.

## 2023-02-15 ENCOUNTER — OFFICE VISIT (OUTPATIENT)
Dept: FAMILY MEDICINE CLINIC | Age: 28
End: 2023-02-15
Payer: COMMERCIAL

## 2023-02-15 VITALS
SYSTOLIC BLOOD PRESSURE: 122 MMHG | HEART RATE: 108 BPM | WEIGHT: 293 LBS | TEMPERATURE: 98.3 F | RESPIRATION RATE: 18 BRPM | BODY MASS INDEX: 47.12 KG/M2 | DIASTOLIC BLOOD PRESSURE: 66 MMHG

## 2023-02-15 DIAGNOSIS — J31.0 RHINOSINUSITIS: ICD-10-CM

## 2023-02-15 DIAGNOSIS — J32.9 RHINOSINUSITIS: ICD-10-CM

## 2023-02-15 DIAGNOSIS — H66.92 LEFT ACUTE OTITIS MEDIA: Primary | ICD-10-CM

## 2023-02-15 PROCEDURE — 99213 OFFICE O/P EST LOW 20 MIN: CPT | Performed by: NURSE PRACTITIONER

## 2023-02-15 RX ORDER — LEVOFLOXACIN 500 MG/1
500 TABLET, FILM COATED ORAL DAILY
Qty: 10 TABLET | Refills: 0 | Status: SHIPPED | OUTPATIENT
Start: 2023-02-15 | End: 2023-02-25

## 2023-02-15 ASSESSMENT — ENCOUNTER SYMPTOMS
COUGH: 0
SINUS PRESSURE: 1
NAUSEA: 0
ABDOMINAL PAIN: 0
SORE THROAT: 1
SHORTNESS OF BREATH: 0
RHINORRHEA: 1

## 2023-02-15 NOTE — PATIENT INSTRUCTIONS
You may receive a survey regarding the care you received during your visit. Your input is valuable to us. We encourage you to complete and return your survey. We hope you will choose us in the future for your healthcare needs. Tobacco Cessation Programs     Telephonic behavior modification  1-800-QUIT-NOW (732-8523)  Counseling service for those who are ready to quit using tobacco.    Available for uninsured [place] residents, Medicaid recipients, pregnant women, or patients whose health plans or employers are members of the [address] [place] [address] behavior modification  http://[place]. Quitlogix. org  Online support program to help patients through each step of the quitting process. Available 24 hours a day 7 days a week. Provides up to date researched based tool, step-by-step guides, and motivational messages. Online behavior modification  www.lungusa.org/stop-smoking/how-to-quit  HelpLine: 1-800-LUNG-USA (862-2641)  Email questions to:  Josué@[employer]. org   Website offers resources to help tobacco users figure out their reasons for quitting and then take the big step of quitting for good. Hypnosis  Location: [address] [facility], [address], [place]  Contact: Zita Ramos, PhD at [phone]  Hypnosis for tobacco cessation  Cost $225 for the initial session and $175 for each session afterwards. Most patients require 6-8 sessions. There is the option to submit through the patients insurance. Hypnosis and behavior modification  Location: [place] [address],  [facility] [address] [employer], [place]  Contact: Florecita Aly, PhD at [phone]  Counseling and hypnosis for nicotine addition  Cost: For uninsured patients:  Please call above phone number  Cost for insured patients depends on patients insurance plan.     Behavior modification  Location: [facility], [address] [facility] [address] [address]: [phone]  Services include four one-on-one appointments between the patient and a respiratory therapist.  The four appointments span over three weeks. The respiratory therapist schedules one of the appointments to occur 48 hours after the patients quit date. Cost $100 total for the four sessions.   Tobacco cessation products are not included in the cost and are not provided by [facility].

## 2023-02-15 NOTE — PROGRESS NOTES
Annettemichelle Tovar (1995) 32 y.o. female here for evaluation of the following chief complaint(s):      HPI:  Chief Complaint   Patient presents with    Ear Fullness    Otalgia     drainage    Head Congestion     Swollen glands    Pharyngitis       Was seen 2 days ago. Returned home on Saturday from [facility]. Was having diarrhea prior to leaving. Dizziness and weakness. Shakes. Mild cough. Since that time has developed more congestion, runny nose, ear fullness, ear drianage, ear pain, ST and glands feel swollen.      Vitals:    02/15/23 1449   BP: 122/66   Pulse: (!) 108   Resp: 18   Temp: 98.3 °F (36.8 °C)       Patient Active Problem List   Diagnosis    Major depressive disorder, recurrent episode, mild (HCC)    Low self esteem    KARLIE (generalized anxiety disorder)    Obesity    Obstructive sleep apnea    Snores    Sleep disturbances    Daytime somnolence    Sleep talking    Night terrors, adult    Bruxism (teeth grinding)    Restless sleeper    Anxiety    Abnormal thyroid function test    Trigeminal neuralgia    Inappropriate sinus tachycardia    POTS (postural orthostatic tachycardia syndrome)    Syncope and collapse    Neck discomfort    Thyroid cyst    Pulmonary embolism (HCC)    Breast pain, left    Positive CAESAR (antinuclear antibody)    Hypotension    S/P ablation of ventricular arrhythmia    Pneumonia    Hypokalemia    DUB (dysfunctional uterine bleeding)    Palpitations    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    Closed right ankle fracture, initial encounter    Status post open reduction with internal fixation (ORIF) of fracture of ankle    Tear of deltoid ligament of ankle, right, initial encounter    Abdominal pain during pregnancy in second trimester    Maternal obesity affecting pregnancy, antepartum    Postpartum care following  delivery    Biliary colic symptom    Dizziness, nonspecific    Orthostatic dizziness    Borderline personality disorder ([address] Pseudoseizures ([facility] [address] [address]    Tachyarrhythmia    Pelvic pain    Effusion of ankle or foot joint    Exostosis of bone of ankle    Muscle wasting and atrophy, not elsewhere classified, right ankle and foot    Other synovitis and tenosynovitis, right ankle and foot    Pain due to internal prosthetic device    Pain in joint involving ankle and foot    Pain in right knee    Peroneal tendinitis    Pes planus    Sprain of deltoid ligament of ankle    Tendon contracture       SUBJECTIVE/OBJECTIVE:  Review of Systems   Constitutional:  Positive for fatigue. Negative for chills and fever. HENT:  Positive for congestion, ear discharge, ear pain, postnasal drip, rhinorrhea, sinus pressure and sore throat. Respiratory:  Negative for cough and shortness of breath. Cardiovascular:  Negative for chest pain. Gastrointestinal:  Negative for abdominal pain and nausea. Skin:  Negative for rash. Neurological:  Positive for headaches. Negative for dizziness and light-headedness. Psychiatric/Behavioral: Negative. Physical Exam  Constitutional:       General: She is not in acute distress. HENT:      Right Ear: No swelling or tenderness. Left Ear: No swelling or tenderness. Tympanic membrane is erythematous. Nose: Mucosal edema, congestion and rhinorrhea present. Eyes:      Pupils: Pupils are equal, round, and reactive to light. Cardiovascular:      Rate and Rhythm: Normal rate and regular rhythm. Heart sounds: No murmur heard. Pulmonary:      Effort: Pulmonary effort is normal.      Breath sounds: Normal breath sounds. No wheezing. Abdominal:      General: Bowel sounds are normal. There is no distension. Palpations: Abdomen is soft. Tenderness: There is no abdominal tenderness. Musculoskeletal:         General: No tenderness. Normal range of motion. Cervical back: Normal range of motion and neck supple. Skin:     General: Skin is warm and dry. Findings: No rash. ASSESSMENT/PLAN:   Diagnosis Orders   1. Left acute otitis media  levoFLOXacin (LEVAQUIN) 500 MG tablet      2. Rhinosinusitis              MDM:  AOM on top of viral syndrome  Orders as above  Fluids and rest  OTC Coriciden  RTO PRN    An electronic signature was used to authenticate this note.     --Rei Villalobos, LVEI - CNP

## 2023-03-05 ENCOUNTER — HOSPITAL ENCOUNTER (EMERGENCY)
Age: 28
Discharge: HOME | End: 2023-03-05
Payer: COMMERCIAL

## 2023-03-05 VITALS
RESPIRATION RATE: 18 BRPM | DIASTOLIC BLOOD PRESSURE: 53 MMHG | WEIGHT: 293 LBS | TEMPERATURE: 97.3 F | BODY MASS INDEX: 41.95 KG/M2 | OXYGEN SATURATION: 98 % | HEART RATE: 120 BPM | HEIGHT: 70 IN | SYSTOLIC BLOOD PRESSURE: 129 MMHG

## 2023-03-05 DIAGNOSIS — J06.9 VIRAL UPPER RESPIRATORY TRACT INFECTION: ICD-10-CM

## 2023-03-05 DIAGNOSIS — H65.92 FLUID LEVEL BEHIND TYMPANIC MEMBRANE OF LEFT EAR: Primary | ICD-10-CM

## 2023-03-05 PROCEDURE — 99213 OFFICE O/P EST LOW 20 MIN: CPT

## 2023-03-05 PROCEDURE — 99213 OFFICE O/P EST LOW 20 MIN: CPT | Performed by: NURSE PRACTITIONER

## 2023-03-05 RX ORDER — PREDNISONE 20 MG/1
20 TABLET ORAL DAILY
Qty: 5 TABLET | Refills: 0 | Status: SHIPPED | OUTPATIENT
Start: 2023-03-05 | End: 2023-03-09

## 2023-03-05 RX ORDER — FLUTICASONE PROPIONATE 50 MCG
2 SPRAY, SUSPENSION (ML) NASAL DAILY
Qty: 16 G | Refills: 0 | Status: SHIPPED | OUTPATIENT
Start: 2023-03-05 | End: 2023-03-09

## 2023-03-05 RX ORDER — GUAIFENESIN AND PSEUDOEPHEDRINE HCL 1200; 120 MG/1; MG/1
1 TABLET, EXTENDED RELEASE ORAL 2 TIMES DAILY PRN
Qty: 30 TABLET | Refills: 0 | Status: SHIPPED | OUTPATIENT
Start: 2023-03-05 | End: 2023-03-09

## 2023-03-05 ASSESSMENT — ENCOUNTER SYMPTOMS
EYE PAIN: 0
ABDOMINAL PAIN: 0
NAUSEA: 0
DIARRHEA: 0
RHINORRHEA: 0
SORE THROAT: 1
WHEEZING: 0
VOMITING: 0
SHORTNESS OF BREATH: 0
COUGH: 1
SINUS PAIN: 1
CONSTIPATION: 0
BLOOD IN STOOL: 0
SINUS PRESSURE: 1

## 2023-03-05 ASSESSMENT — PAIN SCALES - GENERAL: PAINLEVEL_OUTOF10: 4

## 2023-03-05 ASSESSMENT — PAIN - FUNCTIONAL ASSESSMENT: PAIN_FUNCTIONAL_ASSESSMENT: 0-10

## 2023-03-05 ASSESSMENT — PAIN DESCRIPTION - DESCRIPTORS: DESCRIPTORS: PRESSURE

## 2023-03-05 ASSESSMENT — PAIN DESCRIPTION - LOCATION: LOCATION: HEAD

## 2023-03-05 NOTE — DISCHARGE INSTRUCTIONS
Go to ER for worsening symptoms, visual changes, inability to keep liquids down, inability to urinate for greater than 8 hours or difficulty breathing. Follow-up with your primary care provider. Increase fluid intake. Keep ears dry.   May take Tylenol or ibuprofen as needed for pain or fever

## 2023-03-05 NOTE — ED NOTES
Pt with complaints of sinus pressure, nasal drainage, sore throat and left ear pain that started 3 days ago. States 2 weeks ago she was diagnosed with a double ear infection and a sinus infection and she finished the antibiotics 5 days ago. States she feels as if the infections came back.       Reyes Medina, LPN  29/71/23 [id number]

## 2023-03-05 NOTE — ED PROVIDER NOTES
[address] Encounter      CHIEFCOMPLAINT       Chief Complaint   Patient presents with    Otalgia     left    Pharyngitis    Sinusitis        Nurses Notes reviewed and I agree except as noted in the HPI. HISTORY OF PRESENT ILLNESS   Faustino Singh is a 32 y.o. female who presents to urgent care with complaint of left ear pain, sore throat, sinus pain, sinus pressure and sinus congestion that started 5 days ago. Patient states that she had a sinus infection 2 weeks ago and was seen by her primary care provider on 2/15/23. Patient was given a 10-day course of Levaquin at that time. Patient denies taking any medications for her symptoms that started 5 days ago. She denies other symptoms including chest pain, shortness of breath or fever. REVIEW OF SYSTEMS     Review of Systems   Constitutional:  Negative for appetite change, chills, fatigue, fever and unexpected weight change. HENT:  Positive for congestion, ear pain, sinus pressure, sinus pain and sore throat. Negative for rhinorrhea. Eyes:  Negative for pain and visual disturbance. Respiratory:  Positive for cough. Negative for shortness of breath and wheezing. Cardiovascular:  Negative for chest pain, palpitations and leg swelling. Gastrointestinal:  Negative for abdominal pain, blood in stool, constipation, diarrhea, nausea and vomiting. Genitourinary:  Negative for dysuria, frequency and hematuria. Musculoskeletal:  Negative for arthralgias, joint swelling and neck stiffness. Skin:  Negative for rash. Neurological:  Negative for dizziness, syncope, weakness, light-headedness and headaches. Hematological:  Does not bruise/bleed easily.      PAST MEDICAL HISTORY         Diagnosis Date    ADD (attention deficit disorder)     Anxiety 04/08/2015    Anxiety attack     Asthma     exercise induced    Atypical face pain     Back pain     Bipolar 1 disorder (HCC)     Diabetes mellitus (HCC)     GDM on insulin started on -during pregnancy    Fibromyalgia     GERD (gastroesophageal reflux disease)     Hx of blood clots     Hypotension 2017    Major depressive disorder, recurrent episode, mild ([facility] 2012    Obesity 10/24/2014    WILFREDO treated with BiPAP     Pneumonia 2018    POTS (postural orthostatic tachycardia syndrome)     POTS (postural orthostatic tachycardia syndrome)     Pott disease     Pulmonary emboli ([facility] 2016    was on blood thinners for 6 months    Seizures ([facility] 2016    anxiety induced no meds last seizure 4 years ago    Tailbone injury 2015    patient broke tailbone    Thyroid disease     borderline low no meds    TMJ (dislocation of temporomandibular joint)     Trigeminal neuralgia     Wears contact lenses        SURGICAL HISTORY     Patient  has a past surgical history that includes [place] tooth extraction (); Cardiac catheterization (2016); Cardiac catheterization (2016); Colonoscopy (2015); Insertable Cardiac Monitor; Ankle fracture surgery (Right, 2020); ablation of dysrhythmic focus ();  section (N/A, 2020); Upper gastrointestinal endoscopy (2020); Upper gastrointestinal endoscopy (2015); Cholecystectomy, laparoscopic (N/A, 2021); and Hysterectomy (N/A, 2022).     CURRENT MEDICATIONS       Discharge Medication List as of 3/5/2023 10:39 AM        CONTINUE these medications which have NOT CHANGED    Details   !! busPIRone (BUSPAR) 30 MG tablet Take 1.5 tablets by mouth nightlyHistorical Med      DULoxetine (CYMBALTA) 60 MG extended release capsule Take 2 capsules by mouth nightlyHistorical Med      ferrous sulfate (IRON 325) 325 (65 Fe) MG tablet Take 1 tablet by mouth daily (with breakfast), Disp-180 tablet, R-1Normal      Dulaglutide (TRULICITY) 1.10 WK/3.5XP SOPN Inject 0.75 mg into the skin once a week, Disp-6 mL, R-1Normal      pioglitazone (ACTOS) 30 MG tablet Take 1 tablet by mouth daily, Disp-90 tablet, R-3Normal      omeprazole (PRILOSEC) 40 MG delayed release capsule Take 1 capsule by mouth daily PT TAKES ONCE AT HS, Disp-90 capsule, R-1Normal      atorvastatin (LIPITOR) 20 MG tablet Take 1 tablet by mouth daily, Disp-90 tablet, R-1Normal      dapagliflozin (FARXIGA) 10 MG tablet Take 1 tablet by mouth every morning, Disp-90 tablet, R-1Normal      fenofibrate (TRIGLIDE) 160 MG tablet Take 1 tablet by mouth daily, Disp-90 tablet, R-1Normal      !! sertraline (ZOLOFT) 25 MG tablet Take 25 mg by mouth dailyHistorical Med      !! busPIRone (BUSPAR) 15 MG tablet Take 45 mg by mouth nightly Historical Med      OXcarbazepine (TRILEPTAL) 600 MG tablet Take 600 mg by mouth daily Historical Med      !! QUEtiapine (SEROQUEL) 300 MG tablet Take 300 mg by mouth nightlyHistorical Med      !! QUEtiapine (SEROQUEL) 50 MG tablet Take 50 mg by mouth every morning (before breakfast)Historical Med      ARIPiprazole (ABILIFY) 15 MG tablet Take 25 mg by mouth nightlyHistorical Med      !! sertraline (ZOLOFT) 50 MG tablet Take 50 mg by mouth nightly Historical Med      cetirizine (ZYRTEC) 10 MG tablet Take 1 tablet by mouth daily, Disp-90 tablet, R-3Normal       !! - Potential duplicate medications found. Please discuss with provider. ALLERGIES     Patient is is allergic to dilaudid [hydromorphone hcl], flexeril [cyclobenzaprine], keflex [cephalexin], tessalon [benzonatate], augmentin [amoxicillin-pot clavulanate], lorabid [loracarbef], and minocycline. FAMILY HISTORY     Patient'sfamily history includes Anxiety Disorder in her father and mother; Bipolar Disorder in her father; Cancer in her maternal grandmother, paternal grandfather, and paternal uncle; Depression in her maternal grandfather, paternal aunt, and paternal uncle; Diabetes in her maternal grandmother and mother; Heart Attack in her maternal grandfather; High Blood Pressure in her father; Lupus in her maternal aunt; Mental Illness in her father;  No Known Problems in her brother and paternal grandmother; Ovarian Cancer in her maternal grandmother; Parkinsonism in her father. SOCIAL HISTORY     Patient  reports that she has been smoking cigarettes. She started smoking about 4 years ago. She has a 4.00 pack-year smoking history. She has never used smokeless tobacco. She reports that she does not currently use alcohol after a past usage of about 1.0 standard drink per week. She reports current drug use. Drug: Marijuana Charmayne Staмария). PHYSICAL EXAM     ED TRIAGE VITALS  BP: (!) 129/53, Temp: 97.3 °F (36.3 °C), Heart Rate: (!) 120, Resp: 18, SpO2: 98 %  Physical Exam  Vitals and nursing note reviewed. Constitutional:       Appearance: She is well-developed. HENT:      Head: Normocephalic and atraumatic. Right Ear: Tympanic membrane normal.      Left Ear: A middle ear effusion is present. Nose: Congestion present. Right Sinus: Maxillary sinus tenderness present. No frontal sinus tenderness. Left Sinus: Maxillary sinus tenderness present. No frontal sinus tenderness. Mouth/Throat:      Mouth: Mucous membranes are moist. No oral lesions. Pharynx: Posterior oropharyngeal erythema present. No oropharyngeal exudate or uvula swelling. Tonsils: No tonsillar exudate. 0 on the right. 0 on the left. Eyes:      Conjunctiva/sclera: Conjunctivae normal.   Cardiovascular:      Rate and Rhythm: Normal rate and regular rhythm. Heart sounds: Normal heart sounds. No murmur heard. No gallop. Pulmonary:      Effort: Pulmonary effort is normal. No respiratory distress. Breath sounds: Normal breath sounds. No stridor. No decreased breath sounds, wheezing, rhonchi or rales. Chest:      Chest wall: No tenderness. Musculoskeletal:         General: Normal range of motion. Cervical back: Normal range of motion and neck supple. Skin:     General: Skin is warm and dry.    Neurological:      Mental Status: She is alert and oriented to person, place, and time. DIAGNOSTIC RESULTS   Labs:No results found for this visit on 03/05/23. IMAGING:  No orders to display     URGENT CARE COURSE:        MDM      Patient presents to urgent care with complaint of left ear pain, sore throat, sinus pain, sinus pressure and sinus congestion that started 5 days ago. Patient states that she had a sinus infection 2 weeks ago and was seen by her primary care provider on 2/15/23. Patient was given a 10-day course of Levaquin at that time. Examination patient does have sinus pain and pressure. She does have a left middle ear effusion but this does not appear to be infectious. Patient will be treated symptomatically and encouraged to follow-up with her primary care provider in the next 1 to 2 days if not better. Patient instructed to go to ER for worsening symptoms, visual changes, inability to keep liquids down, inability to urinate for greater than 8 hours or difficulty breathing. Follow-up with your primary care provider. Increase fluid intake. Keep ears dry. May take Tylenol or ibuprofen as needed for pain or fever      Medications - No data to display  PROCEDURES:    Procedures    FINALIMPRESSION      1. Fluid level behind tympanic membrane of left ear    2.  Viral upper respiratory tract infection        DISPOSITION/PLAN   DISPOSITION Decision To Discharge 03/05/2023 10:39:27 AM    PATIENT REFERRED TO:  LEVI Barney - CNP  [address] [place] [address]  [facility]  [phone]    In 2 days    DISCHARGE MEDICATIONS:  Discharge Medication List as of 3/5/2023 10:39 AM        START taking these medications    Details   fluticasone (FLONASE) 50 MCG/ACT nasal spray 2 sprays by Each Nostril route daily, Disp-16 g, R-0Normal      dextromethorphan-guaiFENesin (MUCINEX DM)  MG per extended release tablet Take 1 tablet by mouth every 12 hours as needed for Cough or Congestion, Disp-30 tablet, R-0Normal      pseudoephedrine-guaiFENesin 120-1200 MG TB12 Take 1 tablet by mouth 2 times daily as needed (nasal congestion), Disp-30 tablet, R-0Normal      predniSONE (DELTASONE) 20 MG tablet Take 1 tablet by mouth daily for 5 days, Disp-5 tablet, R-0Normal           Discharge Medication List as of 3/5/2023 10:39 AM          Iron Mathews, LEVI - TISHA Mathews, LEVI - TISHA  03/05/23 1058

## 2023-03-06 ENCOUNTER — TELEPHONE (OUTPATIENT)
Dept: FAMILY MEDICINE CLINIC | Age: 28
End: 2023-03-06

## 2023-03-09 ENCOUNTER — TELEMEDICINE (OUTPATIENT)
Dept: FAMILY MEDICINE CLINIC | Age: 28
End: 2023-03-09
Payer: COMMERCIAL

## 2023-03-09 DIAGNOSIS — R05.1 ACUTE COUGH: ICD-10-CM

## 2023-03-09 DIAGNOSIS — J01.90 ACUTE BACTERIAL SINUSITIS: Primary | ICD-10-CM

## 2023-03-09 DIAGNOSIS — B96.89 ACUTE BACTERIAL SINUSITIS: Primary | ICD-10-CM

## 2023-03-09 PROCEDURE — 99213 OFFICE O/P EST LOW 20 MIN: CPT | Performed by: NURSE PRACTITIONER

## 2023-03-09 RX ORDER — DEXTROMETHORPHAN HYDROBROMIDE AND PROMETHAZINE HYDROCHLORIDE 15; 6.25 MG/5ML; MG/5ML
5 SYRUP ORAL 4 TIMES DAILY PRN
Qty: 120 ML | Refills: 0 | Status: SHIPPED | OUTPATIENT
Start: 2023-03-09 | End: 2023-03-16

## 2023-03-09 RX ORDER — LEVOFLOXACIN 500 MG/1
500 TABLET, FILM COATED ORAL DAILY
Qty: 10 TABLET | Refills: 0 | Status: SHIPPED | OUTPATIENT
Start: 2023-03-09 | End: 2023-03-19

## 2023-03-09 ASSESSMENT — ENCOUNTER SYMPTOMS
SINUS PAIN: 1
ABDOMINAL PAIN: 0
NAUSEA: 0
COUGH: 1
SINUS PRESSURE: 1
RHINORRHEA: 1
SHORTNESS OF BREATH: 0

## 2023-03-09 NOTE — PROGRESS NOTES
Subjective:      Patient ID: Lisa Schaefer is a 32 y.o. female    HPI: Acute for cough    TELEHEALTH EVALUATION -- Audio/Visual (During TTHIR-48 public health emergency)    Patient identification was verified at the start of the visit: Yes    Services were provided through a video synchronous discussion virtually to substitute for in-person clinic visit. Patient and provider were located at their individual homes. Patient has requested an audio/video evaluation for the following concern(s):    Chief Complaint   Patient presents with    Cough       Was seen at [address] [place] [address] 3/5/23     HISTORY OF Patel 78 is a 32 y.o. female who presents to urgent care with complaint of left ear pain, sore throat, sinus pain, sinus pressure and sinus congestion that started 5 days ago. Patient states that she had a sinus infection 2 weeks ago and was seen by her primary care provider on 2/15/23. Patient was given a 10-day course of Levaquin at that time. Patient denies taking any medications for her symptoms that started 5 days ago. She denies other symptoms including chest pain, shortness of breath or fever. Was prescribed Mucinex and Prednisone and dx with URI. Stopped the Prednisone due to SE. Sinus congestion, thick congestion, pressure. Coughing attacks and harsh cough. Was placed on Levaquin 1 month ago for AOM.      Patient Active Problem List   Diagnosis    Major depressive disorder, recurrent episode, mild (HCC)    Low self esteem    KARLIE (generalized anxiety disorder)    Obesity    Obstructive sleep apnea    Snores    Sleep disturbances    Daytime somnolence    Sleep talking    Night terrors, adult    Bruxism (teeth grinding)    Restless sleeper    Anxiety    Abnormal thyroid function test    Trigeminal neuralgia    Inappropriate sinus tachycardia    POTS (postural orthostatic tachycardia syndrome)    Syncope and collapse    Neck discomfort    Thyroid cyst    Pulmonary embolism (HCC)    Breast pain, left    Positive CAESAR (antinuclear antibody)    Hypotension    S/P ablation of ventricular arrhythmia    Pneumonia    Hypokalemia    DUB (dysfunctional uterine bleeding)    Palpitations    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    Closed right ankle fracture, initial encounter    Status post open reduction with internal fixation (ORIF) of fracture of ankle    Tear of deltoid ligament of ankle, right, initial encounter    Abdominal pain during pregnancy in second trimester    Maternal obesity affecting pregnancy, antepartum    Postpartum care following  delivery    Biliary colic symptom    Dizziness, nonspecific    Orthostatic dizziness    Borderline personality disorder (HCC)    Pseudoseizures (HCC)    Tachyarrhythmia    Pelvic pain    Effusion of ankle or foot joint    Exostosis of bone of ankle    Muscle wasting and atrophy, not elsewhere classified, right ankle and foot    Other synovitis and tenosynovitis, right ankle and foot    Pain due to internal prosthetic device    Pain in joint involving ankle and foot    Pain in right knee    Peroneal tendinitis    Pes planus    Sprain of deltoid ligament of ankle    Tendon contracture       Review of Systems   Constitutional:  Positive for fatigue. Negative for chills and fever. HENT:  Positive for congestion, postnasal drip, rhinorrhea, sinus pressure and sinus pain. Respiratory:  Positive for cough. Negative for shortness of breath. Cardiovascular:  Negative for chest pain. Gastrointestinal:  Negative for abdominal pain and nausea. Skin:  Negative for rash. Neurological:  Positive for headaches. Negative for dizziness and light-headedness. Psychiatric/Behavioral: Negative. Objective:   Physical Exam  Constitutional:       General: She is not in acute distress. Appearance: She is not ill-appearing. Pulmonary:      Effort: Pulmonary effort is normal. No respiratory distress.    Neurological: Mental Status: She is alert and oriented to person, place, and time. Psychiatric:         Mood and Affect: Mood normal.         Behavior: Behavior normal.       Assessment:       Diagnosis Orders   1. Acute bacterial sinusitis  levoFLOXacin (LEVAQUIN) 500 MG tablet      2. Acute cough  promethazine-dextromethorphan (PROMETHAZINE-DM) 6.25-15 MG/5ML syrup          Plan:     [facility] note reviewed  Orders as above   Fluids and rest  RTO if symptoms worsen or stay the same            [facility], was evaluated through a synchronous (real-time) audio-video encounter. The patient (or guardian if applicable) is aware that this is a billable service, which includes applicable co-pays. This Virtual Visit was conducted with patient's (and/or legal guardian's) consent. The visit was conducted pursuant to the emergency declaration under the [address] [place] [address], [address] [place] [address] authority and the [employer] and "[employer] General Act. Patient identification was verified, and a caregiver was present when appropriate. The patient was located at Home: [address] [address]  Provider was located at [facility] ([place] [address]): [address]  [facility].[place],  [address]         Total time spent for this encounter: Not billed by time    --LEVI Thomas CNP on 3/9/2023 at 1:46 PM    An electronic signature was used to authenticate this note.

## 2023-03-26 ENCOUNTER — HOSPITAL ENCOUNTER (EMERGENCY)
Age: 28
Discharge: HOME | End: 2023-03-26
Payer: COMMERCIAL

## 2023-03-26 VITALS
DIASTOLIC BLOOD PRESSURE: 83 MMHG | WEIGHT: 293 LBS | HEART RATE: 89 BPM | TEMPERATURE: 99.5 F | HEIGHT: 70 IN | RESPIRATION RATE: 20 BRPM | BODY MASS INDEX: 41.95 KG/M2 | OXYGEN SATURATION: 99 % | SYSTOLIC BLOOD PRESSURE: 130 MMHG

## 2023-03-26 DIAGNOSIS — H83.09 LABYRINTHITIS, UNSPECIFIED LATERALITY: Primary | ICD-10-CM

## 2023-03-26 LAB
ALBUMIN SERPL BCG-MCNC: 3.9 G/DL (ref 3.5–5.1)
ALP SERPL-CCNC: 112 U/L (ref 38–126)
ALT SERPL W/O P-5'-P-CCNC: 19 U/L (ref 11–66)
ANION GAP SERPL CALC-SCNC: 11 MEQ/L (ref 8–16)
AST SERPL-CCNC: 14 U/L (ref 5–40)
BASOPHILS ABSOLUTE: 0 THOU/MM3 (ref 0–0.1)
BASOPHILS NFR BLD AUTO: 0.3 %
BILIRUB SERPL-MCNC: < 0.2 MG/DL (ref 0.3–1.2)
BUN SERPL-MCNC: 9 MG/DL (ref 7–22)
CALCIUM SERPL-MCNC: 8.7 MG/DL (ref 8.5–10.5)
CHLORIDE SERPL-SCNC: 103 MEQ/L (ref 98–111)
CO2 SERPL-SCNC: 22 MEQ/L (ref 23–33)
CREAT SERPL-MCNC: 0.4 MG/DL (ref 0.4–1.2)
DEPRECATED RDW RBC AUTO: 51.7 FL (ref 35–45)
EOSINOPHIL NFR BLD AUTO: 0.3 %
EOSINOPHILS ABSOLUTE: 0 THOU/MM3 (ref 0–0.4)
ERYTHROCYTE [DISTWIDTH] IN BLOOD BY AUTOMATED COUNT: 14.8 % (ref 11.5–14.5)
GFR SERPL CREATININE-BSD FRML MDRD: > 60 ML/MIN/1.73M2
GLUCOSE SERPL-MCNC: 130 MG/DL (ref 70–108)
HCT VFR BLD AUTO: 40.1 % (ref 37–47)
HGB BLD-MCNC: 12.8 GM/DL (ref 12–16)
IMM GRANULOCYTES # BLD AUTO: 0.02 THOU/MM3 (ref 0–0.07)
IMM GRANULOCYTES NFR BLD AUTO: 0.2 %
LYMPHOCYTES ABSOLUTE: 2.8 THOU/MM3 (ref 1–4.8)
LYMPHOCYTES NFR BLD AUTO: 30.9 %
MCH RBC QN AUTO: 30.4 PG (ref 26–33)
MCHC RBC AUTO-ENTMCNC: 31.9 GM/DL (ref 32.2–35.5)
MCV RBC AUTO: 95.2 FL (ref 81–99)
MONOCYTES ABSOLUTE: 0.5 THOU/MM3 (ref 0.4–1.3)
MONOCYTES NFR BLD AUTO: 5.1 %
NEUTROPHILS NFR BLD AUTO: 63.2 %
NRBC BLD AUTO-RTO: 0 /100 WBC
OSMOLALITY SERPL CALC.SUM OF ELEC: 272.4 MOSMOL/KG (ref 275–300)
PLATELET # BLD AUTO: 206 THOU/MM3 (ref 130–400)
PMV BLD AUTO: 9.8 FL (ref 9.4–12.4)
POTASSIUM SERPL-SCNC: 3.4 MEQ/L (ref 3.5–5.2)
PROT SERPL-MCNC: 7 G/DL (ref 6.1–8)
RBC # BLD AUTO: 4.21 MILL/MM3 (ref 4.2–5.4)
SEGMENTED NEUTROPHILS ABSOLUTE COUNT: 5.7 THOU/MM3 (ref 1.8–7.7)
SODIUM SERPL-SCNC: 136 MEQ/L (ref 135–145)
WBC # BLD AUTO: 9 THOU/MM3 (ref 4.8–10.8)

## 2023-03-26 PROCEDURE — 2580000003 HC RX 258: Performed by: PHYSICIAN ASSISTANT

## 2023-03-26 PROCEDURE — 93005 ELECTROCARDIOGRAM TRACING: CPT | Performed by: PHYSICIAN ASSISTANT

## 2023-03-26 PROCEDURE — 85025 COMPLETE CBC W/AUTO DIFF WBC: CPT

## 2023-03-26 PROCEDURE — 99284 EMERGENCY DEPT VISIT MOD MDM: CPT

## 2023-03-26 PROCEDURE — 80053 COMPREHEN METABOLIC PANEL: CPT

## 2023-03-26 PROCEDURE — 36415 COLL VENOUS BLD VENIPUNCTURE: CPT

## 2023-03-26 PROCEDURE — 6370000000 HC RX 637 (ALT 250 FOR IP): Performed by: PHYSICIAN ASSISTANT

## 2023-03-26 RX ORDER — MECLIZINE HYDROCHLORIDE 25 MG/1
25 TABLET ORAL 3 TIMES DAILY PRN
Qty: 30 TABLET | Refills: 0 | Status: SHIPPED | OUTPATIENT
Start: 2023-03-26 | End: 2023-04-05

## 2023-03-26 RX ORDER — LORAZEPAM 1 MG/1
1 TABLET ORAL ONCE
Status: COMPLETED | OUTPATIENT
Start: 2023-03-26 | End: 2023-03-26

## 2023-03-26 RX ORDER — 0.9 % SODIUM CHLORIDE 0.9 %
1000 INTRAVENOUS SOLUTION INTRAVENOUS ONCE
Status: COMPLETED | OUTPATIENT
Start: 2023-03-26 | End: 2023-03-26

## 2023-03-26 RX ORDER — MECLIZINE HCL 25MG 25 MG/1
50 TABLET, CHEWABLE ORAL ONCE
Status: COMPLETED | OUTPATIENT
Start: 2023-03-26 | End: 2023-03-26

## 2023-03-26 RX ORDER — LORAZEPAM 0.5 MG/1
0.5 TABLET ORAL 3 TIMES DAILY PRN
Qty: 10 TABLET | Refills: 0 | Status: SHIPPED | OUTPATIENT
Start: 2023-03-26 | End: 2023-03-29

## 2023-03-26 RX ADMIN — MECLIZINE HYDROCHLORIDE 50 MG: 25 TABLET, CHEWABLE ORAL at 19:24

## 2023-03-26 RX ADMIN — LORAZEPAM 1 MG: 1 TABLET ORAL at 19:24

## 2023-03-26 RX ADMIN — SODIUM CHLORIDE 1000 ML: 9 INJECTION, SOLUTION INTRAVENOUS at 19:32

## 2023-03-26 ASSESSMENT — PAIN - FUNCTIONAL ASSESSMENT
PAIN_FUNCTIONAL_ASSESSMENT: NONE - DENIES PAIN
PAIN_FUNCTIONAL_ASSESSMENT: 0-10

## 2023-03-26 ASSESSMENT — PAIN SCALES - GENERAL
PAINLEVEL_OUTOF10: 3

## 2023-03-26 ASSESSMENT — ENCOUNTER SYMPTOMS
RHINORRHEA: 0
NAUSEA: 1
SINUS PRESSURE: 1
SORE THROAT: 0
COUGH: 0
ABDOMINAL PAIN: 0
PHOTOPHOBIA: 0
VOMITING: 0
SHORTNESS OF BREATH: 0
DIARRHEA: 0

## 2023-03-26 ASSESSMENT — PAIN DESCRIPTION - LOCATION: LOCATION: HEAD

## 2023-03-26 NOTE — ED NOTES
Pt resting quietly in bed. Provider at bedside assessing pt. Pt complain of generalized body aches at this time. Pt updated on POC, verbalized understanding.       Triston Kimball RN  03/26/23 0372

## 2023-03-26 NOTE — ED PROVIDER NOTES
portions of this note were completed with a voice recognition program.  Efforts were made to edit the dictations but occasionally words are mis-transcribed.)    Cecy Isidro PA-C 03/28/23 2:17 AM    HERON Wing PA-C  03/28/23 [id number]

## 2023-03-27 ENCOUNTER — PATIENT MESSAGE (OUTPATIENT)
Dept: FAMILY MEDICINE CLINIC | Age: 28
End: 2023-03-27

## 2023-03-27 LAB
EKG ATRIAL RATE: 100 BPM
EKG P AXIS: 58 DEGREES
EKG P-R INTERVAL: 150 MS
EKG Q-T INTERVAL: 334 MS
EKG QRS DURATION: 98 MS
EKG QTC CALCULATION (BAZETT): 430 MS
EKG R AXIS: 64 DEGREES
EKG T AXIS: 34 DEGREES
EKG VENTRICULAR RATE: 100 BPM

## 2023-03-27 PROCEDURE — 93010 ELECTROCARDIOGRAM REPORT: CPT | Performed by: INTERNAL MEDICINE

## 2023-03-27 NOTE — ED NOTES
PT reassessed at this time. Pt reports feeling a decrease in dizziness. Pt updated on POC, verbalized understanding. Call light in reach. Telemetry in place.       Alejandro Myers, TACO  03/26/23 2034

## 2023-03-27 NOTE — ED NOTES
Orthostatic Bps completed at this time. Pt reports feeling dizziness when changing from lying to sitting position. Pt tolerated well. Provider notified.       Master Tobias RN  03/26/23 2037

## 2023-04-04 ENCOUNTER — TELEPHONE (OUTPATIENT)
Dept: PHARMACY | Facility: CLINIC | Age: 28
End: 2023-04-04

## 2023-04-04 NOTE — TELEPHONE ENCOUNTER
review changing prescription from a 30-day supply to a 90-day supply. Left message asking for return call. Neovacshart message sent to patient.     Recent Visits  Date Type Provider Dept   02/15/23 Office Visit Tiffanie Mosqueda, APRN - CNP [facility] [address] [facility]   02/13/23 Office Visit Deradha Mosqueda, APRN - CNP [facility] [address] [facility]   11/17/22 Office Visit Deradha Finleyr, APRN - CNP [facility] [address] [facility]   11/03/22 Office Visit Deradha Finleyr, APRN - CNP [facility] [address] [facility]   06/16/22 Office Visit Deradha Finleyr, APRN - CNP [facility] [address] [facility]   03/23/22 Office Visit Tiffanie Mosqueda, APRN - CNP [facility]   01/28/22 Office Visit Tiffanie Mosqueda, APRN - CNP [facility]   11/03/21 Office Visit Tiffanie Mosqueda, APRN - CNP [facility]   Showing recent visits within past 540 days with a meds authorizing provider and meeting all other requirements  Future Appointments  Date Type Provider Dept   05/17/23 Appointment Tiffanie Mosqueda, APRN - CNP [facility]   Showing future appointments within next 150 days with a meds authorizing provider and meeting all other requirements    Future Appointments   Date Time Provider Sharonda Nice   5/17/2023  9:45 AM J[address] // Department, toll free [phone], Option 3    For Pharmacy Admin Tracking Only    Program: [address] in place:  No  Gap Closed?: No   Time Spent (min): 15

## 2023-04-06 ENCOUNTER — OFFICE VISIT (OUTPATIENT)
Dept: FAMILY MEDICINE CLINIC | Age: 28
End: 2023-04-06
Payer: COMMERCIAL

## 2023-04-06 VITALS
SYSTOLIC BLOOD PRESSURE: 122 MMHG | WEIGHT: 293 LBS | DIASTOLIC BLOOD PRESSURE: 76 MMHG | HEART RATE: 116 BPM | OXYGEN SATURATION: 97 % | HEIGHT: 70 IN | RESPIRATION RATE: 18 BRPM | BODY MASS INDEX: 41.95 KG/M2

## 2023-04-06 DIAGNOSIS — E11.9 TYPE 2 DIABETES MELLITUS WITHOUT COMPLICATION, WITHOUT LONG-TERM CURRENT USE OF INSULIN (HCC): ICD-10-CM

## 2023-04-06 DIAGNOSIS — R20.0 NUMBNESS OF LEFT ANTERIOR THIGH: Primary | ICD-10-CM

## 2023-04-06 DIAGNOSIS — G57.92 NEUROPATHY OF LEFT THIGH: ICD-10-CM

## 2023-04-06 DIAGNOSIS — B02.29 POST HERPETIC NEURALGIA: ICD-10-CM

## 2023-04-06 PROCEDURE — 99213 OFFICE O/P EST LOW 20 MIN: CPT | Performed by: NURSE PRACTITIONER

## 2023-04-06 RX ORDER — LIDOCAINE 50 MG/G
2 PATCH TOPICAL DAILY
Qty: 60 PATCH | Refills: 0 | Status: SHIPPED | OUTPATIENT
Start: 2023-04-06 | End: 2023-05-06

## 2023-04-06 RX ORDER — DULAGLUTIDE 1.5 MG/.5ML
1.5 INJECTION, SOLUTION SUBCUTANEOUS WEEKLY
Qty: 2 ML | Refills: 2 | Status: SHIPPED | OUTPATIENT
Start: 2023-04-06

## 2023-04-06 ASSESSMENT — ENCOUNTER SYMPTOMS
SHORTNESS OF BREATH: 0
ABDOMINAL PAIN: 0
NAUSEA: 0
COUGH: 0

## 2023-04-06 NOTE — PROGRESS NOTES
following  delivery    Biliary colic symptom    Dizziness, nonspecific    Orthostatic dizziness    Borderline personality disorder (HCC)    Pseudoseizures    Tachyarrhythmia    Pelvic pain    Effusion of ankle or foot joint    Exostosis of bone of ankle    Muscle wasting and atrophy, not elsewhere classified, right ankle and foot    Other synovitis and tenosynovitis, right ankle and foot    Pain due to internal prosthetic device    Pain in joint involving ankle and foot    Pain in right knee    Peroneal tendinitis    Pes planus    Sprain of deltoid ligament of ankle    Tendon contracture       CARDIO - Dr. Claudia Cheng - Dr. Patel Root - Dr. Loan Alarcon - Dr. Naga Deutsch, [address] with CARDIO. Had ECHO, TILT and Holter for POTS . Vitals:    23 1311   BP: 122/76   Pulse: (!) 116   Resp: 18   SpO2: 97%        Labs reviewed. On atypical antipsychotics for MDD. Follows with [facility] Body mass index is 47.84 kg/m². Metformin intolerance GI SE . Actos 30 mg. Was to be on Trulicity 1.5 mg Weekly but insurance denied. Was tolerating well with good appetite suppressant. Wt Readings from Last 3 Encounters:   23 (!) 333 lb 6.4 oz (151.2 kg)   23 (!) 325 lb (147.4 kg)   23 (!) 320 lb (145.2 kg)       Labs reviewed    On fenofibrate 160 mg. TRIG improved.      Lab Results   Component Value Date    LABA1C 6.6 (H) 11/15/2022    LABA1C 6.3 2022    LABA1C 6.6 (H) 2022     Lab Results   Component Value Date    .7 2020       No components found for: CHLPL  Lab Results   Component Value Date    TRIG 520 (H) 11/15/2022    TRIG 803 (H) 2022    TRIG 399 (H) 2021     Lab Results   Component Value Date    HDL 40 11/15/2022    HDL 36 2022    HDL 44 2021     Lab Results   Component Value Date    LDLCALC SEE BELOW 11/15/2022    [address] SEE BELOW 2022

## 2023-05-02 ENCOUNTER — PROCEDURE VISIT (OUTPATIENT)
Dept: NEUROLOGY | Age: 28
End: 2023-05-02
Payer: COMMERCIAL

## 2023-05-02 DIAGNOSIS — R20.0 LEFT LEG NUMBNESS: Primary | ICD-10-CM

## 2023-05-02 DIAGNOSIS — M79.605 LEFT LEG PAIN: ICD-10-CM

## 2023-05-02 PROCEDURE — 95908 NRV CNDJ TST 3-4 STUDIES: CPT | Performed by: PSYCHIATRY & NEUROLOGY

## 2023-05-02 PROCEDURE — 95886 MUSC TEST DONE W/N TEST COMP: CPT | Performed by: PSYCHIATRY & NEUROLOGY

## 2023-05-03 ENCOUNTER — HOSPITAL ENCOUNTER (EMERGENCY)
Age: 28
Discharge: HOME | End: 2023-05-03
Attending: STUDENT IN AN ORGANIZED HEALTH CARE EDUCATION/TRAINING PROGRAM
Payer: COMMERCIAL

## 2023-05-03 ENCOUNTER — APPOINTMENT (OUTPATIENT)
Dept: CT IMAGING | Age: 28
End: 2023-05-03
Payer: COMMERCIAL

## 2023-05-03 ENCOUNTER — APPOINTMENT (OUTPATIENT)
Dept: GENERAL RADIOLOGY | Age: 28
End: 2023-05-03
Payer: COMMERCIAL

## 2023-05-03 VITALS
BODY MASS INDEX: 41.95 KG/M2 | HEART RATE: 94 BPM | TEMPERATURE: 98.6 F | DIASTOLIC BLOOD PRESSURE: 71 MMHG | RESPIRATION RATE: 15 BRPM | SYSTOLIC BLOOD PRESSURE: 129 MMHG | HEIGHT: 70 IN | OXYGEN SATURATION: 96 % | WEIGHT: 293 LBS

## 2023-05-03 DIAGNOSIS — R42 VERTIGO: ICD-10-CM

## 2023-05-03 DIAGNOSIS — R07.9 CHEST PAIN, UNSPECIFIED TYPE: ICD-10-CM

## 2023-05-03 DIAGNOSIS — R11.2 NAUSEA AND VOMITING, UNSPECIFIED VOMITING TYPE: Primary | ICD-10-CM

## 2023-05-03 DIAGNOSIS — R42 DIZZINESS: ICD-10-CM

## 2023-05-03 LAB
BASOPHILS ABSOLUTE: 0 THOU/MM3 (ref 0–0.1)
BASOPHILS NFR BLD AUTO: 0.3 %
BUN BLD-MCNC: 9 MG/DL (ref 8–26)
CHLORIDE BLD-SCNC: 99 MEQ/L (ref 98–109)
CHP ED QC CHECK: 147
CO2 BLD CALC-SCNC: 25 MEQ/L (ref 23–33)
CREAT BLD-MCNC: 0.6 MG/DL (ref 0.5–1.2)
D DIMER PPP IA.FEU-MCNC: 568 NG/ML FEU (ref 0–500)
EKG ATRIAL RATE: 125 BPM
EKG P AXIS: 53 DEGREES
EKG P-R INTERVAL: 138 MS
EKG Q-T INTERVAL: 316 MS
EKG QRS DURATION: 92 MS
EKG QTC CALCULATION (BAZETT): 456 MS
EKG R AXIS: 55 DEGREES
EKG T AXIS: 8 DEGREES
EKG VENTRICULAR RATE: 125 BPM
EOSINOPHIL NFR BLD AUTO: 0.2 %
EOSINOPHILS ABSOLUTE: 0 THOU/MM3 (ref 0–0.4)
ERYTHROCYTE [DISTWIDTH] IN BLOOD BY AUTOMATED COUNT: 14.8 % (ref 11.5–14.5)
FLUAV RNA RESP QL NAA+PROBE: NOT DETECTED
FLUBV RNA RESP QL NAA+PROBE: NOT DETECTED
GFR SERPL CREATININE-BSD FRML MDRD: > 60 ML/MIN/1.73M2
GLUCOSE BLD STRIP.AUTO-MCNC: 147 MG/DL (ref 70–108)
GLUCOSE BLD-MCNC: 161 MG/DL (ref 70–108)
HCT VFR BLD AUTO: 44.5 % (ref 37–47)
HGB BLD-MCNC: 15.3 GM/DL (ref 12–16)
IMM GRANULOCYTES # BLD AUTO: 0.04 THOU/MM3 (ref 0–0.07)
IMM GRANULOCYTES NFR BLD AUTO: 0.3 %
LYMPHOCYTES ABSOLUTE: 3.4 THOU/MM3 (ref 1–4.8)
LYMPHOCYTES NFR BLD AUTO: 22.9 %
MCH RBC QN AUTO: 31.1 PG (ref 27–31)
MCHC RBC AUTO-ENTMCNC: 34.4 GM/DL (ref 33–37)
MCV RBC AUTO: 90 FL (ref 81–99)
MONOCYTES ABSOLUTE: 0.7 THOU/MM3 (ref 0.4–1.3)
MONOCYTES NFR BLD AUTO: 4.8 %
NEUTROPHILS NFR BLD AUTO: 71.5 %
NRBC BLD AUTO-RTO: 0 /100 WBC
NT-PROBNP SERPL IA-MCNC: < 36 PG/ML (ref 0–124)
PLATELET # BLD AUTO: 252 THOU/MM3 (ref 130–400)
PMV BLD AUTO: 10.3 FL (ref 7.4–10.4)
POTASSIUM BLD-SCNC: 3.5 MEQ/L (ref 3.5–4.9)
RBC # BLD AUTO: 4.92 MILL/MM3 (ref 4.2–5.4)
SARS-COV-2 RNA RESP QL NAA+PROBE: NOT DETECTED
SEGMENTED NEUTROPHILS ABSOLUTE COUNT: 10.6 THOU/MM3 (ref 1.8–7.7)
SODIUM BLD-SCNC: 137 MEQ/L (ref 138–146)
TROPONIN T: < 0.01 NG/ML
WBC # BLD AUTO: 14.8 THOU/MM3 (ref 4.8–10.8)

## 2023-05-03 PROCEDURE — 80051 ELECTROLYTE PANEL: CPT

## 2023-05-03 PROCEDURE — 84520 ASSAY OF UREA NITROGEN: CPT

## 2023-05-03 PROCEDURE — 6370000000 HC RX 637 (ALT 250 FOR IP): Performed by: NURSE PRACTITIONER

## 2023-05-03 PROCEDURE — 96361 HYDRATE IV INFUSION ADD-ON: CPT

## 2023-05-03 PROCEDURE — 82948 REAGENT STRIP/BLOOD GLUCOSE: CPT

## 2023-05-03 PROCEDURE — 71046 X-RAY EXAM CHEST 2 VIEWS: CPT

## 2023-05-03 PROCEDURE — 99214 OFFICE O/P EST MOD 30 MIN: CPT | Performed by: NURSE PRACTITIONER

## 2023-05-03 PROCEDURE — 93005 ELECTROCARDIOGRAM TRACING: CPT | Performed by: NURSE PRACTITIONER

## 2023-05-03 PROCEDURE — 6360000002 HC RX W HCPCS: Performed by: STUDENT IN AN ORGANIZED HEALTH CARE EDUCATION/TRAINING PROGRAM

## 2023-05-03 PROCEDURE — 6370000000 HC RX 637 (ALT 250 FOR IP): Performed by: STUDENT IN AN ORGANIZED HEALTH CARE EDUCATION/TRAINING PROGRAM

## 2023-05-03 PROCEDURE — 82947 ASSAY GLUCOSE BLOOD QUANT: CPT

## 2023-05-03 PROCEDURE — 71275 CT ANGIOGRAPHY CHEST: CPT

## 2023-05-03 PROCEDURE — 87636 SARSCOV2 & INF A&B AMP PRB: CPT

## 2023-05-03 PROCEDURE — 85379 FIBRIN DEGRADATION QUANT: CPT

## 2023-05-03 PROCEDURE — 83880 ASSAY OF NATRIURETIC PEPTIDE: CPT

## 2023-05-03 PROCEDURE — 85025 COMPLETE CBC W/AUTO DIFF WBC: CPT

## 2023-05-03 PROCEDURE — 6360000004 HC RX CONTRAST MEDICATION: Performed by: STUDENT IN AN ORGANIZED HEALTH CARE EDUCATION/TRAINING PROGRAM

## 2023-05-03 PROCEDURE — 99215 OFFICE O/P EST HI 40 MIN: CPT

## 2023-05-03 PROCEDURE — 36415 COLL VENOUS BLD VENIPUNCTURE: CPT

## 2023-05-03 PROCEDURE — 2580000003 HC RX 258: Performed by: STUDENT IN AN ORGANIZED HEALTH CARE EDUCATION/TRAINING PROGRAM

## 2023-05-03 PROCEDURE — 96375 TX/PRO/DX INJ NEW DRUG ADDON: CPT

## 2023-05-03 PROCEDURE — 93005 ELECTROCARDIOGRAM TRACING: CPT | Performed by: STUDENT IN AN ORGANIZED HEALTH CARE EDUCATION/TRAINING PROGRAM

## 2023-05-03 PROCEDURE — 93010 ELECTROCARDIOGRAM REPORT: CPT | Performed by: NUCLEAR MEDICINE

## 2023-05-03 PROCEDURE — 84484 ASSAY OF TROPONIN QUANT: CPT

## 2023-05-03 PROCEDURE — 96374 THER/PROPH/DIAG INJ IV PUSH: CPT

## 2023-05-03 PROCEDURE — 99285 EMERGENCY DEPT VISIT HI MDM: CPT

## 2023-05-03 PROCEDURE — 82565 ASSAY OF CREATININE: CPT

## 2023-05-03 RX ORDER — MECLIZINE HCL 25MG 25 MG/1
25 TABLET, CHEWABLE ORAL ONCE
Status: COMPLETED | OUTPATIENT
Start: 2023-05-03 | End: 2023-05-03

## 2023-05-03 RX ORDER — ONDANSETRON 4 MG/1
4 TABLET, ORALLY DISINTEGRATING ORAL ONCE
Status: COMPLETED | OUTPATIENT
Start: 2023-05-03 | End: 2023-05-03

## 2023-05-03 RX ORDER — KETOROLAC TROMETHAMINE 30 MG/ML
15 INJECTION, SOLUTION INTRAMUSCULAR; INTRAVENOUS ONCE
Status: COMPLETED | OUTPATIENT
Start: 2023-05-03 | End: 2023-05-03

## 2023-05-03 RX ORDER — 0.9 % SODIUM CHLORIDE 0.9 %
1000 INTRAVENOUS SOLUTION INTRAVENOUS ONCE
Status: COMPLETED | OUTPATIENT
Start: 2023-05-03 | End: 2023-05-03

## 2023-05-03 RX ORDER — ONDANSETRON 4 MG/1
4 TABLET, ORALLY DISINTEGRATING ORAL 3 TIMES DAILY PRN
Qty: 12 TABLET | Refills: 0 | Status: SHIPPED | OUTPATIENT
Start: 2023-05-03

## 2023-05-03 RX ORDER — MECLIZINE HYDROCHLORIDE 25 MG/1
25 TABLET ORAL 3 TIMES DAILY PRN
Qty: 30 TABLET | Refills: 0 | Status: SHIPPED | OUTPATIENT
Start: 2023-05-03 | End: 2023-05-13

## 2023-05-03 RX ORDER — DROPERIDOL 2.5 MG/ML
0.62 INJECTION, SOLUTION INTRAMUSCULAR; INTRAVENOUS ONCE
Status: COMPLETED | OUTPATIENT
Start: 2023-05-03 | End: 2023-05-03

## 2023-05-03 RX ADMIN — SODIUM CHLORIDE 1000 ML: 9 INJECTION, SOLUTION INTRAVENOUS at 20:46

## 2023-05-03 RX ADMIN — MECLIZINE HYDROCHLORIDE 25 MG: 25 TABLET, CHEWABLE ORAL at 20:48

## 2023-05-03 RX ADMIN — IOPAMIDOL 80 ML: 755 INJECTION, SOLUTION INTRAVENOUS at 21:59

## 2023-05-03 RX ADMIN — DROPERIDOL 0.62 MG: 2.5 INJECTION, SOLUTION INTRAMUSCULAR; INTRAVENOUS at 20:48

## 2023-05-03 RX ADMIN — ONDANSETRON 4 MG: 4 TABLET, ORALLY DISINTEGRATING ORAL at 18:38

## 2023-05-03 RX ADMIN — KETOROLAC TROMETHAMINE 15 MG: 30 INJECTION, SOLUTION INTRAMUSCULAR; INTRAVENOUS at 20:47

## 2023-05-03 ASSESSMENT — PAIN DESCRIPTION - LOCATION: LOCATION: HEAD

## 2023-05-03 ASSESSMENT — ENCOUNTER SYMPTOMS
VOMITING: 0
TROUBLE SWALLOWING: 0
NAUSEA: 1
RHINORRHEA: 0
SHORTNESS OF BREATH: 0
SORE THROAT: 0
DIARRHEA: 1
EYE REDNESS: 0
EYE DISCHARGE: 0
COUGH: 0

## 2023-05-03 ASSESSMENT — HEART SCORE: ECG: 0

## 2023-05-03 ASSESSMENT — PAIN - FUNCTIONAL ASSESSMENT
PAIN_FUNCTIONAL_ASSESSMENT: 0-10
PAIN_FUNCTIONAL_ASSESSMENT: ACTIVITIES ARE NOT PREVENTED

## 2023-05-03 ASSESSMENT — PAIN SCALES - GENERAL
PAINLEVEL_OUTOF10: 6
PAINLEVEL_OUTOF10: 6
PAINLEVEL_OUTOF10: 5
PAINLEVEL_OUTOF10: 3

## 2023-05-03 ASSESSMENT — PAIN DESCRIPTION - DESCRIPTORS: DESCRIPTORS: ACHING

## 2023-05-03 ASSESSMENT — PAIN DESCRIPTION - PAIN TYPE: TYPE: ACUTE PAIN

## 2023-05-03 NOTE — ED TRIAGE NOTES
To room with c/o dizziness that started today suddenly today at 1430. She had a vertigo dx but is out of the medication.

## 2023-05-03 NOTE — ED TRIAGE NOTES
PT comes to ED from urgent care for dizziness and emesis. Pt states that she was sent form urgent care for an increased WBC count. Pt states that she received zofran but is continuing to have emesis. Pt states that she was recently diagnoses with vertigo.

## 2023-05-03 NOTE — ED PROVIDER NOTES
[facility]      CHIEF COMPLAINT       Chief Complaint   Patient presents with    Dizziness       Nurses Notes reviewed and I agree except as noted in the HPI. HISTORY OF PRESENT ILLNESS   Yris Allred is a 32 y.o. female who presents with a one-hour history of dizziness. She has a history of POTS disease. Patient states that she started having dizziness approximately 1 hour ago when she was trying to get out of her car. The dizziness episode lasted 1 to 2 minutes. Patient has had episodes like this before with her pots disease. Patient states she has had nausea all day today and has had 3 episodes of diarrhea. She has not been eating very well today but did eat approximately 1 hour prior to arrival.  Patient denies chest pain, shortness of breath, cough, congestion, or fever. She is out of Antivert and usually takes this when she has vertigo. She does have an appointment with an ENT specialist on 5/18 and is already established with cardiology. REVIEW OF SYSTEMS     Review of Systems   Constitutional:  Negative for chills, diaphoresis, fatigue and fever. HENT:  Negative for congestion, ear pain, rhinorrhea, sore throat and trouble swallowing. Eyes:  Negative for discharge and redness. Respiratory:  Negative for cough and shortness of breath. Cardiovascular:  Negative for chest pain. Gastrointestinal:  Positive for diarrhea and nausea. Negative for vomiting. Genitourinary:  Negative for decreased urine volume. Musculoskeletal:  Negative for neck pain and neck stiffness. Skin:  Negative for rash. Neurological:  Positive for dizziness and light-headedness. Negative for headaches. Hematological:  Negative for adenopathy. Psychiatric/Behavioral:  Negative for sleep disturbance.       PAST MEDICAL HISTORY         Diagnosis Date    ADD (attention deficit disorder)     Anxiety 04/08/2015    Anxiety attack     Asthma     exercise induced

## 2023-05-03 NOTE — DISCHARGE INSTRUCTIONS
Take Antivert as needed for your dizziness. Follow-up with your previously scheduled ENT appointment. Make sure to drink plenty of fluids. Return to the emergency department if you develop any worsening dizziness, chest pain, shortness of breath or if you are unable to keep fluids down.

## 2023-05-03 NOTE — ED NOTES
Pt starts vomiting multiple episodes. Moved to a bed. Cold wash cloths given.       Ashanti Rhodes RN  05/03/23 0973

## 2023-05-04 ENCOUNTER — TELEPHONE (OUTPATIENT)
Dept: FAMILY MEDICINE CLINIC | Age: 28
End: 2023-05-04

## 2023-05-04 ENCOUNTER — TELEMEDICINE (OUTPATIENT)
Dept: FAMILY MEDICINE CLINIC | Age: 28
End: 2023-05-04
Payer: COMMERCIAL

## 2023-05-04 DIAGNOSIS — D72.829 LEUKOCYTOSIS, UNSPECIFIED TYPE: Primary | ICD-10-CM

## 2023-05-04 DIAGNOSIS — K21.9 GASTROESOPHAGEAL REFLUX DISEASE WITHOUT ESOPHAGITIS: ICD-10-CM

## 2023-05-04 DIAGNOSIS — E66.01 MORBID OBESITY WITH BMI OF 40.0-44.9, ADULT (HCC): ICD-10-CM

## 2023-05-04 DIAGNOSIS — E11.9 TYPE 2 DIABETES MELLITUS WITHOUT COMPLICATION, WITHOUT LONG-TERM CURRENT USE OF INSULIN (HCC): ICD-10-CM

## 2023-05-04 DIAGNOSIS — R42 VERTIGO: ICD-10-CM

## 2023-05-04 LAB
EKG ATRIAL RATE: 116 BPM
EKG P AXIS: 57 DEGREES
EKG P-R INTERVAL: 140 MS
EKG Q-T INTERVAL: 332 MS
EKG QRS DURATION: 92 MS
EKG QTC CALCULATION (BAZETT): 461 MS
EKG R AXIS: 62 DEGREES
EKG T AXIS: 31 DEGREES
EKG VENTRICULAR RATE: 116 BPM

## 2023-05-04 PROCEDURE — 99214 OFFICE O/P EST MOD 30 MIN: CPT | Performed by: NURSE PRACTITIONER

## 2023-05-04 PROCEDURE — 93010 ELECTROCARDIOGRAM REPORT: CPT | Performed by: NUCLEAR MEDICINE

## 2023-05-04 RX ORDER — DEXLANSOPRAZOLE 60 MG/1
60 CAPSULE, DELAYED RELEASE ORAL DAILY
Qty: 90 CAPSULE | Refills: 3 | Status: SHIPPED | OUTPATIENT
Start: 2023-05-04

## 2023-05-04 ASSESSMENT — ENCOUNTER SYMPTOMS
NAUSEA: 0
ABDOMINAL PAIN: 0
SHORTNESS OF BREATH: 0
SORE THROAT: 1
COUGH: 0

## 2023-05-04 NOTE — ED PROVIDER NOTES
[facility] [address] [address] EMERGENCY DEPT      EMERGENCY MEDICINE     Pt Name: Hayder Gamez  MRN: [id number]  [place] 1995  Date of evaluation: 5/3/2023  Provider: Osmany Nunez MD    CHIEF COMPLAINT       Chief Complaint   Patient presents with    Dizziness     HISTORY OF PRESENT ILLNESS   Hayder Gamez is a pleasant 32 y.o. female who presents to the emergency department for evaluation of dizziness. Patient states that earlier today she developed sudden onset dizziness described as both vertigo and lightheadedness. Patient has had vertigo for the past 4 weeks for which she has an ENT follow-up appointment scheduled next week. She states that she was previously taking Antivert but she ran out of this medication. She did try taking Zofran orally which does not help her symptoms. She has also had similar lightheadedness related to her POTS. She reports multiple episodes of nonbloody nonbilious emesis and states that she now has left-sided chest pain that feels like a sharp stabbing sensation. She also has shortness of breath and tachycardia. Patient does have a history of PE years ago that was thought to be secondary to oral contraceptive pills but she is no longer on anticoagulation.     PASTMEDICAL HISTORY     Past Medical History:   Diagnosis Date    ADD (attention deficit disorder)     Anxiety 04/08/2015    Anxiety attack     Asthma     exercise induced    Atypical face pain     Back pain     Bipolar 1 disorder (HCC)     Diabetes mellitus ([address]     GDM on insulin started on 7/23-during pregnancy    Fibromyalgia     GERD (gastroesophageal reflux disease)     Hx of blood clots     Hypotension 11/02/2017    Major depressive disorder, recurrent episode, mild ([address] 07/31/2012    Obesity 10/24/2014    WILFREDO treated with BiPAP     Pneumonia 02/21/2018    POTS (postural orthostatic tachycardia syndrome)     POTS (postural orthostatic tachycardia syndrome)     Pott disease     Pulmonary emboli ([address] 2016    was on blood

## 2023-05-04 NOTE — ED NOTES
Pt medicated per MAR at this time. Pt updated on Poc, verbalized understanding. Call light in reach. Telemetry in place.       Aimee Mcguire RN  05/03/23 9509

## 2023-05-04 NOTE — PROGRESS NOTES
Subjective:      Patient ID: Ana Luisa Mckay is a 32 y.o. female    HPI: ED Follow Up    TELEHEALTH EVALUATION -- Audio/Visual (During YCJLM-21 public health emergency)    Patient identification was verified at the start of the visit: Yes    Services were provided through a video synchronous discussion virtually to substitute for in-person clinic visit. Patient and provider were located at their individual homes. Patient has requested an audio/video evaluation for the following concern(s):    Chief Complaint   Patient presents with    Follow-up     ED        HISTORY OF Patel 78 is a pleasant 32 y.o. female who presents to the emergency department for evaluation of dizziness. Patient states that earlier today she developed sudden onset dizziness described as both vertigo and lightheadedness. Patient has had vertigo for the past 4 weeks for which she has an ENT follow-up appointment scheduled next week. She states that she was previously taking Antivert but she ran out of this medication. She did try taking Zofran orally which does not help her symptoms. She has also had similar lightheadedness related to her POTS. She reports multiple episodes of nonbloody nonbilious emesis and states that she now has left-sided chest pain that feels like a sharp stabbing sensation. She also has shortness of breath and tachycardia. Patient does have a history of PE years ago that was thought to be secondary to oral contraceptive pills but she is no longer on anticoagulation      CTA Chest was NEG and CXR normal.  CTA done due to elevated D-dimer. Concerned about elevated WBC. Smokes. Lab Results   Component Value Date    WBC 14.8 (H) 05/03/2023    HGB 15.3 05/03/2023    HCT 44.5 05/03/2023     05/03/2023       Looking into weight loss surgery at [facility] [place][note 2[place]      Patient Active Problem List   Diagnosis    Major depressive disorder, recurrent episode, mild (HCC)    Low self esteem

## 2023-05-04 NOTE — ED NOTES
Pt reports that she is feeling \"better. \" Pt updated on POC, verbalized understanding. Call light in reach.       Niurka Blakely RN  05/03/23 2051

## 2023-05-18 ENCOUNTER — TELEPHONE (OUTPATIENT)
Dept: FAMILY MEDICINE CLINIC | Age: 28
End: 2023-05-18

## 2023-05-18 DIAGNOSIS — D72.829 LEUKOCYTOSIS, UNSPECIFIED TYPE: Primary | ICD-10-CM

## 2023-05-18 DIAGNOSIS — E11.9 TYPE 2 DIABETES MELLITUS WITHOUT COMPLICATION, WITHOUT LONG-TERM CURRENT USE OF INSULIN (HCC): ICD-10-CM

## 2023-05-18 NOTE — TELEPHONE ENCOUNTER
----- Message from Mayiemeli [address] sent at 5/18/2023  3:05 PM EDT -----  Subject: Referral Request    Reason for referral request? Patient would like to have her A1C, white   blood cell count , blood clots blood work done prior to her upcoming appt   on 5/23/23. Could you please let patient know when orders are ready. She's   hoping to get these done by end of day 5/19/23  Provider patient wants to be referred to(if known):     Provider Phone Number(if known):     Additional Information for Provider?   ---------------------------------------------------------------------------  --------------  [email or web address]    [phone]; OK to leave message on voicemail  ---------------------------------------------------------------------------  --------------

## 2023-05-19 ENCOUNTER — TELEPHONE (OUTPATIENT)
Dept: PHARMACY | Facility: CLINIC | Age: 28
End: 2023-05-19

## 2023-05-19 ENCOUNTER — NURSE ONLY (OUTPATIENT)
Dept: LAB | Age: 28
End: 2023-05-19

## 2023-05-19 DIAGNOSIS — E78.1 HYPERTRIGLYCERIDEMIA: ICD-10-CM

## 2023-05-19 DIAGNOSIS — D72.829 LEUKOCYTOSIS, UNSPECIFIED TYPE: ICD-10-CM

## 2023-05-19 DIAGNOSIS — E11.9 TYPE 2 DIABETES MELLITUS WITHOUT COMPLICATION, WITHOUT LONG-TERM CURRENT USE OF INSULIN (HCC): ICD-10-CM

## 2023-05-19 LAB
ALBUMIN SERPL BCG-MCNC: 4.3 G/DL (ref 3.5–5.1)
ALP SERPL-CCNC: 113 U/L (ref 38–126)
ALT SERPL W/O P-5'-P-CCNC: 29 U/L (ref 11–66)
ANION GAP SERPL CALC-SCNC: 15 MEQ/L (ref 8–16)
AST SERPL-CCNC: 21 U/L (ref 5–40)
BASOPHILS ABSOLUTE: 0 THOU/MM3 (ref 0–0.1)
BASOPHILS NFR BLD AUTO: 0.3 %
BILIRUB SERPL-MCNC: 0.3 MG/DL (ref 0.3–1.2)
BUN SERPL-MCNC: 10 MG/DL (ref 7–22)
CALCIUM SERPL-MCNC: 9.6 MG/DL (ref 8.5–10.5)
CHLORIDE SERPL-SCNC: 102 MEQ/L (ref 98–111)
CHOLEST SERPL-MCNC: 188 MG/DL (ref 100–199)
CO2 SERPL-SCNC: 21 MEQ/L (ref 23–33)
CREAT SERPL-MCNC: 0.5 MG/DL (ref 0.4–1.2)
DEPRECATED MEAN GLUCOSE BLD GHB EST-ACNC: 144 MG/DL (ref 70–126)
DEPRECATED RDW RBC AUTO: 49.6 FL (ref 35–45)
EOSINOPHIL NFR BLD AUTO: 0.3 %
EOSINOPHILS ABSOLUTE: 0 THOU/MM3 (ref 0–0.4)
ERYTHROCYTE [DISTWIDTH] IN BLOOD BY AUTOMATED COUNT: 14.5 % (ref 11.5–14.5)
GFR SERPL CREATININE-BSD FRML MDRD: > 60 ML/MIN/1.73M2
GLUCOSE SERPL-MCNC: 163 MG/DL (ref 70–108)
HBA1C MFR BLD HPLC: 6.8 % (ref 4.4–6.4)
HCT VFR BLD AUTO: 43.8 % (ref 37–47)
HDLC SERPL-MCNC: 39 MG/DL
HGB BLD-MCNC: 14.3 GM/DL (ref 12–16)
IMM GRANULOCYTES # BLD AUTO: 0.04 THOU/MM3 (ref 0–0.07)
IMM GRANULOCYTES NFR BLD AUTO: 0.5 %
LDLC SERPL CALC-MCNC: ABNORMAL MG/DL
LYMPHOCYTES ABSOLUTE: 2.3 THOU/MM3 (ref 1–4.8)
LYMPHOCYTES NFR BLD AUTO: 25.6 %
MCH RBC QN AUTO: 30.9 PG (ref 26–33)
MCHC RBC AUTO-ENTMCNC: 32.6 GM/DL (ref 32.2–35.5)
MCV RBC AUTO: 94.6 FL (ref 81–99)
MONOCYTES ABSOLUTE: 0.5 THOU/MM3 (ref 0.4–1.3)
MONOCYTES NFR BLD AUTO: 5.9 %
NEUTROPHILS NFR BLD AUTO: 67.4 %
NRBC BLD AUTO-RTO: 0 /100 WBC
PLATELET # BLD AUTO: 201 THOU/MM3 (ref 130–400)
PMV BLD AUTO: 10.3 FL (ref 9.4–12.4)
POTASSIUM SERPL-SCNC: 4.3 MEQ/L (ref 3.5–5.2)
PROT SERPL-MCNC: 7.5 G/DL (ref 6.1–8)
RBC # BLD AUTO: 4.63 MILL/MM3 (ref 4.2–5.4)
SEGMENTED NEUTROPHILS ABSOLUTE COUNT: 5.9 THOU/MM3 (ref 1.8–7.7)
SODIUM SERPL-SCNC: 138 MEQ/L (ref 135–145)
TRIGL SERPL-MCNC: 462 MG/DL (ref 0–199)
TSH SERPL DL<=0.005 MIU/L-ACNC: 1.41 UIU/ML (ref 0.4–4.2)
WBC # BLD AUTO: 8.8 THOU/MM3 (ref 4.8–10.8)

## 2023-05-19 NOTE — TELEPHONE ENCOUNTER
Pt was notified orders were placed. Says she just got them done at [facility].

## 2023-05-23 ENCOUNTER — OFFICE VISIT (OUTPATIENT)
Dept: FAMILY MEDICINE CLINIC | Age: 28
End: 2023-05-23
Payer: COMMERCIAL

## 2023-05-23 VITALS
RESPIRATION RATE: 16 BRPM | HEIGHT: 70 IN | BODY MASS INDEX: 41.95 KG/M2 | SYSTOLIC BLOOD PRESSURE: 122 MMHG | HEART RATE: 80 BPM | DIASTOLIC BLOOD PRESSURE: 72 MMHG | WEIGHT: 293 LBS

## 2023-05-23 DIAGNOSIS — F33.0 MAJOR DEPRESSIVE DISORDER, RECURRENT EPISODE, MILD (HCC): Chronic | ICD-10-CM

## 2023-05-23 DIAGNOSIS — K21.9 GASTROESOPHAGEAL REFLUX DISEASE WITHOUT ESOPHAGITIS: ICD-10-CM

## 2023-05-23 DIAGNOSIS — E78.1 HYPERTRIGLYCERIDEMIA: ICD-10-CM

## 2023-05-23 DIAGNOSIS — E66.01 MORBID OBESITY WITH BMI OF 40.0-44.9, ADULT (HCC): ICD-10-CM

## 2023-05-23 DIAGNOSIS — G90.A POTS (POSTURAL ORTHOSTATIC TACHYCARDIA SYNDROME): ICD-10-CM

## 2023-05-23 DIAGNOSIS — D50.9 IRON DEFICIENCY ANEMIA, UNSPECIFIED IRON DEFICIENCY ANEMIA TYPE: ICD-10-CM

## 2023-05-23 DIAGNOSIS — Z00.00 INITIAL MEDICARE ANNUAL WELLNESS VISIT: Primary | ICD-10-CM

## 2023-05-23 DIAGNOSIS — E11.9 TYPE 2 DIABETES MELLITUS WITHOUT COMPLICATION, WITHOUT LONG-TERM CURRENT USE OF INSULIN (HCC): ICD-10-CM

## 2023-05-23 DIAGNOSIS — F60.3 BORDERLINE PERSONALITY DISORDER (HCC): ICD-10-CM

## 2023-05-23 PROCEDURE — 3044F HG A1C LEVEL LT 7.0%: CPT | Performed by: NURSE PRACTITIONER

## 2023-05-23 PROCEDURE — 99214 OFFICE O/P EST MOD 30 MIN: CPT | Performed by: NURSE PRACTITIONER

## 2023-05-23 PROCEDURE — G0438 PPPS, INITIAL VISIT: HCPCS | Performed by: NURSE PRACTITIONER

## 2023-05-23 RX ORDER — ATORVASTATIN CALCIUM 40 MG/1
40 TABLET, FILM COATED ORAL DAILY
Qty: 90 TABLET | Refills: 3 | Status: SHIPPED | OUTPATIENT
Start: 2023-05-23

## 2023-05-23 ASSESSMENT — LIFESTYLE VARIABLES
HOW MANY STANDARD DRINKS CONTAINING ALCOHOL DO YOU HAVE ON A TYPICAL DAY: PATIENT DOES NOT DRINK
HOW OFTEN DO YOU HAVE A DRINK CONTAINING ALCOHOL: NEVER

## 2023-05-23 ASSESSMENT — PATIENT HEALTH QUESTIONNAIRE - PHQ9
SUM OF ALL RESPONSES TO PHQ9 QUESTIONS 1 & 2: 0
4. FEELING TIRED OR HAVING LITTLE ENERGY: 0
10. IF YOU CHECKED OFF ANY PROBLEMS, HOW DIFFICULT HAVE THESE PROBLEMS MADE IT FOR YOU TO DO YOUR WORK, TAKE CARE OF THINGS AT HOME, OR GET ALONG WITH OTHER PEOPLE: 0
9. THOUGHTS THAT YOU WOULD BE BETTER OFF DEAD, OR OF HURTING YOURSELF: 0
SUM OF ALL RESPONSES TO PHQ QUESTIONS 1-9: 0
3. TROUBLE FALLING OR STAYING ASLEEP: 0
6. FEELING BAD ABOUT YOURSELF - OR THAT YOU ARE A FAILURE OR HAVE LET YOURSELF OR YOUR FAMILY DOWN: 0
8. MOVING OR SPEAKING SO SLOWLY THAT OTHER PEOPLE COULD HAVE NOTICED. OR THE OPPOSITE, BEING SO FIGETY OR RESTLESS THAT YOU HAVE BEEN MOVING AROUND A LOT MORE THAN USUAL: 0
5. POOR APPETITE OR OVEREATING: 0
SUM OF ALL RESPONSES TO PHQ QUESTIONS 1-9: 0
2. FEELING DOWN, DEPRESSED OR HOPELESS: 0
7. TROUBLE CONCENTRATING ON THINGS, SUCH AS READING THE NEWSPAPER OR WATCHING TELEVISION: 0
SUM OF ALL RESPONSES TO PHQ QUESTIONS 1-9: 0
SUM OF ALL RESPONSES TO PHQ QUESTIONS 1-9: 0
1. LITTLE INTEREST OR PLEASURE IN DOING THINGS: 0

## 2023-05-23 ASSESSMENT — ENCOUNTER SYMPTOMS
ABDOMINAL PAIN: 0
BACK PAIN: 1
SHORTNESS OF BREATH: 0
COUGH: 0
NAUSEA: 0

## 2023-05-23 NOTE — PROGRESS NOTES
Subjective:      Patient ID: Blessing Carlson 1995 is a 32 y.o. female here for evaluation.      Chief Complaint   Patient presents with    Medicare AW    6 Month Follow-Up    Results       Patient Active Problem List   Diagnosis    Major depressive disorder, recurrent episode, mild (HCC)    Low self esteem    KARLIE (generalized anxiety disorder)    Obesity    Obstructive sleep apnea    Snores    Sleep disturbances    Daytime somnolence    Sleep talking    Night terrors, adult    Bruxism (teeth grinding)    Restless sleeper    Anxiety    Abnormal thyroid function test    Trigeminal neuralgia    Inappropriate sinus tachycardia    POTS (postural orthostatic tachycardia syndrome)    Syncope and collapse    Neck discomfort    Thyroid cyst    Pulmonary embolism (HCC)    Breast pain, left    Positive CAESAR (antinuclear antibody)    Hypotension    S/P ablation of ventricular arrhythmia    Pneumonia    Hypokalemia    DUB (dysfunctional uterine bleeding)    Palpitations    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    Closed right ankle fracture, initial encounter    Status post open reduction with internal fixation (ORIF) of fracture of ankle    Tear of deltoid ligament of ankle, right, initial encounter    Abdominal pain during pregnancy in second trimester    Maternal obesity affecting pregnancy, antepartum    Postpartum care following  delivery    Biliary colic symptom    Dizziness, nonspecific    Orthostatic dizziness    Borderline personality disorder (HCC)    Pseudoseizures    Tachyarrhythmia    Pelvic pain    Effusion of ankle or foot joint    Exostosis of bone of ankle    Muscle wasting and atrophy, not elsewhere classified, right ankle and foot    Other synovitis and tenosynovitis, right ankle and foot    Pain due to internal prosthetic device    Pain in joint involving ankle and foot    Pain in right knee    Peroneal tendinitis    Pes planus    Sprain of deltoid ligament of ankle

## 2023-05-23 NOTE — PROGRESS NOTES
Medicare Annual Wellness Visit    [facility] is here for Medicare AWV, 6 Month Follow-Up, and Results    Assessment & Plan   Initial Medicare annual wellness visit    Recommendations for Preventive Services Due: see orders and patient instructions/AVS.  Recommended screening schedule for the next 5-10 years is provided to the patient in written form: see Patient Instructions/AVS.     No follow-ups on file. Subjective     Patient's complete Health Risk Assessment and screening values have been reviewed and are found in Flowsheets. The following problems were reviewed today and where indicated follow up appointments were made and/or referrals ordered. Positive Risk Factor Screenings with Interventions:          Drug Use:          Interpretation:  1-2: Low level - Monitor, re-assess at a later date  3-5: Moderate level - Further Investigation  6-8: Substantial level - Intensive Assessment  9-10: Severe level - Intensive Assessment    Interventions:  Patient declined any further intervention or treatment       Self-assessment of health: In general, how would you say your health is?: (!) Poor    Interventions:  Healthy Lifestyles discussed      Weight and Activity:  Physical Activity: Inactive    Days of Exercise per Week: 0 days    Minutes of Exercise per Session: 0 min     On average, how many days per week do you engage in moderate to strenuous exercise (like a brisk walk)?: 0 days  Have you lost any weight without trying in the past 3 months?: No  Body mass index is 48.84 kg/m².  (!) Abnormal    Inactivity Interventions:  Recommendations: patient agrees to exercise for at least 150 minutes/week  Obesity Interventions:  Patient declines any further evaluation or treatment             Safety:  Do you always fasten your seatbelt when you are in a car?: (!) No  Interventions:  See above     Advanced Directives:  Do you have a Living Will?: (!) No    Intervention:  has NO advanced directive - information

## 2023-05-23 NOTE — PATIENT INSTRUCTIONS
You may receive a survey about your visit with us today. The feedback from our patients helps us identify what is working well and where the service to all patients can be enhanced. Thank you! Tobacco Cessation Programs     Telephonic behavior modification  1-800-QUIT-NOW (043-0006)  Counseling service for those who are ready to quit using tobacco.    Available for uninsured [place] residents, Medicaid recipients, pregnant women, or patients whose health plans or employers are members of the [address] [place] [address] behavior modification  http://[place]. Quitlogix. org  Online support program to help patients through each step of the quitting process. Available 24 hours a day 7 days a week. Provides up to date researched based tool, step-by-step guides, and motivational messages. Online behavior modification  www.lungusa.org/stop-smoking/how-to-quit  HelpLine: 1-800-LUNG-USA (195-6577)  Email questions to:  Marcelino@[employer]. [employer]   Website offers resources to help tobacco users figure out their reasons for quitting and then take the big step of quitting for good. Hypnosis  Location: [address] [place] [address], [place] CARVAL[place]IVER[place], [place]  Contact: Mabel Lafleur, PhD at [phone]  Hypnosis for tobacco cessation  Cost $225 for the initial session and $175 for each session afterwards. Most patients require 6-8 sessions. There is the option to submit through the patients insurance. Hypnosis and behavior modification  Location: [place] [address],  [facility] [address] SON[place] COLLINS AM [facility].[place], [place]  Contact: Ismael Ruiz, PhD at [phone]  Counseling and hypnosis for nicotine addition  Cost: For uninsured patients:  Please call above phone number  Cost for insured patients depends on patients insurance plan.     Behavior modification  Location: [facility], [address] [facility] [address] [place] [address]: [phone]  Services include four one-on-one appointments between the patient and a respiratory therapist.  The four appointments

## 2023-06-26 DIAGNOSIS — E11.9 TYPE 2 DIABETES MELLITUS WITHOUT COMPLICATION, WITHOUT LONG-TERM CURRENT USE OF INSULIN (HCC): Primary | ICD-10-CM

## 2023-06-26 RX ORDER — DULAGLUTIDE 1.5 MG/.5ML
1.5 INJECTION, SOLUTION SUBCUTANEOUS WEEKLY
Qty: 2 ML | Refills: 2 | Status: CANCELLED | OUTPATIENT
Start: 2023-06-26

## 2023-06-26 RX ORDER — DULAGLUTIDE 3 MG/.5ML
3 INJECTION, SOLUTION SUBCUTANEOUS WEEKLY
Qty: 6 ML | Refills: 1 | Status: SHIPPED | OUTPATIENT
Start: 2023-06-26

## 2023-06-28 ENCOUNTER — TELEPHONE (OUTPATIENT)
Dept: FAMILY MEDICINE CLINIC | Age: 28
End: 2023-06-28

## 2023-08-15 ENCOUNTER — TELEPHONE (OUTPATIENT)
Dept: FAMILY MEDICINE CLINIC | Age: 28
End: 2023-08-15

## 2023-08-15 NOTE — TELEPHONE ENCOUNTER
Patient called and stated that she had nerve testing/ nerve block completed yesterday. She stated that today she isn't feeling well. She has pain all over, she stated her face even hurts. She had the testing in her back. She stated she is having some swelling, pain all over, stomach pain, nausea, not feeling well and flushed/redness in the face. She did not what to do.

## 2023-08-15 NOTE — TELEPHONE ENCOUNTER
Called patient and informed. She verbalized understanding. She stated that she did call their office and hasn't gotten a call back yet.

## 2023-08-31 ENCOUNTER — TELEPHONE (OUTPATIENT)
Dept: FAMILY MEDICINE CLINIC | Age: 28
End: 2023-08-31

## 2023-08-31 DIAGNOSIS — E11.9 TYPE 2 DIABETES MELLITUS WITHOUT COMPLICATION, WITHOUT LONG-TERM CURRENT USE OF INSULIN (HCC): ICD-10-CM

## 2023-08-31 DIAGNOSIS — K21.9 GASTROESOPHAGEAL REFLUX DISEASE WITHOUT ESOPHAGITIS: Primary | ICD-10-CM

## 2023-08-31 DIAGNOSIS — E78.1 HYPERTRIGLYCERIDEMIA: ICD-10-CM

## 2023-08-31 DIAGNOSIS — K21.9 GASTROESOPHAGEAL REFLUX DISEASE WITHOUT ESOPHAGITIS: ICD-10-CM

## 2023-08-31 RX ORDER — ATORVASTATIN CALCIUM 40 MG/1
40 TABLET, FILM COATED ORAL DAILY
Qty: 90 TABLET | Refills: 3 | Status: SHIPPED | OUTPATIENT
Start: 2023-08-31

## 2023-08-31 RX ORDER — FENOFIBRATE 160 MG/1
160 TABLET ORAL DAILY
Qty: 90 TABLET | Refills: 1 | Status: SHIPPED | OUTPATIENT
Start: 2023-08-31

## 2023-08-31 RX ORDER — PIOGLITAZONEHYDROCHLORIDE 30 MG/1
30 TABLET ORAL DAILY
Qty: 90 TABLET | Refills: 3 | Status: SHIPPED | OUTPATIENT
Start: 2023-08-31

## 2023-08-31 RX ORDER — DEXLANSOPRAZOLE 60 MG/1
60 CAPSULE, DELAYED RELEASE ORAL DAILY
Qty: 90 CAPSULE | Refills: 3 | Status: SHIPPED | OUTPATIENT
Start: 2023-08-31

## 2023-08-31 NOTE — TELEPHONE ENCOUNTER
Fax received from [facility] requesting a PA on the patients dexlansoprazole 60mg. PA submitted through CoverMyMeds, waiting on determination.

## 2023-09-05 ENCOUNTER — NURSE ONLY (OUTPATIENT)
Dept: LAB | Age: 28
End: 2023-09-05

## 2023-09-05 DIAGNOSIS — E11.9 TYPE 2 DIABETES MELLITUS WITHOUT COMPLICATION, WITHOUT LONG-TERM CURRENT USE OF INSULIN (HCC): ICD-10-CM

## 2023-09-05 DIAGNOSIS — E78.1 HYPERTRIGLYCERIDEMIA: ICD-10-CM

## 2023-09-05 LAB
CHOLEST SERPL-MCNC: 155 MG/DL (ref 100–199)
CREAT UR-MCNC: 116 MG/DL
DEPRECATED MEAN GLUCOSE BLD GHB EST-ACNC: 144 MG/DL (ref 70–126)
HBA1C MFR BLD HPLC: 6.8 % (ref 4.4–6.4)
HDLC SERPL-MCNC: 37 MG/DL
LDLC SERPL CALC-MCNC: 48 MG/DL
MICROALBUMIN UR-MCNC: 6.21 MG/DL
MICROALBUMIN/CREAT RATIO PNL UR: 54 MG/G (ref 0–30)
TRIGL SERPL-MCNC: 349 MG/DL (ref 0–199)

## 2023-09-06 ENCOUNTER — OFFICE VISIT (OUTPATIENT)
Dept: FAMILY MEDICINE CLINIC | Age: 28
End: 2023-09-06
Payer: COMMERCIAL

## 2023-09-06 VITALS
SYSTOLIC BLOOD PRESSURE: 118 MMHG | BODY MASS INDEX: 41.95 KG/M2 | DIASTOLIC BLOOD PRESSURE: 86 MMHG | RESPIRATION RATE: 18 BRPM | HEART RATE: 115 BPM | HEIGHT: 70 IN | WEIGHT: 293 LBS | OXYGEN SATURATION: 98 %

## 2023-09-06 DIAGNOSIS — D50.9 IRON DEFICIENCY ANEMIA, UNSPECIFIED IRON DEFICIENCY ANEMIA TYPE: ICD-10-CM

## 2023-09-06 DIAGNOSIS — M25.50 ARTHRALGIA OF MULTIPLE JOINTS: ICD-10-CM

## 2023-09-06 DIAGNOSIS — E78.2 MIXED HYPERLIPIDEMIA: ICD-10-CM

## 2023-09-06 DIAGNOSIS — F60.3 BORDERLINE PERSONALITY DISORDER (HCC): ICD-10-CM

## 2023-09-06 DIAGNOSIS — E78.1 HYPERTRIGLYCERIDEMIA: ICD-10-CM

## 2023-09-06 DIAGNOSIS — G90.A POTS (POSTURAL ORTHOSTATIC TACHYCARDIA SYNDROME): ICD-10-CM

## 2023-09-06 DIAGNOSIS — K21.9 GASTROESOPHAGEAL REFLUX DISEASE WITHOUT ESOPHAGITIS: ICD-10-CM

## 2023-09-06 DIAGNOSIS — F33.0 MAJOR DEPRESSIVE DISORDER, RECURRENT EPISODE, MILD (HCC): ICD-10-CM

## 2023-09-06 DIAGNOSIS — E11.9 TYPE 2 DIABETES MELLITUS WITHOUT COMPLICATION, WITHOUT LONG-TERM CURRENT USE OF INSULIN (HCC): ICD-10-CM

## 2023-09-06 DIAGNOSIS — E66.01 MORBID OBESITY WITH BMI OF 40.0-44.9, ADULT (HCC): Primary | ICD-10-CM

## 2023-09-06 PROCEDURE — 99214 OFFICE O/P EST MOD 30 MIN: CPT | Performed by: NURSE PRACTITIONER

## 2023-09-06 PROCEDURE — 3044F HG A1C LEVEL LT 7.0%: CPT | Performed by: NURSE PRACTITIONER

## 2023-09-06 ASSESSMENT — ENCOUNTER SYMPTOMS
SHORTNESS OF BREATH: 0
ABDOMINAL PAIN: 0
NAUSEA: 0
COUGH: 0
BACK PAIN: 1

## 2023-09-06 NOTE — PROGRESS NOTES
Subjective:      Patient ID: Anne Lynn 1995 is a 32 y.o. female here for evaluation. Chief Complaint   Patient presents with    Follow-up     Talk about lab work        Will need to change providers due to insurance restrictions.      Patient Active Problem List   Diagnosis    Major depressive disorder, recurrent episode, mild (HCC)    Low self esteem    KARLIE (generalized anxiety disorder)    Obesity    Obstructive sleep apnea    Snores    Sleep disturbances    Daytime somnolence    Sleep talking    Night terrors, adult    Bruxism (teeth grinding)    Restless sleeper    Anxiety    Abnormal thyroid function test    Trigeminal neuralgia    Inappropriate sinus tachycardia    POTS (postural orthostatic tachycardia syndrome)    Syncope and collapse    Neck discomfort    Thyroid cyst    Pulmonary embolism (HCC)    Breast pain, left    Positive CAESAR (antinuclear antibody)    Hypotension    S/P ablation of ventricular arrhythmia    Pneumonia    Hypokalemia    DUB (dysfunctional uterine bleeding)    Palpitations    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    Closed right ankle fracture, initial encounter    Status post open reduction with internal fixation (ORIF) of fracture of ankle    Tear of deltoid ligament of ankle, right, initial encounter    Abdominal pain during pregnancy in second trimester    Maternal obesity affecting pregnancy, antepartum    Postpartum care following  delivery    Biliary colic symptom    Dizziness, nonspecific    Orthostatic dizziness    Borderline personality disorder (HCC)    Pseudoseizures    Tachyarrhythmia    Pelvic pain    Effusion of ankle or foot joint    Exostosis of bone of ankle    Muscle wasting and atrophy, not elsewhere classified, right ankle and foot    Other synovitis and tenosynovitis, right ankle and foot    Pain due to internal prosthetic device    Pain in joint involving ankle and foot    Pain in right knee    Peroneal tendinitis

## 2023-09-06 NOTE — TELEPHONE ENCOUNTER
Your request has been denied  PA Case: [id number], Status: Denied. Notification: Completed. Denial in media.   Must try and fail ALL: omeprazole, lansoprazole, pantoprazole and rabeprazole  Please advise

## 2023-09-06 NOTE — TELEPHONE ENCOUNTER
No response from insurance yet. [facility] has not yet replied to your PA request. You may close this dialog, return to your dashboard, and perform other tasks. To check for an update later, open this request again from your dashboard. If [facility] has not replied to your urgent request within 72 hours or your standard request within 15 days, please contact [facility] at [phone].

## 2023-09-07 RX ORDER — OMEPRAZOLE 40 MG/1
40 CAPSULE, DELAYED RELEASE ORAL DAILY
Qty: 90 CAPSULE | Refills: 3 | Status: SHIPPED | OUTPATIENT
Start: 2023-09-07

## 2023-09-07 NOTE — ED TRIAGE NOTES
Presents to ED with c/o dizziness and feeling like her eyes are on rotation that started last night. Alert and oriented. Respirations easy and unlabored. Opzelura Pregnancy And Lactation Text: There is insufficient data to evaluate drug-associated risk for major birth defects, miscarriage, or other adverse maternal or fetal outcomes.  There is a pregnancy registry that monitors pregnancy outcomes in pregnant persons exposed to the medication during pregnancy.  It is unknown if this medication is excreted in breast milk.  Do not breastfeed during treatment and for about 4 weeks after the last dose.

## 2023-09-15 ENCOUNTER — HOSPITAL ENCOUNTER (EMERGENCY)
Age: 28
Discharge: HOME | End: 2023-09-16
Payer: COMMERCIAL

## 2023-09-15 ENCOUNTER — APPOINTMENT (OUTPATIENT)
Dept: GENERAL RADIOLOGY | Age: 28
End: 2023-09-15
Payer: COMMERCIAL

## 2023-09-15 VITALS
HEART RATE: 91 BPM | WEIGHT: 293 LBS | RESPIRATION RATE: 18 BRPM | SYSTOLIC BLOOD PRESSURE: 132 MMHG | DIASTOLIC BLOOD PRESSURE: 82 MMHG | TEMPERATURE: 98.4 F | OXYGEN SATURATION: 100 % | BODY MASS INDEX: 50.22 KG/M2
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
## 2023-09-15 DIAGNOSIS — S51.052A DOG BITE OF LEFT ELBOW, INITIAL ENCOUNTER: Primary | ICD-10-CM

## 2023-09-15 PROCEDURE — 73080 X-RAY EXAM OF ELBOW: CPT

## 2023-09-15 PROCEDURE — 6370000000 HC RX 637 (ALT 250 FOR IP)

## 2023-09-15 PROCEDURE — 99283 EMERGENCY DEPT VISIT LOW MDM: CPT

## 2023-09-15 RX ORDER — CLINDAMYCIN HYDROCHLORIDE 150 MG/1
450 CAPSULE ORAL 3 TIMES DAILY
Qty: 63 CAPSULE | Refills: 0 | Status: SHIPPED | OUTPATIENT
Start: 2023-09-15 | End: 2023-09-22

## 2023-09-15 RX ORDER — CLINDAMYCIN HYDROCHLORIDE 150 MG/1
450 CAPSULE ORAL ONCE
Status: COMPLETED | OUTPATIENT
Start: 2023-09-15 | End: 2023-09-15

## 2023-09-15 RX ORDER — CIPROFLOXACIN 500 MG/1
500 TABLET, FILM COATED ORAL ONCE
Status: COMPLETED | OUTPATIENT
Start: 2023-09-15 | End: 2023-09-15

## 2023-09-15 RX ORDER — CIPROFLOXACIN 500 MG/1
500 TABLET, FILM COATED ORAL 2 TIMES DAILY
Qty: 14 TABLET | Refills: 0 | Status: SHIPPED | OUTPATIENT
Start: 2023-09-15 | End: 2023-09-22

## 2023-09-15 RX ADMIN — CLINDAMYCIN HYDROCHLORIDE 450 MG: 150 CAPSULE ORAL at 23:22

## 2023-09-15 RX ADMIN — CIPROFLOXACIN HYDROCHLORIDE 500 MG: 500 TABLET, FILM COATED ORAL at 23:23

## 2023-09-15 ASSESSMENT — PAIN - FUNCTIONAL ASSESSMENT: PAIN_FUNCTIONAL_ASSESSMENT: NONE - DENIES PAIN

## 2023-09-16 NOTE — ED TRIAGE NOTES
Pt arrives to ED from home with c/o dog bite to left elbow, pt states she walked in a friends house and the dog just bit her  States she has sharon trauma from a dog bite as child so forgets what happened tonight    Pt states she had tdap shot in 2020, states the owners told her the dog is up to date on his shots

## 2023-09-16 NOTE — DISCHARGE INSTRUCTIONS
Return to ER for fever/chills. Return for wound drainage/bleeding or other concern. Return for the dog changing behavior or having signs of rabies/infection. Return for any additional medical concerns. Please follow-up with your family doctor in a week for reevaluation/outpatient management. Please take your antibiotics until they are gone even if you are feeling better.

## 2023-09-18 ENCOUNTER — PATIENT MESSAGE (OUTPATIENT)
Dept: FAMILY MEDICINE CLINIC | Age: 28
End: 2023-09-18

## 2023-11-14 ENCOUNTER — HOSPITAL ENCOUNTER (EMERGENCY)
Age: 28
Discharge: HOME | End: 2023-11-14
Payer: COMMERCIAL

## 2023-11-14 VITALS
RESPIRATION RATE: 16 BRPM | OXYGEN SATURATION: 97 % | TEMPERATURE: 98.1 F | HEIGHT: 70 IN | WEIGHT: 293 LBS | HEART RATE: 99 BPM | SYSTOLIC BLOOD PRESSURE: 137 MMHG | BODY MASS INDEX: 41.95 KG/M2 | DIASTOLIC BLOOD PRESSURE: 75 MMHG

## 2023-11-14 DIAGNOSIS — H65.02 ACUTE SEROUS OTITIS MEDIA OF LEFT EAR, RECURRENCE NOT SPECIFIED: ICD-10-CM

## 2023-11-14 DIAGNOSIS — J32.0 LEFT MAXILLARY SINUSITIS: Primary | ICD-10-CM

## 2023-11-14 PROCEDURE — 99213 OFFICE O/P EST LOW 20 MIN: CPT | Performed by: NURSE PRACTITIONER

## 2023-11-14 PROCEDURE — 99213 OFFICE O/P EST LOW 20 MIN: CPT

## 2023-11-14 RX ORDER — CLINDAMYCIN HYDROCHLORIDE 300 MG/1
300 CAPSULE ORAL 2 TIMES DAILY
Qty: 14 CAPSULE | Refills: 0 | Status: SHIPPED | OUTPATIENT
Start: 2023-11-14 | End: 2023-11-21

## 2023-11-14 ASSESSMENT — PAIN DESCRIPTION - ORIENTATION: ORIENTATION: LEFT

## 2023-11-14 ASSESSMENT — PAIN - FUNCTIONAL ASSESSMENT
PAIN_FUNCTIONAL_ASSESSMENT: PREVENTS OR INTERFERES SOME ACTIVE ACTIVITIES AND ADLS
PAIN_FUNCTIONAL_ASSESSMENT: 0-10

## 2023-11-14 ASSESSMENT — ENCOUNTER SYMPTOMS
SHORTNESS OF BREATH: 0
EYE DISCHARGE: 0
SORE THROAT: 1
CHEST TIGHTNESS: 1
EYE REDNESS: 0
COUGH: 1
VOMITING: 0
RHINORRHEA: 0
TROUBLE SWALLOWING: 0
DIARRHEA: 0
SINUS PRESSURE: 1
NAUSEA: 0
SINUS PAIN: 1

## 2023-11-14 ASSESSMENT — PAIN DESCRIPTION - LOCATION: LOCATION: EAR

## 2023-11-14 ASSESSMENT — PAIN DESCRIPTION - DESCRIPTORS: DESCRIPTORS: ACHING

## 2023-11-14 ASSESSMENT — PAIN DESCRIPTION - PAIN TYPE: TYPE: ACUTE PAIN

## 2023-11-14 ASSESSMENT — PAIN SCALES - GENERAL: PAINLEVEL_OUTOF10: 5

## 2023-11-14 NOTE — DISCHARGE INSTRUCTIONS
Take all medications or antibiotics as prescribed. Treat the symptoms by making sure you are drinking fluids and you are well-rested. You may take Tylenol or Motrin per package instructions, unless otherwise directed. Seek emergency medical treatment for fever >101.5 for 3 days, unable to eat or urinate for 6 hours, increase in current symptoms or for new or worrisome symptoms. Lizzette Galan

## 2023-11-14 NOTE — ED TRIAGE NOTES
Patient ambulated to room with complaint of left ear pain that radiates to left throat and started 4-5 days ago

## 2023-11-14 NOTE — ED PROVIDER NOTES
[address]  Urgent Care Encounter      CHIEF COMPLAINT       Chief Complaint   Patient presents with    Otalgia     left       Nurses Notes reviewed and I agree except as noted in the HPI. HISTORY OF PRESENT ILLNESS   [address] is a 29 y.o. female who presents with a 2-week history of sinus pressure on the left side and a 3-day history of left ear pain. Patient states that she feels congested during the day but when she lays flat she has increased pressure and pain to her left ear. Patient has history of chronic sinus infections and ear infections. She also has a history of bronchitis and asthma. Patient has an extensive list of allergies to antibiotics and medications. REVIEW OF SYSTEMS     Review of Systems   Constitutional:  Negative for chills, diaphoresis, fatigue and fever. HENT:  Positive for ear pain, sinus pressure, sinus pain and sore throat. Negative for congestion, rhinorrhea and trouble swallowing. Eyes:  Negative for discharge and redness. Respiratory:  Positive for cough and chest tightness. Negative for shortness of breath. Cardiovascular:  Negative for chest pain. Gastrointestinal:  Negative for diarrhea, nausea and vomiting. Genitourinary:  Negative for decreased urine volume. Musculoskeletal:  Negative for neck pain and neck stiffness. Skin:  Negative for rash. Neurological:  Negative for headaches. Hematological:  Negative for adenopathy. Psychiatric/Behavioral:  Negative for sleep disturbance.         PAST MEDICAL HISTORY         Diagnosis Date    ADD (attention deficit disorder)     Anxiety 04/08/2015    Anxiety attack     Asthma     exercise induced    Atypical face pain     Back pain     Bipolar 1 disorder (HCC)     Diabetes mellitus (HCC)     GDM on insulin started on 7/23-during pregnancy    Fibromyalgia     GERD (gastroesophageal reflux disease)     Hx of blood clots     Hypotension 11/02/2017    Major depressive disorder,

## 2023-11-15 ENCOUNTER — TELEPHONE (OUTPATIENT)
Dept: FAMILY MEDICINE CLINIC | Age: 28
End: 2023-11-15

## 2023-11-29 ENCOUNTER — TELEPHONE (OUTPATIENT)
Dept: FAMILY MEDICINE CLINIC | Age: 28
End: 2023-11-29

## 2023-11-29 ENCOUNTER — OFFICE VISIT (OUTPATIENT)
Dept: FAMILY MEDICINE CLINIC | Age: 28
End: 2023-11-29
Payer: COMMERCIAL

## 2023-11-29 VITALS
BODY MASS INDEX: 41.95 KG/M2 | HEART RATE: 118 BPM | SYSTOLIC BLOOD PRESSURE: 128 MMHG | HEIGHT: 70 IN | WEIGHT: 293 LBS | DIASTOLIC BLOOD PRESSURE: 70 MMHG | RESPIRATION RATE: 18 BRPM | OXYGEN SATURATION: 96 %

## 2023-11-29 DIAGNOSIS — F60.3 BORDERLINE PERSONALITY DISORDER (HCC): ICD-10-CM

## 2023-11-29 DIAGNOSIS — J40 BRONCHITIS: Primary | ICD-10-CM

## 2023-11-29 DIAGNOSIS — R05.1 ACUTE COUGH: ICD-10-CM

## 2023-11-29 DIAGNOSIS — E66.01 MORBID OBESITY WITH BMI OF 40.0-44.9, ADULT (HCC): ICD-10-CM

## 2023-11-29 DIAGNOSIS — E11.9 TYPE 2 DIABETES MELLITUS WITHOUT COMPLICATION, WITHOUT LONG-TERM CURRENT USE OF INSULIN (HCC): ICD-10-CM

## 2023-11-29 PROBLEM — I26.99 PULMONARY EMBOLISM (HCC): Status: RESOLVED | Noted: 2017-05-05 | Resolved: 2023-11-29

## 2023-11-29 PROCEDURE — 3044F HG A1C LEVEL LT 7.0%: CPT | Performed by: NURSE PRACTITIONER

## 2023-11-29 PROCEDURE — 99213 OFFICE O/P EST LOW 20 MIN: CPT | Performed by: NURSE PRACTITIONER

## 2023-11-29 RX ORDER — OXCARBAZEPINE 600 MG/1
600 TABLET, FILM COATED ORAL 3 TIMES DAILY
Qty: 42 TABLET | Refills: 0 | Status: SHIPPED | OUTPATIENT
Start: 2023-11-29 | End: 2023-12-13

## 2023-11-29 RX ORDER — AZITHROMYCIN 250 MG/1
TABLET, FILM COATED ORAL
Qty: 6 TABLET | Refills: 0 | Status: SHIPPED | OUTPATIENT
Start: 2023-11-29 | End: 2023-12-09

## 2023-11-29 RX ORDER — PREDNISONE 20 MG/1
40 TABLET ORAL DAILY
COMMUNITY
Start: 2023-11-26

## 2023-11-29 RX ORDER — DEXTROMETHORPHAN HYDROBROMIDE AND PROMETHAZINE HYDROCHLORIDE 15; 6.25 MG/5ML; MG/5ML
5 SYRUP ORAL 4 TIMES DAILY PRN
Qty: 120 ML | Refills: 0 | Status: SHIPPED | OUTPATIENT
Start: 2023-11-29 | End: 2023-12-06

## 2023-11-29 ASSESSMENT — ENCOUNTER SYMPTOMS
RHINORRHEA: 1
SHORTNESS OF BREATH: 1
CHEST TIGHTNESS: 0
ABDOMINAL PAIN: 0
NAUSEA: 0
COUGH: 1

## 2023-11-29 NOTE — PROGRESS NOTES
delivery    Biliary colic symptom    Dizziness, nonspecific    Orthostatic dizziness    Borderline personality disorder (HCC)    Pseudoseizures    Tachyarrhythmia    Pelvic pain    Effusion of ankle or foot joint    Exostosis of bone of ankle    Muscle wasting and atrophy, not elsewhere classified, right ankle and foot    Other synovitis and tenosynovitis, right ankle and foot    Pain due to internal prosthetic device    Pain in joint involving ankle and foot    Pain in right knee    Peroneal tendinitis    Pes planus    Sprain of deltoid ligament of ankle    Tendon contracture       SUBJECTIVE/OBJECTIVE:  Review of Systems   Constitutional:  Positive for fatigue. Negative for chills and fever. HENT:  Positive for congestion, postnasal drip and rhinorrhea. Respiratory:  Positive for cough and shortness of breath. Negative for chest tightness. Cardiovascular:  Negative for chest pain. Gastrointestinal:  Negative for abdominal pain and nausea. Skin:  Negative for rash. Neurological:  Positive for headaches. Negative for dizziness and light-headedness. Psychiatric/Behavioral: Negative. Physical Exam  Constitutional:       General: She is not in acute distress. Appearance: She is ill-appearing. HENT:      Nose: Mucosal edema, congestion and rhinorrhea present. Eyes:      Pupils: Pupils are equal, round, and reactive to light. Cardiovascular:      Rate and Rhythm: Normal rate and regular rhythm. Heart sounds: No murmur heard. Pulmonary:      Effort: Pulmonary effort is normal.      Breath sounds: Normal breath sounds. No wheezing. Abdominal:      General: Bowel sounds are normal. There is no distension. Palpations: Abdomen is soft. Tenderness: There is no abdominal tenderness. Musculoskeletal:         General: No tenderness. Normal range of motion. Cervical back: Normal range of motion and neck supple. Skin:     General: Skin is warm and dry.

## 2023-11-29 NOTE — TELEPHONE ENCOUNTER
Florina [facility] with [address] [place] [address] called office stating they received the short term rx today for Trileptal 600mg 1po morning, noon and bedtime [id number]. Per Sara Ferrari, pt has always taken 3po at night. She wanted to confirm the directions because she has to order this medication. Lui Montanoa TISHA is gone for the day, called his cell and he wants the 3po at night as pt has always been taking. Balaji Snyder was notified and will change the rx directions to 3po at night.

## 2023-12-04 ENCOUNTER — TELEPHONE (OUTPATIENT)
Dept: FAMILY MEDICINE CLINIC | Age: 28
End: 2023-12-04

## 2023-12-04 DIAGNOSIS — E11.9 TYPE 2 DIABETES MELLITUS WITHOUT COMPLICATION, WITHOUT LONG-TERM CURRENT USE OF INSULIN (HCC): ICD-10-CM

## 2023-12-04 DIAGNOSIS — G47.33 OSA TREATED WITH BIPAP: Primary | ICD-10-CM

## 2023-12-04 RX ORDER — GLIMEPIRIDE 4 MG/1
4 TABLET ORAL EVERY MORNING
Qty: 90 TABLET | Refills: 3 | Status: SHIPPED | OUTPATIENT
Start: 2023-12-04

## 2023-12-04 NOTE — TELEPHONE ENCOUNTER
Patient states that she had a upper respiratory infection and was prescribed a Zpak. Patient states that since finishing that she developed a clogged ear. Patient states that her right ear has now been plugged for about the past 3 days and she is unable to hear. Patient states that she is on Cassandra, and states that it is very expensive even with her insurance. Patient is wondering if there would be a substitute medication that might be cheaper for her. Patient states that she takes a Bipap at night. Patient is requesting a new script be sent over for her Bipap supplies.  Patient uses [facility].

## 2023-12-04 NOTE — TELEPHONE ENCOUNTER
Called patient and informed. She verbalized understanding. She stated that she is already on Actos.      Informed patient we will fax order to [facility] home equipment [phone]

## 2023-12-06 ENCOUNTER — TELEPHONE (OUTPATIENT)
Dept: FAMILY MEDICINE CLINIC | Age: 28
End: 2023-12-06

## 2023-12-06 DIAGNOSIS — J40 BRONCHITIS: Primary | ICD-10-CM

## 2023-12-06 RX ORDER — DEXTROMETHORPHAN HYDROBROMIDE AND PROMETHAZINE HYDROCHLORIDE 15; 6.25 MG/5ML; MG/5ML
5 SYRUP ORAL 4 TIMES DAILY PRN
Qty: 120 ML | Refills: 0 | Status: SHIPPED | OUTPATIENT
Start: 2023-12-06 | End: 2023-12-13

## 2023-12-06 NOTE — TELEPHONE ENCOUNTER
Pt called office stating she was seen in the office 11/29/23 and treated for bronchitis. Says she is still really sick. Pt finished the Tommy Belling, she was better for a couple days and is now \"bad again\". Pt c/o head/chest congestion, cough and her ears are full to where she is unable to hear. The cough syrup is helping but she is almost out. Pt also mentions the breathing treatments help as well. Please advise.

## 2023-12-11 ENCOUNTER — APPOINTMENT (OUTPATIENT)
Dept: CT IMAGING | Age: 28
End: 2023-12-11
Payer: COMMERCIAL

## 2023-12-11 ENCOUNTER — HOSPITAL ENCOUNTER (EMERGENCY)
Age: 28
Discharge: HOME | End: 2023-12-12
Attending: STUDENT IN AN ORGANIZED HEALTH CARE EDUCATION/TRAINING PROGRAM
Payer: COMMERCIAL

## 2023-12-11 ENCOUNTER — NURSE ONLY (OUTPATIENT)
Dept: LAB | Age: 28
End: 2023-12-11

## 2023-12-11 VITALS
HEART RATE: 81 BPM | DIASTOLIC BLOOD PRESSURE: 77 MMHG | BODY MASS INDEX: 41.95 KG/M2 | WEIGHT: 293 LBS | HEIGHT: 70 IN | SYSTOLIC BLOOD PRESSURE: 131 MMHG | OXYGEN SATURATION: 98 % | RESPIRATION RATE: 16 BRPM | TEMPERATURE: 98.5 F

## 2023-12-11 DIAGNOSIS — R10.2 PELVIC PAIN: Primary | ICD-10-CM

## 2023-12-11 LAB
ALBUMIN SERPL BCG-MCNC: 4.5 G/DL (ref 3.5–5.1)
ALP SERPL-CCNC: 85 U/L (ref 38–126)
ALT SERPL W/O P-5'-P-CCNC: 31 U/L (ref 11–66)
ANION GAP SERPL CALC-SCNC: 13 MEQ/L (ref 8–16)
AST SERPL-CCNC: 21 U/L (ref 5–40)
BASOPHILS ABSOLUTE: 0 THOU/MM3 (ref 0–0.1)
BASOPHILS NFR BLD AUTO: 0.3 %
BILIRUB SERPL-MCNC: 0.2 MG/DL (ref 0.3–1.2)
BILIRUB UR QL STRIP.AUTO: NEGATIVE
BUN SERPL-MCNC: 9 MG/DL (ref 7–22)
CALCIUM SERPL-MCNC: 9.4 MG/DL (ref 8.5–10.5)
CHARACTER UR: CLEAR
CHLORIDE SERPL-SCNC: 102 MEQ/L (ref 98–111)
CO2 SERPL-SCNC: 24 MEQ/L (ref 23–33)
COLOR: YELLOW
CREAT SERPL-MCNC: 0.6 MG/DL (ref 0.4–1.2)
DEPRECATED RDW RBC AUTO: 44 FL (ref 35–45)
EOSINOPHIL NFR BLD AUTO: 0.2 %
EOSINOPHILS ABSOLUTE: 0 THOU/MM3 (ref 0–0.4)
ERYTHROCYTE [DISTWIDTH] IN BLOOD BY AUTOMATED COUNT: 13.4 % (ref 11.5–14.5)
GFR SERPL CREATININE-BSD FRML MDRD: > 60 ML/MIN/1.73M2
GLUCOSE SERPL-MCNC: 141 MG/DL (ref 70–108)
GLUCOSE UR QL STRIP.AUTO: NEGATIVE MG/DL
HCG UR QL: NEGATIVE
HCT VFR BLD AUTO: 40.9 % (ref 37–47)
HGB BLD-MCNC: 13.3 GM/DL (ref 12–16)
HGB UR QL STRIP.AUTO: NEGATIVE
IMM GRANULOCYTES # BLD AUTO: 0.05 THOU/MM3 (ref 0–0.07)
IMM GRANULOCYTES NFR BLD AUTO: 0.4 %
KETONES UR QL STRIP.AUTO: NEGATIVE
LYMPHOCYTES ABSOLUTE: 3.3 THOU/MM3 (ref 1–4.8)
LYMPHOCYTES NFR BLD AUTO: 28.4 %
MCH RBC QN AUTO: 29.4 PG (ref 26–33)
MCHC RBC AUTO-ENTMCNC: 32.5 GM/DL (ref 32.2–35.5)
MCV RBC AUTO: 90.3 FL (ref 81–99)
MONOCYTES ABSOLUTE: 0.6 THOU/MM3 (ref 0.4–1.3)
MONOCYTES NFR BLD AUTO: 5.1 %
NEUTROPHILS NFR BLD AUTO: 65.6 %
NITRITE UR QL STRIP: NEGATIVE
NRBC BLD AUTO-RTO: 0 /100 WBC
OSMOLALITY SERPL CALC.SUM OF ELEC: 278.6 MOSMOL/KG (ref 275–300)
PH UR STRIP.AUTO: 7 [PH] (ref 5–9)
PLATELET # BLD AUTO: 253 THOU/MM3 (ref 130–400)
PMV BLD AUTO: 10.1 FL (ref 9.4–12.4)
POTASSIUM SERPL-SCNC: 3.8 MEQ/L (ref 3.5–5.2)
PROT SERPL-MCNC: 7.7 G/DL (ref 6.1–8)
PROT UR STRIP.AUTO-MCNC: NEGATIVE MG/DL
RBC # BLD AUTO: 4.53 MILL/MM3 (ref 4.2–5.4)
SEGMENTED NEUTROPHILS ABSOLUTE COUNT: 7.5 THOU/MM3 (ref 1.8–7.7)
SODIUM SERPL-SCNC: 139 MEQ/L (ref 135–145)
SP GR UR REFRACT.AUTO: 1.02 (ref 1–1.03)
UROBILINOGEN, URINE: 0.2 EU/DL (ref 0–1)
WBC # BLD AUTO: 11.5 THOU/MM3 (ref 4.8–10.8)
WBC #/AREA URNS HPF: NEGATIVE /[HPF]

## 2023-12-11 PROCEDURE — 6360000004 HC RX CONTRAST MEDICATION: Performed by: STUDENT IN AN ORGANIZED HEALTH CARE EDUCATION/TRAINING PROGRAM

## 2023-12-11 PROCEDURE — 6360000002 HC RX W HCPCS: Performed by: STUDENT IN AN ORGANIZED HEALTH CARE EDUCATION/TRAINING PROGRAM

## 2023-12-11 PROCEDURE — 99285 EMERGENCY DEPT VISIT HI MDM: CPT

## 2023-12-11 PROCEDURE — 81025 URINE PREGNANCY TEST: CPT

## 2023-12-11 PROCEDURE — 74177 CT ABD & PELVIS W/CONTRAST: CPT

## 2023-12-11 PROCEDURE — 36415 COLL VENOUS BLD VENIPUNCTURE: CPT

## 2023-12-11 PROCEDURE — 81003 URINALYSIS AUTO W/O SCOPE: CPT

## 2023-12-11 PROCEDURE — 96374 THER/PROPH/DIAG INJ IV PUSH: CPT

## 2023-12-11 PROCEDURE — 85025 COMPLETE CBC W/AUTO DIFF WBC: CPT

## 2023-12-11 PROCEDURE — 96375 TX/PRO/DX INJ NEW DRUG ADDON: CPT

## 2023-12-11 PROCEDURE — 80053 COMPREHEN METABOLIC PANEL: CPT

## 2023-12-11 RX ORDER — ONDANSETRON 2 MG/ML
4 INJECTION INTRAMUSCULAR; INTRAVENOUS ONCE
Status: COMPLETED | OUTPATIENT
Start: 2023-12-11 | End: 2023-12-11

## 2023-12-11 RX ORDER — KETOROLAC TROMETHAMINE 30 MG/ML
15 INJECTION, SOLUTION INTRAMUSCULAR; INTRAVENOUS ONCE
Status: COMPLETED | OUTPATIENT
Start: 2023-12-11 | End: 2023-12-11

## 2023-12-11 RX ADMIN — KETOROLAC TROMETHAMINE 15 MG: 30 INJECTION, SOLUTION INTRAMUSCULAR at 22:38

## 2023-12-11 RX ADMIN — ONDANSETRON 4 MG: 2 INJECTION INTRAMUSCULAR; INTRAVENOUS at 22:38

## 2023-12-11 RX ADMIN — IOPAMIDOL 80 ML: 755 INJECTION, SOLUTION INTRAVENOUS at 22:58

## 2023-12-11 ASSESSMENT — PAIN DESCRIPTION - LOCATION
LOCATION: PELVIS;BACK
LOCATION: PELVIS
LOCATION: PELVIS

## 2023-12-11 ASSESSMENT — PAIN SCALES - GENERAL
PAINLEVEL_OUTOF10: 9
PAINLEVEL_OUTOF10: 9
PAINLEVEL_OUTOF10: 5
PAINLEVEL_OUTOF10: 8

## 2023-12-11 ASSESSMENT — PAIN DESCRIPTION - ORIENTATION: ORIENTATION: LOWER

## 2023-12-11 ASSESSMENT — PAIN - FUNCTIONAL ASSESSMENT
PAIN_FUNCTIONAL_ASSESSMENT: 0-10

## 2023-12-12 LAB
BACTERIA UR CULT: ABNORMAL
ORGANISM: ABNORMAL

## 2023-12-12 NOTE — ED TRIAGE NOTES
PT to the ED with complaint of pelvic pain and lower back pain. PT states pain has started about 5 days ago and gotten worse. PT states she did go to her OB but was unable to get an ultra sound scheduled until next month. PT states she has a hysterectomy about 1.5 years ago. PT denies any vaginal bleeding but states she is having urgency and some incontinence but denies painful urination. PT states she last took tylenol around 1800. PT states pain is shooting and constant through the pelvic area.

## 2023-12-12 NOTE — ED PROVIDER NOTES
Pes planus M21.40    Sprain of deltoid ligament of ankle S93.429A    Tendon contracture M62.40     SURGICAL HISTORY       Past Surgical History:   Procedure Laterality Date    ABLATION OF DYSRHYTHMIC FOCUS  2016    [facility]    ANKLE FRACTURE SURGERY Right 2020    RIGHT ANKLE OPEN REDUCTION INTERNAL FIXATION AND DELTOID LIGAMENT REPAIR performed by Reina Levin DPM at [facility]  2016    EP Study    CARDIAC CATHETERIZATION  2016    [facility]     SECTION N/A 2020     SECTION performed by Daniel Bernal MD at [facility], LAPAROSCOPIC N/A 2021    CHOLECYSTECTOMY LAPAROSCOPIC ROBOTIC performed by Trudy Gary MD at [address]  2015    [place]    HYSTERECTOMY (CERVIX STATUS UNKNOWN) N/A 2022    Robotic Hysterectomy with Bilateral Salpingectomy performed by Daniel Bernal MD at [facility]  2020    Dr. Moy Wahl  2015    [address]  2010       CURRENT MEDICATIONS       Discharge Medication List as of 2023 12:13 AM        CONTINUE these medications which have NOT CHANGED    Details   promethazine-dextromethorphan (PROMETHAZINE-DM) 6.25-15 MG/5ML syrup Take 5 mLs by mouth 4 times daily as needed for Cough, Disp-120 mL, R-0Normal      glimepiride (AMARYL) 4 MG tablet Take 1 tablet by mouth every morning, Disp-90 tablet, R-3Normal      predniSONE (DELTASONE) 20 MG tablet Take 2 tablets by mouth dailyHistorical Med      OXcarbazepine (TRILEPTAL) 600 MG tablet Take 1 tablet by mouth in the morning, at noon, and at bedtime for 14 days, Disp-42 tablet, R-0Normal      omeprazole (PRILOSEC) 40 MG delayed release capsule Take 1 capsule by mouth daily, Disp-90 capsule, R-3Normal      fenofibrate (TRIGLIDE) 160 MG tablet Take 1 tablet by mouth daily, Disp-90 tablet, R-1Normal      pioglitazone

## 2023-12-12 NOTE — ED NOTES
PT resting in bed. PT and VS assessed. Call light in reach. Respirations equal and unlabored.      Emil Garcia RN  12/11/23 [phone]

## 2024-01-03 ENCOUNTER — TELEPHONE (OUTPATIENT)
Dept: FAMILY MEDICINE CLINIC | Age: 29
End: 2024-01-03

## 2024-01-03 NOTE — TELEPHONE ENCOUNTER
----- Message from Gladys Lucero sent at 1/3/2024 12:39 PM EST -----  Subject: Message to Provider    QUESTIONS  Information for Provider? Patient called to get clearance prior bariatric   surgery but doesn't have a date. When I told her that I couldn't schedule   without a surgery date she didn't think she needed one. She will discuss   with Jovi when she brings her daughter in on 1/4/24  ---------------------------------------------------------------------------  --------------  CALL BACK INFO  [phone]; OK to leave message on voicemail  ---------------------------------------------------------------------------  --------------  SCRIPT ANSWERS  Relationship to Patient? Self

## 2024-01-19 ENCOUNTER — OFFICE VISIT (OUTPATIENT)
Dept: FAMILY MEDICINE CLINIC | Age: 29
End: 2024-01-19
Payer: COMMERCIAL

## 2024-01-19 VITALS
RESPIRATION RATE: 18 BRPM | SYSTOLIC BLOOD PRESSURE: 128 MMHG | WEIGHT: 293 LBS | HEIGHT: 70 IN | DIASTOLIC BLOOD PRESSURE: 70 MMHG | BODY MASS INDEX: 41.95 KG/M2 | HEART RATE: 92 BPM

## 2024-01-19 DIAGNOSIS — F33.0 MAJOR DEPRESSIVE DISORDER, RECURRENT EPISODE, MILD (HCC): ICD-10-CM

## 2024-01-19 DIAGNOSIS — D50.9 IRON DEFICIENCY ANEMIA, UNSPECIFIED IRON DEFICIENCY ANEMIA TYPE: ICD-10-CM

## 2024-01-19 DIAGNOSIS — B37.2 CANDIDAL INTERTRIGO: Primary | ICD-10-CM

## 2024-01-19 DIAGNOSIS — E11.9 TYPE 2 DIABETES MELLITUS WITHOUT COMPLICATION, WITHOUT LONG-TERM CURRENT USE OF INSULIN (HCC): ICD-10-CM

## 2024-01-19 DIAGNOSIS — F60.3 BORDERLINE PERSONALITY DISORDER (HCC): ICD-10-CM

## 2024-01-19 DIAGNOSIS — R56.9 CONVULSIONS, UNSPECIFIED CONVULSION TYPE (HCC): ICD-10-CM

## 2024-01-19 DIAGNOSIS — E78.1 HYPERTRIGLYCERIDEMIA: ICD-10-CM

## 2024-01-19 DIAGNOSIS — F17.200 SMOKING: ICD-10-CM

## 2024-01-19 DIAGNOSIS — G47.33 OBSTRUCTIVE SLEEP APNEA: ICD-10-CM

## 2024-01-19 DIAGNOSIS — E78.2 MIXED HYPERLIPIDEMIA: ICD-10-CM

## 2024-01-19 PROCEDURE — 99214 OFFICE O/P EST MOD 30 MIN: CPT | Performed by: NURSE PRACTITIONER

## 2024-01-19 RX ORDER — TIRZEPATIDE 5 MG/.5ML
5 INJECTION, SOLUTION SUBCUTANEOUS WEEKLY
Qty: 2 ML | Refills: 0 | Status: SHIPPED | OUTPATIENT
Start: 2024-01-19

## 2024-01-19 RX ORDER — CLOTRIMAZOLE AND BETAMETHASONE DIPROPIONATE 10; .64 MG/G; MG/G
CREAM TOPICAL
Qty: 45 G | Refills: 0 | Status: SHIPPED | OUTPATIENT
Start: 2024-01-19

## 2024-01-19 ASSESSMENT — PATIENT HEALTH QUESTIONNAIRE - PHQ9
5. POOR APPETITE OR OVEREATING: 0
2. FEELING DOWN, DEPRESSED OR HOPELESS: NOT AT ALL
9. THOUGHTS THAT YOU WOULD BE BETTER OFF DEAD, OR OF HURTING YOURSELF: NOT AT ALL
8. MOVING OR SPEAKING SO SLOWLY THAT OTHER PEOPLE COULD HAVE NOTICED. OR THE OPPOSITE - BEING SO FIDGETY OR RESTLESS THAT YOU HAVE BEEN MOVING AROUND A LOT MORE THAN USUAL: NOT AT ALL
6. FEELING BAD ABOUT YOURSELF - OR THAT YOU ARE A FAILURE OR HAVE LET YOURSELF OR YOUR FAMILY DOWN: 0
SUM OF ALL RESPONSES TO PHQ QUESTIONS 1-9: 0
5. POOR APPETITE OR OVEREATING: NOT AT ALL
9. THOUGHTS THAT YOU WOULD BE BETTER OFF DEAD, OR OF HURTING YOURSELF: 0
8. MOVING OR SPEAKING SO SLOWLY THAT OTHER PEOPLE COULD HAVE NOTICED. OR THE OPPOSITE, BEING SO FIGETY OR RESTLESS THAT YOU HAVE BEEN MOVING AROUND A LOT MORE THAN USUAL: 0
SUM OF ALL RESPONSES TO PHQ QUESTIONS 1-9: 0
6. FEELING BAD ABOUT YOURSELF - OR THAT YOU ARE A FAILURE OR HAVE LET YOURSELF OR YOUR FAMILY DOWN: NOT AT ALL
SUM OF ALL RESPONSES TO PHQ QUESTIONS 1-9: 0
4. FEELING TIRED OR HAVING LITTLE ENERGY: 0
7. TROUBLE CONCENTRATING ON THINGS, SUCH AS READING THE NEWSPAPER OR WATCHING TELEVISION: 0
SUM OF ALL RESPONSES TO PHQ9 QUESTIONS 1 & 2: 0
3. TROUBLE FALLING OR STAYING ASLEEP: NOT AT ALL
10. IF YOU CHECKED OFF ANY PROBLEMS, HOW DIFFICULT HAVE THESE PROBLEMS MADE IT FOR YOU TO DO YOUR WORK, TAKE CARE OF THINGS AT HOME, OR GET ALONG WITH OTHER PEOPLE: 0
4. FEELING TIRED OR HAVING LITTLE ENERGY: NOT AT ALL
2. FEELING DOWN, DEPRESSED OR HOPELESS: 0
1. LITTLE INTEREST OR PLEASURE IN DOING THINGS: NOT AT ALL
SUM OF ALL RESPONSES TO PHQ QUESTIONS 1-9: 0
10. IF YOU CHECKED OFF ANY PROBLEMS, HOW DIFFICULT HAVE THESE PROBLEMS MADE IT FOR YOU TO DO YOUR WORK, TAKE CARE OF THINGS AT HOME, OR GET ALONG WITH OTHER PEOPLE: NOT DIFFICULT AT ALL
7. TROUBLE CONCENTRATING ON THINGS, SUCH AS READING THE NEWSPAPER OR WATCHING TELEVISION: NOT AT ALL
SUM OF ALL RESPONSES TO PHQ QUESTIONS 1-9: 0
3. TROUBLE FALLING OR STAYING ASLEEP: 0
1. LITTLE INTEREST OR PLEASURE IN DOING THINGS: 0

## 2024-01-19 ASSESSMENT — ENCOUNTER SYMPTOMS
BACK PAIN: 1
SHORTNESS OF BREATH: 0
NAUSEA: 0
COUGH: 0
ABDOMINAL PAIN: 0

## 2024-01-19 NOTE — PROGRESS NOTES
Deana Mcginnis (1995) 28 y.o. female here for evaluation of the following chief complaint(s):      HPI:  Chief Complaint   Patient presents with    OTHER     Bump/open sore on back of head where her cpap strap sits.        Sore on back of neck where her CPAP strap sits at night.  Onset of 6 days.  Painful.     Has been putting Triple ATB on it and antifungal cream .    Vitals:    24 0907   BP: 128/70   Pulse: 92   Resp: 18       Patient Active Problem List   Diagnosis    Major depressive disorder, recurrent episode, mild (HCC)    Low self esteem    KARLIE (generalized anxiety disorder)    Obesity    Obstructive sleep apnea    Snores    Sleep disturbances    Daytime somnolence    Sleep talking    Night terrors, adult    Bruxism (teeth grinding)    Restless sleeper    Anxiety    Abnormal thyroid function test    Trigeminal neuralgia    Inappropriate sinus tachycardia    POTS (postural orthostatic tachycardia syndrome)    Syncope and collapse    Neck discomfort    Thyroid cyst    Breast pain, left    Positive CAESAR (antinuclear antibody)    Hypotension    S/P ablation of ventricular arrhythmia    Pneumonia    Hypokalemia    DUB (dysfunctional uterine bleeding)    Palpitations    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    Closed right ankle fracture, initial encounter    Status post open reduction with internal fixation (ORIF) of fracture of ankle    Tear of deltoid ligament of ankle, right, initial encounter    Abdominal pain during pregnancy in second trimester    Maternal obesity affecting pregnancy, antepartum    Postpartum care following  delivery    Biliary colic symptom    Dizziness, nonspecific    Orthostatic dizziness    Borderline personality disorder (HCC)    Pseudoseizures    Tachyarrhythmia    Pelvic pain    Effusion of ankle or foot joint    Exostosis of bone of ankle    Muscle wasting and atrophy, not elsewhere classified, right ankle and foot    Other synovitis

## 2024-01-19 NOTE — PROGRESS NOTES
Subjective:      Patient ID: Deana Mcginnis 1995 is a 28 y.o. female here for evaluation.     Chief Complaint   Patient presents with    OTHER     Bump/open sore on back of head where her cpap strap sits.        Sore on back of neck where her CPAP strap sits at night.  Onset of 6 days.  Painful.     Has been putting Triple ATB on it and antifungal cream .    Patient Active Problem List   Diagnosis    Major depressive disorder, recurrent episode, mild (HCC)    Low self esteem    KARLIE (generalized anxiety disorder)    Obesity    Obstructive sleep apnea    Snores    Sleep disturbances    Daytime somnolence    Sleep talking    Night terrors, adult    Bruxism (teeth grinding)    Restless sleeper    Anxiety    Abnormal thyroid function test    Trigeminal neuralgia    Inappropriate sinus tachycardia    POTS (postural orthostatic tachycardia syndrome)    Syncope and collapse    Neck discomfort    Thyroid cyst    Breast pain, left    Positive CAESAR (antinuclear antibody)    Hypotension    S/P ablation of ventricular arrhythmia    Pneumonia    Hypokalemia    DUB (dysfunctional uterine bleeding)    Palpitations    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    Closed right ankle fracture, initial encounter    Status post open reduction with internal fixation (ORIF) of fracture of ankle    Tear of deltoid ligament of ankle, right, initial encounter    Abdominal pain during pregnancy in second trimester    Maternal obesity affecting pregnancy, antepartum    Postpartum care following  delivery    Biliary colic symptom    Dizziness, nonspecific    Orthostatic dizziness    Borderline personality disorder (HCC)    Pseudoseizures    Tachyarrhythmia    Pelvic pain    Effusion of ankle or foot joint    Exostosis of bone of ankle    Muscle wasting and atrophy, not elsewhere classified, right ankle and foot    Other synovitis and tenosynovitis, right ankle and foot    Pain due to internal prosthetic

## 2024-01-19 NOTE — PATIENT INSTRUCTIONS
You may receive a survey about your visit with us today. The feedback from our patients helps us identify what is working well and where the service to all patients can be enhanced. Thank you!     Tobacco Cessation Programs     Telephonic behavior modification  1-800-QUIT-NOW (649-2492)  Counseling service for those who are ready to quit using tobacco.    Available for uninsured [place] residents, Medicaid recipients, pregnant women, or patients whose health plans or employers are members of the [place] [employer]    Online behavior modification  http://[place].Quitlogix.org  Online support program to help patients through each step of the quitting process.  Available 24 hours a day 7 days a week.  Provides up to date researched based tool, step-by-step guides, and motivational messages.    Online behavior modification  www.lungusa.org/stop-smoking/how-to-quit  HelpLine: 1-800-LUNG-USA (961-0868)  Email questions to:  questions@[facility].org   Website offers resources to help tobacco users figure out their reasons for quitting and then take the big step of quitting for good.    Hypnosis  Location: [address] [place] [address]  Contact: Percy Davis, PhD at [phone]  Hypnosis for tobacco cessation  Cost $225 for the initial session and $175 for each session afterwards.  Most patients require 6-8 sessions.  There is the option to submit through the patient’s insurance.    Hypnosis and behavior modification  Location: [address] [place] [address]  Contact: Camron Moreno, PhD at [phone]  Counseling and hypnosis for nicotine addition  Cost: For uninsured patients:  Please call above phone number  Cost for insured patients depends on patient’s insurance plan.    Behavior modification  Location: [facility], [address] [place] [address]  Contact: [phone]  Services include four one-on-one appointments between the patient and a respiratory therapist.  The four appointments

## 2024-01-22 ENCOUNTER — TELEPHONE (OUTPATIENT)
Dept: FAMILY MEDICINE CLINIC | Age: 29
End: 2024-01-22

## 2024-01-22 DIAGNOSIS — E11.9 TYPE 2 DIABETES MELLITUS WITHOUT COMPLICATION, WITHOUT LONG-TERM CURRENT USE OF INSULIN (HCC): Primary | ICD-10-CM

## 2024-01-22 DIAGNOSIS — E78.2 MIXED HYPERLIPIDEMIA: ICD-10-CM

## 2024-01-22 NOTE — TELEPHONE ENCOUNTER
There is a shortage of Trulicity so that is not an option.  Would recommend going to a different oral medication - Januvia to replace the shot.

## 2024-01-22 NOTE — TELEPHONE ENCOUNTER
Outcome  Approved today  PA Case: [id number], Status: Approved, Coverage Starts on: 1/1/2024 12:00:00 AM, Coverage Ends on: 1/21/2025 12:00:00 AM.  Authorization Expiration Date: 1/20/2025      Called [facility] and left a detailed msg updating them on approval    Attempted to update the patient, left a VM asking her to call the office back.

## 2024-01-22 NOTE — TELEPHONE ENCOUNTER
The patient called back and stated that she called and spoke with [facility] and was told that her cost with the PA approval will still be $500.  She is asking if maybe a lower dose of the Trulicity could be sent in for her to see if she tolerates that better or called her insurance company about the Mounjaro and tell them that that she did not tolerated the Trulicity and see if they will lower the tier.  Please advise.

## 2024-01-22 NOTE — TELEPHONE ENCOUNTER
Called patient and informed. She verbalized understanding. She stated that she was on one medication in the past wasn't sure if it was januvia or jardiance but it made her urinate a lot.     She was willing to try the januvia

## 2024-01-24 ENCOUNTER — HOSPITAL ENCOUNTER (EMERGENCY)
Age: 29
Discharge: HOME | End: 2024-01-25
Payer: COMMERCIAL

## 2024-01-24 ENCOUNTER — APPOINTMENT (OUTPATIENT)
Dept: GENERAL RADIOLOGY | Age: 29
End: 2024-01-24
Payer: COMMERCIAL

## 2024-01-24 VITALS
HEIGHT: 70 IN | SYSTOLIC BLOOD PRESSURE: 145 MMHG | OXYGEN SATURATION: 96 % | BODY MASS INDEX: 41.95 KG/M2 | HEART RATE: 106 BPM | WEIGHT: 293 LBS | TEMPERATURE: 99.1 F | DIASTOLIC BLOOD PRESSURE: 97 MMHG | RESPIRATION RATE: 16 BRPM

## 2024-01-24 DIAGNOSIS — S93.401A SPRAIN OF RIGHT ANKLE, UNSPECIFIED LIGAMENT, INITIAL ENCOUNTER: Primary | ICD-10-CM

## 2024-01-24 PROCEDURE — 96372 THER/PROPH/DIAG INJ SC/IM: CPT

## 2024-01-24 PROCEDURE — 29515 APPLICATION SHORT LEG SPLINT: CPT

## 2024-01-24 PROCEDURE — 73610 X-RAY EXAM OF ANKLE: CPT

## 2024-01-24 PROCEDURE — 99284 EMERGENCY DEPT VISIT MOD MDM: CPT

## 2024-01-24 RX ORDER — KETOROLAC TROMETHAMINE 30 MG/ML
30 INJECTION, SOLUTION INTRAMUSCULAR; INTRAVENOUS ONCE
Status: COMPLETED | OUTPATIENT
Start: 2024-01-25 | End: 2024-01-25

## 2024-01-24 ASSESSMENT — PAIN DESCRIPTION - ORIENTATION: ORIENTATION: RIGHT

## 2024-01-24 ASSESSMENT — PAIN SCALES - GENERAL: PAINLEVEL_OUTOF10: 7

## 2024-01-24 ASSESSMENT — PAIN DESCRIPTION - DESCRIPTORS: DESCRIPTORS: ACHING

## 2024-01-24 ASSESSMENT — PAIN - FUNCTIONAL ASSESSMENT: PAIN_FUNCTIONAL_ASSESSMENT: 0-10

## 2024-01-24 ASSESSMENT — PAIN DESCRIPTION - LOCATION: LOCATION: ANKLE

## 2024-01-25 ENCOUNTER — TELEPHONE (OUTPATIENT)
Dept: FAMILY MEDICINE CLINIC | Age: 29
End: 2024-01-25

## 2024-01-25 PROCEDURE — 6360000002 HC RX W HCPCS: Performed by: NURSE PRACTITIONER

## 2024-01-25 RX ADMIN — KETOROLAC TROMETHAMINE 30 MG: 30 INJECTION INTRAMUSCULAR; INTRAVENOUS at 00:00

## 2024-01-25 ASSESSMENT — PAIN DESCRIPTION - LOCATION: LOCATION: ANKLE

## 2024-01-25 ASSESSMENT — PAIN DESCRIPTION - ORIENTATION: ORIENTATION: RIGHT

## 2024-01-25 ASSESSMENT — PAIN SCALES - GENERAL: PAINLEVEL_OUTOF10: 7

## 2024-01-25 NOTE — ED TRIAGE NOTES
Pt presents to ED with chief complaint of right ankle injury. Pt states about 3 days ago she tripped over a toy and sprained her right ankle. States the pain has become more severe. Reports previous ankle surgery.

## 2024-01-25 NOTE — DISCHARGE INSTRUCTIONS
Call today or tomorrow to follow up with Jovi Morrison APRN - CNP  in 3 days.    Use an ice pack or bag filled with ice and apply to the injured area 3 - 4 times a day for 15 - 20 minutes each time.    Use ibuprofen or Tylenol (unless prescribed medications that have Tylenol in it) for pain.  You can take over the counter Ibuprofen (advil) tablets (4 every 8 hours or 3 every 6 hours or 2 every 4 hours)    Use your crutches for the next several days until you are able to take 10 steps without pain.    Return to the Emergency Department for worsening of pain, increase swelling to the ankle, inability to move your toes, any other care or concern.

## 2024-02-01 ENCOUNTER — TELEPHONE (OUTPATIENT)
Dept: CARDIOLOGY CLINIC | Age: 29
End: 2024-02-01

## 2024-02-01 NOTE — TELEPHONE ENCOUNTER
Family has been updated by MD fellow. All questions answered. LMOM for pt to call to r/s missed appt for cardiac clearance on 2/1/24

## 2024-02-12 ENCOUNTER — OFFICE VISIT (OUTPATIENT)
Dept: PULMONOLOGY | Age: 29
End: 2024-02-12
Payer: MEDICARE

## 2024-02-12 VITALS
BODY MASS INDEX: 43.4 KG/M2 | TEMPERATURE: 98.8 F | DIASTOLIC BLOOD PRESSURE: 70 MMHG | WEIGHT: 293 LBS | HEART RATE: 99 BPM | OXYGEN SATURATION: 96 % | HEIGHT: 69 IN | SYSTOLIC BLOOD PRESSURE: 118 MMHG

## 2024-02-12 DIAGNOSIS — Z01.818 PRE-OPERATIVE CLEARANCE: ICD-10-CM

## 2024-02-12 DIAGNOSIS — G47.33 OSA TREATED WITH BIPAP: Primary | ICD-10-CM

## 2024-02-12 DIAGNOSIS — E66.01 MORBID OBESITY WITH BMI OF 50.0-59.9, ADULT (HCC): ICD-10-CM

## 2024-02-12 PROCEDURE — 99214 OFFICE O/P EST MOD 30 MIN: CPT | Performed by: NURSE PRACTITIONER

## 2024-02-12 NOTE — PROGRESS NOTES
[facility]      Deana Mcginnis         [id number]  2/12/2024   Chief Complaint   Patient presents with    Follow-up     Amol 2 year f/u with download        Pt of Dr. travon WEISS Download:   Original or initial AHI: 131.4     Date of initial study: 5-19-15      Compliant  100%     Noncompliant 0 %     PAP Type spont Level  [id number]   Avg Hrs/Day 10hrs 2 min  AHI: 0.6   Recorded compliance dates , 1-9-24  to 2-7-24   Machine/Mfg:   [x] ResMed    [] Respironics/Dreamstation   Interface:   [] Nasal    [] Nasal pillows   [x] FFM      Provider:      [x] [facility]     [][employer]     [] [employer]    [] [employer]    [] [employer]               [] [employer]      [] [employer]    [] [employer]:      [] Other    Neck Size: 18  Mallampati :3  ESS:  13  SAQLI: 77    Here is a scan of the most recent download:                Presentation:   Deana presents for sleep medicine follow up for obstructive sleep apnea  Since the last visit, Deana has been compliant with her PAP therapy and continues  to see benefit from its use.   Awake and alert today  AHI is controlled   MO BMI 51  No sleep medication use at night   Patient is needing medical clearance for upcoming bariatric surgery in [place] soon.     Equipment issues:  The pressure is  acceptable, the mask is acceptable     Sleep issues:  Do you feel better? Yes  More rested?Yes   Better concentration? yes    Progress History:   Since last visit any new medical issues? No  New ER or hospital visits? No  Any new or changes in medicines? No  Any new sleep medicines? No    Review of Systems -   Review of Systems   Constitutional: Negative.  Negative for chills and fever.   HENT: Negative.  Negative for congestion.    Eyes: Negative.    Respiratory: Negative.  Negative for cough, shortness of breath and wheezing.    Cardiovascular: Negative.  Negative for chest pain and leg swelling.   Gastrointestinal: Negative.    Endocrine: Negative.

## 2024-02-13 ENCOUNTER — OFFICE VISIT (OUTPATIENT)
Dept: FAMILY MEDICINE CLINIC | Age: 29
End: 2024-02-13
Payer: MEDICARE

## 2024-02-13 VITALS
SYSTOLIC BLOOD PRESSURE: 108 MMHG | DIASTOLIC BLOOD PRESSURE: 64 MMHG | WEIGHT: 293 LBS | HEART RATE: 76 BPM | BODY MASS INDEX: 51.75 KG/M2 | RESPIRATION RATE: 20 BRPM

## 2024-02-13 DIAGNOSIS — M25.50 ARTHRALGIA OF MULTIPLE JOINTS: ICD-10-CM

## 2024-02-13 DIAGNOSIS — E78.1 HYPERTRIGLYCERIDEMIA: ICD-10-CM

## 2024-02-13 DIAGNOSIS — E78.2 MIXED HYPERLIPIDEMIA: ICD-10-CM

## 2024-02-13 DIAGNOSIS — F60.3 BORDERLINE PERSONALITY DISORDER (HCC): ICD-10-CM

## 2024-02-13 DIAGNOSIS — G47.33 OSA TREATED WITH BIPAP: ICD-10-CM

## 2024-02-13 DIAGNOSIS — K21.9 GASTROESOPHAGEAL REFLUX DISEASE WITHOUT ESOPHAGITIS: ICD-10-CM

## 2024-02-13 DIAGNOSIS — E11.9 TYPE 2 DIABETES MELLITUS WITHOUT COMPLICATION, WITHOUT LONG-TERM CURRENT USE OF INSULIN (HCC): ICD-10-CM

## 2024-02-13 DIAGNOSIS — Z01.818 PREOP EXAMINATION: Primary | ICD-10-CM

## 2024-02-13 DIAGNOSIS — F33.0 MAJOR DEPRESSIVE DISORDER, RECURRENT EPISODE, MILD (HCC): ICD-10-CM

## 2024-02-13 DIAGNOSIS — F17.200 SMOKING: ICD-10-CM

## 2024-02-13 DIAGNOSIS — B37.2 CANDIDAL INTERTRIGO: ICD-10-CM

## 2024-02-13 PROBLEM — R94.31 ELECTROCARDIOGRAM ABNORMAL: Status: ACTIVE | Noted: 2023-12-22

## 2024-02-13 PROBLEM — M25.473 ANKLE SWELLING: Status: ACTIVE | Noted: 2023-12-22

## 2024-02-13 PROBLEM — E78.5 DYSLIPIDEMIA: Status: ACTIVE | Noted: 2023-12-22

## 2024-02-13 PROBLEM — M54.50 LOW BACK PAIN: Status: ACTIVE | Noted: 2023-12-22

## 2024-02-13 PROBLEM — F31.9 BIPOLAR DISORDER (HCC): Status: ACTIVE | Noted: 2023-12-22

## 2024-02-13 PROBLEM — M19.90 ARTHRITIS: Status: ACTIVE | Noted: 2023-12-22

## 2024-02-13 PROCEDURE — 99214 OFFICE O/P EST MOD 30 MIN: CPT | Performed by: NURSE PRACTITIONER

## 2024-02-13 RX ORDER — NYSTATIN 100000 [USP'U]/G
POWDER TOPICAL
Qty: 60 G | Refills: 0 | Status: SHIPPED | OUTPATIENT
Start: 2024-02-13

## 2024-02-13 NOTE — PROGRESS NOTES
Subjective:      Patient ID: Deana Mcginnis 1995 is a 28 y.o. female here for PRE-OP    Chief Complaint   Patient presents with    sore on neck that is not getting better    Pre-op Exam     Bariatric Surgery         Date pending for Bariatric Surgery with [facility] in [place].     Patient Active Problem List   Diagnosis    Major depressive disorder, recurrent episode, mild (HCC)    Low self esteem    KARLIE (generalized anxiety disorder)    Obesity    Obstructive sleep apnea    Snores    Sleep disturbances    Daytime somnolence    Sleep talking    Night terrors, adult    Bruxism (teeth grinding)    Restless sleeper    Anxiety    Abnormal thyroid function test    Trigeminal neuralgia    Inappropriate sinus tachycardia    POTS (postural orthostatic tachycardia syndrome)    Syncope and collapse    Neck discomfort    Thyroid cyst    Breast pain, left    Positive CAESAR (antinuclear antibody)    Hypotension    S/P ablation of ventricular arrhythmia    Pneumonia    Hypokalemia    DUB (dysfunctional uterine bleeding)    Palpitations    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    Closed right ankle fracture, initial encounter    Status post open reduction with internal fixation (ORIF) of fracture of ankle    Tear of deltoid ligament of ankle, right, initial encounter    Abdominal pain during pregnancy in second trimester    Maternal obesity affecting pregnancy, antepartum    Postpartum care following  delivery    Biliary colic symptom    Dizziness, nonspecific    Orthostatic dizziness    Borderline personality disorder (HCC)    Pseudoseizures    Tachyarrhythmia    Pelvic pain    Effusion of ankle or foot joint    Exostosis of bone of ankle    Muscle wasting and atrophy, not elsewhere classified, right ankle and foot    Other synovitis and tenosynovitis, right ankle and foot    Pain due to internal prosthetic device    Pain in joint involving ankle and foot    Pain in right knee

## 2024-02-13 NOTE — PROGRESS NOTES
Deana Mcginnis (1995) 28 y.o. female here for evaluation of the following chief complaint(s):      HPI:  Chief Complaint   Patient presents with    sore on neck that is not getting better       Sore on back of neck where her CPAP strap sits at night. Onset of 1 month. Painful.     No benefit with Lotrisone.  Always moist back there under strap from CPAP.  Hard to keep dry.  Use of guaze.     Vitals:    24 1127   BP: 108/64   Pulse: 76   Resp: 20       Patient Active Problem List   Diagnosis    Major depressive disorder, recurrent episode, mild (HCC)    Low self esteem    KARLIE (generalized anxiety disorder)    Obesity    Obstructive sleep apnea    Snores    Sleep disturbances    Daytime somnolence    Sleep talking    Night terrors, adult    Bruxism (teeth grinding)    Restless sleeper    Anxiety    Abnormal thyroid function test    Trigeminal neuralgia    Inappropriate sinus tachycardia    POTS (postural orthostatic tachycardia syndrome)    Syncope and collapse    Neck discomfort    Thyroid cyst    Breast pain, left    Positive CAESAR (antinuclear antibody)    Hypotension    S/P ablation of ventricular arrhythmia    Pneumonia    Hypokalemia    DUB (dysfunctional uterine bleeding)    Palpitations    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    Closed right ankle fracture, initial encounter    Status post open reduction with internal fixation (ORIF) of fracture of ankle    Tear of deltoid ligament of ankle, right, initial encounter    Abdominal pain during pregnancy in second trimester    Maternal obesity affecting pregnancy, antepartum    Postpartum care following  delivery    Biliary colic symptom    Dizziness, nonspecific    Orthostatic dizziness    Borderline personality disorder (HCC)    Pseudoseizures    Tachyarrhythmia    Pelvic pain    Effusion of ankle or foot joint    Exostosis of bone of ankle    Muscle wasting and atrophy, not elsewhere classified, right ankle and

## 2024-02-15 ENCOUNTER — HOSPITAL ENCOUNTER (OUTPATIENT)
Age: 29
Discharge: HOME | End: 2024-02-15
Payer: COMMERCIAL

## 2024-02-15 ENCOUNTER — HOSPITAL ENCOUNTER (OUTPATIENT)
Dept: GENERAL RADIOLOGY | Age: 29
Discharge: HOME | End: 2024-02-15
Payer: COMMERCIAL

## 2024-02-15 DIAGNOSIS — Z01.818 PREOP EXAMINATION: ICD-10-CM

## 2024-02-15 DIAGNOSIS — E11.9 TYPE 2 DIABETES MELLITUS WITHOUT COMPLICATION, WITHOUT LONG-TERM CURRENT USE OF INSULIN (HCC): ICD-10-CM

## 2024-02-15 LAB
ANION GAP SERPL CALC-SCNC: 13 MEQ/L (ref 8–16)
BASOPHILS ABSOLUTE: 0 THOU/MM3 (ref 0–0.1)
BASOPHILS NFR BLD AUTO: 0.2 %
BUN SERPL-MCNC: 8 MG/DL (ref 7–22)
CALCIUM SERPL-MCNC: 9.2 MG/DL (ref 8.5–10.5)
CHLORIDE SERPL-SCNC: 99 MEQ/L (ref 98–111)
CO2 SERPL-SCNC: 24 MEQ/L (ref 23–33)
CREAT SERPL-MCNC: 0.6 MG/DL (ref 0.4–1.2)
DEPRECATED MEAN GLUCOSE BLD GHB EST-ACNC: 168 MG/DL (ref 70–126)
DEPRECATED RDW RBC AUTO: 47.9 FL (ref 35–45)
EKG ATRIAL RATE: 91 BPM
EKG P AXIS: 56 DEGREES
EKG P-R INTERVAL: 152 MS
EKG Q-T INTERVAL: 378 MS
EKG QRS DURATION: 96 MS
EKG QTC CALCULATION (BAZETT): 464 MS
EKG R AXIS: 60 DEGREES
EKG T AXIS: 45 DEGREES
EKG VENTRICULAR RATE: 91 BPM
EOSINOPHIL NFR BLD AUTO: 0.1 %
EOSINOPHILS ABSOLUTE: 0 THOU/MM3 (ref 0–0.4)
ERYTHROCYTE [DISTWIDTH] IN BLOOD BY AUTOMATED COUNT: 14.4 % (ref 11.5–14.5)
GFR SERPL CREATININE-BSD FRML MDRD: > 60 ML/MIN/1.73M2
GLUCOSE SERPL-MCNC: 231 MG/DL (ref 70–108)
HBA1C MFR BLD HPLC: 7.6 % (ref 4.4–6.4)
HCT VFR BLD AUTO: 39.4 % (ref 37–47)
HGB BLD-MCNC: 12.7 GM/DL (ref 12–16)
IMM GRANULOCYTES # BLD AUTO: 0.08 THOU/MM3 (ref 0–0.07)
IMM GRANULOCYTES NFR BLD AUTO: 0.9 %
LYMPHOCYTES ABSOLUTE: 2.4 THOU/MM3 (ref 1–4.8)
LYMPHOCYTES NFR BLD AUTO: 26 %
MCH RBC QN AUTO: 30 PG (ref 26–33)
MCHC RBC AUTO-ENTMCNC: 32.2 GM/DL (ref 32.2–35.5)
MCV RBC AUTO: 93.1 FL (ref 81–99)
MONOCYTES ABSOLUTE: 0.4 THOU/MM3 (ref 0.4–1.3)
MONOCYTES NFR BLD AUTO: 4.3 %
NEUTROPHILS NFR BLD AUTO: 68.5 %
NRBC BLD AUTO-RTO: 0 /100 WBC
PLATELET # BLD AUTO: 234 THOU/MM3 (ref 130–400)
PMV BLD AUTO: 10.6 FL (ref 9.4–12.4)
POTASSIUM SERPL-SCNC: 4.3 MEQ/L (ref 3.5–5.2)
RBC # BLD AUTO: 4.23 MILL/MM3 (ref 4.2–5.4)
SCAN OF BLOOD SMEAR: NORMAL
SEGMENTED NEUTROPHILS ABSOLUTE COUNT: 6.4 THOU/MM3 (ref 1.8–7.7)
SODIUM SERPL-SCNC: 136 MEQ/L (ref 135–145)
WBC # BLD AUTO: 9.4 THOU/MM3 (ref 4.8–10.8)

## 2024-02-15 PROCEDURE — 93005 ELECTROCARDIOGRAM TRACING: CPT

## 2024-02-15 PROCEDURE — 85025 COMPLETE CBC W/AUTO DIFF WBC: CPT

## 2024-02-15 PROCEDURE — 80048 BASIC METABOLIC PNL TOTAL CA: CPT

## 2024-02-15 PROCEDURE — 83036 HEMOGLOBIN GLYCOSYLATED A1C: CPT

## 2024-02-15 PROCEDURE — 36415 COLL VENOUS BLD VENIPUNCTURE: CPT

## 2024-02-15 PROCEDURE — 71046 X-RAY EXAM CHEST 2 VIEWS: CPT

## 2024-02-17 ENCOUNTER — HOSPITAL ENCOUNTER (EMERGENCY)
Age: 29
Discharge: HOME | End: 2024-02-17
Payer: COMMERCIAL

## 2024-02-17 VITALS
DIASTOLIC BLOOD PRESSURE: 96 MMHG | HEART RATE: 89 BPM | RESPIRATION RATE: 16 BRPM | OXYGEN SATURATION: 100 % | SYSTOLIC BLOOD PRESSURE: 137 MMHG | TEMPERATURE: 96.9 F

## 2024-02-17 DIAGNOSIS — J06.9 ACUTE UPPER RESPIRATORY INFECTION: ICD-10-CM

## 2024-02-17 DIAGNOSIS — H66.003 ACUTE SUPPURATIVE OTITIS MEDIA OF BOTH EARS WITHOUT SPONTANEOUS RUPTURE OF TYMPANIC MEMBRANES, RECURRENCE NOT SPECIFIED: Primary | ICD-10-CM

## 2024-02-17 LAB
FLUAV AG SPEC QL: NEGATIVE
FLUBV AG SPEC QL: NEGATIVE
SARS-COV-2 RDRP RESP QL NAA+PROBE: NOT  DETECTED

## 2024-02-17 PROCEDURE — 99213 OFFICE O/P EST LOW 20 MIN: CPT

## 2024-02-17 PROCEDURE — 99214 OFFICE O/P EST MOD 30 MIN: CPT | Performed by: NURSE PRACTITIONER

## 2024-02-17 PROCEDURE — 87635 SARS-COV-2 COVID-19 AMP PRB: CPT

## 2024-02-17 PROCEDURE — 87804 INFLUENZA ASSAY W/OPTIC: CPT

## 2024-02-17 RX ORDER — AZITHROMYCIN 250 MG/1
TABLET, FILM COATED ORAL
Qty: 1 PACKET | Refills: 0 | Status: SHIPPED | OUTPATIENT
Start: 2024-02-17 | End: 2024-02-19

## 2024-02-17 NOTE — ED TRIAGE NOTES
Pt to UC with c/o body aches and fatigue x 2 days. Pt does report seeing her pcp 3 days ago for a wound on the back of her neck for her cpap- Pcp did not feel it was infected but more of a yeast. Pt on nystatin powder for this.

## 2024-02-17 NOTE — ED PROVIDER NOTES
[facility]  Urgent Care Encounter       CHIEF COMPLAINT       Chief Complaint   Patient presents with    Generalized Body Aches    Fatigue       Nurses Notes reviewed and I agree except as noted in the HPI.  HISTORY OF PRESENT ILLNESS   Deana Mcginnis is a 28 y.o. female who presents to the [facility] urgent care for evaluation of myalgia and fatigue.  Patient reports symptoms started roughly 2 days ago.  Patient does report that she has a small sore less than 1.5 cm on the back of her neck which her PCP prescribed nystatin.  She reports her associated symptoms of congestion, rhinorrhea, fatigue, myalgia, and sinus pressure.  Denies nausea, vomiting, diarrhea.    The history is provided by the patient. No  was used.       REVIEW OF SYSTEMS     Review of Systems   Constitutional:  Positive for fatigue. Negative for activity change, appetite change, chills and fever.   HENT:  Positive for congestion, ear pain and rhinorrhea. Negative for ear discharge and sore throat.    Respiratory:  Negative for cough, chest tightness and shortness of breath.    Cardiovascular:  Negative for chest pain.   Gastrointestinal:  Negative for diarrhea, nausea and vomiting.   Genitourinary:  Negative for dysuria.   Musculoskeletal:  Positive for myalgias. Negative for neck pain.   Skin:  Negative for rash.   Allergic/Immunologic: Negative for environmental allergies and food allergies.   Neurological:  Negative for dizziness and headaches.       PAST MEDICAL HISTORY         Diagnosis Date    ADD (attention deficit disorder)     Anxiety 04/08/2015    Anxiety attack     Asthma     exercise induced    Atypical face pain     Back pain     Bipolar 1 disorder (HCC)     Diabetes mellitus (HCC)     GDM on insulin started on 7/23-during pregnancy    Fibromyalgia     GERD (gastroesophageal reflux disease)     Hx of blood clots     Hypotension 11/02/2017    Major depressive disorder, recurrent episode, mild  includes Anxiety Disorder in her father and mother; Bipolar Disorder in her father; Cancer in her maternal grandmother, paternal grandfather, and paternal uncle; Depression in her maternal grandfather, paternal aunt, and paternal uncle; Diabetes in her maternal grandmother and mother; Heart Attack in her maternal grandfather; High Blood Pressure in her father; Lupus in her maternal aunt; Mental Illness in her father; No Known Problems in her brother and paternal grandmother; Ovarian Cancer in her maternal grandmother; Parkinsonism in her father.    SOCIAL HISTORY     Patient  reports that she has quit smoking. Her smoking use included cigarettes. She started smoking about 5 years ago. She has a 5.6 pack-year smoking history. She has never used smokeless tobacco. She reports that she does not currently use alcohol after a past usage of about 1.0 standard drink of alcohol per week. She reports current drug use. Drug: Marijuana (Weed).    PHYSICAL EXAM     ED TRIAGE VITALS  BP: (!) 137/96, Temp: 96.9 °F (36.1 °C), Pulse: 89, Respirations: 16, SpO2: 100 %,Estimated body mass index is 51.75 kg/m² as calculated from the following:    Height as of 2/12/24: 1.753 m (5' 9\").    Weight as of 2/13/24: 158.9 kg (350 lb 6.4 oz).,Patient's last menstrual period was 05/16/2022.    Physical Exam  Vitals and nursing note reviewed.   Constitutional:       General: She is not in acute distress.     Appearance: Normal appearance. She is not ill-appearing, toxic-appearing or diaphoretic.   HENT:      Head: Normocephalic.      Right Ear: Ear canal and external ear normal. Tympanic membrane is erythematous.      Left Ear: Ear canal and external ear normal. Tympanic membrane is erythematous.      Nose: Nose normal. No congestion or rhinorrhea.      Mouth/Throat:      Mouth: Mucous membranes are moist.      Pharynx: Oropharynx is clear. No oropharyngeal exudate or posterior oropharyngeal erythema.   Cardiovascular:      Rate and Rhythm:

## 2024-02-19 ENCOUNTER — TELEPHONE (OUTPATIENT)
Dept: FAMILY MEDICINE CLINIC | Age: 29
End: 2024-02-19

## 2024-02-19 ENCOUNTER — OFFICE VISIT (OUTPATIENT)
Dept: FAMILY MEDICINE CLINIC | Age: 29
End: 2024-02-19
Payer: MEDICARE

## 2024-02-19 VITALS
DIASTOLIC BLOOD PRESSURE: 64 MMHG | SYSTOLIC BLOOD PRESSURE: 118 MMHG | HEART RATE: 100 BPM | BODY MASS INDEX: 51.2 KG/M2 | WEIGHT: 293 LBS | TEMPERATURE: 98.1 F | RESPIRATION RATE: 18 BRPM

## 2024-02-19 DIAGNOSIS — K11.21 ACUTE PAROTITIS: Primary | ICD-10-CM

## 2024-02-19 PROCEDURE — 99213 OFFICE O/P EST LOW 20 MIN: CPT | Performed by: NURSE PRACTITIONER

## 2024-02-19 RX ORDER — LEVOFLOXACIN 500 MG/1
500 TABLET, FILM COATED ORAL DAILY
Qty: 10 TABLET | Refills: 0 | Status: SHIPPED | OUTPATIENT
Start: 2024-02-19 | End: 2024-02-29

## 2024-02-19 SDOH — ECONOMIC STABILITY: FOOD INSECURITY: WITHIN THE PAST 12 MONTHS, YOU WORRIED THAT YOUR FOOD WOULD RUN OUT BEFORE YOU GOT MONEY TO BUY MORE.: NEVER TRUE

## 2024-02-19 SDOH — ECONOMIC STABILITY: FOOD INSECURITY: WITHIN THE PAST 12 MONTHS, THE FOOD YOU BOUGHT JUST DIDN'T LAST AND YOU DIDN'T HAVE MONEY TO GET MORE.: NEVER TRUE

## 2024-02-19 SDOH — ECONOMIC STABILITY: INCOME INSECURITY: HOW HARD IS IT FOR YOU TO PAY FOR THE VERY BASICS LIKE FOOD, HOUSING, MEDICAL CARE, AND HEATING?: NOT HARD AT ALL

## 2024-02-19 ASSESSMENT — ENCOUNTER SYMPTOMS
COUGH: 0
SHORTNESS OF BREATH: 0
NAUSEA: 0
ABDOMINAL PAIN: 0

## 2024-02-19 NOTE — PROGRESS NOTES
Subjective:      Patient ID: Deana Mcginnis 1995 is a 28 y.o. female here for PRE-OP    Chief Complaint   Patient presents with    Follow-up     In  on  - ear infection bilateral       Was seen UC 24 for fatigue and myalgia.  FLU and COVID NEG.  Dx with bilateral ear infection.  Placed on ZPAK.  Denies complaints of ear pain.  Presents today with continue fatigue and body aches.  Left side facial pain along jaw to the ear.  Denies cough or congestion.  Denies ST.  Dry mouth.  Tongue feels sore.     Patient Active Problem List   Diagnosis    Major depressive disorder, recurrent episode, mild (HCC)    Low self esteem    KARLIE (generalized anxiety disorder)    Obesity    Obstructive sleep apnea    Snores    Sleep disturbances    Daytime somnolence    Sleep talking    Night terrors, adult    Bruxism (teeth grinding)    Restless sleeper    Anxiety    Abnormal thyroid function test    Trigeminal neuralgia    Inappropriate sinus tachycardia    POTS (postural orthostatic tachycardia syndrome)    Syncope and collapse    Neck discomfort    Thyroid cyst    Breast pain, left    Positive CAESAR (antinuclear antibody)    Hypotension    S/P ablation of ventricular arrhythmia    Pneumonia    Hypokalemia    DUB (dysfunctional uterine bleeding)    Palpitations    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    Closed right ankle fracture, initial encounter    Status post open reduction with internal fixation (ORIF) of fracture of ankle    Tear of deltoid ligament of ankle, right, initial encounter    Abdominal pain during pregnancy in second trimester    Maternal obesity affecting pregnancy, antepartum    Postpartum care following  delivery    Biliary colic symptom    Dizziness, nonspecific    Orthostatic dizziness    Borderline personality disorder (HCC)    Pseudoseizures    Tachyarrhythmia    Pelvic pain    Effusion of ankle or foot joint    Exostosis of bone of ankle    Muscle wasting and

## 2024-02-21 ENCOUNTER — PATIENT MESSAGE (OUTPATIENT)
Dept: FAMILY MEDICINE CLINIC | Age: 29
End: 2024-02-21

## 2024-02-21 DIAGNOSIS — K11.21 ACUTE PAROTITIS: Primary | ICD-10-CM

## 2024-02-21 RX ORDER — PREDNISONE 20 MG/1
20 TABLET ORAL 2 TIMES DAILY
Qty: 8 TABLET | Refills: 0 | Status: SHIPPED | OUTPATIENT
Start: 2024-02-21 | End: 2024-02-25

## 2024-02-21 NOTE — TELEPHONE ENCOUNTER
From: Deana Mcginnis  To: Jovi Morrison  Sent: 2/21/2024 10:32 AM EST  Subject: Pain    By which day should I start feeling better? Because I feel worse it’s hard to eat drink and swallow   Thanks Deana

## 2024-02-22 LAB
EKG ATRIAL RATE: 91 BPM
EKG P AXIS: 56 DEGREES
EKG P-R INTERVAL: 152 MS
EKG Q-T INTERVAL: 378 MS
EKG QRS DURATION: 96 MS
EKG QTC CALCULATION (BAZETT): 464 MS
EKG R AXIS: 60 DEGREES
EKG T AXIS: 45 DEGREES
EKG VENTRICULAR RATE: 91 BPM

## 2024-03-05 ENCOUNTER — OFFICE VISIT (OUTPATIENT)
Dept: CARDIOLOGY CLINIC | Age: 29
End: 2024-03-05
Payer: COMMERCIAL

## 2024-03-05 VITALS
HEIGHT: 70 IN | BODY MASS INDEX: 41.95 KG/M2 | HEART RATE: 101 BPM | WEIGHT: 293 LBS | SYSTOLIC BLOOD PRESSURE: 122 MMHG | DIASTOLIC BLOOD PRESSURE: 74 MMHG

## 2024-03-05 DIAGNOSIS — Z01.818 PRE-OP EVALUATION: Primary | ICD-10-CM

## 2024-03-05 DIAGNOSIS — R06.09 DOE (DYSPNEA ON EXERTION): ICD-10-CM

## 2024-03-05 DIAGNOSIS — Z98.890 S/P ABLATION OF VENTRICULAR ARRHYTHMIA: ICD-10-CM

## 2024-03-05 DIAGNOSIS — E78.5 DYSLIPIDEMIA: ICD-10-CM

## 2024-03-05 DIAGNOSIS — G90.A POTS (POSTURAL ORTHOSTATIC TACHYCARDIA SYNDROME): ICD-10-CM

## 2024-03-05 DIAGNOSIS — E66.01 MORBID OBESITY DUE TO EXCESS CALORIES (HCC): ICD-10-CM

## 2024-03-05 DIAGNOSIS — R42 ORTHOSTATIC DIZZINESS: ICD-10-CM

## 2024-03-05 DIAGNOSIS — Z86.79 S/P ABLATION OF VENTRICULAR ARRHYTHMIA: ICD-10-CM

## 2024-03-05 DIAGNOSIS — I47.11 INAPPROPRIATE SINUS TACHYCARDIA: ICD-10-CM

## 2024-03-05 PROCEDURE — 99214 OFFICE O/P EST MOD 30 MIN: CPT | Performed by: INTERNAL MEDICINE

## 2024-03-05 NOTE — PROGRESS NOTES
Chief Complaint   Patient presents with    Follow-up    Cardiac Clearance     Originally  here - follow up from hospital - POTS  TTT, holter, Echo          Patient here for a follow up (last seen ov 2021) and here today 3/5/24 needs cardiac clearance for bariatric surgery with Dr. Feldman not yet scheduled     EKG done 2/22/24    Denied cp, palpitation, or edema    Dizziness in supine sitting or standing - better after hydration  More with standing  Milder form before.  Feel near syncope  No dizziness on rolling over  Getting better    Hx of syncope  Like 2014 - last one      Smokes 1/2 ppd for 3 yrs    FHX  Grandma and grandpa- had CAD/ MI age unknown      Record from Dr. cotto reviewed  \" 5/17/2017  HPI  Deana Dailey is a 20 y.o. female with past medical history of bipolar mood disorder, palpitations, tachycardic episodes and syncope. She first noted racing heart rate when she was approximately 12 years old. She has undergone tilt table test in the past (she reports when she was 15 years old) which was consistent with POTS. She has had 3 full yudelka syncopal spells most recently in 2014 and she reports daily episodes of presyncope associated with changes in position, lasting 5-15 seconds. In 2015 she was started on beta blocker for consistently high heart rates, but beta blocker was not tolerated and subsequently discontinued. Holter monitoring in 2015 revealed frequent sinus tachycardia. Stress test showed poor functional capacity, EKG without ischemic changes, but stress inadequate to rule out ischemia. She underwent EP testing conducted 3/29/2016 at [facility] by Dr. Massey to assess for other mechanisms for her tachycardia. EP study revealed normal sinus node, AV node function with inducible atrial tachycardia at a rate of 250bpm associated with hypotension. Of note, the patient did wake up several times during the procedure, and has significant anxiety related to undergoing repeat procedure. It was

## 2024-03-14 ENCOUNTER — HOSPITAL ENCOUNTER (OUTPATIENT)
Age: 29
Discharge: HOME | End: 2024-03-16
Attending: INTERNAL MEDICINE
Payer: MEDICARE

## 2024-03-14 ENCOUNTER — HOSPITAL ENCOUNTER (OUTPATIENT)
Dept: NUCLEAR MEDICINE | Age: 29
Discharge: HOME | End: 2024-03-14
Attending: INTERNAL MEDICINE
Payer: MEDICARE

## 2024-03-14 VITALS
WEIGHT: 293 LBS | HEIGHT: 69 IN | DIASTOLIC BLOOD PRESSURE: 74 MMHG | BODY MASS INDEX: 43.4 KG/M2 | SYSTOLIC BLOOD PRESSURE: 122 MMHG

## 2024-03-14 DIAGNOSIS — I47.11 INAPPROPRIATE SINUS TACHYCARDIA: ICD-10-CM

## 2024-03-14 DIAGNOSIS — G90.A POTS (POSTURAL ORTHOSTATIC TACHYCARDIA SYNDROME): ICD-10-CM

## 2024-03-14 DIAGNOSIS — Z86.79 S/P ABLATION OF VENTRICULAR ARRHYTHMIA: ICD-10-CM

## 2024-03-14 DIAGNOSIS — R42 ORTHOSTATIC DIZZINESS: ICD-10-CM

## 2024-03-14 DIAGNOSIS — Z01.818 PRE-OP EVALUATION: ICD-10-CM

## 2024-03-14 DIAGNOSIS — Z98.890 S/P ABLATION OF VENTRICULAR ARRHYTHMIA: ICD-10-CM

## 2024-03-14 DIAGNOSIS — E78.5 DYSLIPIDEMIA: ICD-10-CM

## 2024-03-14 DIAGNOSIS — R06.09 DOE (DYSPNEA ON EXERTION): ICD-10-CM

## 2024-03-14 LAB
ECHO AO ASC DIAM: 2.6 CM
ECHO AO ASCENDING AORTA INDEX: 0.99 CM/M2
ECHO AV CUSP MM: 2 CM
ECHO AV PEAK GRADIENT: 13 MMHG
ECHO AV PEAK VELOCITY: 1.8 M/S
ECHO AV VELOCITY RATIO: 0.56
ECHO BSA: 2.78 M2
ECHO BSA: 2.78 M2
ECHO LA AREA 2C: 15 CM2
ECHO LA AREA 4C: 21.3 CM2
ECHO LA DIAMETER INDEX: 1.53 CM/M2
ECHO LA DIAMETER: 4 CM
ECHO LA MAJOR AXIS: 6.2 CM
ECHO LA MINOR AXIS: 4.3 CM
ECHO LA VOL MOD A2C: 44 ML (ref 22–52)
ECHO LA VOL MOD A4C: 58 ML (ref 22–52)
ECHO LA VOLUME INDEX MOD A2C: 17 ML/M2 (ref 16–34)
ECHO LA VOLUME INDEX MOD A4C: 22 ML/M2 (ref 16–34)
ECHO LV E' LATERAL VELOCITY: 12 CM/S
ECHO LV E' SEPTAL VELOCITY: 10 CM/S
ECHO LV FRACTIONAL SHORTENING: 30 % (ref 28–44)
ECHO LV INTERNAL DIMENSION DIASTOLE INDEX: 2.14 CM/M2
ECHO LV INTERNAL DIMENSION DIASTOLIC: 5.6 CM (ref 3.9–5.3)
ECHO LV INTERNAL DIMENSION SYSTOLIC INDEX: 1.49 CM/M2
ECHO LV INTERNAL DIMENSION SYSTOLIC: 3.9 CM
ECHO LV ISOVOLUMETRIC RELAXATION TIME (IVRT): 60 MS
ECHO LV IVSD: 1.1 CM (ref 0.6–0.9)
ECHO LV MASS 2D: 249.3 G (ref 67–162)
ECHO LV MASS INDEX 2D: 95.2 G/M2 (ref 43–95)
ECHO LV POSTERIOR WALL DIASTOLIC: 1.1 CM (ref 0.6–0.9)
ECHO LV RELATIVE WALL THICKNESS RATIO: 0.39
ECHO LVOT PEAK GRADIENT: 4 MMHG
ECHO LVOT PEAK VELOCITY: 1 M/S
ECHO MV A VELOCITY: 0.67 M/S
ECHO MV E DECELERATION TIME (DT): 218 MS
ECHO MV E VELOCITY: 0.9 M/S
ECHO MV E/A RATIO: 1.34
ECHO MV E/E' LATERAL: 7.5
ECHO MV E/E' RATIO (AVERAGED): 8.25
ECHO PV MAX VELOCITY: 0.9 M/S
ECHO PV PEAK GRADIENT: 4 MMHG
ECHO RV INTERNAL DIMENSION: 2.8 CM
ECHO RV TAPSE: 1.7 CM (ref 1.7–?)
ECHO TV E WAVE: 0.8 M/S
ECHO TV REGURGITANT MAX VELOCITY: 2.29 M/S
ECHO TV REGURGITANT PEAK GRADIENT: 21 MMHG
NUC STRESS EJECTION FRACTION: 57 %
STRESS BASELINE DIAS BP: 82 MMHG
STRESS BASELINE HR: 87 BPM
STRESS BASELINE ST DEPRESSION: 0 MM
STRESS BASELINE SYS BP: 128 MMHG
STRESS ST DEPRESSION: 0 MM
STRESS STAGE 1 BP: NORMAL MMHG
STRESS STAGE 1 DURATION: 1 MIN:SEC
STRESS STAGE 1 HR: 119 BPM
STRESS STAGE 2 BP: NORMAL MMHG
STRESS STAGE 2 DURATION: 1 MIN:SEC
STRESS STAGE 2 HR: 104 BPM
STRESS STAGE 3 BP: NORMAL MMHG
STRESS STAGE 3 DURATION: 1 MIN:SEC
STRESS STAGE 3 HR: 107 BPM
STRESS STAGE RECOVERY 1 BP: NORMAL MMHG
STRESS STAGE RECOVERY 1 DURATION: 1 MIN:SEC
STRESS STAGE RECOVERY 1 HR: 106 BPM
STRESS STAGE RECOVERY 2 BP: NORMAL MMHG
STRESS STAGE RECOVERY 2 DURATION: 1 MIN:SEC
STRESS STAGE RECOVERY 2 HR: 108 BPM
STRESS STAGE RECOVERY 3 BP: NORMAL MMHG
STRESS STAGE RECOVERY 3 DURATION: 1 MIN:SEC
STRESS STAGE RECOVERY 3 HR: 106 BPM
STRESS STAGE RECOVERY 4 BP: NORMAL MMHG
STRESS STAGE RECOVERY 4 DURATION: 1 MIN:SEC
STRESS STAGE RECOVERY 4 HR: 105 BPM
STRESS TARGET HR: 192 BPM
TID: 0.82

## 2024-03-14 PROCEDURE — 93017 CV STRESS TEST TRACING ONLY: CPT

## 2024-03-14 PROCEDURE — 78452 HT MUSCLE IMAGE SPECT MULT: CPT

## 2024-03-14 PROCEDURE — 93306 TTE W/DOPPLER COMPLETE: CPT

## 2024-03-14 PROCEDURE — 6360000002 HC RX W HCPCS: Performed by: INTERNAL MEDICINE

## 2024-03-14 PROCEDURE — 3430000000 HC RX DIAGNOSTIC RADIOPHARMACEUTICAL: Performed by: INTERNAL MEDICINE

## 2024-03-14 PROCEDURE — A9500 TC99M SESTAMIBI: HCPCS | Performed by: INTERNAL MEDICINE

## 2024-03-14 RX ORDER — TETRAKIS(2-METHOXYISOBUTYLISOCYANIDE)COPPER(I) TETRAFLUOROBORATE 1 MG/ML
34 INJECTION, POWDER, LYOPHILIZED, FOR SOLUTION INTRAVENOUS
Status: COMPLETED | OUTPATIENT
Start: 2024-03-14 | End: 2024-03-14

## 2024-03-14 RX ORDER — REGADENOSON 0.08 MG/ML
0.4 INJECTION, SOLUTION INTRAVENOUS
Status: COMPLETED | OUTPATIENT
Start: 2024-03-14 | End: 2024-03-14

## 2024-03-14 RX ORDER — TETRAKIS(2-METHOXYISOBUTYLISOCYANIDE)COPPER(I) TETRAFLUOROBORATE 1 MG/ML
9.9 INJECTION, POWDER, LYOPHILIZED, FOR SOLUTION INTRAVENOUS
Status: COMPLETED | OUTPATIENT
Start: 2024-03-14 | End: 2024-03-14

## 2024-03-14 RX ADMIN — REGADENOSON 0.4 MG: 0.08 INJECTION, SOLUTION INTRAVENOUS at 15:11

## 2024-03-14 RX ADMIN — Medication 34 MILLICURIE: at 15:10

## 2024-03-14 RX ADMIN — Medication 9.9 MILLICURIE: at 14:05

## 2024-04-02 ENCOUNTER — OFFICE VISIT (OUTPATIENT)
Dept: FAMILY MEDICINE CLINIC | Age: 29
End: 2024-04-02
Payer: COMMERCIAL

## 2024-04-02 ENCOUNTER — TELEPHONE (OUTPATIENT)
Dept: FAMILY MEDICINE CLINIC | Age: 29
End: 2024-04-02

## 2024-04-02 VITALS
DIASTOLIC BLOOD PRESSURE: 74 MMHG | WEIGHT: 293 LBS | HEIGHT: 69 IN | SYSTOLIC BLOOD PRESSURE: 130 MMHG | BODY MASS INDEX: 43.4 KG/M2 | RESPIRATION RATE: 18 BRPM | HEART RATE: 98 BPM

## 2024-04-02 DIAGNOSIS — K11.21 ACUTE PAROTITIS: Primary | ICD-10-CM

## 2024-04-02 DIAGNOSIS — R22.1 LOCALIZED SWELLING, MASS AND LUMP, NECK: ICD-10-CM

## 2024-04-02 DIAGNOSIS — B37.2 CANDIDAL INTERTRIGO: ICD-10-CM

## 2024-04-02 DIAGNOSIS — Z01.818 PREOP EXAMINATION: Primary | ICD-10-CM

## 2024-04-02 PROCEDURE — 99213 OFFICE O/P EST LOW 20 MIN: CPT | Performed by: NURSE PRACTITIONER

## 2024-04-02 PROCEDURE — G2211 COMPLEX E/M VISIT ADD ON: HCPCS | Performed by: NURSE PRACTITIONER

## 2024-04-02 RX ORDER — NYSTATIN 100000 [USP'U]/G
POWDER TOPICAL
Qty: 60 G | Refills: 0 | Status: SHIPPED | OUTPATIENT
Start: 2024-04-02

## 2024-04-02 RX ORDER — LEVOFLOXACIN 500 MG/1
500 TABLET, FILM COATED ORAL DAILY
Qty: 7 TABLET | Refills: 0 | Status: SHIPPED | OUTPATIENT
Start: 2024-04-02 | End: 2024-04-09

## 2024-04-02 ASSESSMENT — ENCOUNTER SYMPTOMS
SHORTNESS OF BREATH: 0
COUGH: 0
RHINORRHEA: 0
NAUSEA: 0

## 2024-04-02 NOTE — PROGRESS NOTES
Deana Mcginnis (1995) 28 y.o. female here for evaluation of the following chief complaint(s):      HPI:  Chief Complaint   Patient presents with    Neck Pain     Sore on the neck is back, went away for 2-3 weeks back again     Other     Salivary gland pain left side       Seen 6 weeks ago for parotitis.  Placed on Levaquin with resolution of pain.  Presents today with recurrence of left side facial pain along jaw to the ear.  Denies ear pain.  Denies cough or congestion. Denies ST.  Dry mouth. Tongue feels sore.   No gum swelling or tooth pain.     Vitals:    24 0807   BP: 130/74   Pulse: 98   Resp: 18       Continues with sore on back of neck.   Comes from where BiPAP strap sits on her neck.   Use of Nystatin powder with previous resolution.     Patient Active Problem List   Diagnosis    Major depressive disorder, recurrent episode, mild (HCC)    Low self esteem    KARLIE (generalized anxiety disorder)    Obesity    Obstructive sleep apnea    Snores    Sleep disturbances    Daytime somnolence    Sleep talking    Night terrors, adult    Bruxism (teeth grinding)    Restless sleeper    Anxiety    Abnormal thyroid function test    Trigeminal neuralgia    Inappropriate sinus tachycardia (HCC)    POTS (postural orthostatic tachycardia syndrome)    Syncope and collapse    Neck discomfort    Thyroid cyst    Breast pain, left    Positive CAESAR (antinuclear antibody)    Hypotension    S/P ablation of ventricular arrhythmia    Pneumonia    Hypokalemia    DUB (dysfunctional uterine bleeding)    Palpitations    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    Closed right ankle fracture, initial encounter    Status post open reduction with internal fixation (ORIF) of fracture of ankle    Tear of deltoid ligament of ankle, right, initial encounter    Abdominal pain during pregnancy in second trimester    Maternal obesity affecting pregnancy, antepartum    Postpartum care following  delivery

## 2024-04-02 NOTE — TELEPHONE ENCOUNTER
Jess with [facility] called office asking for pt's nicotine lab result. Advised we ordered this for pt 1/19/2024, but no results to date. She voiced understanding.

## 2024-04-04 PROBLEM — Z01.818 PRE-OP EVALUATION: Status: RESOLVED | Noted: 2024-03-05 | Resolved: 2024-04-04

## 2024-04-15 ENCOUNTER — PATIENT MESSAGE (OUTPATIENT)
Dept: FAMILY MEDICINE CLINIC | Age: 29
End: 2024-04-15

## 2024-04-15 DIAGNOSIS — J45.22 MILD INTERMITTENT REACTIVE AIRWAY DISEASE WITH STATUS ASTHMATICUS: Primary | ICD-10-CM

## 2024-04-15 RX ORDER — ALBUTEROL SULFATE 2.5 MG/3ML
2.5 SOLUTION RESPIRATORY (INHALATION) 4 TIMES DAILY PRN
Qty: 120 EACH | Refills: 0 | Status: SHIPPED | OUTPATIENT
Start: 2024-04-15

## 2024-04-15 NOTE — TELEPHONE ENCOUNTER
From: Deana Mcginnis  To: Jovi Morrison  Sent: 4/15/2024 12:19 PM EDT  Subject: Nebulizer solution     Hello,   I’ve had a semi bad cough were I can’t catch my breath very well, so I’ve been using a breathing treatment and now I’m out of them. Is it possible for you to send in a prescription for more treatments?

## 2024-05-04 ENCOUNTER — OFFICE VISIT (OUTPATIENT)
Age: 29
End: 2024-05-04

## 2024-05-04 VITALS
WEIGHT: 293 LBS | TEMPERATURE: 98 F | BODY MASS INDEX: 41.95 KG/M2 | DIASTOLIC BLOOD PRESSURE: 82 MMHG | HEART RATE: 74 BPM | SYSTOLIC BLOOD PRESSURE: 120 MMHG | HEIGHT: 70 IN

## 2024-05-04 DIAGNOSIS — S40.862A INSECT BITE OF LEFT UPPER ARM, INITIAL ENCOUNTER: Primary | ICD-10-CM
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
## 2024-05-04 RX ORDER — METHYLPREDNISOLONE 4 MG/1
TABLET ORAL
Qty: 21 TABLET | Refills: 0 | Status: SHIPPED | OUTPATIENT
Start: 2024-05-04 | End: 2024-05-10

## 2024-05-13 ENCOUNTER — OFFICE VISIT (OUTPATIENT)
Age: 29
End: 2024-05-13

## 2024-05-13 VITALS
HEIGHT: 70 IN | WEIGHT: 293 LBS | DIASTOLIC BLOOD PRESSURE: 75 MMHG | TEMPERATURE: 98.4 F | HEART RATE: 105 BPM | BODY MASS INDEX: 41.95 KG/M2 | SYSTOLIC BLOOD PRESSURE: 112 MMHG | RESPIRATION RATE: 17 BRPM | OXYGEN SATURATION: 96 %

## 2024-05-13 DIAGNOSIS — R05.1 ACUTE COUGH: ICD-10-CM

## 2024-05-13 DIAGNOSIS — R68.89 FLU-LIKE SYMPTOMS: Primary | ICD-10-CM

## 2024-05-13 ASSESSMENT — ENCOUNTER SYMPTOMS
NAUSEA: 0
RHINORRHEA: 1
COUGH: 1
SINUS PAIN: 1
WHEEZING: 0
ABDOMINAL PAIN: 0
SWOLLEN GLANDS: 0

## 2024-05-13 NOTE — PROGRESS NOTES
Deana Mcginnis (:  1995) is a 28 y.o. female,Established patient, here for evaluation of the following chief complaint(s):  Generalized Body Aches, Cough, Congestion, Pharyngitis, and Headache (The patient has been sick for the past two days and she feels like she has been hit by a train )      ASSESSMENT/PLAN:  Visit Diagnoses and Associated Orders       Flu-like symptoms    -  Primary         Acute cough                    Suspect viral upper respiratory infection.  Did consider viral testing for COVID and FLU but Pt declined.  Non productive cough and clear lungs on exam.  Due to potential side effects with chronic medications, decided against prescription cough and congestion medications.  Recommend taking Delsym OTC for cough as this is tolerated.  Can also try honey, menthol or eucalyptus oil to help reduce cough naturally.  Stay well hydrated and rested.   Return if symptoms persist or change.  Proceed to hospital for evaluation if any worsening symptoms or concerns.      SUBJECTIVE/OBJECTIVE:      History provided by:  Patient   used: No    URI   This is a new problem. The current episode started in the past 7 days (2 days ago). The problem has been gradually worsening. There has been no fever. Associated symptoms include congestion, coughing, ear pain, headaches, rhinorrhea, sinus pain and a sore throat. Pertinent negatives include no abdominal pain, chest pain, nausea, neck pain, rash, swollen glands, vomiting or wheezing. She has tried nothing for the symptoms.       ROS: See HPI       Vitals:    24 1715   BP: 112/75   Site: Left Upper Arm   Position: Sitting   Cuff Size: Large Adult   Pulse: (!) 105   Resp: 17   Temp: 98.4 °F (36.9 °C)   SpO2: 96%   Weight: (!) 150.6 kg (332 lb)   Height: 1.778 m (5' 10\")       No results found for this visit on 24.     Physical Exam  Vitals and nursing note reviewed.   Constitutional:       General: She is not in acute

## 2024-05-13 NOTE — PATIENT INSTRUCTIONS
Suspect viral upper respiratory infection.  Did consider viral testing for COVID and FLU but Pt declined.  Non productive cough and clear lungs on exam.  Due to potential side effects with chronic medications, decided against prescription cough and congestion medications.  Recommend taking Delsym OTC for cough as this is tolerated.  Can also try honey, menthol or eucalyptus oil to help reduce cough naturally.  Stay well hydrated and rested.   Return if symptoms persist or change.  Proceed to hospital for evaluation if any worsening symptoms or concerns.

## 2024-05-20 ENCOUNTER — HOSPITAL ENCOUNTER (EMERGENCY)
Age: 29
Discharge: HOME | End: 2024-05-21
Attending: EMERGENCY MEDICINE
Payer: COMMERCIAL

## 2024-05-20 ENCOUNTER — APPOINTMENT (OUTPATIENT)
Age: 29
End: 2024-05-20
Payer: COMMERCIAL

## 2024-05-20 DIAGNOSIS — R11.2 NAUSEA VOMITING AND DIARRHEA: Primary | ICD-10-CM

## 2024-05-20 DIAGNOSIS — R19.7 NAUSEA VOMITING AND DIARRHEA: Primary | ICD-10-CM

## 2024-05-20 LAB
ALBUMIN SERPL-MCNC: 4.7 G/DL (ref 3.4–5)
ALBUMIN/GLOB SERPL: 1.2 {RATIO}
ALP SERPL-CCNC: 127 U/L (ref 40–129)
ALT SERPL-CCNC: 41 U/L (ref 10–40)
ANION GAP SERPL CALCULATED.3IONS-SCNC: 14 MMOL/L (ref 3–16)
AST SERPL-CCNC: 43 U/L (ref 15–37)
BACTERIA URNS QL MICRO: ABNORMAL
BASOPHILS # BLD: 0.02 K/UL (ref 0–0.2)
BASOPHILS NFR BLD: 0 %
BILIRUB SERPL-MCNC: <0.2 MG/DL (ref 0–1)
BILIRUB UR QL STRIP: ABNORMAL
BUN SERPL-MCNC: 9 MG/DL (ref 7–20)
CALCIUM SERPL-MCNC: 9.6 MG/DL (ref 8.3–10.6)
CHLORIDE SERPL-SCNC: 101 MMOL/L (ref 99–110)
CLARITY UR: CLEAR
CO2 SERPL-SCNC: 22 MMOL/L (ref 21–32)
COLOR UR: YELLOW
CREAT SERPL-MCNC: 0.6 MG/DL (ref 0.5–1)
EOSINOPHIL # BLD: 0.01 K/UL (ref 0–0.6)
EOSINOPHILS RELATIVE PERCENT: 0 %
EPI CELLS #/AREA URNS HPF: ABNORMAL /HPF
ERYTHROCYTE [DISTWIDTH] IN BLOOD BY AUTOMATED COUNT: 13.9 % (ref 12.4–15.4)
GFR, ESTIMATED: >90 ML/MIN/1.73M2
GLUCOSE SERPL-MCNC: 136 MG/DL (ref 70–99)
GLUCOSE UR STRIP-MCNC: NEGATIVE MG/DL
HCG SERPL QL: NEGATIVE
HCT VFR BLD AUTO: 43.3 % (ref 36–48)
HGB BLD-MCNC: 14.8 G/DL (ref 12–16)
HGB UR QL STRIP.AUTO: NEGATIVE
IMM GRANULOCYTES # BLD AUTO: 0.05 K/UL (ref 0–0.5)
IMM GRANULOCYTES NFR BLD: 0 %
KETONES UR STRIP-MCNC: ABNORMAL MG/DL
LEUKOCYTE ESTERASE UR QL STRIP: NEGATIVE
LIPASE SERPL-CCNC: 36 U/L (ref 13–60)
LYMPHOCYTES NFR BLD: 3.86 K/UL (ref 1–5.1)
LYMPHOCYTES RELATIVE PERCENT: 27 %
MCH RBC QN AUTO: 29.7 PG (ref 26–34)
MCHC RBC AUTO-ENTMCNC: 34.2 G/DL (ref 31–36)
MCV RBC AUTO: 86.9 FL (ref 80–100)
MONOCYTES NFR BLD: 0.67 K/UL (ref 0–1.3)
MONOCYTES NFR BLD: 5 %
NEUTROPHILS NFR BLD: 68 %
NEUTS SEG NFR BLD: 9.7 K/UL (ref 1.7–7.7)
NITRITE UR QL STRIP: NEGATIVE
PH UR STRIP: 6 [PH] (ref 5–8)
PLATELET # BLD AUTO: 237 K/UL (ref 135–450)
PMV BLD AUTO: 9.9 FL
POTASSIUM SERPL-SCNC: 3.8 MMOL/L (ref 3.5–5.1)
PROT SERPL-MCNC: 8.5 G/DL (ref 6.4–8.2)
PROT UR STRIP-MCNC: >=300 MG/DL
RBC # BLD AUTO: 4.98 M/UL (ref 4–5.2)
RBC #/AREA URNS HPF: ABNORMAL /HPF
SODIUM SERPL-SCNC: 137 MMOL/L (ref 136–145)
SP GR UR STRIP: >1.03 (ref 1–1.03)
UROBILINOGEN UR STRIP-ACNC: 0.2 EU/DL (ref 0–1)
WBC #/AREA URNS HPF: ABNORMAL /HPF
WBC OTHER # BLD: 14.3 K/UL (ref 4–11)

## 2024-05-20 PROCEDURE — 80053 COMPREHEN METABOLIC PANEL: CPT

## 2024-05-20 PROCEDURE — 74177 CT ABD & PELVIS W/CONTRAST: CPT

## 2024-05-20 PROCEDURE — 84703 CHORIONIC GONADOTROPIN ASSAY: CPT

## 2024-05-20 PROCEDURE — 96374 THER/PROPH/DIAG INJ IV PUSH: CPT

## 2024-05-20 PROCEDURE — 83690 ASSAY OF LIPASE: CPT

## 2024-05-20 PROCEDURE — 6360000002 HC RX W HCPCS: Performed by: EMERGENCY MEDICINE

## 2024-05-20 PROCEDURE — 2580000003 HC RX 258: Performed by: EMERGENCY MEDICINE

## 2024-05-20 PROCEDURE — 81001 URINALYSIS AUTO W/SCOPE: CPT

## 2024-05-20 PROCEDURE — 6360000004 HC RX CONTRAST MEDICATION: Performed by: EMERGENCY MEDICINE

## 2024-05-20 PROCEDURE — 99285 EMERGENCY DEPT VISIT HI MDM: CPT

## 2024-05-20 PROCEDURE — 85025 COMPLETE CBC W/AUTO DIFF WBC: CPT

## 2024-05-20 RX ORDER — ONDANSETRON 2 MG/ML
4 INJECTION INTRAMUSCULAR; INTRAVENOUS ONCE
Status: COMPLETED | OUTPATIENT
Start: 2024-05-20 | End: 2024-05-20

## 2024-05-20 RX ORDER — 0.9 % SODIUM CHLORIDE 0.9 %
1000 INTRAVENOUS SOLUTION INTRAVENOUS ONCE
Status: COMPLETED | OUTPATIENT
Start: 2024-05-20 | End: 2024-05-21

## 2024-05-20 RX ADMIN — ONDANSETRON 4 MG: 2 INJECTION INTRAMUSCULAR; INTRAVENOUS at 22:46

## 2024-05-20 RX ADMIN — SODIUM CHLORIDE 1000 ML: 9 INJECTION, SOLUTION INTRAVENOUS at 23:48

## 2024-05-20 RX ADMIN — IOPAMIDOL 75 ML: 755 INJECTION, SOLUTION INTRAVENOUS at 23:28

## 2024-05-20 SDOH — HEALTH STABILITY: PHYSICAL HEALTH: ON AVERAGE, HOW MANY DAYS PER WEEK DO YOU ENGAGE IN MODERATE TO STRENUOUS EXERCISE (LIKE A BRISK WALK)?: 0 DAYS

## 2024-05-20 ASSESSMENT — PAIN SCALES - GENERAL: PAINLEVEL_OUTOF10: 5

## 2024-05-20 ASSESSMENT — PAIN DESCRIPTION - LOCATION: LOCATION: ABDOMEN

## 2024-05-20 ASSESSMENT — PAIN - FUNCTIONAL ASSESSMENT: PAIN_FUNCTIONAL_ASSESSMENT: 0-10

## 2024-05-20 ASSESSMENT — PAIN DESCRIPTION - ORIENTATION: ORIENTATION: LEFT;LOWER

## 2024-05-20 ASSESSMENT — PAIN DESCRIPTION - DESCRIPTORS: DESCRIPTORS: SHARP

## 2024-05-21 ENCOUNTER — PATIENT MESSAGE (OUTPATIENT)
Dept: FAMILY MEDICINE CLINIC | Age: 29
End: 2024-05-21

## 2024-05-21 ENCOUNTER — OFFICE VISIT (OUTPATIENT)
Dept: PRIMARY CARE CLINIC | Age: 29
End: 2024-05-21
Payer: COMMERCIAL

## 2024-05-21 VITALS
SYSTOLIC BLOOD PRESSURE: 132 MMHG | RESPIRATION RATE: 20 BRPM | HEIGHT: 68 IN | DIASTOLIC BLOOD PRESSURE: 82 MMHG | BODY MASS INDEX: 44.41 KG/M2 | WEIGHT: 293 LBS | OXYGEN SATURATION: 98 % | TEMPERATURE: 97.2 F | HEART RATE: 103 BPM

## 2024-05-21 VITALS
HEIGHT: 70 IN | RESPIRATION RATE: 16 BRPM | DIASTOLIC BLOOD PRESSURE: 70 MMHG | OXYGEN SATURATION: 100 % | SYSTOLIC BLOOD PRESSURE: 127 MMHG | WEIGHT: 293 LBS | BODY MASS INDEX: 41.95 KG/M2 | TEMPERATURE: 97.7 F | HEART RATE: 93 BPM

## 2024-05-21 DIAGNOSIS — J45.20 MILD INTERMITTENT ASTHMA WITHOUT COMPLICATION: ICD-10-CM

## 2024-05-21 DIAGNOSIS — E66.01 MORBID OBESITY DUE TO EXCESS CALORIES (HCC): ICD-10-CM

## 2024-05-21 DIAGNOSIS — E11.69 HYPERLIPIDEMIA ASSOCIATED WITH TYPE 2 DIABETES MELLITUS (HCC): ICD-10-CM

## 2024-05-21 DIAGNOSIS — E11.9 TYPE 2 DIABETES MELLITUS WITHOUT COMPLICATION, WITHOUT LONG-TERM CURRENT USE OF INSULIN (HCC): Primary | ICD-10-CM

## 2024-05-21 DIAGNOSIS — E78.5 HYPERLIPIDEMIA ASSOCIATED WITH TYPE 2 DIABETES MELLITUS (HCC): ICD-10-CM

## 2024-05-21 DIAGNOSIS — K21.9 GASTROESOPHAGEAL REFLUX DISEASE WITHOUT ESOPHAGITIS: ICD-10-CM

## 2024-05-21 PROBLEM — R42 ORTHOSTATIC DIZZINESS: Status: RESOLVED | Noted: 2021-08-23 | Resolved: 2024-05-21

## 2024-05-21 PROBLEM — S82.431K: Status: RESOLVED | Noted: 2020-02-01 | Resolved: 2024-05-21

## 2024-05-21 PROBLEM — Z98.890 STATUS POST OPEN REDUCTION WITH INTERNAL FIXATION (ORIF) OF FRACTURE OF ANKLE: Status: RESOLVED | Noted: 2020-02-01 | Resolved: 2024-05-21

## 2024-05-21 PROBLEM — R10.9 ABDOMINAL PAIN DURING PREGNANCY IN SECOND TRIMESTER: Status: RESOLVED | Noted: 2020-07-07 | Resolved: 2024-05-21

## 2024-05-21 PROBLEM — N93.8 DUB (DYSFUNCTIONAL UTERINE BLEEDING): Status: RESOLVED | Noted: 2018-02-28 | Resolved: 2024-05-21

## 2024-05-21 PROBLEM — M25.473 ANKLE SWELLING: Status: RESOLVED | Noted: 2023-12-22 | Resolved: 2024-05-21

## 2024-05-21 PROBLEM — Z87.81 STATUS POST OPEN REDUCTION WITH INTERNAL FIXATION (ORIF) OF FRACTURE OF ANKLE: Status: RESOLVED | Noted: 2020-02-01 | Resolved: 2024-05-21

## 2024-05-21 PROBLEM — M54.50 LOW BACK PAIN: Status: RESOLVED | Noted: 2023-12-22 | Resolved: 2024-05-21

## 2024-05-21 PROBLEM — S93.421A TEAR OF DELTOID LIGAMENT OF ANKLE, RIGHT, INITIAL ENCOUNTER: Status: RESOLVED | Noted: 2020-02-01 | Resolved: 2024-05-21

## 2024-05-21 PROBLEM — M76.70 PERONEAL TENDINITIS: Status: RESOLVED | Noted: 2022-11-03 | Resolved: 2024-05-21

## 2024-05-21 PROBLEM — R94.31 ELECTROCARDIOGRAM ABNORMAL: Status: RESOLVED | Noted: 2023-12-22 | Resolved: 2024-05-21

## 2024-05-21 PROBLEM — M25.579 PAIN IN JOINT INVOLVING ANKLE AND FOOT: Status: RESOLVED | Noted: 2022-11-03 | Resolved: 2024-05-21

## 2024-05-21 PROBLEM — M62.571: Status: RESOLVED | Noted: 2022-11-03 | Resolved: 2024-05-21

## 2024-05-21 PROBLEM — N64.4 BREAST PAIN, LEFT: Status: RESOLVED | Noted: 2017-05-05 | Resolved: 2024-05-21

## 2024-05-21 PROBLEM — K80.50 BILIARY COLIC SYMPTOM: Status: RESOLVED | Noted: 2021-02-17 | Resolved: 2024-05-21

## 2024-05-21 PROBLEM — Z98.890 S/P ABLATION OF VENTRICULAR ARRHYTHMIA: Status: RESOLVED | Noted: 2018-01-12 | Resolved: 2024-05-21

## 2024-05-21 PROBLEM — I95.9 HYPOTENSION: Status: RESOLVED | Noted: 2017-11-02 | Resolved: 2024-05-21

## 2024-05-21 PROBLEM — M89.8X7 EXOSTOSIS OF BONE OF ANKLE: Status: RESOLVED | Noted: 2022-11-03 | Resolved: 2024-05-21

## 2024-05-21 PROBLEM — E87.6 HYPOKALEMIA: Status: RESOLVED | Noted: 2018-02-23 | Resolved: 2024-05-21

## 2024-05-21 PROBLEM — Z86.79 S/P ABLATION OF VENTRICULAR ARRHYTHMIA: Status: RESOLVED | Noted: 2018-01-12 | Resolved: 2024-05-21

## 2024-05-21 PROBLEM — F44.5 PSYCHOGENIC NONEPILEPTIC SEIZURE: Status: RESOLVED | Noted: 2017-04-06 | Resolved: 2024-05-21

## 2024-05-21 PROBLEM — R10.2 PELVIC PAIN: Status: RESOLVED | Noted: 2022-06-07 | Resolved: 2024-05-21

## 2024-05-21 PROBLEM — M62.40 TENDON CONTRACTURE: Status: RESOLVED | Noted: 2022-11-03 | Resolved: 2024-05-21

## 2024-05-21 PROBLEM — M19.90 ARTHRITIS: Status: RESOLVED | Noted: 2023-12-22 | Resolved: 2024-05-21

## 2024-05-21 PROBLEM — S82.891A CLOSED RIGHT ANKLE FRACTURE, INITIAL ENCOUNTER: Status: RESOLVED | Noted: 2020-02-01 | Resolved: 2024-05-21

## 2024-05-21 PROBLEM — O26.892 ABDOMINAL PAIN DURING PREGNANCY IN SECOND TRIMESTER: Status: RESOLVED | Noted: 2020-07-07 | Resolved: 2024-05-21

## 2024-05-21 PROBLEM — M65.871 OTHER SYNOVITIS AND TENOSYNOVITIS, RIGHT ANKLE AND FOOT: Status: RESOLVED | Noted: 2022-11-03 | Resolved: 2024-05-21

## 2024-05-21 PROBLEM — M21.40 PES PLANUS: Status: RESOLVED | Noted: 2022-11-03 | Resolved: 2024-05-21

## 2024-05-21 PROBLEM — R56.9 CONVULSIONS, UNSPECIFIED CONVULSION TYPE (HCC): Status: RESOLVED | Noted: 2024-01-19 | Resolved: 2024-05-21

## 2024-05-21 PROBLEM — M25.561 PAIN IN RIGHT KNEE: Status: RESOLVED | Noted: 2022-11-03 | Resolved: 2024-05-21

## 2024-05-21 PROBLEM — J18.9 PNEUMONIA: Status: RESOLVED | Noted: 2018-02-21 | Resolved: 2024-05-21

## 2024-05-21 PROBLEM — S93.429A SPRAIN OF DELTOID LIGAMENT OF ANKLE: Status: RESOLVED | Noted: 2022-11-03 | Resolved: 2024-05-21

## 2024-05-21 PROBLEM — T85.848A PAIN DUE TO INTERNAL PROSTHETIC DEVICE: Status: RESOLVED | Noted: 2022-11-03 | Resolved: 2024-05-21

## 2024-05-21 PROCEDURE — 99204 OFFICE O/P NEW MOD 45 MIN: CPT | Performed by: FAMILY MEDICINE

## 2024-05-21 PROCEDURE — 3051F HG A1C>EQUAL 7.0%<8.0%: CPT | Performed by: FAMILY MEDICINE

## 2024-05-21 RX ORDER — ONDANSETRON 4 MG/1
4 TABLET, ORALLY DISINTEGRATING ORAL EVERY 8 HOURS PRN
Qty: 10 TABLET | Refills: 0 | Status: SHIPPED | OUTPATIENT
Start: 2024-05-21

## 2024-05-21 RX ORDER — OMEPRAZOLE 20 MG/1
20 CAPSULE, DELAYED RELEASE ORAL
Qty: 90 CAPSULE | Refills: 0 | Status: SHIPPED | OUTPATIENT
Start: 2024-05-21

## 2024-05-21 NOTE — ED TRIAGE NOTES
Pt reports vomiting, diarrhea, and abdominal pain x2 days. Reports she has been unable to keep down food or water. Pt reports she is vomiting \"mostly stomach bile\". Reporting nausea. Reports LLQ abdominal pain, hx of GERD.

## 2024-05-21 NOTE — ED NOTES
Pt ambulated to restroom without difficulty, and with a smooth and steady gait. Pt denied dizziness upon standing or during ambulation.

## 2024-05-21 NOTE — ED PROVIDER NOTES
follow-up appointment with PCP in a.m. and is encouraged to keep this appointment.     Patient was given the following medications:   Medications   ondansetron (ZOFRAN) injection 4 mg (4 mg IntraVENous Given 5/20/24 2246)   sodium chloride 0.9 % bolus 1,000 mL (0 mLs IntraVENous Stopped 5/21/24 0035)   iopamidol (ISOVUE-370) 76 % injection 75 mL (75 mLs IntraVENous Given 5/20/24 8988)        CONSULTS:   None   Discussion with Other Professionals: None   {Social Determinants: None   Chronic Conditions: See medical record  Records Reviewed: Outpatient old records reviewed      Disposition Considerations:    I am the Primary Clinician of Record.        FINAL IMPRESSION    1. Nausea vomiting and diarrhea           DISPOSITION/PLAN:   Pt discharged home in stable condition.         PATIENT REFERRED TO:   Follow-up with your family doctor in a.m. for recheck without fail as discussed             DISCHARGE MEDICATIONS:   Discharge Medication List as of 5/21/2024  1:28 AM        START taking these medications    Details   ondansetron (ZOFRAN-ODT) 4 MG disintegrating tablet Take 1 tablet by mouth every 8 hours as needed for Nausea or Vomiting, Disp-10 tablet, R-0Normal              DISCONTINUED MEDICATIONS:   Discharge Medication List as of 5/21/2024  1:28 AM                 Shae Sandhu DO (electronically signed)         Shae Sandhu DO  05/21/24 5092

## 2024-05-21 NOTE — PROGRESS NOTES
Arthritis Mother     Osteoarthritis Mother     Anxiety Disorder Father     Bipolar Disorder Father     High Blood Pressure Father     Mental Illness Father     Parkinsonism Father         Early-Onset    Depression Paternal Aunt     Cancer Paternal Uncle         multiple myeloma    Depression Paternal Uncle     Cancer Maternal Grandmother         breast    Ovarian Cancer Maternal Grandmother     Diabetes Maternal Grandmother     Depression Maternal Grandfather     Heart Attack Maternal Grandfather     Cancer Paternal Grandfather         lung    No Known Problems Brother         Gaviria disease    No Known Problems Paternal Grandmother     Lupus Maternal Aunt      Current Outpatient Medications on File Prior to Visit   Medication Sig Dispense Refill    ondansetron (ZOFRAN-ODT) 4 MG disintegrating tablet Take 1 tablet by mouth every 8 hours as needed for Nausea or Vomiting 10 tablet 0    SITagliptin (JANUVIA) 100 MG tablet Take 1 tablet by mouth daily 90 tablet 1    dapagliflozin (FARXIGA) 10 MG tablet Take 1 tablet by mouth every morning 90 tablet 1    pioglitazone (ACTOS) 30 MG tablet Take 1 tablet by mouth daily 90 tablet 3    atorvastatin (LIPITOR) 40 MG tablet Take 1 tablet by mouth daily 90 tablet 3    busPIRone (BUSPAR) 30 MG tablet Take 45 mg by mouth nightly      DULoxetine (CYMBALTA) 60 MG extended release capsule Take 2 capsules by mouth nightly      sertraline (ZOLOFT) 25 MG tablet Take 1 tablet by mouth daily      QUEtiapine (SEROQUEL) 300 MG tablet Take 1 tablet by mouth nightly      QUEtiapine (SEROQUEL) 50 MG tablet Take 1 tablet by mouth every morning (before breakfast)      ARIPiprazole (ABILIFY) 15 MG tablet Take 25 mg by mouth nightly      sertraline (ZOLOFT) 50 MG tablet Take 1 tablet by mouth nightly      cetirizine (ZYRTEC) 10 MG tablet Take 1 tablet by mouth daily 90 tablet 3     No current facility-administered medications on file prior to visit.       Allergies   Allergen Reactions

## 2024-05-21 NOTE — DISCHARGE INSTRUCTIONS
Return to emergency department if persistent vomiting diarrhea abdominal pain fever worse in any way or any problems.    Keep follow-up appointment with PCP in the a.m.    Recheck liver enzymes within 1 week (AST and ALT slightly elevated)    Clear liquids and advance to bland diet.

## 2024-05-21 NOTE — ED NOTES
Introduced self to patient. Pt oriented to room and basic plan of treatment at this time. Pt denies questions or concerns. Pt oriented to call light, and call light placed within reach. Bed locked in lowest position. Pt A&OX4.

## 2024-05-26 ENCOUNTER — HOSPITAL ENCOUNTER (EMERGENCY)
Age: 29
Discharge: HOME | End: 2024-05-27
Attending: EMERGENCY MEDICINE
Payer: COMMERCIAL

## 2024-05-26 DIAGNOSIS — F41.0 PANIC ATTACK: ICD-10-CM

## 2024-05-26 DIAGNOSIS — J45.901 ASTHMA WITH ACUTE EXACERBATION, UNSPECIFIED ASTHMA SEVERITY, UNSPECIFIED WHETHER PERSISTENT: Primary | ICD-10-CM

## 2024-05-26 PROCEDURE — 96374 THER/PROPH/DIAG INJ IV PUSH: CPT

## 2024-05-26 PROCEDURE — 99285 EMERGENCY DEPT VISIT HI MDM: CPT

## 2024-05-26 RX ORDER — IPRATROPIUM BROMIDE AND ALBUTEROL SULFATE 2.5; .5 MG/3ML; MG/3ML
1 SOLUTION RESPIRATORY (INHALATION) ONCE
Status: COMPLETED | OUTPATIENT
Start: 2024-05-27 | End: 2024-05-27

## 2024-05-26 RX ORDER — CODEINE PHOSPHATE AND GUAIFENESIN 10; 100 MG/5ML; MG/5ML
5 SOLUTION ORAL ONCE
Status: DISCONTINUED | OUTPATIENT
Start: 2024-05-27 | End: 2024-05-27

## 2024-05-26 RX ORDER — ALBUTEROL SULFATE 2.5 MG/3ML
2.5 SOLUTION RESPIRATORY (INHALATION) ONCE
Status: COMPLETED | OUTPATIENT
Start: 2024-05-27 | End: 2024-05-27

## 2024-05-26 ASSESSMENT — PAIN - FUNCTIONAL ASSESSMENT: PAIN_FUNCTIONAL_ASSESSMENT: 0-10

## 2024-05-26 ASSESSMENT — PAIN SCALES - GENERAL: PAINLEVEL_OUTOF10: 10

## 2024-05-27 ENCOUNTER — APPOINTMENT (OUTPATIENT)
Age: 29
End: 2024-05-27
Payer: COMMERCIAL

## 2024-05-27 VITALS
BODY MASS INDEX: 41.95 KG/M2 | SYSTOLIC BLOOD PRESSURE: 125 MMHG | HEIGHT: 70 IN | RESPIRATION RATE: 16 BRPM | WEIGHT: 293 LBS | TEMPERATURE: 100.3 F | HEART RATE: 117 BPM | DIASTOLIC BLOOD PRESSURE: 65 MMHG | OXYGEN SATURATION: 99 %

## 2024-05-27 LAB
ANION GAP SERPL CALCULATED.3IONS-SCNC: 15 MMOL/L (ref 3–16)
BASOPHILS # BLD: 0.02 K/UL (ref 0–0.2)
BASOPHILS NFR BLD: 0 %
BUN SERPL-MCNC: 11 MG/DL (ref 7–20)
CALCIUM SERPL-MCNC: 9.2 MG/DL (ref 8.3–10.6)
CHLORIDE SERPL-SCNC: 101 MMOL/L (ref 99–110)
CO2 SERPL-SCNC: 22 MMOL/L (ref 21–32)
CREAT SERPL-MCNC: 0.7 MG/DL (ref 0.5–1)
EKG ATRIAL RATE: 113 BPM
EKG DIAGNOSIS: NORMAL
EKG P AXIS: 60 DEGREES
EKG P-R INTERVAL: 144 MS
EKG Q-T INTERVAL: 342 MS
EKG QRS DURATION: 86 MS
EKG QTC CALCULATION (BAZETT): 469 MS
EKG R AXIS: 66 DEGREES
EKG T AXIS: 39 DEGREES
EKG VENTRICULAR RATE: 113 BPM
EOSINOPHIL # BLD: 0 K/UL (ref 0–0.6)
EOSINOPHILS RELATIVE PERCENT: 0 %
ERYTHROCYTE [DISTWIDTH] IN BLOOD BY AUTOMATED COUNT: 14.1 % (ref 12.4–15.4)
FLUAV AG SPEC QL: NEGATIVE
FLUBV AG SPEC QL: NEGATIVE
GFR, ESTIMATED: >90 ML/MIN/1.73M2
GLUCOSE SERPL-MCNC: 223 MG/DL (ref 70–99)
HCT VFR BLD AUTO: 39.1 % (ref 36–48)
HGB BLD-MCNC: 13.2 G/DL (ref 12–16)
IMM GRANULOCYTES # BLD AUTO: 0.03 K/UL (ref 0–0.5)
IMM GRANULOCYTES NFR BLD: 0 %
LYMPHOCYTES NFR BLD: 1.86 K/UL (ref 1–5.1)
LYMPHOCYTES RELATIVE PERCENT: 13 %
MCH RBC QN AUTO: 29.9 PG (ref 26–34)
MCHC RBC AUTO-ENTMCNC: 33.8 G/DL (ref 31–36)
MCV RBC AUTO: 88.5 FL (ref 80–100)
MONOCYTES NFR BLD: 0.65 K/UL (ref 0–1.3)
MONOCYTES NFR BLD: 5 %
NEUTROPHILS NFR BLD: 82 %
NEUTS SEG NFR BLD: 11.27 K/UL (ref 1.7–7.7)
PLATELET # BLD AUTO: 194 K/UL (ref 135–450)
PMV BLD AUTO: 10 FL
POTASSIUM SERPL-SCNC: 3.8 MMOL/L (ref 3.5–5.1)
RBC # BLD AUTO: 4.42 M/UL (ref 4–5.2)
SARS-COV-2 RDRP RESP QL NAA+PROBE: NOT DETECTED
SODIUM SERPL-SCNC: 137 MMOL/L (ref 136–145)
SPECIMEN DESCRIPTION: NORMAL
TROPONIN I SERPL HS-MCNC: <6 NG/L (ref 0–14)
WBC OTHER # BLD: 13.8 K/UL (ref 4–11)

## 2024-05-27 PROCEDURE — 87804 INFLUENZA ASSAY W/OPTIC: CPT

## 2024-05-27 PROCEDURE — 6370000000 HC RX 637 (ALT 250 FOR IP): Performed by: EMERGENCY MEDICINE

## 2024-05-27 PROCEDURE — 6360000002 HC RX W HCPCS: Performed by: EMERGENCY MEDICINE

## 2024-05-27 PROCEDURE — 84484 ASSAY OF TROPONIN QUANT: CPT

## 2024-05-27 PROCEDURE — 2580000003 HC RX 258: Performed by: EMERGENCY MEDICINE

## 2024-05-27 PROCEDURE — 71045 X-RAY EXAM CHEST 1 VIEW: CPT

## 2024-05-27 PROCEDURE — 87635 SARS-COV-2 COVID-19 AMP PRB: CPT

## 2024-05-27 PROCEDURE — 80048 BASIC METABOLIC PNL TOTAL CA: CPT

## 2024-05-27 PROCEDURE — 94640 AIRWAY INHALATION TREATMENT: CPT

## 2024-05-27 PROCEDURE — 85025 COMPLETE CBC W/AUTO DIFF WBC: CPT

## 2024-05-27 RX ORDER — ALBUTEROL SULFATE 90 UG/1
2 AEROSOL, METERED RESPIRATORY (INHALATION) 4 TIMES DAILY PRN
Qty: 18 G | Refills: 0 | Status: SHIPPED | OUTPATIENT
Start: 2024-05-27 | End: 2024-05-27

## 2024-05-27 RX ORDER — ALBUTEROL SULFATE 90 UG/1
2 AEROSOL, METERED RESPIRATORY (INHALATION) 4 TIMES DAILY PRN
Qty: 18 G | Refills: 0 | Status: SHIPPED | OUTPATIENT
Start: 2024-05-27 | End: 2024-06-26

## 2024-05-27 RX ORDER — LORAZEPAM 1 MG/1
1 TABLET ORAL ONCE
Status: COMPLETED | OUTPATIENT
Start: 2024-05-27 | End: 2024-05-27

## 2024-05-27 RX ORDER — PREDNISONE 20 MG/1
20 TABLET ORAL 2 TIMES DAILY
Qty: 10 TABLET | Refills: 0 | Status: SHIPPED | OUTPATIENT
Start: 2024-05-27 | End: 2024-06-01

## 2024-05-27 RX ORDER — ACETAMINOPHEN 500 MG
1000 TABLET ORAL ONCE
Status: COMPLETED | OUTPATIENT
Start: 2024-05-27 | End: 2024-05-27

## 2024-05-27 RX ORDER — PREDNISONE 20 MG/1
20 TABLET ORAL 2 TIMES DAILY
Qty: 10 TABLET | Refills: 0 | Status: SHIPPED | OUTPATIENT
Start: 2024-05-27 | End: 2024-05-27

## 2024-05-27 RX ORDER — ALBUTEROL SULFATE 2.5 MG/3ML
2.5 SOLUTION RESPIRATORY (INHALATION) ONCE
Status: COMPLETED | OUTPATIENT
Start: 2024-05-27 | End: 2024-05-27

## 2024-05-27 RX ADMIN — ALBUTEROL SULFATE 2.5 MG: 2.5 SOLUTION RESPIRATORY (INHALATION) at 00:13

## 2024-05-27 RX ADMIN — ACETAMINOPHEN 1000 MG: 500 TABLET ORAL at 01:01

## 2024-05-27 RX ADMIN — WATER 125 MG: 1 INJECTION INTRAMUSCULAR; INTRAVENOUS; SUBCUTANEOUS at 01:12

## 2024-05-27 RX ADMIN — ALBUTEROL SULFATE 2.5 MG: 2.5 SOLUTION RESPIRATORY (INHALATION) at 01:17

## 2024-05-27 RX ADMIN — LORAZEPAM 1 MG: 1 TABLET ORAL at 01:18

## 2024-05-27 RX ADMIN — IPRATROPIUM BROMIDE AND ALBUTEROL SULFATE 1 DOSE: .5; 2.5 SOLUTION RESPIRATORY (INHALATION) at 00:13

## 2024-05-27 ASSESSMENT — PAIN DESCRIPTION - LOCATION: LOCATION: CHEST

## 2024-05-27 ASSESSMENT — PAIN DESCRIPTION - ORIENTATION: ORIENTATION: MID

## 2024-05-27 ASSESSMENT — PAIN - FUNCTIONAL ASSESSMENT: PAIN_FUNCTIONAL_ASSESSMENT: 0-10

## 2024-05-27 ASSESSMENT — PAIN DESCRIPTION - DESCRIPTORS: DESCRIPTORS: ACHING;BURNING

## 2024-05-27 ASSESSMENT — PAIN SCALES - GENERAL: PAINLEVEL_OUTOF10: 6

## 2024-05-27 NOTE — PROGRESS NOTES
05/27/24 0100   RT Protocol   History Pulmonary Disease 0  (smoker)   Respiratory pattern 0   Breath sounds 2   Cough 0   Indications for Bronchodilator Therapy Decreased or absent breath sounds   Bronchodilator Assessment Score 2

## 2024-05-27 NOTE — DISCHARGE INSTRUCTIONS
Return to emergency department if worsening recurrent difficulty breathing chest pain fever worse in any way or any problems.    Force fluids.  Rest.  Take Xanax as directed while taking prednisone.

## 2024-05-27 NOTE — ED TRIAGE NOTES
Pt c/o cough for 2 days, now having chest pain and shortness of breath with cough. States this has been ongoing for 1 day

## 2024-05-27 NOTE — ED NOTES
----- Message from Tanya Milian MD sent at 5/13/2024  1:54 PM CDT -----  Kidney function is improved. Can proceed with colonoscopy/EGD.   Discharge and education instructions reviewed. Patient verbalized understanding, teach-back successful. Patient denied questions at this time. No acute distress noted. Patient instructed to follow-up as noted - return to emergency department if symptoms worsen. Patient verbalized understanding. Discharged per EDMD with discharge instructions.

## 2024-05-28 ENCOUNTER — OFFICE VISIT (OUTPATIENT)
Dept: PRIMARY CARE CLINIC | Age: 29
End: 2024-05-28

## 2024-05-28 VITALS
HEART RATE: 113 BPM | TEMPERATURE: 97.6 F | RESPIRATION RATE: 20 BRPM | DIASTOLIC BLOOD PRESSURE: 72 MMHG | SYSTOLIC BLOOD PRESSURE: 118 MMHG | OXYGEN SATURATION: 97 % | BODY MASS INDEX: 47.78 KG/M2 | WEIGHT: 293 LBS

## 2024-05-28 DIAGNOSIS — J45.21 MILD INTERMITTENT ASTHMA WITH ACUTE EXACERBATION: Primary | ICD-10-CM

## 2024-05-28 RX ORDER — ALBUTEROL SULFATE 2.5 MG/3ML
2.5 SOLUTION RESPIRATORY (INHALATION) ONCE
Status: COMPLETED | OUTPATIENT
Start: 2024-05-28 | End: 2024-05-28

## 2024-05-28 RX ORDER — TRIAMCINOLONE ACETONIDE 40 MG/ML
40 INJECTION, SUSPENSION INTRA-ARTICULAR; INTRAMUSCULAR ONCE
Status: COMPLETED | OUTPATIENT
Start: 2024-05-28 | End: 2024-05-28

## 2024-05-28 RX ORDER — AZITHROMYCIN 250 MG/1
TABLET, FILM COATED ORAL
Qty: 6 TABLET | Refills: 0 | Status: SHIPPED | OUTPATIENT
Start: 2024-05-28

## 2024-05-28 RX ADMIN — TRIAMCINOLONE ACETONIDE 40 MG: 40 INJECTION, SUSPENSION INTRA-ARTICULAR; INTRAMUSCULAR at 14:53

## 2024-05-28 RX ADMIN — ALBUTEROL SULFATE 2.5 MG: 2.5 SOLUTION RESPIRATORY (INHALATION) at 14:51

## 2024-05-28 NOTE — PROGRESS NOTES
Head: Normocephalic and atraumatic.      Right Ear: Tympanic membrane, ear canal and external ear normal.      Left Ear: Tympanic membrane, ear canal and external ear normal.      Nose: Congestion and rhinorrhea present.   Cardiovascular:      Rate and Rhythm: Regular rhythm. Tachycardia present.   Pulmonary:      Effort: No respiratory distress.      Breath sounds: Wheezing present. No rhonchi or rales.   Lymphadenopathy:      Cervical: No cervical adenopathy.   Neurological:      General: No focal deficit present.      Mental Status: She is alert and oriented to person, place, and time.   Psychiatric:         Attention and Perception: Attention and perception normal.         Mood and Affect: Mood and affect normal.         Speech: Speech normal.         Behavior: Behavior normal. Behavior is cooperative.         Thought Content: Thought content normal.         Cognition and Memory: Cognition and memory normal.         Judgment: Judgment normal.             Electronically signed by RAMIRO GOMEZ MD on 5/28/2024 at 6:08 PM.    Please note this chart was generated using dragon dictation software.  Although every effort was made to ensure the accuracy of this automated transcription, some errors in transcription may have occurred.

## 2024-05-30 PROBLEM — E11.69 HYPERLIPIDEMIA ASSOCIATED WITH TYPE 2 DIABETES MELLITUS (HCC): Status: ACTIVE | Noted: 2024-05-30

## 2024-05-30 PROBLEM — J45.20 MILD INTERMITTENT ASTHMA WITHOUT COMPLICATION: Status: ACTIVE | Noted: 2024-05-30

## 2024-05-30 PROBLEM — E78.5 HYPERLIPIDEMIA ASSOCIATED WITH TYPE 2 DIABETES MELLITUS (HCC): Status: ACTIVE | Noted: 2024-05-30

## 2024-06-03 LAB
EKG ATRIAL RATE: 113 BPM
EKG DIAGNOSIS: NORMAL
EKG P AXIS: 60 DEGREES
EKG P-R INTERVAL: 144 MS
EKG Q-T INTERVAL: 342 MS
EKG QRS DURATION: 86 MS
EKG QTC CALCULATION (BAZETT): 469 MS
EKG R AXIS: 66 DEGREES
EKG T AXIS: 39 DEGREES
EKG VENTRICULAR RATE: 113 BPM

## 2024-06-03 SDOH — HEALTH STABILITY: PHYSICAL HEALTH
ON AVERAGE, HOW MANY DAYS PER WEEK DO YOU ENGAGE IN MODERATE TO STRENUOUS EXERCISE (LIKE A BRISK WALK)?: PATIENT DECLINED

## 2024-06-03 ASSESSMENT — PATIENT HEALTH QUESTIONNAIRE - PHQ9
SUM OF ALL RESPONSES TO PHQ QUESTIONS 1-9: 0
SUM OF ALL RESPONSES TO PHQ QUESTIONS 1-9: 0
SUM OF ALL RESPONSES TO PHQ9 QUESTIONS 1 & 2: 0
SUM OF ALL RESPONSES TO PHQ QUESTIONS 1-9: 0
1. LITTLE INTEREST OR PLEASURE IN DOING THINGS: NOT AT ALL
2. FEELING DOWN, DEPRESSED OR HOPELESS: NOT AT ALL
SUM OF ALL RESPONSES TO PHQ QUESTIONS 1-9: 0

## 2024-06-03 ASSESSMENT — LIFESTYLE VARIABLES
HOW OFTEN DO YOU HAVE A DRINK CONTAINING ALCOHOL: NEVER
HOW OFTEN DO YOU HAVE SIX OR MORE DRINKS ON ONE OCCASION: 1
HOW OFTEN DO YOU HAVE A DRINK CONTAINING ALCOHOL: 1
HOW MANY STANDARD DRINKS CONTAINING ALCOHOL DO YOU HAVE ON A TYPICAL DAY: PATIENT DOES NOT DRINK
HOW MANY STANDARD DRINKS CONTAINING ALCOHOL DO YOU HAVE ON A TYPICAL DAY: 0

## 2024-06-04 ENCOUNTER — OFFICE VISIT (OUTPATIENT)
Dept: PRIMARY CARE CLINIC | Age: 29
End: 2024-06-04
Payer: COMMERCIAL

## 2024-06-04 VITALS
RESPIRATION RATE: 16 BRPM | WEIGHT: 293 LBS | OXYGEN SATURATION: 94 % | BODY MASS INDEX: 41.95 KG/M2 | HEART RATE: 95 BPM | SYSTOLIC BLOOD PRESSURE: 114 MMHG | HEIGHT: 70 IN | TEMPERATURE: 98.2 F | DIASTOLIC BLOOD PRESSURE: 80 MMHG

## 2024-06-04 DIAGNOSIS — J01.00 ACUTE NON-RECURRENT MAXILLARY SINUSITIS: ICD-10-CM

## 2024-06-04 DIAGNOSIS — E11.69 HYPERLIPIDEMIA ASSOCIATED WITH TYPE 2 DIABETES MELLITUS (HCC): ICD-10-CM

## 2024-06-04 DIAGNOSIS — Z11.59 ENCOUNTER FOR HEPATITIS C SCREENING TEST FOR LOW RISK PATIENT: ICD-10-CM

## 2024-06-04 DIAGNOSIS — Z00.00 MEDICARE ANNUAL WELLNESS VISIT, SUBSEQUENT: Primary | ICD-10-CM

## 2024-06-04 DIAGNOSIS — E78.5 HYPERLIPIDEMIA ASSOCIATED WITH TYPE 2 DIABETES MELLITUS (HCC): ICD-10-CM

## 2024-06-04 DIAGNOSIS — K21.9 GASTROESOPHAGEAL REFLUX DISEASE WITHOUT ESOPHAGITIS: ICD-10-CM

## 2024-06-04 DIAGNOSIS — E11.9 TYPE 2 DIABETES MELLITUS WITHOUT COMPLICATION, WITHOUT LONG-TERM CURRENT USE OF INSULIN (HCC): ICD-10-CM

## 2024-06-04 LAB
ALBUMIN SERPL-MCNC: 4.1 G/DL (ref 3.4–5)
ALBUMIN/GLOB SERPL: 1.2 {RATIO} (ref 1.1–2.2)
ALP SERPL-CCNC: 128 U/L (ref 40–129)
ALT SERPL-CCNC: 42 U/L (ref 10–40)
ANION GAP SERPL CALCULATED.3IONS-SCNC: 11 MMOL/L (ref 3–16)
AST SERPL-CCNC: 52 U/L (ref 15–37)
BILIRUB SERPL-MCNC: 0.3 MG/DL (ref 0–1)
BUN SERPL-MCNC: 9 MG/DL (ref 7–20)
CALCIUM SERPL-MCNC: 9.7 MG/DL (ref 8.3–10.6)
CHLORIDE SERPL-SCNC: 97 MMOL/L (ref 99–110)
CHOLEST SERPL-MCNC: 164 MG/DL (ref 0–199)
CO2 SERPL-SCNC: 27 MMOL/L (ref 21–32)
CREAT SERPL-MCNC: <0.5 MG/DL (ref 0.6–1.1)
DEPRECATED RDW RBC AUTO: 14.8 % (ref 12.4–15.4)
GFR SERPLBLD CREATININE-BSD FMLA CKD-EPI: >90 ML/MIN/{1.73_M2}
GLUCOSE SERPL-MCNC: 113 MG/DL (ref 70–99)
HCT VFR BLD AUTO: 41.5 % (ref 36–48)
HCV AB SERPL QL IA: NORMAL
HDLC SERPL-MCNC: 50 MG/DL (ref 40–60)
HGB BLD-MCNC: 13.9 G/DL (ref 12–16)
LDLC SERPL CALC-MCNC: 65 MG/DL
MAGNESIUM SERPL-MCNC: 1.8 MG/DL (ref 1.8–2.4)
MCH RBC QN AUTO: 29.8 PG (ref 26–34)
MCHC RBC AUTO-ENTMCNC: 33.6 G/DL (ref 31–36)
MCV RBC AUTO: 88.8 FL (ref 80–100)
PLATELET # BLD AUTO: 236 K/UL (ref 135–450)
PMV BLD AUTO: 8.7 FL (ref 5–10.5)
POTASSIUM SERPL-SCNC: 4.4 MMOL/L (ref 3.5–5.1)
PROT SERPL-MCNC: 7.6 G/DL (ref 6.4–8.2)
RBC # BLD AUTO: 4.67 M/UL (ref 4–5.2)
SODIUM SERPL-SCNC: 135 MMOL/L (ref 136–145)
TRIGL SERPL-MCNC: 245 MG/DL (ref 0–150)
TSH SERPL DL<=0.005 MIU/L-ACNC: 1.02 UIU/ML (ref 0.27–4.2)
VIT B12 SERPL-MCNC: 1218 PG/ML (ref 211–911)
VLDLC SERPL CALC-MCNC: 49 MG/DL
WBC # BLD AUTO: 11.2 K/UL (ref 4–11)

## 2024-06-04 PROCEDURE — 3051F HG A1C>EQUAL 7.0%<8.0%: CPT | Performed by: FAMILY MEDICINE

## 2024-06-04 PROCEDURE — G0439 PPPS, SUBSEQ VISIT: HCPCS | Performed by: FAMILY MEDICINE

## 2024-06-04 PROCEDURE — 36415 COLL VENOUS BLD VENIPUNCTURE: CPT | Performed by: FAMILY MEDICINE

## 2024-06-04 RX ORDER — LANCETS 30 GAUGE
1 EACH MISCELLANEOUS DAILY
Qty: 100 EACH | Refills: 4 | Status: SHIPPED | OUTPATIENT
Start: 2024-06-04

## 2024-06-04 RX ORDER — CLARITHROMYCIN 500 MG/1
500 TABLET, COATED ORAL 2 TIMES DAILY
Qty: 20 TABLET | Refills: 0 | Status: SHIPPED | OUTPATIENT
Start: 2024-06-04 | End: 2024-06-14

## 2024-06-04 ASSESSMENT — PATIENT HEALTH QUESTIONNAIRE - PHQ9
SUM OF ALL RESPONSES TO PHQ QUESTIONS 1-9: 0
SUM OF ALL RESPONSES TO PHQ QUESTIONS 1-9: 0
5. POOR APPETITE OR OVEREATING: NOT AT ALL
8. MOVING OR SPEAKING SO SLOWLY THAT OTHER PEOPLE COULD HAVE NOTICED. OR THE OPPOSITE, BEING SO FIGETY OR RESTLESS THAT YOU HAVE BEEN MOVING AROUND A LOT MORE THAN USUAL: NOT AT ALL
SUM OF ALL RESPONSES TO PHQ QUESTIONS 1-9: 0
9. THOUGHTS THAT YOU WOULD BE BETTER OFF DEAD, OR OF HURTING YOURSELF: NOT AT ALL
4. FEELING TIRED OR HAVING LITTLE ENERGY: NOT AT ALL
2. FEELING DOWN, DEPRESSED OR HOPELESS: NOT AT ALL
7. TROUBLE CONCENTRATING ON THINGS, SUCH AS READING THE NEWSPAPER OR WATCHING TELEVISION: NOT AT ALL
1. LITTLE INTEREST OR PLEASURE IN DOING THINGS: NOT AT ALL
SUM OF ALL RESPONSES TO PHQ QUESTIONS 1-9: 0
3. TROUBLE FALLING OR STAYING ASLEEP: NOT AT ALL
SUM OF ALL RESPONSES TO PHQ9 QUESTIONS 1 & 2: 0
6. FEELING BAD ABOUT YOURSELF - OR THAT YOU ARE A FAILURE OR HAVE LET YOURSELF OR YOUR FAMILY DOWN: NOT AT ALL

## 2024-06-04 NOTE — PATIENT INSTRUCTIONS
such as acetaminophen (Tylenol).   When should you call for help?   Call 911 if you have symptoms of a heart attack. These may include:    Chest pain or pressure, or a strange feeling in the chest.     Sweating.     Shortness of breath.     Pain, pressure, or a strange feeling in the back, neck, jaw, or upper belly or in one or both shoulders or arms.     Lightheadedness or sudden weakness.     A fast or irregular heartbeat.   After you call 911, the  may tell you to chew 1 adult-strength or 2 to 4 low-dose aspirin. Wait for an ambulance. Do not try to drive yourself.  Watch closely for changes in your health, and be sure to contact your doctor if you have any problems.  Where can you learn more?  Go to https://www.[employer].net/patientEd and enter F075 to learn more about \"A Healthy Heart: Care Instructions.\"  Current as of: June 24, 2023               Content Version: 14.0  © 8724-3255 [employer].   Care instructions adapted under license by [employer]. If you have questions about a medical condition or this instruction, always ask your healthcare professional. [employer] disclaims any warranty or liability for your use of this information.      Personalized Preventive Plan for Deana Mcginnis - 6/4/2024  Medicare offers a range of preventive health benefits. Some of the tests and screenings are paid in full while other may be subject to a deductible, co-insurance, and/or copay.    Some of these benefits include a comprehensive review of your medical history including lifestyle, illnesses that may run in your family, and various assessments and screenings as appropriate.    After reviewing your medical record and screening and assessments performed today your provider may have ordered immunizations, labs, imaging, and/or referrals for you.  A list of these orders (if applicable) as well as your Preventive Care list are included within your After Visit Summary for your

## 2024-06-04 NOTE — PROGRESS NOTES
with last labs but previously had had a consistent hemoglobin A1c near 6.8  She did have significant side effects from Trulicity and did stop this medication  Will need to establish with optometry in [place]  Has not been checking recently    Hyperlipidemia-tolerates statin well, last labs showed continued high triglycerides    Asthma-recent exac and breathing improved but now with pain across face and behind eyes      Gerd-well-controlled on her proton pump inhibitor    Recently she had onset of nausea approximately 1 month ago this improved after a week then 3 days ago she had onset of emesis and diarrhea the emesis has really resolved but she did have 8 BMs yesterday.  Today she has had 2 bowel movements.  No blood in her stool no black and tarry stool she is tolerating fluids well and has been able to advance her diet today    Bipolar disorder and anxiety-she has been under the care of of a psychiatrist and feels symptoms are overall well-controlled    Patient's complete Health Risk Assessment and screening values have been reviewed and are found in Flowsheets. The following problems were reviewed today and where indicated follow up appointments were made and/or referrals ordered.    Positive Risk Factor Screenings with Interventions:          Drug Use:   Substance and Sexual Activity   Drug Use Yes    Types: Marijuana (Weed)    Comment: [employer] MARIJ[employer]NA  5/30/22     Interventions:  See AVS for additional education material         Activity, Diet, and Weight:  On average, how many days per week do you engage in moderate to strenuous exercise (like a brisk walk)?: Patient declined       Do you eat balanced/healthy meals regularly?: Yes    Body mass index is 46.75 kg/m². (!) Abnormal    Obesity Interventions:  See AVS for additional education material                 Advanced Directives:  Do you have a Living Will?: (!) No  Intervention:  has NO advanced directive - information provided        Tobacco

## 2024-06-05 LAB
EST. AVERAGE GLUCOSE BLD GHB EST-MCNC: 168.6 MG/DL
HBA1C MFR BLD: 7.5 %

## 2024-06-09 DIAGNOSIS — E11.9 TYPE 2 DIABETES MELLITUS WITHOUT COMPLICATION, WITHOUT LONG-TERM CURRENT USE OF INSULIN (HCC): ICD-10-CM

## 2024-06-09 DIAGNOSIS — E78.1 HYPERTRIGLYCERIDEMIA: ICD-10-CM

## 2024-06-09 RX ORDER — DAPAGLIFLOZIN 10 MG/1
10 TABLET, FILM COATED ORAL EVERY MORNING
Qty: 90 TABLET | Refills: 1 | Status: SHIPPED | OUTPATIENT
Start: 2024-06-09

## 2024-06-09 RX ORDER — PIOGLITAZONEHYDROCHLORIDE 30 MG/1
30 TABLET ORAL DAILY
Qty: 90 TABLET | Refills: 1 | Status: SHIPPED | OUTPATIENT
Start: 2024-06-09

## 2024-06-09 RX ORDER — ATORVASTATIN CALCIUM 40 MG/1
40 TABLET, FILM COATED ORAL DAILY
Qty: 90 TABLET | Refills: 1 | Status: SHIPPED | OUTPATIENT
Start: 2024-06-09

## 2024-07-26 ENCOUNTER — TELEMEDICINE (OUTPATIENT)
Dept: PRIMARY CARE CLINIC | Age: 29
End: 2024-07-26
Payer: COMMERCIAL

## 2024-07-26 DIAGNOSIS — J01.00 ACUTE NON-RECURRENT MAXILLARY SINUSITIS: Primary | ICD-10-CM

## 2024-07-26 DIAGNOSIS — G50.0 TRIGEMINAL NEURALGIA OF LEFT SIDE OF FACE: ICD-10-CM

## 2024-07-26 PROCEDURE — 99214 OFFICE O/P EST MOD 30 MIN: CPT | Performed by: FAMILY MEDICINE

## 2024-07-26 RX ORDER — OXCARBAZEPINE 600 MG/1
TABLET, FILM COATED ORAL
COMMUNITY
Start: 2024-05-28

## 2024-07-26 RX ORDER — GLIMEPIRIDE 4 MG/1
4 TABLET ORAL
COMMUNITY
Start: 2024-05-28

## 2024-07-26 RX ORDER — CLARITHROMYCIN 500 MG/1
500 TABLET, COATED ORAL 2 TIMES DAILY
Qty: 14 TABLET | Refills: 0 | Status: SHIPPED | OUTPATIENT
Start: 2024-07-26 | End: 2024-08-02

## 2024-07-26 RX ORDER — NYSTATIN 100000 [USP'U]/G
POWDER TOPICAL
COMMUNITY
Start: 2024-05-28

## 2024-07-26 NOTE — PROGRESS NOTES
Deana Mcginnis (:  1995) is a 28 y.o. female,Established patient, here for evaluation of the following chief complaint(s): Cough, Headache, and Congestion      ASSESSMENT/PLAN:  1. Acute non-recurrent maxillary sinusitis  -     clarithromycin (BIAXIN) 500 MG tablet; Take 1 tablet by mouth 2 times daily for 7 days, Disp-14 tablet, R-0Normal  2. Trigeminal neuralgia of left side of face  -     [facility] - Javier Ramey DO, Neurology, [place]  Exam is limited as this is a telehealth visit.  Discussed there can be recurrence of trigeminal neuralgia but with possibility that this is a sinus infection do recommend treatment with antibiotic and supportive measures.  If symptoms resolve then no further treatment is necessary however if symptoms continue or pain increases in intensity referral has been placed for neurology.    Return if symptoms worsen or fail to improve.    SUBJECTIVE/OBJECTIVE:    The patient was in normal state of health until approximately week to 10 days ago she began having pain across her left eye face and upper cheek.  She does experience pain into her teeth as well    She reports this is a sharp sensation rather than a dull ache.  This was similar to how she experienced trigeminal neuralgia in the past  Previously she had a long course of evaluation for trigeminal neuralgia but has been off treatment for many years    She had a slight nasal congestion prior to this but this was not significant  No fevers or chills  Some nasal drainage but not thick  No cough no sore throat    [INSTRUCTIONS:  \"[x]\" Indicates a positive item  \"[]\" Indicates a negative item  -- DELETE ALL ITEMS NOT EXAMINED]    Constitutional: [x] Appears well-developed and well-nourished [x] No apparent distress      [] Abnormal -     Mental status: [x] Alert and awake  [x] Oriented to person/place/time [x] Able to follow commands    [] Abnormal -     Eyes:   EOM    [x]  Normal    [] Abnormal -   Sclera  [x]  Normal    []

## 2024-07-26 NOTE — ED PROVIDER NOTES
[facility] EMERGENCY DEPT     EMERGENCY DEPARTMENT ENCOUNTER            Pt Name: Deana Mcginnis   MRN: [id number]   Birthdate 1995   Date of evaluation: 5/26/2024   Provider: Shae Sandhu DO   PCP: Ariela Cooper MD   Note Started: 12:12 AM EDT 5/27/24          CHIEF COMPLAINT     Chief Complaint   Patient presents with    Cough    Chest Pain    Shortness of Breath             HISTORY OF PRESENT ILLNESS:   History from : Patient   Limitations to history : None     Deana Mcginnis is a 28 y.o. female who presents to emergency department ambulatory with reports of cough wheezing difficulty breathing.  Patient reports nonproductive cough that began last evening with associated postnasal drip.  Symptoms have been constant since that time and were worse prior to admission with difficulty breathing.  She denies chest pain abdominal pain back pain.  Denies headache neck pain ear pain sore throat or fever.  Patient does have history of asthma.  She is a smoker.    Nursing Notes were all reviewed and agreed with, or any disagreements were addressed in the HPI.     REVIEW OF SYSTEMS :    Positives and Pertinent negatives as per HPI.      MEDICAL HISTORY   has a past medical history of ADD (attention deficit disorder), Anxiety (04/08/2015), Anxiety attack, Asthma, Atypical face pain, Back pain, Bipolar 1 disorder ([facility]), Diabetes mellitus ([facility]), Fibromyalgia, GERD (gastroesophageal reflux disease), blood clots, Hypotension (11/02/2017), Major depressive disorder, recurrent episode, mild (HCC) (07/31/2012), Obesity (10/24/2014), WILFREDO treated with BiPAP, Pneumonia (02/21/2018), POTS (postural orthostatic tachycardia syndrome), Pseudoseizures (04/06/2017), Pulmonary emboli ([facility]) (2016), Seizures ([facility]) (07/31/2016), Tailbone injury (11/28/2015), Thyroid cyst (08/22/2016), Thyroid disease, TMJ (dislocation of temporomandibular joint), Trigeminal neuralgia, and Wears contact lenses.    Past Surgical History:   Procedure 
Never

## 2024-08-01 ENCOUNTER — TELEPHONE (OUTPATIENT)
Dept: PRIMARY CARE CLINIC | Age: 29
End: 2024-08-01

## 2024-08-01 NOTE — TELEPHONE ENCOUNTER
Patient calling into office stating she was in the ER on 7/28. Patient states that the ER \"wrote her off\". Patient reports she has lots of bug bites that are painful, muscle and joint pain, swelling, and extreme fatigue. This issue started Saturday. Patient was tested for covid, flu, and respiratory illness, all which came back negative. Patient was given a dose of Famotidine and Claritin in the ED. ED did nothing else for her. Patient informed that provider is out of office until 8/14. Patient inquiring what she should do to seek care.

## 2024-08-02 ENCOUNTER — OFFICE VISIT (OUTPATIENT)
Dept: PRIMARY CARE CLINIC | Age: 29
End: 2024-08-02

## 2024-08-02 VITALS
RESPIRATION RATE: 18 BRPM | OXYGEN SATURATION: 98 % | BODY MASS INDEX: 41.95 KG/M2 | TEMPERATURE: 98 F | HEART RATE: 111 BPM | HEIGHT: 70 IN | SYSTOLIC BLOOD PRESSURE: 100 MMHG | DIASTOLIC BLOOD PRESSURE: 64 MMHG | WEIGHT: 293 LBS
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
## 2024-08-02 RX ORDER — FENOFIBRATE 160 MG/1
TABLET ORAL DAILY
COMMUNITY

## 2024-08-02 NOTE — PROGRESS NOTES
extremities and upper extremeties   Erythema noted on upper eye lids    Neurological:      Mental Status: She is alert.         ASSESSMENT:  1. Bug bite, subsequent encounter       Stop zyrtec add xyzol and pepcid  Limit your exposure   PLAN:   1. Bug bite, subsequent encounter  Assessment & Plan:  Limit outside exposure  Wash lines and clothes  Spray area for bugs   May try xyzal instead of zyrtec         Follow up: Return if symptoms worsen or fail to improve.     Electronically signed by LEVI Muir CNP on 8/2/2024 at 12:39 PM.

## 2024-08-02 NOTE — ASSESSMENT & PLAN NOTE
Limit outside exposure  Wash lines and clothes  Spray area for bugs   May try xyzal instead of zyrtec

## 2024-08-16 ENCOUNTER — OFFICE VISIT (OUTPATIENT)
Dept: PRIMARY CARE CLINIC | Age: 29
End: 2024-08-16
Payer: COMMERCIAL

## 2024-08-16 VITALS
OXYGEN SATURATION: 97 % | SYSTOLIC BLOOD PRESSURE: 116 MMHG | HEART RATE: 94 BPM | BODY MASS INDEX: 47.61 KG/M2 | TEMPERATURE: 97.3 F | WEIGHT: 293 LBS | DIASTOLIC BLOOD PRESSURE: 80 MMHG | RESPIRATION RATE: 16 BRPM

## 2024-08-16 DIAGNOSIS — E11.9 TYPE 2 DIABETES MELLITUS WITHOUT COMPLICATION, WITHOUT LONG-TERM CURRENT USE OF INSULIN (HCC): ICD-10-CM

## 2024-08-16 DIAGNOSIS — B86 SCABIES: Primary | ICD-10-CM

## 2024-08-16 LAB
CREATININE URINE POCT: 100
MICROALBUMIN/CREAT 24H UR: 80 MG/DL
MICROALBUMIN/CREAT UR-RTO: ABNORMAL MG/G

## 2024-08-16 PROCEDURE — 82044 UR ALBUMIN SEMIQUANTITATIVE: CPT | Performed by: FAMILY MEDICINE

## 2024-08-16 PROCEDURE — 3051F HG A1C>EQUAL 7.0%<8.0%: CPT | Performed by: FAMILY MEDICINE

## 2024-08-16 PROCEDURE — 99213 OFFICE O/P EST LOW 20 MIN: CPT | Performed by: FAMILY MEDICINE

## 2024-08-16 RX ORDER — PERMETHRIN 50 MG/G
CREAM TOPICAL
Qty: 60 G | Refills: 1 | Status: SHIPPED | OUTPATIENT
Start: 2024-08-16

## 2024-08-16 NOTE — PROGRESS NOTES
PROGRESS NOTE  Date of Service:  8/16/2024    SUBJECTIVE:  Patient ID: Deana Mcginnis is a 28 y.o. female    ASSESSMENT  1. Scabies    2. Type 2 diabetes mellitus without complication, without long-term current use of insulin ([facility])        PLAN:   1. Scabies  -     permethrin (ELIMITE) 5 % cream; Apply topically from neck to soles of feet tonight. Wash off after at least 8 hours. Repeat in 1 week, Disp-60 g, R-1, Normal  2. Type 2 diabetes mellitus without complication, without long-term current use of insulin ([facility])  -     POCT microalbumin     We discussed that her rash is not typical for scabies.  But with continued and extensive symptoms treatment with Elimite and successful resolution of rash would be diagnostic.  Discussed medication use.  Follow-up if symptoms persist            HPI:     She reports continued rash and itching on her legs.  Has checked her home for bedbugs and does not see signs of this  Has been outside but does not recall repeated episodes of insect bites  She is concerned about possible scabies      Patient's medications, allergies, past medical, surgical, social and family histories were reviewed and updated as appropriate.     OBJECTIVE:  Vitals:    08/16/24 1302   BP: 116/80   Site: Left Upper Arm   Position: Sitting   Cuff Size: Large Adult   Pulse: 94   Resp: 16   Temp: 97.3 °F (36.3 °C)   SpO2: 97%   Weight: (!) 150.5 kg (331 lb 12.8 oz)      Body mass index is 47.61 kg/m².   Physical Exam  Constitutional:       Appearance: Normal appearance.   HENT:      Head: Normocephalic and atraumatic.   Skin:     Findings: Rash present.      Comments: Multiple discrete lesions along her upper thighs     Neurological:      General: No focal deficit present.      Mental Status: She is alert and oriented to person, place, and time.   Psychiatric:         Attention and Perception: Attention and perception normal.         Mood and Affect: Mood and affect normal.         Speech: Speech normal.

## 2024-08-26 ENCOUNTER — TELEPHONE (OUTPATIENT)
Dept: PRIMARY CARE CLINIC | Age: 29
End: 2024-08-26

## 2024-08-26 RX ORDER — GLIMEPIRIDE 4 MG/1
4 TABLET ORAL
Qty: 90 TABLET | Refills: 0 | Status: SHIPPED | OUTPATIENT
Start: 2024-08-26

## 2024-08-26 NOTE — TELEPHONE ENCOUNTER
Requested patient to call back to inform of medication change- also sent message to patient in [facility]

## 2024-08-26 NOTE — TELEPHONE ENCOUNTER
Restarted the glimiperide. She is due for follow up in September and would adjust medications further after that

## 2024-08-26 NOTE — TELEPHONE ENCOUNTER
Patient went to [facility] pharmacy to  her SITagliptin (JANUVIA) 100 MG tablet  . Patient was informed that medication is $150. Patient requesting alternative to [facility].

## 2024-08-27 ENCOUNTER — OFFICE VISIT (OUTPATIENT)
Age: 29
End: 2024-08-27

## 2024-08-27 VITALS
SYSTOLIC BLOOD PRESSURE: 128 MMHG | TEMPERATURE: 98.2 F | HEIGHT: 70 IN | OXYGEN SATURATION: 94 % | WEIGHT: 293 LBS | DIASTOLIC BLOOD PRESSURE: 78 MMHG | HEART RATE: 92 BPM | BODY MASS INDEX: 41.95 KG/M2

## 2024-08-27 DIAGNOSIS — R21 RASH AND NONSPECIFIC SKIN ERUPTION: ICD-10-CM

## 2024-08-27 DIAGNOSIS — L24.9 IRRITANT CONTACT DERMATITIS, UNSPECIFIED TRIGGER: Primary | ICD-10-CM

## 2024-08-27 RX ORDER — METHYLPREDNISOLONE 4 MG
TABLET, DOSE PACK ORAL
Qty: 21 TABLET | Refills: 0 | Status: SHIPPED | OUTPATIENT
Start: 2024-08-27 | End: 2024-09-02

## 2024-08-27 RX ORDER — HYDROXYZINE HYDROCHLORIDE 25 MG/1
25 TABLET, FILM COATED ORAL 4 TIMES DAILY PRN
Qty: 120 TABLET | Refills: 0 | Status: SHIPPED | OUTPATIENT
Start: 2024-08-27 | End: 2024-09-26

## 2024-08-27 RX ORDER — HYDROCORTISONE 25 MG/G
OINTMENT TOPICAL
Qty: 20 G | Refills: 1 | Status: SHIPPED | OUTPATIENT
Start: 2024-08-27 | End: 2024-09-03

## 2024-08-27 RX ORDER — TESTOSTERONE CYPIONATE 200 MG/ML
10 INJECTION INTRAMUSCULAR ONCE
Status: COMPLETED | OUTPATIENT
Start: 2024-08-27 | End: 2024-08-27

## 2024-08-27 RX ADMIN — TESTOSTERONE CYPIONATE 10 MG: 200 INJECTION INTRAMUSCULAR at 17:00

## 2024-08-27 ASSESSMENT — ENCOUNTER SYMPTOMS
NAUSEA: 1
RESPIRATORY NEGATIVE: 1
DIARRHEA: 1
VOMITING: 1
ABDOMINAL PAIN: 1
ALLERGIC/IMMUNOLOGIC NEGATIVE: 1

## 2024-08-27 NOTE — PROGRESS NOTES
Deana Mcginnis (:  1995) is a 28 y.o. female,Established patient, here for evaluation of the following chief complaint(s):  Rash (Rash on legs and face for two weeks. Areas are itchy and painful. Unknown cause of rash at this time. )         Assessment & Plan  Irritant contact dermatitis, unspecified trigger       Orders:    dexAMETHasone Sodium Phosphate injection 10 mg    methylPREDNISolone (MEDROL DOSEPACK) 4 MG tablet; Take by mouth.    Rash and nonspecific skin eruption       Orders:    hydrOXYzine HCl (ATARAX) 25 MG tablet; Take 1 tablet by mouth 4 times daily as needed for Itching    hydrocortisone 2.5 % ointment; Apply topically 2 times daily.      Pt received a steroid shot today to help stop the spread of the rash and start the oral steroid pack tomorrow and take as directed on the box.  Pt educated to use the hydrocortisone 2.5% ointment twice a day to the areas that itch as needed do not use past 14 days and do not use to face  Pt to use the hydroxyzine as needed for severe itching  Recommended to use soap for sensitive skin, avoid soaps with fragrances.  Pt can also use aveeno, eucerin, or vaseline to keep your skin moisturized.  Pt to not to scratch as that can break the skin and cause infection.   Pt to keep appt with dermatology and f/u with PCP.      No follow-ups on file.       Subjective   Pt presents to clinic with c/o rash for 2 weeks to both legs but nowhere else to body. Rash does itch, does not hurt, but does have clear \"ooze\" from some of them.  Has tried calamine, hydrocortisone, zyrtec, and xyzal, without relief  Heat makes symptoms worse, nothing makes it worse; heat makes rash worse and makes rash spread more and makes more nauseous  Associated symptoms headache, N/V/D, abdominal pain that started about 3 days ago, able to keep fluids, have not had much of an appetite  Ibuprofen taken for headache and did provide relief. Has not been around anyone that has been sick that she  knows of. Denies  States had chicken pox 2 years ago and was worried that this might be another case of chicken pox.      History provided by:  Patient      Review of Systems   Constitutional:  Positive for appetite change.   HENT: Negative.     Respiratory: Negative.     Cardiovascular: Negative.    Gastrointestinal:  Positive for abdominal pain, diarrhea, nausea and vomiting.   Musculoskeletal: Negative.    Skin:  Positive for rash.   Allergic/Immunologic: Negative.    Neurological:  Positive for headaches.          Objective   Physical Exam  Vitals reviewed.   Constitutional:       Appearance: Normal appearance.   HENT:      Head: Normocephalic and atraumatic.      Nose: Nose normal.   Eyes:      Extraocular Movements: Extraocular movements intact.      Pupils: Pupils are equal, round, and reactive to light.   Cardiovascular:      Rate and Rhythm: Normal rate and regular rhythm.      Heart sounds: Normal heart sounds.   Pulmonary:      Breath sounds: Normal breath sounds.   Abdominal:      General: Bowel sounds are normal. There is no distension.      Palpations: Abdomen is soft.      Tenderness: There is no abdominal tenderness.   Musculoskeletal:         General: Normal range of motion.      Cervical back: Normal range of motion.   Skin:     Findings: Erythema and rash present. Rash is not crusting or vesicular.             Comments: Pruritic, multiple maculopapular rashes to bilateral lower legs, no drainage, no bleeding, no warmth, no edema   Neurological:      Mental Status: She is alert and oriented to person, place, and time.                  An electronic signature was used to authenticate this note.    --Tameka Perez, LEVI - CNP

## 2024-08-27 NOTE — TELEPHONE ENCOUNTER
Requested patient to call back #2 to inform of medication change- also sent message to patient in [facility]

## 2024-08-27 NOTE — PATIENT INSTRUCTIONS
You received a steroid shot today to help stop the spread of the rash.  Start the oral steroid pack tomorrow and take as directed on the box when you get it from the pharmacy.  Use the hydrocortisone 2.5% ointment twice a day to the areas that itch as needed do not use past 14 days and do not use to face  Use the hydroxyzine as needed for severe itching  Use soap for sensitive skin, avoid soaps with fragrances.  You can also use aveeno, eucerin, or vaseline to keep your skin moisturized.  Try not to scratch as that can break the skin and cause infection.   Keep appt with dermatology and f/u with PCP.

## 2024-09-11 NOTE — TELEPHONE ENCOUNTER
Ambulatory Visit  Name: Jolynn Nicole      : 1981      MRN: [id number]  Encounter Provider: Massiel Thakur MD  Encounter Date: 2024   Encounter department: [facility]    Haro.          Acquired hypothyroidism  Follows endocrinologist. I do not have availability. Offer the other office and if not available should be seen at urgent care   "abuse  Counseled on smoking cessation.          Schizoaffective disorder, unspecified type (HCC)  Follows psychiatrist through [facility].          Screening for colon cancer         Encounter for screening mammogram for malignant neoplasm of breast    Orders:    Mammo screening bilateral w 3d and cad; Future    Screening for cervical cancer    Orders:    Ambulatory referral to Obstetrics / Gynecology; Future    Acute sinusitis, recurrence not specified, unspecified location    Orders:    predniSONE 20 mg tablet; Take 2 tablets (40 mg total) by mouth daily for 5 days    azithromycin (Zithromax) 250 mg tablet; Take 2 tablets (500 mg total) by mouth daily for 1 day, THEN 1 tablet (250 mg total) daily for 4 days.    Encounter to establish care            History of Present Illness     HPI      Review of Systems        Objective     /66   Pulse 84   Temp 97.9 °F (36.6 °C) (Tympanic)   Resp 20   Ht 5' 9\" (1.753 m)   Wt (!) 196 kg (431 lb)   SpO2 91%   BMI 63.65 kg/m²     Physical Exam  Constitutional:       General: She is not in acute distress.     Appearance: Normal appearance. She is normal weight. She is not ill-appearing, toxic-appearing or diaphoretic.   HENT:      Head: Normocephalic and atraumatic.      Right Ear: Tympanic membrane, ear canal and external ear normal. There is no impacted cerumen.      Left Ear: Tympanic membrane, ear canal and external ear normal. There is no impacted cerumen.      Nose: Nose normal.      Mouth/Throat:      Mouth: Mucous membranes are moist.      Pharynx: Oropharynx is clear. No oropharyngeal exudate or posterior oropharyngeal erythema.   Eyes:      General: No scleral icterus.        Right eye: No discharge.         Left eye: No discharge.      Extraocular Movements: Extraocular movements intact.      Conjunctiva/sclera: Conjunctivae normal.      Pupils: Pupils are equal, round, and reactive to light.   Cardiovascular:      Rate and Rhythm: Normal rate and regular " rhythm.      Pulses: Normal pulses.      Heart sounds: Normal heart sounds. No murmur heard.     No friction rub. No gallop.   Pulmonary:      Effort: Pulmonary effort is normal. No respiratory distress.      Breath sounds: Normal breath sounds. No stridor. No wheezing, rhonchi or rales.   Chest:      Chest wall: No tenderness.   Musculoskeletal:         General: Normal range of motion.   Skin:     General: Skin is warm and dry.   Neurological:      General: No focal deficit present.      Mental Status: She is alert and oriented to person, place, and time.

## 2024-10-08 ENCOUNTER — APPOINTMENT (OUTPATIENT)
Age: 29
End: 2024-10-08
Payer: COMMERCIAL

## 2024-10-08 ENCOUNTER — OFFICE VISIT (OUTPATIENT)
Dept: PRIMARY CARE CLINIC | Age: 29
End: 2024-10-08
Payer: COMMERCIAL

## 2024-10-08 ENCOUNTER — HOSPITAL ENCOUNTER (EMERGENCY)
Age: 29
Discharge: HOME | End: 2024-10-08
Attending: EMERGENCY MEDICINE
Payer: COMMERCIAL

## 2024-10-08 VITALS
TEMPERATURE: 98.3 F | DIASTOLIC BLOOD PRESSURE: 70 MMHG | BODY MASS INDEX: 41.95 KG/M2 | SYSTOLIC BLOOD PRESSURE: 126 MMHG | HEART RATE: 88 BPM | RESPIRATION RATE: 16 BRPM | HEIGHT: 70 IN | WEIGHT: 293 LBS | OXYGEN SATURATION: 100 %

## 2024-10-08 VITALS
RESPIRATION RATE: 18 BRPM | DIASTOLIC BLOOD PRESSURE: 84 MMHG | BODY MASS INDEX: 47.72 KG/M2 | HEART RATE: 102 BPM | OXYGEN SATURATION: 97 % | WEIGHT: 293 LBS | TEMPERATURE: 97.8 F | SYSTOLIC BLOOD PRESSURE: 114 MMHG

## 2024-10-08 DIAGNOSIS — L03.213 PRESEPTAL CELLULITIS: Primary | ICD-10-CM

## 2024-10-08 DIAGNOSIS — L03.213 CELLULITIS OF PERIORBITAL REGION OF BOTH EYES: Primary | ICD-10-CM

## 2024-10-08 LAB
ANION GAP SERPL CALCULATED.3IONS-SCNC: 12 MMOL/L (ref 3–16)
BASOPHILS # BLD: 0.02 K/UL (ref 0–0.2)
BASOPHILS NFR BLD: 0 %
BUN SERPL-MCNC: 8 MG/DL (ref 7–20)
CALCIUM SERPL-MCNC: 9.4 MG/DL (ref 8.3–10.6)
CHLORIDE SERPL-SCNC: 102 MMOL/L (ref 99–110)
CO2 SERPL-SCNC: 26 MMOL/L (ref 21–32)
CREAT SERPL-MCNC: 0.5 MG/DL (ref 0.5–1)
EOSINOPHIL # BLD: 0 K/UL (ref 0–0.6)
EOSINOPHILS RELATIVE PERCENT: 0 %
ERYTHROCYTE [DISTWIDTH] IN BLOOD BY AUTOMATED COUNT: 14.5 % (ref 12.4–15.4)
GFR, ESTIMATED: >90 ML/MIN/1.73M2
GLUCOSE SERPL-MCNC: 219 MG/DL (ref 70–99)
HCT VFR BLD AUTO: 43.1 % (ref 36–48)
HGB BLD-MCNC: 14.4 G/DL (ref 12–16)
IMM GRANULOCYTES # BLD AUTO: 0.03 K/UL (ref 0–0.5)
IMM GRANULOCYTES NFR BLD: 0 %
LYMPHOCYTES NFR BLD: 2.92 K/UL (ref 1–5.1)
LYMPHOCYTES RELATIVE PERCENT: 26 %
MCH RBC QN AUTO: 29.8 PG (ref 26–34)
MCHC RBC AUTO-ENTMCNC: 33.4 G/DL (ref 31–36)
MCV RBC AUTO: 89.2 FL (ref 80–100)
MONOCYTES NFR BLD: 0.42 K/UL (ref 0–1.3)
MONOCYTES NFR BLD: 4 %
NEUTROPHILS NFR BLD: 70 %
NEUTS SEG NFR BLD: 8.05 K/UL (ref 1.7–7.7)
PLATELET # BLD AUTO: 197 K/UL (ref 135–450)
PMV BLD AUTO: 10 FL
POTASSIUM SERPL-SCNC: 4.4 MMOL/L (ref 3.5–5.1)
RBC # BLD AUTO: 4.83 M/UL (ref 4–5.2)
SODIUM SERPL-SCNC: 140 MMOL/L (ref 136–145)
WBC OTHER # BLD: 11.4 K/UL (ref 4–11)

## 2024-10-08 PROCEDURE — 85025 COMPLETE CBC W/AUTO DIFF WBC: CPT

## 2024-10-08 PROCEDURE — 70481 CT ORBIT/EAR/FOSSA W/DYE: CPT

## 2024-10-08 PROCEDURE — 6360000002 HC RX W HCPCS: Performed by: EMERGENCY MEDICINE

## 2024-10-08 PROCEDURE — 80048 BASIC METABOLIC PNL TOTAL CA: CPT

## 2024-10-08 PROCEDURE — 6360000004 HC RX CONTRAST MEDICATION: Performed by: EMERGENCY MEDICINE

## 2024-10-08 PROCEDURE — 96365 THER/PROPH/DIAG IV INF INIT: CPT

## 2024-10-08 PROCEDURE — 99214 OFFICE O/P EST MOD 30 MIN: CPT | Performed by: FAMILY MEDICINE

## 2024-10-08 PROCEDURE — 99285 EMERGENCY DEPT VISIT HI MDM: CPT

## 2024-10-08 PROCEDURE — 87040 BLOOD CULTURE FOR BACTERIA: CPT

## 2024-10-08 RX ORDER — LEVOFLOXACIN 750 MG/1
750 TABLET, FILM COATED ORAL DAILY
Qty: 10 TABLET | Refills: 0 | Status: ON HOLD | OUTPATIENT
Start: 2024-10-08 | End: 2024-10-18

## 2024-10-08 RX ORDER — IOPAMIDOL 755 MG/ML
75 INJECTION, SOLUTION INTRAVASCULAR
Status: COMPLETED | OUTPATIENT
Start: 2024-10-08 | End: 2024-10-08

## 2024-10-08 RX ORDER — BUSPIRONE HYDROCHLORIDE 30 MG/1
45 TABLET ORAL NIGHTLY
Status: CANCELLED | OUTPATIENT
Start: 2024-10-08

## 2024-10-08 RX ORDER — LEVOFLOXACIN 5 MG/ML
500 INJECTION, SOLUTION INTRAVENOUS ONCE
Status: COMPLETED | OUTPATIENT
Start: 2024-10-08 | End: 2024-10-08

## 2024-10-08 RX ADMIN — LEVOFLOXACIN 500 MG: 500 INJECTION, SOLUTION INTRAVENOUS at 15:14

## 2024-10-08 RX ADMIN — IOPAMIDOL 75 ML: 755 INJECTION, SOLUTION INTRAVENOUS at 15:50

## 2024-10-08 ASSESSMENT — ENCOUNTER SYMPTOMS
EYE ITCHING: 0
EYE DISCHARGE: 0
GASTROINTESTINAL NEGATIVE: 1
EYE PAIN: 0

## 2024-10-08 ASSESSMENT — PATIENT HEALTH QUESTIONNAIRE - PHQ9
7. TROUBLE CONCENTRATING ON THINGS, SUCH AS READING THE NEWSPAPER OR WATCHING TELEVISION: NOT AT ALL
10. IF YOU CHECKED OFF ANY PROBLEMS, HOW DIFFICULT HAVE THESE PROBLEMS MADE IT FOR YOU TO DO YOUR WORK, TAKE CARE OF THINGS AT HOME, OR GET ALONG WITH OTHER PEOPLE: NOT DIFFICULT AT ALL
SUM OF ALL RESPONSES TO PHQ9 QUESTIONS 1 & 2: 0
SUM OF ALL RESPONSES TO PHQ QUESTIONS 1-9: 0
SUM OF ALL RESPONSES TO PHQ QUESTIONS 1-9: 0
9. THOUGHTS THAT YOU WOULD BE BETTER OFF DEAD, OR OF HURTING YOURSELF: NOT AT ALL
8. MOVING OR SPEAKING SO SLOWLY THAT OTHER PEOPLE COULD HAVE NOTICED. OR THE OPPOSITE, BEING SO FIGETY OR RESTLESS THAT YOU HAVE BEEN MOVING AROUND A LOT MORE THAN USUAL: NOT AT ALL
SUM OF ALL RESPONSES TO PHQ QUESTIONS 1-9: 0
SUM OF ALL RESPONSES TO PHQ QUESTIONS 1-9: 0
3. TROUBLE FALLING OR STAYING ASLEEP: NOT AT ALL
2. FEELING DOWN, DEPRESSED OR HOPELESS: NOT AT ALL
5. POOR APPETITE OR OVEREATING: NOT AT ALL
6. FEELING BAD ABOUT YOURSELF - OR THAT YOU ARE A FAILURE OR HAVE LET YOURSELF OR YOUR FAMILY DOWN: NOT AT ALL
1. LITTLE INTEREST OR PLEASURE IN DOING THINGS: NOT AT ALL
4. FEELING TIRED OR HAVING LITTLE ENERGY: NOT AT ALL

## 2024-10-08 ASSESSMENT — VISUAL ACUITY
OD: 20/25
OU: 20/15
OS: 20/25

## 2024-10-08 ASSESSMENT — PAIN DESCRIPTION - LOCATION: LOCATION: FACE

## 2024-10-08 ASSESSMENT — PAIN SCALES - GENERAL: PAINLEVEL_OUTOF10: 6

## 2024-10-08 ASSESSMENT — PAIN DESCRIPTION - PAIN TYPE: TYPE: ACUTE PAIN

## 2024-10-08 ASSESSMENT — PAIN - FUNCTIONAL ASSESSMENT
PAIN_FUNCTIONAL_ASSESSMENT: NONE - DENIES PAIN
PAIN_FUNCTIONAL_ASSESSMENT: 0-10

## 2024-10-08 ASSESSMENT — PAIN DESCRIPTION - DESCRIPTORS: DESCRIPTORS: ACHING

## 2024-10-08 NOTE — ED PROVIDER NOTES
visualized and preliminarily interpreted by the ED Provider with the below findings:   Interpretation per the Radiologist below, if available at the time of this note:  CT ORBITS W CONTRAST   Final Result      1.  Mild right-sided preseptal soft tissue thickening.  Consistent with superficial periorbital cellulitis.   2.  No intraconal fat stranding.  No definite abscess.   3.  Mild asymmetric prominence of the right lacrimal gland.  Possibly reactive, underlying inflammatory process versus neoplasm not excluded..      Electronically signed by Luly Melo DO               EKG: None    PROCEDURES none  Unless otherwise noted below, none     CRITICAL CARE TIME   I personally saw the patient and independently provided 0 minutes of non-concurrent critical care out of the total shared critical care time excluding separately billable procedures.        Vitals:    Vitals:    10/08/24 1433   BP: (!) 145/88   Pulse: 99   Resp: 16   Temp: 98.3 °F (36.8 °C)   TempSrc: Oral   SpO2: 100%   Weight: (!) 153.5 kg (338 lb 4.8 oz)   Height: 1.778 m (5' 10\")             Is this patient to be included in the SEP-1 Core Measure due to severe sepsis or septic shock?   No Exclusion criteria - the patient is NOT to be included for SEP-1 Core Measure due to: 2+ SIRS criteria are not met       CC/HPI Summary, DDx, ED Course, and Reassessment: This is a 29-year-old female type II diabetic on oral control presenting today with concerns for orbital cellulitis that is not demonstrated on CT.  Her CT scan is consistent with periorbital cellulitis and I reviewed these findings with her.  She also had no findings of DKA today.  Her primary care physician already prescribed Levaquin which she has to .  She received an initial dose intravenously here while her workup was in progress.  She has no visual difficulties and I am discharging her on outpatient management.       Patient was given the following medications:   Medications

## 2024-10-08 NOTE — PROGRESS NOTES
motion.      Conjunctiva/sclera:      Right eye: Right conjunctiva is not injected. No hemorrhage.     Left eye: Left conjunctiva is not injected. No hemorrhage.     Comments: Edematous right upper eyelid, + erythema  Erythema and tenderness at forehead, + warmth    Full ROM of right eye without difficulty   Neurological:      General: No focal deficit present.      Mental Status: She is alert and oriented to person, place, and time.   Psychiatric:         Attention and Perception: Attention and perception normal.         Mood and Affect: Mood and affect normal.         Speech: Speech normal.         Behavior: Behavior normal. Behavior is cooperative.         Thought Content: Thought content normal.         Cognition and Memory: Cognition and memory normal.         Judgment: Judgment normal.           1 acute illness that poses a threat to bodily function  Electronically signed by RAMIRO GOMEZ MD on 10/8/2024 at 5:09 PM.    Please note this chart was generated using dragon dictation software.  Although every effort was made to ensure the accuracy of this automated transcription, some errors in transcription may have occurred.

## 2024-10-08 NOTE — ED TRIAGE NOTES
Chief Complaint   Patient presents with    Facial Swelling     Pt c/o swelling to face and around right eye, states was seen at pcp this am and told to go to ER if pain worsens, states believes it may come from CPAP mask

## 2024-10-12 LAB
MICROORGANISM SPEC CULT: NORMAL
SERVICE CMNT-IMP: NORMAL
SPECIMEN DESCRIPTION: NORMAL

## 2024-10-13 ENCOUNTER — APPOINTMENT (OUTPATIENT)
Age: 29
DRG: 603 | End: 2024-10-13
Payer: COMMERCIAL

## 2024-10-13 ENCOUNTER — HOSPITAL ENCOUNTER (INPATIENT)
Age: 29
LOS: 2 days | Discharge: HOME | DRG: 603 | End: 2024-10-15
Attending: EMERGENCY MEDICINE | Admitting: INTERNAL MEDICINE
Payer: COMMERCIAL

## 2024-10-13 DIAGNOSIS — L03.213 PERIORBITAL CELLULITIS OF RIGHT EYE: Primary | ICD-10-CM

## 2024-10-13 DIAGNOSIS — F31.31 BIPOLAR AFFECTIVE DISORDER, CURRENTLY DEPRESSED, MILD (HCC): ICD-10-CM

## 2024-10-13 DIAGNOSIS — G47.33 OBSTRUCTIVE SLEEP APNEA: ICD-10-CM

## 2024-10-13 PROBLEM — Z88.1 ALLERGY TO MULTIPLE ANTIBIOTICS: Status: ACTIVE | Noted: 2024-10-13

## 2024-10-13 PROBLEM — E66.01 MORBID OBESITY WITH BMI OF 45.0-49.9, ADULT: Status: ACTIVE | Noted: 2024-10-13

## 2024-10-13 PROBLEM — R73.09 ELEVATED HEMOGLOBIN A1C: Status: ACTIVE | Noted: 2024-10-13

## 2024-10-13 PROBLEM — Z86.718 H/O DEEP VENOUS THROMBOSIS: Status: ACTIVE | Noted: 2024-10-13

## 2024-10-13 LAB
ALBUMIN SERPL-MCNC: 4.1 G/DL (ref 3.4–5)
ALBUMIN/GLOB SERPL: 1.2 {RATIO}
ALP SERPL-CCNC: 135 U/L (ref 40–129)
ALT SERPL-CCNC: 24 U/L (ref 10–40)
ANION GAP SERPL CALCULATED.3IONS-SCNC: 13 MMOL/L (ref 3–16)
ANION GAP SERPL CALCULATED.3IONS-SCNC: 14 MMOL/L (ref 3–16)
AST SERPL-CCNC: 22 U/L (ref 15–37)
BASOPHILS # BLD: 0.02 K/UL (ref 0–0.2)
BASOPHILS NFR BLD: 0 %
BILIRUB SERPL-MCNC: <0.2 MG/DL (ref 0–1)
BILIRUB UR QL STRIP: NEGATIVE
BUN SERPL-MCNC: 10 MG/DL (ref 7–20)
BUN SERPL-MCNC: 10 MG/DL (ref 7–20)
CALCIUM SERPL-MCNC: 9 MG/DL (ref 8.3–10.6)
CALCIUM SERPL-MCNC: 9.6 MG/DL (ref 8.3–10.6)
CHLORIDE SERPL-SCNC: 102 MMOL/L (ref 99–110)
CHLORIDE SERPL-SCNC: 98 MMOL/L (ref 99–110)
CLARITY UR: CLEAR
CO2 SERPL-SCNC: 22 MMOL/L (ref 21–32)
CO2 SERPL-SCNC: 23 MMOL/L (ref 21–32)
COLOR UR: YELLOW
COMMENT: NORMAL
CREAT SERPL-MCNC: 0.5 MG/DL (ref 0.5–1)
CREAT SERPL-MCNC: 0.6 MG/DL (ref 0.5–1)
EOSINOPHIL # BLD: 0.01 K/UL (ref 0–0.6)
EOSINOPHILS RELATIVE PERCENT: 0 %
ERYTHROCYTE [DISTWIDTH] IN BLOOD BY AUTOMATED COUNT: 14.3 % (ref 12.4–15.4)
FLUAV AG SPEC QL: NEGATIVE
FLUBV AG SPEC QL: NEGATIVE
GFR, ESTIMATED: >90 ML/MIN/1.73M2
GFR, ESTIMATED: >90 ML/MIN/1.73M2
GLUCOSE BLD-MCNC: 137 MG/DL (ref 70–99)
GLUCOSE BLD-MCNC: 139 MG/DL (ref 70–99)
GLUCOSE BLD-MCNC: 155 MG/DL (ref 70–99)
GLUCOSE BLD-MCNC: 163 MG/DL (ref 70–99)
GLUCOSE SERPL-MCNC: 127 MG/DL (ref 70–99)
GLUCOSE SERPL-MCNC: 187 MG/DL (ref 70–99)
GLUCOSE UR STRIP-MCNC: NEGATIVE MG/DL
HCG SERPL QL: NEGATIVE
HCT VFR BLD AUTO: 40.9 % (ref 36–48)
HGB BLD-MCNC: 13.9 G/DL (ref 12–16)
HGB UR QL STRIP.AUTO: NEGATIVE
IMM GRANULOCYTES # BLD AUTO: 0.1 K/UL (ref 0–0.5)
IMM GRANULOCYTES NFR BLD: 1 %
KETONES UR STRIP-MCNC: NEGATIVE MG/DL
LACTATE BLDV-SCNC: 1.6 MMOL/L (ref 0.4–1.9)
LACTATE BLDV-SCNC: 2.1 MMOL/L (ref 0.4–1.9)
LEUKOCYTE ESTERASE UR QL STRIP: NEGATIVE
LYMPHOCYTES NFR BLD: 2.53 K/UL (ref 1–5.1)
LYMPHOCYTES RELATIVE PERCENT: 23 %
MAGNESIUM SERPL-MCNC: 1.5 MG/DL (ref 1.8–2.4)
MAGNESIUM SERPL-MCNC: 1.6 MG/DL (ref 1.8–2.4)
MCH RBC QN AUTO: 30 PG (ref 26–34)
MCHC RBC AUTO-ENTMCNC: 34 G/DL (ref 31–36)
MCV RBC AUTO: 88.3 FL (ref 80–100)
MONOCYTES NFR BLD: 0.53 K/UL (ref 0–1.3)
MONOCYTES NFR BLD: 5 %
NEUTROPHILS NFR BLD: 71 %
NEUTS SEG NFR BLD: 7.97 K/UL (ref 1.7–7.7)
NITRITE UR QL STRIP: NEGATIVE
PH UR STRIP: 6 [PH] (ref 5–8)
PLATELET # BLD AUTO: 219 K/UL (ref 135–450)
PMV BLD AUTO: 10.2 FL
POTASSIUM SERPL-SCNC: 3.4 MMOL/L (ref 3.5–5.1)
POTASSIUM SERPL-SCNC: 3.5 MMOL/L (ref 3.5–5.1)
PROCALCITONIN SERPL-MCNC: <0.02 NG/ML (ref 0–0.15)
PROT SERPL-MCNC: 7.4 G/DL (ref 6.4–8.2)
PROT UR STRIP-MCNC: NEGATIVE MG/DL
RBC # BLD AUTO: 4.63 M/UL (ref 4–5.2)
SARS-COV-2 RDRP RESP QL NAA+PROBE: NOT DETECTED
SODIUM SERPL-SCNC: 135 MMOL/L (ref 136–145)
SODIUM SERPL-SCNC: 137 MMOL/L (ref 136–145)
SP GR UR STRIP: 1.01 (ref 1–1.03)
SPECIMEN DESCRIPTION: NORMAL
UROBILINOGEN UR STRIP-ACNC: 0.2 EU/DL (ref 0–1)
WBC OTHER # BLD: 11.2 K/UL (ref 4–11)

## 2024-10-13 PROCEDURE — 93005 ELECTROCARDIOGRAM TRACING: CPT | Performed by: EMERGENCY MEDICINE

## 2024-10-13 PROCEDURE — 87641 MR-STAPH DNA AMP PROBE: CPT

## 2024-10-13 PROCEDURE — 5A09357 ASSISTANCE WITH RESPIRATORY VENTILATION, LESS THAN 24 CONSECUTIVE HOURS, CONTINUOUS POSITIVE AIRWAY PRESSURE: ICD-10-PCS | Performed by: INTERNAL MEDICINE

## 2024-10-13 PROCEDURE — 36415 COLL VENOUS BLD VENIPUNCTURE: CPT

## 2024-10-13 PROCEDURE — 84145 PROCALCITONIN (PCT): CPT

## 2024-10-13 PROCEDURE — 6370000000 HC RX 637 (ALT 250 FOR IP): Performed by: INTERNAL MEDICINE

## 2024-10-13 PROCEDURE — 87635 SARS-COV-2 COVID-19 AMP PRB: CPT

## 2024-10-13 PROCEDURE — 87804 INFLUENZA ASSAY W/OPTIC: CPT

## 2024-10-13 PROCEDURE — 83735 ASSAY OF MAGNESIUM: CPT

## 2024-10-13 PROCEDURE — 2580000003 HC RX 258: Performed by: INTERNAL MEDICINE

## 2024-10-13 PROCEDURE — 6360000002 HC RX W HCPCS: Performed by: EMERGENCY MEDICINE

## 2024-10-13 PROCEDURE — 6370000000 HC RX 637 (ALT 250 FOR IP): Performed by: NURSE PRACTITIONER

## 2024-10-13 PROCEDURE — 2580000003 HC RX 258: Performed by: EMERGENCY MEDICINE

## 2024-10-13 PROCEDURE — 96374 THER/PROPH/DIAG INJ IV PUSH: CPT

## 2024-10-13 PROCEDURE — 80053 COMPREHEN METABOLIC PANEL: CPT

## 2024-10-13 PROCEDURE — 70450 CT HEAD/BRAIN W/O DYE: CPT

## 2024-10-13 PROCEDURE — 6360000002 HC RX W HCPCS: Performed by: INTERNAL MEDICINE

## 2024-10-13 PROCEDURE — 70498 CT ANGIOGRAPHY NECK: CPT

## 2024-10-13 PROCEDURE — 99223 1ST HOSP IP/OBS HIGH 75: CPT | Performed by: INTERNAL MEDICINE

## 2024-10-13 PROCEDURE — 87070 CULTURE OTHR SPECIMN AEROBIC: CPT

## 2024-10-13 PROCEDURE — 87205 SMEAR GRAM STAIN: CPT

## 2024-10-13 PROCEDURE — 83605 ASSAY OF LACTIC ACID: CPT

## 2024-10-13 PROCEDURE — 84703 CHORIONIC GONADOTROPIN ASSAY: CPT

## 2024-10-13 PROCEDURE — 81003 URINALYSIS AUTO W/O SCOPE: CPT

## 2024-10-13 PROCEDURE — 87040 BLOOD CULTURE FOR BACTERIA: CPT

## 2024-10-13 PROCEDURE — 82962 GLUCOSE BLOOD TEST: CPT

## 2024-10-13 PROCEDURE — 96365 THER/PROPH/DIAG IV INF INIT: CPT

## 2024-10-13 PROCEDURE — 94660 CPAP INITIATION&MGMT: CPT

## 2024-10-13 PROCEDURE — 80048 BASIC METABOLIC PNL TOTAL CA: CPT

## 2024-10-13 PROCEDURE — 99285 EMERGENCY DEPT VISIT HI MDM: CPT

## 2024-10-13 PROCEDURE — 71045 X-RAY EXAM CHEST 1 VIEW: CPT

## 2024-10-13 PROCEDURE — 2060000000 HC ICU INTERMEDIATE R&B

## 2024-10-13 PROCEDURE — 85025 COMPLETE CBC W/AUTO DIFF WBC: CPT

## 2024-10-13 PROCEDURE — 6360000004 HC RX CONTRAST MEDICATION: Performed by: EMERGENCY MEDICINE

## 2024-10-13 PROCEDURE — 83036 HEMOGLOBIN GLYCOSYLATED A1C: CPT

## 2024-10-13 RX ORDER — LACTOBACILLUS RHAMNOSUS GG 10B CELL
1 CAPSULE ORAL
Status: DISCONTINUED | OUTPATIENT
Start: 2024-10-13 | End: 2024-10-15 | Stop reason: HOSPADM

## 2024-10-13 RX ORDER — DEXTROSE MONOHYDRATE 100 MG/ML
INJECTION, SOLUTION INTRAVENOUS CONTINUOUS PRN
Status: DISCONTINUED | OUTPATIENT
Start: 2024-10-13 | End: 2024-10-15 | Stop reason: HOSPADM

## 2024-10-13 RX ORDER — IOPAMIDOL 755 MG/ML
100 INJECTION, SOLUTION INTRAVASCULAR
Status: COMPLETED | OUTPATIENT
Start: 2024-10-13 | End: 2024-10-13

## 2024-10-13 RX ORDER — SODIUM CHLORIDE 9 MG/ML
INJECTION, SOLUTION INTRAVENOUS PRN
Status: DISCONTINUED | OUTPATIENT
Start: 2024-10-13 | End: 2024-10-15 | Stop reason: HOSPADM

## 2024-10-13 RX ORDER — SODIUM CHLORIDE 0.9 % (FLUSH) 0.9 %
5-40 SYRINGE (ML) INJECTION EVERY 12 HOURS SCHEDULED
Status: DISCONTINUED | OUTPATIENT
Start: 2024-10-13 | End: 2024-10-15 | Stop reason: HOSPADM

## 2024-10-13 RX ORDER — MAGNESIUM SULFATE IN WATER 40 MG/ML
2000 INJECTION, SOLUTION INTRAVENOUS ONCE
Status: COMPLETED | OUTPATIENT
Start: 2024-10-13 | End: 2024-10-13

## 2024-10-13 RX ORDER — KETOROLAC TROMETHAMINE 15 MG/ML
15 INJECTION, SOLUTION INTRAMUSCULAR; INTRAVENOUS ONCE
Status: COMPLETED | OUTPATIENT
Start: 2024-10-13 | End: 2024-10-13

## 2024-10-13 RX ORDER — OXCARBAZEPINE 300 MG/1
600 TABLET, FILM COATED ORAL 2 TIMES DAILY
Status: DISCONTINUED | OUTPATIENT
Start: 2024-10-13 | End: 2024-10-13

## 2024-10-13 RX ORDER — ATORVASTATIN CALCIUM 40 MG/1
40 TABLET, FILM COATED ORAL
Status: DISCONTINUED | OUTPATIENT
Start: 2024-10-14 | End: 2024-10-13

## 2024-10-13 RX ORDER — POTASSIUM CHLORIDE 7.45 MG/ML
10 INJECTION INTRAVENOUS PRN
Status: DISCONTINUED | OUTPATIENT
Start: 2024-10-13 | End: 2024-10-15 | Stop reason: HOSPADM

## 2024-10-13 RX ORDER — ACETAMINOPHEN 325 MG/1
650 TABLET ORAL EVERY 6 HOURS PRN
Status: DISCONTINUED | OUTPATIENT
Start: 2024-10-13 | End: 2024-10-15 | Stop reason: HOSPADM

## 2024-10-13 RX ORDER — ATORVASTATIN CALCIUM 40 MG/1
40 TABLET, FILM COATED ORAL DAILY
Status: DISCONTINUED | OUTPATIENT
Start: 2024-10-13 | End: 2024-10-13

## 2024-10-13 RX ORDER — CLINDAMYCIN PHOSPHATE 600 MG/50ML
600 INJECTION, SOLUTION INTRAVENOUS ONCE
Status: COMPLETED | OUTPATIENT
Start: 2024-10-13 | End: 2024-10-13

## 2024-10-13 RX ORDER — MAGNESIUM SULFATE IN WATER 40 MG/ML
2000 INJECTION, SOLUTION INTRAVENOUS PRN
Status: DISCONTINUED | OUTPATIENT
Start: 2024-10-13 | End: 2024-10-15 | Stop reason: HOSPADM

## 2024-10-13 RX ORDER — SERTRALINE HYDROCHLORIDE 25 MG/1
25 TABLET, FILM COATED ORAL DAILY
Status: DISCONTINUED | OUTPATIENT
Start: 2024-10-13 | End: 2024-10-13

## 2024-10-13 RX ORDER — ARIPIPRAZOLE 10 MG/1
25 TABLET ORAL NIGHTLY
Status: DISCONTINUED | OUTPATIENT
Start: 2024-10-13 | End: 2024-10-15 | Stop reason: HOSPADM

## 2024-10-13 RX ORDER — CETIRIZINE HYDROCHLORIDE 10 MG/1
10 TABLET ORAL DAILY
Status: DISCONTINUED | OUTPATIENT
Start: 2024-10-13 | End: 2024-10-15 | Stop reason: HOSPADM

## 2024-10-13 RX ORDER — SERTRALINE HYDROCHLORIDE 25 MG/1
50 TABLET, FILM COATED ORAL NIGHTLY
Status: DISCONTINUED | OUTPATIENT
Start: 2024-10-13 | End: 2024-10-13

## 2024-10-13 RX ORDER — INSULIN GLARGINE 100 [IU]/ML
15 INJECTION, SOLUTION SUBCUTANEOUS DAILY
Status: DISCONTINUED | OUTPATIENT
Start: 2024-10-13 | End: 2024-10-15 | Stop reason: HOSPADM

## 2024-10-13 RX ORDER — KETOROLAC TROMETHAMINE 30 MG/ML
30 INJECTION, SOLUTION INTRAMUSCULAR; INTRAVENOUS EVERY 6 HOURS PRN
Status: DISCONTINUED | OUTPATIENT
Start: 2024-10-13 | End: 2024-10-15 | Stop reason: HOSPADM

## 2024-10-13 RX ORDER — SERTRALINE HYDROCHLORIDE 25 MG/1
75 TABLET, FILM COATED ORAL
Status: DISCONTINUED | OUTPATIENT
Start: 2024-10-14 | End: 2024-10-15 | Stop reason: HOSPADM

## 2024-10-13 RX ORDER — DULOXETIN HYDROCHLORIDE 30 MG/1
120 CAPSULE, DELAYED RELEASE ORAL NIGHTLY
Status: DISCONTINUED | OUTPATIENT
Start: 2024-10-13 | End: 2024-10-15 | Stop reason: HOSPADM

## 2024-10-13 RX ORDER — CLINDAMYCIN PHOSPHATE 600 MG/50ML
600 INJECTION, SOLUTION INTRAVENOUS EVERY 8 HOURS
Status: DISCONTINUED | OUTPATIENT
Start: 2024-10-13 | End: 2024-10-15 | Stop reason: HOSPADM

## 2024-10-13 RX ORDER — GLUCAGON 1 MG/ML
1 KIT INJECTION PRN
Status: DISCONTINUED | OUTPATIENT
Start: 2024-10-13 | End: 2024-10-15 | Stop reason: HOSPADM

## 2024-10-13 RX ORDER — INSULIN LISPRO 100 [IU]/ML
0-8 INJECTION, SOLUTION INTRAVENOUS; SUBCUTANEOUS
Status: DISCONTINUED | OUTPATIENT
Start: 2024-10-13 | End: 2024-10-15 | Stop reason: SDUPTHER

## 2024-10-13 RX ORDER — ONDANSETRON 2 MG/ML
4 INJECTION INTRAMUSCULAR; INTRAVENOUS EVERY 6 HOURS PRN
Status: DISCONTINUED | OUTPATIENT
Start: 2024-10-13 | End: 2024-10-15 | Stop reason: HOSPADM

## 2024-10-13 RX ORDER — OXCARBAZEPINE 300 MG/1
1800 TABLET, FILM COATED ORAL
Status: DISCONTINUED | OUTPATIENT
Start: 2024-10-13 | End: 2024-10-15 | Stop reason: HOSPADM

## 2024-10-13 RX ORDER — POTASSIUM CHLORIDE 1500 MG/1
40 TABLET, EXTENDED RELEASE ORAL PRN
Status: DISCONTINUED | OUTPATIENT
Start: 2024-10-13 | End: 2024-10-15 | Stop reason: HOSPADM

## 2024-10-13 RX ORDER — ENOXAPARIN SODIUM 100 MG/ML
40 INJECTION SUBCUTANEOUS 2 TIMES DAILY
Status: DISCONTINUED | OUTPATIENT
Start: 2024-10-13 | End: 2024-10-15 | Stop reason: HOSPADM

## 2024-10-13 RX ORDER — 0.9 % SODIUM CHLORIDE 0.9 %
1000 INTRAVENOUS SOLUTION INTRAVENOUS ONCE
Status: COMPLETED | OUTPATIENT
Start: 2024-10-13 | End: 2024-10-13

## 2024-10-13 RX ORDER — BUSPIRONE HYDROCHLORIDE 15 MG/1
45 TABLET ORAL NIGHTLY
Status: DISCONTINUED | OUTPATIENT
Start: 2024-10-13 | End: 2024-10-15 | Stop reason: HOSPADM

## 2024-10-13 RX ORDER — OXCARBAZEPINE 150 MG/1
600 TABLET, FILM COATED ORAL
Status: DISCONTINUED | OUTPATIENT
Start: 2024-10-14 | End: 2024-10-13

## 2024-10-13 RX ORDER — ONDANSETRON 4 MG/1
4 TABLET, ORALLY DISINTEGRATING ORAL EVERY 8 HOURS PRN
Status: DISCONTINUED | OUTPATIENT
Start: 2024-10-13 | End: 2024-10-15 | Stop reason: HOSPADM

## 2024-10-13 RX ORDER — PANTOPRAZOLE SODIUM 40 MG/1
40 TABLET, DELAYED RELEASE ORAL
Status: DISCONTINUED | OUTPATIENT
Start: 2024-10-13 | End: 2024-10-15 | Stop reason: HOSPADM

## 2024-10-13 RX ORDER — POTASSIUM CHLORIDE 1500 MG/1
40 TABLET, EXTENDED RELEASE ORAL ONCE
Status: COMPLETED | OUTPATIENT
Start: 2024-10-13 | End: 2024-10-13

## 2024-10-13 RX ORDER — SODIUM CHLORIDE 0.9 % (FLUSH) 0.9 %
5-40 SYRINGE (ML) INJECTION PRN
Status: DISCONTINUED | OUTPATIENT
Start: 2024-10-13 | End: 2024-10-15 | Stop reason: HOSPADM

## 2024-10-13 RX ORDER — ACETAMINOPHEN 650 MG/1
650 SUPPOSITORY RECTAL EVERY 6 HOURS PRN
Status: DISCONTINUED | OUTPATIENT
Start: 2024-10-13 | End: 2024-10-15 | Stop reason: HOSPADM

## 2024-10-13 RX ORDER — POLYETHYLENE GLYCOL 3350 17 G/17G
17 POWDER, FOR SOLUTION ORAL DAILY PRN
Status: DISCONTINUED | OUTPATIENT
Start: 2024-10-13 | End: 2024-10-15 | Stop reason: HOSPADM

## 2024-10-13 RX ADMIN — PANTOPRAZOLE SODIUM 40 MG: 40 TABLET, DELAYED RELEASE ORAL at 06:47

## 2024-10-13 RX ADMIN — ARIPIPRAZOLE 25 MG: 10 TABLET ORAL at 20:24

## 2024-10-13 RX ADMIN — OXCARBAZEPINE 1800 MG: 300 TABLET, FILM COATED ORAL at 21:16

## 2024-10-13 RX ADMIN — KETOROLAC TROMETHAMINE 15 MG: 15 INJECTION, SOLUTION INTRAMUSCULAR; INTRAVENOUS at 02:33

## 2024-10-13 RX ADMIN — CLINDAMYCIN PHOSPHATE 600 MG: 600 INJECTION, SOLUTION INTRAVENOUS at 01:26

## 2024-10-13 RX ADMIN — DAPTOMYCIN 400 MG: 500 INJECTION, POWDER, LYOPHILIZED, FOR SOLUTION INTRAVENOUS at 15:58

## 2024-10-13 RX ADMIN — Medication 10 ML: at 09:28

## 2024-10-13 RX ADMIN — CLINDAMYCIN PHOSPHATE 600 MG: 600 INJECTION, SOLUTION INTRAVENOUS at 21:24

## 2024-10-13 RX ADMIN — Medication 1 CAPSULE: at 09:23

## 2024-10-13 RX ADMIN — ENOXAPARIN SODIUM 40 MG: 100 INJECTION SUBCUTANEOUS at 09:24

## 2024-10-13 RX ADMIN — BUSPIRONE HYDROCHLORIDE 45 MG: 15 TABLET ORAL at 20:24

## 2024-10-13 RX ADMIN — SODIUM CHLORIDE 1000 ML: 9 INJECTION, SOLUTION INTRAVENOUS at 00:57

## 2024-10-13 RX ADMIN — CLINDAMYCIN PHOSPHATE 600 MG: 600 INJECTION, SOLUTION INTRAVENOUS at 09:42

## 2024-10-13 RX ADMIN — IOPAMIDOL 100 ML: 755 INJECTION, SOLUTION INTRAVENOUS at 01:21

## 2024-10-13 RX ADMIN — POTASSIUM CHLORIDE 40 MEQ: 1500 TABLET, EXTENDED RELEASE ORAL at 06:47

## 2024-10-13 RX ADMIN — CETIRIZINE HYDROCHLORIDE 10 MG: 10 TABLET, FILM COATED ORAL at 09:23

## 2024-10-13 RX ADMIN — INSULIN GLARGINE 15 UNITS: 100 INJECTION, SOLUTION SUBCUTANEOUS at 09:20

## 2024-10-13 RX ADMIN — MAGNESIUM SULFATE HEPTAHYDRATE 2000 MG: 40 INJECTION, SOLUTION INTRAVENOUS at 06:50

## 2024-10-13 RX ADMIN — DULOXETINE HYDROCHLORIDE 120 MG: 30 CAPSULE, DELAYED RELEASE ORAL at 20:24

## 2024-10-13 RX ADMIN — ENOXAPARIN SODIUM 40 MG: 100 INJECTION SUBCUTANEOUS at 20:24

## 2024-10-13 ASSESSMENT — PAIN - FUNCTIONAL ASSESSMENT
PAIN_FUNCTIONAL_ASSESSMENT: 0-10
PAIN_FUNCTIONAL_ASSESSMENT: ACTIVITIES ARE NOT PREVENTED

## 2024-10-13 ASSESSMENT — PAIN DESCRIPTION - DESCRIPTORS
DESCRIPTORS: ACHING
DESCRIPTORS: ACHING

## 2024-10-13 ASSESSMENT — PAIN DESCRIPTION - ONSET: ONSET: ON-GOING

## 2024-10-13 ASSESSMENT — PAIN DESCRIPTION - LOCATION
LOCATION: NECK;HEAD;EYE
LOCATION: FACE;EYE

## 2024-10-13 ASSESSMENT — PAIN SCALES - GENERAL
PAINLEVEL_OUTOF10: 5
PAINLEVEL_OUTOF10: 0
PAINLEVEL_OUTOF10: 0
PAINLEVEL_OUTOF10: 8

## 2024-10-13 ASSESSMENT — PAIN DESCRIPTION - FREQUENCY: FREQUENCY: CONTINUOUS

## 2024-10-13 ASSESSMENT — PAIN DESCRIPTION - PAIN TYPE
TYPE: ACUTE PAIN
TYPE: ACUTE PAIN

## 2024-10-13 ASSESSMENT — PAIN DESCRIPTION - ORIENTATION
ORIENTATION: RIGHT;ANTERIOR;POSTERIOR
ORIENTATION: RIGHT

## 2024-10-13 ASSESSMENT — PAIN SCALES - WONG BAKER: WONGBAKER_NUMERICALRESPONSE: HURTS A LITTLE BIT

## 2024-10-13 NOTE — ED TRIAGE NOTES
Patient presents ambulatory to room 07 w/ c/o RIGHT eye pain, back pain, nasal congestion, and general unwell feeling worsening over the last 5 days. Patient seen in ER for periorbital cellutilits treated with 5 days of Levaquin 750mg once daily with progression of symptoms noted.

## 2024-10-13 NOTE — ED NOTES
ED to Inpatient Handoff SBAR    Patient Name: Deana Mcginnis   :  1995  29 y.o.   MRN:  [id number]  Preferred Name  Deana  ED Room #:    Family/Caregiver Present no     Chief Complaint Eye Pain, Nasal Congestion, and Back Pain       Restraints no   Sitter no   Sepsis Risk Score    Isolation No active isolations   Fall Risk Assessment Presents to emergency department  because of falls (Syncope, seizure, or loss of consciousness): No, Age > 70: No, Altered Mental Status, Intoxication with alcohol or substance confusion (Disorientation, impaired judgment, poor safety awaremess, or inability to follow instructions): No, Impaired Mobility: Ambulates or transfers with assistive devices or assistance; Unable to ambulate or transer.: No, Nursing Judgement: Yes     Situation  Code Status: Prior No additional code details.    Allergies: Amoxicillin, Benzonatate, Cephalexin, Loracarbef, Dilaudid [hydromorphone hcl], Flexeril [cyclobenzaprine], Augmentin [amoxicillin-pot clavulanate], and Minocycline  Weight: Patient Vitals for the past 96 hrs (Last 3 readings):   Weight   10/13/24 0009 (!) 153.9 kg (339 lb 4.8 oz)     Arrived from: home  Hospital Problem/Diagnosis:  Active Problems:    * No active hospital problems. *  Resolved Problems:    * No resolved hospital problems. *    Imaging:   XR CHEST PORTABLE   Final Result      No acute radiographic abnormality of the chest.      Electronically signed by Venkatesh Zeng MD      CTA Head Neck W/Contrast   Final Result      CTA Head:   No arterial steno-occlusive disease or aneurysm.      CTA Neck:   No arterial steno-occlusive disease or dissection.      Electronically signed by Venkatesh Zeng MD      CT Head W/O Contrast   Final Result      No acute intracranial hemorrhage or mass effect.         Mild residual right periorbital soft tissue swelling.      Electronically signed by Venkatesh Zeng MD        Abnormal labs:   Abnormal Labs Reviewed   CBC

## 2024-10-13 NOTE — PROGRESS NOTES
4 Eyes Admission Assessment     I agree as the admission nurse that 2 RN's have performed a thorough Head to Toe Skin Assessment on the patient. ALL assessment sites listed below have been assessed on admission.       Areas assessed by both nurses: ***  [x]   Head, Face, and Ears   [x]   Shoulders, Back, and Chest  [x]   Arms, Elbows, and Hands   [x]   Coccyx, Sacrum, and Ischum  [x]   Legs, Feet, and Heels        Does the Patient have Skin Breakdown?  No         Corey Prevention initiated:  No   Wound Care Orders initiated:  No      [facility] nurse consulted for Pressure Injury (Stage 3,4, Unstageable, DTI, NWPT, and Complex wounds):  No      Nurse 1 eSignature: Electronically signed by Jaja Stoner RN on 10/13/24 at 6:07 AM EDT    **SHARE this note so that the co-signing nurse is able to place an eSignature**    Nurse 2 eSignature: Electronically signed by Jaja Stoner RN on 10/13/24 at 6:07 AM EDT

## 2024-10-13 NOTE — PROGRESS NOTES
Hospitalist Progress Note      PCP: Ariela Cooper MD    Date of Admission: 10/13/2024    Chief Complaint:  Right eye pain and swelling       Hospital Course: 29 y.o. female with PMHx significant for morbid obesity, WILFREDO, asthma, diabetes mellitus, who presented to ED with a complaint of right eye pain and swelling.  Patient presented to ED 4 days ago with a complaint of worsening swelling of the eye and pain in addition to headache and nasal congestion.  Patient had a CT of the orbit which showed evidence of preseptal and possible periorbital cellulitis.  Patient declined admission and was given p.o. Levaquin.  Patient started on p.o. Levaquin following her discharge from ED and took it at home yesterday without improvement.  Patient continued to have worsening symptoms which prompted her to come to ED for further evaluation. Pt notes she has to wear her cpap mask very tight to prevent air leaks, and that it causes swelling under her eyes.  Pt admitted for further workup and treatment.     Subjective: Pt laying in bed. Right eye with drainage and swelling. Redness around both eyes, worse under the eyes. No increased warmth, although pt states she feels like it is hot. Mom at bedside. Pt denies having a stye, wound, or scratching the eye. Denies cp sob palpitations, abd pain, n/v. Reviewed POC, denies needs.     Assessment/Plan:    Right periorbital and preseptal cellulitis, failed outpt antibiotic therapy  -continue clindamycin  -ID consult  -obtain culture of right eye drainage  -add probiotic     NIDDM  -fsbs achs, ssc lispro    Hypokalemia  Hypomagnesium  -replacement ordered  -bmp, mg in am    WILFREDO on cpap  -pt needs new mask that fits properly. Pt would like referral to local sleep specialist, as hers is located in [place]. Referral placed to [facility] sleep medicine.     Asthma, no exacerbation  -stable    Morbid obesity  -encourage lifestyle changes     Active Hospital Problems    Diagnosis     Periorbital

## 2024-10-13 NOTE — PROGRESS NOTES
4 Eyes Admission Assessment     I agree as the admission nurse that 2 RN's have performed a thorough Head to Toe Skin Assessment on the patient. ALL assessment sites listed below have been assessed on admission.       Areas assessed by both nurses:   [x]   Head, Face, and Ears   [x]   Shoulders, Back, and Chest  [x]   Arms, Elbows, and Hands   [x]   Coccyx, Sacrum, and Ischum  [x]   Legs, Feet, and Heels        Does the Patient have Skin Breakdown?  No         Corey Prevention initiated:  No   Wound Care Orders initiated:  No      [facility] nurse consulted for Pressure Injury (Stage 3,4, Unstageable, DTI, NWPT, and Complex wounds):  No      Nurse 1 eSignature: Electronically signed by Jaja Stoner RN on 10/13/24 at 6:18 AM EDT    **SHARE this note so that the co-signing nurse is able to place an eSignature**    Nurse 2 eSignature: Electronically signed by Dima Luther RN on 10/13/24 at 6:23 AM EDT

## 2024-10-13 NOTE — H&P
[facility] Medicine History & Physical      Date of Admission: 10/13/2024    Date of Service:  Pt seen/examined on 10/13/24     [x]Admitted to Inpatient with expected LOS greater than two midnights due to medical therapy.  []Placed in Observation status.    Chief Admission Complaint: Right eye pain and swelling    Presenting Admission History:      29 y.o. female with PMHx significant for morbid obesity, WILFREDO, asthma, diabetes mellitus, who presented to ED with a complaint of right eye pain and swelling.  Patient presented to ED 4 days ago with a complaint of worsening swelling of the eye and pain in addition to headache and nasal congestion.  Patient had a CT of the orbit which showed evidence of preseptal and possible periorbital cellulitis.  Patient declined admission and was given p.o. Levaquin.  Patient started on p.o. Levaquin following her discharge from ED and took it at home yesterday without improvement.  Patient continued to have worsening symptoms which prompted her to come to ED for further evaluation.  Patient currently denies any other complaint at this time.    ROS:     Review of 10 systems is negative except what is outlined in HPI.     Assessment:  Right preseptal/periorbital cellulitis, failed outpatient antibiotic therapy.  Type 2 diabetes mellitus, not on long-term use of insulin.  Morbid obesity, Body mass index is 48.31 kg/m².   WILFREDO on CPAP.  Intermittent asthma without exacerbation.  Hypokalemia and hypomagnesemia.  Mood disorder/generalized anxiety disorder.      Plan:    Patient admitted under inpatient status.  Patient has multiple drug allergies, for now start IV clindamycin.  Infectious disease consultation for recommendation regarding antibiotics.  Continue home meds as appropriate.  Potassium and magnesium replacements.  As needed bronchodilators.  DVT prophylaxis SQ Lovenox    Physical Exam Performed:      /65   Pulse 93   Temp 98.2 °F (36.8 °C) (Oral)   Resp 16   Ht 1.778 m

## 2024-10-13 NOTE — CONSULTS
pantoprazole  40 mg Oral QAM AC    sodium chloride flush  5-40 mL IntraVENous 2 times per day    enoxaparin  40 mg SubCUTAneous BID    insulin lispro  0-8 Units SubCUTAneous 4x Daily AC & HS    insulin glargine  15 Units SubCUTAneous Daily    clindamycin (CLEOCIN) IV  600 mg IntraVENous Q8H    lactobacillus  1 capsule Oral Daily with breakfast    [START ON 10/14/2024] sertraline  75 mg Oral QHS    [START ON 10/14/2024] atorvastatin  40 mg Oral QHS    [START ON 10/14/2024] OXcarbazepine  600 mg Oral QHS       Continuous Infusions:   sodium chloride      dextrose         PRN Meds:  sodium chloride flush, sodium chloride, potassium chloride **OR** potassium alternative oral replacement **OR** potassium chloride, magnesium sulfate, ondansetron **OR** ondansetron, polyethylene glycol, acetaminophen **OR** acetaminophen, glucose, dextrose bolus **OR** dextrose bolus, glucagon (rDNA), dextrose, ketorolac    Imaging:   XR CHEST PORTABLE   Final Result      No acute radiographic abnormality of the chest.      Electronically signed by Venkatesh Zeng MD      CTA Head Neck W/Contrast   Final Result      CTA Head:   No arterial steno-occlusive disease or aneurysm.      CTA Neck:   No arterial steno-occlusive disease or dissection.      Electronically signed by Venkatesh Zeng MD      CT Head W/O Contrast   Final Result      No acute intracranial hemorrhage or mass effect.         Mild residual right periorbital soft tissue swelling.      Electronically signed by Venkatesh Zeng MD          All pertinent images and reports for the current Hospitalization were reviewed by me.    IMPRESSION:    Patient Active Problem List   Diagnosis Code    KARLIE (generalized anxiety disorder) F41.1    Obstructive sleep apnea G47.33    POTS (postural orthostatic tachycardia syndrome) G90.A    Positive CAESAR (antinuclear antibody) R76.8    Borderline personality disorder (HCC) F60.3    Bipolar disorder (HCC) F31.9    Type 2 diabetes

## 2024-10-13 NOTE — ED PROVIDER NOTES
[facility], [facility]    Transportation   Physical Activity: Unknown (6/3/2024)    Exercise Vital Sign     Days of Exercise per Week: Patient declined   Recent Concern: Physical Activity - Inactive (5/20/2024)    Exercise Vital Sign     Days of Exercise per Week: 0 days     Minutes of Exercise per Session: 0 min    Received from [facility], [facility]    Interpersonal Safety    Received from [facility], [facility]    Housing/Utilities       SCREENINGS         Royer Coma Scale  Eye Opening: Spontaneous  Best Verbal Response: Oriented  Best Motor Response: Obeys commands  Marion Coma Scale Score: 15                     CIWA Assessment  BP: 118/65  Pulse: 99                 PHYSICAL EXAM    (up to 7 for level 4, 8 or more for level 5)     ED Triage Vitals [10/13/24 0009]   BP Systolic BP Percentile Diastolic BP Percentile Temp Temp Source Pulse Respirations SpO2   137/80 -- -- 98.2 °F (36.8 °C) Oral (!) 105 20 97 %      Height Weight - Scale         1.778 m (5' 10\") (!) 153.9 kg (339 lb 4.8 oz)             GEN: Well nourished, well developed, no acute distress  HEAD: Normocephalic, atraumatic.  No step-offs or deformities  EYES: Pupils equally round and reactive to light.  Extraocular movements intact.  Anicteric sclera.  Swelling to the right eye lid with redness.  No proptosis  ENT: No nasal discharge but audible nasal congestion.  No visible trauma.  Pink moist mucous membranes..  No lip, throat, or tongue swelling  NECK: Painful range of motion with limited turning to the right.  Diffuse tenderness but no lymphadenopathy..  Trachea midline.  CHEST: No visible deformity  HEART: Regular rate and rhythm.  No murmurs, gallops, rubs  LUNGS: Clear to auscultation bilaterally.  No wheezes, rhonchi, or rubs  ABDOMEN: Soft, nontender, nondistended.  Negative Mart sign.  No

## 2024-10-13 NOTE — PLAN OF CARE
Problem: Chronic Conditions and Co-morbidities  Goal: Patient's chronic conditions and co-morbidity symptoms are monitored and maintained or improved  10/13/2024 1116 by Marylin Espinosa RN  Outcome: Progressing  10/13/2024 0600 by Jaja Stoner RN  Outcome: Progressing     Problem: Discharge Planning  Goal: Discharge to home or other facility with appropriate resources  10/13/2024 1116 by Marylin Espinosa RN  Outcome: Progressing  10/13/2024 0600 by Jaja Stoner RN  Outcome: Progressing     Problem: Pain  Goal: Verbalizes/displays adequate comfort level or baseline comfort level  10/13/2024 1116 by Marylin Espinosa RN  Outcome: Progressing  10/13/2024 0600 by Jaja Stoner RN  Outcome: Progressing     Problem: Safety - Adult  Goal: Free from fall injury  10/13/2024 1116 by Marylin Espinosa RN  Outcome: Progressing  10/13/2024 0600 by Jaja Stoner RN  Outcome: Progressing

## 2024-10-13 NOTE — CARE COORDINATION
Review of chart for any potential discharge needs.   No needs identified for discharge intervention at this time.   MD and bedside RN, if needs arise please consult case management for discharge intervention.  CM will follow as needed

## 2024-10-13 NOTE — RT PROTOCOL NOTE
RT Nebulizer Bronchodilator Protocol Note    There is a bronchodilator order in the chart from a provider indicating to follow the RT Bronchodilator Protocol and there is an “Initiate RT Bronchodilator Protocol” order as well (see protocol at bottom of note).    CXR Findings:  XR CHEST PORTABLE    Result Date: 10/13/2024  No acute radiographic abnormality of the chest. Electronically signed by Venkatesh Zeng MD      The findings from the last RT Protocol Assessment were as follows:  Smoking: Chronic pulmonary disease  Respiratory Pattern: Regular pattern and RR 12-20 bpm  Breath Sounds: Clear breath sounds  Cough: Strong, spontaneous, non-productive  Indication for Bronchodilator Therapy: None  Bronchodilator Assessment Score: 2    Aerosolized bronchodilator medication orders have been revised according to the RT Nebulizer Bronchodilator Protocol below.    Respiratory Therapist to perform RT Therapy Protocol Assessment initially then follow the protocol.  Repeat RT Therapy Protocol Assessment PRN for score 0-3 or on second treatment, BID, and PRN for scores above 3.    No Indications - adjust the frequency to every 6 hours PRN wheezing or bronchospasm, if no treatments needed after 48 hours then discontinue using Per Protocol order mode.     If indication present, adjust the RT bronchodilator orders based on the Bronchodilator Assessment Score as indicated below.  If a patient is on this medication at home then do not decrease Frequency below that used at home.    0-3 - enter or revise RT bronchodilator order(s) to equivalent RT Bronchodilator order with Frequency of every 4 hours PRN for wheezing or increased work of breathing using Per Protocol order mode.       4-6 - enter or revise RT Bronchodilator order(s) to two equivalent RT bronchodilator orders with one order with BID Frequency and one order with Frequency of every 4 hours PRN wheezing or increased work of breathing using Per Protocol order mode.

## 2024-10-14 LAB
ANION GAP SERPL CALCULATED.3IONS-SCNC: 11 MMOL/L (ref 3–16)
BASOPHILS # BLD: 0.02 K/UL (ref 0–0.2)
BASOPHILS NFR BLD: 0 %
BUN SERPL-MCNC: 11 MG/DL (ref 7–20)
CALCIUM SERPL-MCNC: 8.9 MG/DL (ref 8.3–10.6)
CHLORIDE SERPL-SCNC: 102 MMOL/L (ref 99–110)
CK SERPL-CCNC: 72 U/L (ref 26–192)
CO2 SERPL-SCNC: 22 MMOL/L (ref 21–32)
CREAT SERPL-MCNC: 0.5 MG/DL (ref 0.5–1)
CRP SERPL HS-MCNC: 18.5 MG/L (ref 0–5.1)
EKG ATRIAL RATE: 97 BPM
EKG DIAGNOSIS: NORMAL
EKG P AXIS: 42 DEGREES
EKG P-R INTERVAL: 140 MS
EKG Q-T INTERVAL: 368 MS
EKG QRS DURATION: 90 MS
EKG QTC CALCULATION (BAZETT): 467 MS
EKG R AXIS: 51 DEGREES
EKG T AXIS: 40 DEGREES
EKG VENTRICULAR RATE: 97 BPM
EOSINOPHIL # BLD: 0.01 K/UL (ref 0–0.6)
EOSINOPHILS RELATIVE PERCENT: 0 %
ERYTHROCYTE [DISTWIDTH] IN BLOOD BY AUTOMATED COUNT: 14.8 % (ref 12.4–15.4)
ERYTHROCYTE [SEDIMENTATION RATE] IN BLOOD BY WESTERGREN METHOD: 35 MM/HR (ref 0–20)
EST. AVERAGE GLUCOSE BLD GHB EST-MCNC: 157 MG/DL
GFR, ESTIMATED: >90 ML/MIN/1.73M2
GLUCOSE BLD-MCNC: 119 MG/DL (ref 70–99)
GLUCOSE BLD-MCNC: 154 MG/DL (ref 70–99)
GLUCOSE BLD-MCNC: 191 MG/DL (ref 70–99)
GLUCOSE BLD-MCNC: 197 MG/DL (ref 70–99)
GLUCOSE SERPL-MCNC: 154 MG/DL (ref 70–99)
HBA1C MFR BLD: 7.1 %
HCT VFR BLD AUTO: 39 % (ref 36–48)
HGB BLD-MCNC: 13 G/DL (ref 12–16)
IMM GRANULOCYTES # BLD AUTO: 0.02 K/UL (ref 0–0.5)
IMM GRANULOCYTES NFR BLD: 0 %
LYMPHOCYTES NFR BLD: 1.83 K/UL (ref 1–5.1)
LYMPHOCYTES RELATIVE PERCENT: 22 %
MAGNESIUM SERPL-MCNC: 1.7 MG/DL (ref 1.8–2.4)
MCH RBC QN AUTO: 30 PG (ref 26–34)
MCHC RBC AUTO-ENTMCNC: 33.3 G/DL (ref 31–36)
MCV RBC AUTO: 90.1 FL (ref 80–100)
MONOCYTES NFR BLD: 0.56 K/UL (ref 0–1.3)
MONOCYTES NFR BLD: 7 %
NEUTROPHILS NFR BLD: 71 %
NEUTS SEG NFR BLD: 5.79 K/UL (ref 1.7–7.7)
PLATELET # BLD AUTO: 170 K/UL (ref 135–450)
PMV BLD AUTO: 10.1 FL
POTASSIUM SERPL-SCNC: 3.8 MMOL/L (ref 3.5–5.1)
RBC # BLD AUTO: 4.33 M/UL (ref 4–5.2)
SODIUM SERPL-SCNC: 135 MMOL/L (ref 136–145)
WBC OTHER # BLD: 8.2 K/UL (ref 4–11)

## 2024-10-14 PROCEDURE — 2060000000 HC ICU INTERMEDIATE R&B

## 2024-10-14 PROCEDURE — 6370000000 HC RX 637 (ALT 250 FOR IP): Performed by: NURSE PRACTITIONER

## 2024-10-14 PROCEDURE — 6360000002 HC RX W HCPCS: Performed by: INTERNAL MEDICINE

## 2024-10-14 PROCEDURE — 2580000003 HC RX 258: Performed by: INTERNAL MEDICINE

## 2024-10-14 PROCEDURE — 6370000000 HC RX 637 (ALT 250 FOR IP): Performed by: INTERNAL MEDICINE

## 2024-10-14 PROCEDURE — 94660 CPAP INITIATION&MGMT: CPT

## 2024-10-14 PROCEDURE — 83735 ASSAY OF MAGNESIUM: CPT

## 2024-10-14 PROCEDURE — 80048 BASIC METABOLIC PNL TOTAL CA: CPT

## 2024-10-14 PROCEDURE — 93010 ELECTROCARDIOGRAM REPORT: CPT | Performed by: STUDENT IN AN ORGANIZED HEALTH CARE EDUCATION/TRAINING PROGRAM

## 2024-10-14 PROCEDURE — 85025 COMPLETE CBC W/AUTO DIFF WBC: CPT

## 2024-10-14 PROCEDURE — 82962 GLUCOSE BLOOD TEST: CPT

## 2024-10-14 PROCEDURE — 36415 COLL VENOUS BLD VENIPUNCTURE: CPT

## 2024-10-14 PROCEDURE — 82550 ASSAY OF CK (CPK): CPT

## 2024-10-14 PROCEDURE — 86140 C-REACTIVE PROTEIN: CPT

## 2024-10-14 PROCEDURE — 85652 RBC SED RATE AUTOMATED: CPT

## 2024-10-14 RX ORDER — OXYCODONE HYDROCHLORIDE 5 MG/1
5 TABLET ORAL
Status: COMPLETED | OUTPATIENT
Start: 2024-10-14 | End: 2024-10-14

## 2024-10-14 RX ORDER — ALPRAZOLAM 0.5 MG
0.5 TABLET ORAL
Status: COMPLETED | OUTPATIENT
Start: 2024-10-14 | End: 2024-10-14

## 2024-10-14 RX ORDER — 0.9 % SODIUM CHLORIDE 0.9 %
500 INTRAVENOUS SOLUTION INTRAVENOUS ONCE
Status: COMPLETED | OUTPATIENT
Start: 2024-10-14 | End: 2024-10-14

## 2024-10-14 RX ADMIN — SODIUM CHLORIDE 500 ML: 9 INJECTION, SOLUTION INTRAVENOUS at 16:58

## 2024-10-14 RX ADMIN — ALPRAZOLAM 0.5 MG: 0.5 TABLET ORAL at 22:00

## 2024-10-14 RX ADMIN — INSULIN GLARGINE 15 UNITS: 100 INJECTION, SOLUTION SUBCUTANEOUS at 08:17

## 2024-10-14 RX ADMIN — OXCARBAZEPINE 1800 MG: 300 TABLET, FILM COATED ORAL at 21:23

## 2024-10-14 RX ADMIN — Medication 10 ML: at 21:32

## 2024-10-14 RX ADMIN — PANTOPRAZOLE SODIUM 40 MG: 40 TABLET, DELAYED RELEASE ORAL at 05:26

## 2024-10-14 RX ADMIN — CETIRIZINE HYDROCHLORIDE 10 MG: 10 TABLET, FILM COATED ORAL at 08:18

## 2024-10-14 RX ADMIN — SODIUM CHLORIDE: 9 INJECTION, SOLUTION INTRAVENOUS at 12:27

## 2024-10-14 RX ADMIN — BUSPIRONE HYDROCHLORIDE 45 MG: 15 TABLET ORAL at 21:23

## 2024-10-14 RX ADMIN — ARIPIPRAZOLE 25 MG: 10 TABLET ORAL at 21:22

## 2024-10-14 RX ADMIN — CLINDAMYCIN PHOSPHATE 600 MG: 600 INJECTION, SOLUTION INTRAVENOUS at 04:53

## 2024-10-14 RX ADMIN — ENOXAPARIN SODIUM 40 MG: 100 INJECTION SUBCUTANEOUS at 08:17

## 2024-10-14 RX ADMIN — DULOXETINE HYDROCHLORIDE 120 MG: 30 CAPSULE, DELAYED RELEASE ORAL at 21:23

## 2024-10-14 RX ADMIN — SERTRALINE HYDROCHLORIDE 75 MG: 25 TABLET ORAL at 21:22

## 2024-10-14 RX ADMIN — Medication 10 ML: at 08:18

## 2024-10-14 RX ADMIN — OXYCODONE HYDROCHLORIDE 5 MG: 5 TABLET ORAL at 22:42

## 2024-10-14 RX ADMIN — CLINDAMYCIN PHOSPHATE 600 MG: 600 INJECTION, SOLUTION INTRAVENOUS at 12:28

## 2024-10-14 RX ADMIN — Medication 1 CAPSULE: at 08:18

## 2024-10-14 RX ADMIN — KETOROLAC TROMETHAMINE 30 MG: 30 INJECTION, SOLUTION INTRAMUSCULAR at 16:52

## 2024-10-14 RX ADMIN — CLINDAMYCIN PHOSPHATE 600 MG: 600 INJECTION, SOLUTION INTRAVENOUS at 21:40

## 2024-10-14 RX ADMIN — ENOXAPARIN SODIUM 40 MG: 100 INJECTION SUBCUTANEOUS at 21:23

## 2024-10-14 RX ADMIN — DAPTOMYCIN 400 MG: 500 INJECTION, POWDER, LYOPHILIZED, FOR SOLUTION INTRAVENOUS at 16:29

## 2024-10-14 ASSESSMENT — PAIN SCALES - GENERAL
PAINLEVEL_OUTOF10: 9
PAINLEVEL_OUTOF10: 4
PAINLEVEL_OUTOF10: 6
PAINLEVEL_OUTOF10: 0
PAINLEVEL_OUTOF10: 6
PAINLEVEL_OUTOF10: 9
PAINLEVEL_OUTOF10: 4

## 2024-10-14 ASSESSMENT — PAIN DESCRIPTION - LOCATION
LOCATION: EYE

## 2024-10-14 ASSESSMENT — PAIN - FUNCTIONAL ASSESSMENT
PAIN_FUNCTIONAL_ASSESSMENT: ACTIVITIES ARE NOT PREVENTED

## 2024-10-14 ASSESSMENT — PAIN DESCRIPTION - ORIENTATION
ORIENTATION: RIGHT

## 2024-10-14 ASSESSMENT — PAIN DESCRIPTION - DESCRIPTORS
DESCRIPTORS: BURNING;STABBING
DESCRIPTORS: STABBING
DESCRIPTORS: ACHING
DESCRIPTORS: ACHING;DISCOMFORT

## 2024-10-14 ASSESSMENT — PAIN DESCRIPTION - FREQUENCY
FREQUENCY: CONTINUOUS
FREQUENCY: CONTINUOUS

## 2024-10-14 ASSESSMENT — PAIN DESCRIPTION - ONSET
ONSET: ON-GOING
ONSET: ON-GOING

## 2024-10-14 ASSESSMENT — PAIN DESCRIPTION - PAIN TYPE
TYPE: ACUTE PAIN
TYPE: ACUTE PAIN

## 2024-10-14 ASSESSMENT — PAIN SCALES - WONG BAKER: WONGBAKER_NUMERICALRESPONSE: HURTS A LITTLE BIT

## 2024-10-14 NOTE — PROGRESS NOTES
10/14/24 1527   Encounter Summary   Encounter Overview/Reason Attempted Encounter   Service Provided For Patient not available   Referral/Consult From Rounding   Last Encounter  10/14/24  (Patient was sleeping/CK)

## 2024-10-14 NOTE — PROGRESS NOTES
Hospitalist Progress Note      PCP: Ariela Cooper MD    Date of Admission: 10/13/2024    Chief Complaint:  Right eye pain and swelling       Hospital Course: 29 y.o. female with PMHx significant for morbid obesity, WILFREDO, asthma, diabetes mellitus, who presented to ED with a complaint of right eye pain and swelling.  Patient presented to ED 4 days ago with a complaint of worsening swelling of the eye and pain in addition to headache and nasal congestion.  Patient had a CT of the orbit which showed evidence of preseptal and possible periorbital cellulitis.  Patient declined admission and was given p.o. Levaquin.  Patient started on p.o. Levaquin following her discharge from ED and took it at home yesterday without improvement.  Patient continued to have worsening symptoms which prompted her to come to ED for further evaluation. Pt notes she has to wear her cpap mask very tight to prevent air leaks, and that it causes swelling under her eyes.  Pt admitted for further workup and treatment.     Subjective:   Patient reports eye drainage improved, no visual changes, minimal pain.      Assessment/Plan:    Right periorbital and preseptal cellulitis, failed outpt antibiotic therapy  -10/14/24 afebrile, WBC 8.2   continue clindamycin day 2 , Daptomycin added 10/13/24  -ID consult appreciated   - culture of right eye drainage pending  -add probiotic   -MRSA nasal pending   Possibly home today or tomorrow     NIDDM  -10/14/24 glucose stable  -fsbs achs, ssc lispro  -    Hypokalemia  Hypomagnesium  - 10/14/24 mag continues to be low Mag 1.7  -replacement ordered  -bmp, mg in am    WILFREDO on cpap  -pt needs new mask that fits properly. Pt would like referral to local sleep specialist, as hers is located in [place]. Referral placed to [facility] sleep medicine.     Asthma, no exacerbation  -stable    Morbid obesity  -encourage lifestyle changes     Active Hospital Problems    Diagnosis     Periorbital cellulitis [L03.213]     Elevated

## 2024-10-14 NOTE — PLAN OF CARE
Problem: Chronic Conditions and Co-morbidities  Goal: Patient's chronic conditions and co-morbidity symptoms are monitored and maintained or improved  10/14/2024 1128 by Lesly Stewart RN  Outcome: Progressing  Flowsheets (Taken 10/14/2024 0823)  Care Plan - Patient's Chronic Conditions and Co-Morbidity Symptoms are Monitored and Maintained or Improved:   Monitor and assess patient's chronic conditions and comorbid symptoms for stability, deterioration, or improvement   Collaborate with multidisciplinary team to address chronic and comorbid conditions and prevent exacerbation or deterioration   Update acute care plan with appropriate goals if chronic or comorbid symptoms are exacerbated and prevent overall improvement and discharge  10/13/2024 2131 by Savita Segal RN  Outcome: Progressing     Problem: Discharge Planning  Goal: Discharge to home or other facility with appropriate resources  10/14/2024 1128 by Lesly Stewart RN  Outcome: Progressing  Flowsheets (Taken 10/14/2024 0823)  Discharge to home or other facility with appropriate resources:   Identify barriers to discharge with patient and caregiver   Arrange for needed discharge resources and transportation as appropriate   Identify discharge learning needs (meds, wound care, etc)   Arrange for interpreters to assist at discharge as needed   Refer to discharge planning if patient needs post-hospital services based on physician order or complex needs related to functional status, cognitive ability or social support system  10/13/2024 2131 by Savita Segal RN  Outcome: Progressing     Problem: Pain  Goal: Verbalizes/displays adequate comfort level or baseline comfort level  10/14/2024 1128 by Lesly Stewart RN  Outcome: Progressing  10/13/2024 2131 by Savita Segal, RN  Outcome: Progressing     Problem: Safety - Adult  Goal: Free from fall injury  10/13/2024 2131 by Savita Segal, RN  Outcome: Progressing

## 2024-10-14 NOTE — PLAN OF CARE
Problem: Chronic Conditions and Co-morbidities  Goal: Patient's chronic conditions and co-morbidity symptoms are monitored and maintained or improved  10/13/2024 2131 by Savita Segal RN  Outcome: Progressing     Problem: Discharge Planning  Goal: Discharge to home or other facility with appropriate resources  10/13/2024 2131 by Savita Segal RN  Outcome: Progressing     Problem: Pain  Goal: Verbalizes/displays adequate comfort level or baseline comfort level  10/13/2024 2131 by Savita Segal RN  Outcome: Progressing     Problem: Safety - Adult  Goal: Free from fall injury  10/13/2024 2131 by Savita Segal RN  Outcome: Progressing

## 2024-10-15 VITALS
OXYGEN SATURATION: 98 % | TEMPERATURE: 98.1 F | SYSTOLIC BLOOD PRESSURE: 115 MMHG | HEART RATE: 85 BPM | BODY MASS INDEX: 41.95 KG/M2 | HEIGHT: 70 IN | DIASTOLIC BLOOD PRESSURE: 63 MMHG | WEIGHT: 293 LBS | RESPIRATION RATE: 18 BRPM

## 2024-10-15 LAB
ANION GAP SERPL CALCULATED.3IONS-SCNC: 12 MMOL/L (ref 3–16)
BASOPHILS # BLD: 0.01 K/UL (ref 0–0.2)
BASOPHILS NFR BLD: 0 %
BUN SERPL-MCNC: 14 MG/DL (ref 7–20)
CALCIUM SERPL-MCNC: 8.5 MG/DL (ref 8.3–10.6)
CHLORIDE SERPL-SCNC: 101 MMOL/L (ref 99–110)
CO2 SERPL-SCNC: 23 MMOL/L (ref 21–32)
CREAT SERPL-MCNC: 0.7 MG/DL (ref 0.5–1)
EOSINOPHIL # BLD: 0.02 K/UL (ref 0–0.6)
EOSINOPHILS RELATIVE PERCENT: 0 %
ERYTHROCYTE [DISTWIDTH] IN BLOOD BY AUTOMATED COUNT: 14.6 % (ref 12.4–15.4)
GFR, ESTIMATED: >90 ML/MIN/1.73M2
GLUCOSE BLD-MCNC: 168 MG/DL (ref 70–99)
GLUCOSE SERPL-MCNC: 246 MG/DL (ref 70–99)
HCT VFR BLD AUTO: 38.5 % (ref 36–48)
HGB BLD-MCNC: 12.8 G/DL (ref 12–16)
IMM GRANULOCYTES # BLD AUTO: 0.02 K/UL (ref 0–0.5)
IMM GRANULOCYTES NFR BLD: 0 %
LYMPHOCYTES NFR BLD: 2.25 K/UL (ref 1–5.1)
LYMPHOCYTES RELATIVE PERCENT: 31 %
MCH RBC QN AUTO: 30 PG (ref 26–34)
MCHC RBC AUTO-ENTMCNC: 33.2 G/DL (ref 31–36)
MCV RBC AUTO: 90.2 FL (ref 80–100)
MONOCYTES NFR BLD: 0.45 K/UL (ref 0–1.3)
MONOCYTES NFR BLD: 6 %
MRSA, DNA, NASAL: NOT DETECTED
NEUTROPHILS NFR BLD: 62 %
NEUTS SEG NFR BLD: 4.49 K/UL (ref 1.7–7.7)
PLATELET # BLD AUTO: 151 K/UL (ref 135–450)
PMV BLD AUTO: 10 FL
POTASSIUM SERPL-SCNC: 3.8 MMOL/L (ref 3.5–5.1)
RBC # BLD AUTO: 4.27 M/UL (ref 4–5.2)
SODIUM SERPL-SCNC: 136 MMOL/L (ref 136–145)
SPECIMEN DESCRIPTION: NORMAL
WBC OTHER # BLD: 7.2 K/UL (ref 4–11)

## 2024-10-15 PROCEDURE — 2580000003 HC RX 258: Performed by: INTERNAL MEDICINE

## 2024-10-15 PROCEDURE — 6370000000 HC RX 637 (ALT 250 FOR IP): Performed by: INTERNAL MEDICINE

## 2024-10-15 PROCEDURE — 6360000002 HC RX W HCPCS: Performed by: INTERNAL MEDICINE

## 2024-10-15 PROCEDURE — 82962 GLUCOSE BLOOD TEST: CPT

## 2024-10-15 PROCEDURE — 80048 BASIC METABOLIC PNL TOTAL CA: CPT

## 2024-10-15 PROCEDURE — 85025 COMPLETE CBC W/AUTO DIFF WBC: CPT

## 2024-10-15 PROCEDURE — 36415 COLL VENOUS BLD VENIPUNCTURE: CPT

## 2024-10-15 PROCEDURE — 6370000000 HC RX 637 (ALT 250 FOR IP): Performed by: NURSE PRACTITIONER

## 2024-10-15 RX ORDER — ALPRAZOLAM 0.5 MG
2 TABLET ORAL 2 TIMES DAILY PRN
Status: DISCONTINUED | OUTPATIENT
Start: 2024-10-15 | End: 2024-10-15 | Stop reason: HOSPADM

## 2024-10-15 RX ORDER — OXCARBAZEPINE 600 MG/1
1800 TABLET, FILM COATED ORAL
Qty: 60 TABLET | Refills: 3 | Status: SHIPPED | OUTPATIENT
Start: 2024-10-15

## 2024-10-15 RX ORDER — SERTRALINE HYDROCHLORIDE 25 MG/1
75 TABLET, FILM COATED ORAL
Qty: 30 TABLET | Refills: 3 | Status: SHIPPED | OUTPATIENT
Start: 2024-10-15

## 2024-10-15 RX ORDER — CLINDAMYCIN HCL 300 MG
300 CAPSULE ORAL 3 TIMES DAILY
Qty: 15 CAPSULE | Refills: 0 | Status: SHIPPED | OUTPATIENT
Start: 2024-10-15 | End: 2024-10-20

## 2024-10-15 RX ORDER — ALBUTEROL SULFATE 5 MG/ML
2.5 SOLUTION RESPIRATORY (INHALATION) EVERY 6 HOURS PRN
Status: DISCONTINUED | OUTPATIENT
Start: 2024-10-15 | End: 2024-10-15 | Stop reason: HOSPADM

## 2024-10-15 RX ORDER — ALPRAZOLAM 0.5 MG
2 TABLET ORAL
Status: COMPLETED | OUTPATIENT
Start: 2024-10-15 | End: 2024-10-15

## 2024-10-15 RX ORDER — ALPRAZOLAM 2 MG
2 TABLET ORAL 2 TIMES DAILY PRN
Qty: 6 TABLET | Refills: 0 | Status: SHIPPED | OUTPATIENT
Start: 2024-10-15 | End: 2024-10-18

## 2024-10-15 RX ORDER — INSULIN LISPRO 100 [IU]/ML
0-16 INJECTION, SOLUTION INTRAVENOUS; SUBCUTANEOUS
Status: DISCONTINUED | OUTPATIENT
Start: 2024-10-15 | End: 2024-10-15 | Stop reason: HOSPADM

## 2024-10-15 RX ADMIN — ALPRAZOLAM 2 MG: 0.5 TABLET ORAL at 11:52

## 2024-10-15 RX ADMIN — CLINDAMYCIN PHOSPHATE 600 MG: 600 INJECTION, SOLUTION INTRAVENOUS at 05:44

## 2024-10-15 RX ADMIN — CLINDAMYCIN PHOSPHATE 600 MG: 600 INJECTION, SOLUTION INTRAVENOUS at 12:23

## 2024-10-15 RX ADMIN — Medication 1 CAPSULE: at 08:45

## 2024-10-15 RX ADMIN — PANTOPRAZOLE SODIUM 40 MG: 40 TABLET, DELAYED RELEASE ORAL at 05:40

## 2024-10-15 RX ADMIN — INSULIN GLARGINE 15 UNITS: 100 INJECTION, SOLUTION SUBCUTANEOUS at 08:45

## 2024-10-15 RX ADMIN — CETIRIZINE HYDROCHLORIDE 10 MG: 10 TABLET, FILM COATED ORAL at 08:45

## 2024-10-15 RX ADMIN — ALPRAZOLAM 2 MG: 0.5 TABLET ORAL at 00:57

## 2024-10-15 RX ADMIN — ENOXAPARIN SODIUM 40 MG: 100 INJECTION SUBCUTANEOUS at 08:46

## 2024-10-15 RX ADMIN — DAPTOMYCIN 400 MG: 500 INJECTION, POWDER, LYOPHILIZED, FOR SOLUTION INTRAVENOUS at 14:05

## 2024-10-15 RX ADMIN — Medication 10 ML: at 08:45

## 2024-10-15 NOTE — PLAN OF CARE
Problem: Chronic Conditions and Co-morbidities  Goal: Patient's chronic conditions and co-morbidity symptoms are monitored and maintained or improved  10/15/2024 1244 by Wendi Huitron, RN  Outcome: Adequate for Discharge

## 2024-10-15 NOTE — CARE COORDINATION
CASE MANAGEMENT DISCHARGE SUMMARY      Discharge to: home, no needs      Transportation:    Family/car: yes     Confirmed discharge plan with:     Patient: yes      RN, name: Wendi Rudolph RN

## 2024-10-15 NOTE — PLAN OF CARE
Problem: Chronic Conditions and Co-morbidities  Goal: Patient's chronic conditions and co-morbidity symptoms are monitored and maintained or improved  10/14/2024 1128 by Lesly Stewart, RN  Outcome: Progressing  Flowsheets (Taken 10/14/2024 0823)  Care Plan - Patient's Chronic Conditions and Co-Morbidity Symptoms are Monitored and Maintained or Improved:   Monitor and assess patient's chronic conditions and comorbid symptoms for stability, deterioration, or improvement   Collaborate with multidisciplinary team to address chronic and comorbid conditions and prevent exacerbation or deterioration   Update acute care plan with appropriate goals if chronic or comorbid symptoms are exacerbated and prevent overall improvement and discharge     Problem: Discharge Planning  Goal: Discharge to home or other facility with appropriate resources  10/14/2024 1128 by Lesly Stewart, RN  Outcome: Progressing  Flowsheets (Taken 10/14/2024 0823)  Discharge to home or other facility with appropriate resources:   Identify barriers to discharge with patient and caregiver   Arrange for needed discharge resources and transportation as appropriate   Identify discharge learning needs (meds, wound care, etc)   Arrange for interpreters to assist at discharge as needed   Refer to discharge planning if patient needs post-hospital services based on physician order or complex needs related to functional status, cognitive ability or social support system     Problem: Pain  Goal: Verbalizes/displays adequate comfort level or baseline comfort level  10/14/2024 1128 by Lesly Stewart, RN  Outcome: Progressing     Problem: Safety - Adult  Goal: Free from fall injury  Outcome: Progressing

## 2024-10-15 NOTE — DISCHARGE SUMMARY
V2.0  Discharge Summary    Name:  Deana Mcginnis /Age/Sex: 1995 (29 y.o. female)   Admit Date: 10/13/2024  Discharge Date: 10/15/24    MRN & CSN:  [id number] & [id number] Encounter Date and Time 10/15/24 12:11 PM EDT    Attending:  Pedro Hernandez MD Discharging Provider: LEVI Viveros - CNP       Hospital Course:     Brief HPI: Deana Mcginnis is a 29 y.o. female who presented with PMHx attention deficit disorder, anxiety, asthma, morbid obesity BMI 48 obstructive sleep apnea, bipolar disorder, history of DVT in the past, history of pulmonary embolism, history of seizures admitted to hospital secondary to complaining of right side indigo orbital  swelling pain in addition to headache and nasal congestion no recorded fevers labs indicate creatinine 0.5 WBC 11.2 hemoglobin 13.9 rapid flu negative COVID-19 negative blood culture in process chest x-ray negative CT of the head no acute intracranial hemorrhage or mass effect mild residual right periorbital soft tissue swelling noted we are consulted for recommendations She was on a course of Oral Levofloxacin from PCP for this.     Brief Problem Based Course:   Right periorbital and preseptal cellulitis, failed outpt antibiotic therapy  -10/14/24 afebrile, WBC 8.2   continue clindamycin day 2 , Daptomycin added 10/13/24; initially infectious disease recommended discharge on doxycycline 100 mg p.o. twice daily x 5-day though with patient multiple antibiotic allergies this was changed to clindamycin 300 mg p.o. every 8 hours x 5 days orders have been placed.  -ID consult appreciated   - culture of right eye drainage pending  -add probiotic   -MRSA nasal negative  Possibly home today or tomorrow      NIDDM  -10/14/24 glucose stable  -fsbs achs, ssc lispro  -     Hypokalemia  Hypomagnesium  - 10/14/24 mag continues to be low Mag 1.7  -replacement ordered  -bmp, mg in am     WILFREDO on cpap  -pt needs new mask that fits properly. Pt would like referral to local sleep

## 2024-10-15 NOTE — PROGRESS NOTES
Patient requested for RT to not go into room the rest of the night so she can get some rest. Patient on home Bipap settings on our machine.

## 2024-10-16 ENCOUNTER — TELEPHONE (OUTPATIENT)
Dept: PRIMARY CARE CLINIC | Age: 29
End: 2024-10-16

## 2024-10-16 LAB
MICROORGANISM SPEC CULT: ABNORMAL
MICROORGANISM/AGENT SPEC: ABNORMAL
SPECIMEN DESCRIPTION: ABNORMAL

## 2024-10-16 NOTE — TELEPHONE ENCOUNTER
[facility] Transitions Initial Follow Up Call    Outreach made within 2 business days of discharge: Yes    Patient: Deana Mcginnis Patient : 1995   MRN: [id number]  Reason for Admission: Periorbital cellulitis   Discharge Date: 10/15/24       Spoke with: Patient    Discharge department/facility: [facility]        TCM Interactive Patient Contact:  Was patient able to fill all prescriptions: Yes  Was patient instructed to bring all medications to the follow-up visit: Yes  Is patient taking all medications as directed in the discharge summary? Yes  Does patient understand their discharge instructions: Yes  Does patient have questions or concerns that need addressed prior to 7-14 day follow up office visit: yes - will discuss with PCP at .    Scheduled appointment with PCP within 7-14 days : yes, Patient is scheduled 10/21/24    Follow Up  Future Appointments   Date Time Provider Department Center   2024 11:40 AM Ariela Cooper MD [facility] BS E[facility]   2025  1:00 PM Wendi Song, APRN - CNP N Pulm Med [facility] [place] [place]

## 2024-10-17 LAB
MICROORGANISM SPEC CULT: NORMAL
SERVICE CMNT-IMP: NORMAL
SPECIMEN DESCRIPTION: NORMAL

## 2024-10-21 ENCOUNTER — OFFICE VISIT (OUTPATIENT)
Dept: PRIMARY CARE CLINIC | Age: 29
End: 2024-10-21

## 2024-10-21 VITALS
HEART RATE: 110 BPM | RESPIRATION RATE: 20 BRPM | WEIGHT: 293 LBS | OXYGEN SATURATION: 98 % | BODY MASS INDEX: 47.52 KG/M2 | TEMPERATURE: 98.3 F | DIASTOLIC BLOOD PRESSURE: 78 MMHG | SYSTOLIC BLOOD PRESSURE: 128 MMHG

## 2024-10-21 DIAGNOSIS — H04.001 DACRYOADENITIS OF RIGHT LACRIMAL GLAND: Primary | ICD-10-CM

## 2024-10-21 DIAGNOSIS — L03.213 PRESEPTAL CELLULITIS: ICD-10-CM

## 2024-10-21 DIAGNOSIS — H04.129 DRY EYE: ICD-10-CM

## 2024-10-21 DIAGNOSIS — E11.9 TYPE 2 DIABETES MELLITUS WITHOUT COMPLICATION, WITHOUT LONG-TERM CURRENT USE OF INSULIN (HCC): ICD-10-CM

## 2024-10-21 RX ORDER — PREDNISONE 20 MG/1
40 TABLET ORAL DAILY
Qty: 10 TABLET | Refills: 0 | Status: SHIPPED | OUTPATIENT
Start: 2024-10-21 | End: 2024-10-26

## 2024-10-21 RX ORDER — CLINDAMYCIN HYDROCHLORIDE 300 MG/1
300 CAPSULE ORAL 3 TIMES DAILY
COMMUNITY

## 2024-10-21 NOTE — PROGRESS NOTES
Post-Discharge Transitional Care  Follow Up      Deana Mcginnis   YOB: 1995    Date of Office Visit:  10/21/2024  Date of Hospital Admission: 10/13/24  Date of Hospital Discharge: 10/15/24  Risk of hospital readmission (high >=14%. Medium >=10%) :Readmission Risk Score: 9.3      Care management risk score Rising risk (score 2-5) and Complex Care (Scores >=6): No Risk Score On File     Non face to face  following discharge, date last encounter closed (first attempt may have been earlier): 10/16/2024    Call initiated 2 business days of discharge: Yes    ASSESSMENT/PLAN:   Dacryoadenitis of right lacrimal gland  -     Urinalysis  -     Comprehensive Metabolic Panel; Future  -     CBC with Auto Differential; Future  -     Sedimentation Rate; Future  -     CAESAR Reflex to Antibody Cascade; Future  -     Rheumatoid Factor; Future  -     Hepatitis C Antibody; Future  -     HIV Screen; Future  -     Electrophoresis Protein, Serum; Future  -     C3 COMPLEMENT; Future  -     C4 COMPLEMENT; Future  -     IgE; Future  -     IgG; Future  -     IgG 4; Future  -     Anti SSB; Future  -     Anti SSA; Future  -     Lucille Clay MD, Ophthalmology, [facility]  -     predniSONE (DELTASONE) 20 MG tablet; Take 2 tablets by mouth daily for 5 days, Disp-10 tablet, R-0Normal  Dry eye  -     Lucille Clay MD, Ophthalmology, [facility]  Preseptal cellulitis  Type 2 diabetes mellitus without complication, without long-term current use of insulin (HCC)  Preseptal cellulitis has been effectively treated with her antibiotic course and do recommend that she complete this course which she has 1 more day  Discussed other CT findings with enlargement of lacrimal gland which has been present on previous CTs as well  With this and dry eye consider Sjogren's syndrome and do recommend further evaluation with blood work as well as referral to ophthalmology  Short course of steroids offered for symptom relief of

## 2024-10-22 DIAGNOSIS — H04.001 DACRYOADENITIS OF RIGHT LACRIMAL GLAND: ICD-10-CM

## 2024-10-22 LAB
ALBUMIN SERPL-MCNC: 4.1 G/DL (ref 3.4–5)
ALBUMIN/GLOB SERPL: 1.5 {RATIO} (ref 1.1–2.2)
ALP SERPL-CCNC: 139 U/L (ref 40–129)
ALT SERPL-CCNC: 36 U/L (ref 10–40)
ANION GAP SERPL CALCULATED.3IONS-SCNC: 17 MMOL/L (ref 3–16)
AST SERPL-CCNC: 26 U/L (ref 15–37)
BACTERIA URNS QL MICRO: ABNORMAL /HPF
BASOPHILS # BLD: 0 K/UL (ref 0–0.2)
BASOPHILS NFR BLD: 0.3 %
BILIRUB SERPL-MCNC: <0.2 MG/DL (ref 0–1)
BILIRUB UR QL STRIP.AUTO: NEGATIVE
BUN SERPL-MCNC: 12 MG/DL (ref 7–20)
C3 SERPL-MCNC: 189 MG/DL (ref 90–180)
C4 SERPL-MCNC: 31.2 MG/DL (ref 10–40)
CALCIUM SERPL-MCNC: 9.5 MG/DL (ref 8.3–10.6)
CHLORIDE SERPL-SCNC: 99 MMOL/L (ref 99–110)
CLARITY UR: CLEAR
CO2 SERPL-SCNC: 21 MMOL/L (ref 21–32)
COLOR UR: YELLOW
CREAT SERPL-MCNC: 0.6 MG/DL (ref 0.6–1.1)
DEPRECATED RDW RBC AUTO: 15.8 % (ref 12.4–15.4)
EOSINOPHIL # BLD: 0 K/UL (ref 0–0.6)
EOSINOPHIL NFR BLD: 0.3 %
EPI CELLS #/AREA URNS HPF: ABNORMAL /HPF (ref 0–5)
ERYTHROCYTE [SEDIMENTATION RATE] IN BLOOD BY WESTERGREN METHOD: 34 MM/HR (ref 0–20)
GFR SERPLBLD CREATININE-BSD FMLA CKD-EPI: >90 ML/MIN/{1.73_M2}
GLUCOSE SERPL-MCNC: 263 MG/DL (ref 70–99)
GLUCOSE UR STRIP.AUTO-MCNC: NEGATIVE MG/DL
HCT VFR BLD AUTO: 42.5 % (ref 36–48)
HCV AB SERPL QL IA: NORMAL
HGB BLD-MCNC: 13.8 G/DL (ref 12–16)
HGB UR QL STRIP.AUTO: NEGATIVE
IGG SERPL-MCNC: 1264 MG/DL (ref 700–1600)
KETONES UR STRIP.AUTO-MCNC: NEGATIVE MG/DL
LEUKOCYTE ESTERASE UR QL STRIP.AUTO: NEGATIVE
LYMPHOCYTES # BLD: 2.5 K/UL (ref 1–5.1)
LYMPHOCYTES NFR BLD: 23.9 %
MCH RBC QN AUTO: 30 PG (ref 26–34)
MCHC RBC AUTO-ENTMCNC: 32.6 G/DL (ref 31–36)
MCV RBC AUTO: 92.1 FL (ref 80–100)
MONOCYTES # BLD: 0.5 K/UL (ref 0–1.3)
MONOCYTES NFR BLD: 5.1 %
NEUTROPHILS # BLD: 7.4 K/UL (ref 1.7–7.7)
NEUTROPHILS NFR BLD: 70.4 %
NITRITE UR QL STRIP.AUTO: NEGATIVE
PH UR STRIP.AUTO: 6.5 [PH] (ref 5–8)
PLATELET # BLD AUTO: 233 K/UL (ref 135–450)
PMV BLD AUTO: 8.7 FL (ref 5–10.5)
POTASSIUM SERPL-SCNC: 4.1 MMOL/L (ref 3.5–5.1)
PROT UR STRIP.AUTO-MCNC: ABNORMAL MG/DL
RBC # BLD AUTO: 4.61 M/UL (ref 4–5.2)
RBC #/AREA URNS HPF: ABNORMAL /HPF (ref 0–4)
RHEUMATOID FACT SER IA-ACNC: <10 IU/ML
SODIUM SERPL-SCNC: 137 MMOL/L (ref 136–145)
SP GR UR STRIP.AUTO: 1.02 (ref 1–1.03)
UA DIPSTICK W REFLEX MICRO PNL UR: YES
URN SPEC COLLECT METH UR: ABNORMAL
UROBILINOGEN UR STRIP-ACNC: 0.2 E.U./DL
WBC # BLD AUTO: 10.5 K/UL (ref 4–11)
WBC #/AREA URNS HPF: ABNORMAL /HPF (ref 0–5)

## 2024-10-23 LAB
ALBUMIN SERPL ELPH-MCNC: 3.1 G/DL (ref 3.1–4.9)
ALPHA1 GLOB SERPL ELPH-MCNC: 0.3 G/DL (ref 0.2–0.4)
ALPHA2 GLOB SERPL ELPH-MCNC: 0.9 G/DL (ref 0.4–1.1)
ANA SER QL IA: POSITIVE
B-GLOBULIN SERPL ELPH-MCNC: 1.2 G/DL (ref 0.9–1.6)
CENTROMERE B IGG SER QL LINE BLOT: 1.3 AI (ref 0–0.9)
CHROMATIN AB SERPL-ACNC: <0.2 AI (ref 0–0.9)
DSDNA AB SER-ACNC: 2 IU/ML (ref 0–9)
ENA JO1 AB SER IA-ACNC: <0.2 AI (ref 0–0.9)
ENA RNP AB SER IA-ACNC: <0.2 AI (ref 0–0.9)
ENA SCL70 IGG SER IA-ACNC: <0.2 AI (ref 0–0.9)
ENA SM AB SER IA-ACNC: <0.2 AI (ref 0–0.9)
ENA SM+RNP IGG SER-ACNC: <0.2 AI (ref 0–0.9)
ENA SS-A AB SER IA-ACNC: <0.2 AI (ref 0–0.9)
ENA SS-B AB SER IA-ACNC: <0.2 AI (ref 0–0.9)
GAMMA GLOB SERPL ELPH-MCNC: 1.3 G/DL (ref 0.6–1.8)
HIV 1+2 AB+HIV1 P24 AG SERPL QL IA: NORMAL
HIV 2 AB SERPL QL IA: NORMAL
HIV1 AB SERPL QL IA: NORMAL
HIV1 P24 AG SERPL QL IA: NORMAL
PROT SERPL-MCNC: 6.8 G/DL (ref 6.4–8.2)
RIBOSOMAL P AB SER IA-ACNC: <0.2 AI (ref 0–0.9)
SPE/IFE INTERPRETATION: NORMAL

## 2024-10-24 ENCOUNTER — OFFICE VISIT (OUTPATIENT)
Dept: PRIMARY CARE CLINIC | Age: 29
End: 2024-10-24
Payer: COMMERCIAL

## 2024-10-24 VITALS
SYSTOLIC BLOOD PRESSURE: 102 MMHG | TEMPERATURE: 98.7 F | BODY MASS INDEX: 47.18 KG/M2 | WEIGHT: 293 LBS | HEART RATE: 94 BPM | DIASTOLIC BLOOD PRESSURE: 86 MMHG | OXYGEN SATURATION: 97 % | RESPIRATION RATE: 20 BRPM

## 2024-10-24 DIAGNOSIS — R05.1 ACUTE COUGH: Primary | ICD-10-CM

## 2024-10-24 DIAGNOSIS — E11.9 TYPE 2 DIABETES MELLITUS WITHOUT COMPLICATION, WITHOUT LONG-TERM CURRENT USE OF INSULIN (HCC): ICD-10-CM

## 2024-10-24 LAB
CHP ED QC CHECK: NORMAL
GLUCOSE BLD-MCNC: 117 MG/DL
IGE SERPL-ACNC: 18 KU/L
IGG4 SER-MCNC: 2 MG/DL (ref 1–123)
INFLUENZA A ANTIBODY: NEGATIVE
INFLUENZA B ANTIBODY: NEGATIVE
Lab: NORMAL
QC PASS/FAIL: NORMAL
SARS-COV-2 RDRP RESP QL NAA+PROBE: NEGATIVE

## 2024-10-24 PROCEDURE — 3051F HG A1C>EQUAL 7.0%<8.0%: CPT | Performed by: FAMILY MEDICINE

## 2024-10-24 PROCEDURE — 87804 INFLUENZA ASSAY W/OPTIC: CPT | Performed by: FAMILY MEDICINE

## 2024-10-24 PROCEDURE — 87635 SARS-COV-2 COVID-19 AMP PRB: CPT | Performed by: FAMILY MEDICINE

## 2024-10-24 PROCEDURE — 82962 GLUCOSE BLOOD TEST: CPT | Performed by: FAMILY MEDICINE

## 2024-10-24 PROCEDURE — 99213 OFFICE O/P EST LOW 20 MIN: CPT | Performed by: FAMILY MEDICINE

## 2024-10-24 NOTE — PROGRESS NOTES
external ear normal.      Nose: Rhinorrhea present.      Mouth/Throat:      Pharynx: No oropharyngeal exudate or posterior oropharyngeal erythema.   Eyes:      General: No scleral icterus.     Conjunctiva/sclera: Conjunctivae normal.   Cardiovascular:      Rate and Rhythm: Normal rate and regular rhythm.      Heart sounds: Normal heart sounds.   Pulmonary:      Effort: Pulmonary effort is normal. No respiratory distress.      Breath sounds: Normal breath sounds. No wheezing, rhonchi or rales.   Lymphadenopathy:      Cervical: Cervical adenopathy present.   Neurological:      General: No focal deficit present.      Mental Status: She is alert and oriented to person, place, and time.   Psychiatric:         Attention and Perception: Attention and perception normal.         Mood and Affect: Mood and affect normal.         Speech: Speech normal.         Behavior: Behavior normal. Behavior is cooperative.         Thought Content: Thought content normal.         Cognition and Memory: Cognition and memory normal.         Judgment: Judgment normal.             Electronically signed by RAMIRO GOMEZ MD on 10/24/2024 at 4:35 PM.    Please note this chart was generated using dragon dictation software.  Although every effort was made to ensure the accuracy of this automated transcription, some errors in transcription may have occurred.

## 2024-10-25 LAB
ANA PATTERN: ABNORMAL
ANA TITER: ABNORMAL
ANTINUCLEAR AB INTERPRETIVE COMMENT: ABNORMAL
NUCLEAR IGG SER QL IF: DETECTED

## 2024-10-27 ENCOUNTER — OFFICE VISIT (OUTPATIENT)
Age: 29
End: 2024-10-27

## 2024-10-27 VITALS
HEIGHT: 70 IN | SYSTOLIC BLOOD PRESSURE: 133 MMHG | TEMPERATURE: 98.1 F | BODY MASS INDEX: 41.95 KG/M2 | WEIGHT: 293 LBS | DIASTOLIC BLOOD PRESSURE: 80 MMHG | OXYGEN SATURATION: 96 % | HEART RATE: 132 BPM

## 2024-10-27 DIAGNOSIS — J40 BRONCHITIS: Primary | ICD-10-CM

## 2024-10-27 RX ORDER — AZITHROMYCIN 250 MG/1
TABLET, FILM COATED ORAL
Qty: 6 TABLET | Refills: 0 | Status: SHIPPED | OUTPATIENT
Start: 2024-10-27 | End: 2024-11-06

## 2024-10-27 RX ORDER — PREDNISONE 10 MG/1
10 TABLET ORAL
COMMUNITY
Start: 2024-10-25

## 2024-10-28 ENCOUNTER — TELEMEDICINE (OUTPATIENT)
Dept: FAMILY MEDICINE CLINIC | Age: 29
End: 2024-10-28
Payer: COMMERCIAL

## 2024-10-28 DIAGNOSIS — H04.129 DRY EYE: ICD-10-CM

## 2024-10-28 DIAGNOSIS — R76.8 POSITIVE ANA (ANTINUCLEAR ANTIBODY): ICD-10-CM

## 2024-10-28 DIAGNOSIS — L03.213 PERIORBITAL CELLULITIS OF RIGHT EYE: Primary | ICD-10-CM

## 2024-10-28 DIAGNOSIS — H04.001 DACRYOADENITIS OF RIGHT LACRIMAL GLAND: Primary | ICD-10-CM

## 2024-10-28 DIAGNOSIS — F31.9 BIPOLAR AFFECTIVE DISORDER, REMISSION STATUS UNSPECIFIED (HCC): ICD-10-CM

## 2024-10-28 PROCEDURE — G2211 COMPLEX E/M VISIT ADD ON: HCPCS | Performed by: NURSE PRACTITIONER

## 2024-10-28 PROCEDURE — 99214 OFFICE O/P EST MOD 30 MIN: CPT | Performed by: NURSE PRACTITIONER

## 2024-10-28 ASSESSMENT — ENCOUNTER SYMPTOMS
NAUSEA: 0
COUGH: 0
ABDOMINAL PAIN: 0
SHORTNESS OF BREATH: 0

## 2024-10-28 NOTE — PROGRESS NOTES
Patient was called and message left cancelling apt 11/24/23
  Psychiatric/Behavioral: Negative.         Objective:   Physical Exam  Constitutional:       General: She is not in acute distress.     Appearance: She is not ill-appearing.   Pulmonary:      Effort: Pulmonary effort is normal. No respiratory distress.   Neurological:      Mental Status: She is alert and oriented to person, place, and time.   Psychiatric:         Mood and Affect: Mood normal.         Behavior: Behavior normal.         Assessment:       Diagnosis Orders   1. Periorbital cellulitis of right eye        2. Positive CAESAR (antinuclear antibody)        3. Bipolar affective disorder, remission status unspecified (HCC)            Plan:     Patient reaching out on advice and direction  Hospitalizations reviewed  Labs reviewed  Follow up with [facility]  Follow up with PCP and RHEUM referral  RTO SABINE Mcginnis, was evaluated through a synchronous (real-time) audio-video encounter. The patient (or guardian if applicable) is aware that this is a billable service, which includes applicable co-pays. This Virtual Visit was conducted with patient's (and/or legal guardian's) consent. Patient identification was verified, and a caregiver was present when appropriate.   The patient was located at Home: [address] [address]  Provider was located at Facility (Appt Dept): [facility]. [place] [id number]  Confirm you are appropriately licensed, registered, or certified to deliver care in the state where the patient is located as indicated above. If you are not or unsure, please re-schedule the visit: Yes, I confirm.        Total time spent for this encounter: Not billed by time    --LEVI Alba - CNP on 10/28/2024 at 2:00 PM    An electronic signature was used to authenticate this note.

## 2024-12-03 ENCOUNTER — OFFICE VISIT (OUTPATIENT)
Dept: PRIMARY CARE CLINIC | Age: 29
End: 2024-12-03

## 2024-12-03 ENCOUNTER — TELEPHONE (OUTPATIENT)
Dept: PRIMARY CARE CLINIC | Age: 29
End: 2024-12-03

## 2024-12-03 VITALS
SYSTOLIC BLOOD PRESSURE: 120 MMHG | BODY MASS INDEX: 47.81 KG/M2 | RESPIRATION RATE: 22 BRPM | WEIGHT: 293 LBS | TEMPERATURE: 98.7 F | OXYGEN SATURATION: 97 % | HEART RATE: 96 BPM | DIASTOLIC BLOOD PRESSURE: 82 MMHG

## 2024-12-03 DIAGNOSIS — K21.9 GASTROESOPHAGEAL REFLUX DISEASE WITHOUT ESOPHAGITIS: ICD-10-CM

## 2024-12-03 DIAGNOSIS — E11.9 TYPE 2 DIABETES MELLITUS WITHOUT COMPLICATION, WITHOUT LONG-TERM CURRENT USE OF INSULIN (HCC): Primary | ICD-10-CM

## 2024-12-03 DIAGNOSIS — E11.69 HYPERLIPIDEMIA ASSOCIATED WITH TYPE 2 DIABETES MELLITUS (HCC): ICD-10-CM

## 2024-12-03 DIAGNOSIS — H04.001 DACRYOADENITIS OF RIGHT LACRIMAL GLAND: ICD-10-CM

## 2024-12-03 DIAGNOSIS — E78.5 HYPERLIPIDEMIA ASSOCIATED WITH TYPE 2 DIABETES MELLITUS (HCC): ICD-10-CM

## 2024-12-03 LAB — HBA1C MFR BLD: 7.6 %

## 2024-12-03 RX ORDER — ATORVASTATIN CALCIUM 40 MG/1
40 TABLET, FILM COATED ORAL DAILY
Qty: 90 TABLET | Refills: 1 | Status: SHIPPED | OUTPATIENT
Start: 2024-12-03

## 2024-12-03 RX ORDER — GLIMEPIRIDE 4 MG/1
4 TABLET ORAL
Qty: 90 TABLET | Refills: 0 | Status: SHIPPED | OUTPATIENT
Start: 2024-12-03

## 2024-12-03 RX ORDER — METHOTREXATE 25 MG/ML
INJECTION, SOLUTION INTRA-ARTERIAL; INTRAMUSCULAR; INTRAVENOUS
COMMUNITY
Start: 2024-11-08

## 2024-12-03 RX ORDER — DAPAGLIFLOZIN 10 MG/1
10 TABLET, FILM COATED ORAL EVERY MORNING
Qty: 90 TABLET | Refills: 0 | Status: SHIPPED | OUTPATIENT
Start: 2024-12-03

## 2024-12-03 RX ORDER — FOLIC ACID 1 MG/1
TABLET ORAL
COMMUNITY
Start: 2024-11-07

## 2024-12-03 RX ORDER — PEN NEEDLE, DIABETIC 29 G X1/2"
NEEDLE, DISPOSABLE MISCELLANEOUS
COMMUNITY
Start: 2024-11-08

## 2024-12-03 NOTE — TELEPHONE ENCOUNTER
Medication:   Requested Prescriptions     Pending Prescriptions Disp Refills    Continuous Glucose Sensor (DEXCOM G7 SENSOR) MISC       Sig: by Does not apply route       Last Filled:      Patient Phone Number: [phone] (home)     Last appt: 12/3/2024   Next appt: 1/28/2025    Last Labs DM:   Lab Results   Component Value Date/Time    LABA1C 7.6 12/03/2024 03:08 PM

## 2024-12-03 NOTE — TELEPHONE ENCOUNTER
She was going to trial for a week and see if she ,liked the dexcom and then ,let me know- rx would be sent then

## 2024-12-03 NOTE — PROGRESS NOTES
PROGRESS NOTE  Date of Service:  12/3/2024    SUBJECTIVE:  Patient ID: Deana Mcginnis is a 29 y.o. female    ASSESSMENT  1. Type 2 diabetes mellitus without complication, without long-term current use of insulin ([facility])    2. Hyperlipidemia associated with type 2 diabetes mellitus ([facility])    3. Gastroesophageal reflux disease without esophagitis    4. Dacryoadenitis of right lacrimal gland        PLAN:   1. Type 2 diabetes mellitus without complication, without long-term current use of insulin (HCC)  -     POCT glycosylated hemoglobin (Hb A1C)  -     glimepiride (AMARYL) 4 MG tablet; Take 1 tablet by mouth every morning (before breakfast), Disp-90 tablet, R-0Normal  -     dapagliflozin (FARXIGA) 10 MG tablet; Take 1 tablet by mouth every morning, Disp-90 tablet, R-0Normal  2. Hyperlipidemia associated with type 2 diabetes mellitus (HCC)  -     atorvastatin (LIPITOR) 40 MG tablet; Take 1 tablet by mouth daily, Disp-90 tablet, R-1Normal  3. Gastroesophageal reflux disease without esophagitis  -     omeprazole (PRILOSEC) 20 MG delayed release capsule; Take 1 capsule by mouth every morning (before breakfast), Disp-90 capsule, R-0Normal  4. Dacryoadenitis of right lacrimal gland     No change in treatment now as she adjusts to new treatment. Check formualry for medication coverage  Continue statin  Gerd controlled  Continue care per rheumatologist and psychaitrist      Follow up in 3 months          HPI:         Diabetes-   Blood sugars overall stable  No hypoglycemic episodes  Eye exam 2024  Denies neuropathy      Hyperlipidemia-tolerates statin well, last labs showed continued high triglycerides    Asthma-stable    Gerd-well-controlled on her proton pump inhibitor      Bipolar disorder and anxiety-she has been under the care of of a psychiatrist and feels symptoms are overall well-controlled    Dacroadenitis- has just started methotrexate, symptoms not improving yet  Side effects are severe lulu first 2

## 2024-12-03 NOTE — TELEPHONE ENCOUNTER
Pt states she forgot to get these refilled during apt.     DexCom 7 sensors     Send to [facility] in [place]

## 2024-12-04 RX ORDER — ACYCLOVIR 400 MG/1
TABLET ORAL
OUTPATIENT
Start: 2024-12-04

## 2024-12-04 NOTE — TELEPHONE ENCOUNTER
[phone] ([place])   [facility] for return call, please give message from provider, thank you    Sent aleat message

## 2024-12-04 NOTE — TELEPHONE ENCOUNTER
Patient reports the sample that she received from the office only had the  in it, it didn't contain the sensors. Patient is confused, please advise.

## 2024-12-05 ENCOUNTER — TELEPHONE (OUTPATIENT)
Dept: PRIMARY CARE CLINIC | Age: 29
End: 2024-12-05

## 2024-12-05 NOTE — TELEPHONE ENCOUNTER
I am sorry to hear her insurance covers changed from this medication that she has been taking regularly.  If she could send me her formulary for diabetic medications so that we can determine

## 2024-12-05 NOTE — TELEPHONE ENCOUNTER
Pt states that insurance will not cover Farxiga in name brand or generic and is requesting a different Rx to be sent to [facility] in [place].

## 2024-12-07 PROBLEM — H04.001: Status: ACTIVE | Noted: 2024-12-07

## 2024-12-07 ASSESSMENT — PATIENT HEALTH QUESTIONNAIRE - PHQ9
6. FEELING BAD ABOUT YOURSELF - OR THAT YOU ARE A FAILURE OR HAVE LET YOURSELF OR YOUR FAMILY DOWN: NOT AT ALL
SUM OF ALL RESPONSES TO PHQ QUESTIONS 1-9: 0
9. THOUGHTS THAT YOU WOULD BE BETTER OFF DEAD, OR OF HURTING YOURSELF: NOT AT ALL
SUM OF ALL RESPONSES TO PHQ QUESTIONS 1-9: 0
10. IF YOU CHECKED OFF ANY PROBLEMS, HOW DIFFICULT HAVE THESE PROBLEMS MADE IT FOR YOU TO DO YOUR WORK, TAKE CARE OF THINGS AT HOME, OR GET ALONG WITH OTHER PEOPLE: NOT DIFFICULT AT ALL
8. MOVING OR SPEAKING SO SLOWLY THAT OTHER PEOPLE COULD HAVE NOTICED. OR THE OPPOSITE, BEING SO FIGETY OR RESTLESS THAT YOU HAVE BEEN MOVING AROUND A LOT MORE THAN USUAL: NOT AT ALL
4. FEELING TIRED OR HAVING LITTLE ENERGY: NOT AT ALL
SUM OF ALL RESPONSES TO PHQ9 QUESTIONS 1 & 2: 0
2. FEELING DOWN, DEPRESSED OR HOPELESS: NOT AT ALL
SUM OF ALL RESPONSES TO PHQ QUESTIONS 1-9: 0
3. TROUBLE FALLING OR STAYING ASLEEP: NOT AT ALL
7. TROUBLE CONCENTRATING ON THINGS, SUCH AS READING THE NEWSPAPER OR WATCHING TELEVISION: NOT AT ALL
SUM OF ALL RESPONSES TO PHQ QUESTIONS 1-9: 0
5. POOR APPETITE OR OVEREATING: NOT AT ALL
1. LITTLE INTEREST OR PLEASURE IN DOING THINGS: NOT AT ALL

## 2024-12-08 ENCOUNTER — OFFICE VISIT (OUTPATIENT)
Age: 29
End: 2024-12-08

## 2024-12-08 VITALS
BODY MASS INDEX: 41.95 KG/M2 | WEIGHT: 293 LBS | HEIGHT: 70 IN | RESPIRATION RATE: 16 BRPM | SYSTOLIC BLOOD PRESSURE: 132 MMHG | HEART RATE: 74 BPM | TEMPERATURE: 98.4 F | OXYGEN SATURATION: 81 % | DIASTOLIC BLOOD PRESSURE: 83 MMHG

## 2024-12-08 DIAGNOSIS — J06.9 BACTERIAL URI: Primary | ICD-10-CM

## 2024-12-08 DIAGNOSIS — B96.89 BACTERIAL URI: Primary | ICD-10-CM

## 2024-12-08 RX ORDER — BROMPHENIRAMINE MALEATE, PSEUDOEPHEDRINE HYDROCHLORIDE, AND DEXTROMETHORPHAN HYDROBROMIDE 2; 30; 10 MG/5ML; MG/5ML; MG/5ML
10 SYRUP ORAL 4 TIMES DAILY PRN
Qty: 118 ML | Refills: 0 | Status: SHIPPED | OUTPATIENT
Start: 2024-12-08 | End: 2024-12-15

## 2024-12-08 RX ORDER — AZITHROMYCIN 250 MG/1
TABLET, FILM COATED ORAL
Qty: 6 TABLET | Refills: 0 | Status: SHIPPED | OUTPATIENT
Start: 2024-12-08 | End: 2024-12-18

## 2024-12-09 RX ORDER — GLIMEPIRIDE 4 MG/1
4 TABLET ORAL
Qty: 90 TABLET | Refills: 0 | OUTPATIENT
Start: 2024-12-09

## 2024-12-09 NOTE — TELEPHONE ENCOUNTER
Too soon to fill    Medication:   Requested Prescriptions     Pending Prescriptions Disp Refills    glimepiride (AMARYL) 4 MG tablet [Pharmacy Med Name: GLIMEPIRIDE 4 MG TABLET] 90 tablet 0     Sig: TAKE ONE TABLET BY MOUTH EVERY MORNING BEFORE BREAKFAST        Last Filled:  12/3/24 #90    Patient Phone Number: [phone] (home) [phone] (work)    Last appt: 12/3/2024   Next appt: 1/28/2025    Last OARRS:       5/16/2017     3:58 PM   RX Monitoring   Periodic Controlled Substance Monitoring --

## 2024-12-15 ENCOUNTER — APPOINTMENT (OUTPATIENT)
Dept: GENERAL RADIOLOGY | Age: 29
End: 2024-12-15
Payer: MEDICARE

## 2024-12-15 ENCOUNTER — HOSPITAL ENCOUNTER (EMERGENCY)
Age: 29
Discharge: HOME | End: 2024-12-16
Attending: STUDENT IN AN ORGANIZED HEALTH CARE EDUCATION/TRAINING PROGRAM
Payer: MEDICARE

## 2024-12-15 VITALS
RESPIRATION RATE: 18 BRPM | WEIGHT: 293 LBS | HEIGHT: 70 IN | DIASTOLIC BLOOD PRESSURE: 83 MMHG | OXYGEN SATURATION: 96 % | SYSTOLIC BLOOD PRESSURE: 129 MMHG | BODY MASS INDEX: 41.95 KG/M2 | TEMPERATURE: 98.2 F | HEART RATE: 95 BPM

## 2024-12-15 DIAGNOSIS — J06.9 UPPER RESPIRATORY TRACT INFECTION, UNSPECIFIED TYPE: Primary | ICD-10-CM

## 2024-12-15 LAB
FLUAV RNA RESP QL NAA+PROBE: NOT DETECTED
FLUBV RNA RESP QL NAA+PROBE: NOT DETECTED
RSV AG NOSE QL: NEGATIVE
SARS-COV-2 RNA RESP QL NAA+PROBE: NOT DETECTED

## 2024-12-15 PROCEDURE — 94640 AIRWAY INHALATION TREATMENT: CPT

## 2024-12-15 PROCEDURE — 87636 SARSCOV2 & INF A&B AMP PRB: CPT

## 2024-12-15 PROCEDURE — 87807 RSV ASSAY W/OPTIC: CPT

## 2024-12-15 PROCEDURE — 6370000000 HC RX 637 (ALT 250 FOR IP): Performed by: PHYSICIAN ASSISTANT

## 2024-12-15 PROCEDURE — 99284 EMERGENCY DEPT VISIT MOD MDM: CPT

## 2024-12-15 PROCEDURE — 71046 X-RAY EXAM CHEST 2 VIEWS: CPT

## 2024-12-15 RX ORDER — IPRATROPIUM BROMIDE AND ALBUTEROL SULFATE 2.5; .5 MG/3ML; MG/3ML
1 SOLUTION RESPIRATORY (INHALATION) ONCE
Status: COMPLETED | OUTPATIENT
Start: 2024-12-15 | End: 2024-12-15

## 2024-12-15 RX ORDER — PREDNISONE 20 MG/1
60 TABLET ORAL ONCE
Status: COMPLETED | OUTPATIENT
Start: 2024-12-15 | End: 2024-12-15

## 2024-12-15 RX ADMIN — PREDNISONE 60 MG: 20 TABLET ORAL at 23:13

## 2024-12-15 RX ADMIN — IPRATROPIUM BROMIDE AND ALBUTEROL SULFATE 1 DOSE: .5; 3 SOLUTION RESPIRATORY (INHALATION) at 23:48

## 2024-12-15 ASSESSMENT — PAIN - FUNCTIONAL ASSESSMENT: PAIN_FUNCTIONAL_ASSESSMENT: 0-10

## 2024-12-15 ASSESSMENT — PAIN SCALES - GENERAL: PAINLEVEL_OUTOF10: 8

## 2024-12-15 ASSESSMENT — LIFESTYLE VARIABLES
HOW OFTEN DO YOU HAVE A DRINK CONTAINING ALCOHOL: NEVER
HOW MANY STANDARD DRINKS CONTAINING ALCOHOL DO YOU HAVE ON A TYPICAL DAY: PATIENT DOES NOT DRINK

## 2024-12-15 ASSESSMENT — PAIN DESCRIPTION - LOCATION: LOCATION: HEAD

## 2024-12-15 ASSESSMENT — PAIN DESCRIPTION - PAIN TYPE: TYPE: ACUTE PAIN

## 2024-12-16 RX ORDER — ALBUTEROL SULFATE 90 UG/1
2 INHALANT RESPIRATORY (INHALATION) 4 TIMES DAILY PRN
Qty: 18 G | Refills: 0 | Status: SHIPPED | OUTPATIENT
Start: 2024-12-16

## 2024-12-16 RX ORDER — AZITHROMYCIN 250 MG/1
TABLET, FILM COATED ORAL
Qty: 6 TABLET | Refills: 0 | Status: SHIPPED | OUTPATIENT
Start: 2024-12-16 | End: 2024-12-26

## 2024-12-16 ASSESSMENT — ENCOUNTER SYMPTOMS
NAUSEA: 0
ABDOMINAL PAIN: 0
COUGH: 1
SHORTNESS OF BREATH: 0
DIARRHEA: 0
VOMITING: 0
RHINORRHEA: 0

## 2024-12-16 NOTE — ED PROVIDER NOTES
In addition to the advanced practice provider, I personally saw Deana ANDERSEN Ras and performed a substantive portion of the visit including all aspects of the medical decision making.    Medical Decision Making    Pt with cough, congestion x 1 month. Initially got better with z pack given by urgent care but then cough got worse. Exposure to sick contacts at home, she has a daughter with similar symptoms.  Chart reviewed: pt with hx of HLD, GERD, bipolar, fibromyalgia, WILFREDO, obesity, former smoker.    On exam pt is resting comfortably  Cardiac RRR, no murmur  Lungs clear bilaterally, no increased work of breathing  Dry cough noted.    Patient appears nontoxic and is hemodynamically stable.  In no respiratory distress.  She was having some wheezing on arrival but I do not appreciate this here after breathing treatment has been administered.  Chest x-ray showing no signs of infiltrate or effusion.  COVID flu and RSV are negative.  Patient is strongly requesting antibiotic therapy today as she has a history of improvement in the symptoms with azithromycin given to her last month.  She has multiple drug allergies.  We discussed avoidance of antibiotics and partaking in supportive care, versus administration of antibiotics for possible atypical pneumonia.  She does choose to proceed with the latter understanding risks of antimicrobial resistance and diarrhea.  Respiratory return precautions given.    SEP-1  Is this patient to be included in the SEP-1 Core Measure due to severe sepsis or septic shock?   No     Exclusion criteria - the patient is NOT to be included for SEP-1 Core Measure due to:  2+ SIRS criteria are not met    Screenings     Royer Coma Scale  Eye Opening: Spontaneous  Best Verbal Response: Oriented  Best Motor Response: Obeys commands  Royer Coma Scale Score: 15     XR CHEST (2 VW)   Final Result   No acute cardiopulmonary process.           Labs Reviewed   COVID-19 & INFLUENZA COMBO   RSV DETECTION

## 2024-12-16 NOTE — ED PROVIDER NOTES
[facility] EMERGENCY DEPARTMENT  EMERGENCY DEPARTMENT ENCOUNTER        Pt Name: Deana Mcginnis  MRN: [id number]  Birthdate 1995  Date of evaluation: 12/15/2024  Provider: Savita Gambino PA-C  PCP: Ariela Cooper MD  Note Started: 12:47 AM EST 12/16/24       I have seen and evaluated this patient with my supervising physician Otilio Tovar MD.      CHIEF COMPLAINT       Chief Complaint   Patient presents with    Cough     Pt w c/o cough       HISTORY OF PRESENT ILLNESS: 1 or more Elements     History From: Patient  Limitations to history : None    Deana Mcginnis is a 29 y.o. female who presents to the emergency department today for evaluation for concerns of a cough.  Mom reports that she has had a cough for a month to a month and a half.  Patient reports that she was recently seen at urgent care, and she was prescribed Bromfed as well as a Z-Ollie.  Mom reports that she started to get better, however then the cough worsened, returned and she began to feel nausea and generally not feeling well.  Patient reports that she is having some shortness of breath, she is a smoker.  She is also diabetic.  Mom reports the cough is noted active sputum, no hemoptysis.  She has no recent travels immobilizations or surgeries.  She is not any fevers.  She is nauseous although no vomiting or diarrhea.  She has no urinary symptoms, patient otherwise has no other complaints.    Nursing Notes were all reviewed and agreed with or any disagreements were addressed in the HPI.    REVIEW OF SYSTEMS :      Review of Systems   Constitutional:  Negative for activity change, appetite change, chills and fever.   HENT:  Positive for congestion. Negative for rhinorrhea.    Respiratory:  Positive for cough. Negative for shortness of breath.    Cardiovascular:  Negative for chest pain.   Gastrointestinal:  Negative for abdominal pain, diarrhea, nausea and vomiting.   Genitourinary:  Negative for difficulty urinating, dysuria

## 2024-12-25 ENCOUNTER — APPOINTMENT (OUTPATIENT)
Dept: CT IMAGING | Age: 29
End: 2024-12-25
Payer: COMMERCIAL

## 2024-12-25 ENCOUNTER — HOSPITAL ENCOUNTER (EMERGENCY)
Age: 29
Discharge: ANOTHER HOSPITAL | End: 2024-12-26
Payer: COMMERCIAL

## 2024-12-25 DIAGNOSIS — E87.1 HYPONATREMIA: ICD-10-CM

## 2024-12-25 DIAGNOSIS — L03.213 PERIORBITAL CELLULITIS OF RIGHT EYE: Primary | ICD-10-CM

## 2024-12-25 DIAGNOSIS — H40.053 INCREASED INTRAOCULAR PRESSURE, BILATERAL: ICD-10-CM

## 2024-12-25 DIAGNOSIS — R73.9 HYPERGLYCEMIA: ICD-10-CM

## 2024-12-25 DIAGNOSIS — H16.9 KERATITIS, RIGHT: ICD-10-CM

## 2024-12-25 LAB
ANION GAP SERPL CALC-SCNC: 10 MEQ/L (ref 8–16)
BASOPHILS ABSOLUTE: 0 THOU/MM3 (ref 0–0.1)
BASOPHILS NFR BLD AUTO: 0.4 %
BUN SERPL-MCNC: 10 MG/DL (ref 7–22)
CALCIUM SERPL-MCNC: 9.3 MG/DL (ref 8.5–10.5)
CHLORIDE SERPL-SCNC: 96 MEQ/L (ref 98–111)
CO2 SERPL-SCNC: 24 MEQ/L (ref 23–33)
CREAT SERPL-MCNC: 0.5 MG/DL (ref 0.4–1.2)
CRP SERPL-MCNC: 1.64 MG/DL (ref 0–1)
DEPRECATED RDW RBC AUTO: 47.8 FL (ref 35–45)
EOSINOPHIL NFR BLD AUTO: 2.5 %
EOSINOPHILS ABSOLUTE: 0.3 THOU/MM3 (ref 0–0.4)
ERYTHROCYTE [DISTWIDTH] IN BLOOD BY AUTOMATED COUNT: 14 % (ref 11.5–14.5)
ERYTHROCYTE [SEDIMENTATION RATE] IN BLOOD BY WESTERGREN METHOD: 32 MM/HR (ref 0–20)
GFR SERPL CREATININE-BSD FRML MDRD: > 90 ML/MIN/1.73M2
GLUCOSE SERPL-MCNC: 259 MG/DL (ref 70–108)
HCT VFR BLD AUTO: 41.5 % (ref 37–47)
HGB BLD-MCNC: 13.7 GM/DL (ref 12–16)
IMM GRANULOCYTES # BLD AUTO: 0.04 THOU/MM3 (ref 0–0.07)
IMM GRANULOCYTES NFR BLD AUTO: 0.4 %
LACTIC ACID, SEPSIS: 2.2 MMOL/L (ref 0.5–1.9)
LACTIC ACID, SEPSIS: 2.5 MMOL/L (ref 0.5–1.9)
LYMPHOCYTES ABSOLUTE: 3 THOU/MM3 (ref 1–4.8)
LYMPHOCYTES NFR BLD AUTO: 28.1 %
MCH RBC QN AUTO: 30.6 PG (ref 26–33)
MCHC RBC AUTO-ENTMCNC: 33 GM/DL (ref 32.2–35.5)
MCV RBC AUTO: 92.8 FL (ref 81–99)
MONOCYTES ABSOLUTE: 0.5 THOU/MM3 (ref 0.4–1.3)
MONOCYTES NFR BLD AUTO: 4.6 %
NEUTROPHILS ABSOLUTE: 6.8 THOU/MM3 (ref 1.8–7.7)
NEUTROPHILS NFR BLD AUTO: 64 %
NRBC BLD AUTO-RTO: 0 /100 WBC
OSMOLALITY SERPL CALC.SUM OF ELEC: 268.8 MOSMOL/KG (ref 275–300)
PLATELET # BLD AUTO: 196 THOU/MM3 (ref 130–400)
PMV BLD AUTO: 10.4 FL (ref 9.4–12.4)
POTASSIUM SERPL-SCNC: 3.9 MEQ/L (ref 3.5–5.2)
PROCALCITONIN SERPL IA-MCNC: 0.04 NG/ML (ref 0.01–0.09)
RBC # BLD AUTO: 4.47 MILL/MM3 (ref 4.2–5.4)
SODIUM SERPL-SCNC: 130 MEQ/L (ref 135–145)
WBC # BLD AUTO: 10.7 THOU/MM3 (ref 4.8–10.8)

## 2024-12-25 PROCEDURE — 99285 EMERGENCY DEPT VISIT HI MDM: CPT

## 2024-12-25 PROCEDURE — 96365 THER/PROPH/DIAG IV INF INIT: CPT

## 2024-12-25 PROCEDURE — 85651 RBC SED RATE NONAUTOMATED: CPT

## 2024-12-25 PROCEDURE — 96366 THER/PROPH/DIAG IV INF ADDON: CPT

## 2024-12-25 PROCEDURE — 96368 THER/DIAG CONCURRENT INF: CPT

## 2024-12-25 PROCEDURE — 70481 CT ORBIT/EAR/FOSSA W/DYE: CPT

## 2024-12-25 PROCEDURE — 84145 PROCALCITONIN (PCT): CPT

## 2024-12-25 PROCEDURE — 83605 ASSAY OF LACTIC ACID: CPT

## 2024-12-25 PROCEDURE — 87147 CULTURE TYPE IMMUNOLOGIC: CPT

## 2024-12-25 PROCEDURE — 2580000003 HC RX 258: Performed by: PHYSICIAN ASSISTANT

## 2024-12-25 PROCEDURE — 6370000000 HC RX 637 (ALT 250 FOR IP): Performed by: PHYSICIAN ASSISTANT

## 2024-12-25 PROCEDURE — 86140 C-REACTIVE PROTEIN: CPT

## 2024-12-25 PROCEDURE — 6360000002 HC RX W HCPCS: Performed by: PHYSICIAN ASSISTANT

## 2024-12-25 PROCEDURE — 80076 HEPATIC FUNCTION PANEL: CPT

## 2024-12-25 PROCEDURE — 87040 BLOOD CULTURE FOR BACTERIA: CPT

## 2024-12-25 PROCEDURE — 85025 COMPLETE CBC W/AUTO DIFF WBC: CPT

## 2024-12-25 PROCEDURE — 6360000004 HC RX CONTRAST MEDICATION: Performed by: PHYSICIAN ASSISTANT

## 2024-12-25 PROCEDURE — 36415 COLL VENOUS BLD VENIPUNCTURE: CPT

## 2024-12-25 PROCEDURE — 87801 DETECT AGNT MULT DNA AMPLI: CPT

## 2024-12-25 PROCEDURE — 80048 BASIC METABOLIC PNL TOTAL CA: CPT

## 2024-12-25 RX ORDER — 0.9 % SODIUM CHLORIDE 0.9 %
30 INTRAVENOUS SOLUTION INTRAVENOUS ONCE
Status: COMPLETED | OUTPATIENT
Start: 2024-12-25 | End: 2024-12-26

## 2024-12-25 RX ORDER — IOPAMIDOL 755 MG/ML
80 INJECTION, SOLUTION INTRAVASCULAR
Status: COMPLETED | OUTPATIENT
Start: 2024-12-25 | End: 2024-12-25

## 2024-12-25 RX ORDER — PROPARACAINE HYDROCHLORIDE 5 MG/ML
1 SOLUTION/ DROPS OPHTHALMIC ONCE
Status: DISCONTINUED | OUTPATIENT
Start: 2024-12-25 | End: 2024-12-26 | Stop reason: HOSPADM

## 2024-12-25 RX ORDER — ERYTHROMYCIN 5 MG/G
OINTMENT OPHTHALMIC ONCE
Status: COMPLETED | OUTPATIENT
Start: 2024-12-25 | End: 2024-12-25

## 2024-12-25 RX ORDER — CLINDAMYCIN PHOSPHATE 900 MG/50ML
900 INJECTION, SOLUTION INTRAVENOUS ONCE
Status: COMPLETED | OUTPATIENT
Start: 2024-12-25 | End: 2024-12-26

## 2024-12-25 RX ADMIN — CLINDAMYCIN PHOSPHATE 900 MG: 900 INJECTION, SOLUTION INTRAVENOUS at 22:51

## 2024-12-25 RX ADMIN — IOPAMIDOL 80 ML: 755 INJECTION, SOLUTION INTRAVENOUS at 22:43

## 2024-12-25 RX ADMIN — SODIUM CHLORIDE 2055 ML: 9 INJECTION, SOLUTION INTRAVENOUS at 23:41

## 2024-12-25 RX ADMIN — ERYTHROMYCIN: 5 OINTMENT OPHTHALMIC at 23:48

## 2024-12-25 ASSESSMENT — PAIN SCALES - GENERAL: PAINLEVEL_OUTOF10: 8

## 2024-12-25 ASSESSMENT — PAIN - FUNCTIONAL ASSESSMENT: PAIN_FUNCTIONAL_ASSESSMENT: 0-10

## 2024-12-26 VITALS
RESPIRATION RATE: 19 BRPM | HEART RATE: 85 BPM | WEIGHT: 293 LBS | BODY MASS INDEX: 41.95 KG/M2 | TEMPERATURE: 98.1 F | DIASTOLIC BLOOD PRESSURE: 83 MMHG | OXYGEN SATURATION: 97 % | SYSTOLIC BLOOD PRESSURE: 134 MMHG | HEIGHT: 70 IN

## 2024-12-26 LAB
ACB COMPLEX DNA BLD POS QL NAA+NON-PROBE: NOT DETECTED
ALBUMIN SERPL BCG-MCNC: 3.9 G/DL (ref 3.5–5.1)
ALP SERPL-CCNC: 118 U/L (ref 38–126)
ALT SERPL W/O P-5'-P-CCNC: 33 U/L (ref 11–66)
AST SERPL-CCNC: 27 U/L (ref 5–40)
B FRAGILIS DNA BLD POS QL NAA+NON-PROBE: NOT DETECTED
BILIRUB CONJ SERPL-MCNC: < 0.1 MG/DL (ref 0.1–13.8)
BILIRUB SERPL-MCNC: 0.2 MG/DL (ref 0.3–1.2)
BLACTX-M ISLT/SPM QL: ABNORMAL
BLAIMP ISLT/SPM QL: ABNORMAL
BLAKPC ISLT/SPM QL: ABNORMAL
BLAOXA-48-LIKE ISLT/SPM QL: ABNORMAL
BLAVIM ISLT/SPM QL: ABNORMAL
BOTTLE TYPE: ABNORMAL
C ALBICANS DNA BLD POS QL NAA+NON-PROBE: NOT DETECTED
C AURIS DNA BLD POS QL NAA+NON-PROBE: NOT DETECTED
C GATTII+NEOFOR DNA BLD POS QL NAA+N-PRB: NOT DETECTED
C GLABRATA DNA BLD POS QL NAA+NON-PROBE: NOT DETECTED
C KRUSEI DNA BLD POS QL NAA+NON-PROBE: NOT DETECTED
C PARAP DNA BLD POS QL NAA+NON-PROBE: NOT DETECTED
C TROPICLS DNA BLD POS QL NAA+NON-PROBE: NOT DETECTED
COAG NEG STAPH DNA BLD QL NAA+PROBE: DETECTED
COLISTIN RES MCR-1 ISLT/SPM QL: ABNORMAL
E CLOAC COMP DNA BLD POS NAA+NON-PROBE: NOT DETECTED
E COLI DNA BLD POS QL NAA+NON-PROBE: NOT DETECTED
E FAECALIS DNA BLD POS QL NAA+NON-PROBE: NOT DETECTED
E FAECIUM DNA BLD POS QL NAA+NON-PROBE: NOT DETECTED
ENTEROBACTERALES DNA BLD POS NAA+N-PRB: NOT DETECTED
GP B STREP DNA SPEC QL NAA+PROBE: NOT DETECTED
GP B STREP DNA SPEC QL NAA+PROBE: NOT DETECTED
HAEM INFLU DNA BLD POS QL NAA+NON-PROBE: NOT DETECTED
K OXYTOCA DNA BLD POS QL NAA+NON-PROBE: NOT DETECTED
K OXYTOCA DNA BLD POS QL NAA+NON-PROBE: NOT DETECTED
KLEBSIELLA SP DNA BLD POS QL NAA+NON-PRB: NOT DETECTED
L MONOCYTOG DNA BLD POS QL NAA+NON-PROBE: NOT DETECTED
MECA ISLT/SPM QL: NOT DETECTED
MECA+MECC+MREJ ISLT/SPM QL: ABNORMAL
N MEN DNA BLD POS QL NAA+NON-PROBE: NOT DETECTED
NDM: ABNORMAL
P AERUGINOSA DNA BLD POS NAA+NON-PROBE: NOT DETECTED
PROT SERPL-MCNC: 6.9 G/DL (ref 6.1–8)
PROTEUS SPP: NOT DETECTED
S AUREUS DNA BLD POS QL NAA+NON-PROBE: NOT DETECTED
S EPIDERMIDIS DNA BLD POS QL NAA+NON-PRB: DETECTED
S LUGDUNENSIS DNA BLD POS QL NAA+NON-PRB: NOT DETECTED
S MALTOPHILIA DNA BLD POS QL NAA+NON-PRB: NOT DETECTED
S MARCESCENS DNA BLD POS NAA+NON-PROBE: NOT DETECTED
S PYO DNA THROAT QL NAA+PROBE: NOT DETECTED
SALMONELLA DNA BLD POS QL NAA+NON-PROBE: NOT DETECTED
SOURCE OF BLOOD CULTURE: ABNORMAL
STREPTOCOCCUS DNA BLD QL NAA+PROBE: NOT DETECTED
VANA+VANB ISLT/SPM QL: ABNORMAL

## 2024-12-26 PROCEDURE — 2580000003 HC RX 258: Performed by: PHYSICIAN ASSISTANT

## 2024-12-26 PROCEDURE — 6360000002 HC RX W HCPCS: Performed by: PHYSICIAN ASSISTANT

## 2024-12-26 PROCEDURE — 96375 TX/PRO/DX INJ NEW DRUG ADDON: CPT

## 2024-12-26 RX ORDER — LEVOFLOXACIN 500 MG/1
750 TABLET, FILM COATED ORAL DAILY
Status: DISCONTINUED | OUTPATIENT
Start: 2024-12-26 | End: 2024-12-26 | Stop reason: HOSPADM

## 2024-12-26 RX ORDER — METRONIDAZOLE 500 MG/100ML
500 INJECTION, SOLUTION INTRAVENOUS EVERY 8 HOURS
Status: DISCONTINUED | OUTPATIENT
Start: 2024-12-26 | End: 2024-12-26 | Stop reason: HOSPADM

## 2024-12-26 RX ORDER — KETOROLAC TROMETHAMINE 30 MG/ML
30 INJECTION, SOLUTION INTRAMUSCULAR; INTRAVENOUS ONCE
Status: COMPLETED | OUTPATIENT
Start: 2024-12-26 | End: 2024-12-26

## 2024-12-26 RX ADMIN — VANCOMYCIN HYDROCHLORIDE 2500 MG: 5 INJECTION, POWDER, LYOPHILIZED, FOR SOLUTION INTRAVENOUS at 00:55

## 2024-12-26 RX ADMIN — KETOROLAC TROMETHAMINE 30 MG: 30 INJECTION, SOLUTION INTRAMUSCULAR at 04:54

## 2024-12-26 ASSESSMENT — TONOMETRY
OD_IOP_MMHG: 35
OS_IOP_MMHG: 31
IOP_HANDHELD: 1

## 2024-12-26 ASSESSMENT — VISUAL ACUITY: OU: 1

## 2024-12-26 NOTE — PROGRESS NOTES
Pharmacy changed vancomycin loading dose to 2500mg (this is the cap for 25mg/kg which is loading dose per [facility] protocol).  Chanel Muniz [facility], [facility], [facility]  12/25/2024     11:39 PM

## 2024-12-26 NOTE — ED NOTES
Pt ambulated to bathroom and back to bed in stable condition. Updated on POC, denies any additional needs at this time. Call light remains in place

## 2024-12-26 NOTE — ED TRIAGE NOTES
Pt to ED c/o facial swelling. Pt states she was diagnosed with cellulitis in her right eye lid and it is flared up yesterday but has been getting worst. Pt states she has been admitted for this in the past. Pt states it is painful to touch and has slightly blurry vision. Pt A&O.

## 2024-12-26 NOTE — ED NOTES
Report received from Shoaib ESPAÑA. Pt in bed, eyes open, respirations even and unlabored. Vital signs reassessed, no needs voiced.

## 2024-12-26 NOTE — ED PROVIDER NOTES
eye with correction, 20/20 both eyes.    Patient does have evidence of fluorescein uptake to the inferior aspect of the cornea, this is very superficial.    No hypopyon or hyphema.  No evidence of dendrites.  This was on Woods lamp examination.   Cardiovascular:      Rate and Rhythm: Normal rate and regular rhythm.      Pulses: Normal pulses.      Heart sounds: Normal heart sounds. No murmur heard.  Pulmonary:      Effort: Pulmonary effort is normal. No respiratory distress.      Breath sounds: Normal breath sounds. No stridor. No wheezing, rhonchi or rales.   Abdominal:      General: Abdomen is flat.      Palpations: Abdomen is soft.   Musculoskeletal:         General: Normal range of motion.      Cervical back: Normal range of motion.   Skin:     General: Skin is warm and dry.      Coloration: Skin is not jaundiced.      Findings: No rash.   Neurological:      General: No focal deficit present.      Mental Status: She is alert and oriented to person, place, and time.      Cranial Nerves: No cranial nerve deficit.      Sensory: No sensory deficit.   Psychiatric:         Mood and Affect: Mood normal.         Behavior: Behavior normal.         FORMAL DIAGNOSTIC RESULTS     RADIOLOGY: Interpretation per the Radiologist below, if available at the time of this note (none if blank):    CT ORBITS W CONTRAST   Final Result      Right periorbital soft tissue swelling and subcutaneous density. This may    be compatible with the history of periorbital cellulitis. Correlate    clinically.      This document has been electronically signed by: Altaf Ryan MD on    12/25/2024 11:24 PM      All CTs at this facility use dose modulation techniques and iterative    reconstructions, and/or weight-based dosing   when appropriate to reduce radiation to a low as reasonably achievable.          LABS: (none if blank)  Labs Reviewed   CBC WITH AUTO DIFFERENTIAL - Abnormal; Notable for the following components:       Result Value    RDW-SD

## 2024-12-27 ENCOUNTER — PATIENT MESSAGE (OUTPATIENT)
Dept: PRIMARY CARE CLINIC | Age: 29
End: 2024-12-27

## 2024-12-27 LAB
BACTERIA BLD AEROBE CULT: ABNORMAL
BACTERIA BLD AEROBE CULT: NORMAL
ORGANISM: ABNORMAL

## 2024-12-28 ENCOUNTER — TELEPHONE (OUTPATIENT)
Dept: PHARMACY | Age: 29
End: 2024-12-28

## 2024-12-28 NOTE — TELEPHONE ENCOUNTER
Pharmacy Note  ED Culture Follow-up    Deana Mcgninis is a 29 y.o. female.     Allergies: Amoxicillin, Benzonatate, Cephalexin, Loracarbef, Dilaudid [hydromorphone hcl], Flexeril [cyclobenzaprine], Diphenhydramine, Phenytoin, Augmentin [amoxicillin-pot clavulanate], Clonazepam, and Minocycline     Labs:  Lab Results   Component Value Date    BUN 10 12/25/2024    CREATININE 0.5 12/25/2024    WBC 10.7 12/25/2024     Estimated Creatinine Clearance: 265 mL/min (based on SCr of 0.5 mg/dL).    Current antimicrobials:   None; transferred to [facility] from ED by private car    ASSESSMENT:  Micro results:   Blood culture: positive for Staphylococcus (coagulase negative)     PLAN:  Need for intervention: Yes  Discussed with: Keven Menard DO  Chosen treatment:    Return to emergency department for evaluation    Patient response:   Called and spoke with patient. Patient stated she has been discharged from [facility]. Reviewed culture result and requested patient return to ED or nearest ED for evaluation. Patient stated that [facility] repeated blood cultures. Patient was instructed by [facility] to follow-up on blood culture results through [facility]. Patient stated that she is aware current culture result is potentially contamination and is awaiting cultures from [facility] to result. Patient instructed to return to closest emergency department for evaluation if blood cultures are positive from [facility]. Patient demonstrated understanding of the above plan.     Called/sent in prescription to: Not applicable    Please call with any questions. Ext. [phone]    Loan Ferrer RPH, PharmD 4:00 PM 12/28/2024

## 2024-12-30 LAB — BACTERIA BLD AEROBE CULT: NORMAL

## 2025-01-15 ENCOUNTER — APPOINTMENT (OUTPATIENT)
Dept: GENERAL RADIOLOGY | Age: 30
End: 2025-01-15
Payer: COMMERCIAL

## 2025-01-15 ENCOUNTER — HOSPITAL ENCOUNTER (EMERGENCY)
Age: 30
Discharge: HOME | End: 2025-01-15
Payer: COMMERCIAL

## 2025-01-15 VITALS
OXYGEN SATURATION: 96 % | BODY MASS INDEX: 47.35 KG/M2 | SYSTOLIC BLOOD PRESSURE: 138 MMHG | HEART RATE: 97 BPM | DIASTOLIC BLOOD PRESSURE: 66 MMHG | RESPIRATION RATE: 18 BRPM | WEIGHT: 293 LBS | TEMPERATURE: 99.1 F

## 2025-01-15 DIAGNOSIS — J45.21 MILD INTERMITTENT ASTHMA WITH EXACERBATION: ICD-10-CM

## 2025-01-15 DIAGNOSIS — B34.2 CORONAVIRUS INFECTION: Primary | ICD-10-CM

## 2025-01-15 LAB
ANION GAP SERPL CALC-SCNC: 13 MEQ/L (ref 8–16)
BASOPHILS ABSOLUTE: 0 THOU/MM3 (ref 0–0.1)
BASOPHILS NFR BLD AUTO: 0.3 %
BUN SERPL-MCNC: 9 MG/DL (ref 7–22)
CALCIUM SERPL-MCNC: 9.4 MG/DL (ref 8.5–10.5)
CHLORIDE SERPL-SCNC: 96 MEQ/L (ref 98–111)
CO2 SERPL-SCNC: 24 MEQ/L (ref 23–33)
CREAT SERPL-MCNC: 0.5 MG/DL (ref 0.4–1.2)
DEPRECATED RDW RBC AUTO: 45.4 FL (ref 35–45)
EKG ATRIAL RATE: 101 BPM
EKG P AXIS: 63 DEGREES
EKG P-R INTERVAL: 140 MS
EKG Q-T INTERVAL: 350 MS
EKG QRS DURATION: 94 MS
EKG QTC CALCULATION (BAZETT): 453 MS
EKG R AXIS: 69 DEGREES
EKG T AXIS: 45 DEGREES
EKG VENTRICULAR RATE: 101 BPM
EOSINOPHIL NFR BLD AUTO: 1.2 %
EOSINOPHILS ABSOLUTE: 0.1 THOU/MM3 (ref 0–0.4)
ERYTHROCYTE [DISTWIDTH] IN BLOOD BY AUTOMATED COUNT: 13.5 % (ref 11.5–14.5)
FLUAV RNA RESP QL NAA+PROBE: NOT DETECTED
FLUBV RNA RESP QL NAA+PROBE: NOT DETECTED
GFR SERPL CREATININE-BSD FRML MDRD: > 90 ML/MIN/1.73M2
GLUCOSE SERPL-MCNC: 246 MG/DL (ref 70–108)
HCT VFR BLD AUTO: 41.9 % (ref 37–47)
HGB BLD-MCNC: 14 GM/DL (ref 12–16)
IMM GRANULOCYTES # BLD AUTO: 0.04 THOU/MM3 (ref 0–0.07)
IMM GRANULOCYTES NFR BLD AUTO: 0.4 %
LYMPHOCYTES ABSOLUTE: 3.2 THOU/MM3 (ref 1–4.8)
LYMPHOCYTES NFR BLD AUTO: 33.1 %
MCH RBC QN AUTO: 30.5 PG (ref 26–33)
MCHC RBC AUTO-ENTMCNC: 33.4 GM/DL (ref 32.2–35.5)
MCV RBC AUTO: 91.3 FL (ref 81–99)
MONOCYTES ABSOLUTE: 0.7 THOU/MM3 (ref 0.4–1.3)
MONOCYTES NFR BLD AUTO: 6.9 %
NEUTROPHILS ABSOLUTE: 5.6 THOU/MM3 (ref 1.8–7.7)
NEUTROPHILS NFR BLD AUTO: 58.1 %
NRBC BLD AUTO-RTO: 0 /100 WBC
NT-PROBNP SERPL IA-MCNC: < 36 PG/ML (ref 0–124)
OSMOLALITY SERPL CALC.SUM OF ELEC: 273.3 MOSMOL/KG (ref 275–300)
PLATELET # BLD AUTO: 206 THOU/MM3 (ref 130–400)
PMV BLD AUTO: 10.3 FL (ref 9.4–12.4)
POTASSIUM SERPL-SCNC: 3.9 MEQ/L (ref 3.5–5.2)
RBC # BLD AUTO: 4.59 MILL/MM3 (ref 4.2–5.4)
SARS-COV-2 RNA RESP QL NAA+PROBE: NOT DETECTED
SODIUM SERPL-SCNC: 133 MEQ/L (ref 135–145)
TROPONIN, HIGH SENSITIVITY: < 6 NG/L (ref 0–12)
WBC # BLD AUTO: 9.6 THOU/MM3 (ref 4.8–10.8)

## 2025-01-15 PROCEDURE — 94640 AIRWAY INHALATION TREATMENT: CPT

## 2025-01-15 PROCEDURE — 93010 ELECTROCARDIOGRAM REPORT: CPT | Performed by: INTERNAL MEDICINE

## 2025-01-15 PROCEDURE — 36415 COLL VENOUS BLD VENIPUNCTURE: CPT

## 2025-01-15 PROCEDURE — 87636 SARSCOV2 & INF A&B AMP PRB: CPT

## 2025-01-15 PROCEDURE — 93005 ELECTROCARDIOGRAM TRACING: CPT | Performed by: STUDENT IN AN ORGANIZED HEALTH CARE EDUCATION/TRAINING PROGRAM

## 2025-01-15 PROCEDURE — 83880 ASSAY OF NATRIURETIC PEPTIDE: CPT

## 2025-01-15 PROCEDURE — 99285 EMERGENCY DEPT VISIT HI MDM: CPT

## 2025-01-15 PROCEDURE — 84484 ASSAY OF TROPONIN QUANT: CPT

## 2025-01-15 PROCEDURE — 6370000000 HC RX 637 (ALT 250 FOR IP): Performed by: PHYSICIAN ASSISTANT

## 2025-01-15 PROCEDURE — 71046 X-RAY EXAM CHEST 2 VIEWS: CPT

## 2025-01-15 PROCEDURE — 80048 BASIC METABOLIC PNL TOTAL CA: CPT

## 2025-01-15 PROCEDURE — 85025 COMPLETE CBC W/AUTO DIFF WBC: CPT

## 2025-01-15 RX ORDER — IPRATROPIUM BROMIDE AND ALBUTEROL SULFATE 2.5; .5 MG/3ML; MG/3ML
1 SOLUTION RESPIRATORY (INHALATION) ONCE
Status: COMPLETED | OUTPATIENT
Start: 2025-01-15 | End: 2025-01-15

## 2025-01-15 RX ORDER — NEBULIZER ACCESSORIES
1 KIT MISCELLANEOUS
Qty: 1 KIT | Refills: 0 | Status: SHIPPED | OUTPATIENT
Start: 2025-01-15

## 2025-01-15 RX ORDER — IPRATROPIUM BROMIDE AND ALBUTEROL SULFATE 2.5; .5 MG/3ML; MG/3ML
1 SOLUTION RESPIRATORY (INHALATION) EVERY 4 HOURS
Qty: 360 ML | Refills: 0 | Status: SHIPPED | OUTPATIENT
Start: 2025-01-15

## 2025-01-15 RX ADMIN — IPRATROPIUM BROMIDE AND ALBUTEROL SULFATE 1 DOSE: .5; 3 SOLUTION RESPIRATORY (INHALATION) at 02:22

## 2025-01-15 ASSESSMENT — ENCOUNTER SYMPTOMS
SORE THROAT: 1
NAUSEA: 1
SHORTNESS OF BREATH: 1
WHEEZING: 1
ABDOMINAL PAIN: 1
COUGH: 1
DIARRHEA: 0
FACIAL SWELLING: 1
TROUBLE SWALLOWING: 0

## 2025-01-15 ASSESSMENT — PAIN SCALES - GENERAL: PAINLEVEL_OUTOF10: 8

## 2025-01-15 ASSESSMENT — PAIN - FUNCTIONAL ASSESSMENT: PAIN_FUNCTIONAL_ASSESSMENT: 0-10

## 2025-01-15 NOTE — ED PROVIDER NOTES
[facility] EMERGENCY DEPT      Pt Name: Deana Mcginnis  MRN: [id number]  Birthdate 1995  Date of evaluation: 1/15/2025  Provider: Tayla Dowell PA-C    CHIEF COMPLAINT       Chief Complaint   Patient presents with    Shortness of Breath       Nurses Notes reviewed and I agree except as noted in the HPI.      HISTORY OF PRESENT ILLNESS    Deana Mcginnis is a 29 y.o. female with history of diabetes and asthma who presents from home driven by mother for chest pain and shortness of breath. Patient has extensive history of orbital cellulitis/facial swelling and was recently diagnosed with coronavirus on 1-11.  Patient reports cough, chest pain, lung pain, and shortness of breath after coughing.  She states her chest pain gets worse with taking a deep breath. She states that she feels like the bottom half of her face is swollen.  She can not take a deep breath with out coughing and wheezing.  There are low-grade fevers of 100, dizziness, fatigue, myalgias, sore throat, and decreased appetite.  Vomiting and diarrhea originally present have resolved.  Patient is urinating normally.  Patient denies possibility of pregnancy and is a smoker.    REVIEW OF SYSTEMS     Review of Systems   Constitutional:  Positive for activity change, appetite change, chills, fatigue and fever.   HENT:  Positive for congestion, facial swelling and sore throat. Negative for trouble swallowing.    Respiratory:  Positive for cough, shortness of breath and wheezing.    Cardiovascular:  Positive for chest pain. Negative for leg swelling.   Gastrointestinal:  Positive for abdominal pain and nausea. Negative for diarrhea.   Genitourinary:  Negative for decreased urine volume and dysuria.   Musculoskeletal:  Positive for myalgias. Negative for gait problem.   Skin:  Negative for rash.   Neurological:  Positive for dizziness. Negative for weakness, light-headedness and headaches.        PAST MEDICAL HISTORY    has a past medical history of ADD

## 2025-01-15 NOTE — ED NOTES
Pt sitting in bed comfortably with even and unlabored respirations. Updated on POC. Call light in reach.

## 2025-01-15 NOTE — DISCHARGE INSTRUCTIONS
Alternate Tylenol 650 mg every 3-4 hours with ibuprofen 800 mg to suppress fever and treat pain.    Rest.  Push fluids.  Use ice to the face.

## 2025-01-15 NOTE — ED TRIAGE NOTES
Pt to ED from home with c/o SOB. Pt voices this started when she was diagnosed with coronavirus last week. However, pt states chest ain and facial swelling started last night, she is 8/10 pain. Pt has a cough. Pt naz does not seem to be swollen, but pt voices voices she feels swelling under her lips and lymph nodes. Telemetry in place. EKG completed. Call light in reach.

## 2025-01-17 ENCOUNTER — TELEPHONE (OUTPATIENT)
Dept: PRIMARY CARE CLINIC | Age: 30
End: 2025-01-17

## 2025-01-17 NOTE — TELEPHONE ENCOUNTER
Patient calling into office requesting Mounjaro to be sent in to [facility] in [place]. Patient also is requesting a prescription for a CGM, her trial has run out.

## 2025-01-28 ENCOUNTER — TELEMEDICINE (OUTPATIENT)
Dept: PRIMARY CARE CLINIC | Age: 30
End: 2025-01-28

## 2025-01-28 DIAGNOSIS — Z00.00 MEDICARE ANNUAL WELLNESS VISIT, SUBSEQUENT: Primary | ICD-10-CM

## 2025-01-28 DIAGNOSIS — E78.5 HYPERLIPIDEMIA ASSOCIATED WITH TYPE 2 DIABETES MELLITUS (HCC): ICD-10-CM

## 2025-01-28 DIAGNOSIS — F60.3 BORDERLINE PERSONALITY DISORDER (HCC): ICD-10-CM

## 2025-01-28 DIAGNOSIS — F31.9 BIPOLAR AFFECTIVE DISORDER, REMISSION STATUS UNSPECIFIED (HCC): ICD-10-CM

## 2025-01-28 DIAGNOSIS — E11.9 TYPE 2 DIABETES MELLITUS WITHOUT COMPLICATION, WITHOUT LONG-TERM CURRENT USE OF INSULIN (HCC): ICD-10-CM

## 2025-01-28 DIAGNOSIS — E11.69 HYPERLIPIDEMIA ASSOCIATED WITH TYPE 2 DIABETES MELLITUS (HCC): ICD-10-CM

## 2025-01-28 SDOH — HEALTH STABILITY: PHYSICAL HEALTH: ON AVERAGE, HOW MANY DAYS PER WEEK DO YOU ENGAGE IN MODERATE TO STRENUOUS EXERCISE (LIKE A BRISK WALK)?: 0 DAYS

## 2025-01-28 ASSESSMENT — PATIENT HEALTH QUESTIONNAIRE - PHQ9
4. FEELING TIRED OR HAVING LITTLE ENERGY: MORE THAN HALF THE DAYS
SUM OF ALL RESPONSES TO PHQ QUESTIONS 1-9: 0
SUM OF ALL RESPONSES TO PHQ9 QUESTIONS 1 & 2: 0
SUM OF ALL RESPONSES TO PHQ QUESTIONS 1-9: 0
10. IF YOU CHECKED OFF ANY PROBLEMS, HOW DIFFICULT HAVE THESE PROBLEMS MADE IT FOR YOU TO DO YOUR WORK, TAKE CARE OF THINGS AT HOME, OR GET ALONG WITH OTHER PEOPLE: SOMEWHAT DIFFICULT
8. MOVING OR SPEAKING SO SLOWLY THAT OTHER PEOPLE COULD HAVE NOTICED. OR THE OPPOSITE, BEING SO FIGETY OR RESTLESS THAT YOU HAVE BEEN MOVING AROUND A LOT MORE THAN USUAL: NOT AT ALL
9. THOUGHTS THAT YOU WOULD BE BETTER OFF DEAD, OR OF HURTING YOURSELF: NOT AT ALL
SUM OF ALL RESPONSES TO PHQ QUESTIONS 1-9: 0
5. POOR APPETITE OR OVEREATING: NOT AT ALL
SUM OF ALL RESPONSES TO PHQ QUESTIONS 1-9: 2
SUM OF ALL RESPONSES TO PHQ9 QUESTIONS 1 & 2: 0
7. TROUBLE CONCENTRATING ON THINGS, SUCH AS READING THE NEWSPAPER OR WATCHING TELEVISION: NOT AT ALL
2. FEELING DOWN, DEPRESSED OR HOPELESS: NOT AT ALL
6. FEELING BAD ABOUT YOURSELF - OR THAT YOU ARE A FAILURE OR HAVE LET YOURSELF OR YOUR FAMILY DOWN: NOT AT ALL
SUM OF ALL RESPONSES TO PHQ QUESTIONS 1-9: 2
SUM OF ALL RESPONSES TO PHQ QUESTIONS 1-9: 0
SUM OF ALL RESPONSES TO PHQ QUESTIONS 1-9: 2
2. FEELING DOWN, DEPRESSED OR HOPELESS: NOT AT ALL
SUM OF ALL RESPONSES TO PHQ QUESTIONS 1-9: 2
3. TROUBLE FALLING OR STAYING ASLEEP: NOT AT ALL
1. LITTLE INTEREST OR PLEASURE IN DOING THINGS: NOT AT ALL
1. LITTLE INTEREST OR PLEASURE IN DOING THINGS: NOT AT ALL

## 2025-01-28 ASSESSMENT — LIFESTYLE VARIABLES
HOW OFTEN DO YOU HAVE A DRINK CONTAINING ALCOHOL: 1
HOW MANY STANDARD DRINKS CONTAINING ALCOHOL DO YOU HAVE ON A TYPICAL DAY: PATIENT DOES NOT DRINK
HOW MANY STANDARD DRINKS CONTAINING ALCOHOL DO YOU HAVE ON A TYPICAL DAY: PATIENT DOES NOT DRINK
HOW OFTEN DO YOU HAVE SIX OR MORE DRINKS ON ONE OCCASION: 1
HOW OFTEN DO YOU HAVE A DRINK CONTAINING ALCOHOL: NEVER
HOW OFTEN DO YOU HAVE A DRINK CONTAINING ALCOHOL: NEVER
HOW MANY STANDARD DRINKS CONTAINING ALCOHOL DO YOU HAVE ON A TYPICAL DAY: 0

## 2025-01-28 NOTE — PROGRESS NOTES
Medicare Annual Wellness Visit    Deana Mcginnis is here for Medicare AWV    Assessment & Plan   Medicare annual wellness visit, subsequent  Type 2 diabetes mellitus without complication, without long-term current use of insulin (HCC)  -     Tirzepatide 2.5 MG/0.5ML SOAJ; Inject 2.5 mg into the skin every 7 days, Disp-2 mL, R-0Normal  Borderline personality disorder (HCC)  Hyperlipidemia associated with type 2 diabetes mellitus (HCC)  Bipolar affective disorder, remission status unspecified (HCC)  Diabetes is not at goal and do recommend intensification of her treatment.  Discussed the need to check blood sugars on a regular basis especially to ensure she can be cleared for surgery.  Discussed tirzepatide and possible side effects including nausea and constipation.  Discussed that she will not be able to use this medication for 1 to 2 weeks prior to her surgery and so if her surgery is going to be scheduled quickly she should wait until after surgery to start this medication  Continue the Farxiga and the glimepiride for now but may be able to decrease medication in the future  Continue care per psychiatry     Return in about 4 weeks (around 2/25/2025)..  Appointment should be 4 weeks after start of the GLP-1  Will need preop clearance appointment as well once date of surgery is determined     Subjective   The following acute and/or chronic problems were also addressed today:  She met with Zana from allergy yesterday and plan is for biopsy of the swelling area at her right eye needs clearance from ID and then will need preop    Diabetes-she has not been checking her blood sugars lately  Denies hypoglycemic episodes  Is interested in a GLP-1 agonist she did have side effects with Trulicity in the past  No family history of thyroid cancer or M EN    Hyperlipidemia-tolerates statin well,     Bipolar disorder and anxiety-she has been under the care of of a psychiatrist and feels symptoms are overall

## 2025-01-28 NOTE — PATIENT INSTRUCTIONS
help you achieve your weight-loss goals.  A dietitian can help you make healthy changes in your diet.  An exercise specialist or  can help you develop a safe and effective exercise program.  A counselor or psychiatrist can help you cope with issues such as depression, anxiety, or family problems that can make it hard to focus on weight loss.  Consider joining a support group for people who are trying to lose weight. Your doctor can suggest groups in your area.  Where can you learn more?  Go to https://[email or web address].net/patientEd and enter U357 to learn more about \"Starting a Weight-Loss Plan: Care Instructions.\"  Current as of: April 30, 2024  Content Version: 14.3  © 2024 [employer].   Care instructions adapted under license by [employer]. If you have questions about a medical condition or this instruction, always ask your healthcare professional. [employer], disclaims any warranty or liability for your use of this information.         Advance Directives: Care Instructions  Overview  An advance directive is a legal way to state your wishes at the end of your life. It tells your family and your doctor what to do if you can't say what you want.  There are two main types of advance directives. You can change them any time your wishes change.  Living will.  This form tells your family and your doctor your wishes about life support and other treatment. The form is also called a declaration.  Medical power of .  This form lets you name a person to make treatment decisions for you when you can't speak for yourself. This person is called a health care agent (health care proxy, health care surrogate). The form is also called a durable power of  for health care.  If you do not have an advance directive, decisions about your medical care may be made by a family member, or by a doctor or a  who doesn't know you.  It may help to think of an advance directive as a

## 2025-02-06 ENCOUNTER — TELEPHONE (OUTPATIENT)
Dept: PULMONOLOGY | Age: 30
End: 2025-02-06

## 2025-02-06 NOTE — TELEPHONE ENCOUNTER
LVM pt needs to get r/s with another provider due to derrell no longer practicing here in the clinic.

## 2025-02-09 NOTE — PROGRESS NOTES
Preoperative Consultation    Date of Service: 2/10/2025   Patient: Deana Mcginnis  YOB: 1995     This patient presents to the office today for a preoperative consultation at the request of surgeon, , who plans on performing orbitotomy and biopsy on .      Planned anesthesia: General   Known anesthesia problems: None   Bleeding risk: No recent or remote history of abnormal bleeding  Personal or FH ofDVT/PE: Yes - secondary to ocp use  Personal History of Snoring and/or Sleep Apnea: yes, wears bipap for sleep apnea    Patient Active Problem List   Diagnosis    KARLIE (generalized anxiety disorder)    Obstructive sleep apnea    POTS (postural orthostatic tachycardia syndrome)    Positive CAESAR (antinuclear antibody)    Borderline personality disorder (HCC)    Bipolar disorder (HCC)    Type 2 diabetes mellitus without complication, without long-term current use of insulin (HCC)    Hyperlipidemia associated with type 2 diabetes mellitus (HCC)    Mild intermittent asthma without complication    Gastroesophageal reflux disease without esophagitis    Allergy to multiple antibiotics    H/O deep venous thrombosis    Morbid obesity with BMI of 45.0-49.9, adult    Dacryoadenitis of right lacrimal gland    Chronic inflammation of orbit    Nicotine use disorder     Past Surgical History:   Procedure Laterality Date    ABLATION OF DYSRHYTHMIC FOCUS      [facility]    ANKLE FRACTURE SURGERY Right 2020    RIGHT ANKLE OPEN REDUCTION INTERNAL FIXATION AND DELTOID LIGAMENT REPAIR performed by Saul Smiley DPM at [facility] OR    CARDIAC CATHETERIZATION  2016    EP Study    CARDIAC CATHETERIZATION  2016    [facility]     SECTION N/A 2020     SECTION performed by Chanel Shrestha MD at [facility] L&D OR    CHOLECYSTECTOMY, LAPAROSCOPIC N/A 2021    CHOLECYSTECTOMY LAPAROSCOPIC ROBOTIC performed by Laisha Redman MD at [facility] OR    COLONOSCOPY  2015    [facility]    HYSTERECTOMY (CERVIX

## 2025-02-10 ENCOUNTER — OFFICE VISIT (OUTPATIENT)
Dept: PRIMARY CARE CLINIC | Age: 30
End: 2025-02-10

## 2025-02-10 VITALS
TEMPERATURE: 98.4 F | SYSTOLIC BLOOD PRESSURE: 126 MMHG | DIASTOLIC BLOOD PRESSURE: 86 MMHG | RESPIRATION RATE: 20 BRPM | HEART RATE: 91 BPM | WEIGHT: 293 LBS | BODY MASS INDEX: 47.61 KG/M2 | OXYGEN SATURATION: 98 %

## 2025-02-10 DIAGNOSIS — K21.9 GASTROESOPHAGEAL REFLUX DISEASE WITHOUT ESOPHAGITIS: ICD-10-CM

## 2025-02-10 DIAGNOSIS — H05.10 CHRONIC INFLAMMATION OF ORBIT: Primary | ICD-10-CM

## 2025-02-10 DIAGNOSIS — G90.A POTS (POSTURAL ORTHOSTATIC TACHYCARDIA SYNDROME): ICD-10-CM

## 2025-02-10 DIAGNOSIS — Z01.818 PREOP EXAMINATION: ICD-10-CM

## 2025-02-10 DIAGNOSIS — Z86.718 H/O DEEP VENOUS THROMBOSIS: ICD-10-CM

## 2025-02-10 DIAGNOSIS — E66.01 MORBID OBESITY WITH BMI OF 45.0-49.9, ADULT: ICD-10-CM

## 2025-02-10 DIAGNOSIS — J45.20 MILD INTERMITTENT ASTHMA WITHOUT COMPLICATION: ICD-10-CM

## 2025-02-10 DIAGNOSIS — G47.33 OBSTRUCTIVE SLEEP APNEA: ICD-10-CM

## 2025-02-10 DIAGNOSIS — E11.9 TYPE 2 DIABETES MELLITUS WITHOUT COMPLICATION, WITHOUT LONG-TERM CURRENT USE OF INSULIN (HCC): ICD-10-CM

## 2025-02-10 PROBLEM — F17.200 NICOTINE USE DISORDER: Status: ACTIVE | Noted: 2024-12-27

## 2025-02-10 LAB
ALBUMIN SERPL-MCNC: 4.4 G/DL (ref 3.4–5)
ALBUMIN/GLOB SERPL: 1.5 {RATIO} (ref 1.1–2.2)
ALP SERPL-CCNC: 120 U/L (ref 40–129)
ALT SERPL-CCNC: 41 U/L (ref 10–40)
ANION GAP SERPL CALCULATED.3IONS-SCNC: 11 MMOL/L (ref 3–16)
AST SERPL-CCNC: 39 U/L (ref 15–37)
BILIRUB SERPL-MCNC: <0.2 MG/DL (ref 0–1)
BUN SERPL-MCNC: 7 MG/DL (ref 7–20)
CALCIUM SERPL-MCNC: 9.5 MG/DL (ref 8.3–10.6)
CHLORIDE SERPL-SCNC: 99 MMOL/L (ref 99–110)
CO2 SERPL-SCNC: 26 MMOL/L (ref 21–32)
CREAT SERPL-MCNC: 0.5 MG/DL (ref 0.6–1.1)
DEPRECATED RDW RBC AUTO: 14.2 % (ref 12.4–15.4)
GFR SERPLBLD CREATININE-BSD FMLA CKD-EPI: >90 ML/MIN/{1.73_M2}
GLUCOSE SERPL-MCNC: 170 MG/DL (ref 70–99)
HCT VFR BLD AUTO: 42.1 % (ref 36–48)
HGB BLD-MCNC: 14.4 G/DL (ref 12–16)
MCH RBC QN AUTO: 31.4 PG (ref 26–34)
MCHC RBC AUTO-ENTMCNC: 34.3 G/DL (ref 31–36)
MCV RBC AUTO: 91.4 FL (ref 80–100)
PLATELET # BLD AUTO: 186 K/UL (ref 135–450)
PMV BLD AUTO: 8.8 FL (ref 5–10.5)
POTASSIUM SERPL-SCNC: 4.7 MMOL/L (ref 3.5–5.1)
PROT SERPL-MCNC: 7.3 G/DL (ref 6.4–8.2)
RBC # BLD AUTO: 4.6 M/UL (ref 4–5.2)
SODIUM SERPL-SCNC: 136 MMOL/L (ref 136–145)
WBC # BLD AUTO: 8.5 K/UL (ref 4–11)

## 2025-02-10 ASSESSMENT — ENCOUNTER SYMPTOMS
CHEST TIGHTNESS: 0
SHORTNESS OF BREATH: 0

## 2025-02-11 ENCOUNTER — TELEPHONE (OUTPATIENT)
Dept: PRIMARY CARE CLINIC | Age: 30
End: 2025-02-11

## 2025-02-11 LAB
EST. AVERAGE GLUCOSE BLD GHB EST-MCNC: 194.4 MG/DL
HBA1C MFR BLD: 8.4 %

## 2025-02-11 NOTE — TELEPHONE ENCOUNTER
Faxed Pre OP notes, Labs, and EKG from 2/10/25 to [facility] [place] [phone]

## 2025-02-13 ENCOUNTER — TELEMEDICINE (OUTPATIENT)
Dept: PRIMARY CARE CLINIC | Age: 30
End: 2025-02-13

## 2025-02-13 DIAGNOSIS — E11.65 TYPE 2 DIABETES MELLITUS WITH HYPERGLYCEMIA, WITHOUT LONG-TERM CURRENT USE OF INSULIN (HCC): Primary | ICD-10-CM

## 2025-02-13 RX ORDER — INSULIN GLARGINE 100 [IU]/ML
10 INJECTION, SOLUTION SUBCUTANEOUS NIGHTLY
Qty: 5 ADJUSTABLE DOSE PRE-FILLED PEN SYRINGE | Refills: 0 | Status: SHIPPED | OUTPATIENT
Start: 2025-02-13

## 2025-02-13 RX ORDER — ACYCLOVIR 800 MG/1
1 TABLET ORAL
Qty: 6 EACH | Refills: 3 | Status: SHIPPED | OUTPATIENT
Start: 2025-02-13

## 2025-02-13 NOTE — PROGRESS NOTES
Normal  [] Abnormal -    Neck: [x] No visualized mass [] Abnormal -     Pulmonary/Chest: [x] Respiratory effort normal   [x] No visualized signs of difficulty breathing or respiratory distress        [] Abnormal -      Musculoskeletal:   [x] Normal gait with no signs of ataxia         [x] Normal range of motion of neck        [] Abnormal -     Neurological:        [x] No Facial Asymmetry (Cranial nerve 7 motor function) (limited exam due to video visit)          [x] No gaze palsy        [] Abnormal -          Skin:        [x] No significant exanthematous lesions or discoloration noted on facial skin         [] Abnormal -            Psychiatric:       [x] Normal Affect [] Abnormal -        [x] No Hallucinations    Other pertinent observable physical exam findings:-    Electronically signed by RAMIRO GOMEZ MD on 2/13/2025 at 12:07 PM     Please note this chart was generated using dragon dictation software.  Although every effort was made to ensure the accuracy of this automated transcription, some errors in transcription may have occurred.     Deana Mcginnis, was evaluated through a synchronous (real-time) audio-video encounter. The patient (or guardian if applicable) is aware that this is a billable service, which includes applicable co-pays. This Virtual Visit was conducted with patient's (and/or legal guardian's) consent. Patient identification was verified, and a caregiver was present when appropriate.   The patient was located at Home: [address] [address]  Provider was located at Facility (Appt Dept): [address]  [place] [address]  Confirm you are appropriately licensed, registered, or certified to deliver care in the Replaced by [facility] where the patient is located as indicated above. If you are not or unsure, please re-schedule the visit: Yes, I confirm.

## 2025-03-05 DIAGNOSIS — K21.9 GASTROESOPHAGEAL REFLUX DISEASE WITHOUT ESOPHAGITIS: ICD-10-CM

## 2025-03-05 DIAGNOSIS — E11.9 TYPE 2 DIABETES MELLITUS WITHOUT COMPLICATION, WITHOUT LONG-TERM CURRENT USE OF INSULIN (HCC): ICD-10-CM

## 2025-03-05 NOTE — TELEPHONE ENCOUNTER
Return in about 4 weeks (around 3/13/2025).     [phone] (home)  [facility] for return call to schedule and determine if patient needs short term refill before schedule appointment  Sent [employer] message      Medication:   Requested Prescriptions     Pending Prescriptions Disp Refills    glimepiride (AMARYL) 4 MG tablet [Pharmacy Med Name: GLIMEPIRIDE 4 MG TABLET] 90 tablet 0     Sig: TAKE ONE TABLET BY MOUTH EVERY MORNING BEFORE BREAKFAST    omeprazole (PRILOSEC) 20 MG delayed release capsule [Pharmacy Med Name: OMEPRAZOLE DR 20 MG CAPSULE] 90 capsule 0     Sig: TAKE ONE CAPSULE BY MOUTH EVERY MORNING BEFORE BREAKFAST       Last Filled:  12/3/24 #90, 0 refill - Due 3/3/25    Patient Phone Number: [phone] (home) [phone] (work)    Last appt: 2/13/2025   Next appt: Visit date not found    Last Labs DM:   Lab Results   Component Value Date/Time    LABA1C 8.4 02/10/2025 09:19 AM

## 2025-03-06 RX ORDER — OMEPRAZOLE 20 MG/1
20 CAPSULE, DELAYED RELEASE ORAL
Qty: 90 CAPSULE | Refills: 0 | OUTPATIENT
Start: 2025-03-06

## 2025-03-06 RX ORDER — GLIMEPIRIDE 4 MG/1
4 TABLET ORAL
Qty: 90 TABLET | Refills: 0 | OUTPATIENT
Start: 2025-03-06

## 2025-03-06 NOTE — TELEPHONE ENCOUNTER
2nd attempt  Return in about 4 weeks (around 3/13/2025).      [phone] ([place])  M for return call to schedule and determine if patient needs short term refill before schedule appointment  Sent mychart message already

## 2025-03-11 ENCOUNTER — OFFICE VISIT (OUTPATIENT)
Dept: PRIMARY CARE CLINIC | Age: 30
End: 2025-03-11

## 2025-03-11 VITALS
TEMPERATURE: 99.2 F | DIASTOLIC BLOOD PRESSURE: 78 MMHG | WEIGHT: 293 LBS | RESPIRATION RATE: 20 BRPM | SYSTOLIC BLOOD PRESSURE: 108 MMHG | HEART RATE: 109 BPM | BODY MASS INDEX: 47.26 KG/M2 | OXYGEN SATURATION: 97 %

## 2025-03-11 DIAGNOSIS — H04.001 DACRYOADENITIS OF RIGHT LACRIMAL GLAND: ICD-10-CM

## 2025-03-11 DIAGNOSIS — Z22.7 LATENT TUBERCULOSIS: ICD-10-CM

## 2025-03-11 DIAGNOSIS — E11.65 TYPE 2 DIABETES MELLITUS WITH HYPERGLYCEMIA, WITHOUT LONG-TERM CURRENT USE OF INSULIN (HCC): Primary | ICD-10-CM

## 2025-03-11 DIAGNOSIS — M31.30 GRANULOMATOSIS WITH POLYANGIITIS WITHOUT RENAL INVOLVEMENT (HCC): ICD-10-CM

## 2025-03-11 RX ORDER — GLIMEPIRIDE 4 MG/1
4 TABLET ORAL
Qty: 90 TABLET | Refills: 0 | Status: SHIPPED | OUTPATIENT
Start: 2025-03-11

## 2025-03-11 RX ORDER — ARIPIPRAZOLE 30 MG/1
30 TABLET ORAL NIGHTLY
COMMUNITY
Start: 2025-02-27

## 2025-03-11 RX ORDER — INSULIN GLARGINE 100 [IU]/ML
10 INJECTION, SOLUTION SUBCUTANEOUS NIGHTLY
Qty: 5 ADJUSTABLE DOSE PRE-FILLED PEN SYRINGE | Refills: 2 | Status: SHIPPED | OUTPATIENT
Start: 2025-03-11

## 2025-03-11 RX ORDER — ISONIAZID 300 MG/1
300 TABLET ORAL DAILY
COMMUNITY
Start: 2025-03-10 | End: 2025-09-06

## 2025-03-11 RX ORDER — PYRIDOXINE HCL (VITAMIN B6) 50 MG
50 TABLET ORAL DAILY
COMMUNITY
Start: 2025-03-10 | End: 2025-09-06

## 2025-03-11 RX ORDER — DAPAGLIFLOZIN 10 MG/1
10 TABLET, FILM COATED ORAL EVERY MORNING
Qty: 90 TABLET | Refills: 0 | Status: SHIPPED | OUTPATIENT
Start: 2025-03-11

## 2025-03-11 NOTE — PROGRESS NOTES
PROGRESS NOTE  Date of Service:  3/11/2025    SUBJECTIVE:  Patient ID: Deana Mcginnis is a 29 y.o. female    ASSESSMENT  1. Type 2 diabetes mellitus with hyperglycemia, without long-term current use of insulin (HCC)    2. Dacryoadenitis of right lacrimal gland    3. Latent tuberculosis    4. Granulomatosis with polyangiitis without renal involvement (HCC)        PLAN:   1. Type 2 diabetes mellitus with hyperglycemia, without long-term current use of insulin (HCC)  -     dapagliflozin (FARXIGA) 10 MG tablet; Take 1 tablet by mouth every morning, Disp-90 tablet, R-0Normal  -     glimepiride (AMARYL) 4 MG tablet; Take 1 tablet by mouth every morning (before breakfast), Disp-90 tablet, R-0Normal  -     insulin glargine (LANTUS SOLOSTAR) 100 UNIT/ML injection pen; Inject 10 Units into the skin nightly, Disp-5 Adjustable Dose Pre-filled Pen Syringe, R-2Normal  2. Dacryoadenitis of right lacrimal gland  3. Latent tuberculosis  4. Granulomatosis with polyangiitis without renal involvement (HCC)   Has had a great response to initial start of insulin. Monitor for hypoglycemic episodes and if begin to occur should stop glimiperide    Continue care per ID, rheumatology  Return in about 3 months (around 6/11/2025).          HPI:     Continues in recovery from eye surgery.  Bx confirms dacryoadenitis and folliculitis with chronic inflammation  Further testing with rheumatology with + ANCA undergoing further testing- possible wegeners    + latent tb and plan to start INH     Diabetes- started insulin   16 units each night  Fbs now running  120-140 regularly  Did not start GLP 1- insurance  No hypoglycemic episodes      Hyperlipidemia-tolerates statin well, last labs showed continued high triglycerides    Asthma-stable    Gerd-well-controlled on her proton pump inhibitor      Bipolar disorder and anxiety-she has been under the care of of a psychiatrist and feels symptoms are overall well-controlled      Patient's medications,

## 2025-03-15 PROBLEM — Z22.7 LATENT TUBERCULOSIS: Status: ACTIVE | Noted: 2025-03-15

## 2025-03-15 PROBLEM — M31.30 GRANULOMATOSIS WITH POLYANGIITIS WITHOUT RENAL INVOLVEMENT (HCC): Status: ACTIVE | Noted: 2025-03-15

## 2025-03-15 PROBLEM — E11.65 TYPE 2 DIABETES MELLITUS WITH HYPERGLYCEMIA, WITHOUT LONG-TERM CURRENT USE OF INSULIN (HCC): Status: ACTIVE | Noted: 2025-03-15

## 2025-03-15 ASSESSMENT — PATIENT HEALTH QUESTIONNAIRE - PHQ9
10. IF YOU CHECKED OFF ANY PROBLEMS, HOW DIFFICULT HAVE THESE PROBLEMS MADE IT FOR YOU TO DO YOUR WORK, TAKE CARE OF THINGS AT HOME, OR GET ALONG WITH OTHER PEOPLE: NOT DIFFICULT AT ALL
3. TROUBLE FALLING OR STAYING ASLEEP: NOT AT ALL
8. MOVING OR SPEAKING SO SLOWLY THAT OTHER PEOPLE COULD HAVE NOTICED. OR THE OPPOSITE, BEING SO FIGETY OR RESTLESS THAT YOU HAVE BEEN MOVING AROUND A LOT MORE THAN USUAL: NOT AT ALL
6. FEELING BAD ABOUT YOURSELF - OR THAT YOU ARE A FAILURE OR HAVE LET YOURSELF OR YOUR FAMILY DOWN: NOT AT ALL
SUM OF ALL RESPONSES TO PHQ QUESTIONS 1-9: 1
1. LITTLE INTEREST OR PLEASURE IN DOING THINGS: NOT AT ALL
7. TROUBLE CONCENTRATING ON THINGS, SUCH AS READING THE NEWSPAPER OR WATCHING TELEVISION: NOT AT ALL
2. FEELING DOWN, DEPRESSED OR HOPELESS: NOT AT ALL
SUM OF ALL RESPONSES TO PHQ QUESTIONS 1-9: 1
5. POOR APPETITE OR OVEREATING: NOT AT ALL
SUM OF ALL RESPONSES TO PHQ QUESTIONS 1-9: 1
SUM OF ALL RESPONSES TO PHQ QUESTIONS 1-9: 1
4. FEELING TIRED OR HAVING LITTLE ENERGY: SEVERAL DAYS
9. THOUGHTS THAT YOU WOULD BE BETTER OFF DEAD, OR OF HURTING YOURSELF: NOT AT ALL

## 2025-03-19 ENCOUNTER — TELEPHONE (OUTPATIENT)
Dept: PULMONOLOGY | Age: 30
End: 2025-03-19

## 2025-03-29 ENCOUNTER — APPOINTMENT (OUTPATIENT)
Dept: GENERAL RADIOLOGY | Age: 30
End: 2025-03-29
Payer: MEDICARE

## 2025-03-29 ENCOUNTER — HOSPITAL ENCOUNTER (EMERGENCY)
Age: 30
Discharge: HOME | End: 2025-03-30
Payer: MEDICARE

## 2025-03-29 VITALS
WEIGHT: 293 LBS | DIASTOLIC BLOOD PRESSURE: 107 MMHG | SYSTOLIC BLOOD PRESSURE: 152 MMHG | RESPIRATION RATE: 18 BRPM | HEART RATE: 112 BPM | BODY MASS INDEX: 47.35 KG/M2 | TEMPERATURE: 98.2 F

## 2025-03-29 DIAGNOSIS — S93.421A SPRAIN OF DELTOID LIGAMENT OF RIGHT ANKLE, INITIAL ENCOUNTER: Primary | ICD-10-CM

## 2025-03-29 PROCEDURE — 73610 X-RAY EXAM OF ANKLE: CPT

## 2025-03-29 PROCEDURE — 99284 EMERGENCY DEPT VISIT MOD MDM: CPT

## 2025-03-29 ASSESSMENT — PAIN DESCRIPTION - DESCRIPTORS: DESCRIPTORS: SHARP;BURNING;STABBING

## 2025-03-29 ASSESSMENT — PAIN DESCRIPTION - ORIENTATION: ORIENTATION: RIGHT

## 2025-03-29 ASSESSMENT — PAIN - FUNCTIONAL ASSESSMENT: PAIN_FUNCTIONAL_ASSESSMENT: 0-10

## 2025-03-29 ASSESSMENT — PAIN DESCRIPTION - LOCATION: LOCATION: ANKLE

## 2025-03-29 ASSESSMENT — PAIN SCALES - GENERAL: PAINLEVEL_OUTOF10: 8

## 2025-03-29 ASSESSMENT — PAIN DESCRIPTION - PAIN TYPE: TYPE: ACUTE PAIN

## 2025-03-30 PROCEDURE — 96372 THER/PROPH/DIAG INJ SC/IM: CPT

## 2025-03-30 PROCEDURE — 6370000000 HC RX 637 (ALT 250 FOR IP): Performed by: PHYSICIAN ASSISTANT

## 2025-03-30 PROCEDURE — 6360000002 HC RX W HCPCS: Performed by: PHYSICIAN ASSISTANT

## 2025-03-30 RX ORDER — ACETAMINOPHEN 500 MG
500 TABLET ORAL 4 TIMES DAILY PRN
Qty: 120 TABLET | Refills: 0 | Status: SHIPPED | OUTPATIENT
Start: 2025-03-30

## 2025-03-30 RX ORDER — KETOROLAC TROMETHAMINE 30 MG/ML
30 INJECTION, SOLUTION INTRAMUSCULAR; INTRAVENOUS ONCE
Status: COMPLETED | OUTPATIENT
Start: 2025-03-30 | End: 2025-03-30

## 2025-03-30 RX ORDER — IBUPROFEN 600 MG/1
600 TABLET, FILM COATED ORAL 3 TIMES DAILY PRN
Qty: 30 TABLET | Refills: 0 | Status: SHIPPED | OUTPATIENT
Start: 2025-03-30

## 2025-03-30 RX ORDER — HYDROCODONE BITARTRATE AND ACETAMINOPHEN 5; 325 MG/1; MG/1
1 TABLET ORAL ONCE
Status: COMPLETED | OUTPATIENT
Start: 2025-03-30 | End: 2025-03-30

## 2025-03-30 RX ADMIN — KETOROLAC TROMETHAMINE 30 MG: 30 INJECTION, SOLUTION INTRAMUSCULAR at 00:40

## 2025-03-30 RX ADMIN — HYDROCODONE BITARTRATE AND ACETAMINOPHEN 1 TABLET: 5; 325 TABLET ORAL at 00:40

## 2025-03-30 NOTE — ED PROVIDER NOTES
[facility] EMERGENCY DEPARTMENT      EMERGENCY MEDICINE     Pt Name: Deana Mcginnis  MRN: [id number]  Birthdate 1995  Date of evaluation: 3/29/2025  Provider: KYA Butt    CHIEF COMPLAINT       Chief Complaint   Patient presents with    Ankle Pain     right     HISTORY OF PRESENT ILLNESS   Deana Mcginnis is a pleasant 29 y.o. female who presents to the emergency department from where she is visiting here in [place].  Patient states that she inadvertently dorsiflex her right foot.  Patient has a significant history of previous surgery to her deltoid ligament as well as the lateral ankle.  Patient has previous surgical appliance.  Patient lives out of town.  Patient does not take anything for pain, no acetaminophen or ibuprofen.    Patient is able to ambulate with increased pain.  Movement increases pain.  No other injuries.  Patient getting a burning sensation to the medial aspect of the ankle, exacerbated by movement and ambulation.  No distal paresthesias.    PASTMEDICAL HISTORY     Past Medical History:   Diagnosis Date    ADD (attention deficit disorder)     Anxiety 04/08/2015    Asthma     exercise induced    Back pain     Bipolar 1 disorder ([facility])     Diabetes mellitus ([facility])     GDM on insulin started on 7/23-during pregnancy    DVT (deep venous thrombosis) ([facility])     Fibromyalgia     GERD (gastroesophageal reflux disease)     Granulomatosis with polyangiitis ([facility]) 02/2025    Major depressive disorder, recurrent episode, mild 07/31/2012    WILFREDO treated with BiPAP     Pneumonia 02/21/2018    POTS (postural orthostatic tachycardia syndrome)     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

## 2025-03-30 NOTE — DISCHARGE INSTRUCTIONS
Elevate your ankle is much as possible.  Apply ice 10 to 20 minutes on and off 4 times daily.  Take the ibuprofen and acetaminophen as directed.    You should follow-up with orthopedics on Monday if this pain is not significantly improved.  Call in the morning, make sure you tell them you are in the emergency department.  I have given you the orthopedic clinic here in [facility].  Of course if you are going back [place] you should follow-up there.    Certainly could have caused injury to the ligament which is not seen on plain film x-ray.    Discharge warning    Please remember that examination and testing performed in the emergency department is not a comprehensive evaluation of all medical conditions and does not replace the need to follow up with your primary care provider.  In the emergency department, we are only able to evaluate your symptoms in the current condition, but symptoms may change or worsen.  Although you are felt safe to be discharged today, if your symptoms persist or change, you need to be re-evaluated by your regular/primary care doctor as soon as possible.  If you are unable to make appointment with your regular doctor, please come back to the ER to be re-evaluated.

## 2025-03-30 NOTE — ED TRIAGE NOTES
Patient presents to ED with right ankle pain. Patient states pain is 8/10.Patient resting in bed. Respirations easy and unlabored. No distress noted. Call light within reach.

## 2025-04-17 ENCOUNTER — OFFICE VISIT (OUTPATIENT)
Age: 30
End: 2025-04-17
Payer: COMMERCIAL

## 2025-04-17 VITALS
BODY MASS INDEX: 41.95 KG/M2 | SYSTOLIC BLOOD PRESSURE: 128 MMHG | TEMPERATURE: 98.4 F | DIASTOLIC BLOOD PRESSURE: 62 MMHG | HEART RATE: 107 BPM | WEIGHT: 293 LBS | HEIGHT: 70 IN | OXYGEN SATURATION: 98 %

## 2025-04-17 DIAGNOSIS — G47.33 OBSTRUCTIVE SLEEP APNEA: Primary | ICD-10-CM

## 2025-04-17 DIAGNOSIS — Z22.7 LATENT TUBERCULOSIS: ICD-10-CM

## 2025-04-17 DIAGNOSIS — E66.813 CLASS 3 SEVERE OBESITY WITH BODY MASS INDEX (BMI) OF 45.0 TO 49.9 IN ADULT, UNSPECIFIED OBESITY TYPE, UNSPECIFIED WHETHER SERIOUS COMORBIDITY PRESENT: ICD-10-CM

## 2025-04-17 PROCEDURE — 99213 OFFICE O/P EST LOW 20 MIN: CPT

## 2025-04-17 RX ORDER — LEVOFLOXACIN 750 MG/1
TABLET, FILM COATED ORAL
COMMUNITY
Start: 2025-04-14

## 2025-04-17 RX ORDER — LATANOPROST 50 UG/ML
SOLUTION/ DROPS OPHTHALMIC
COMMUNITY
Start: 2025-04-07

## 2025-04-17 ASSESSMENT — ENCOUNTER SYMPTOMS
CHEST TIGHTNESS: 0
RHINORRHEA: 0
SORE THROAT: 0
COUGH: 1
SHORTNESS OF BREATH: 0

## 2025-04-17 NOTE — PROGRESS NOTES
[facility]     DEANA THOMASON, 29 y.o.   : 1995  Day of encounter: 2025   Previously seen by Dr. Olivas, Anshu Song, TISHA     Subjective   Deana is here for 1 year follow up for WILFREDO.   Deana presents to the pulmonary clinic today with no acute complaints or concerns. She states she has some degree of worsening daytime sleepiness or drowsiness, however she attributes this to other medical conditions she is having to face. She reports her  and child wake up due to her mask leakage. In the past year she has been diagnosed with vasculitis, and had an incidental finding of latent TB. She has been treated with \"INH\" and is now on levaquin. Denies CP, chest tightness, shortness of breath.   SAQLI: 87  Hinckley Sleepiness Scale: 6  Progress History:   Since last visit any new medical issues? Latent TB, vasculitis Diagnosis   New ER or hospitlal visits? Yes- Covid 2025, related to vasculitis   Any new or changes in medicines? Lantus, had Isoniazid, levaquin     Initial AHI: 131.4 per study dated 2015     Past Medical hx   PMH:  Past Medical History:   Diagnosis Date    ADD (attention deficit disorder)     Anxiety 2015    Asthma     exercise induced    Back pain     Bipolar 1 disorder (HCC)     Diabetes mellitus (HCC)     GDM on insulin started on -during pregnancy    DVT (deep venous thrombosis) ([facility])     Fibromyalgia     GERD (gastroesophageal reflux disease)     Granulomatosis with polyangiitis ([facility]) 2025    Major depressive disorder, recurrent episode, mild 2012    WILFREDO treated with BiPAP     Pneumonia 2018    POTS (postural orthostatic tachycardia syndrome)     Pseudoseizures 2017    Replacing diagnoses that were inactivated after the 10/1/2023 regulatory import    Pulmonary emboli     was on blood thinners for 6 months    Seizures (HCC) 2016    anxiety induced no meds last seizure 4 years ago    Lisa

## 2025-06-01 DIAGNOSIS — K21.9 GASTROESOPHAGEAL REFLUX DISEASE WITHOUT ESOPHAGITIS: ICD-10-CM

## 2025-06-01 RX ORDER — OMEPRAZOLE 20 MG/1
20 CAPSULE, DELAYED RELEASE ORAL
Qty: 90 CAPSULE | Refills: 0 | Status: SHIPPED | OUTPATIENT
Start: 2025-06-01

## 2025-06-06 DIAGNOSIS — E11.69 HYPERLIPIDEMIA ASSOCIATED WITH TYPE 2 DIABETES MELLITUS (HCC): ICD-10-CM

## 2025-06-06 DIAGNOSIS — E78.5 HYPERLIPIDEMIA ASSOCIATED WITH TYPE 2 DIABETES MELLITUS (HCC): ICD-10-CM

## 2025-06-06 RX ORDER — ATORVASTATIN CALCIUM 40 MG/1
40 TABLET, FILM COATED ORAL DAILY
Qty: 30 TABLET | Refills: 0 | Status: SHIPPED | OUTPATIENT
Start: 2025-06-06

## 2025-06-06 NOTE — TELEPHONE ENCOUNTER
Medication:   Requested Prescriptions     Pending Prescriptions Disp Refills    atorvastatin (LIPITOR) 40 MG tablet [Pharmacy Med Name: ATORVASTATIN 40 MG TABLET] 90 tablet 1     Sig: TAKE 1 TABLET BY MOUTH DAILY       Last Filled:  [id number] #90 x 1 Short RX pended till appt. 6/12    Patient Phone Number: [phone] (home) [phone] (work)    Last appt: 3/11/2025   Next appt: 6/12/2025    Last Lipid:   Lab Results   Component Value Date/Time    CHOL 164 06/04/2024 12:01 PM    TRIG 245 06/04/2024 12:01 PM    HDL 50 06/04/2024 12:01 PM

## 2025-07-10 ENCOUNTER — LAB (OUTPATIENT)
Dept: LAB | Age: 30
End: 2025-07-10

## 2025-07-10 LAB
ALBUMIN SERPL BCG-MCNC: 4.3 G/DL (ref 3.4–4.9)
ALP SERPL-CCNC: 117 U/L (ref 38–126)
ALT SERPL W/O P-5'-P-CCNC: 18 U/L (ref 10–35)
ANION GAP SERPL CALC-SCNC: 16 MEQ/L (ref 8–16)
AST SERPL-CCNC: 17 U/L (ref 10–35)
BACTERIA: ABNORMAL
BASOPHILS ABSOLUTE: 0.1 THOU/MM3 (ref 0–0.1)
BASOPHILS NFR BLD AUTO: 0.3 %
BILIRUB SERPL-MCNC: 0.3 MG/DL (ref 0.3–1.2)
BILIRUB UR QL STRIP: NEGATIVE
BUN SERPL-MCNC: 10 MG/DL (ref 8–23)
CALCIUM SERPL-MCNC: 9.9 MG/DL (ref 8.6–10)
CASTS #/AREA URNS LPF: ABNORMAL /LPF
CASTS #/AREA URNS LPF: ABNORMAL /LPF
CHARACTER UR: CLEAR
CHARCOAL URNS QL MICRO: ABNORMAL
CHLORIDE SERPL-SCNC: 100 MEQ/L (ref 98–111)
CO2 SERPL-SCNC: 22 MEQ/L (ref 22–29)
COLOR UR: YELLOW
CREAT SERPL-MCNC: 0.7 MG/DL (ref 0.5–0.9)
CREAT UR-MCNC: 147 MG/DL
CRP SERPL-MCNC: 1.57 MG/DL (ref 0–0.5)
CRYSTALS URNS QL MICRO: ABNORMAL
DEPRECATED RDW RBC AUTO: 43.5 FL (ref 35–45)
EOSINOPHIL NFR BLD AUTO: 0.8 %
EOSINOPHILS ABSOLUTE: 0.1 THOU/MM3 (ref 0–0.4)
EPITHELIAL CELLS, UA: ABNORMAL /HPF
ERYTHROCYTE [DISTWIDTH] IN BLOOD BY AUTOMATED COUNT: 13.2 % (ref 11.5–14.5)
ERYTHROCYTE [SEDIMENTATION RATE] IN BLOOD BY WESTERGREN METHOD: 9 MM/HR (ref 0–20)
GFR SERPL CREATININE-BSD FRML MDRD: > 90 ML/MIN/1.73M2
GLUCOSE SERPL-MCNC: 156 MG/DL (ref 74–109)
GLUCOSE UR QL STRIP.AUTO: NEGATIVE MG/DL
HCT VFR BLD AUTO: 46.4 % (ref 37–47)
HGB BLD-MCNC: 15.9 GM/DL (ref 12–16)
HGB UR QL STRIP.AUTO: NEGATIVE
IMM GRANULOCYTES # BLD AUTO: 0.07 THOU/MM3 (ref 0–0.07)
IMM GRANULOCYTES NFR BLD AUTO: 0.4 %
KETONES UR QL STRIP.AUTO: ABNORMAL
LEUKOCYTE ESTERASE UR QL STRIP.AUTO: NEGATIVE
LYMPHOCYTES ABSOLUTE: 4.8 THOU/MM3 (ref 1–4.8)
LYMPHOCYTES NFR BLD AUTO: 27.8 %
MCH RBC QN AUTO: 30.9 PG (ref 26–33)
MCHC RBC AUTO-ENTMCNC: 34.3 GM/DL (ref 32.2–35.5)
MCV RBC AUTO: 90.1 FL (ref 81–99)
MONOCYTES ABSOLUTE: 0.9 THOU/MM3 (ref 0.4–1.3)
MONOCYTES NFR BLD AUTO: 5.3 %
NEUTROPHILS ABSOLUTE: 11.2 THOU/MM3 (ref 1.8–7.7)
NEUTROPHILS NFR BLD AUTO: 65.4 %
NITRITE UR QL STRIP.AUTO: NEGATIVE
NRBC BLD AUTO-RTO: 0 /100 WBC
PH UR STRIP.AUTO: 6.5 [PH] (ref 5–9)
PLATELET # BLD AUTO: 257 THOU/MM3 (ref 130–400)
PLATELET BLD QL SMEAR: ADEQUATE
PMV BLD AUTO: 10.1 FL (ref 9.4–12.4)
POTASSIUM SERPL-SCNC: 3.8 MEQ/L (ref 3.5–5.2)
PROT SERPL-MCNC: 8 G/DL (ref 6.4–8.3)
PROT UR STRIP.AUTO-MCNC: 30 MG/DL
PROT UR-MCNC: 41.3 MG/DL
PROT/CREAT 24H UR: 0.28 MG/G{CREAT}
RBC # BLD AUTO: 5.15 MILL/MM3 (ref 4.2–5.4)
RBC #/AREA URNS HPF: ABNORMAL /HPF
RENAL EPI CELLS #/AREA URNS HPF: ABNORMAL /[HPF]
SCAN OF BLOOD SMEAR: NORMAL
SODIUM SERPL-SCNC: 138 MEQ/L (ref 135–145)
SPECIFIC GRAVITY UA: 1.02 (ref 1–1.03)
UROBILINOGEN, URINE: 0.2 EU/DL (ref 0–1)
VARIANT LYMPHS BLD QL SMEAR: ABNORMAL %
WBC # BLD AUTO: 17.2 THOU/MM3 (ref 4.8–10.8)
WBC #/AREA URNS HPF: ABNORMAL /HPF
YEAST LIKE FUNGI URNS QL MICRO: ABNORMAL

## 2025-07-12 LAB
BACTERIA UR CULT: ABNORMAL
ORGANISM: ABNORMAL

## 2025-07-13 ENCOUNTER — HOSPITAL ENCOUNTER (EMERGENCY)
Age: 30
Discharge: HOME | End: 2025-07-13
Attending: EMERGENCY MEDICINE
Payer: MEDICARE

## 2025-07-13 VITALS
HEIGHT: 70 IN | OXYGEN SATURATION: 99 % | SYSTOLIC BLOOD PRESSURE: 155 MMHG | HEART RATE: 97 BPM | RESPIRATION RATE: 16 BRPM | BODY MASS INDEX: 41.95 KG/M2 | TEMPERATURE: 98.7 F | DIASTOLIC BLOOD PRESSURE: 84 MMHG | WEIGHT: 293 LBS

## 2025-07-13 DIAGNOSIS — R52 BODY ACHES: Primary | ICD-10-CM

## 2025-07-13 LAB
ANION GAP SERPL CALCULATED.3IONS-SCNC: 16 MMOL/L (ref 3–16)
BASOPHILS # BLD: 0.02 K/UL (ref 0–0.2)
BASOPHILS NFR BLD: 0 %
BILIRUB UR QL STRIP: NEGATIVE
BUN SERPL-MCNC: 10 MG/DL (ref 7–20)
CALCIUM SERPL-MCNC: 8.9 MG/DL (ref 8.3–10.6)
CHARACTER UR: ABNORMAL
CHLORIDE SERPL-SCNC: 99 MMOL/L (ref 99–110)
CLARITY UR: CLEAR
CO2 SERPL-SCNC: 20 MMOL/L (ref 21–32)
COLOR UR: YELLOW
CREAT SERPL-MCNC: 0.6 MG/DL (ref 0.5–1)
EOSINOPHIL # BLD: 0.09 K/UL (ref 0–0.6)
EOSINOPHILS RELATIVE PERCENT: 1 %
EPI CELLS #/AREA URNS HPF: ABNORMAL /HPF
ERYTHROCYTE [DISTWIDTH] IN BLOOD BY AUTOMATED COUNT: 12.7 % (ref 12.4–15.4)
GFR, ESTIMATED: >90 ML/MIN/1.73M2
GLUCOSE SERPL-MCNC: 150 MG/DL (ref 70–99)
GLUCOSE UR STRIP-MCNC: NEGATIVE MG/DL
HCG UR QL: NEGATIVE
HCT VFR BLD AUTO: 43.5 % (ref 36–48)
HGB BLD-MCNC: 14.5 G/DL (ref 12–16)
HGB UR QL STRIP.AUTO: ABNORMAL
IMM GRANULOCYTES # BLD AUTO: 0.02 K/UL (ref 0–0.5)
IMM GRANULOCYTES NFR BLD: 0 %
KETONES UR STRIP-MCNC: NEGATIVE MG/DL
LEUKOCYTE ESTERASE UR QL STRIP: NEGATIVE
LYMPHOCYTES NFR BLD: 4.1 K/UL (ref 1–5.1)
LYMPHOCYTES RELATIVE PERCENT: 34 %
MCH RBC QN AUTO: 30.4 PG (ref 26–34)
MCHC RBC AUTO-ENTMCNC: 33.3 G/DL (ref 31–36)
MCV RBC AUTO: 91.2 FL (ref 80–100)
MONOCYTES NFR BLD: 0.58 K/UL (ref 0–1.3)
MONOCYTES NFR BLD: 5 %
NEUTROPHILS NFR BLD: 61 %
NEUTS SEG NFR BLD: 7.38 K/UL (ref 1.7–7.7)
NITRITE UR QL STRIP: NEGATIVE
PH UR STRIP: 6 [PH] (ref 5–8)
PLATELET # BLD AUTO: 192 K/UL (ref 135–450)
PMV BLD AUTO: 9.6 FL
POTASSIUM SERPL-SCNC: 3.8 MMOL/L (ref 3.5–5.1)
PROT UR STRIP-MCNC: ABNORMAL MG/DL
RBC # BLD AUTO: 4.77 M/UL (ref 4–5.2)
RBC #/AREA URNS HPF: ABNORMAL /HPF
SODIUM SERPL-SCNC: 136 MMOL/L (ref 136–145)
SP GR UR STRIP: 1.02 (ref 1–1.03)
UROBILINOGEN UR STRIP-ACNC: 0.2 EU/DL (ref 0–1)
WBC #/AREA URNS HPF: ABNORMAL /HPF
WBC OTHER # BLD: 12.2 K/UL (ref 4–11)

## 2025-07-13 PROCEDURE — 99284 EMERGENCY DEPT VISIT MOD MDM: CPT

## 2025-07-13 PROCEDURE — 80048 BASIC METABOLIC PNL TOTAL CA: CPT

## 2025-07-13 PROCEDURE — 85025 COMPLETE CBC W/AUTO DIFF WBC: CPT

## 2025-07-13 PROCEDURE — 6360000002 HC RX W HCPCS: Performed by: EMERGENCY MEDICINE

## 2025-07-13 PROCEDURE — 81001 URINALYSIS AUTO W/SCOPE: CPT

## 2025-07-13 PROCEDURE — 96372 THER/PROPH/DIAG INJ SC/IM: CPT

## 2025-07-13 PROCEDURE — 84703 CHORIONIC GONADOTROPIN ASSAY: CPT

## 2025-07-13 RX ORDER — KETOROLAC TROMETHAMINE 30 MG/ML
30 INJECTION, SOLUTION INTRAMUSCULAR; INTRAVENOUS ONCE
Status: COMPLETED | OUTPATIENT
Start: 2025-07-13 | End: 2025-07-13

## 2025-07-13 RX ADMIN — KETOROLAC TROMETHAMINE 30 MG: 30 INJECTION, SOLUTION INTRAMUSCULAR at 05:36

## 2025-07-13 ASSESSMENT — PAIN - FUNCTIONAL ASSESSMENT: PAIN_FUNCTIONAL_ASSESSMENT: 0-10

## 2025-07-13 ASSESSMENT — PAIN SCALES - GENERAL
PAINLEVEL_OUTOF10: 5
PAINLEVEL_OUTOF10: 8
PAINLEVEL_OUTOF10: 8

## 2025-07-13 ASSESSMENT — PAIN DESCRIPTION - LOCATION: LOCATION: OTHER (COMMENT)

## 2025-07-13 ASSESSMENT — PAIN DESCRIPTION - DESCRIPTORS: DESCRIPTORS: SHARP;SHOOTING

## 2025-07-13 NOTE — ED TRIAGE NOTES
Pt c/o \"bone pain\" and \"swollen lymph nodes\" in anterior neck. Pt states this pain is different from her chronic pain, worse, sharper, \"feels like it is in the bones\". Also c/o increased night sweats

## 2025-07-13 NOTE — ED PROVIDER NOTES
[facility] EMERGENCY DEPARTMENT     EMERGENCY DEPARTMENT ENCOUNTER            Pt Name: Deana Mcginnis   MRN: [id number]   Birthdate 1995   Date of evaluation: 7/13/2025   Provider: Shae Sandhu DO   PCP: Ariela Cooper MD   Note Started: 5:06 AM EDT 7/13/25          CHIEF COMPLAINT     Chief Complaint   Patient presents with    Generalized Body Aches             HISTORY OF PRESENT ILLNESS:   History from : Patient   Limitations to history : None     Deana Mcginnis is a 29 y.o. female who presents urgency department with complaints of sharp and aching pain to the forearms and lower legs bilaterally onset  3 days ago constant since that time worse prior to admission.  Patient with history of polyarthralgias.  She is currently being evaluated for rheumatoid arthritis and possible vasculitis.  She was seen by rheumatologist at [facility] on 7/10/2025 via telemedicine visit and plans are to refer her to \"vasculitis specialist\".    Patient denies recent illness or trauma.  She completed a prednisone taper 3 days ago following which above-stated pain began.  Patient reports onset of night sweats 2 to 3 weeks ago which her physicians are aware of.    Patient denies headache neck pain ear pain sore throat chest pain difficulty breathing abdominal pain new back pain or other new pain to the extremities.  Denies focal weakness numbness paresthesias bowel or bladder incontinence.    Past medical history positive for type 2 diabetes bipolar disorder POTS borderline personality disorder, asthma, GERD    Nursing Notes were all reviewed and agreed with, or any disagreements were addressed in the HPI.     REVIEW OF SYSTEMS :    Positives and Pertinent negatives as per HPI.      MEDICAL HISTORY   has a past medical history of ADD (attention deficit disorder), Anxiety (04/08/2015), Asthma, Back pain, Bipolar 1 disorder (HCC), Diabetes mellitus ([facility]), DVT (deep venous thrombosis) ([facility]), Fibromyalgia, GERD

## 2025-07-13 NOTE — DISCHARGE INSTR - COC
Continuity of Care Form    Patient Name: Deana Mcginnis   :  1995  MRN:  [id number]    Admit date:  2025  Discharge date:  ***    Code Status Order: Prior   Advance Directives:     Admitting Physician:  No admitting provider for patient encounter.  PCP: Ariela Cooper MD    Discharging Nurse: ***  Discharging Hospital Unit/Room#:   Discharging Unit Phone Number: ***    Emergency Contact:   Extended Emergency Contact Information  Primary Emergency Contact: Edward Mcginnis  Home Phone: [phone]  Relation: Spouse  Secondary Emergency Contact: Deidre Dailey  Address: [address] [place] [id number] [facility]  Home Phone: [phone]  Relation: Parent    Past Surgical History:  Past Surgical History:   Procedure Laterality Date    ABLATION OF DYSRHYTHMIC FOCUS      [facility]    ANKLE FRACTURE SURGERY Right 2020    RIGHT ANKLE OPEN REDUCTION INTERNAL FIXATION AND DELTOID LIGAMENT REPAIR performed by Saul Smiley DPM at [facility] OR    CARDIAC CATHETERIZATION  2016    EP Study    CARDIAC CATHETERIZATION  2016    [facility]     SECTION N/A 2020     SECTION performed by Chanel Shrestha MD at [facility] L&D OR    CHOLECYSTECTOMY, LAPAROSCOPIC N/A 2021    CHOLECYSTECTOMY LAPAROSCOPIC ROBOTIC performed by Laisha Redman MD at [facility] OR    COLONOSCOPY  2015    [facility]    EYE LID SURGERY  2025    HYSTERECTOMY (CERVIX STATUS UNKNOWN) N/A 2022    Robotic Hysterectomy with Bilateral Salpingectomy performed by Chanel Shrestha MD at [facility] OR    INSERTABLE CARDIAC MONITOR      UPPER GASTROINTESTINAL ENDOSCOPY  2020    Dr. Simmons    UPPER GASTROINTESTINAL ENDOSCOPY  2015    [facility]    WISDOM TOOTH EXTRACTION         Immunization History:   Immunization History   Administered Date(s) Administered    DTaP 1995, 1996, 1996, 1997, 2001    Hepatitis A 2013    Hepatitis B [birth year],

## 2025-07-13 NOTE — DISCHARGE INSTRUCTIONS
Return to emergency department if worsening pain fever any neurologic changes worse in any way or any problems.    Tylenol or ibuprofen for pain or discomfort

## 2025-07-15 DIAGNOSIS — E11.65 TYPE 2 DIABETES MELLITUS WITH HYPERGLYCEMIA, WITHOUT LONG-TERM CURRENT USE OF INSULIN (HCC): ICD-10-CM

## 2025-07-15 RX ORDER — INSULIN GLARGINE 100 [IU]/ML
10 INJECTION, SOLUTION SUBCUTANEOUS NIGHTLY
Qty: 5 ADJUSTABLE DOSE PRE-FILLED PEN SYRINGE | Refills: 2 | OUTPATIENT
Start: 2025-07-15

## 2025-07-15 NOTE — TELEPHONE ENCOUNTER
Hugo from [facility] calling to report patient needing refill  Need to determine if follow up is needed and medication - last sent to [facility]  Will call patient also

## 2025-07-15 NOTE — TELEPHONE ENCOUNTER
[phone] (home) [phone] (work)  [facility] for return call to schedule and determine if patient needs short term refill before schedule appointment  Sent Mobile [employer] message

## 2025-07-16 NOTE — TELEPHONE ENCOUNTER
2nd attempt  [phone] (home) [phone] (work)  [facility] for return call to schedule and determine if patient needs short term refill before schedule appointment  [facility]

## 2025-07-17 ENCOUNTER — TELEPHONE (OUTPATIENT)
Dept: FAMILY MEDICINE CLINIC | Age: 30
End: 2025-07-17

## 2025-07-17 NOTE — TELEPHONE ENCOUNTER
Patient called and would like to become a patient again. She stated that she moved away not to long ago but they are looking to move back into the area and wanted to  know if you would be willing to take her on as a patient again.   Patient was last seen 2024.

## 2025-07-17 NOTE — TELEPHONE ENCOUNTER
Called patient and left a message that Jovi Morrison CNP would be willing to see her again. Informed to call the office to schedule an appointment.

## 2025-07-24 ENCOUNTER — OFFICE VISIT (OUTPATIENT)
Dept: FAMILY MEDICINE CLINIC | Age: 30
End: 2025-07-24

## 2025-07-24 VITALS
HEART RATE: 104 BPM | TEMPERATURE: 98.9 F | HEIGHT: 70 IN | RESPIRATION RATE: 18 BRPM | SYSTOLIC BLOOD PRESSURE: 128 MMHG | BODY MASS INDEX: 41.95 KG/M2 | OXYGEN SATURATION: 98 % | WEIGHT: 293 LBS | DIASTOLIC BLOOD PRESSURE: 84 MMHG

## 2025-07-24 DIAGNOSIS — F60.3 BORDERLINE PERSONALITY DISORDER (HCC): ICD-10-CM

## 2025-07-24 DIAGNOSIS — G47.33 OSA TREATED WITH BIPAP: ICD-10-CM

## 2025-07-24 DIAGNOSIS — R76.8 POSITIVE ANA (ANTINUCLEAR ANTIBODY): ICD-10-CM

## 2025-07-24 DIAGNOSIS — E66.01 MORBID OBESITY WITH BMI OF 45.0-49.9, ADULT (HCC): ICD-10-CM

## 2025-07-24 DIAGNOSIS — F31.9 BIPOLAR AFFECTIVE DISORDER, REMISSION STATUS UNSPECIFIED (HCC): ICD-10-CM

## 2025-07-24 DIAGNOSIS — Z22.7 LATENT TUBERCULOSIS: ICD-10-CM

## 2025-07-24 DIAGNOSIS — E78.2 MIXED HYPERLIPIDEMIA: Primary | ICD-10-CM

## 2025-07-24 DIAGNOSIS — E11.9 TYPE 2 DIABETES MELLITUS WITHOUT COMPLICATION, WITHOUT LONG-TERM CURRENT USE OF INSULIN (HCC): ICD-10-CM

## 2025-07-24 RX ORDER — PIOGLITAZONE 30 MG/1
30 TABLET ORAL DAILY
Qty: 90 TABLET | Refills: 1 | Status: SHIPPED | OUTPATIENT
Start: 2025-07-24

## 2025-07-24 RX ORDER — GLIMEPIRIDE 4 MG/1
4 TABLET ORAL
Qty: 90 TABLET | Refills: 1 | Status: SHIPPED | OUTPATIENT
Start: 2025-07-24

## 2025-07-24 RX ORDER — DAPAGLIFLOZIN 10 MG/1
10 TABLET, FILM COATED ORAL EVERY MORNING
Qty: 90 TABLET | Refills: 1 | Status: SHIPPED | OUTPATIENT
Start: 2025-07-24

## 2025-07-24 ASSESSMENT — ENCOUNTER SYMPTOMS
COUGH: 0
SHORTNESS OF BREATH: 0
ABDOMINAL PAIN: 0
NAUSEA: 0

## 2025-07-24 NOTE — PROGRESS NOTES
Subjective:      Patient ID: Deana Mcginnis 1995 is a 29 y.o. female here for evaluation.     Chief Complaint   Patient presents with    New Patient     Patient presents to re-establish care. Patient reports she hasn't had her diabetic medications in 1 month due to cost.       Patient Active Problem List   Diagnosis    KARLIE (generalized anxiety disorder)    Obstructive sleep apnea    POTS (postural orthostatic tachycardia syndrome)    Positive CAESAR (antinuclear antibody)    Borderline personality disorder (HCC)    Bipolar disorder (HCC)    Type 2 diabetes mellitus without complication, without long-term current use of insulin (HCC)    Hyperlipidemia associated with type 2 diabetes mellitus (HCC)    Mild intermittent asthma without complication    Gastroesophageal reflux disease without esophagitis    Allergy to multiple antibiotics    H/O deep venous thrombosis    Morbid obesity with BMI of 45.0-49.9, adult (HCC)    Dacryoadenitis of right lacrimal gland    Chronic inflammation of orbit    Nicotine use disorder    Latent tuberculosis    Type 2 diabetes mellitus with hyperglycemia, without long-term current use of insulin (HCC)    Granulomatosis with polyangiitis without renal involvement ([facility])       PSYCH -  [facility] [place]   RHEUM - [facility]  SLEEP - [facility]  ID - [facility]    Further testing with rheumatology with + ANCA undergoing further testing- possible wegeners or GPA/NPA     + latent tb and plan to start INH.  Following with ID    Bipolar disorder and anxiety-she has been under the care of of a psychiatrist and feels symptoms are overall well-controlled     Vitals:    07/24/25 1325   BP: 128/84   Pulse: (!) 104   Resp: 18   Temp: 98.9 °F (37.2 °C)   SpO2: 98%        BP wnl    BP Readings from Last 3 Encounters:   07/24/25 128/84   07/13/25 (!) 155/84   04/17/25 128/62       35# lost in 1 year.     Has been without her DM medication x 1 month - to be on Lantus only. Was previous on

## 2025-08-08 ENCOUNTER — LAB (OUTPATIENT)
Dept: LAB | Age: 30
End: 2025-08-08

## 2025-08-08 DIAGNOSIS — E11.9 TYPE 2 DIABETES MELLITUS WITHOUT COMPLICATION, WITHOUT LONG-TERM CURRENT USE OF INSULIN (HCC): ICD-10-CM

## 2025-08-08 LAB
CHOLEST SERPL-MCNC: 212 MG/DL (ref 100–199)
CREAT UR-MCNC: 159 MG/DL
DEPRECATED MEAN GLUCOSE BLD GHB EST-ACNC: 165 MG/DL (ref 70–126)
HBA1C MFR BLD HPLC: 7.5 % (ref 4–6)
HDLC SERPL-MCNC: 50 MG/DL
LDLC SERPL CALC-MCNC: 98 MG/DL
MICROALBUMIN UR-MCNC: 8.66 MG/DL
MICROALBUMIN/CREAT RATIO PNL UR: 54 MG/G (ref 0–30)
TRIGL SERPL-MCNC: 318 MG/DL (ref 0–199)

## 2025-08-21 ENCOUNTER — HOSPITAL ENCOUNTER (EMERGENCY)
Age: 30
Discharge: HOME | End: 2025-08-21
Attending: FAMILY MEDICINE
Payer: MEDICARE

## 2025-08-21 ENCOUNTER — APPOINTMENT (OUTPATIENT)
Dept: GENERAL RADIOLOGY | Age: 30
End: 2025-08-21
Payer: MEDICARE

## 2025-08-21 VITALS
TEMPERATURE: 97.9 F | DIASTOLIC BLOOD PRESSURE: 90 MMHG | BODY MASS INDEX: 41.95 KG/M2 | WEIGHT: 293 LBS | RESPIRATION RATE: 15 BRPM | SYSTOLIC BLOOD PRESSURE: 109 MMHG | HEART RATE: 86 BPM | HEIGHT: 70 IN | OXYGEN SATURATION: 97 %

## 2025-08-21 DIAGNOSIS — R07.9 CHEST PAIN, UNSPECIFIED TYPE: ICD-10-CM

## 2025-08-21 DIAGNOSIS — R06.00 DYSPNEA, UNSPECIFIED TYPE: Primary | ICD-10-CM

## 2025-08-21 LAB
ALBUMIN SERPL BCG-MCNC: 4.1 G/DL (ref 3.4–4.9)
ALP SERPL-CCNC: 105 U/L (ref 38–126)
ALT SERPL W/O P-5'-P-CCNC: 21 U/L (ref 10–35)
ANION GAP SERPL CALC-SCNC: 13 MEQ/L (ref 8–16)
AST SERPL-CCNC: 19 U/L (ref 10–35)
B-HCG SERPL QL: NEGATIVE
BASOPHILS ABSOLUTE: 0 THOU/MM3 (ref 0–0.1)
BASOPHILS NFR BLD AUTO: 0.2 %
BILIRUB SERPL-MCNC: 0.3 MG/DL (ref 0.3–1.2)
BUN SERPL-MCNC: 9 MG/DL (ref 8–23)
CALCIUM SERPL-MCNC: 9.6 MG/DL (ref 8.5–10.5)
CHLORIDE SERPL-SCNC: 101 MEQ/L (ref 98–111)
CO2 SERPL-SCNC: 23 MEQ/L (ref 22–29)
CREAT SERPL-MCNC: 0.8 MG/DL (ref 0.5–0.9)
D DIMER PPP IA.FEU-MCNC: 480 NG/ML FEU (ref 0–500)
DEPRECATED RDW RBC AUTO: 46.2 FL (ref 35–45)
EKG ATRIAL RATE: 90 BPM
EKG P AXIS: 48 DEGREES
EKG P-R INTERVAL: 138 MS
EKG Q-T INTERVAL: 366 MS
EKG QRS DURATION: 90 MS
EKG QTC CALCULATION (BAZETT): 447 MS
EKG R AXIS: 46 DEGREES
EKG T AXIS: 50 DEGREES
EKG VENTRICULAR RATE: 90 BPM
EOSINOPHIL NFR BLD AUTO: 0.3 %
EOSINOPHILS ABSOLUTE: 0 THOU/MM3 (ref 0–0.4)
ERYTHROCYTE [DISTWIDTH] IN BLOOD BY AUTOMATED COUNT: 13.6 % (ref 11.5–14.5)
GFR SERPL CREATININE-BSD FRML MDRD: > 90 ML/MIN/1.73M2
GLUCOSE SERPL-MCNC: 174 MG/DL (ref 74–109)
HCT VFR BLD AUTO: 42.2 % (ref 37–47)
HGB BLD-MCNC: 14.1 GM/DL (ref 12–16)
IMM GRANULOCYTES # BLD AUTO: 0.09 THOU/MM3 (ref 0–0.07)
IMM GRANULOCYTES NFR BLD AUTO: 0.7 %
LYMPHOCYTES ABSOLUTE: 1.7 THOU/MM3 (ref 1–4.8)
LYMPHOCYTES NFR BLD AUTO: 13.7 %
MAGNESIUM SERPL-MCNC: 1.8 MG/DL (ref 1.6–2.6)
MCH RBC QN AUTO: 30.9 PG (ref 26–33)
MCHC RBC AUTO-ENTMCNC: 33.4 GM/DL (ref 32.2–35.5)
MCV RBC AUTO: 92.3 FL (ref 81–99)
MONOCYTES ABSOLUTE: 0.4 THOU/MM3 (ref 0.4–1.3)
MONOCYTES NFR BLD AUTO: 3.6 %
NEUTROPHILS ABSOLUTE: 9.9 THOU/MM3 (ref 1.8–7.7)
NEUTROPHILS NFR BLD AUTO: 81.5 %
NRBC BLD AUTO-RTO: 0 /100 WBC
OSMOLALITY SERPL CALC.SUM OF ELEC: 276.7 MOSMOL/KG (ref 275–300)
PLATELET # BLD AUTO: 250 THOU/MM3 (ref 130–400)
PMV BLD AUTO: 10.1 FL (ref 9.4–12.4)
POTASSIUM SERPL-SCNC: 4 MEQ/L (ref 3.5–5.2)
PROT SERPL-MCNC: 7.5 G/DL (ref 6.4–8.3)
RBC # BLD AUTO: 4.57 MILL/MM3 (ref 4.2–5.4)
SODIUM SERPL-SCNC: 137 MEQ/L (ref 135–145)
TROPONIN, HIGH SENSITIVITY: < 6 NG/L (ref 0–12)
WBC # BLD AUTO: 12.2 THOU/MM3 (ref 4.8–10.8)

## 2025-08-21 PROCEDURE — 93005 ELECTROCARDIOGRAM TRACING: CPT | Performed by: FAMILY MEDICINE

## 2025-08-21 PROCEDURE — 99285 EMERGENCY DEPT VISIT HI MDM: CPT

## 2025-08-21 PROCEDURE — 85379 FIBRIN DEGRADATION QUANT: CPT

## 2025-08-21 PROCEDURE — 80053 COMPREHEN METABOLIC PANEL: CPT

## 2025-08-21 PROCEDURE — 84484 ASSAY OF TROPONIN QUANT: CPT

## 2025-08-21 PROCEDURE — 83735 ASSAY OF MAGNESIUM: CPT

## 2025-08-21 PROCEDURE — 36415 COLL VENOUS BLD VENIPUNCTURE: CPT

## 2025-08-21 PROCEDURE — 84703 CHORIONIC GONADOTROPIN ASSAY: CPT

## 2025-08-21 PROCEDURE — 71045 X-RAY EXAM CHEST 1 VIEW: CPT

## 2025-08-21 PROCEDURE — 85025 COMPLETE CBC W/AUTO DIFF WBC: CPT

## 2025-08-21 ASSESSMENT — PAIN SCALES - GENERAL
PAINLEVEL_OUTOF10: 8
PAINLEVEL_OUTOF10: 9

## 2025-08-21 ASSESSMENT — PAIN DESCRIPTION - LOCATION: LOCATION: CHEST

## 2025-08-21 ASSESSMENT — PAIN - FUNCTIONAL ASSESSMENT: PAIN_FUNCTIONAL_ASSESSMENT: 0-10

## 2025-08-21 ASSESSMENT — PAIN DESCRIPTION - DESCRIPTORS: DESCRIPTORS: HEAVINESS;TIGHTNESS

## 2025-08-22 ENCOUNTER — OFFICE VISIT (OUTPATIENT)
Dept: FAMILY MEDICINE CLINIC | Age: 30
End: 2025-08-22
Payer: MEDICARE

## 2025-08-22 VITALS
BODY MASS INDEX: 45 KG/M2 | DIASTOLIC BLOOD PRESSURE: 80 MMHG | SYSTOLIC BLOOD PRESSURE: 118 MMHG | WEIGHT: 293 LBS | RESPIRATION RATE: 16 BRPM | HEART RATE: 104 BPM

## 2025-08-22 DIAGNOSIS — K11.5 SALIVARY DUCT STONES: Primary | ICD-10-CM

## 2025-08-22 DIAGNOSIS — T50.905D ADVERSE EFFECT OF DRUG, SUBSEQUENT ENCOUNTER: ICD-10-CM

## 2025-08-22 PROCEDURE — 99214 OFFICE O/P EST MOD 30 MIN: CPT | Performed by: NURSE PRACTITIONER

## 2025-08-22 SDOH — ECONOMIC STABILITY: FOOD INSECURITY: WITHIN THE PAST 12 MONTHS, YOU WORRIED THAT YOUR FOOD WOULD RUN OUT BEFORE YOU GOT MONEY TO BUY MORE.: NEVER TRUE

## 2025-08-22 SDOH — ECONOMIC STABILITY: FOOD INSECURITY: WITHIN THE PAST 12 MONTHS, THE FOOD YOU BOUGHT JUST DIDN'T LAST AND YOU DIDN'T HAVE MONEY TO GET MORE.: NEVER TRUE

## 2025-08-22 ASSESSMENT — ENCOUNTER SYMPTOMS
NAUSEA: 0
ABDOMINAL PAIN: 0
SHORTNESS OF BREATH: 0
COUGH: 0

## 2025-08-22 ASSESSMENT — HEART SCORE: ECG: NON-SPECIFC REPOLARIZATION DISTURBANCE/LBTB/PM

## (undated) DEVICE — SUTURE VCRL + SZ 4-0 L27IN ABSRB WHT FS-2 3/8 CIR REV CUT VCP422H

## (undated) DEVICE — BANDAGE COMPR M W4INXL10YD WHT BGE VELC E MTRX HK AND LOOP

## (undated) DEVICE — SOLUTION IV IRRIG POUR BRL 0.9% SODIUM CHL 2F7124

## (undated) DEVICE — INTRODUCER CATH L3.5IN DIA7.5FR FOR CHOLANGIOGRAPHY TAUT

## (undated) DEVICE — TAPE,ELASTIC,FOAM,CURAD,4"X5.5YD,LF: Brand: CURAD

## (undated) DEVICE — SPLINT ORTH W4XL30IN WHT FBRGLS 1 SIDE FELT PD CNFRM LO

## (undated) DEVICE — APPLICATOR MEDICATED 26 CC SOLUTION HI LT ORNG CHLORAPREP

## (undated) DEVICE — SUTURE ETHBND D SPEC NO 0 UR 6 30IN D9436

## (undated) DEVICE — SUTURE VCRL SZ 3-0 L18IN ABSRB VLT L26MM SH 1/2 CIR J774D

## (undated) DEVICE — TIP COVER ACCESSORY

## (undated) DEVICE — SURGICEL ENDOSCP APPL

## (undated) DEVICE — DRESSING ANITMICROBIAL FOAM OPTIFOAM POSTOP STRP 35 X 10 IN

## (undated) DEVICE — 2.7MM SHORT DRILL BIT W/QUICK CONNECT: Brand: PERI-LOC

## (undated) DEVICE — BANDAGE COMPR W6INXL12FT SMOOTH FOR LIMB EXSANG ESMARCH

## (undated) DEVICE — DRAPE C ARM W36XL30IN RECTANG BND BG AND TAPE

## (undated) DEVICE — SUTURE VCRL SZ 4-0 L27IN ABSRB UD L60MM KS STR REV CUT NDL J662H

## (undated) DEVICE — PUMP SUC IRR TBNG L10FT W/ HNDPC ASSEMB STRYKEFLOW 2

## (undated) DEVICE — CHLORAPREP 26ML ORANGE

## (undated) DEVICE — ADHESIVE SKIN CLSR 0.7ML TOP DERMBND ADV

## (undated) DEVICE — ARM DRAPE

## (undated) DEVICE — GLOVE SURG SZ 65 THK91MIL LTX FREE SYN POLYISOPRENE

## (undated) DEVICE — COLUMN DRAPE

## (undated) DEVICE — GLOVE ORANGE PI 8 1/2   MSG9085

## (undated) DEVICE — Z DUP USE 2641840 CLIP INT L POLYMER LOK LIG HEM O LOK

## (undated) DEVICE — CATHETER CHOLGM 4.5FR L18IN TIP 5.5FR W/ MTL SUPP TB TAUT

## (undated) DEVICE — YANKAUER,BULB TIP,W/O VENT,RIGID,STERILE: Brand: MEDLINE

## (undated) DEVICE — GLOVE ORANGE PI 8   MSG9080

## (undated) DEVICE — LARGE, DISPOSABLE ALEXIS O C-SECTION PROTECTOR - RETRACTOR: Brand: ALEXIS ® O C-SECTION PROTECTOR - RETRACTOR

## (undated) DEVICE — BAG SPEC REM 224ML W4XL6IN DIA10MM 1 HND GYN DISP ENDOPCH

## (undated) DEVICE — PADDING CAST W6INXL4YD COT LO LINTING WYTEX

## (undated) DEVICE — GLOVE ORANGE PI 7   MSG9070

## (undated) DEVICE — TUBING, SUCTION, 1/4" X 20', STRAIGHT: Brand: MEDLINE INDUSTRIES, INC.

## (undated) DEVICE — SUTURE VCRL + SZ 0 L27IN ABSRB VLT L26MM UR-6 5/8 CIR VCP603H

## (undated) DEVICE — SUTURE VCRL SZ 0 L36IN ABSRB VLT L36MM CT-1 1/2 CIR J346H

## (undated) DEVICE — BANDAGE,GAUZE,4.5"X4.1YD,STERILE,LF: Brand: MEDLINE

## (undated) DEVICE — AIRSEAL 8 MM ACCESS PORT AND LOW PROFILE OBTURATOR WITH BLADELESS OPTICAL TIP, 120 MM LENGTH: Brand: AIRSEAL

## (undated) DEVICE — COVER ARMBRD W13XL28.5IN IMPERV BLU FOR OP RM

## (undated) DEVICE — SOLUTION ANTIFOG VIS SYS CLEARIFY LAPSCP

## (undated) DEVICE — TOTAL TRAY, DB, 100% SILI FOLEY, 16FR 10: Brand: MEDLINE

## (undated) DEVICE — GLOVE ORANGE PI 7 1/2   MSG9075

## (undated) DEVICE — BLADELESS OBTURATOR: Brand: WECK VISTA

## (undated) DEVICE — Device

## (undated) DEVICE — SOLUTION IV 1000ML LAC RINGERS PH 6.5 INJ USP VIAFLX PLAS

## (undated) DEVICE — SUTURE VCRL + SZ 0 L27IN ABSRB VLT L36MM CT-1 1/2 CIR VCPB260H

## (undated) DEVICE — CANNULA SEAL

## (undated) DEVICE — SUTURE VCRL + SZ 0 L27IN ABSRB VLT L26MM CT-2 1/2 CIR VCP334H

## (undated) DEVICE — ELECTRO LUBE IS A SINGLE PATIENT USE DEVICE THAT IS INTENDED TO BE USED ON ELECTROSURGICAL ELECTRODES TO REDUCE STICKING.: Brand: KEY SURGICAL ELECTRO LUBE

## (undated) DEVICE — SPONGE LAP W18XL18IN WHT COT 4 PLY FLD STRUNG RADPQ DISP ST

## (undated) DEVICE — SUTURE MCRYL SZ 3-0 L27IN ABSRB UD L24MM PS-1 3/8 CIR PRIM Y936H

## (undated) DEVICE — GOWN,SIRUS,NONRNF,SETINSLV,XL,20/CS: Brand: MEDLINE

## (undated) DEVICE — GOWN,SIRUS,NON REINFRCD,LARGE,SET IN SL: Brand: MEDLINE

## (undated) DEVICE — COVER EQUIP STEEP TREND EZ CLN UP WTRPRF DISP FOR STD SZ

## (undated) DEVICE — TRI-LUMEN FILTERED TUBE SET WITH ACTIVATED CHARCOAL FILTER: Brand: AIRSEAL

## (undated) DEVICE — 2.0MM DRILL BIT W/ QUICK CONNECT - 157MM: Brand: PERI-LOC

## (undated) DEVICE — REDUCER: Brand: ENDOWRIST

## (undated) DEVICE — LINER SUCT CANSTR 1500CC SEMI RIG W/ POR HYDROPHOBIC SHUT

## (undated) DEVICE — GLOVE SURG SZ 6 THK91MIL LTX FREE SYN POLYISOPRENE ANTI

## (undated) DEVICE — GENERAL LAPAROSCOPY PACK-LF: Brand: MEDLINE INDUSTRIES, INC.

## (undated) DEVICE — DRESSING ANTIMIC W3.5XL14IN IONIC SIL FOAM POSTOP STRP

## (undated) DEVICE — SEAL

## (undated) DEVICE — AGENT HEMSTAT 3GM OXIDIZED REGENERATED CELOS ABSRB FOR CONT (ORDER MULTIPLES OF 5EA)

## (undated) DEVICE — VCARE MEDIUM, UTERINE MANIPULATOR, VAGINAL-CERVICAL-AHLUWALIA'S-RETRACTOR-ELEVATOR: Brand: VCARE

## (undated) DEVICE — PACK PROCEDURE SURG GYN ROBOTIC

## (undated) DEVICE — BANDAGE ADH W1XL3IN NAT FAB WVN FLX DURABLE N ADH PD SEAL

## (undated) DEVICE — TUBING INSUFFLATOR HEAT HUMIDIFIED SMK EVAC SET PNEUMOCLEAR

## (undated) DEVICE — PAD,ABDOMINAL,5"X9",ST,LF,25/BX: Brand: MEDLINE INDUSTRIES, INC.

## (undated) DEVICE — TRAY EPI 25GA L3.5IN 0.75% BIPIVCAIN 8.25% D CONTAIN BPA

## (undated) DEVICE — BASIC SINGLE BASIN BTC-LF: Brand: MEDLINE INDUSTRIES, INC.

## (undated) DEVICE — STRIP,CLOSURE,WOUND,MEDI-STRIP,1/2X4: Brand: MEDLINE

## (undated) DEVICE — 3M™ STERI-STRIP™ COMPOUND BENZOIN TINCTURE 40 BAGS/CARTON 4 CARTONS/CASE C1544: Brand: 3M™ STERI-STRIP™

## (undated) DEVICE — 450 ML BOTTLE OF 0.05% CHLORHEXIDINE GLUCONATE IN 99.95% STERILE WATER FOR IRRIGATION, USP AND APPLICATOR.: Brand: IRRISEPT ANTIMICROBIAL WOUND LAVAGE

## (undated) DEVICE — IMPREGNATED GAUZE DRESSING: Brand: CUTICERIN 7.5X20CM CTN 50

## (undated) DEVICE — PADDING CAST W6INXL4YD POLY POR SPUN DACRON SYN VERSATILE

## (undated) DEVICE — INTENDED FOR TISSUE SEPARATION, AND OTHER PROCEDURES THAT REQUIRE A SHARP SURGICAL BLADE TO PUNCTURE OR CUT.: Brand: BARD-PARKER ® CARBON RIB-BACK BLADES

## (undated) DEVICE — TROCAR: Brand: KII FIOS FIRST ENTRY

## (undated) DEVICE — SPONGE GZ W4XL4IN COT 12 PLY TYP VII WVN C FLD DSGN